# Patient Record
Sex: FEMALE | Race: BLACK OR AFRICAN AMERICAN | Employment: OTHER | URBAN - METROPOLITAN AREA
[De-identification: names, ages, dates, MRNs, and addresses within clinical notes are randomized per-mention and may not be internally consistent; named-entity substitution may affect disease eponyms.]

---

## 2017-07-05 ENCOUNTER — CLINICAL SUPPORT (OUTPATIENT)
Dept: CARDIOLOGY CLINIC | Age: 62
End: 2017-07-05

## 2017-07-05 ENCOUNTER — OFFICE VISIT (OUTPATIENT)
Dept: CARDIOLOGY CLINIC | Age: 62
End: 2017-07-05

## 2017-07-05 VITALS
WEIGHT: 193.4 LBS | SYSTOLIC BLOOD PRESSURE: 132 MMHG | HEART RATE: 84 BPM | HEIGHT: 67 IN | RESPIRATION RATE: 16 BRPM | DIASTOLIC BLOOD PRESSURE: 90 MMHG | BODY MASS INDEX: 30.35 KG/M2 | OXYGEN SATURATION: 95 %

## 2017-07-05 DIAGNOSIS — I49.3 PVC (PREMATURE VENTRICULAR CONTRACTION): ICD-10-CM

## 2017-07-05 DIAGNOSIS — Z95.810 IMPLANTABLE CARDIOVERTER-DEFIBRILLATOR (ICD) IN SITU: ICD-10-CM

## 2017-07-05 DIAGNOSIS — Z95.810 CARDIAC DEFIBRILLATOR IN SITU: Primary | ICD-10-CM

## 2017-07-05 DIAGNOSIS — I42.9 CARDIOMYOPATHY, IDIOPATHIC (HCC): Primary | ICD-10-CM

## 2017-07-05 RX ORDER — SITAGLIPTIN 100 MG/1
TABLET, FILM COATED ORAL
Refills: 5 | COMMUNITY
Start: 2017-06-21 | End: 2019-01-21 | Stop reason: SDUPTHER

## 2017-07-05 RX ORDER — HYDROCHLOROTHIAZIDE 12.5 MG/1
CAPSULE ORAL
Refills: 2 | COMMUNITY
Start: 2017-05-15 | End: 2021-01-01

## 2017-07-05 RX ORDER — LOSARTAN POTASSIUM 100 MG/1
100 TABLET ORAL DAILY
Refills: 2 | COMMUNITY
Start: 2017-04-25 | End: 2021-01-01

## 2017-07-05 NOTE — PROGRESS NOTES
Visit Vitals    /90 (BP 1 Location: Left arm, BP Patient Position: Sitting)    Pulse 84    Resp 16    Ht 5' 7\" (1.702 m)    Wt 193 lb 6.4 oz (87.7 kg)    SpO2 95%    BMI 30.29 kg/m2

## 2017-07-05 NOTE — PROGRESS NOTES
HISTORY OF PRESENTING ILLNESS      Daysi Mobley is a 60 y.o. female with hypertension, cardiomyopathy, and dual chamber Medtronic ICD who presents for follow up of her ICD. She denies chest pain, SOB, edema, syncope or functional limitations. Her device interrogation reveals normal device functioning with > 7 years of battery life remaining. ACTIVE PROBLEM LIST     Patient Active Problem List    Diagnosis Date Noted    Implantable cardioverter-defibrillator (ICD) in situ 11/16/2010    Cardiomyopathy, idiopathic ([address] 10/28/2010    HTN (hypertension), benign 10/28/2010    PVC (premature ventricular contraction) 10/28/2010           PAST MEDICAL HISTORY     Past Medical History:   Diagnosis Date    Cardiomyopathy, idiopathic ([address] 10/28/2010    HTN (hypertension), benign 10/28/2010    PVC (premature ventricular contraction) 10/28/2010           PAST SURGICAL HISTORY     History reviewed. No pertinent surgical history. ALLERGIES     Allergies   Allergen Reactions    Iodine Anaphylaxis    Lisinopril Anaphylaxis          FAMILY HISTORY     History reviewed. No pertinent family history. negative for cardiac disease       SOCIAL HISTORY     Social History     Social History    Marital status: SINGLE     Spouse name: N/A    Number of children: N/A    Years of education: N/A     Social History Main Topics    Smoking status: Never Smoker    Smokeless tobacco: Never Used    Alcohol use No    Drug use: None    Sexual activity: Not Asked     Other Topics Concern    None     Social History Narrative         MEDICATIONS     Current Outpatient Prescriptions   Medication Sig    JANUVIA 100 mg tablet TAKE 1 TABLET BY MOUTH DAILY    losartan (COZAAR) 100 mg tablet TAKE 1 TABLET EVERY DAY    hydroCHLOROthiazide (MICROZIDE) 12.5 mg capsule TAKE ONE CAPSULE BY MOUTH ONCE A DAY    cloNIDine (CATAPRESS) 0.2 mg tablet Take  by mouth daily.     PARoxetine (PAXIL) 20 mg tablet Take  by mouth daily.    omeprazole (PRILOSEC) 20 mg capsule Take 20 mg by mouth daily. No current facility-administered medications for this visit. I have reviewed the nurses notes, vitals, problem list, allergy list, medical history, family, social history and medications. REVIEW OF SYMPTOMS      General: Pt denies excessive weight gain or loss. Pt is able to conduct ADL's  HEENT: Denies blurred vision, headaches, hearing loss, epistaxis and difficulty swallowing. Respiratory: Denies cough, congestion, shortness of breath, WEAVER, wheezing or stridor. Cardiovascular: Denies precordial pain, palpitations, edema or PND  Gastrointestinal: Denies poor appetite, indigestion, abdominal pain or blood in stool  Genitourinary: Denies hematuria, dysuria, increased urinary frequency  Musculoskeletal: Denies joint pain or swelling from muscles or joints  Neurologic: Denies tremor, paresthesias, headache, or sensory motor disturbance  Psychiatric: Denies confusion, insomnia, depression  Integumentray: Denies rash, itching or ulcers. Hematologic: Denies easy bruising, bleeding       PHYSICAL EXAMINATION      Vitals:    07/05/17 1433   BP: 132/90   Pulse: 84   Resp: 16   SpO2: 95%   Weight: 193 lb 6.4 oz (87.7 kg)   Height: 5' 7\" (1.702 m)     General: Well developed, in no acute distress. HEENT: No jaundice, oral mucosa moist, no oral ulcers  Neck: Supple, no stiffness, no lymphadenopathy, supple  Heart:  Normal S1/S2 negative S3 or S4. Regular, no murmur, gallop or rub, no jugular venous distention  Respiratory: Clear bilaterally x 4, no wheezing or rales  Abdomen:   Soft, non-tender, bowel sounds are active.   Extremities:  No edema, normal cap refill, no cyanosis. Musculoskeletal: No clubbing, no deformities  Neuro: A&Ox3, speech clear, gait stable, cooperative, no focal neurologic deficits  Skin: Skin color is normal. No rashes or lesions.  Non diaphoretic, moist.  Vascular: 2+ pulses symmetric in all extremities DIAGNOSTIC DATA      EKG:        LABORATORY DATA      Lab Results   Component Value Date/Time    WBC 5.6 02/14/2014 03:17 PM    HGB 12.8 02/14/2014 03:17 PM    HCT 38.0 02/14/2014 03:17 PM    PLATELET 221 76/69/6217 03:17 PM    MCV 90 02/14/2014 03:17 PM      Lab Results   Component Value Date/Time    Sodium 142 02/14/2014 03:17 PM    Potassium 4.0 02/14/2014 03:17 PM    Chloride 106 02/14/2014 03:17 PM    CO2 25 02/14/2014 03:17 PM    Glucose 85 02/14/2014 03:17 PM    BUN 16 02/14/2014 03:17 PM    Creatinine 0.91 02/14/2014 03:17 PM    BUN/Creatinine ratio 18 02/14/2014 03:17 PM    GFR est AA 79 02/14/2014 03:17 PM    GFR est non-AA 69 02/14/2014 03:17 PM    Calcium 9.2 02/14/2014 03:17 PM           ASSESSMENT      1. ICD   A. Medtronic   B. Dual  2. Cardiomyopathy  3. Hypertension       PLAN     Continue follow up in device clinic. Patient prefers to continue with in-office device checks rather than remote monitoring. FOLLOW-UP     1 year      Thank you, Blake Ordonez MD for allowing me to participate in the care of this extraordinarily pleasant female. Please do not hesitate to contact me for further questions/concerns.          Galindo Lyle MD  Cardiac Electrophysiology / Cardiology    [facility].  [address] [facility], [address], [address] [place] [address], [facility]  [phone] / [phone] Fax   [phone] / [phone] Fax

## 2017-12-18 ENCOUNTER — CLINICAL SUPPORT (OUTPATIENT)
Dept: CARDIOLOGY CLINIC | Age: 62
End: 2017-12-18

## 2017-12-18 DIAGNOSIS — Z95.810 CARDIAC DEFIBRILLATOR IN SITU: Primary | ICD-10-CM

## 2018-03-26 ENCOUNTER — CLINICAL SUPPORT (OUTPATIENT)
Dept: CARDIOLOGY CLINIC | Age: 63
End: 2018-03-26

## 2018-03-26 DIAGNOSIS — Z95.810 CARDIAC DEFIBRILLATOR IN SITU: Primary | ICD-10-CM

## 2018-08-08 ENCOUNTER — OFFICE VISIT (OUTPATIENT)
Dept: CARDIOLOGY CLINIC | Age: 63
End: 2018-08-08

## 2018-08-08 ENCOUNTER — CLINICAL SUPPORT (OUTPATIENT)
Dept: CARDIOLOGY CLINIC | Age: 63
End: 2018-08-08

## 2018-08-08 VITALS
SYSTOLIC BLOOD PRESSURE: 138 MMHG | RESPIRATION RATE: 16 BRPM | BODY MASS INDEX: 29.03 KG/M2 | WEIGHT: 185 LBS | HEIGHT: 67 IN | HEART RATE: 68 BPM | DIASTOLIC BLOOD PRESSURE: 88 MMHG | OXYGEN SATURATION: 98 %

## 2018-08-08 DIAGNOSIS — I49.3 PVC (PREMATURE VENTRICULAR CONTRACTION): ICD-10-CM

## 2018-08-08 DIAGNOSIS — I10 HTN (HYPERTENSION), BENIGN: ICD-10-CM

## 2018-08-08 DIAGNOSIS — Z95.810 IMPLANTABLE CARDIOVERTER-DEFIBRILLATOR (ICD) IN SITU: ICD-10-CM

## 2018-08-08 DIAGNOSIS — Z95.810 CARDIAC DEFIBRILLATOR IN SITU: Primary | ICD-10-CM

## 2018-08-08 DIAGNOSIS — I42.9 CARDIOMYOPATHY, IDIOPATHIC (HCC): Primary | ICD-10-CM

## 2018-08-08 DIAGNOSIS — I48.0 PAROXYSMAL ATRIAL FIBRILLATION (HCC): ICD-10-CM

## 2018-08-08 RX ORDER — NIFEDIPINE 30 MG/1
TABLET, FILM COATED, EXTENDED RELEASE ORAL
Refills: 2 | COMMUNITY
Start: 2018-05-20 | End: 2021-01-01

## 2018-08-08 RX ORDER — CLONIDINE HYDROCHLORIDE 0.2 MG/1
0.2 TABLET ORAL 2 TIMES DAILY
Refills: 3 | COMMUNITY
Start: 2018-05-21 | End: 2021-01-01

## 2018-08-08 RX ORDER — METOPROLOL TARTRATE 25 MG/1
25 TABLET, FILM COATED ORAL 2 TIMES DAILY
Qty: 60 TAB | Refills: 3 | Status: SHIPPED | OUTPATIENT
Start: 2018-08-08 | End: 2018-09-10 | Stop reason: ALTCHOICE

## 2018-08-08 NOTE — PATIENT INSTRUCTIONS
Treatment Plan:  · Follow up in 1 month. · Start metoprolol 25mg twice daily. · Start Eliquis 5mg twice daily    Apixaban (By mouth)   Apixaban (a-PIX-a-ban)  Treats and prevents blood clots. This medicine is a blood thinner. Brand Name(s): Eliquis   There may be other brand names for this medicine. When This Medicine Should Not Be Used: This medicine is not right for everyone. Do not use it if you had an allergic reaction to apixaban or you have active bleeding. How to Use This Medicine:   Tablet  · Your doctor will tell you how much medicine to use. Do not use more than directed. · If you are not able to swallow the tablets whole, they may be crushed and mixed in water, 5% dextrose in water (D5W), apple juice, or applesauce. The crushed tablets may be mixed with 60 mL of water or D5W dose and given through a nasogastric tube (NGT). · This medicine should come with a Medication Guide. Ask your pharmacist for a copy if you do not have one. · Missed dose: Take a dose as soon as you remember. If it is almost time for your next dose, wait until then and take a regular dose. Do not take extra medicine to make up for a missed dose. · Store the medicine in a closed container at room temperature, away from heat, moisture, and direct light. Drugs and Foods to Avoid:   Ask your doctor or pharmacist before using any other medicine, including over-the-counter medicines, vitamins, and herbal products. · Some medicines can affect how apixaban works.  Tell your doctor if you are using any of the following:   ¨ Carbamazepine, clarithromycin, itraconazole, ketoconazole, phenytoin, rifampin, ritonavir, Lauryn's wort  ¨ Blood thinner (including clopidogrel, heparin, prasugrel, warfarin)  ¨ Medicine to treat depression  ¨ NSAID pain or arthritis medicine (including aspirin, celecoxib, diclofenac, ibuprofen, naproxen)  Warnings While Using This Medicine:   · Tell your doctor if you are pregnant or breastfeeding, or if you have kidney disease, liver disease, bleeding problems, or an artificial heart valve. · Do not stop using this medicine suddenly without asking your doctor. You might have a higher risk of stroke for a short time after you stop using this medicine. · This medicine increases your risk for bleeding that can become serious if not controlled. You may also bruise easily, and it may take longer than usual for bleeding to stop. · This medicine may increase your risk for blood clots in your spine or back if you undergo an epidural or spinal puncture. This could lead to paralysis. Tell your doctor if you ever had spine problems or back surgery. · Tell any doctor or dentist who treats you that you are using this medicine. With your doctor's supervision, you may need to stop using this medicine several days before you have surgery or medical tests. · Your doctor will do lab tests at regular visits to check on the effects of this medicine. Keep all appointments. · Keep all medicine out of the reach of children. Never share your medicine with anyone. Possible Side Effects While Using This Medicine:   Call your doctor right away if you notice any of these side effects:  · Allergic reaction: Itching or hives, swelling in your face or hands, swelling or tingling in your mouth or throat, chest tightness, trouble breathing  · Change in how much or how often you urinate, red or pink urine  · Chest pain, trouble breathing  · Coughing up blood, vomiting blood or material that looks like coffee grounds  · Numbness, tingling, or muscle weakness in your legs or feet  · Red or black, tarry stools  · Unusual bleeding, bruising, or weakness  If you notice other side effects that you think are caused by this medicine, tell your doctor. Call your doctor for medical advice about side effects.  You may report side effects to FDA at 9-573-FDA-4284  © 2017 [facility] Information is for End User's use only and may not be sold, redistributed or otherwise used for commercial purposes. The above information is an  only. It is not intended as medical advice for individual conditions or treatments. Talk to your doctor, nurse or pharmacist before following any medical regimen to see if it is safe and effective for you.

## 2018-08-08 NOTE — MR AVS SNAPSHOT
[address]  [place] [address] [facility] 
[phone] Patient: Rocío Roman MRN: [id number] QIS:[id number]/[id number] Visit Information Date & Time Provider Department Dept. Phone Encounter #  
 8/8/2018  9:20 AM Damaso Encinas MD [facility] [place] Addison [phone] [id number] Your Appointments 9/10/2018  9:40 AM  
ESTABLISHED PATIENT with Damaso Encinas MD  
[facility] ([address]) Appt Note: 1 month follow up  
 [address] [place] [address] [place] [id number]  
[phone]  
  
   
 [address] [facility] [place] [address] [place] [address]  
  
    
 11/12/2018  8:45 AM  
PACEMAKER with PACEMAKER, FRANCES  
[facility] (CRISTY SCHEDULING) Appt Note: med icd/stf  
 [address] [facility] [place] [address] [facility]  
[address] [facility] [address] [address] Upcoming Health Maintenance Date Due Hepatitis C Screening 1955 Pneumococcal 19-64 Medium Risk (1 of 1 - PPSV23) 1/28/1973 DTaP/Tdap/Td series (1 - Tdap) 1/28/1975 PAP AKA CERVICAL CYTOLOGY 1/28/1975 BREAST CANCER SCRN MAMMOGRAM 1/28/2004 FOBT Q 1 YEAR AGE 50-75 1/28/2004 ZOSTER VACCINE AGE 60> 11/28/2013 MEDICARE YEARLY EXAM 3/14/2018 Influenza Age 5 to Adult 8/1/2018 Allergies as of 8/8/2018  Review Complete On: 8/8/2018 By: Mylene Balbuena NP Severity Noted Reaction Type Reactions Iodine High 10/28/2010    Anaphylaxis Lisinopril High 02/14/2014    Anaphylaxis Current Immunizations  Reviewed on 2/28/2014 No immunizations on file. Not reviewed this visit You Were Diagnosed With   
  
 Codes Comments Cardiomyopathy, idiopathic ([facility]    -  Primary ICD-10-CM: I42.8 ICD-9-CM: 425.4 PVC (premature ventricular contraction)     ICD-10-CM: I49.3 ICD-9-CM: 427.69   
 Implantable cardioverter-defibrillator (ICD) in situ     ICD-10-CM: Z95.810 ICD-9-CM: V45.02 Paroxysmal atrial fibrillation (HCC)     ICD-10-CM: I48.0 ICD-9-CM: 427.31   
 HTN (hypertension), benign     ICD-10-CM: I10 
ICD-9-CM: 401.1 Vitals BP Pulse Resp Height(growth percentile) Weight(growth percentile) SpO2  
 138/88 (BP 1 Location: Right arm, BP Patient Position: Sitting) 68 16 5' 7\" (1.702 m) 185 lb (83.9 kg) 98% BMI Smoking Status 28.98 kg/m2 Never Smoker Vitals History BMI and BSA Data Body Mass Index Body Surface Area  
 28.98 kg/m 2 1.99 m 2 Preferred Pharmacy Pharmacy Name Phone [id number] [place] [place] - [address] [place] [address] [phone] Your Updated Medication List  
  
   
This list is accurate as of 8/8/18  9:58 AM.  Always use your most recent med list.  
  
  
  
  
 apixaban 5 mg tablet Commonly known as:  Laurent Amna Take 1 Tab by mouth two (2) times a day. cloNIDine HCl 0.3 mg tablet Commonly known as:  CATAPRES  
TAKE 1 TABLET TWICE DAILY  
  
 hydroCHLOROthiazide 12.5 mg capsule Commonly known as:  Christo Hilario TAKE ONE CAPSULE BY MOUTH ONCE A DAY  
  
 JANUVIA 100 mg tablet Generic drug:  SITagliptin TAKE 1 TABLET BY MOUTH DAILY losartan 100 mg tablet Commonly known as:  COZAAR  
TAKE 1 TABLET EVERY DAY  
  
 metoprolol tartrate 25 mg tablet Commonly known as:  LOPRESSOR Take 1 Tab by mouth two (2) times a day. NIFEdipine ER 30 mg ER tablet Commonly known as:  ADALAT CC  
TAKE 1 TABLET BY MOUTH EVERY DAY FOR 90 DAYS  
  
 omeprazole 20 mg capsule Commonly known as:  PRILOSEC Take 20 mg by mouth daily. PARoxetine 20 mg tablet Commonly known as:  PAXIL Take  by mouth daily. Prescriptions Sent to Pharmacy Refills  
 apixaban (ELIQUIS) 5 mg tablet 3 Sig: Take 1 Tab by mouth two (2) times a day.   
 Class: Normal  
 Pharmacy: [facility]acy [address] Ph #: [phone] Route: Oral  
 metoprolol tartrate (LOPRESSOR) 25 mg tablet 3 Sig: Take 1 Tab by mouth two (2) times a day. Class: Normal  
 Pharmacy: [facility]/pharm[facility] #[id number] [address] Ph #: [phone] Route: Oral  
  
We Performed the Following AMB POC EKG ROUTINE W/ 12 LEADS, INTER & REP [23018 CPT(R)] CBC WITH AUTOMATED DIFF [53195 CPT(R)] METABOLIC PANEL, COMPREHENSIVE [70151 CPT(R)] THYROID PANEL W/TSH [16326 CPT(R)] Patient Instructions Treatment Plan: · Follow up in 1 month. · Start metoprolol 25mg twice daily. · Start Eliquis 5mg twice daily Apixaban (By mouth) Apixaban (a-PIX-a-ban) Treats and prevents blood clots. This medicine is a blood thinner. Brand Name(s): Eliquis There may be other brand names for this medicine. When This Medicine Should Not Be Used: This medicine is not right for everyone. Do not use it if you had an allergic reaction to apixaban or you have active bleeding. How to Use This Medicine:  
Tablet · Your doctor will tell you how much medicine to use. Do not use more than directed. · If you are not able to swallow the tablets whole, they may be crushed and mixed in water, 5% dextrose in water (D5W), apple juice, or applesauce. The crushed tablets may be mixed with 60 mL of water or D5W dose and given through a nasogastric tube (NGT). · This medicine should come with a Medication Guide. Ask your pharmacist for a copy if you do not have one. · Missed dose: Take a dose as soon as you remember. If it is almost time for your next dose, wait until then and take a regular dose. Do not take extra medicine to make up for a missed dose. · Store the medicine in a closed container at room temperature, away from heat, moisture, and direct light. Drugs and Foods to Avoid: Ask your doctor or pharmacist before using any other medicine, including over-the-counter medicines, vitamins, and herbal products. · Some medicines can affect how apixaban works. Tell your doctor if you are using any of the following: ¨ Carbamazepine, clarithromycin, itraconazole, ketoconazole, phenytoin, rifampin, ritonavir, Lauryn's wort ¨ Blood thinner (including clopidogrel, heparin, prasugrel, warfarin) ¨ Medicine to treat depression ¨ NSAID pain or arthritis medicine (including aspirin, celecoxib, diclofenac, ibuprofen, naproxen) Warnings While Using This Medicine: · Tell your doctor if you are pregnant or breastfeeding, or if you have kidney disease, liver disease, bleeding problems, or an artificial heart valve. · Do not stop using this medicine suddenly without asking your doctor. You might have a higher risk of stroke for a short time after you stop using this medicine. · This medicine increases your risk for bleeding that can become serious if not controlled. You may also bruise easily, and it may take longer than usual for bleeding to stop. · This medicine may increase your risk for blood clots in your spine or back if you undergo an epidural or spinal puncture. This could lead to paralysis. Tell your doctor if you ever had spine problems or back surgery. · Tell any doctor or dentist who treats you that you are using this medicine. With your doctor's supervision, you may need to stop using this medicine several days before you have surgery or medical tests. · Your doctor will do lab tests at regular visits to check on the effects of this medicine. Keep all appointments. · Keep all medicine out of the reach of children. Never share your medicine with anyone. Possible Side Effects While Using This Medicine:  
Call your doctor right away if you notice any of these side effects: · Allergic reaction: Itching or hives, swelling in your face or hands, swelling or tingling in your mouth or throat, chest tightness, trouble breathing · Change in how much or how often you urinate, red or pink urine · Chest pain, trouble breathing · Coughing up blood, vomiting blood or material that looks like coffee grounds · Numbness, tingling, or muscle weakness in your legs or feet · Red or black, tarry stools · Unusual bleeding, bruising, or weakness If you notice other side effects that you think are caused by this medicine, tell your doctor. Call your doctor for medical advice about side effects. You may report side effects to FDA at 9-755-GQE-3035 © 2017 [address] Information is for End User's use only and may not be sold, redistributed or otherwise used for commercial purposes. The above information is an  only. It is not intended as medical advice for individual conditions or treatments. Talk to your doctor, nurse or pharmacist before following any medical regimen to see if it is safe and effective for you. Introducing [facility]! [employer] introduces [employer] patient portal. Now you can access parts of your medical record, email your doctor's office, and request medication refills online. 1. In your internet browser, go to [email or web address] 2. Click on the First Time User? Click Here link in the Sign In box. You will see the New Member Sign Up page. 3. Enter your [employer] Access Code exactly as it appears below. You will not need to use this code after youve completed the sign-up process. If you do not sign up before the expiration date, you must request a new code. · [employer] Access Code: [email or web address] Expires: 11/6/2018  9:58 AM 
 
4. Enter the last four digits of your Social Security Number (xxxx) and Date of Birth (mm/dd/yyyy) as indicated and click Submit. You will be taken to the next sign-up page. 5. Create a [employer] ID.  This will be your [employer] login ID and cannot be changed, so think of one that is secure and easy to remember. 6. Create a [employer] password. You can change your password at any time. 7. Enter your Password Reset Question and Answer. This can be used at a later time if you forget your password. 8. Enter your e-mail address. You will receive e-mail notification when new information is available in [address] 9. Click Sign Up. You can now view and download portions of your medical record. 10. Click the Download Summary menu link to download a portable copy of your medical information. If you have questions, please visit the Frequently Asked Questions section of the [employer] website. Remember, [employer] is NOT to be used for urgent needs. For medical emergencies, dial 911. Now available from your iPhone and Android! Please provide this summary of care documentation to your next provider. Your primary care clinician is listed as Sherlean Gosselin. If you have any questions after today's visit, please call [phone].

## 2018-08-08 NOTE — PROGRESS NOTES
Visit Vitals    /88 (BP 1 Location: Right arm, BP Patient Position: Sitting)    Pulse 68    Resp 16    Ht 5' 7\" (1.702 m)    Wt 185 lb (83.9 kg)    SpO2 98%    BMI 28.98 kg/m2     Medication changes made  per verbal order of Dr. Jerry Banda

## 2018-08-08 NOTE — PROGRESS NOTES
HISTORY OF PRESENTING ILLNESS      Alessandra Payne is a 58 y.o. female hypertension, cardiomyopathy, and dual chamber Medtronic ICD who presents for follow up of her ICD. She denies chest pain, SOB, edema, syncope or functional limitations. Her device interrogation reveals normal device functioning. She has new onset atrial fibrillation occurring on device check, longest episode was 40 minutes. She denies cardiac complaints. ACTIVE PROBLEM LIST     Patient Active Problem List    Diagnosis Date Noted    Implantable cardioverter-defibrillator (ICD) in situ 11/16/2010    Cardiomyopathy, idiopathic ([address] 10/28/2010    HTN (hypertension), benign 10/28/2010    PVC (premature ventricular contraction) 10/28/2010           PAST MEDICAL HISTORY     Past Medical History:   Diagnosis Date    Cardiomyopathy, idiopathic ([address] 10/28/2010    HTN (hypertension), benign 10/28/2010    PVC (premature ventricular contraction) 10/28/2010           PAST SURGICAL HISTORY     History reviewed. No pertinent surgical history. ALLERGIES     Allergies   Allergen Reactions    Iodine Anaphylaxis    Lisinopril Anaphylaxis          FAMILY HISTORY     History reviewed. No pertinent family history. negative for cardiac disease       SOCIAL HISTORY     Social History     Social History    Marital status: SINGLE     Spouse name: N/A    Number of children: N/A    Years of education: N/A     Social History Main Topics    Smoking status: Never Smoker    Smokeless tobacco: Never Used    Alcohol use No    Drug use: None    Sexual activity: Not Asked     Other Topics Concern    None     Social History Narrative         MEDICATIONS     Current Outpatient Prescriptions   Medication Sig    cloNIDine HCl (CATAPRES) 0.3 mg tablet TAKE 1 TABLET TWICE DAILY    NIFEdipine ER (ADALAT CC) 30 mg ER tablet TAKE 1 TABLET BY MOUTH EVERY DAY FOR 90 DAYS    apixaban (ELIQUIS) 5 mg tablet Take 1 Tab by mouth two (2) times a day.  metoprolol tartrate (LOPRESSOR) 25 mg tablet Take 1 Tab by mouth two (2) times a day.  JANUVIA 100 mg tablet TAKE 1 TABLET BY MOUTH DAILY    losartan (COZAAR) 100 mg tablet TAKE 1 TABLET EVERY DAY    hydroCHLOROthiazide (MICROZIDE) 12.5 mg capsule TAKE ONE CAPSULE BY MOUTH ONCE A DAY    PARoxetine (PAXIL) 20 mg tablet Take  by mouth daily.  omeprazole (PRILOSEC) 20 mg capsule Take 20 mg by mouth daily. No current facility-administered medications for this visit. I have reviewed the nurses notes, vitals, problem list, allergy list, medical history, family, social history and medications. REVIEW OF SYMPTOMS      General: Pt denies excessive weight gain or loss. Pt is able to conduct ADL's  HEENT: Denies blurred vision, headaches, hearing loss, epistaxis and difficulty swallowing. Respiratory: Denies cough, congestion, shortness of breath, WEAVER, wheezing or stridor. Cardiovascular: Denies precordial pain, palpitations, edema or PND  Gastrointestinal: Denies poor appetite, indigestion, abdominal pain or blood in stool  Genitourinary: Denies hematuria, dysuria, increased urinary frequency  Musculoskeletal: Denies joint pain or swelling from muscles or joints  Neurologic: Denies tremor, paresthesias, headache, or sensory motor disturbance  Psychiatric: Denies confusion, insomnia, depression  Integumentray: Denies rash, itching or ulcers. Hematologic: Denies easy bruising, bleeding       PHYSICAL EXAMINATION      Vitals:    08/08/18 0830   BP: 138/88   Pulse: 68   Resp: 16   SpO2: 98%   Weight: 185 lb (83.9 kg)   Height: 5' 7\" (1.702 m)     General: Well developed, in no acute distress. HEENT: No jaundice, oral mucosa moist, no oral ulcers  Neck: Supple, no stiffness, no lymphadenopathy, supple  Heart:  Normal S1/S2 negative S3 or S4.  Regular, no murmur, gallop or rub, no jugular venous distention  Respiratory: Clear bilaterally x 4, no wheezing or rales  Abdomen:   Soft, non-tender, bowel sounds are active.   Extremities:  No edema, normal cap refill, no cyanosis. Musculoskeletal: No clubbing, no deformities  Neuro: A&Ox3, speech clear, gait stable, cooperative, no focal neurologic deficits  Skin: Skin color is normal. No rashes or lesions. Non diaphoretic, moist.  Vascular: 2+ pulses symmetric in all extremities       DIAGNOSTIC DATA      EKG: sinus rhythm        LABORATORY DATA      Lab Results   Component Value Date/Time    WBC 5.6 02/14/2014 03:17 PM    HGB 12.8 02/14/2014 03:17 PM    HCT 38.0 02/14/2014 03:17 PM    PLATELET 620 62/58/5349 03:17 PM    MCV 90 02/14/2014 03:17 PM      Lab Results   Component Value Date/Time    Sodium 142 02/14/2014 03:17 PM    Potassium 4.0 02/14/2014 03:17 PM    Chloride 106 02/14/2014 03:17 PM    CO2 25 02/14/2014 03:17 PM    Glucose 85 02/14/2014 03:17 PM    BUN 16 02/14/2014 03:17 PM    Creatinine 0.91 02/14/2014 03:17 PM    BUN/Creatinine ratio 18 02/14/2014 03:17 PM    GFR est AA 79 02/14/2014 03:17 PM    GFR est non-AA 69 02/14/2014 03:17 PM    Calcium 9.2 02/14/2014 03:17 PM           ASSESSMENT      1. ICD                        A. Medtronic                        B. Dual  2. Cardiomyopathy  3. Hypertension  4. Paroxysmal [facility]   CHADS VASC 3              Asymptomatic       PLAN     Will start Eliquis 5mg BID for CVA risk reduction (CHADS VASC 3) as well as Lopressor 25mg BID. Obtain labwork and follow up with device interrogation in one month. FOLLOW-UP   1 month    Thank you, Gladys Christian MD for allowing me to participate in the care of this extraordinarily pleasant female. Please do not hesitate to contact me for further questions/concerns.      WILLIE Dey MD  Cardiac Electrophysiology / Cardiology    [address]  [address] [facility], [address] [place] [address], [address] [place] [address], [address] [place] [address] [id number]  [phone] / [phone] Fax   [phone] / [phone] Fax

## 2018-08-13 ENCOUNTER — HOSPITAL ENCOUNTER (OUTPATIENT)
Dept: LAB | Age: 63
Discharge: HOME | End: 2018-08-13
Payer: MEDICARE

## 2018-08-13 PROCEDURE — 36415 COLL VENOUS BLD VENIPUNCTURE: CPT

## 2018-08-13 PROCEDURE — 84443 ASSAY THYROID STIM HORMONE: CPT

## 2018-08-13 PROCEDURE — 80053 COMPREHEN METABOLIC PANEL: CPT

## 2018-08-13 PROCEDURE — 85025 COMPLETE CBC W/AUTO DIFF WBC: CPT

## 2018-08-14 LAB
ALBUMIN SERPL-MCNC: 4 G/DL (ref 3.6–4.8)
ALBUMIN/GLOB SERPL: 1.2 {RATIO} (ref 1.2–2.2)
ALP SERPL-CCNC: 117 IU/L (ref 39–117)
ALT SERPL-CCNC: 16 IU/L (ref 0–32)
AST SERPL-CCNC: 14 IU/L (ref 0–40)
BASOPHILS # BLD AUTO: 0 X10E3/UL (ref 0–0.2)
BASOPHILS NFR BLD AUTO: 0 %
BILIRUB SERPL-MCNC: 0.4 MG/DL (ref 0–1.2)
BUN SERPL-MCNC: 21 MG/DL (ref 8–27)
BUN/CREAT SERPL: 18 (ref 12–28)
CALCIUM SERPL-MCNC: 9.5 MG/DL (ref 8.7–10.3)
CHLORIDE SERPL-SCNC: 93 MMOL/L (ref 96–106)
CO2 SERPL-SCNC: 26 MMOL/L (ref 20–29)
CREAT SERPL-MCNC: 1.19 MG/DL (ref 0.57–1)
EOSINOPHIL # BLD AUTO: 0.1 X10E3/UL (ref 0–0.4)
EOSINOPHIL NFR BLD AUTO: 1 %
ERYTHROCYTE [DISTWIDTH] IN BLOOD BY AUTOMATED COUNT: 13.5 % (ref 12.3–15.4)
FT4I SERPL CALC-MCNC: 1.9 (ref 1.2–4.9)
GLOBULIN SER CALC-MCNC: 3.4 G/DL (ref 1.5–4.5)
GLUCOSE SERPL-MCNC: 417 MG/DL (ref 65–99)
HCT VFR BLD AUTO: 40.5 % (ref 34–46.6)
HGB BLD-MCNC: 13.7 G/DL (ref 11.1–15.9)
IMM GRANULOCYTES # BLD: 0 X10E3/UL (ref 0–0.1)
IMM GRANULOCYTES NFR BLD: 0 %
INTERPRETATION: NORMAL
LYMPHOCYTES # BLD AUTO: 1.8 X10E3/UL (ref 0.7–3.1)
LYMPHOCYTES NFR BLD AUTO: 29 %
MCH RBC QN AUTO: 30 PG (ref 26.6–33)
MCHC RBC AUTO-ENTMCNC: 33.8 G/DL (ref 31.5–35.7)
MCV RBC AUTO: 89 FL (ref 79–97)
MONOCYTES # BLD AUTO: 0.5 X10E3/UL (ref 0.1–0.9)
MONOCYTES NFR BLD AUTO: 8 %
NEUTROPHILS # BLD AUTO: 3.7 X10E3/UL (ref 1.4–7)
NEUTROPHILS NFR BLD AUTO: 62 %
PLATELET # BLD AUTO: 173 X10E3/UL (ref 150–379)
POTASSIUM SERPL-SCNC: 4.6 MMOL/L (ref 3.5–5.2)
PROT SERPL-MCNC: 7.4 G/DL (ref 6–8.5)
RBC # BLD AUTO: 4.57 X10E6/UL (ref 3.77–5.28)
SODIUM SERPL-SCNC: 134 MMOL/L (ref 134–144)
T3RU NFR SERPL: 23 % (ref 24–39)
T4 SERPL-MCNC: 8.1 UG/DL (ref 4.5–12)
TSH SERPL DL<=0.005 MIU/L-ACNC: 0.34 UIU/ML (ref 0.45–4.5)
WBC # BLD AUTO: 6 X10E3/UL (ref 3.4–10.8)

## 2018-08-30 ENCOUNTER — HOSPITAL ENCOUNTER (OUTPATIENT)
Dept: LAB | Age: 63
Discharge: HOME | End: 2018-08-30
Payer: MEDICARE

## 2018-08-30 ENCOUNTER — OFFICE VISIT (OUTPATIENT)
Dept: ENDOCRINOLOGY | Age: 63
End: 2018-08-30

## 2018-08-30 VITALS
RESPIRATION RATE: 18 BRPM | WEIGHT: 178.3 LBS | HEIGHT: 67 IN | OXYGEN SATURATION: 99 % | HEART RATE: 55 BPM | SYSTOLIC BLOOD PRESSURE: 99 MMHG | DIASTOLIC BLOOD PRESSURE: 72 MMHG | BODY MASS INDEX: 27.99 KG/M2 | TEMPERATURE: 98 F

## 2018-08-30 DIAGNOSIS — I10 ESSENTIAL HYPERTENSION: ICD-10-CM

## 2018-08-30 DIAGNOSIS — E11.65 TYPE 2 DIABETES MELLITUS WITH HYPERGLYCEMIA, UNSPECIFIED WHETHER LONG TERM INSULIN USE (HCC): Primary | ICD-10-CM

## 2018-08-30 LAB
GLUCOSE POC: 428 MG/DL
HBA1C MFR BLD HPLC: 13.4 %

## 2018-08-30 PROCEDURE — 84681 ASSAY OF C-PEPTIDE: CPT

## 2018-08-30 PROCEDURE — 83516 IMMUNOASSAY NONANTIBODY: CPT

## 2018-08-30 PROCEDURE — 80061 LIPID PANEL: CPT

## 2018-08-30 PROCEDURE — 82043 UR ALBUMIN QUANTITATIVE: CPT

## 2018-08-30 PROCEDURE — 36415 COLL VENOUS BLD VENIPUNCTURE: CPT

## 2018-08-30 PROCEDURE — 80053 COMPREHEN METABOLIC PANEL: CPT

## 2018-08-30 RX ORDER — NITROGLYCERIN 0.4 MG/1
0.4 TABLET SUBLINGUAL
COMMUNITY
End: 2021-01-01

## 2018-08-30 RX ORDER — SYRINGE AND NEEDLE,INSULIN,1ML 31GX15/64"
SYRINGE, EMPTY DISPOSABLE MISCELLANEOUS
Qty: 200 PEN NEEDLE | Refills: 6 | Status: SHIPPED | OUTPATIENT
Start: 2018-08-30 | End: 2021-01-01

## 2018-08-30 NOTE — PROGRESS NOTES
1. Have you been to the ER, urgent care clinic since your last visit? No  Hospitalized since your last visit? No 
 
2. Have you seen or consulted any other health care providers outside of the [address] [place] [address] since your last visit? Yes Wt Readings from Last 3 Encounters:  
08/30/18 178 lb 4.8 oz (80.9 kg) 08/08/18 185 lb (83.9 kg) 07/05/17 193 lb 6.4 oz (87.7 kg) Temp Readings from Last 3 Encounters:  
08/30/18 98 °F (36.7 °C) (Oral) 02/28/14 98.1 °F (36.7 °C) BP Readings from Last 3 Encounters:  
08/30/18 99/72  
08/08/18 138/88  
07/05/17 132/90 Pulse Readings from Last 3 Encounters:  
08/30/18 (!) 55  
08/08/18 68  
07/05/17 84 Patient has two meters today.

## 2018-08-30 NOTE — PROGRESS NOTES
HISTORY OF PRESENT ILLNESS Ruthie Bennett is a 58 y.o. female. HPI Patient here for initial visit of Type 2 diabetes mellitus . Referred : by pcp H/o diabetes for 5 years Current A1C is over 13 %   and symptoms/problems include polyuria, polydipsia and visual disturbances Current diabetic medications include metformin . Current monitoring regimen: home blood tests - rarely Home blood sugar records: trend: fluctuating a lot Any episodes of hypoglycemia? no 
 
Weight trend: fluctuating a lot Prior visit with dietician: no 
Current diet: \"unhealthy\" diet in general 
Current exercise: no regular exercise Known diabetic complications: cardiac and renal  
Cardiovascular risk factors: diabetes mellitus, obesity, hypertension, stress, post-menopausal 
 
Eye exam current (within one year): yes KAYE: no  
 
Past Medical History:  
Diagnosis Date  Cardiomyopathy, idiopathic ([facility] 10/28/2010  
 HTN (hypertension), benign 10/28/2010  PVC (premature ventricular contraction) 10/28/2010 History reviewed. No pertinent surgical history. Current Outpatient Prescriptions Medication Sig  
 nitroglycerin (NITROSTAT) 0.4 mg SL tablet 0.4 mg by SubLINGual route every five (5) minutes as needed for Chest Pain. Up to 3 doses.  cloNIDine HCl (CATAPRES) 0.3 mg tablet TAKE 1 TABLET TWICE DAILY  NIFEdipine ER (ADALAT CC) 30 mg ER tablet TAKE 1 TABLET BY MOUTH EVERY DAY FOR 90 DAYS  apixaban (ELIQUIS) 5 mg tablet Take 1 Tab by mouth two (2) times a day.  metoprolol tartrate (LOPRESSOR) 25 mg tablet Take 1 Tab by mouth two (2) times a day.  JANUVIA 100 mg tablet TAKE 1 TABLET BY MOUTH DAILY  losartan (COZAAR) 100 mg tablet TAKE 1 TABLET EVERY DAY  hydroCHLOROthiazide (MICROZIDE) 12.5 mg capsule TAKE ONE CAPSULE BY MOUTH ONCE A DAY  PARoxetine (PAXIL) 20 mg tablet Take  by mouth daily.  omeprazole (PRILOSEC) 20 mg capsule Take 20 mg by mouth daily. No current facility-administered medications for this visit. Review of Systems Constitutional: Positive for malaise/fatigue and weight loss. Eyes: Positive for blurred vision. Cardiovascular: Positive for palpitations. Physical Exam  
Constitutional: She is oriented to person, place, and time. She appears well-developed and well-nourished. HENT:  
Head: Normocephalic. Eyes: Conjunctivae and EOM are normal. Pupils are equal, round, and reactive to light. Neck: Normal range of motion. Neck supple. No JVD present. No tracheal deviation present. No thyromegaly present. Cardiovascular: Normal rate, regular rhythm and normal heart sounds. No murmur heard. Pulmonary/Chest: Breath sounds normal.  
Abdominal: Soft. Bowel sounds are normal.  
Musculoskeletal: Normal range of motion. Lymphadenopathy:  
  She has no cervical adenopathy. Neurological: She is alert and oriented to person, place, and time. She has normal reflexes. Skin: Skin is warm. Psychiatric: She has a normal mood and affect. Diabetic foot exam:  
 
Left Foot: 
 Visual Exam: normal  
 Pulse DP: 2+ (normal) Filament test: normal sensation Vibratory sensation: normal 
   
Right Foot: 
 Visual Exam: normal  
 Pulse DP: 2+ (normal) Filament test: normal sensation Vibratory sensation: normal 
 
 
ASSESSMENT and PLAN 1. Type 2 DM, poorly controlled : a1c is over 14 % Discussed DM 2 patho-physiology extensively  
 
could not review the glucose log 
 
starting on BASAL BOLUS REGIMEN  
EXPLAINED THE IMPORTANCE OF CHECKS AND ALSO INSULIN INTAKE  
 
TRAINED ON USING THE VIALS Patient is advised about checking blood sugars 4 times a day and maintaining log book. The danger of having low blood sugars has been explained with inappropriate use of insulin  Patient voiced understanding and using the printed instructions at home. Hypoglycemia management has been explained to the patient. Carbohydrate counting discussed with the patient  
lab results and schedule of future lab studies reviewed with patient 
specific diabetic recommendations: diabetic diet discussed in detail, written exchange diet given, home glucose monitoring emphasized, home glucose monitoring demonstrated and taught, all medications, side effects and compliance discussed carefully, use and side effects of insulin is taught, foot care discussed and Podiatry visits discussed, annual eye examinations at Ophthalmology discussed, glycohemoglobin and other lab monitoring discussed, long term diabetic complications discussed and labs immediately prior to next visit 2. Hypoglycemia :  Educated on treating the hypoglycemia. Discussed INSULIN use and prescribed 3. HTN : continue current care. Patient is educated about importance of compliance with anti-hypertensives especially ARB/ACEI 4. Dyslipidemia : continue current meds. Patient is educated about benefits and adverse effects of statins and explained how benefits outweigh risk. 5. use of aspirin to prevent MI and TIA's discussed 6. Cad : S/p IACD - ON ELIQUIS 7. ckd ; CREAT WAS1.25 WITH Egfr OF 53, STAGE 2 CKD LIKELY PRE RENAL FROM HYPERGLYCEMIAS  
 
> 50 % of time is spent on counseling Patient voiced understanding her plan of care

## 2018-08-30 NOTE — PATIENT INSTRUCTIONS
-------------------------------------------------------------------------------------------- Refills    -    please call your pharmacy and have them send us a refill request 
 
Results  -  allow up to a week for lab results to be processed and reviewed. Phone calls  -  Allow upto 24 hrs. for non-urgent calls to be retained Prior authorization - It may take up to 2 weeks to process, depending on your insurance Forms  -  FMLA, DMV, patient assistance, etc. will take up to 2 weeks to process Cancellations - please notify the office in advance if you cannot keep your appointment Samples  - will only be dispensed at visits as supply is limited If you are having a medical emergency call 911 
 
-------------------------------------------------------------------------------------------- Do not skip meals Do not eat in between meals Reduce carbs- pasta, rice, potatoes, bread Do not drink juices or sodas Do not eat peanut butter Do not eat sugar free cookies and cakes Do not eat peaches, grapes,  Oranges, pineapples, canteloupe and raisins Check blood sugars immediately before each meal and at bedtime COLD PACK Take  NPH   novolin   insulin  24  units  at bed time Take  novolin  Regular  Insulin   6   units before breakfast, 6 units before lunch and 6 units before dinner. Also, add additional  Regular insulin  as follows with meals  If blood sugars are[de-identified] 
 
150-200 mg 0 units 201-250 mg 2 units 251-300 mg 4 units 301-350 mg 6 units 351-400 mg 8 units 401-450 mg 10 units 451-500 mg 12 units Less than 70 mg NO INSULIN

## 2018-08-30 NOTE — MR AVS SNAPSHOT
[address] [facility] [address] 
[phone] Patient: Kathryn Johnson MRN: [id number] [facility]:[birth year] Visit Information Date & Time Provider Department Dept. Phone Encounter #  
 8/30/2018 10:00 AM Yuri Sanchez MD [facility] [phone] [id number] Follow-up Instructions Return in about 1 month (around 9/30/2018). Your Appointments 9/10/2018  9:40 AM  
ESTABLISHED PATIENT with Scot Walden MD  
[facility] ([address]) Appt Note: 1 month follow up  
 [address] [facility] [address] [facility] [address]  
[phone]  
  
   
 [address] [address]  
  
    
 9/10/2018 10:00 AM  
PACEMAKER with PACEMAKER, STFRANCES  
[facility] ([facility] SCHEDULING) Appt Note: Medtronic ICD interrogation and seeing Dr. Nadia Hall [address] [facility] [address] [facility] [address]  
[phone]  
  
   
 [address] [address]  
  
    
 11/12/2018  8:45 AM  
PACEMAKER with PACEMAKER, STFRANCES  
[facility] ([facility] SCHEDULING) Appt Note: med icd/stf  
 [address] [facility] [address] [facility]  
[phone] Upcoming Health Maintenance Date Due Hepatitis C Screening 1955 Pneumococcal 19-64 Medium Risk (1 of 1 - PPSV23) 1/28/1973 DTaP/Tdap/Td series (1 - Tdap) 1/28/1975 PAP AKA CERVICAL CYTOLOGY 1/28/1975 BREAST CANCER SCRN MAMMOGRAM 1/28/2004 FOBT Q 1 YEAR AGE 50-75 1/28/2004 ZOSTER VACCINE AGE 60> 11/28/2013 MEDICARE YEARLY EXAM 3/14/2018 Influenza Age 5 to Adult 8/1/2018 Allergies as of 8/30/2018  Review Complete On: 8/30/2018 By: Yuri Sanchez MD  
  
 Severity Noted Reaction Type Reactions Iodine High 10/28/2010    Anaphylaxis Lisinopril High 02/14/2014    Anaphylaxis Current Immunizations  Reviewed on 2/28/2014 No immunizations on file. Not reviewed this visit You Were Diagnosed With   
  
 Codes Comments Type 2 diabetes mellitus with hyperglycemia, unspecified whether long term insulin use (HCC)    -  Primary ICD-10-CM: E11.65 ICD-9-CM: 250.00 Vitals BP Pulse Temp Resp Height(growth percentile) Weight(growth percentile) 99/72 (BP 1 Location: Left arm, BP Patient Position: Sitting) (!) 55 98 °F (36.7 °C) (Oral) 18 5' 7\" (1.702 m) 178 lb 4.8 oz (80.9 kg) SpO2 BMI OB Status Smoking Status 99% 27.93 kg/m2 Postmenopausal Never Smoker BMI and BSA Data Body Mass Index Body Surface Area  
 27.93 kg/m 2 1.96 m 2 Preferred Pharmacy Pharmacy Name Phone [id number] [place], [place] - [address] [place] [phone] Your Updated Medication List  
  
   
This list is accurate as of 8/30/18 10:06 AM.  Always use your most recent med list.  
  
  
  
  
 apixaban 5 mg tablet Commonly known as:  Cliffton Rubinstein Take 1 Tab by mouth two (2) times a day. cloNIDine HCl 0.3 mg tablet Commonly known as:  CATAPRES  
TAKE 1 TABLET TWICE DAILY  
  
 hydroCHLOROthiazide 12.5 mg capsule Commonly known as:  Leita Grade TAKE ONE CAPSULE BY MOUTH ONCE A DAY  
  
 JANUVIA 100 mg tablet Generic drug:  SITagliptin TAKE 1 TABLET BY MOUTH DAILY losartan 100 mg tablet Commonly known as:  COZAAR  
TAKE 1 TABLET EVERY DAY  
  
 metoprolol tartrate 25 mg tablet Commonly known as:  LOPRESSOR Take 1 Tab by mouth two (2) times a day. NIFEdipine ER 30 mg ER tablet Commonly known as:  ADALAT CC  
TAKE 1 TABLET BY MOUTH EVERY DAY FOR 90 DAYS  
  
 nitroglycerin 0.4 mg SL tablet Commonly known as:  NITROSTAT  
0.4 mg by SubLINGual route every five (5) minutes as needed for Chest Pain. Up to 3 doses. omeprazole 20 mg capsule Commonly known as:  PRILOSEC Take 20 mg by mouth daily. PARoxetine 20 mg tablet Commonly known as:  PAXIL Take  by mouth daily. We Performed the Following AMB POC GLUCOSE BLOOD, BY GLUCOSE MONITORING DEVICE [96611 CPT(R)] AMB POC HEMOGLOBIN A1C [75919 CPT(R)] Follow-up Instructions Return in about 1 month (around 9/30/2018). Patient Instructions   
-------------------------------------------------------------------------------------------- Refills    -    please call your pharmacy and have them send us a refill request 
 
Results  -  allow up to a week for lab results to be processed and reviewed. Phone calls  -  Allow upto 24 hrs. for non-urgent calls to be retained Prior authorization - It may take up to 2 weeks to process, depending on your insurance Forms  -  FMLA, DMV, patient assistance, etc. will take up to 2 weeks to process Cancellations - please notify the office in advance if you cannot keep your appointment Samples  - will only be dispensed at visits as supply is limited If you are having a medical emergency call 911 
 
-------------------------------------------------------------------------------------------- Do not skip meals Do not eat in between meals Reduce carbs- pasta, rice, potatoes, bread Do not drink juices or sodas Do not eat peanut butter Do not eat sugar free cookies and cakes Do not eat peaches, grapes,  Oranges, pineapples, canteloupe and raisins Check blood sugars immediately before each meal and at bedtime COLD PACK Take  NPH   novolin   insulin  24  units  at bed time Take  novolin  Regular  Insulin   6   units before breakfast, 6 units before lunch and 6 units before dinner. Also, add additional  Regular insulin  as follows with meals  If blood sugars are[de-identified] 
 
150-200 mg 0 units 201-250 mg 2 units 251-300 mg 4 units 301-350 mg 6 units 351-400 mg 8 units 401-450 mg 10 units 451-500 mg 12 units Less than 70 mg NO INSULIN Introducing [facility]! Concepcion Norton introduces [employer] patient portal. Now you can access parts of your medical record, email your doctor's office, and request medication refills online. 1. In your internet browser, go to [email or web address] 2. Click on the First Time User? Click Here link in the Sign In box. You will see the New Member Sign Up page. 3. Enter your [employer] Access Code exactly as it appears below. You will not need to use this code after youve completed the sign-up process. If you do not sign up before the expiration date, you must request a new code. · [employer] Access Code: [email or web address] Expires: 11/6/2018  9:58 AM 
 
4. Enter the last four digits of your Social Security Number (xxxx) and Date of Birth (mm/dd/yyyy) as indicated and click Submit. You will be taken to the next sign-up page. 5. Create a [employer] ID. This will be your [employer] login ID and cannot be changed, so think of one that is secure and easy to remember. 6. Create a [employer] password. You can change your password at any time. 7. Enter your Password Reset Question and Answer. This can be used at a later time if you forget your password. 8. Enter your e-mail address. You will receive e-mail notification when new information is available in [address] 9. Click Sign Up. You can now view and download portions of your medical record. 10. Click the Download Summary menu link to download a portable copy of your medical information. If you have questions, please visit the Frequently Asked Questions section of the [employer] website. Remember, [employer] is NOT to be used for urgent needs. For medical emergencies, dial 911. Now available from your iPhone and Android! Please provide this summary of care documentation to your next provider. Your primary care clinician is listed as Merwin Fleischer.  If you have any questions after today's visit, please call [phone].

## 2018-09-04 LAB
ALBUMIN SERPL-MCNC: 3.7 G/DL (ref 3.6–4.8)
ALBUMIN/CREAT UR: 308.8 MG/G CREAT (ref 0–30)
ALBUMIN/GLOB SERPL: 1 {RATIO} (ref 1.2–2.2)
ALP SERPL-CCNC: 103 IU/L (ref 39–117)
ALT SERPL-CCNC: 14 IU/L (ref 0–32)
AST SERPL-CCNC: 13 IU/L (ref 0–40)
BILIRUB SERPL-MCNC: 0.3 MG/DL (ref 0–1.2)
BUN SERPL-MCNC: 25 MG/DL (ref 8–27)
BUN/CREAT SERPL: 19 (ref 12–28)
C PEPTIDE SERPL-MCNC: 5 NG/ML (ref 1.1–4.4)
CALCIUM SERPL-MCNC: 9.3 MG/DL (ref 8.7–10.3)
CHLORIDE SERPL-SCNC: 94 MMOL/L (ref 96–106)
CHOLEST SERPL-MCNC: 127 MG/DL (ref 100–199)
CO2 SERPL-SCNC: 26 MMOL/L (ref 20–29)
CREAT SERPL-MCNC: 1.33 MG/DL (ref 0.57–1)
CREAT UR-MCNC: 178.2 MG/DL
GAD65 AB SER IA-ACNC: <5 U/ML (ref 0–5)
GLOBULIN SER CALC-MCNC: 3.6 G/DL (ref 1.5–4.5)
GLUCOSE SERPL-MCNC: 343 MG/DL (ref 65–99)
HDLC SERPL-MCNC: 34 MG/DL
INTERPRETATION, 910389: NORMAL
INTERPRETATION: NORMAL
LDLC SERPL CALC-MCNC: 37 MG/DL (ref 0–99)
Lab: NORMAL
MICROALBUMIN UR-MCNC: 550.3 UG/ML
PDF IMAGE, 910387: NORMAL
POTASSIUM SERPL-SCNC: 3.7 MMOL/L (ref 3.5–5.2)
PROT SERPL-MCNC: 7.3 G/DL (ref 6–8.5)
SODIUM SERPL-SCNC: 137 MMOL/L (ref 134–144)
TRIGL SERPL-MCNC: 278 MG/DL (ref 0–149)
VLDLC SERPL CALC-MCNC: 56 MG/DL (ref 5–40)

## 2018-09-10 ENCOUNTER — OFFICE VISIT (OUTPATIENT)
Dept: CARDIOLOGY CLINIC | Age: 63
End: 2018-09-10

## 2018-09-10 ENCOUNTER — CLINICAL SUPPORT (OUTPATIENT)
Dept: CARDIOLOGY CLINIC | Age: 63
End: 2018-09-10

## 2018-09-10 VITALS
BODY MASS INDEX: 27.78 KG/M2 | WEIGHT: 177 LBS | HEIGHT: 67 IN | SYSTOLIC BLOOD PRESSURE: 102 MMHG | RESPIRATION RATE: 16 BRPM | DIASTOLIC BLOOD PRESSURE: 60 MMHG | OXYGEN SATURATION: 96 % | HEART RATE: 64 BPM

## 2018-09-10 DIAGNOSIS — Z95.810 CARDIAC DEFIBRILLATOR IN SITU: Primary | ICD-10-CM

## 2018-09-10 DIAGNOSIS — I48.0 PAROXYSMAL ATRIAL FIBRILLATION (HCC): Primary | ICD-10-CM

## 2018-09-10 RX ORDER — METOPROLOL SUCCINATE 25 MG/1
12.5 TABLET, EXTENDED RELEASE ORAL DAILY
Qty: 30 TAB | Refills: 3 | Status: SHIPPED | OUTPATIENT
Start: 2018-09-10 | End: 2018-11-05 | Stop reason: SDUPTHER

## 2018-09-10 NOTE — PROGRESS NOTES
HISTORY OF PRESENTING ILLNESS      Tripp Villatoro is a 58 y.o. female hypertension, cardiomyopathy, and dual chamber Medtronic ICD who presents for follow up of her ICD. She denies chest pain, SOB, edema, syncope or functional limitations. Her device interrogation reveals normal device functioning. She has new onset atrial fibrillation occurring on device check, longest episode was 40 minutes. She denies cardiac complaints. Last visit, Eliquis 5mg BID for CVA risk reduction (CHADS VASC 3) as well as Lopressor 25mg BID. She is tolerating her anticoagulation thus far however has experienced fatigue since starting the Lopressor. Device interrogation today reveals normal device function without recurrent episodes of atrial fibrillation. ACTIVE PROBLEM LIST     Patient Active Problem List    Diagnosis Date Noted    Implantable cardioverter-defibrillator (ICD) in situ 11/16/2010    Cardiomyopathy, idiopathic ([address] 10/28/2010    HTN (hypertension), benign 10/28/2010    PVC (premature ventricular contraction) 10/28/2010           PAST MEDICAL HISTORY     Past Medical History:   Diagnosis Date    Cardiomyopathy, idiopathic ([address] 10/28/2010    HTN (hypertension), benign 10/28/2010    PVC (premature ventricular contraction) 10/28/2010           PAST SURGICAL HISTORY     No past surgical history on file. ALLERGIES     Allergies   Allergen Reactions    Iodine Anaphylaxis    Lisinopril Anaphylaxis          FAMILY HISTORY     No family history on file.  negative for cardiac disease       SOCIAL HISTORY     Social History     Social History    Marital status: SINGLE     Spouse name: N/A    Number of children: N/A    Years of education: N/A     Social History Main Topics    Smoking status: Never Smoker    Smokeless tobacco: Never Used    Alcohol use No    Drug use: Not on file    Sexual activity: Not on file     Other Topics Concern    Not on file     Social History Narrative MEDICATIONS     Current Outpatient Prescriptions   Medication Sig    nitroglycerin (NITROSTAT) 0.4 mg SL tablet 0.4 mg by SubLINGual route every five (5) minutes as needed for Chest Pain. Up to 3 doses.  insulin NPH (NOVOLIN N, HUMULIN N) 100 unit/mL injection Inject 24 units at bedtime    insulin regular (NOVOLIN R, HUMULIN R) 100 unit/mL injection Inject 6 units before breakfast, lunch, and dinner. Plus sliding scale to max 54 units daily    insulin syringe-needle U-100 (BD INSULIN SYRINGE ULTRA-FINE) 1/2 mL 31 gauge x 15/64\" syrg Use 4 times daily. Dx code E11.65    flash glucose scanning reader (FREESTYLE BELKIS READER) misc Test 4 times daily. Dx code E11.65. To use as needed    flash glucose sensor (FREESTYLE BELKIS SENSOR) kit Test 4 times daily. Dx code E11.65. Change every 10 days    cloNIDine HCl (CATAPRES) 0.3 mg tablet TAKE 1 TABLET TWICE DAILY    NIFEdipine ER (ADALAT CC) 30 mg ER tablet TAKE 1 TABLET BY MOUTH EVERY DAY FOR 90 DAYS    apixaban (ELIQUIS) 5 mg tablet Take 1 Tab by mouth two (2) times a day.  metoprolol tartrate (LOPRESSOR) 25 mg tablet Take 1 Tab by mouth two (2) times a day.  JANUVIA 100 mg tablet TAKE 1 TABLET BY MOUTH DAILY    losartan (COZAAR) 100 mg tablet TAKE 1 TABLET EVERY DAY    hydroCHLOROthiazide (MICROZIDE) 12.5 mg capsule TAKE ONE CAPSULE BY MOUTH ONCE A DAY    PARoxetine (PAXIL) 20 mg tablet Take  by mouth daily.  omeprazole (PRILOSEC) 20 mg capsule Take 20 mg by mouth daily. No current facility-administered medications for this visit. I have reviewed the nurses notes, vitals, problem list, allergy list, medical history, family, social history and medications. REVIEW OF SYMPTOMS      General: Pt denies excessive weight gain or loss. Pt is able to conduct ADL's  HEENT: Denies blurred vision, headaches, hearing loss, epistaxis and difficulty swallowing.   Respiratory: Denies cough, congestion, shortness of breath, WEAVER, wheezing or stridor. Cardiovascular: Denies precordial pain, palpitations, edema or PND  Gastrointestinal: Denies poor appetite, indigestion, abdominal pain or blood in stool  Genitourinary: Denies hematuria, dysuria, increased urinary frequency  Musculoskeletal: Denies joint pain or swelling from muscles or joints  Neurologic: Denies tremor, paresthesias, headache, or sensory motor disturbance  Psychiatric: Denies confusion, insomnia, depression  Integumentray: Denies rash, itching or ulcers. Hematologic: Denies easy bruising, bleeding       PHYSICAL EXAMINATION      There were no vitals filed for this visit. General: Well developed, in no acute distress. HEENT: No jaundice, oral mucosa moist, no oral ulcers  Neck: Supple, no stiffness, no lymphadenopathy, supple  Heart:  Normal S1/S2 negative S3 or S4. Regular, no murmur, gallop or rub, no jugular venous distention  Respiratory: Clear bilaterally x 4, no wheezing or rales  Abdomen:   Soft, non-tender, bowel sounds are active.   Extremities:  No edema, normal cap refill, no cyanosis. Musculoskeletal: No clubbing, no deformities  Neuro: A&Ox3, speech clear, gait stable, cooperative, no focal neurologic deficits  Skin: Skin color is normal. No rashes or lesions.  Non diaphoretic, moist.  Vascular: 2+ pulses symmetric in all extremities       DIAGNOSTIC DATA      EKG:        LABORATORY DATA      Lab Results   Component Value Date/Time    WBC 6.0 08/13/2018 10:48 AM    HGB 13.7 08/13/2018 10:48 AM    HCT 40.5 08/13/2018 10:48 AM    PLATELET 347 55/37/4228 10:48 AM    MCV 89 08/13/2018 10:48 AM      Lab Results   Component Value Date/Time    Sodium 137 08/30/2018 10:33 AM    Potassium 3.7 08/30/2018 10:33 AM    Chloride 94 (L) 08/30/2018 10:33 AM    CO2 26 08/30/2018 10:33 AM    Glucose 343 (H) 08/30/2018 10:33 AM    BUN 25 08/30/2018 10:33 AM    Creatinine 1.33 (H) 08/30/2018 10:33 AM    BUN/Creatinine ratio 19 08/30/2018 10:33 AM    GFR est AA 49 (L) 08/30/2018 10:33 AM GFR est non-AA 42 (L) 08/30/2018 10:33 AM    Calcium 9.3 08/30/2018 10:33 AM    Bilirubin, total 0.3 08/30/2018 10:33 AM    AST (SGOT) 13 08/30/2018 10:33 AM    Alk. phosphatase 103 08/30/2018 10:33 AM    Protein, total 7.3 08/30/2018 10:33 AM    Albumin 3.7 08/30/2018 10:33 AM    A-G Ratio 1.0 (L) 08/30/2018 10:33 AM    ALT (SGPT) 14 08/30/2018 10:33 AM           ASSESSMENT      1. ICD                        N. Medtronic                        G. Dual  2. Cardiomyopathy  3. Hypertension  4. Paroxysmal [facility]                        CHADS VASC 3              Asymptomatic     PLAN     Continue anticoagulation. Continue follow-up in device clinic. Will change Lopressor to Toprol 12.5 mg daily due to patient's reported fatigue. FOLLOW-UP     1 year        Thank you, Toney Cárdenas MD for allowing me to participate in the care of this extraordinarily pleasant female. Please do not hesitate to contact me for further questions/concerns.          Dwain Reed MD  Cardiac Electrophysiology / Cardiology    [facility] [address] [facility], [facility], [address] [place] [address]  [phone] / [phone] Fax   [phone] / [phone] Fax

## 2018-09-10 NOTE — PROGRESS NOTES
Visit Vitals    /60 (BP 1 Location: Left arm, BP Patient Position: Sitting)    Pulse 64    Resp 16    Ht 5' 7\" (1.702 m)    Wt 177 lb (80.3 kg)    SpO2 96%    BMI 27.72 kg/m2

## 2018-09-10 NOTE — PATIENT INSTRUCTIONS
Treatment Plan:  · Discontinue metoprolol (lopressor). · Start metoprolol xl (Toprol) 12.5mg daily. · Follow up in 1 year. · Contact our office with any concerns. Metoprolol (Lopressor, Toprol XL) - (By mouth)   Why this medicine is used:   Treats high blood pressure, chest pain, and heart failure. Contact a nurse or doctor right away if you have:  · Lightheadedness, dizziness, fainting  · Rapid weight gain; swelling in your hands, ankles, or feet     Common side effects:  · Mild dizziness  · Tiredness  © 2017 [address] [employer] Information is for End User's use only and may not be sold, redistributed or otherwise used for commercial purposes.

## 2018-09-10 NOTE — MR AVS SNAPSHOT
[address] [place] [address] [facility] 
[phone] Patient: Ab Yadav MRN: [id number] Visit Information Date & Time Provider Department Dept. Phone Encounter #  
 9/10/2018  9:40 AM Verónica Mckinney MD [facility] [phone] [id number] Your Appointments 9/10/2018  9:40 AM  
ESTABLISHED PATIENT with Verónica Mckinney MD  
[facility] Appt Note: 1 month follow up  
 [address] [facility] [place] [address] [place] [address]  
[phone]  
  
   
 [address] [facility] [place] [address] [address]  
  
    
 9/10/2018 10:00 AM  
PACEMAKER with PACEMAKER, [facility]  
[facility] ([facility] SCHEDULING) Appt Note: Medtronic ICD interrogation and seeing Dr. Terri Hermosillo [address] [facility] [place] [address] [place] [facility] [address]  
[phone]  
  
   
 [address] [facility] [place] [address] [place] [id number]  
  
    
 10/1/2018  9:30 AM  
LAB with CDE NURSE [facility]) Appt Note: labs [address] [place] [address] [address]  
[phone]  
  
   
 [address] [address]  
  
    
 10/8/2018  9:45 AM  
ROUTINE CARE with Karen Cabrera MD  
[facility]) Appt Note: 1mo fu dm  
 [address] [address]  
[address] [address]  
  
    
 11/12/2018  8:45 AM  
PACEMAKER with PACEMAKER, [facility]  
[facility] ([facility]) Appt Note: med icd/stf  
 [address] [address] [place] [address] [facility]  
[phone]  
  
    
 9/16/2019  9:20 AM  
ESTABLISHED PATIENT with Verónica Mckinney MD  
[facility] Appt Note: annual  
 [address] [facility] [address] [facility]  
[phone] Upcoming Health Maintenance Date Due Hepatitis C Screening 1955 Pneumococcal 19-64 Medium Risk (1 of 1 - PPSV23) 1/28/1973 DTaP/Tdap/Td series (1 - Tdap) 1/28/1975 PAP AKA CERVICAL CYTOLOGY 1/28/1975 BREAST CANCER SCRN MAMMOGRAM 1/28/2004 FOBT Q 1 YEAR AGE 50-75 1/28/2004 ZOSTER VACCINE AGE 60> 11/28/2013 MEDICARE YEARLY EXAM 3/14/2018 Influenza Age 5 to Adult 8/1/2018 Allergies as of 9/10/2018  Review Complete On: 9/10/2018 By: Nati Leon LPN Severity Noted Reaction Type Reactions Iodine High 10/28/2010    Anaphylaxis Lisinopril High 02/14/2014    Anaphylaxis Current Immunizations  Reviewed on 2/28/2014 No immunizations on file. Not reviewed this visit Vitals BP Pulse Resp Height(growth percentile) Weight(growth percentile) SpO2  
 102/60 (BP 1 Location: Left arm, BP Patient Position: Sitting) 64 16 5' 7\" (1.702 m) 177 lb (80.3 kg) 96% BMI OB Status Smoking Status 27.72 kg/m2 Postmenopausal Never Smoker Vitals History BMI and BSA Data Body Mass Index Body Surface Area  
 27.72 kg/m 2 1.95 m 2 Preferred Pharmacy Pharmacy Name Phone [id number] [phone] [phone] Your Updated Medication List  
  
   
This list is accurate as of 9/10/18  9:35 AM.  Always use your most recent med list.  
  
  
  
  
 apixaban 5 mg tablet Commonly known as:  Virgel Michelle Take 1 Tab by mouth two (2) times a day. cloNIDine HCl 0.3 mg tablet Commonly known as:  CATAPRES  
TAKE 1 TABLET TWICE DAILY  
  
 flash glucose scanning reader Misc Commonly known as:  FREESTYLE BELKIS READER Test 4 times daily. Dx code E11.65. To use as needed  
  
 flash glucose sensor Kit Commonly known as:  [address] Test 4 times daily. Dx code E11.65. Change every 10 days hydroCHLOROthiazide 12.5 mg capsule Commonly known as:  Mallory Jung TAKE ONE CAPSULE BY MOUTH ONCE A DAY  
  
 insulin  unit/mL injection Commonly known as:  Leila Half Inject 24 units at bedtime  
  
 insulin regular 100 unit/mL injection Commonly known as:  Hipolito Adam, HUMULIN R Inject 6 units before breakfast, lunch, and dinner. Plus sliding scale to max 54 units daily  
  
 insulin syringe-needle U-100 1/2 mL 31 gauge x 15/64\" Syrg Commonly known as:  BD INSULIN SYRINGE ULTRA-FINE Use 4 times daily. Dx code E11.65 JANUVIA 100 mg tablet Generic drug:  SITagliptin TAKE 1 TABLET BY MOUTH DAILY losartan 100 mg tablet Commonly known as:  COZAAR  
TAKE 1 TABLET EVERY DAY  
  
 metoprolol tartrate 25 mg tablet Commonly known as:  LOPRESSOR Take 1 Tab by mouth two (2) times a day. NIFEdipine ER 30 mg ER tablet Commonly known as:  ADALAT CC  
TAKE 1 TABLET BY MOUTH EVERY DAY FOR 90 DAYS  
  
 nitroglycerin 0.4 mg SL tablet Commonly known as:  NITROSTAT  
0.4 mg by SubLINGual route every five (5) minutes as needed for Chest Pain. Up to 3 doses. omeprazole 20 mg capsule Commonly known as:  PRILOSEC Take 20 mg by mouth daily. PARoxetine 20 mg tablet Commonly known as:  PAXIL Take  by mouth daily. Introducing [facility]! Ban Alcaraz introduces [employer] patient portal. Now you can access parts of your medical record, email your doctor's office, and request medication refills online. 1. In your internet browser, go to [email or web address] 2. Click on the First Time User? Click Here link in the Sign In box. You will see the New Member Sign Up page. 3. Enter your [employer] Access Code exactly as it appears below. You will not need to use this code after youve completed the sign-up process. If you do not sign up before the expiration date, you must request a new code. · [employer] Access Code: [email or web address] Expires: 11/6/2018  9:58 AM 
 
4. Enter the last four digits of your Social Security Number (xxxx) and Date of Birth (mm/dd/yyyy) as indicated and click Submit. You will be taken to the next sign-up page. 5. Create a [employer] ID. This will be your [employer] login ID and cannot be changed, so think of one that is secure and easy to remember. 6. Create a [employer] password. You can change your password at any time. 7. Enter your Password Reset Question and Answer. This can be used at a later time if you forget your password. 8. Enter your e-mail address. You will receive e-mail notification when new information is available in [address] 9. Click Sign Up. You can now view and download portions of your medical record. 10. Click the Download Summary menu link to download a portable copy of your medical information. If you have questions, please visit the Frequently Asked Questions section of the [employer] website. Remember, [employer] is NOT to be used for urgent needs. For medical emergencies, dial 911. Now available from your iPhone and Android! Please provide this summary of care documentation to your next provider. Your primary care clinician is listed as Juvencio Barnard. If you have any questions after today's visit, please call [phone].

## 2018-09-27 ENCOUNTER — TELEPHONE (OUTPATIENT)
Dept: ENDOCRINOLOGY | Age: 63
End: 2018-09-27

## 2018-09-27 DIAGNOSIS — Z79.4 TYPE 2 DIABETES MELLITUS WITH HYPERGLYCEMIA, WITH LONG-TERM CURRENT USE OF INSULIN (HCC): Primary | ICD-10-CM

## 2018-09-27 DIAGNOSIS — E11.65 TYPE 2 DIABETES MELLITUS WITH HYPERGLYCEMIA, WITH LONG-TERM CURRENT USE OF INSULIN (HCC): Primary | ICD-10-CM

## 2018-10-01 ENCOUNTER — HOSPITAL ENCOUNTER (OUTPATIENT)
Dept: LAB | Age: 63
Discharge: HOME | End: 2018-10-01
Payer: MEDICARE

## 2018-10-01 PROCEDURE — 36415 COLL VENOUS BLD VENIPUNCTURE: CPT

## 2018-10-01 PROCEDURE — 80053 COMPREHEN METABOLIC PANEL: CPT

## 2018-10-01 PROCEDURE — 83036 HEMOGLOBIN GLYCOSYLATED A1C: CPT

## 2018-10-01 PROCEDURE — 80061 LIPID PANEL: CPT

## 2018-10-01 PROCEDURE — 82043 UR ALBUMIN QUANTITATIVE: CPT

## 2018-10-08 ENCOUNTER — OFFICE VISIT (OUTPATIENT)
Dept: ENDOCRINOLOGY | Age: 63
End: 2018-10-08

## 2018-10-08 VITALS
HEIGHT: 67 IN | WEIGHT: 183 LBS | OXYGEN SATURATION: 100 % | SYSTOLIC BLOOD PRESSURE: 120 MMHG | TEMPERATURE: 96.1 F | RESPIRATION RATE: 18 BRPM | DIASTOLIC BLOOD PRESSURE: 74 MMHG | BODY MASS INDEX: 28.72 KG/M2

## 2018-10-08 DIAGNOSIS — E78.2 MIXED HYPERLIPIDEMIA: ICD-10-CM

## 2018-10-08 DIAGNOSIS — I10 ESSENTIAL HYPERTENSION: ICD-10-CM

## 2018-10-08 DIAGNOSIS — E11.65 UNCONTROLLED TYPE 2 DIABETES MELLITUS WITH HYPERGLYCEMIA (HCC): Primary | ICD-10-CM

## 2018-10-08 NOTE — PROGRESS NOTES
Mynor Borrero is a 58 y.o. female Chief Complaint Patient presents with  Diabetes 1 month follow up 1. Have you been to the ER, urgent care clinic since your last visit? Hospitalized since your last visit? No 
M 
2. Have you seen or consulted any other health care providers outside of the [facility] since your last visit? Include any pap smears or colon screening. MCV for right eye surgery Visit Vitals  /74 (BP 1 Location: Left arm, BP Patient Position: Sitting)  Temp 96.1 °F (35.6 °C) (Oral)  Resp 18  Ht 5' 7\" (1.702 m)  Wt 183 lb (83 kg)  SpO2 100%  BMI 28.66 kg/m2 Health Maintenance Due Topic Date Due  
 Hepatitis C Screening  1955  
 EYE EXAM RETINAL OR DILATED Q1  01/28/1964  Pneumococcal 19-64 Medium Risk (1 of 1 - PPSV23) 01/28/1973  
 DTaP/Tdap/Td series (1 - Tdap) 01/28/1975  PAP AKA CERVICAL CYTOLOGY  01/28/1975  Shingrix Vaccine Age 50> (1 of 2) 01/28/2004  BREAST CANCER SCRN MAMMOGRAM  01/28/2004  FOBT Q 1 YEAR AGE 50-75  01/28/2004  MEDICARE YEARLY EXAM  03/14/2018  Influenza Age 5 to Adult  08/01/2018 Wt Readings from Last 3 Encounters:  
10/08/18 183 lb (83 kg) 09/10/18 177 lb (80.3 kg) 08/30/18 178 lb 4.8 oz (80.9 kg) Temp Readings from Last 3 Encounters:  
10/08/18 96.1 °F (35.6 °C) (Oral) 08/30/18 98 °F (36.7 °C) (Oral) 02/28/14 98.1 °F (36.7 °C) BP Readings from Last 3 Encounters:  
10/08/18 120/74  
09/10/18 102/60  
08/30/18 99/72 Pulse Readings from Last 3 Encounters:  
09/10/18 64  
08/30/18 (!) 55  
08/08/18 68

## 2018-10-08 NOTE — MR AVS SNAPSHOT
[address] [address] [address] [facility] [address] 
[phone] Patient: Genaro Lucia MRN: [id number] LHP:[id number] Visit Information Date & Time Provider Department Dept. Phone Encounter #  
 10/8/2018  9:45 AM Deneen Caraballo MD [facility] [phone] [id number] Follow-up Instructions Return in about 3 months (around 1/8/2019). Your Appointments 11/12/2018  8:45 AM  
PACEMAKER with PACEMAKER, STFRANCES  
[facility] ([facility] SCHEDULING) Appt Note: med icd/stf  
 [address] [place] [address] [facility]  
[phone]  
  
   
 [address] [place] [address] [place] [address]  
  
    
 9/16/2019  9:20 AM  
ESTABLISHED PATIENT with Tyrel Pruitt MD  
[facility] ([facility]) Appt Note: annual  
 [address] [place] [address] [address] [facility]  
[address] [place] [address] [address] Upcoming Health Maintenance Date Due Hepatitis C Screening 1955 EYE EXAM RETINAL OR DILATED Q1 1/28/1964 Pneumococcal 19-64 Medium Risk (1 of 1 - PPSV23) 1/28/1973 DTaP/Tdap/Td series (1 - Tdap) 1/28/1975 PAP AKA CERVICAL CYTOLOGY 1/28/1975 Shingrix Vaccine Age 50> (1 of 2) 1/28/2004 BREAST CANCER SCRN MAMMOGRAM 1/28/2004 FOBT Q 1 YEAR AGE 50-75 1/28/2004 MEDICARE YEARLY EXAM 3/14/2018 Influenza Age 5 to Adult 8/1/2018 HEMOGLOBIN A1C Q6M 4/1/2019 FOOT EXAM Q1 8/30/2019 MICROALBUMIN Q1 10/1/2019 LIPID PANEL Q1 10/1/2019 Allergies as of 10/8/2018  Review Complete On: 10/8/2018 By: Deneen Caraballo MD  
  
 Severity Noted Reaction Type Reactions Iodine High 10/28/2010    Anaphylaxis Lisinopril High 02/14/2014    Anaphylaxis Current Immunizations  Reviewed on 2/28/2014 No immunizations on file. Not reviewed this visit Vitals BP Temp Resp Height(growth percentile) Weight(growth percentile) SpO2  
 120/74 (BP 1 Location: Left arm, BP Patient Position: Sitting) 96.1 °F (35.6 °C) (Oral) 18 5' 7\" (1.702 m) 183 lb (83 kg) 100% BMI OB Status Smoking Status 28.66 kg/m2 Postmenopausal Never Smoker Vitals History BMI and BSA Data Body Mass Index Body Surface Area  
 28.66 kg/m 2 1.98 m 2 Preferred Pharmacy Pharmacy Name Phone [id number] [place], [place] - [address] [place] [address] [phone] Your Updated Medication List  
  
   
This list is accurate as of 10/8/18 10:14 AM.  Always use your most recent med list.  
  
  
  
  
 apixaban 5 mg tablet Commonly known as:  [place] Take 1 Tab by mouth two (2) times a day. cloNIDine HCl 0.3 mg tablet Commonly known as:  CATAPRES  
TAKE 1 TABLET TWICE DAILY  
  
 flash glucose scanning reader Misc Commonly known as:  FREESTYLE BELKIS 10 DAY READER Test 4 times daily. Dx code E11.65. To use as needed  
  
 flash glucose sensor Kit Commonly known as:  [address] Test 4 times daily. Dx code E11.65. Change every 10 days  
  
 hydroCHLOROthiazide 12.5 mg capsule Commonly known as:  P[place] TAKE ONE CAPSULE BY MOUTH ONCE A DAY  
  
 insulin syringe-needle U-100 1/2 mL 31 gauge x 15/64\" Syrg Commonly known as:  BD INSULIN SYRINGE ULTRA-FINE Use 4 times daily. Dx code E11.65 JANUVIA 100 mg tablet Generic drug:  SITagliptin TAKE 1 TABLET BY MOUTH DAILY losartan 100 mg tablet Commonly known as:  COZAAR  
TAKE 1 TABLET EVERY DAY  
  
 metoprolol succinate 25 mg XL tablet Commonly known as:  TOPROL-XL Take 0.5 Tabs by mouth daily. NIFEdipine ER 30 mg ER tablet Commonly known as:  ADALAT CC  
TAKE 1 TABLET BY MOUTH EVERY DAY FOR 90 DAYS  
  
 nitroglycerin 0.4 mg SL tablet Commonly known as:  NITROSTAT 0.4 mg by SubLINGual route every five (5) minutes as needed for Chest Pain. Up to 3 doses. omeprazole 20 mg capsule Commonly known as:  PRILOSEC Take 20 mg by mouth daily. PARoxetine 20 mg tablet Commonly known as:  PAXIL Take  by mouth daily. Follow-up Instructions Return in about 3 months (around 1/8/2019). Patient Instructions   
-------------------------------------------------------------------------------------------- Refills    -    please call your pharmacy and have them send us a refill request 
 
Results  -  allow up to a week for lab results to be processed and reviewed. Phone calls  -  Allow upto 24 hrs. for non-urgent calls to be retained Prior authorization - It may take up to 2 weeks to process, depending on your insurance Forms  -  FMLA, DMV, patient assistance, etc. will take up to 2 weeks to process Cancellations - please notify the office in advance if you cannot keep your appointment Samples  - will only be dispensed at visits as supply is limited If you are having a medical emergency call 911 
 
-------------------------------------------------------------------------------------------- Do not skip meals Do not eat in between meals Reduce carbs- pasta, rice, potatoes, bread Do not drink juices or sodas Do not eat peanut butter Do not eat sugar free cookies and cakes Do not eat peaches, grapes,  Oranges, pineapples, canteloupe and raisins SHE HAS NOT STARTED ANY INSULINS AT ALL Check blood sugars immediately before each meal and at bedtime STOP   NPH   novolin   insulin  AND   novolin  Regular  Insulin Introducing [facility]! Naomi Smith introduces MyChart patient portal. Now you can access parts of your medical record, email your doctor's office, and request medication refills online. 1. In your internet browser, go to [email or web address] 2. Click on the First Time User? Click Here link in the Sign In box. You will see the New Member Sign Up page. 3. Enter your [employer] Access Code exactly as it appears below. You will not need to use this code after youve completed the sign-up process. If you do not sign up before the expiration date, you must request a new code. · [employer] Access Code: [email or web address] Expires: 11/6/2018  9:58 AM 
 
4. Enter the last four digits of your Social Security Number (xxxx) and Date of Birth (mm/dd/yyyy) as indicated and click Submit. You will be taken to the next sign-up page. 5. Create a [employer] ID. This will be your [employer] login ID and cannot be changed, so think of one that is secure and easy to remember. 6. Create a [employer] password. You can change your password at any time. 7. Enter your Password Reset Question and Answer. This can be used at a later time if you forget your password. 8. Enter your e-mail address. You will receive e-mail notification when new information is available in [address] 9. Click Sign Up. You can now view and download portions of your medical record. 10. Click the Download Summary menu link to download a portable copy of your medical information. If you have questions, please visit the Frequently Asked Questions section of the [employer] website. Remember, [employer] is NOT to be used for urgent needs. For medical emergencies, dial 911. Now available from your iPhone and Android! Please provide this summary of care documentation to your next provider. Your primary care clinician is listed as Nasim Oakley. If you have any questions after today's visit, please call [phone].

## 2018-10-08 NOTE — PATIENT INSTRUCTIONS
-------------------------------------------------------------------------------------------- Refills    -    please call your pharmacy and have them send us a refill request 
 
Results  -  allow up to a week for lab results to be processed and reviewed. Phone calls  -  Allow upto 24 hrs. for non-urgent calls to be retained Prior authorization - It may take up to 2 weeks to process, depending on your insurance Forms  -  FMLA, DMV, patient assistance, etc. will take up to 2 weeks to process Cancellations - please notify the office in advance if you cannot keep your appointment Samples  - will only be dispensed at visits as supply is limited If you are having a medical emergency call 911 
 
-------------------------------------------------------------------------------------------- Do not skip meals Do not eat in between meals Reduce carbs- pasta, rice, potatoes, bread Do not drink juices or sodas Do not eat peanut butter Do not eat sugar free cookies and cakes Do not eat peaches, grapes,  Oranges, pineapples, canteloupe and raisins SHE HAS NOT STARTED ANY INSULINS AT ALL Check blood sugars immediately before each meal and at bedtime STOP   NPH   novolin   insulin  AND   novolin  Regular  Insulin

## 2018-10-08 NOTE — PROGRESS NOTES
HISTORY OF PRESENT ILLNESS Diana Hammond is a 58 y.o. female. HPI Patient here for  FIRST FOLLOW UP  initial visit of Type 2 diabetes mellitus   From August 2018 SHE HAD SURGERY ON  EYES AT [facility] [place] LAST Tuesday SHE DID WELL  
 
 
SHE GOT THE LOG , SUGARS ARE BETTER JUST WITH DIET  
BUT, SHE SAYS SHE HAS NOT STARTED ON INSULINS AT ALL Old  history H/o diabetes for 5 years Current A1C is over 13 %   and symptoms/problems include polyuria, polydipsia and visual disturbances Current diabetic medications include metformin . Current monitoring regimen: home blood tests - rarely Home blood sugar records: trend: fluctuating a lot Any episodes of hypoglycemia? no 
 
Weight trend: fluctuating a lot Prior visit with dietician: no 
Current diet: \"unhealthy\" diet in general 
Current exercise: no regular exercise Known diabetic complications: cardiac and renal  
Cardiovascular risk factors: diabetes mellitus, obesity, hypertension, stress, post-menopausal 
 
Eye exam current (within one year): yes KAYE: no  
 
Past Medical History:  
Diagnosis Date  Cardiomyopathy, idiopathic ([address] 10/28/2010  
 HTN (hypertension), benign 10/28/2010  PVC (premature ventricular contraction) 10/28/2010 Past Surgical History:  
Procedure Laterality Date  HX OTHER SURGICAL  10/02/2018 Right eye surgery Current Outpatient Prescriptions Medication Sig  
 metoprolol succinate (TOPROL-XL) 25 mg XL tablet Take 0.5 Tabs by mouth daily.  nitroglycerin (NITROSTAT) 0.4 mg SL tablet 0.4 mg by SubLINGual route every five (5) minutes as needed for Chest Pain. Up to 3 doses.  insulin syringe-needle U-100 (BD INSULIN SYRINGE ULTRA-FINE) 1/2 mL 31 gauge x 15/64\" syrg Use 4 times daily. Dx code E11.65  
 flash glucose scanning reader (FREESTYLE BELKIS READER) misc Test 4 times daily. Dx code E11.65. To use as needed  flash glucose sensor (FREESTYLE BELKIS SENSOR) kit Test 4 times daily.  Dx code E11.65. Change every 10 days  cloNIDine HCl (CATAPRES) 0.3 mg tablet TAKE 1 TABLET TWICE DAILY  NIFEdipine ER (ADALAT CC) 30 mg ER tablet TAKE 1 TABLET BY MOUTH EVERY DAY FOR 90 DAYS  apixaban (ELIQUIS) 5 mg tablet Take 1 Tab by mouth two (2) times a day.  JANUVIA 100 mg tablet TAKE 1 TABLET BY MOUTH DAILY  losartan (COZAAR) 100 mg tablet TAKE 1 TABLET EVERY DAY  hydroCHLOROthiazide (MICROZIDE) 12.5 mg capsule TAKE ONE CAPSULE BY MOUTH ONCE A DAY  PARoxetine (PAXIL) 20 mg tablet Take  by mouth daily.  omeprazole (PRILOSEC) 20 mg capsule Take 20 mg by mouth daily.  insulin regular (NOVOLIN R, HUMULIN R) 100 unit/mL injection Inject 6 units before breakfast, lunch, and dinner. Plus sliding scale to max 54 units daily (Patient not taking: Reported on 10/8/2018)  insulin NPH (NOVOLIN N, HUMULIN N) 100 unit/mL injection Inject 24 units at bedtime (Patient not taking: Reported on 10/8/2018) No current facility-administered medications for this visit. Review of Systems Constitutional: Positive for malaise/fatigue and weight loss. Eyes: Positive for blurred vision. Cardiovascular: Positive for palpitations. Physical Exam  
Constitutional: She is oriented to person, place, and time. She appears well-developed and well-nourished. HENT:  
Head: Normocephalic. Eyes: Conjunctivae and EOM are normal. Pupils are equal, round, and reactive to light. Neck: Normal range of motion. Neck supple. No JVD present. No tracheal deviation present. No thyromegaly present. Cardiovascular: Normal rate, regular rhythm and normal heart sounds. No murmur heard. Pulmonary/Chest: Breath sounds normal.  
Abdominal: Soft. Bowel sounds are normal.  
Musculoskeletal: Normal range of motion. Lymphadenopathy:  
  She has no cervical adenopathy. Neurological: She is alert and oriented to person, place, and time. She has normal reflexes. Skin: Skin is warm. Psychiatric: She has a normal mood and affect. Diabetic foot exam:  AUGUST 2018 Left Foot: 
 Visual Exam: normal  
 Pulse DP: 2+ (normal) Filament test: normal sensation Vibratory sensation: normal 
   
Right Foot: 
 Visual Exam: normal  
 Pulse DP: 2+ (normal) Filament test: normal sensation Vibratory sensation: normal 
 
 
ASSESSMENT and PLAN 1. Type 2 DM, poorly controlled : a1c is   11.3 %    FROM   SEPT 2018    COMPARED TO    over 14 %  
 
 reviewED  the glucose log, has  Few sugars over 200 mg Some improvement is noticeable as she has followed DIET STRICTLY  
startED  on BASAL BOLUS REGIMEN , BUT SHE HAS NOT STARTED ON INSULINS  
SHE GOT AFRAID OF TAKING INSULIN SHOTS ( SHE SAYS HAD MULTIPLE ALLERGIES IN THE PAST ) SHE DOES NOT HAVE A LIST , BUT SHE HAS NO CURRENT  LIST 
SHE IS TAKING JANUVIA   
 
 
SHE HAS FUNCTIONAL PANCREAS  
 
 
EXPLAINED THE IMPORTANCE OF CHECKS SHE IS NOT READY  TO TAKE ORAL MEDS EITHER WHEN I OFFERED IT TO HER INSTEAD OF INSULINS 
AS SHE SAYS SHE IS ALLERGIC TO MEDS    
 
SHE HAS LOW DIABETIC HEALTH LITERACY Patient is advised about checking blood sugars 4 times a day and maintaining log book. The danger of having low blood sugars has been explained with inappropriate use of insulin  Patient voiced understanding and using the printed instructions at home. Hypoglycemia management has been explained to the patient. Carbohydrate counting discussed with the patient  
lab results and schedule of future lab studies reviewed with patient 2. Hypoglycemia :  Educated on treating the hypoglycemia. 3. HTN : continue current care. Patient is educated about importance of compliance with anti-hypertensives especially ARB/ACEI 4. Dyslipidemia : continue current meds. Patient is educated about benefits and adverse effects of statins and explained how benefits outweigh risk. 5. use of aspirin to prevent MI and TIA's discussed 6. Cad : S/p IACD - ON WALI Leigh- REVIEWED HE INFO 7. ckd ; RESOLVED 8. PROTEINURIA : NEED GLYCEMIC CONTROL  
 
> 50 % of time is spent on counseling Patient voiced understanding her plan of care

## 2018-10-15 ENCOUNTER — DOCUMENTATION ONLY (OUTPATIENT)
Dept: ENDOCRINOLOGY | Age: 63
End: 2018-10-15

## 2018-10-15 NOTE — PROGRESS NOTES
Allergies obtained from PCP office via fax and updated in chart. Call placed to [facility] pharmacy per MD Blunt request. Pharmacist  states that patient picked up 5 vials of Novolin R on September 7, 2018. Also Januvia was picked up on July 27, 2018 (90 day supply).

## 2018-11-05 RX ORDER — METOPROLOL SUCCINATE 25 MG/1
12.5 TABLET, EXTENDED RELEASE ORAL DAILY
Qty: 45 TAB | Refills: 3 | Status: SHIPPED | OUTPATIENT
Start: 2018-11-05 | End: 2018-12-12 | Stop reason: SDUPTHER

## 2018-11-05 NOTE — TELEPHONE ENCOUNTER
Requested Prescriptions     Signed Prescriptions Disp Refills    metoprolol succinate (TOPROL-XL) 25 mg XL tablet 45 Tab 3     Sig: Take 0.5 Tabs by mouth daily. Authorizing Provider: Denise Arechiga     Ordering User: Callie Gonzalez     Refill per verbal order Dr. Rowan Ulloa.

## 2018-11-08 ENCOUNTER — TELEPHONE (OUTPATIENT)
Dept: ENDOCRINOLOGY | Age: 63
End: 2018-11-08

## 2018-11-08 NOTE — TELEPHONE ENCOUNTER
[facility] [address] faxed over a request for a new lancing device. Need to know for which type of meter.  Also she may have to end up getting a whole new meter kit, in order to get the lancing device if it is not ordererable individually

## 2018-11-26 ENCOUNTER — CLINICAL SUPPORT (OUTPATIENT)
Dept: CARDIOLOGY CLINIC | Age: 63
End: 2018-11-26

## 2018-11-26 DIAGNOSIS — Z95.810 CARDIAC DEFIBRILLATOR IN SITU: Primary | ICD-10-CM

## 2019-01-14 ENCOUNTER — HOSPITAL ENCOUNTER (OUTPATIENT)
Dept: LAB | Age: 64
Discharge: HOME | End: 2019-01-14
Payer: MEDICARE

## 2019-01-14 PROCEDURE — 36415 COLL VENOUS BLD VENIPUNCTURE: CPT

## 2019-01-14 PROCEDURE — 80053 COMPREHEN METABOLIC PANEL: CPT

## 2019-01-14 PROCEDURE — 83036 HEMOGLOBIN GLYCOSYLATED A1C: CPT

## 2019-01-14 PROCEDURE — 82043 UR ALBUMIN QUANTITATIVE: CPT

## 2019-01-14 PROCEDURE — 80061 LIPID PANEL: CPT

## 2019-01-21 ENCOUNTER — OFFICE VISIT (OUTPATIENT)
Dept: ENDOCRINOLOGY | Age: 64
End: 2019-01-21

## 2019-01-21 VITALS
HEIGHT: 67 IN | WEIGHT: 184.5 LBS | BODY MASS INDEX: 28.96 KG/M2 | RESPIRATION RATE: 18 BRPM | TEMPERATURE: 97 F | DIASTOLIC BLOOD PRESSURE: 82 MMHG | HEART RATE: 87 BPM | SYSTOLIC BLOOD PRESSURE: 118 MMHG | OXYGEN SATURATION: 98 %

## 2019-01-21 DIAGNOSIS — E11.65 UNCONTROLLED TYPE 2 DIABETES MELLITUS WITH HYPERGLYCEMIA (HCC): Primary | ICD-10-CM

## 2019-01-21 DIAGNOSIS — E78.2 MIXED HYPERLIPIDEMIA: ICD-10-CM

## 2019-01-21 DIAGNOSIS — I10 ESSENTIAL HYPERTENSION: ICD-10-CM

## 2019-01-21 RX ORDER — SITAGLIPTIN 100 MG/1
TABLET, FILM COATED ORAL
Qty: 30 TAB | Refills: 6 | Status: SHIPPED | OUTPATIENT
Start: 2019-01-21 | End: 2019-10-18 | Stop reason: SDUPTHER

## 2019-01-21 NOTE — PATIENT INSTRUCTIONS
-------------------------------------------------------------------------------------------- Refills    -    please call your pharmacy and have them send us a refill request 
 
Results  -  allow up to a week for lab results to be processed and reviewed. Phone calls  -  Allow upto 24 hrs. for non-urgent calls to be retained Prior authorization - It may take up to 2 weeks to process, depending on your insurance Forms  -  FMLA, DMV, patient assistance, etc. will take up to 2 weeks to process Cancellations - please notify the office in advance if you cannot keep your appointment Samples  - will only be dispensed at visits as supply is limited If you are having a medical emergency call 911 
 
-------------------------------------------------------------------------------------------- Do not skip meals Do not eat in between meals Reduce carbs- pasta, rice, potatoes, bread Do not drink juices or sodas Do not eat peanut butter Do not eat sugar free cookies and cakes Do not eat peaches, grapes,  Oranges, pineapples, canteloupe and raisins Check blood sugars  2 times a day STAY ON JANUVIA 100 MG A DAY

## 2019-01-21 NOTE — PROGRESS NOTES
1. Have you been to the ER, urgent care clinic since your last visit? Hospitalized since your last visit? No 
 
2. Have you seen or consulted any other health care providers outside of the [address] [place] [address] since your last visit? Include any pap smears or colon screening. No 
 
Chief Complaint Patient presents with  Diabetes  
  follow up Lab Results Component Value Date/Time Hemoglobin A1c 8.3 (H) 01/14/2019 10:09 AM  
 Hemoglobin A1c (POC) 13.4 08/30/2018 09:37 AM  
 
Wt Readings from Last 3 Encounters:  
01/21/19 184 lb 8 oz (83.7 kg) 10/08/18 183 lb (83 kg) 09/10/18 177 lb (80.3 kg) Temp Readings from Last 3 Encounters:  
01/21/19 97 °F (36.1 °C) (Oral) 10/08/18 96.1 °F (35.6 °C) (Oral) 08/30/18 98 °F (36.7 °C) (Oral) BP Readings from Last 3 Encounters:  
01/21/19 118/82  
10/08/18 120/74  
09/10/18 102/60 Pulse Readings from Last 3 Encounters:  
01/21/19 87  
09/10/18 64  
08/30/18 (!) 55

## 2019-01-21 NOTE — PROGRESS NOTES
HISTORY OF PRESENT ILLNESS Jose G Stover is a 58 y.o. female. HPI Patient here for   FOLLOW UP  After last  visit of Type 2 diabetes mellitus   From October 2018 Her vision Is good since cataract surgery She feels fine SHE REPORTS SEVERAL ALLERGIES AND SHE FEARS TO TAKE ANY MEDS  
SHE MANAGES  HER DIABETES BY EATING RIGHT Old history SHE HAD SURGERY ON  EYES AT [facility], LAST Tuesday SHE DID WELL  
 
 
SHE GOT THE LOG , SUGARS ARE BETTER JUST WITH DIET  
BUT, SHE SAYS SHE HAS NOT STARTED ON INSULINS AT ALL Old  history H/o diabetes for 5 years Current A1C is over 13 %   and symptoms/problems include polyuria, polydipsia and visual disturbances Current diabetic medications include metformin . Current monitoring regimen: home blood tests - rarely Home blood sugar records: trend: fluctuating a lot Any episodes of hypoglycemia? no 
 
Weight trend: fluctuating a lot Prior visit with dietician: no 
Current diet: \"unhealthy\" diet in general 
Current exercise: no regular exercise Known diabetic complications: cardiac and renal  
Cardiovascular risk factors: diabetes mellitus, obesity, hypertension, stress, post-menopausal 
 
Eye exam current (within one year): yes KAYE: no  
 
Past Medical History:  
Diagnosis Date  Cardiomyopathy, idiopathic ([address] 10/28/2010  
 HTN (hypertension), benign 10/28/2010  PVC (premature ventricular contraction) 10/28/2010 Past Surgical History:  
Procedure Laterality Date  HX OTHER SURGICAL  10/02/2018 Right eye surgery Current Outpatient Medications Medication Sig  
 apixaban (ELIQUIS) 5 mg tablet Take 1 Tab by mouth two (2) times a day.  metoprolol succinate (TOPROL-XL) 25 mg XL tablet Take 0.5 Tabs by mouth daily.  nitroglycerin (NITROSTAT) 0.4 mg SL tablet 0.4 mg by SubLINGual route every five (5) minutes as needed for Chest Pain. Up to 3 doses.  insulin syringe-needle U-100 (BD INSULIN SYRINGE ULTRA-FINE) 1/2 mL 31 gauge x 15/64\" syrg Use 4 times daily. Dx code E11.65  
 flash glucose scanning reader (FREESTYLE BELKIS READER) misc Test 4 times daily. Dx code E11.65. To use as needed  flash glucose sensor (FREESTYLE BELKIS SENSOR) kit Test 4 times daily. Dx code E11.65. Change every 10 days  cloNIDine HCl (CATAPRES) 0.3 mg tablet TAKE 1 TABLET TWICE DAILY  NIFEdipine ER (ADALAT CC) 30 mg ER tablet TAKE 1 TABLET BY MOUTH EVERY DAY FOR 90 DAYS  JANUVIA 100 mg tablet TAKE 1 TABLET BY MOUTH DAILY  losartan (COZAAR) 100 mg tablet TAKE 1 TABLET EVERY DAY  hydroCHLOROthiazide (MICROZIDE) 12.5 mg capsule TAKE ONE CAPSULE BY MOUTH ONCE A DAY  PARoxetine (PAXIL) 20 mg tablet Take  by mouth daily.  omeprazole (PRILOSEC) 20 mg capsule Take 20 mg by mouth daily. No current facility-administered medications for this visit. Review of Systems Constitutional: Positive for malaise/fatigue and weight loss. Eyes: Positive for blurred vision. Cardiovascular: Positive for palpitations. Physical Exam  
Constitutional: She is oriented to person, place, and time. She appears well-developed and well-nourished. HENT:  
Head: Normocephalic. Eyes: Conjunctivae and EOM are normal. Pupils are equal, round, and reactive to light. Neck: Normal range of motion. Neck supple. No JVD present. No tracheal deviation present. No thyromegaly present. Cardiovascular: Normal rate, regular rhythm and normal heart sounds. No murmur heard. Pulmonary/Chest: Breath sounds normal.  
Abdominal: Soft. Bowel sounds are normal.  
Musculoskeletal: Normal range of motion. Lymphadenopathy:  
  She has no cervical adenopathy. Neurological: She is alert and oriented to person, place, and time. She has normal reflexes. Skin: Skin is warm. Psychiatric: She has a normal mood and affect. Diabetic foot exam:  AUGUST 2018 Left Foot: Visual Exam: normal  
 Pulse DP: 2+ (normal) Filament test: normal sensation Vibratory sensation: normal 
   
Right Foot: 
 Visual Exam: normal  
 Pulse DP: 2+ (normal) Filament test: normal sensation Vibratory sensation: normal 
 
 
 
Lab Results Component Value Date/Time Hemoglobin A1c 8.3 (H) 01/14/2019 10:09 AM  
 Hemoglobin A1c 11.3 (H) 10/01/2018 08:57 AM  
 Glucose 207 (H) 01/14/2019 10:09 AM  
 Glucose  08/30/2018 09:37 AM  
 Microalb/Creat ratio (ug/mg creat.) 345.7 (H) 01/14/2019 10:09 AM  
 LDL, calculated 63 01/14/2019 10:09 AM  
 Creatinine 1.28 (H) 01/14/2019 10:09 AM  
  
Lab Results Component Value Date/Time Cholesterol, total 134 01/14/2019 10:09 AM  
 HDL Cholesterol 37 (L) 01/14/2019 10:09 AM  
 LDL, calculated 63 01/14/2019 10:09 AM  
 Triglyceride 172 (H) 01/14/2019 10:09 AM  
 
Lab Results Component Value Date/Time ALT (SGPT) 11 01/14/2019 10:09 AM  
 AST (SGOT) 10 01/14/2019 10:09 AM  
 Alk. phosphatase 98 01/14/2019 10:09 AM  
 Bilirubin, total 0.4 01/14/2019 10:09 AM  
 Albumin 4.0 01/14/2019 10:09 AM  
 Protein, total 7.6 01/14/2019 10:09 AM  
 INR 1.0 02/14/2014 03:17 PM  
 Prothrombin time 11.0 02/14/2014 03:17 PM  
 PLATELET 175 52/05/0818 10:48 AM  
 
Lab Results Component Value Date/Time GFR est non-AA 44 (L) 01/14/2019 10:09 AM  
 GFR est AA 51 (L) 01/14/2019 10:09 AM  
 Creatinine 1.28 (H) 01/14/2019 10:09 AM  
 BUN 19 01/14/2019 10:09 AM  
 Sodium 140 01/14/2019 10:09 AM  
 Potassium 3.7 01/14/2019 10:09 AM  
 Chloride 100 01/14/2019 10:09 AM  
 CO2 23 01/14/2019 10:09 AM  
 Magnesium 2.0 02/14/2014 03:17 PM  
 
 
 
 
 
ASSESSMENT and PLAN 1. Type 2 DM, uncontrolled : a1c is   8.3 %      From      Jan 2019     Compared to    11.3 %    FROM   SEPT 2018    COMPARED TO    over 14 % Reviewed the log Some improvement is noticeable as she has followed DIET STRICTLY  
startED  on BASAL BOLUS REGIMEN , BUT SHE HAS NOT STARTED ON INSULINS SHE GOT AFRAID OF TAKING INSULIN SHOTS ( SHE SAYS HAD MULTIPLE ALLERGIES IN THE PAST ) SHE IS TAKING JANUVIA   
 
 
SHE HAS FUNCTIONAL PANCREAS  
 
EXPLAINED THE IMPORTANCE OF CHECKS SHE IS NOT READY  TO TAKE ORAL MEDS EITHER WHEN I OFFERED IT TO HER INSTEAD OF INSULINS 
AS SHE SAYS SHE IS ALLERGIC TO MEDS    
 
SHE HAS LOW DIABETIC HEALTH LITERACY  
 
SHE IS EDUCATED ABOUT   INSULINS BEING MUCH SAFER THAN ORAL MEDS Patient is advised about checking blood sugars 4 times a day and maintaining log book. The danger of having low blood sugars has been explained with inappropriate use of insulin  Patient voiced understanding and using the printed instructions at home. Hypoglycemia management has been explained to the patient. Carbohydrate counting discussed with the patient  
lab results and schedule of future lab studies reviewed with patient 2. Hypoglycemia :  Educated on treating the hypoglycemia. 3. HTN : continue current care. Patient is educated about importance of compliance with anti-hypertensives especially ARB/ACEI 4. Dyslipidemia : continue current meds. Patient is educated about benefits and adverse effects of statins and explained how benefits outweigh risk. 5. use of aspirin to prevent MI and TIA's discussed 6. Cad : S/p IACD - ON WALI oMntoya- REVIEWED HE INFO 7. ckd ; RESOLVED 8. PROTEINURIA : NEED GLYCEMIC CONTROL  
 
> 50 % of time is spent on counseling Patient voiced understanding her plan of care

## 2019-03-04 ENCOUNTER — CLINICAL SUPPORT (OUTPATIENT)
Dept: CARDIOLOGY CLINIC | Age: 64
End: 2019-03-04

## 2019-03-04 DIAGNOSIS — Z95.810 CARDIAC DEFIBRILLATOR IN SITU: Primary | ICD-10-CM

## 2019-04-10 LAB
CREATININE, EXTERNAL: 1.18
LDL-C, EXTERNAL: 65

## 2019-07-15 ENCOUNTER — CLINICAL SUPPORT (OUTPATIENT)
Dept: CARDIOLOGY CLINIC | Age: 64
End: 2019-07-15

## 2019-07-15 DIAGNOSIS — Z95.810 CARDIAC DEFIBRILLATOR IN SITU: Primary | ICD-10-CM

## 2019-10-28 ENCOUNTER — OFFICE VISIT (OUTPATIENT)
Dept: CARDIOLOGY CLINIC | Age: 64
End: 2019-10-28

## 2019-10-28 ENCOUNTER — CLINICAL SUPPORT (OUTPATIENT)
Dept: CARDIOLOGY CLINIC | Age: 64
End: 2019-10-28

## 2019-10-28 VITALS
HEIGHT: 67 IN | SYSTOLIC BLOOD PRESSURE: 130 MMHG | OXYGEN SATURATION: 96 % | HEART RATE: 68 BPM | RESPIRATION RATE: 18 BRPM | WEIGHT: 187 LBS | BODY MASS INDEX: 29.35 KG/M2 | DIASTOLIC BLOOD PRESSURE: 88 MMHG

## 2019-10-28 DIAGNOSIS — I49.3 PVC (PREMATURE VENTRICULAR CONTRACTION): Primary | ICD-10-CM

## 2019-10-28 DIAGNOSIS — I10 HTN (HYPERTENSION), BENIGN: ICD-10-CM

## 2019-10-28 DIAGNOSIS — Z95.810 CARDIAC DEFIBRILLATOR IN SITU: ICD-10-CM

## 2019-10-28 DIAGNOSIS — Z95.810 CARDIAC DEFIBRILLATOR IN SITU: Primary | ICD-10-CM

## 2019-10-28 DIAGNOSIS — I42.9 CARDIOMYOPATHY, IDIOPATHIC (HCC): ICD-10-CM

## 2019-10-28 DIAGNOSIS — I48.0 PAROXYSMAL ATRIAL FIBRILLATION (HCC): ICD-10-CM

## 2019-10-28 NOTE — PROGRESS NOTES
Room # 4  Visit Vitals  /90 (BP 1 Location: Left arm, BP Patient Position: Sitting)   Pulse 68   Resp 18   Ht 5' 7\" (1.702 m)   Wt 187 lb (84.8 kg)   SpO2 96%   BMI 29.29 kg/m²     No Cardiac Complaints

## 2019-10-28 NOTE — PROGRESS NOTES
HISTORY OF PRESENTING ILLNESS      Sofy Elaine is a 71 y.o. female with hypertension, cardiomyopathy, and dual chamber Medtronic ICD who presents for follow up of her ICD. She denies chest pain, SOB, edema, syncope or functional limitations. Her device interrogation reveals normal device functioning. She has new onset atrial fibrillation occurring on device check, longest episode was 40 minutes. She denies cardiac complaints. Eliquis 5mg BID for CVA risk reduction (CHADS VASC 3) as well as Lopressor 25mg BID. She is tolerating her anticoagulation thus far however has experienced fatigue since starting the Lopressor. Metoprolol was lowered to 12.5mg daily d/t fatigue. She is feeling well and denies cardiac complications. EKG shows sinus rhythm and her device interrogation failed to reveal recurrence of arrhythmia.         ACTIVE PROBLEM LIST     Patient Active Problem List    Diagnosis Date Noted    Uncontrolled type 2 diabetes mellitus with hyperglycemia ([address] 10/08/2018    Mixed hyperlipidemia 10/08/2018    Implantable cardioverter-defibrillator (ICD) in situ 11/16/2010    Cardiomyopathy, idiopathic ([address] 10/28/2010    HTN (hypertension), benign 10/28/2010    PVC (premature ventricular contraction) 10/28/2010           PAST MEDICAL HISTORY     Past Medical History:   Diagnosis Date    Cardiomyopathy, idiopathic ([address] 10/28/2010    HTN (hypertension), benign 10/28/2010    PVC (premature ventricular contraction) 10/28/2010           PAST SURGICAL HISTORY     Past Surgical History:   Procedure Laterality Date    HX OTHER SURGICAL  10/02/2018    Right eye surgery          ALLERGIES     Allergies   Allergen Reactions    Chocolate [Cocoa] Anaphylaxis and Swelling    Iodine Anaphylaxis    Lisinopril Anaphylaxis    Peanut Anaphylaxis and Swelling    Glimepiride Swelling    Glipizide Other (comments)     Pruritis    Metformin Diarrhea    Norvasc [Amlodipine] Other (comments)     Light Headed    Pneumovax 23 [Pneumococcal 23-Devi Ps Vaccine] Hives    Toprol Xl [Metoprolol Succinate] Other (comments)     Light headed    Vitamin D [Cholecalciferol (Vitamin D3)] Other (comments)     Dizziness/headache          FAMILY HISTORY     No family history on file. negative for cardiac disease       SOCIAL HISTORY     Social History     Socioeconomic History    Marital status: SINGLE     Spouse name: Not on file    Number of children: Not on file    Years of education: Not on file    Highest education level: Not on file   Tobacco Use    Smoking status: Never Smoker    Smokeless tobacco: Never Used   Substance and Sexual Activity    Alcohol use: No         MEDICATIONS     Current Outpatient Medications   Medication Sig    SITagliptin (JANUVIA) 100 mg tablet TAKE 1 TABLET BY MOUTH EVERY DAY    apixaban (ELIQUIS) 5 mg tablet Take 1 Tab by mouth two (2) times a day.  metoprolol succinate (TOPROL-XL) 25 mg XL tablet Take 0.5 Tabs by mouth daily.  nitroglycerin (NITROSTAT) 0.4 mg SL tablet 0.4 mg by SubLINGual route every five (5) minutes as needed for Chest Pain. Up to 3 doses.  insulin syringe-needle U-100 (BD INSULIN SYRINGE ULTRA-FINE) 1/2 mL 31 gauge x 15/64\" syrg Use 4 times daily. Dx code E11.65    flash glucose scanning reader (FREESTYLE BELKIS READER) misc Test 4 times daily. Dx code E11.65. To use as needed    flash glucose sensor (FREESTYLE BELKIS SENSOR) kit Test 4 times daily. Dx code E11.65. Change every 10 days    cloNIDine HCl (CATAPRES) 0.3 mg tablet TAKE 1 TABLET TWICE DAILY    NIFEdipine ER (ADALAT CC) 30 mg ER tablet TAKE 1 TABLET BY MOUTH EVERY DAY FOR 90 DAYS    losartan (COZAAR) 100 mg tablet TAKE 1 TABLET EVERY DAY    hydroCHLOROthiazide (MICROZIDE) 12.5 mg capsule TAKE ONE CAPSULE BY MOUTH ONCE A DAY    PARoxetine (PAXIL) 20 mg tablet Take  by mouth daily.  omeprazole (PRILOSEC) 20 mg capsule Take 20 mg by mouth daily.      No current facility-administered medications for this visit. I have reviewed the nurses notes, vitals, problem list, allergy list, medical history, family, social history and medications. REVIEW OF SYMPTOMS      General: Pt denies excessive weight gain or loss. Pt is able to conduct ADL's  HEENT: Denies blurred vision, headaches, hearing loss, epistaxis and difficulty swallowing. Respiratory: Denies cough, congestion, shortness of breath, WEAVER, wheezing or stridor. Cardiovascular: Denies precordial pain, palpitations, edema or PND  Gastrointestinal: Denies poor appetite, indigestion, abdominal pain or blood in stool  Genitourinary: Denies hematuria, dysuria, increased urinary frequency  Musculoskeletal: Denies joint pain or swelling from muscles or joints  Neurologic: Denies tremor, paresthesias, headache, or sensory motor disturbance  Psychiatric: Denies confusion, insomnia, depression  Integumentray: Denies rash, itching or ulcers. Hematologic: Denies easy bruising, bleeding       PHYSICAL EXAMINATION      There were no vitals filed for this visit. General: Well developed, in no acute distress. HEENT: No jaundice, oral mucosa moist, no oral ulcers  Neck: Supple, no stiffness, no lymphadenopathy, supple  Heart:  Normal S1/S2 negative S3 or S4. Regular, no murmur, gallop or rub, no jugular venous distention  Respiratory: Clear bilaterally x 4, no wheezing or rales  Abdomen:   Soft, non-tender, bowel sounds are active.   Extremities:  No edema, normal cap refill, no cyanosis. Musculoskeletal: No clubbing, no deformities  Neuro: A&Ox3, speech clear, gait stable, cooperative, no focal neurologic deficits  Skin: Skin color is normal. No rashes or lesions.  Non diaphoretic, moist.  Vascular: 2+ pulses symmetric in all extremities       DIAGNOSTIC DATA      EKG:        LABORATORY DATA      Lab Results   Component Value Date/Time    WBC 6.0 08/13/2018 10:48 AM    HGB 13.7 08/13/2018 10:48 AM    HCT 40.5 08/13/2018 10:48 AM    PLATELET 746 08/13/2018 10:48 AM    MCV 89 08/13/2018 10:48 AM      Lab Results   Component Value Date/Time    Sodium 140 01/14/2019 10:09 AM    Potassium 3.7 01/14/2019 10:09 AM    Chloride 100 01/14/2019 10:09 AM    CO2 23 01/14/2019 10:09 AM    Glucose 207 (H) 01/14/2019 10:09 AM    BUN 19 01/14/2019 10:09 AM    Creatinine 1.28 (H) 01/14/2019 10:09 AM    BUN/Creatinine ratio 15 01/14/2019 10:09 AM    GFR est AA 51 (L) 01/14/2019 10:09 AM    GFR est non-AA 44 (L) 01/14/2019 10:09 AM    Calcium 9.7 01/14/2019 10:09 AM    Bilirubin, total 0.4 01/14/2019 10:09 AM    AST (SGOT) 10 01/14/2019 10:09 AM    Alk. phosphatase 98 01/14/2019 10:09 AM    Protein, total 7.6 01/14/2019 10:09 AM    Albumin 4.0 01/14/2019 10:09 AM    A-G Ratio 1.1 (L) 01/14/2019 10:09 AM    ALT (SGPT) 11 01/14/2019 10:09 AM           ASSESSMENT      1. ICD                        B. Medtronic                        V. Dual  2. Cardiomyopathy  3. Hypertension  4. Paroxysmal [facility]                        CHADS VASC 3              Asymptomatic        PLAN     Continue to follow per device clinic. FOLLOW-UP     1 year    Thank you, Emily Wolff MD for allowing me to participate in the care of this extraordinarily pleasant female. Please do not hesitate to contact me for further questions/concerns. Victor M Cagle NP    Patient seen and examined by me with nurse practitioner. I personally performed all components of the history, physical, and medical decision making and agree with the assessment and plan with minor modifications as noted.        Gabo Hernandez MD  Cardiac Electrophysiology / Cardiology    [address] [place] [address], [facility], [address]  [phone] / [phone] Fax   [phone] / [phone] Fax

## 2019-11-28 ENCOUNTER — ED HISTORICAL/CONVERTED ENCOUNTER (OUTPATIENT)
Dept: OTHER | Age: 64
End: 2019-11-28

## 2019-12-05 RX ORDER — APIXABAN 5 MG/1
TABLET, FILM COATED ORAL
Qty: 180 TAB | Refills: 3 | Status: SHIPPED | OUTPATIENT
Start: 2019-12-05 | End: 2021-01-01

## 2019-12-16 DIAGNOSIS — I49.3 PVC (PREMATURE VENTRICULAR CONTRACTION): ICD-10-CM

## 2019-12-16 DIAGNOSIS — I10 HTN (HYPERTENSION), BENIGN: ICD-10-CM

## 2019-12-16 RX ORDER — METOPROLOL SUCCINATE 25 MG/1
12.5 TABLET, EXTENDED RELEASE ORAL DAILY
Qty: 45 TAB | Refills: 11 | Status: SHIPPED | OUTPATIENT
Start: 2019-12-16 | End: 2021-01-01

## 2020-01-01 ENCOUNTER — OFFICE VISIT (OUTPATIENT)
Dept: CARDIOLOGY CLINIC | Age: 65
End: 2020-01-01
Payer: MEDICARE

## 2020-01-01 ENCOUNTER — CLINICAL SUPPORT (OUTPATIENT)
Dept: CARDIOLOGY CLINIC | Age: 65
End: 2020-01-01
Payer: MEDICARE

## 2020-01-01 VITALS
BODY MASS INDEX: 29.66 KG/M2 | OXYGEN SATURATION: 100 % | HEIGHT: 67 IN | WEIGHT: 189 LBS | SYSTOLIC BLOOD PRESSURE: 128 MMHG | HEART RATE: 72 BPM | DIASTOLIC BLOOD PRESSURE: 88 MMHG

## 2020-01-01 DIAGNOSIS — I48.0 PAROXYSMAL ATRIAL FIBRILLATION (HCC): ICD-10-CM

## 2020-01-01 DIAGNOSIS — I10 HTN (HYPERTENSION), BENIGN: ICD-10-CM

## 2020-01-01 DIAGNOSIS — Z95.0 CARDIAC PACEMAKER IN SITU: Primary | ICD-10-CM

## 2020-01-01 DIAGNOSIS — I49.3 PVC (PREMATURE VENTRICULAR CONTRACTION): ICD-10-CM

## 2020-01-01 PROCEDURE — G8752 SYS BP LESS 140: HCPCS | Performed by: INTERNAL MEDICINE

## 2020-01-01 PROCEDURE — 3017F COLORECTAL CA SCREEN DOC REV: CPT | Performed by: INTERNAL MEDICINE

## 2020-01-01 PROCEDURE — 99215 OFFICE O/P EST HI 40 MIN: CPT | Performed by: INTERNAL MEDICINE

## 2020-01-01 PROCEDURE — 93288 INTERROG EVL PM/LDLS PM IP: CPT

## 2020-01-01 PROCEDURE — 1101F PT FALLS ASSESS-DOCD LE1/YR: CPT | Performed by: INTERNAL MEDICINE

## 2020-01-01 PROCEDURE — 1090F PRES/ABSN URINE INCON ASSESS: CPT | Performed by: INTERNAL MEDICINE

## 2020-01-01 PROCEDURE — G8754 DIAS BP LESS 90: HCPCS | Performed by: INTERNAL MEDICINE

## 2020-01-01 PROCEDURE — G8536 NO DOC ELDER MAL SCRN: HCPCS | Performed by: INTERNAL MEDICINE

## 2020-01-01 PROCEDURE — G8419 CALC BMI OUT NRM PARAM NOF/U: HCPCS | Performed by: INTERNAL MEDICINE

## 2020-01-01 PROCEDURE — G8400 PT W/DXA NO RESULTS DOC: HCPCS | Performed by: INTERNAL MEDICINE

## 2020-01-01 PROCEDURE — 93280 PM DEVICE PROGR EVAL DUAL: CPT

## 2020-01-01 PROCEDURE — G0463 HOSPITAL OUTPT CLINIC VISIT: HCPCS | Performed by: INTERNAL MEDICINE

## 2020-01-01 PROCEDURE — G8510 SCR DEP NEG, NO PLAN REQD: HCPCS | Performed by: INTERNAL MEDICINE

## 2020-01-01 PROCEDURE — G8427 DOCREV CUR MEDS BY ELIG CLIN: HCPCS | Performed by: INTERNAL MEDICINE

## 2020-01-01 PROCEDURE — 93282 PRGRMG EVAL IMPLANTABLE DFB: CPT

## 2020-01-01 RX ORDER — GABAPENTIN 100 MG/1
CAPSULE ORAL
COMMUNITY
Start: 2020-01-01 | End: 2021-01-01

## 2020-01-01 RX ORDER — METFORMIN HYDROCHLORIDE 1000 MG/1
TABLET ORAL 2 TIMES DAILY WITH MEALS
COMMUNITY
Start: 2020-10-12 | End: 2021-01-01

## 2020-02-24 ENCOUNTER — CLINICAL SUPPORT (OUTPATIENT)
Dept: CARDIOLOGY CLINIC | Age: 65
End: 2020-02-24

## 2020-02-24 DIAGNOSIS — Z95.810 CARDIAC DEFIBRILLATOR IN SITU: Primary | ICD-10-CM

## 2020-06-01 ENCOUNTER — CLINICAL SUPPORT (OUTPATIENT)
Dept: CARDIOLOGY CLINIC | Age: 65
End: 2020-06-01

## 2020-06-01 DIAGNOSIS — Z95.810 CARDIAC DEFIBRILLATOR IN SITU: Primary | ICD-10-CM

## 2020-11-16 NOTE — PROGRESS NOTES
Room 4    Visit Vitals  /88 (BP 1 Location: Left arm, BP Patient Position: Sitting)   Pulse 72   Ht 5' 7\" (1.702 m)   Wt 189 lb (85.7 kg)   SpO2 100%   BMI 29.60 kg/m²         Chest pain: no  Shortness of breath: no  Edema: no  Palpitations, Skipped beats, Rapid heartbeat: no  Dizziness: no    New diagnosis/Surgeries: no    ER/Hospitalizations: no    Refills: no

## 2021-01-01 ENCOUNTER — TELEPHONE (OUTPATIENT)
Dept: CARDIOLOGY CLINIC | Age: 66
End: 2021-01-01

## 2021-01-01 ENCOUNTER — HOSPITAL ENCOUNTER (INPATIENT)
Age: 66
LOS: 4 days | Discharge: NURSING, REHAB OR LONG-TERM CARE | DRG: 291 | End: 2021-10-06
Attending: EMERGENCY MEDICINE | Admitting: INTERNAL MEDICINE
Payer: MEDICARE

## 2021-01-01 ENCOUNTER — APPOINTMENT (OUTPATIENT)
Dept: ENDOSCOPY | Age: 66
DRG: 871 | End: 2021-01-01
Attending: INTERNAL MEDICINE
Payer: MEDICARE

## 2021-01-01 ENCOUNTER — HOSPITAL ENCOUNTER (OUTPATIENT)
Dept: LAB | Age: 66
Discharge: HOME | End: 2021-08-09

## 2021-01-01 ENCOUNTER — APPOINTMENT (OUTPATIENT)
Dept: CT IMAGING | Age: 66
DRG: 948 | End: 2021-01-01
Attending: EMERGENCY MEDICINE
Payer: MEDICARE

## 2021-01-01 ENCOUNTER — APPOINTMENT (OUTPATIENT)
Dept: CT IMAGING | Age: 66
DRG: 948 | End: 2021-01-01
Attending: INTERNAL MEDICINE
Payer: MEDICARE

## 2021-01-01 ENCOUNTER — HOSPITAL ENCOUNTER (INPATIENT)
Age: 66
LOS: 6 days | Discharge: HOME | DRG: 177 | End: 2021-02-19
Attending: EMERGENCY MEDICINE | Admitting: FAMILY MEDICINE
Payer: MEDICARE

## 2021-01-01 ENCOUNTER — TRANSCRIBE ORDER (OUTPATIENT)
Dept: SCHEDULING | Age: 66
End: 2021-01-01

## 2021-01-01 ENCOUNTER — APPOINTMENT (OUTPATIENT)
Dept: GENERAL RADIOLOGY | Age: 66
DRG: 177 | End: 2021-01-01
Attending: EMERGENCY MEDICINE
Payer: MEDICARE

## 2021-01-01 ENCOUNTER — APPOINTMENT (OUTPATIENT)
Dept: GENERAL RADIOLOGY | Age: 66
DRG: 871 | End: 2021-01-01
Attending: FAMILY MEDICINE
Payer: MEDICARE

## 2021-01-01 ENCOUNTER — APPOINTMENT (OUTPATIENT)
Dept: CT IMAGING | Age: 66
DRG: 948 | End: 2021-01-01
Attending: STUDENT IN AN ORGANIZED HEALTH CARE EDUCATION/TRAINING PROGRAM
Payer: MEDICARE

## 2021-01-01 ENCOUNTER — APPOINTMENT (OUTPATIENT)
Dept: GENERAL RADIOLOGY | Age: 66
DRG: 177 | End: 2021-01-01
Attending: INTERNAL MEDICINE
Payer: MEDICARE

## 2021-01-01 ENCOUNTER — APPOINTMENT (OUTPATIENT)
Dept: NON INVASIVE DIAGNOSTICS | Age: 66
DRG: 177 | End: 2021-01-01
Attending: FAMILY MEDICINE
Payer: MEDICARE

## 2021-01-01 ENCOUNTER — APPOINTMENT (OUTPATIENT)
Dept: GENERAL RADIOLOGY | Age: 66
DRG: 291 | End: 2021-01-01
Attending: EMERGENCY MEDICINE
Payer: MEDICARE

## 2021-01-01 ENCOUNTER — ANESTHESIA (OUTPATIENT)
Dept: ENDOSCOPY | Age: 66
DRG: 871 | End: 2021-01-01
Payer: MEDICARE

## 2021-01-01 ENCOUNTER — HOSPITAL ENCOUNTER (OUTPATIENT)
Dept: LAB | Age: 66
Discharge: HOME | End: 2021-08-08

## 2021-01-01 ENCOUNTER — ANESTHESIA EVENT (OUTPATIENT)
Dept: ENDOSCOPY | Age: 66
DRG: 871 | End: 2021-01-01
Payer: MEDICARE

## 2021-01-01 ENCOUNTER — HOSPITAL ENCOUNTER (OUTPATIENT)
Dept: GENERAL RADIOLOGY | Age: 66
Discharge: HOME | End: 2021-01-14
Payer: MEDICARE

## 2021-01-01 ENCOUNTER — APPOINTMENT (OUTPATIENT)
Dept: CT IMAGING | Age: 66
DRG: 291 | End: 2021-01-01
Attending: INTERNAL MEDICINE
Payer: MEDICARE

## 2021-01-01 ENCOUNTER — APPOINTMENT (OUTPATIENT)
Dept: NON INVASIVE DIAGNOSTICS | Age: 66
DRG: 291 | End: 2021-01-01
Attending: INTERNAL MEDICINE
Payer: MEDICARE

## 2021-01-01 ENCOUNTER — OFFICE VISIT (OUTPATIENT)
Dept: CARDIOLOGY CLINIC | Age: 66
End: 2021-01-01

## 2021-01-01 ENCOUNTER — DOCUMENTATION ONLY (OUTPATIENT)
Dept: CARDIOLOGY CLINIC | Age: 66
End: 2021-01-01

## 2021-01-01 ENCOUNTER — HOSPITAL ENCOUNTER (INPATIENT)
Age: 66
LOS: 5 days | Discharge: HOME HEALTH | DRG: 948 | End: 2021-08-07
Attending: STUDENT IN AN ORGANIZED HEALTH CARE EDUCATION/TRAINING PROGRAM | Admitting: HOSPITALIST
Payer: MEDICARE

## 2021-01-01 ENCOUNTER — APPOINTMENT (OUTPATIENT)
Dept: GENERAL RADIOLOGY | Age: 66
DRG: 871 | End: 2021-01-01
Attending: STUDENT IN AN ORGANIZED HEALTH CARE EDUCATION/TRAINING PROGRAM
Payer: MEDICARE

## 2021-01-01 ENCOUNTER — APPOINTMENT (OUTPATIENT)
Dept: CT IMAGING | Age: 66
DRG: 871 | End: 2021-01-01
Attending: OTOLARYNGOLOGY
Payer: MEDICARE

## 2021-01-01 ENCOUNTER — APPOINTMENT (OUTPATIENT)
Dept: CT IMAGING | Age: 66
DRG: 871 | End: 2021-01-01
Attending: FAMILY MEDICINE
Payer: MEDICARE

## 2021-01-01 ENCOUNTER — APPOINTMENT (OUTPATIENT)
Dept: CT IMAGING | Age: 66
DRG: 871 | End: 2021-01-01
Attending: STUDENT IN AN ORGANIZED HEALTH CARE EDUCATION/TRAINING PROGRAM
Payer: MEDICARE

## 2021-01-01 ENCOUNTER — OFFICE VISIT (OUTPATIENT)
Dept: CARDIOLOGY CLINIC | Age: 66
End: 2021-01-01
Payer: MEDICARE

## 2021-01-01 ENCOUNTER — CLINICAL SUPPORT (OUTPATIENT)
Dept: CARDIOLOGY CLINIC | Age: 66
End: 2021-01-01

## 2021-01-01 ENCOUNTER — APPOINTMENT (OUTPATIENT)
Dept: GENERAL RADIOLOGY | Age: 66
DRG: 871 | End: 2021-01-01
Attending: INTERNAL MEDICINE
Payer: MEDICARE

## 2021-01-01 ENCOUNTER — CLINICAL SUPPORT (OUTPATIENT)
Dept: CARDIOLOGY CLINIC | Age: 66
End: 2021-01-01
Payer: MEDICARE

## 2021-01-01 ENCOUNTER — APPOINTMENT (OUTPATIENT)
Dept: GENERAL RADIOLOGY | Age: 66
DRG: 291 | End: 2021-01-01
Attending: INTERNAL MEDICINE
Payer: MEDICARE

## 2021-01-01 ENCOUNTER — APPOINTMENT (OUTPATIENT)
Dept: GENERAL RADIOLOGY | Age: 66
DRG: 948 | End: 2021-01-01
Attending: EMERGENCY MEDICINE
Payer: MEDICARE

## 2021-01-01 ENCOUNTER — HOSPITAL ENCOUNTER (OUTPATIENT)
Dept: MAMMOGRAPHY | Age: 66
Discharge: HOME | End: 2021-06-25
Payer: MEDICARE

## 2021-01-01 ENCOUNTER — HOSPITAL ENCOUNTER (INPATIENT)
Age: 66
LOS: 13 days | Discharge: NURSING, REHAB OR LONG-TERM CARE | DRG: 871 | End: 2021-10-24
Attending: STUDENT IN AN ORGANIZED HEALTH CARE EDUCATION/TRAINING PROGRAM | Admitting: FAMILY MEDICINE
Payer: MEDICARE

## 2021-01-01 ENCOUNTER — APPOINTMENT (OUTPATIENT)
Dept: NON INVASIVE DIAGNOSTICS | Age: 66
DRG: 871 | End: 2021-01-01
Attending: FAMILY MEDICINE
Payer: MEDICARE

## 2021-01-01 VITALS
OXYGEN SATURATION: 94 % | TEMPERATURE: 97.8 F | SYSTOLIC BLOOD PRESSURE: 108 MMHG | DIASTOLIC BLOOD PRESSURE: 81 MMHG | HEIGHT: 66 IN | BODY MASS INDEX: 26.89 KG/M2 | HEART RATE: 68 BPM | RESPIRATION RATE: 18 BRPM | WEIGHT: 167.33 LBS

## 2021-01-01 VITALS
OXYGEN SATURATION: 99 % | BODY MASS INDEX: 30.48 KG/M2 | HEART RATE: 80 BPM | SYSTOLIC BLOOD PRESSURE: 166 MMHG | HEIGHT: 67 IN | DIASTOLIC BLOOD PRESSURE: 98 MMHG | RESPIRATION RATE: 16 BRPM | WEIGHT: 194.2 LBS

## 2021-01-01 VITALS
RESPIRATION RATE: 18 BRPM | HEART RATE: 63 BPM | BODY MASS INDEX: 30.51 KG/M2 | SYSTOLIC BLOOD PRESSURE: 158 MMHG | HEIGHT: 66 IN | OXYGEN SATURATION: 92 % | WEIGHT: 189.82 LBS | TEMPERATURE: 98.2 F | DIASTOLIC BLOOD PRESSURE: 94 MMHG

## 2021-01-01 VITALS
DIASTOLIC BLOOD PRESSURE: 90 MMHG | OXYGEN SATURATION: 100 % | TEMPERATURE: 97.5 F | SYSTOLIC BLOOD PRESSURE: 148 MMHG | RESPIRATION RATE: 18 BRPM | BODY MASS INDEX: 29.83 KG/M2 | HEIGHT: 67 IN | WEIGHT: 190.04 LBS | HEART RATE: 66 BPM

## 2021-01-01 VITALS
OXYGEN SATURATION: 99 % | HEART RATE: 74 BPM | HEIGHT: 67 IN | RESPIRATION RATE: 16 BRPM | WEIGHT: 187.8 LBS | BODY MASS INDEX: 29.47 KG/M2 | SYSTOLIC BLOOD PRESSURE: 130 MMHG | DIASTOLIC BLOOD PRESSURE: 70 MMHG

## 2021-01-01 VITALS
DIASTOLIC BLOOD PRESSURE: 42 MMHG | TEMPERATURE: 97.7 F | WEIGHT: 168.65 LBS | OXYGEN SATURATION: 100 % | BODY MASS INDEX: 27.1 KG/M2 | SYSTOLIC BLOOD PRESSURE: 53 MMHG | HEIGHT: 66 IN

## 2021-01-01 DIAGNOSIS — Z95.810 AUTOMATIC IMPLANTABLE CARDIAC DEFIBRILLATOR IN SITU: Primary | ICD-10-CM

## 2021-01-01 DIAGNOSIS — I50.23 ACUTE ON CHRONIC SYSTOLIC CHF (CONGESTIVE HEART FAILURE) (HCC): ICD-10-CM

## 2021-01-01 DIAGNOSIS — N28.9 ACUTE RENAL INSUFFICIENCY: ICD-10-CM

## 2021-01-01 DIAGNOSIS — I48.0 PAROXYSMAL ATRIAL FIBRILLATION (HCC): ICD-10-CM

## 2021-01-01 DIAGNOSIS — I42.9 CARDIOMYOPATHY, IDIOPATHIC (HCC): ICD-10-CM

## 2021-01-01 DIAGNOSIS — Z95.810 ICD (IMPLANTABLE CARDIOVERTER-DEFIBRILLATOR) IN PLACE: ICD-10-CM

## 2021-01-01 DIAGNOSIS — E87.6 HYPOKALEMIA: ICD-10-CM

## 2021-01-01 DIAGNOSIS — Z12.31 VISIT FOR SCREENING MAMMOGRAM: Primary | ICD-10-CM

## 2021-01-01 DIAGNOSIS — Z22.322 MRSA COLONIZATION: ICD-10-CM

## 2021-01-01 DIAGNOSIS — U07.1 COVID-19 VIRUS INFECTION: ICD-10-CM

## 2021-01-01 DIAGNOSIS — I10 HTN (HYPERTENSION), BENIGN: ICD-10-CM

## 2021-01-01 DIAGNOSIS — I50.9 ACUTE CONGESTIVE HEART FAILURE, UNSPECIFIED HEART FAILURE TYPE (HCC): ICD-10-CM

## 2021-01-01 DIAGNOSIS — Z12.31 VISIT FOR SCREENING MAMMOGRAM: ICD-10-CM

## 2021-01-01 DIAGNOSIS — R78.81 MRSA BACTEREMIA: ICD-10-CM

## 2021-01-01 DIAGNOSIS — Z95.810 ICD (IMPLANTABLE CARDIOVERTER-DEFIBRILLATOR) IN PLACE: Primary | ICD-10-CM

## 2021-01-01 DIAGNOSIS — R50.9 FEVER, UNSPECIFIED FEVER CAUSE: ICD-10-CM

## 2021-01-01 DIAGNOSIS — R77.8 ELEVATED TROPONIN: ICD-10-CM

## 2021-01-01 DIAGNOSIS — R53.1 WEAKNESS: ICD-10-CM

## 2021-01-01 DIAGNOSIS — R41.82 ALTERED MENTAL STATUS, UNSPECIFIED ALTERED MENTAL STATUS TYPE: ICD-10-CM

## 2021-01-01 DIAGNOSIS — R06.02 SOB (SHORTNESS OF BREATH): Primary | ICD-10-CM

## 2021-01-01 DIAGNOSIS — I50.9 CONGESTIVE HEART FAILURE, UNSPECIFIED HF CHRONICITY, UNSPECIFIED HEART FAILURE TYPE (HCC): Primary | ICD-10-CM

## 2021-01-01 DIAGNOSIS — A41.9 SEPSIS, DUE TO UNSPECIFIED ORGANISM, UNSPECIFIED WHETHER ACUTE ORGAN DYSFUNCTION PRESENT (HCC): Primary | ICD-10-CM

## 2021-01-01 DIAGNOSIS — K11.21 ACUTE PAROTITIS: ICD-10-CM

## 2021-01-01 DIAGNOSIS — R78.81 GRAM-POSITIVE COCCI BACTEREMIA: ICD-10-CM

## 2021-01-01 DIAGNOSIS — N17.9 AKI (ACUTE KIDNEY INJURY) (HCC): ICD-10-CM

## 2021-01-01 DIAGNOSIS — I49.3 PVC (PREMATURE VENTRICULAR CONTRACTION): ICD-10-CM

## 2021-01-01 DIAGNOSIS — B95.62 MRSA BACTEREMIA: ICD-10-CM

## 2021-01-01 DIAGNOSIS — Z95.810 AUTOMATIC IMPLANTABLE CARDIAC DEFIBRILLATOR IN SITU: ICD-10-CM

## 2021-01-01 DIAGNOSIS — I21.4 NSTEMI (NON-ST ELEVATED MYOCARDIAL INFARCTION) (HCC): Primary | ICD-10-CM

## 2021-01-01 DIAGNOSIS — I48.20 CHRONIC ATRIAL FIBRILLATION (HCC): ICD-10-CM

## 2021-01-01 LAB
25(OH)D3 SERPL-MCNC: 12.4 NG/ML (ref 30–100)
ALBUMIN SERPL-MCNC: 1.7 G/DL (ref 3.5–5)
ALBUMIN SERPL-MCNC: 1.8 G/DL (ref 3.5–5)
ALBUMIN SERPL-MCNC: 1.9 G/DL (ref 3.5–5)
ALBUMIN SERPL-MCNC: 2 G/DL (ref 3.5–5)
ALBUMIN SERPL-MCNC: 2.2 G/DL (ref 3.5–5)
ALBUMIN SERPL-MCNC: 2.2 G/DL (ref 3.5–5)
ALBUMIN SERPL-MCNC: 2.4 G/DL (ref 3.5–5)
ALBUMIN SERPL-MCNC: 2.5 G/DL (ref 3.5–5)
ALBUMIN SERPL-MCNC: 2.5 G/DL (ref 3.5–5)
ALBUMIN SERPL-MCNC: 2.7 G/DL (ref 3.5–5)
ALBUMIN SERPL-MCNC: 2.7 G/DL (ref 3.5–5)
ALBUMIN SERPL-MCNC: 2.9 G/DL (ref 3.5–5)
ALBUMIN SERPL-MCNC: 2.9 G/DL (ref 3.5–5)
ALBUMIN SERPL-MCNC: 3.1 G/DL (ref 3.5–5)
ALBUMIN SERPL-MCNC: 3.1 G/DL (ref 3.5–5)
ALBUMIN/GLOB SERPL: 0.4 {RATIO} (ref 1.1–2.2)
ALBUMIN/GLOB SERPL: 0.5 {RATIO} (ref 1.1–2.2)
ALBUMIN/GLOB SERPL: 0.6 {RATIO} (ref 1.1–2.2)
ALBUMIN/GLOB SERPL: 0.7 {RATIO} (ref 1.1–2.2)
ALBUMIN/GLOB SERPL: 0.7 {RATIO} (ref 1.1–2.2)
ALP SERPL-CCNC: 101 U/L (ref 45–117)
ALP SERPL-CCNC: 112 U/L (ref 45–117)
ALP SERPL-CCNC: 130 U/L (ref 45–117)
ALP SERPL-CCNC: 139 U/L (ref 45–117)
ALP SERPL-CCNC: 208 U/L (ref 45–117)
ALP SERPL-CCNC: 232 U/L (ref 45–117)
ALP SERPL-CCNC: 61 U/L (ref 45–117)
ALP SERPL-CCNC: 67 U/L (ref 45–117)
ALP SERPL-CCNC: 67 U/L (ref 45–117)
ALP SERPL-CCNC: 72 U/L (ref 45–117)
ALP SERPL-CCNC: 72 U/L (ref 45–117)
ALP SERPL-CCNC: 78 U/L (ref 45–117)
ALP SERPL-CCNC: 82 U/L (ref 45–117)
ALP SERPL-CCNC: 85 U/L (ref 45–117)
ALP SERPL-CCNC: 88 U/L (ref 45–117)
ALP SERPL-CCNC: 91 U/L (ref 45–117)
ALP SERPL-CCNC: 92 U/L (ref 45–117)
ALP SERPL-CCNC: 93 U/L (ref 45–117)
ALP SERPL-CCNC: 95 U/L (ref 45–117)
ALT SERPL-CCNC: 17 U/L (ref 12–78)
ALT SERPL-CCNC: 18 U/L (ref 12–78)
ALT SERPL-CCNC: 18 U/L (ref 12–78)
ALT SERPL-CCNC: 20 U/L (ref 12–78)
ALT SERPL-CCNC: 29 U/L (ref 12–78)
ALT SERPL-CCNC: 34 U/L (ref 12–78)
ALT SERPL-CCNC: 34 U/L (ref 12–78)
ALT SERPL-CCNC: 42 U/L (ref 12–78)
ALT SERPL-CCNC: 43 U/L (ref 12–78)
ALT SERPL-CCNC: 47 U/L (ref 12–78)
ALT SERPL-CCNC: 53 U/L (ref 12–78)
ALT SERPL-CCNC: 61 U/L (ref 12–78)
ALT SERPL-CCNC: 64 U/L (ref 12–78)
ALT SERPL-CCNC: 68 U/L (ref 12–78)
ALT SERPL-CCNC: 74 U/L (ref 12–78)
ALT SERPL-CCNC: 74 U/L (ref 12–78)
AMPHET UR QL SCN: NEGATIVE
ANION GAP SERPL CALC-SCNC: 10 MMOL/L (ref 5–15)
ANION GAP SERPL CALC-SCNC: 11 MMOL/L (ref 5–15)
ANION GAP SERPL CALC-SCNC: 12 MMOL/L (ref 5–15)
ANION GAP SERPL CALC-SCNC: 13 MMOL/L (ref 5–15)
ANION GAP SERPL CALC-SCNC: 6 MMOL/L (ref 5–15)
ANION GAP SERPL CALC-SCNC: 6 MMOL/L (ref 5–15)
ANION GAP SERPL CALC-SCNC: 7 MMOL/L (ref 5–15)
ANION GAP SERPL CALC-SCNC: 8 MMOL/L (ref 5–15)
ANION GAP SERPL CALC-SCNC: 9 MMOL/L (ref 5–15)
APPEARANCE CSF: CLEAR
APPEARANCE UR: CLEAR
ARTERIAL PATENCY WRIST A: ABNORMAL
ARTERIAL PATENCY WRIST A: ABNORMAL
ARTERIAL PATENCY WRIST A: NORMAL
AST SERPL W P-5'-P-CCNC: 14 U/L (ref 15–37)
AST SERPL W P-5'-P-CCNC: 14 U/L (ref 15–37)
AST SERPL W P-5'-P-CCNC: 16 U/L (ref 15–37)
AST SERPL W P-5'-P-CCNC: 16 U/L (ref 15–37)
AST SERPL W P-5'-P-CCNC: 17 U/L (ref 15–37)
AST SERPL W P-5'-P-CCNC: 18 U/L (ref 15–37)
AST SERPL W P-5'-P-CCNC: 18 U/L (ref 15–37)
AST SERPL W P-5'-P-CCNC: 22 U/L (ref 15–37)
AST SERPL W P-5'-P-CCNC: 25 U/L (ref 15–37)
AST SERPL W P-5'-P-CCNC: 25 U/L (ref 15–37)
AST SERPL W P-5'-P-CCNC: 45 U/L (ref 15–37)
AST SERPL W P-5'-P-CCNC: 48 U/L (ref 15–37)
AST SERPL W P-5'-P-CCNC: 50 U/L (ref 15–37)
AST SERPL W P-5'-P-CCNC: 52 U/L (ref 15–37)
AST SERPL W P-5'-P-CCNC: 55 U/L (ref 15–37)
AST SERPL W P-5'-P-CCNC: 62 U/L (ref 15–37)
AST SERPL W P-5'-P-CCNC: 71 U/L (ref 15–37)
AST SERPL W P-5'-P-CCNC: 71 U/L (ref 15–37)
AST SERPL W P-5'-P-CCNC: ABNORMAL U/L (ref 15–37)
ATRIAL RATE: 83 BPM
ATRIAL RATE: 90 BPM
ATRIAL RATE: 90 BPM
ATRIAL RATE: 92 BPM
ATRIAL RATE: 92 BPM
ATRIAL RATE: 93 BPM
BACTERIA SPEC CULT: ABNORMAL
BACTERIA SPEC CULT: NORMAL
BACTERIA URNS QL MICRO: NEGATIVE /HPF
BARBITURATES UR QL SCN: NEGATIVE
BASE DEFICIT BLDA-SCNC: 0.7 MMOL/L (ref 0–2)
BASE DEFICIT BLDA-SCNC: 3 MMOL/L (ref 0–2)
BASE DEFICIT BLDA-SCNC: 4.7 MMOL/L (ref 0–2)
BASE DEFICIT BLDA-SCNC: 5.6 MMOL/L (ref 0–2)
BASOPHILS # BLD: 0 K/UL (ref 0–0.1)
BASOPHILS # BLD: 0.1 K/UL (ref 0–0.1)
BASOPHILS NFR BLD: 0 % (ref 0–1)
BDY SITE: ABNORMAL
BDY SITE: NORMAL
BENZODIAZ UR QL: NEGATIVE
BILIRUB SERPL-MCNC: 0.3 MG/DL (ref 0.2–1)
BILIRUB SERPL-MCNC: 0.3 MG/DL (ref 0.2–1)
BILIRUB SERPL-MCNC: 0.4 MG/DL (ref 0.2–1)
BILIRUB SERPL-MCNC: 0.7 MG/DL (ref 0.2–1)
BILIRUB SERPL-MCNC: 0.7 MG/DL (ref 0.2–1)
BILIRUB SERPL-MCNC: 0.8 MG/DL (ref 0.2–1)
BILIRUB SERPL-MCNC: 1 MG/DL (ref 0.2–1)
BILIRUB SERPL-MCNC: 1.2 MG/DL (ref 0.2–1)
BILIRUB SERPL-MCNC: 1.3 MG/DL (ref 0.2–1)
BILIRUB SERPL-MCNC: 1.6 MG/DL (ref 0.2–1)
BILIRUB UR QL: NEGATIVE
BNP SERPL-MCNC: 1637 PG/ML
BNP SERPL-MCNC: 4277 PG/ML
BNP SERPL-MCNC: 8969 PG/ML
BNP SERPL-MCNC: ABNORMAL PG/ML
BUN SERPL-MCNC: 15 MG/DL (ref 6–20)
BUN SERPL-MCNC: 17 MG/DL (ref 6–20)
BUN SERPL-MCNC: 25 MG/DL (ref 6–20)
BUN SERPL-MCNC: 27 MG/DL (ref 6–20)
BUN SERPL-MCNC: 31 MG/DL (ref 6–20)
BUN SERPL-MCNC: 43 MG/DL (ref 6–20)
BUN SERPL-MCNC: 44 MG/DL (ref 6–20)
BUN SERPL-MCNC: 45 MG/DL (ref 6–20)
BUN SERPL-MCNC: 47 MG/DL (ref 6–20)
BUN SERPL-MCNC: 48 MG/DL (ref 6–20)
BUN SERPL-MCNC: 48 MG/DL (ref 6–20)
BUN SERPL-MCNC: 58 MG/DL (ref 6–20)
BUN SERPL-MCNC: 61 MG/DL (ref 6–20)
BUN SERPL-MCNC: 62 MG/DL (ref 6–20)
BUN SERPL-MCNC: 63 MG/DL (ref 6–20)
BUN SERPL-MCNC: 64 MG/DL (ref 6–20)
BUN SERPL-MCNC: 65 MG/DL (ref 6–20)
BUN SERPL-MCNC: 65 MG/DL (ref 6–20)
BUN SERPL-MCNC: 70 MG/DL (ref 6–20)
BUN SERPL-MCNC: 70 MG/DL (ref 6–20)
BUN SERPL-MCNC: 71 MG/DL (ref 6–20)
BUN SERPL-MCNC: 72 MG/DL (ref 6–20)
BUN SERPL-MCNC: 73 MG/DL (ref 6–20)
BUN SERPL-MCNC: 74 MG/DL (ref 6–20)
BUN SERPL-MCNC: 75 MG/DL (ref 6–20)
BUN SERPL-MCNC: 76 MG/DL (ref 6–20)
BUN SERPL-MCNC: 77 MG/DL (ref 6–20)
BUN/CREAT SERPL: 17 (ref 12–20)
BUN/CREAT SERPL: 19 (ref 12–20)
BUN/CREAT SERPL: 20 (ref 12–20)
BUN/CREAT SERPL: 20 (ref 12–20)
BUN/CREAT SERPL: 21 (ref 12–20)
BUN/CREAT SERPL: 22 (ref 12–20)
BUN/CREAT SERPL: 22 (ref 12–20)
BUN/CREAT SERPL: 23 (ref 12–20)
BUN/CREAT SERPL: 24 (ref 12–20)
BUN/CREAT SERPL: 25 (ref 12–20)
BUN/CREAT SERPL: 26 (ref 12–20)
BUN/CREAT SERPL: 26 (ref 12–20)
BUN/CREAT SERPL: 27 (ref 12–20)
BUN/CREAT SERPL: 28 (ref 12–20)
BUN/CREAT SERPL: 29 (ref 12–20)
BUN/CREAT SERPL: 32 (ref 12–20)
BUN/CREAT SERPL: 32 (ref 12–20)
BUN/CREAT SERPL: 33 (ref 12–20)
BUN/CREAT SERPL: 36 (ref 12–20)
BUN/CREAT SERPL: 37 (ref 12–20)
BUN/CREAT SERPL: 38 (ref 12–20)
BUN/CREAT SERPL: 39 (ref 12–20)
BUN/CREAT SERPL: 40 (ref 12–20)
BUN/CREAT SERPL: 40 (ref 12–20)
BUN/CREAT SERPL: 41 (ref 12–20)
CA-I BLD-MCNC: 8.1 MG/DL (ref 8.5–10.1)
CA-I BLD-MCNC: 8.3 MG/DL (ref 8.5–10.1)
CA-I BLD-MCNC: 8.4 MG/DL (ref 8.5–10.1)
CA-I BLD-MCNC: 8.5 MG/DL (ref 8.5–10.1)
CA-I BLD-MCNC: 8.6 MG/DL (ref 8.5–10.1)
CA-I BLD-MCNC: 8.7 MG/DL (ref 8.5–10.1)
CA-I BLD-MCNC: 8.7 MG/DL (ref 8.5–10.1)
CA-I BLD-MCNC: 8.8 MG/DL (ref 8.5–10.1)
CA-I BLD-MCNC: 8.9 MG/DL (ref 8.5–10.1)
CA-I BLD-MCNC: 8.9 MG/DL (ref 8.5–10.1)
CA-I BLD-MCNC: 9 MG/DL (ref 8.5–10.1)
CA-I BLD-MCNC: 9.1 MG/DL (ref 8.5–10.1)
CA-I BLD-MCNC: 9.1 MG/DL (ref 8.5–10.1)
CA-I BLD-MCNC: 9.2 MG/DL (ref 8.5–10.1)
CA-I BLD-MCNC: 9.3 MG/DL (ref 8.5–10.1)
CA-I BLD-MCNC: 9.5 MG/DL (ref 8.5–10.1)
CA-I BLD-MCNC: 9.5 MG/DL (ref 8.5–10.1)
CALCULATED P AXIS, ECG09: 56 DEGREES
CALCULATED P AXIS, ECG09: 69 DEGREES
CALCULATED P AXIS, ECG09: 71 DEGREES
CALCULATED R AXIS, ECG10: -21 DEGREES
CALCULATED R AXIS, ECG10: -21 DEGREES
CALCULATED R AXIS, ECG10: -26 DEGREES
CALCULATED R AXIS, ECG10: -34 DEGREES
CALCULATED R AXIS, ECG10: -35 DEGREES
CALCULATED R AXIS, ECG10: -39 DEGREES
CALCULATED T AXIS, ECG11: 63 DEGREES
CALCULATED T AXIS, ECG11: 66 DEGREES
CALCULATED T AXIS, ECG11: 73 DEGREES
CALCULATED T AXIS, ECG11: 80 DEGREES
CALCULATED T AXIS, ECG11: 88 DEGREES
CALCULATED T AXIS, ECG11: 94 DEGREES
CANNABINOIDS UR QL SCN: NEGATIVE
CHLORIDE SERPL-SCNC: 100 MMOL/L (ref 97–108)
CHLORIDE SERPL-SCNC: 102 MMOL/L (ref 97–108)
CHLORIDE SERPL-SCNC: 103 MMOL/L (ref 97–108)
CHLORIDE SERPL-SCNC: 104 MMOL/L (ref 97–108)
CHLORIDE SERPL-SCNC: 105 MMOL/L (ref 97–108)
CHLORIDE SERPL-SCNC: 105 MMOL/L (ref 97–108)
CHLORIDE SERPL-SCNC: 106 MMOL/L (ref 97–108)
CHLORIDE SERPL-SCNC: 107 MMOL/L (ref 97–108)
CHLORIDE SERPL-SCNC: 107 MMOL/L (ref 97–108)
CHLORIDE SERPL-SCNC: 108 MMOL/L (ref 97–108)
CHLORIDE SERPL-SCNC: 109 MMOL/L (ref 97–108)
CHLORIDE SERPL-SCNC: 111 MMOL/L (ref 97–108)
CHLORIDE SERPL-SCNC: 112 MMOL/L (ref 97–108)
CHLORIDE SERPL-SCNC: 113 MMOL/L (ref 97–108)
CHLORIDE SERPL-SCNC: 114 MMOL/L (ref 97–108)
CHLORIDE SERPL-SCNC: 115 MMOL/L (ref 97–108)
CHLORIDE SERPL-SCNC: 115 MMOL/L (ref 97–108)
CHLORIDE SERPL-SCNC: 116 MMOL/L (ref 97–108)
CHLORIDE SERPL-SCNC: 119 MMOL/L (ref 97–108)
CHOLEST SERPL-MCNC: 81 MG/DL
CK MB CFR SERPL CALC: 1.3 %
CK MB SERPL-MCNC: 9.6 NG/ML (ref 5–25)
CK SERPL-CCNC: 174 U/L (ref 26–192)
CK SERPL-CCNC: 766 NG/ML (ref 26–192)
CK SERPL-CCNC: 95 U/L (ref 26–192)
CO2 SERPL-SCNC: 21 MMOL/L (ref 21–32)
CO2 SERPL-SCNC: 22 MMOL/L (ref 21–32)
CO2 SERPL-SCNC: 23 MMOL/L (ref 21–32)
CO2 SERPL-SCNC: 24 MMOL/L (ref 21–32)
CO2 SERPL-SCNC: 25 MMOL/L (ref 21–32)
CO2 SERPL-SCNC: 25 MMOL/L (ref 21–32)
CO2 SERPL-SCNC: 26 MMOL/L (ref 21–32)
CO2 SERPL-SCNC: 27 MMOL/L (ref 21–32)
COCAINE UR QL SCN: NEGATIVE
COLOR CSF: COLORLESS
COLOR SPUN CSF: COLORLESS
COLOR UR: ABNORMAL
COVID-19 RAPID TEST, COVR: DETECTED
COVID-19 RAPID TEST, COVR: NOT DETECTED
COVID-19 RAPID TEST, COVR: NOT DETECTED
CREAT SERPL-MCNC: 0.76 MG/DL (ref 0.55–1.02)
CREAT SERPL-MCNC: 0.98 MG/DL (ref 0.55–1.02)
CREAT SERPL-MCNC: 1.02 MG/DL (ref 0.55–1.02)
CREAT SERPL-MCNC: 1.09 MG/DL (ref 0.55–1.02)
CREAT SERPL-MCNC: 1.24 MG/DL (ref 0.55–1.02)
CREAT SERPL-MCNC: 1.44 MG/DL (ref 0.55–1.02)
CREAT SERPL-MCNC: 1.45 MG/DL (ref 0.55–1.02)
CREAT SERPL-MCNC: 1.54 MG/DL (ref 0.55–1.02)
CREAT SERPL-MCNC: 1.67 MG/DL (ref 0.55–1.02)
CREAT SERPL-MCNC: 1.7 MG/DL (ref 0.55–1.02)
CREAT SERPL-MCNC: 1.71 MG/DL (ref 0.55–1.02)
CREAT SERPL-MCNC: 1.75 MG/DL (ref 0.55–1.02)
CREAT SERPL-MCNC: 1.76 MG/DL (ref 0.55–1.02)
CREAT SERPL-MCNC: 1.85 MG/DL (ref 0.55–1.02)
CREAT SERPL-MCNC: 1.86 MG/DL (ref 0.55–1.02)
CREAT SERPL-MCNC: 1.94 MG/DL (ref 0.55–1.02)
CREAT SERPL-MCNC: 1.96 MG/DL (ref 0.55–1.02)
CREAT SERPL-MCNC: 2.03 MG/DL (ref 0.55–1.02)
CREAT SERPL-MCNC: 2.03 MG/DL (ref 0.55–1.02)
CREAT SERPL-MCNC: 2.05 MG/DL (ref 0.55–1.02)
CREAT SERPL-MCNC: 2.21 MG/DL (ref 0.55–1.02)
CREAT SERPL-MCNC: 2.22 MG/DL (ref 0.55–1.02)
CREAT SERPL-MCNC: 2.35 MG/DL (ref 0.55–1.02)
CREAT SERPL-MCNC: 2.56 MG/DL (ref 0.55–1.02)
CREAT SERPL-MCNC: 2.61 MG/DL (ref 0.55–1.02)
CREAT SERPL-MCNC: 2.63 MG/DL (ref 0.55–1.02)
CREAT SERPL-MCNC: 2.71 MG/DL (ref 0.55–1.02)
CREAT SERPL-MCNC: 2.72 MG/DL (ref 0.55–1.02)
CREAT SERPL-MCNC: 2.79 MG/DL (ref 0.55–1.02)
D DIMER PPP FEU-MCNC: 0.88 UG/ML(FEU)
DATE LAST DOSE: NORMAL
DIAGNOSIS, 93000: NORMAL
DIFFERENTIAL METHOD BLD: ABNORMAL
DIFFERENTIAL METHOD BLD: NORMAL
DRUG SCRN COMMENT,DRGCM: NORMAL
ECHO AO ROOT DIAM: 2.5 CM
ECHO AO ROOT DIAM: 2.9 CM
ECHO AV MEAN GRADIENT: 2 MMHG
ECHO AV MEAN VELOCITY: 69.3 CM/S
ECHO AV PEAK GRADIENT: 10 MMHG
ECHO AV PEAK GRADIENT: 3 MMHG
ECHO AV PEAK GRADIENT: 4 MMHG
ECHO AV PEAK VELOCITY: 93.1 CM/S
ECHO AV PEAK VELOCITY: 99.2 CM/S
ECHO AV VTI: 15.2 CM
ECHO EST RA PRESSURE: 15 MMHG
ECHO EST RA PRESSURE: 3 MMHG
ECHO LA AREA 2C: 20.3 CM2
ECHO LA AREA 4C: 26 CM2
ECHO LA AREA 4C: 26.45 CM2
ECHO LA MAJOR AXIS: 3.6 CM
ECHO LA MAJOR AXIS: 4 CM
ECHO LA MAJOR AXIS: 4.9 CM
ECHO LA MAJOR AXIS: 4.9 CM
ECHO LA MAJOR AXIS: 6.54 CM
ECHO LA MINOR AXIS: 2.03 CM
ECHO LA MINOR AXIS: 2.07 CM
ECHO LV E' SEPTAL VELOCITY: 3.61 CM/S
ECHO LV E' SEPTAL VELOCITY: 4.29 CM/S
ECHO LV EDV A2C: 109 CM3
ECHO LV EDV A2C: 132 CM3
ECHO LV EDV A2C: 190 CM3
ECHO LV EDV A2C: 201 CM3
ECHO LV EDV A4C: 129 CM3
ECHO LV EJECTION FRACTION BIPLANE: 12.3 % (ref 55–100)
ECHO LV EJECTION FRACTION BIPLANE: 18.8 % (ref 55–100)
ECHO LV ESV A2C: 159 CM3
ECHO LV ESV A2C: 179 CM3
ECHO LV ESV A2C: 84 CM3
ECHO LV ESV A4C: 108 CM3
ECHO LV INTERNAL DIMENSION DIASTOLIC: 4.78 CM (ref 3.9–5.3)
ECHO LV INTERNAL DIMENSION DIASTOLIC: 5.75 CM (ref 3.9–5.3)
ECHO LV INTERNAL DIMENSION DIASTOLIC: 5.86 CM (ref 3.9–5.3)
ECHO LV INTERNAL DIMENSION SYSTOLIC: 4.38 CM
ECHO LV INTERNAL DIMENSION SYSTOLIC: 5.42 CM
ECHO LV INTERNAL DIMENSION SYSTOLIC: 5.64 CM
ECHO LV IVSD: 1 CM (ref 0.6–0.9)
ECHO LV IVSD: 1.09 CM (ref 0.6–0.9)
ECHO LV IVSD: 1.45 CM (ref 0.6–0.9)
ECHO LV MASS 2D: 257.3 G (ref 67–162)
ECHO LV MASS 2D: 273.5 G (ref 67–162)
ECHO LV MASS 2D: 281.9 G (ref 67–162)
ECHO LV MASS INDEX 2D: 138.5 G/M2 (ref 43–95)
ECHO LV MASS INDEX 2D: 147.9 G/M2 (ref 43–95)
ECHO LV MASS INDEX 2D: 150.8 G/M2 (ref 43–95)
ECHO LV POSTERIOR WALL DIASTOLIC: 1.09 CM (ref 0.6–0.9)
ECHO LV POSTERIOR WALL DIASTOLIC: 1.28 CM (ref 0.6–0.9)
ECHO LV POSTERIOR WALL DIASTOLIC: 1.36 CM (ref 0.6–0.9)
ECHO LVOT PEAK GRADIENT: 1 MMHG
ECHO LVOT PEAK GRADIENT: 1 MMHG
ECHO LVOT PEAK GRADIENT: 3 MMHG
ECHO LVOT PEAK VELOCITY: 51.8 CM/S
ECHO LVOT PEAK VELOCITY: 53 CM/S
ECHO LVOT VTI: 8.15 CM
ECHO LVOT VTI: 8.15 CM
ECHO MV A VELOCITY: 107 CM/S
ECHO MV A VELOCITY: 66.6 CM/S
ECHO MV A VELOCITY: 71.6 CM/S
ECHO MV AREA PHT: 2.93 CM2
ECHO MV AREA PHT: 5.79 CM2
ECHO MV E DECELERATION TIME (DT): 134 MS
ECHO MV E DECELERATION TIME (DT): 162 MS
ECHO MV E VELOCITY: 117 CM/S
ECHO MV E VELOCITY: 172 CM/S
ECHO MV E VELOCITY: 183 CM/S
ECHO MV E/A RATIO: 1.09
ECHO MV E/A RATIO: 2.56
ECHO MV E/A RATIO: 2.58
ECHO MV E/E' SEPTAL: 27.27
ECHO MV E/E' SEPTAL: 50.69
ECHO MV MAX VELOCITY: 183 CM/S
ECHO MV MAX VELOCITY: 201 CM/S
ECHO MV MEAN GRADIENT: 5 MMHG
ECHO MV MEAN GRADIENT: 5 MMHG
ECHO MV PEAK GRADIENT: 13 MMHG
ECHO MV PEAK GRADIENT: 16 MMHG
ECHO MV PRESSURE HALF TIME (PHT): 38 MS
ECHO MV PRESSURE HALF TIME (PHT): 75 MS
ECHO MV REGURGITANT VTIA: 163 CM
ECHO MV VTI: 30.2 CM
ECHO MV VTI: 33 CM
ECHO PV MAX VELOCITY: 45.2 CM/S
ECHO PV MEAN GRADIENT: 1 MMHG
ECHO PV PEAK INSTANTANEOUS GRADIENT SYSTOLIC: 1 MMHG
ECHO PV PEAK INSTANTANEOUS GRADIENT SYSTOLIC: 10 MMHG
ECHO PV PEAK INSTANTANEOUS GRADIENT SYSTOLIC: 3 MMHG
ECHO PV REGURGITANT MAX VELOCITY: 116 CM/S
ECHO PV REGURGITANT MAX VELOCITY: 155 CM/S
ECHO PV REGURGITANT MAX VELOCITY: 156 CM/S
ECHO PV REGURGITANT MAX VELOCITY: 436 CM/S
ECHO PV REGURGITANT MAX VELOCITY: 79.9 CM/S
ECHO PV REGURGITANT MAX VELOCITY: 80.5 CM/S
ECHO PV VTI: 9.77 CM
ECHO PVEIN A DURATION: 113 MS
ECHO PVEIN A VELOCITY: 26.8 CM/S
ECHO RA AREA 4C: 16.2 CM2
ECHO RA AREA 4C: 23.99 CM2
ECHO RIGHT VENTRICULAR SYSTOLIC PRESSURE (RVSP): 31 MMHG
ECHO RIGHT VENTRICULAR SYSTOLIC PRESSURE (RVSP): 67 MMHG
ECHO RV INTERNAL DIMENSION: 3.32 CM
ECHO RV INTERNAL DIMENSION: 3.78 CM
ECHO RV TAPSE: 1.5 CM (ref 1.5–2)
ECHO TV MAX VELOCITY: 264 CM/S
ECHO TV MAX VELOCITY: 287 CM/S
ECHO TV MAX VELOCITY: 360 CM/S
ECHO TV MEAN GRADIENT: 58 MMHG
ECHO TV REGURGITANT PEAK GRADIENT: 28 MMHG
ECHO TV REGURGITANT PEAK GRADIENT: 33 MMHG
ECHO TV REGURGITANT PEAK GRADIENT: 52 MMHG
EOSINOPHIL # BLD: 0 K/UL (ref 0–0.4)
EOSINOPHIL # BLD: 0.1 K/UL (ref 0–0.4)
EOSINOPHIL # BLD: 0.2 K/UL (ref 0–0.4)
EOSINOPHIL NFR BLD: 0 % (ref 0–7)
EOSINOPHIL NFR BLD: 1 % (ref 0–7)
EOSINOPHIL NFR BLD: 2 % (ref 0–7)
EOSINOPHIL NFR BLD: 2 % (ref 0–7)
EOSINOPHIL NFR BLD: 3 % (ref 0–7)
EOSINOPHIL NFR BLD: 3 % (ref 0–7)
EPAP/CPAP/PEEP, PAPEEP: 0
ERYTHROCYTE [DISTWIDTH] IN BLOOD BY AUTOMATED COUNT: 13.4 % (ref 11.5–14.5)
ERYTHROCYTE [DISTWIDTH] IN BLOOD BY AUTOMATED COUNT: 13.8 % (ref 11.5–14.5)
ERYTHROCYTE [DISTWIDTH] IN BLOOD BY AUTOMATED COUNT: 13.9 % (ref 11.5–14.5)
ERYTHROCYTE [DISTWIDTH] IN BLOOD BY AUTOMATED COUNT: 14 % (ref 11.5–14.5)
ERYTHROCYTE [DISTWIDTH] IN BLOOD BY AUTOMATED COUNT: 14 % (ref 11.5–14.5)
ERYTHROCYTE [DISTWIDTH] IN BLOOD BY AUTOMATED COUNT: 15.9 % (ref 11.5–14.5)
ERYTHROCYTE [DISTWIDTH] IN BLOOD BY AUTOMATED COUNT: 16 % (ref 11.5–14.5)
ERYTHROCYTE [DISTWIDTH] IN BLOOD BY AUTOMATED COUNT: 18.5 % (ref 11.5–14.5)
ERYTHROCYTE [DISTWIDTH] IN BLOOD BY AUTOMATED COUNT: 18.6 % (ref 11.5–14.5)
ERYTHROCYTE [DISTWIDTH] IN BLOOD BY AUTOMATED COUNT: 18.8 % (ref 11.5–14.5)
ERYTHROCYTE [DISTWIDTH] IN BLOOD BY AUTOMATED COUNT: 18.9 % (ref 11.5–14.5)
ERYTHROCYTE [DISTWIDTH] IN BLOOD BY AUTOMATED COUNT: 19.1 % (ref 11.5–14.5)
ERYTHROCYTE [DISTWIDTH] IN BLOOD BY AUTOMATED COUNT: 19.6 % (ref 11.5–14.5)
ERYTHROCYTE [DISTWIDTH] IN BLOOD BY AUTOMATED COUNT: 19.7 % (ref 11.5–14.5)
ERYTHROCYTE [DISTWIDTH] IN BLOOD BY AUTOMATED COUNT: 19.8 % (ref 11.5–14.5)
ERYTHROCYTE [DISTWIDTH] IN BLOOD BY AUTOMATED COUNT: 19.9 % (ref 11.5–14.5)
ERYTHROCYTE [DISTWIDTH] IN BLOOD BY AUTOMATED COUNT: 20 % (ref 11.5–14.5)
ERYTHROCYTE [DISTWIDTH] IN BLOOD BY AUTOMATED COUNT: 20.4 % (ref 11.5–14.5)
ERYTHROCYTE [DISTWIDTH] IN BLOOD BY AUTOMATED COUNT: 20.4 % (ref 11.5–14.5)
ERYTHROCYTE [DISTWIDTH] IN BLOOD BY AUTOMATED COUNT: 21.4 % (ref 11.5–14.5)
ERYTHROCYTE [DISTWIDTH] IN BLOOD BY AUTOMATED COUNT: 22.2 % (ref 11.5–14.5)
EST. AVERAGE GLUCOSE BLD GHB EST-MCNC: 154 MG/DL
EST. AVERAGE GLUCOSE BLD GHB EST-MCNC: 163 MG/DL
EST. AVERAGE GLUCOSE BLD GHB EST-MCNC: 174 MG/DL
FIO2 ON VENT: 100 %
FIO2 ON VENT: 21 %
FIO2 ON VENT: 21 %
FIO2 ON VENT: 36 %
FLUAV AG NPH QL IA: NEGATIVE
FLUBV AG NOSE QL IA: NEGATIVE
FOLATE SERPL-MCNC: 12.5 NG/ML (ref 5–21)
GAS FLOW.O2 O2 DELIVERY SYS: 15 L/MIN
GAS FLOW.O2 O2 DELIVERY SYS: 4 L/MIN
GLOBULIN SER CALC-MCNC: 4 G/DL (ref 2–4)
GLOBULIN SER CALC-MCNC: 4.1 G/DL (ref 2–4)
GLOBULIN SER CALC-MCNC: 4.2 G/DL (ref 2–4)
GLOBULIN SER CALC-MCNC: 4.3 G/DL (ref 2–4)
GLOBULIN SER CALC-MCNC: 4.5 G/DL (ref 2–4)
GLOBULIN SER CALC-MCNC: 4.5 G/DL (ref 2–4)
GLOBULIN SER CALC-MCNC: 4.7 G/DL (ref 2–4)
GLOBULIN SER CALC-MCNC: 4.8 G/DL (ref 2–4)
GLOBULIN SER CALC-MCNC: 4.9 G/DL (ref 2–4)
GLOBULIN SER CALC-MCNC: 5 G/DL (ref 2–4)
GLOBULIN SER CALC-MCNC: 5.2 G/DL (ref 2–4)
GLUCOSE BLD STRIP.AUTO-MCNC: 100 MG/DL (ref 65–117)
GLUCOSE BLD STRIP.AUTO-MCNC: 100 MG/DL (ref 65–117)
GLUCOSE BLD STRIP.AUTO-MCNC: 101 MG/DL (ref 65–117)
GLUCOSE BLD STRIP.AUTO-MCNC: 101 MG/DL (ref 65–117)
GLUCOSE BLD STRIP.AUTO-MCNC: 109 MG/DL (ref 65–117)
GLUCOSE BLD STRIP.AUTO-MCNC: 112 MG/DL (ref 65–117)
GLUCOSE BLD STRIP.AUTO-MCNC: 113 MG/DL (ref 65–117)
GLUCOSE BLD STRIP.AUTO-MCNC: 113 MG/DL (ref 65–117)
GLUCOSE BLD STRIP.AUTO-MCNC: 114 MG/DL (ref 65–117)
GLUCOSE BLD STRIP.AUTO-MCNC: 116 MG/DL (ref 65–100)
GLUCOSE BLD STRIP.AUTO-MCNC: 116 MG/DL (ref 65–117)
GLUCOSE BLD STRIP.AUTO-MCNC: 116 MG/DL (ref 65–117)
GLUCOSE BLD STRIP.AUTO-MCNC: 119 MG/DL (ref 65–100)
GLUCOSE BLD STRIP.AUTO-MCNC: 120 MG/DL (ref 65–117)
GLUCOSE BLD STRIP.AUTO-MCNC: 121 MG/DL (ref 65–117)
GLUCOSE BLD STRIP.AUTO-MCNC: 121 MG/DL (ref 65–117)
GLUCOSE BLD STRIP.AUTO-MCNC: 130 MG/DL (ref 65–117)
GLUCOSE BLD STRIP.AUTO-MCNC: 130 MG/DL (ref 65–117)
GLUCOSE BLD STRIP.AUTO-MCNC: 131 MG/DL (ref 65–117)
GLUCOSE BLD STRIP.AUTO-MCNC: 133 MG/DL (ref 65–117)
GLUCOSE BLD STRIP.AUTO-MCNC: 137 MG/DL (ref 65–117)
GLUCOSE BLD STRIP.AUTO-MCNC: 138 MG/DL (ref 65–117)
GLUCOSE BLD STRIP.AUTO-MCNC: 139 MG/DL (ref 65–117)
GLUCOSE BLD STRIP.AUTO-MCNC: 140 MG/DL (ref 65–117)
GLUCOSE BLD STRIP.AUTO-MCNC: 141 MG/DL (ref 65–117)
GLUCOSE BLD STRIP.AUTO-MCNC: 141 MG/DL (ref 65–117)
GLUCOSE BLD STRIP.AUTO-MCNC: 142 MG/DL (ref 65–117)
GLUCOSE BLD STRIP.AUTO-MCNC: 144 MG/DL (ref 65–117)
GLUCOSE BLD STRIP.AUTO-MCNC: 145 MG/DL (ref 65–100)
GLUCOSE BLD STRIP.AUTO-MCNC: 147 MG/DL (ref 65–117)
GLUCOSE BLD STRIP.AUTO-MCNC: 149 MG/DL (ref 65–117)
GLUCOSE BLD STRIP.AUTO-MCNC: 153 MG/DL (ref 65–117)
GLUCOSE BLD STRIP.AUTO-MCNC: 157 MG/DL (ref 65–117)
GLUCOSE BLD STRIP.AUTO-MCNC: 158 MG/DL (ref 65–117)
GLUCOSE BLD STRIP.AUTO-MCNC: 160 MG/DL (ref 65–100)
GLUCOSE BLD STRIP.AUTO-MCNC: 163 MG/DL (ref 65–117)
GLUCOSE BLD STRIP.AUTO-MCNC: 163 MG/DL (ref 65–117)
GLUCOSE BLD STRIP.AUTO-MCNC: 167 MG/DL (ref 65–117)
GLUCOSE BLD STRIP.AUTO-MCNC: 169 MG/DL (ref 65–100)
GLUCOSE BLD STRIP.AUTO-MCNC: 170 MG/DL (ref 65–117)
GLUCOSE BLD STRIP.AUTO-MCNC: 175 MG/DL (ref 65–100)
GLUCOSE BLD STRIP.AUTO-MCNC: 175 MG/DL (ref 65–117)
GLUCOSE BLD STRIP.AUTO-MCNC: 176 MG/DL (ref 65–117)
GLUCOSE BLD STRIP.AUTO-MCNC: 177 MG/DL (ref 65–100)
GLUCOSE BLD STRIP.AUTO-MCNC: 177 MG/DL (ref 65–117)
GLUCOSE BLD STRIP.AUTO-MCNC: 178 MG/DL (ref 65–117)
GLUCOSE BLD STRIP.AUTO-MCNC: 179 MG/DL (ref 65–117)
GLUCOSE BLD STRIP.AUTO-MCNC: 179 MG/DL (ref 65–117)
GLUCOSE BLD STRIP.AUTO-MCNC: 184 MG/DL (ref 65–100)
GLUCOSE BLD STRIP.AUTO-MCNC: 184 MG/DL (ref 65–100)
GLUCOSE BLD STRIP.AUTO-MCNC: 184 MG/DL (ref 65–117)
GLUCOSE BLD STRIP.AUTO-MCNC: 190 MG/DL (ref 65–117)
GLUCOSE BLD STRIP.AUTO-MCNC: 191 MG/DL (ref 65–117)
GLUCOSE BLD STRIP.AUTO-MCNC: 191 MG/DL (ref 65–117)
GLUCOSE BLD STRIP.AUTO-MCNC: 198 MG/DL (ref 65–117)
GLUCOSE BLD STRIP.AUTO-MCNC: 199 MG/DL (ref 65–100)
GLUCOSE BLD STRIP.AUTO-MCNC: 199 MG/DL (ref 65–100)
GLUCOSE BLD STRIP.AUTO-MCNC: 200 MG/DL (ref 65–100)
GLUCOSE BLD STRIP.AUTO-MCNC: 202 MG/DL (ref 65–100)
GLUCOSE BLD STRIP.AUTO-MCNC: 205 MG/DL (ref 65–117)
GLUCOSE BLD STRIP.AUTO-MCNC: 206 MG/DL (ref 65–117)
GLUCOSE BLD STRIP.AUTO-MCNC: 206 MG/DL (ref 65–117)
GLUCOSE BLD STRIP.AUTO-MCNC: 207 MG/DL (ref 65–117)
GLUCOSE BLD STRIP.AUTO-MCNC: 209 MG/DL (ref 65–100)
GLUCOSE BLD STRIP.AUTO-MCNC: 209 MG/DL (ref 65–117)
GLUCOSE BLD STRIP.AUTO-MCNC: 209 MG/DL (ref 65–117)
GLUCOSE BLD STRIP.AUTO-MCNC: 210 MG/DL (ref 65–100)
GLUCOSE BLD STRIP.AUTO-MCNC: 211 MG/DL (ref 65–117)
GLUCOSE BLD STRIP.AUTO-MCNC: 214 MG/DL (ref 65–100)
GLUCOSE BLD STRIP.AUTO-MCNC: 216 MG/DL (ref 65–100)
GLUCOSE BLD STRIP.AUTO-MCNC: 218 MG/DL (ref 65–117)
GLUCOSE BLD STRIP.AUTO-MCNC: 220 MG/DL (ref 65–117)
GLUCOSE BLD STRIP.AUTO-MCNC: 222 MG/DL (ref 65–117)
GLUCOSE BLD STRIP.AUTO-MCNC: 222 MG/DL (ref 65–117)
GLUCOSE BLD STRIP.AUTO-MCNC: 224 MG/DL (ref 65–117)
GLUCOSE BLD STRIP.AUTO-MCNC: 224 MG/DL (ref 65–117)
GLUCOSE BLD STRIP.AUTO-MCNC: 239 MG/DL (ref 65–117)
GLUCOSE BLD STRIP.AUTO-MCNC: 240 MG/DL (ref 65–100)
GLUCOSE BLD STRIP.AUTO-MCNC: 247 MG/DL (ref 65–100)
GLUCOSE BLD STRIP.AUTO-MCNC: 248 MG/DL (ref 65–117)
GLUCOSE BLD STRIP.AUTO-MCNC: 248 MG/DL (ref 65–117)
GLUCOSE BLD STRIP.AUTO-MCNC: 249 MG/DL (ref 65–117)
GLUCOSE BLD STRIP.AUTO-MCNC: 264 MG/DL (ref 65–117)
GLUCOSE BLD STRIP.AUTO-MCNC: 271 MG/DL (ref 65–117)
GLUCOSE BLD STRIP.AUTO-MCNC: 275 MG/DL (ref 65–117)
GLUCOSE BLD STRIP.AUTO-MCNC: 282 MG/DL (ref 65–117)
GLUCOSE BLD STRIP.AUTO-MCNC: 30 MG/DL (ref 65–117)
GLUCOSE BLD STRIP.AUTO-MCNC: 305 MG/DL (ref 65–117)
GLUCOSE BLD STRIP.AUTO-MCNC: 308 MG/DL (ref 65–117)
GLUCOSE BLD STRIP.AUTO-MCNC: 309 MG/DL (ref 65–117)
GLUCOSE BLD STRIP.AUTO-MCNC: 315 MG/DL (ref 65–100)
GLUCOSE BLD STRIP.AUTO-MCNC: 32 MG/DL (ref 65–117)
GLUCOSE BLD STRIP.AUTO-MCNC: 326 MG/DL (ref 65–117)
GLUCOSE BLD STRIP.AUTO-MCNC: 328 MG/DL (ref 65–117)
GLUCOSE BLD STRIP.AUTO-MCNC: 336 MG/DL (ref 65–117)
GLUCOSE BLD STRIP.AUTO-MCNC: 342 MG/DL (ref 65–117)
GLUCOSE BLD STRIP.AUTO-MCNC: 363 MG/DL (ref 65–117)
GLUCOSE BLD STRIP.AUTO-MCNC: 373 MG/DL (ref 65–100)
GLUCOSE BLD STRIP.AUTO-MCNC: 383 MG/DL (ref 65–117)
GLUCOSE BLD STRIP.AUTO-MCNC: 387 MG/DL (ref 65–117)
GLUCOSE BLD STRIP.AUTO-MCNC: 418 MG/DL (ref 65–100)
GLUCOSE BLD STRIP.AUTO-MCNC: 422 MG/DL (ref 65–117)
GLUCOSE BLD STRIP.AUTO-MCNC: 461 MG/DL (ref 65–100)
GLUCOSE BLD STRIP.AUTO-MCNC: 48 MG/DL (ref 65–100)
GLUCOSE BLD STRIP.AUTO-MCNC: 55 MG/DL (ref 65–117)
GLUCOSE BLD STRIP.AUTO-MCNC: 65 MG/DL (ref 65–117)
GLUCOSE BLD STRIP.AUTO-MCNC: 67 MG/DL (ref 65–117)
GLUCOSE BLD STRIP.AUTO-MCNC: 70 MG/DL (ref 65–117)
GLUCOSE BLD STRIP.AUTO-MCNC: 72 MG/DL (ref 65–117)
GLUCOSE BLD STRIP.AUTO-MCNC: 79 MG/DL (ref 65–100)
GLUCOSE BLD STRIP.AUTO-MCNC: 79 MG/DL (ref 65–117)
GLUCOSE BLD STRIP.AUTO-MCNC: 81 MG/DL (ref 65–100)
GLUCOSE BLD STRIP.AUTO-MCNC: 85 MG/DL (ref 65–117)
GLUCOSE BLD STRIP.AUTO-MCNC: 86 MG/DL (ref 65–100)
GLUCOSE BLD STRIP.AUTO-MCNC: 86 MG/DL (ref 65–117)
GLUCOSE BLD STRIP.AUTO-MCNC: 86 MG/DL (ref 65–117)
GLUCOSE BLD STRIP.AUTO-MCNC: 87 MG/DL (ref 65–117)
GLUCOSE BLD STRIP.AUTO-MCNC: 94 MG/DL (ref 65–117)
GLUCOSE BLD STRIP.AUTO-MCNC: 98 MG/DL (ref 65–117)
GLUCOSE CSF-MCNC: 150 MG/DL (ref 40–70)
GLUCOSE SERPL-MCNC: 107 MG/DL (ref 65–100)
GLUCOSE SERPL-MCNC: 109 MG/DL (ref 65–100)
GLUCOSE SERPL-MCNC: 115 MG/DL (ref 65–100)
GLUCOSE SERPL-MCNC: 115 MG/DL (ref 65–100)
GLUCOSE SERPL-MCNC: 125 MG/DL (ref 65–100)
GLUCOSE SERPL-MCNC: 129 MG/DL (ref 65–100)
GLUCOSE SERPL-MCNC: 130 MG/DL (ref 65–100)
GLUCOSE SERPL-MCNC: 151 MG/DL (ref 65–100)
GLUCOSE SERPL-MCNC: 160 MG/DL (ref 65–100)
GLUCOSE SERPL-MCNC: 178 MG/DL (ref 65–100)
GLUCOSE SERPL-MCNC: 180 MG/DL (ref 65–100)
GLUCOSE SERPL-MCNC: 208 MG/DL (ref 65–100)
GLUCOSE SERPL-MCNC: 227 MG/DL (ref 65–100)
GLUCOSE SERPL-MCNC: 237 MG/DL (ref 65–100)
GLUCOSE SERPL-MCNC: 238 MG/DL (ref 65–100)
GLUCOSE SERPL-MCNC: 247 MG/DL (ref 65–100)
GLUCOSE SERPL-MCNC: 258 MG/DL (ref 65–100)
GLUCOSE SERPL-MCNC: 276 MG/DL (ref 65–100)
GLUCOSE SERPL-MCNC: 279 MG/DL (ref 65–100)
GLUCOSE SERPL-MCNC: 297 MG/DL (ref 65–100)
GLUCOSE SERPL-MCNC: 317 MG/DL (ref 65–100)
GLUCOSE SERPL-MCNC: 320 MG/DL (ref 65–100)
GLUCOSE SERPL-MCNC: 342 MG/DL (ref 65–100)
GLUCOSE SERPL-MCNC: 44 MG/DL (ref 65–100)
GLUCOSE SERPL-MCNC: 49 MG/DL (ref 65–100)
GLUCOSE SERPL-MCNC: 52 MG/DL (ref 65–100)
GLUCOSE SERPL-MCNC: 79 MG/DL (ref 65–100)
GLUCOSE SERPL-MCNC: 84 MG/DL (ref 65–100)
GLUCOSE SERPL-MCNC: 93 MG/DL (ref 65–100)
GLUCOSE SERPL-MCNC: 95 MG/DL (ref 65–100)
GLUCOSE UR STRIP.AUTO-MCNC: NEGATIVE MG/DL
GRAM STN SPEC: NORMAL
GRAM STN SPEC: NORMAL
HBA1C MFR BLD: 7 % (ref 4–5.6)
HBA1C MFR BLD: 7.3 % (ref 4–5.6)
HBA1C MFR BLD: 7.7 % (ref 4–5.6)
HCO3 BLDA-SCNC: 20 MMOL/L (ref 22–26)
HCO3 BLDA-SCNC: 20 MMOL/L (ref 22–26)
HCO3 BLDA-SCNC: 21 MMOL/L (ref 22–26)
HCO3 BLDA-SCNC: 24 MMOL/L (ref 22–26)
HCT VFR BLD AUTO: 36.6 % (ref 35–47)
HCT VFR BLD AUTO: 37.4 % (ref 35–47)
HCT VFR BLD AUTO: 37.4 % (ref 35–47)
HCT VFR BLD AUTO: 37.9 % (ref 35–47)
HCT VFR BLD AUTO: 37.9 % (ref 35–47)
HCT VFR BLD AUTO: 39.7 % (ref 35–47)
HCT VFR BLD AUTO: 39.8 % (ref 35–47)
HCT VFR BLD AUTO: 40 % (ref 35–47)
HCT VFR BLD AUTO: 40.5 % (ref 35–47)
HCT VFR BLD AUTO: 41 % (ref 35–47)
HCT VFR BLD AUTO: 41.3 % (ref 35–47)
HCT VFR BLD AUTO: 41.5 % (ref 35–47)
HCT VFR BLD AUTO: 41.5 % (ref 35–47)
HCT VFR BLD AUTO: 41.6 % (ref 35–47)
HCT VFR BLD AUTO: 42.4 % (ref 35–47)
HCT VFR BLD AUTO: 42.5 % (ref 35–47)
HCT VFR BLD AUTO: 43.2 % (ref 35–47)
HCT VFR BLD AUTO: 43.9 % (ref 35–47)
HCT VFR BLD AUTO: 43.9 % (ref 35–47)
HCT VFR BLD AUTO: 44.8 % (ref 35–47)
HCT VFR BLD AUTO: 45.4 % (ref 35–47)
HCT VFR BLD AUTO: 45.7 % (ref 35–47)
HCT VFR BLD AUTO: 49.3 % (ref 35–47)
HDLC SERPL-MCNC: 25 MG/DL
HDLC SERPL: 3.2 {RATIO} (ref 0–5)
HGB BLD-MCNC: 11.9 G/DL (ref 11.5–16)
HGB BLD-MCNC: 12.2 G/DL (ref 11.5–16)
HGB BLD-MCNC: 12.2 G/DL (ref 11.5–16)
HGB BLD-MCNC: 12.5 G/DL (ref 11.5–16)
HGB BLD-MCNC: 12.6 G/DL (ref 11.5–16)
HGB BLD-MCNC: 12.7 G/DL (ref 11.5–16)
HGB BLD-MCNC: 12.7 G/DL (ref 11.5–16)
HGB BLD-MCNC: 12.8 G/DL (ref 11.5–16)
HGB BLD-MCNC: 12.9 G/DL (ref 11.5–16)
HGB BLD-MCNC: 13 G/DL (ref 11.5–16)
HGB BLD-MCNC: 13 G/DL (ref 11.5–16)
HGB BLD-MCNC: 13.1 G/DL (ref 11.5–16)
HGB BLD-MCNC: 13.2 G/DL (ref 11.5–16)
HGB BLD-MCNC: 13.3 G/DL (ref 11.5–16)
HGB BLD-MCNC: 13.4 G/DL (ref 11.5–16)
HGB BLD-MCNC: 13.7 G/DL (ref 11.5–16)
HGB BLD-MCNC: 13.8 G/DL (ref 11.5–16)
HGB BLD-MCNC: 14.1 G/DL (ref 11.5–16)
HGB BLD-MCNC: 14.5 G/DL (ref 11.5–16)
HGB BLD-MCNC: 14.8 G/DL (ref 11.5–16)
HGB BLD-MCNC: 15.1 G/DL (ref 11.5–16)
HGB UR QL STRIP: NEGATIVE
HYALINE CASTS URNS QL MICRO: >20 /LPF (ref 0–5)
HYALINE CASTS URNS QL MICRO: ABNORMAL /LPF (ref 0–5)
IMM GRANULOCYTES # BLD AUTO: 0 K/UL
IMM GRANULOCYTES # BLD AUTO: 0 K/UL (ref 0–0.04)
IMM GRANULOCYTES # BLD AUTO: 0.1 K/UL (ref 0–0.04)
IMM GRANULOCYTES # BLD AUTO: 0.1 K/UL (ref 0–0.04)
IMM GRANULOCYTES # BLD AUTO: 0.2 K/UL (ref 0–0.04)
IMM GRANULOCYTES # BLD AUTO: 0.3 K/UL (ref 0–0.04)
IMM GRANULOCYTES # BLD AUTO: 0.3 K/UL (ref 0–0.04)
IMM GRANULOCYTES NFR BLD AUTO: 0 %
IMM GRANULOCYTES NFR BLD AUTO: 0 % (ref 0–0.5)
IMM GRANULOCYTES NFR BLD AUTO: 1 % (ref 0–0.5)
KETONES UR QL STRIP.AUTO: NEGATIVE MG/DL
LA VOL DISK BP: 83 CM3 (ref 22–52)
LACTATE SERPL-SCNC: 2.4 MMOL/L (ref 0.4–2)
LACTATE SERPL-SCNC: 3.8 MMOL/L (ref 0.4–2)
LACTATE SERPL-SCNC: 4.4 MMOL/L (ref 0.4–2)
LACTATE SERPL-SCNC: 7.7 MMOL/L (ref 0.4–2)
LDLC SERPL CALC-MCNC: 37.6 MG/DL (ref 0–100)
LEUKOCYTE ESTERASE UR QL STRIP.AUTO: NEGATIVE
LIPID PROFILE,FLP: NORMAL
LVOT MG: 1 MMHG
LYMPHOCYTES # BLD: 0.2 K/UL (ref 0.8–3.5)
LYMPHOCYTES # BLD: 0.6 K/UL (ref 0.8–3.5)
LYMPHOCYTES # BLD: 0.6 K/UL (ref 0.8–3.5)
LYMPHOCYTES # BLD: 0.9 K/UL (ref 0.8–3.5)
LYMPHOCYTES # BLD: 1 K/UL (ref 0.8–3.5)
LYMPHOCYTES # BLD: 1 K/UL (ref 0.8–3.5)
LYMPHOCYTES # BLD: 1.1 K/UL (ref 0.8–3.5)
LYMPHOCYTES # BLD: 1.2 K/UL (ref 0.8–3.5)
LYMPHOCYTES # BLD: 1.2 K/UL (ref 0.8–3.5)
LYMPHOCYTES # BLD: 1.5 K/UL (ref 0.8–3.5)
LYMPHOCYTES # BLD: 1.6 K/UL (ref 0.8–3.5)
LYMPHOCYTES # BLD: 1.6 K/UL (ref 0.8–3.5)
LYMPHOCYTES # BLD: 1.7 K/UL (ref 0.8–3.5)
LYMPHOCYTES # BLD: 2.1 K/UL (ref 0.8–3.5)
LYMPHOCYTES # BLD: 2.1 K/UL (ref 0.8–3.5)
LYMPHOCYTES # BLD: 2.2 K/UL (ref 0.8–3.5)
LYMPHOCYTES # BLD: 2.2 K/UL (ref 0.8–3.5)
LYMPHOCYTES # BLD: 2.3 K/UL (ref 0.8–3.5)
LYMPHOCYTES # BLD: 2.6 K/UL (ref 0.8–3.5)
LYMPHOCYTES NFR BLD: 10 % (ref 12–49)
LYMPHOCYTES NFR BLD: 10 % (ref 12–49)
LYMPHOCYTES NFR BLD: 13 % (ref 12–49)
LYMPHOCYTES NFR BLD: 14 % (ref 12–49)
LYMPHOCYTES NFR BLD: 15 % (ref 12–49)
LYMPHOCYTES NFR BLD: 23 % (ref 12–49)
LYMPHOCYTES NFR BLD: 24 % (ref 12–49)
LYMPHOCYTES NFR BLD: 29 % (ref 12–49)
LYMPHOCYTES NFR BLD: 29 % (ref 12–49)
LYMPHOCYTES NFR BLD: 3 % (ref 12–49)
LYMPHOCYTES NFR BLD: 31 % (ref 12–49)
LYMPHOCYTES NFR BLD: 4 % (ref 12–49)
LYMPHOCYTES NFR BLD: 4 % (ref 12–49)
LYMPHOCYTES NFR BLD: 6 % (ref 12–49)
LYMPHOCYTES NFR BLD: 7 % (ref 12–49)
LYMPHOCYTES NFR BLD: 9 % (ref 12–49)
MAGNESIUM SERPL-MCNC: 2 MG/DL (ref 1.6–2.4)
MAGNESIUM SERPL-MCNC: 2.2 MG/DL (ref 1.6–2.4)
MAGNESIUM SERPL-MCNC: 2.2 MG/DL (ref 1.6–2.4)
MAGNESIUM SERPL-MCNC: 2.3 MG/DL (ref 1.6–2.4)
MCH RBC QN AUTO: 27 PG (ref 26–34)
MCH RBC QN AUTO: 27.2 PG (ref 26–34)
MCH RBC QN AUTO: 27.3 PG (ref 26–34)
MCH RBC QN AUTO: 27.4 PG (ref 26–34)
MCH RBC QN AUTO: 27.5 PG (ref 26–34)
MCH RBC QN AUTO: 27.5 PG (ref 26–34)
MCH RBC QN AUTO: 27.6 PG (ref 26–34)
MCH RBC QN AUTO: 27.6 PG (ref 26–34)
MCH RBC QN AUTO: 27.8 PG (ref 26–34)
MCH RBC QN AUTO: 27.8 PG (ref 26–34)
MCH RBC QN AUTO: 27.9 PG (ref 26–34)
MCH RBC QN AUTO: 27.9 PG (ref 26–34)
MCH RBC QN AUTO: 28 PG (ref 26–34)
MCH RBC QN AUTO: 28.1 PG (ref 26–34)
MCH RBC QN AUTO: 28.2 PG (ref 26–34)
MCH RBC QN AUTO: 28.6 PG (ref 26–34)
MCH RBC QN AUTO: 29.5 PG (ref 26–34)
MCH RBC QN AUTO: 29.6 PG (ref 26–34)
MCH RBC QN AUTO: 29.8 PG (ref 26–34)
MCH RBC QN AUTO: 30 PG (ref 26–34)
MCH RBC QN AUTO: 30.1 PG (ref 26–34)
MCHC RBC AUTO-ENTMCNC: 30.5 G/DL (ref 30–36.5)
MCHC RBC AUTO-ENTMCNC: 30.6 G/DL (ref 30–36.5)
MCHC RBC AUTO-ENTMCNC: 31.1 G/DL (ref 30–36.5)
MCHC RBC AUTO-ENTMCNC: 31.2 G/DL (ref 30–36.5)
MCHC RBC AUTO-ENTMCNC: 31.3 G/DL (ref 30–36.5)
MCHC RBC AUTO-ENTMCNC: 31.4 G/DL (ref 30–36.5)
MCHC RBC AUTO-ENTMCNC: 31.5 G/DL (ref 30–36.5)
MCHC RBC AUTO-ENTMCNC: 31.5 G/DL (ref 30–36.5)
MCHC RBC AUTO-ENTMCNC: 31.6 G/DL (ref 30–36.5)
MCHC RBC AUTO-ENTMCNC: 31.7 G/DL (ref 30–36.5)
MCHC RBC AUTO-ENTMCNC: 31.8 G/DL (ref 30–36.5)
MCHC RBC AUTO-ENTMCNC: 31.9 G/DL (ref 30–36.5)
MCHC RBC AUTO-ENTMCNC: 32 G/DL (ref 30–36.5)
MCHC RBC AUTO-ENTMCNC: 32.2 G/DL (ref 30–36.5)
MCHC RBC AUTO-ENTMCNC: 32.4 G/DL (ref 30–36.5)
MCHC RBC AUTO-ENTMCNC: 32.5 G/DL (ref 30–36.5)
MCHC RBC AUTO-ENTMCNC: 32.5 G/DL (ref 30–36.5)
MCHC RBC AUTO-ENTMCNC: 32.6 G/DL (ref 30–36.5)
MCHC RBC AUTO-ENTMCNC: 32.6 G/DL (ref 30–36.5)
MCHC RBC AUTO-ENTMCNC: 33 G/DL (ref 30–36.5)
MCHC RBC AUTO-ENTMCNC: 33.2 G/DL (ref 30–36.5)
MCV RBC AUTO: 85.3 FL (ref 80–99)
MCV RBC AUTO: 86.4 FL (ref 80–99)
MCV RBC AUTO: 86.7 FL (ref 80–99)
MCV RBC AUTO: 87 FL (ref 80–99)
MCV RBC AUTO: 87 FL (ref 80–99)
MCV RBC AUTO: 87.2 FL (ref 80–99)
MCV RBC AUTO: 87.3 FL (ref 80–99)
MCV RBC AUTO: 87.8 FL (ref 80–99)
MCV RBC AUTO: 88.1 FL (ref 80–99)
MCV RBC AUTO: 88.3 FL (ref 80–99)
MCV RBC AUTO: 88.4 FL (ref 80–99)
MCV RBC AUTO: 88.5 FL (ref 80–99)
MCV RBC AUTO: 90 FL (ref 80–99)
MCV RBC AUTO: 90 FL (ref 80–99)
MCV RBC AUTO: 90.5 FL (ref 80–99)
MCV RBC AUTO: 90.6 FL (ref 80–99)
MCV RBC AUTO: 90.8 FL (ref 80–99)
MCV RBC AUTO: 90.8 FL (ref 80–99)
MCV RBC AUTO: 90.9 FL (ref 80–99)
MCV RBC AUTO: 91.2 FL (ref 80–99)
METHADONE UR QL: NEGATIVE
MONOCYTES # BLD: 0.3 K/UL (ref 0–1)
MONOCYTES # BLD: 0.4 K/UL (ref 0–1)
MONOCYTES # BLD: 0.4 K/UL (ref 0–1)
MONOCYTES # BLD: 0.5 K/UL (ref 0–1)
MONOCYTES # BLD: 0.6 K/UL (ref 0–1)
MONOCYTES # BLD: 0.7 K/UL (ref 0–1)
MONOCYTES # BLD: 0.8 K/UL (ref 0–1)
MONOCYTES # BLD: 0.8 K/UL (ref 0–1)
MONOCYTES # BLD: 0.9 K/UL (ref 0–1)
MONOCYTES # BLD: 1.1 K/UL (ref 0–1)
MONOCYTES # BLD: 1.2 K/UL (ref 0–1)
MONOCYTES # BLD: 1.2 K/UL (ref 0–1)
MONOCYTES # BLD: 1.3 K/UL (ref 0–1)
MONOCYTES # BLD: 1.3 K/UL (ref 0–1)
MONOCYTES # BLD: 1.4 K/UL (ref 0–1)
MONOCYTES # BLD: 1.5 K/UL (ref 0–1)
MONOCYTES # BLD: 1.7 K/UL (ref 0–1)
MONOCYTES # BLD: 1.7 K/UL (ref 0–1)
MONOCYTES # BLD: 1.8 K/UL (ref 0–1)
MONOCYTES NFR BLD: 10 % (ref 5–13)
MONOCYTES NFR BLD: 10 % (ref 5–13)
MONOCYTES NFR BLD: 11 % (ref 5–13)
MONOCYTES NFR BLD: 12 % (ref 5–13)
MONOCYTES NFR BLD: 15 % (ref 5–13)
MONOCYTES NFR BLD: 5 % (ref 5–13)
MONOCYTES NFR BLD: 6 % (ref 5–13)
MONOCYTES NFR BLD: 7 % (ref 5–13)
MONOCYTES NFR BLD: 9 % (ref 5–13)
MRSA DNA SPEC QL NAA+PROBE: DETECTED
MUCOUS THREADS URNS QL MICRO: ABNORMAL /LPF
MV DEC SLOPE: 5520 MM/S2
MV DEC SLOPE: 5520 MM/S2
MV DEC SLOPE: ABNORMAL MM/S2
MV DEC SLOPE: ABNORMAL MM/S2
NEUTS BAND NFR BLD MANUAL: 17 % (ref 0–6)
NEUTS SEG # BLD: 11.7 K/UL (ref 1.8–8)
NEUTS SEG # BLD: 12.6 K/UL (ref 1.8–8)
NEUTS SEG # BLD: 13.4 K/UL (ref 1.8–8)
NEUTS SEG # BLD: 13.5 K/UL (ref 1.8–8)
NEUTS SEG # BLD: 13.6 K/UL (ref 1.8–8)
NEUTS SEG # BLD: 13.8 K/UL (ref 1.8–8)
NEUTS SEG # BLD: 17.1 K/UL (ref 1.8–8)
NEUTS SEG # BLD: 21.1 K/UL (ref 1.8–8)
NEUTS SEG # BLD: 22.2 K/UL (ref 1.8–8)
NEUTS SEG # BLD: 23.7 K/UL (ref 1.8–8)
NEUTS SEG # BLD: 24.6 K/UL (ref 1.8–8)
NEUTS SEG # BLD: 24.7 K/UL (ref 1.8–8)
NEUTS SEG # BLD: 3.3 K/UL (ref 1.8–8)
NEUTS SEG # BLD: 3.7 K/UL (ref 1.8–8)
NEUTS SEG # BLD: 3.9 K/UL (ref 1.8–8)
NEUTS SEG # BLD: 4 K/UL (ref 1.8–8)
NEUTS SEG # BLD: 4.1 K/UL (ref 1.8–8)
NEUTS SEG # BLD: 5 K/UL (ref 1.8–8)
NEUTS SEG # BLD: 5.2 K/UL (ref 1.8–8)
NEUTS SEG # BLD: 5.3 K/UL (ref 1.8–8)
NEUTS SEG # BLD: 6.5 K/UL (ref 1.8–8)
NEUTS SEG NFR BLD: 51 % (ref 32–75)
NEUTS SEG NFR BLD: 56 % (ref 32–75)
NEUTS SEG NFR BLD: 60 % (ref 32–75)
NEUTS SEG NFR BLD: 62 % (ref 32–75)
NEUTS SEG NFR BLD: 68 % (ref 32–75)
NEUTS SEG NFR BLD: 75 % (ref 32–75)
NEUTS SEG NFR BLD: 77 % (ref 32–75)
NEUTS SEG NFR BLD: 78 % (ref 32–75)
NEUTS SEG NFR BLD: 78 % (ref 32–75)
NEUTS SEG NFR BLD: 79 % (ref 32–75)
NEUTS SEG NFR BLD: 80 % (ref 32–75)
NEUTS SEG NFR BLD: 81 % (ref 32–75)
NEUTS SEG NFR BLD: 81 % (ref 32–75)
NEUTS SEG NFR BLD: 82 % (ref 32–75)
NEUTS SEG NFR BLD: 83 % (ref 32–75)
NEUTS SEG NFR BLD: 83 % (ref 32–75)
NEUTS SEG NFR BLD: 86 % (ref 32–75)
NEUTS SEG NFR BLD: 87 % (ref 32–75)
NEUTS SEG NFR BLD: 88 % (ref 32–75)
NEUTS SEG NFR BLD: 89 % (ref 32–75)
NEUTS SEG NFR BLD: 90 % (ref 32–75)
NITRITE UR QL STRIP.AUTO: NEGATIVE
NRBC # BLD: 0 K/UL (ref 0–0.01)
NRBC # BLD: 0.02 K/UL (ref 0–0.01)
NRBC # BLD: 0.03 K/UL (ref 0–0.01)
NRBC # BLD: 0.05 K/UL (ref 0–0.01)
NRBC # BLD: 0.09 K/UL (ref 0–0.01)
NRBC # BLD: 0.12 K/UL (ref 0–0.01)
NRBC # BLD: 0.14 K/UL
NRBC # BLD: 0.16 K/UL (ref 0–0.01)
NRBC # BLD: 0.21 K/UL (ref 0–0.01)
NRBC # BLD: 0.25 K/UL (ref 0–0.01)
NRBC BLD MANUAL-RTO: 2 PER 100 WBC
NRBC BLD-RTO: 0 PER 100 WBC
NRBC BLD-RTO: 0.1 PER 100 WBC
NRBC BLD-RTO: 0.3 PER 100 WBC
NRBC BLD-RTO: 0.5 PER 100 WBC
NRBC BLD-RTO: 0.7 PER 100 WBC
NRBC BLD-RTO: 1.1 PER 100 WBC
NRBC BLD-RTO: 1.3 PER 100 WBC
NRBC BLD-RTO: 3.5 PER 100 WBC
OPIATES UR QL: NEGATIVE
P-R INTERVAL, ECG05: 158 MS
P-R INTERVAL, ECG05: 166 MS
P-R INTERVAL, ECG05: 184 MS
P-R INTERVAL, ECG05: 188 MS
P-R INTERVAL, ECG05: 194 MS
PCO2 BLDA: 24 MMHG (ref 35–45)
PCO2 BLDA: 28 MMHG (ref 35–45)
PCO2 BLDA: 29 MMHG (ref 35–45)
PCO2 BLDA: 36 MMHG (ref 35–45)
PCP UR QL: NEGATIVE
PERFORMED BY, TECHID: ABNORMAL
PERFORMED BY, TECHID: NORMAL
PH BLDA: 7.42 [PH] (ref 7.35–7.45)
PH BLDA: 7.42 [PH] (ref 7.35–7.45)
PH BLDA: 7.44 [PH] (ref 7.35–7.45)
PH BLDA: 7.44 [PH] (ref 7.35–7.45)
PH UR STRIP: 5 [PH] (ref 5–8)
PH UR STRIP: 5 [PH] (ref 5–8)
PH UR STRIP: 6 [PH] (ref 5–8)
PHOSPHATE SERPL-MCNC: 2.2 MG/DL (ref 2.6–4.7)
PHOSPHATE SERPL-MCNC: 2.9 MG/DL (ref 2.6–4.7)
PHOSPHATE SERPL-MCNC: 3 MG/DL (ref 2.6–4.7)
PHOSPHATE SERPL-MCNC: 3.2 MG/DL (ref 2.6–4.7)
PHOSPHATE SERPL-MCNC: 4.2 MG/DL (ref 2.6–4.7)
PHOSPHATE SERPL-MCNC: 4.4 MG/DL (ref 2.6–4.7)
PHOSPHATE SERPL-MCNC: 4.5 MG/DL (ref 2.6–4.7)
PHOSPHATE SERPL-MCNC: 5.1 MG/DL (ref 2.6–4.7)
PLATELET # BLD AUTO: 100 K/UL (ref 150–400)
PLATELET # BLD AUTO: 102 K/UL (ref 150–400)
PLATELET # BLD AUTO: 120 K/UL (ref 150–400)
PLATELET # BLD AUTO: 128 K/UL (ref 150–400)
PLATELET # BLD AUTO: 131 K/UL (ref 150–400)
PLATELET # BLD AUTO: 146 K/UL (ref 150–400)
PLATELET # BLD AUTO: 164 K/UL (ref 150–400)
PLATELET # BLD AUTO: 166 K/UL (ref 150–400)
PLATELET # BLD AUTO: 166 K/UL (ref 150–400)
PLATELET # BLD AUTO: 178 K/UL (ref 150–400)
PLATELET # BLD AUTO: 58 K/UL (ref 150–400)
PLATELET # BLD AUTO: 66 K/UL (ref 150–400)
PLATELET # BLD AUTO: 67 K/UL (ref 150–400)
PLATELET # BLD AUTO: 68 K/UL (ref 150–400)
PLATELET # BLD AUTO: 69 K/UL (ref 150–400)
PLATELET # BLD AUTO: 71 K/UL (ref 150–400)
PLATELET # BLD AUTO: 73 K/UL (ref 150–400)
PLATELET # BLD AUTO: 75 K/UL (ref 150–400)
PLATELET # BLD AUTO: 78 K/UL (ref 150–400)
PLATELET # BLD AUTO: 80 K/UL (ref 150–400)
PLATELET # BLD AUTO: 84 K/UL (ref 150–400)
PLATELET # BLD AUTO: 89 K/UL (ref 150–400)
PLATELET # BLD AUTO: 93 K/UL (ref 150–400)
PMV BLD AUTO: 11.7 FL (ref 8.9–12.9)
PMV BLD AUTO: 12 FL (ref 8.9–12.9)
PMV BLD AUTO: 12.8 FL (ref 8.9–12.9)
PMV BLD AUTO: 13 FL (ref 8.9–12.9)
PMV BLD AUTO: 13.1 FL (ref 8.9–12.9)
PMV BLD AUTO: 13.5 FL (ref 8.9–12.9)
PO2 BLDA: 180 MMHG (ref 75–100)
PO2 BLDA: 182 MMHG (ref 75–100)
PO2 BLDA: 185 MMHG (ref 75–100)
PO2 BLDA: 86 MMHG (ref 75–100)
POTASSIUM SERPL-SCNC: 3.2 MMOL/L (ref 3.5–5.1)
POTASSIUM SERPL-SCNC: 3.3 MMOL/L (ref 3.5–5.1)
POTASSIUM SERPL-SCNC: 3.4 MMOL/L (ref 3.5–5.1)
POTASSIUM SERPL-SCNC: 3.5 MMOL/L (ref 3.5–5.1)
POTASSIUM SERPL-SCNC: 3.5 MMOL/L (ref 3.5–5.1)
POTASSIUM SERPL-SCNC: 3.7 MMOL/L (ref 3.5–5.1)
POTASSIUM SERPL-SCNC: 3.7 MMOL/L (ref 3.5–5.1)
POTASSIUM SERPL-SCNC: 3.8 MMOL/L (ref 3.5–5.1)
POTASSIUM SERPL-SCNC: 3.8 MMOL/L (ref 3.5–5.1)
POTASSIUM SERPL-SCNC: 3.9 MMOL/L (ref 3.5–5.1)
POTASSIUM SERPL-SCNC: 4 MMOL/L (ref 3.5–5.1)
POTASSIUM SERPL-SCNC: 4.1 MMOL/L (ref 3.5–5.1)
POTASSIUM SERPL-SCNC: 4.2 MMOL/L (ref 3.5–5.1)
POTASSIUM SERPL-SCNC: 4.3 MMOL/L (ref 3.5–5.1)
POTASSIUM SERPL-SCNC: 4.4 MMOL/L (ref 3.5–5.1)
POTASSIUM SERPL-SCNC: 4.8 MMOL/L (ref 3.5–5.1)
POTASSIUM SERPL-SCNC: ABNORMAL MMOL/L (ref 3.5–5.1)
PROT CSF-MCNC: 41 MG/DL (ref 15–45)
PROT SERPL-MCNC: 5.7 G/DL (ref 6.4–8.2)
PROT SERPL-MCNC: 5.7 G/DL (ref 6.4–8.2)
PROT SERPL-MCNC: 5.8 G/DL (ref 6.4–8.2)
PROT SERPL-MCNC: 5.9 G/DL (ref 6.4–8.2)
PROT SERPL-MCNC: 5.9 G/DL (ref 6.4–8.2)
PROT SERPL-MCNC: 6.1 G/DL (ref 6.4–8.2)
PROT SERPL-MCNC: 6.3 G/DL (ref 6.4–8.2)
PROT SERPL-MCNC: 6.5 G/DL (ref 6.4–8.2)
PROT SERPL-MCNC: 6.7 G/DL (ref 6.4–8.2)
PROT SERPL-MCNC: 6.8 G/DL (ref 6.4–8.2)
PROT SERPL-MCNC: 7.1 G/DL (ref 6.4–8.2)
PROT SERPL-MCNC: 7.2 G/DL (ref 6.4–8.2)
PROT SERPL-MCNC: 7.4 G/DL (ref 6.4–8.2)
PROT SERPL-MCNC: 7.4 G/DL (ref 6.4–8.2)
PROT SERPL-MCNC: 7.5 G/DL (ref 6.4–8.2)
PROT SERPL-MCNC: 8 G/DL (ref 6.4–8.2)
PROT SERPL-MCNC: 8.3 G/DL (ref 6.4–8.2)
PROT UR STRIP-MCNC: 100 MG/DL
PROT UR STRIP-MCNC: 30 MG/DL
PROT UR STRIP-MCNC: >300 MG/DL
PTH-INTACT SERPL-MCNC: 51.3 PG/ML (ref 18.4–88)
PTH-INTACT SERPL-MCNC: 89 PG/ML (ref 18.4–88)
Q-T INTERVAL, ECG07: 392 MS
Q-T INTERVAL, ECG07: 402 MS
Q-T INTERVAL, ECG07: 440 MS
Q-T INTERVAL, ECG07: 448 MS
Q-T INTERVAL, ECG07: 450 MS
Q-T INTERVAL, ECG07: 456 MS
QRS DURATION, ECG06: 102 MS
QRS DURATION, ECG06: 114 MS
QRS DURATION, ECG06: 116 MS
QRS DURATION, ECG06: 118 MS
QRS DURATION, ECG06: 122 MS
QRS DURATION, ECG06: 98 MS
QTC CALCULATION (BEZET), ECG08: 484 MS
QTC CALCULATION (BEZET), ECG08: 497 MS
QTC CALCULATION (BEZET), ECG08: 528 MS
QTC CALCULATION (BEZET), ECG08: 538 MS
QTC CALCULATION (BEZET), ECG08: 547 MS
QTC CALCULATION (BEZET), ECG08: 560 MS
RBC # BLD AUTO: 4.03 M/UL (ref 3.8–5.2)
RBC # BLD AUTO: 4.1 M/UL (ref 3.8–5.2)
RBC # BLD AUTO: 4.12 M/UL (ref 3.8–5.2)
RBC # BLD AUTO: 4.17 M/UL (ref 3.8–5.2)
RBC # BLD AUTO: 4.19 M/UL (ref 3.8–5.2)
RBC # BLD AUTO: 4.47 M/UL (ref 3.8–5.2)
RBC # BLD AUTO: 4.55 M/UL (ref 3.8–5.2)
RBC # BLD AUTO: 4.56 M/UL (ref 3.8–5.2)
RBC # BLD AUTO: 4.63 M/UL (ref 3.8–5.2)
RBC # BLD AUTO: 4.69 M/UL (ref 3.8–5.2)
RBC # BLD AUTO: 4.71 M/UL (ref 3.8–5.2)
RBC # BLD AUTO: 4.77 M/UL (ref 3.8–5.2)
RBC # BLD AUTO: 4.84 M/UL (ref 3.8–5.2)
RBC # BLD AUTO: 4.87 M/UL (ref 3.8–5.2)
RBC # BLD AUTO: 4.92 M/UL (ref 3.8–5.2)
RBC # BLD AUTO: 4.97 M/UL (ref 3.8–5.2)
RBC # BLD AUTO: 5.03 M/UL (ref 3.8–5.2)
RBC # BLD AUTO: 5.07 M/UL (ref 3.8–5.2)
RBC # BLD AUTO: 5.22 M/UL (ref 3.8–5.2)
RBC # BLD AUTO: 5.27 M/UL (ref 3.8–5.2)
RBC # BLD AUTO: 5.48 M/UL (ref 3.8–5.2)
RBC # CSF: 0 /CU MM
RBC #/AREA URNS HPF: ABNORMAL /HPF (ref 0–5)
RBC MORPH BLD: ABNORMAL
REPORTED DOSE,DOSE: NORMAL UNITS
SAO2 % BLD: 100 %
SAO2 % BLD: 100 %
SAO2 % BLD: 101 %
SAO2 % BLD: 96 %
SAO2% DEVICE SAO2% SENSOR NAME: ABNORMAL
SAO2% DEVICE SAO2% SENSOR NAME: NORMAL
SARS-COV-2, COV2: NORMAL
SARS-COV-2, COV2: NORMAL
SODIUM SERPL-SCNC: 135 MMOL/L (ref 136–145)
SODIUM SERPL-SCNC: 138 MMOL/L (ref 136–145)
SODIUM SERPL-SCNC: 139 MMOL/L (ref 136–145)
SODIUM SERPL-SCNC: 140 MMOL/L (ref 136–145)
SODIUM SERPL-SCNC: 141 MMOL/L (ref 136–145)
SODIUM SERPL-SCNC: 142 MMOL/L (ref 136–145)
SODIUM SERPL-SCNC: 142 MMOL/L (ref 136–145)
SODIUM SERPL-SCNC: 144 MMOL/L (ref 136–145)
SODIUM SERPL-SCNC: 145 MMOL/L (ref 136–145)
SODIUM SERPL-SCNC: 146 MMOL/L (ref 136–145)
SODIUM SERPL-SCNC: 147 MMOL/L (ref 136–145)
SODIUM SERPL-SCNC: 148 MMOL/L (ref 136–145)
SODIUM SERPL-SCNC: 149 MMOL/L (ref 136–145)
SODIUM SERPL-SCNC: 150 MMOL/L (ref 136–145)
SODIUM SERPL-SCNC: 150 MMOL/L (ref 136–145)
SP GR UR REFRACTOMETRY: 1.02 (ref 1–1.03)
SPECIAL REQUESTS,SREQ: ABNORMAL
SPECIAL REQUESTS,SREQ: NORMAL
SPECIMEN SOURCE: ABNORMAL
SPECIMEN SOURCE: NORMAL
SPECIMEN SOURCE: NORMAL
TRIGL SERPL-MCNC: 92 MG/DL (ref ?–150)
TROPONIN I SERPL-MCNC: 0.17 NG/ML
TROPONIN I SERPL-MCNC: 0.2 NG/ML
TROPONIN I SERPL-MCNC: 0.2 NG/ML
TROPONIN I SERPL-MCNC: 0.3 NG/ML
TROPONIN I SERPL-MCNC: 0.32 NG/ML
TROPONIN I SERPL-MCNC: 0.34 NG/ML
TROPONIN I SERPL-MCNC: 0.35 NG/ML
TROPONIN I SERPL-MCNC: 0.42 NG/ML
TROPONIN I SERPL-MCNC: 0.45 NG/ML
TSH SERPL DL<=0.05 MIU/L-ACNC: 0.09 UIU/ML (ref 0.36–3.74)
TSH SERPL DL<=0.05 MIU/L-ACNC: 2.13 UIU/ML (ref 0.36–3.74)
TUBE # CSF: 1
TUBE # CSF: 1
TUBE # CSF: 3
UA: UC IF INDICATED,UAUC: ABNORMAL
UA: UC IF INDICATED,UAUC: ABNORMAL
UROBILINOGEN UR QL STRIP.AUTO: 0.1 EU/DL (ref 0.1–1)
UROBILINOGEN UR QL STRIP.AUTO: 2 EU/DL (ref 0.1–1)
UROBILINOGEN UR QL STRIP.AUTO: 4 EU/DL (ref 0.1–1)
VANCOMYCIN SERPL-MCNC: 15.2 UG/ML
VANCOMYCIN SERPL-MCNC: 16.8 UG/ML
VANCOMYCIN SERPL-MCNC: 18.2 UG/ML
VANCOMYCIN SERPL-MCNC: 19.4 UG/ML
VANCOMYCIN SERPL-MCNC: 22.8 UG/ML
VANCOMYCIN SERPL-MCNC: 25.1 UG/ML
VENTRICULAR RATE, ECG03: 83 BPM
VENTRICULAR RATE, ECG03: 84 BPM
VENTRICULAR RATE, ECG03: 92 BPM
VENTRICULAR RATE, ECG03: 92 BPM
VENTRICULAR RATE, ECG03: 93 BPM
VENTRICULAR RATE, ECG03: 94 BPM
VIT B12 SERPL-MCNC: >2000 PG/ML (ref 193–986)
VLDLC SERPL CALC-MCNC: 18.4 MG/DL
WBC # BLD AUTO: 15 K/UL (ref 3.6–11)
WBC # BLD AUTO: 16.3 K/UL (ref 3.6–11)
WBC # BLD AUTO: 16.4 K/UL (ref 3.6–11)
WBC # BLD AUTO: 16.6 K/UL (ref 3.6–11)
WBC # BLD AUTO: 16.8 K/UL (ref 3.6–11)
WBC # BLD AUTO: 17.7 K/UL (ref 3.6–11)
WBC # BLD AUTO: 20.4 K/UL (ref 3.6–11)
WBC # BLD AUTO: 24.4 K/UL (ref 3.6–11)
WBC # BLD AUTO: 25.6 K/UL (ref 3.6–11)
WBC # BLD AUTO: 26.3 K/UL (ref 3.6–11)
WBC # BLD AUTO: 27.7 K/UL (ref 3.6–11)
WBC # BLD AUTO: 28 K/UL (ref 3.6–11)
WBC # BLD AUTO: 4.8 K/UL (ref 3.6–11)
WBC # BLD AUTO: 4.9 K/UL (ref 3.6–11)
WBC # BLD AUTO: 5.2 K/UL (ref 3.6–11)
WBC # BLD AUTO: 6.2 K/UL (ref 3.6–11)
WBC # BLD AUTO: 6.4 K/UL (ref 3.6–11)
WBC # BLD AUTO: 6.5 K/UL (ref 3.6–11)
WBC # BLD AUTO: 6.6 K/UL (ref 3.6–11)
WBC # BLD AUTO: 7.1 K/UL (ref 3.6–11)
WBC # BLD AUTO: 7.2 K/UL (ref 3.6–11)
WBC # BLD AUTO: 7.5 K/UL (ref 3.6–11)
WBC # BLD AUTO: 8.8 K/UL (ref 3.6–11)
WBC # CSF: 2 /CU MM (ref 0–5)
WBC URNS QL MICRO: ABNORMAL /HPF (ref 0–4)

## 2021-01-01 PROCEDURE — G8753 SYS BP > OR = 140: HCPCS | Performed by: INTERNAL MEDICINE

## 2021-01-01 PROCEDURE — 77010033678 HC OXYGEN DAILY

## 2021-01-01 PROCEDURE — 74011636637 HC RX REV CODE- 636/637: Performed by: INTERNAL MEDICINE

## 2021-01-01 PROCEDURE — G8536 NO DOC ELDER MAL SCRN: HCPCS | Performed by: INTERNAL MEDICINE

## 2021-01-01 PROCEDURE — 85027 COMPLETE CBC AUTOMATED: CPT

## 2021-01-01 PROCEDURE — 99285 EMERGENCY DEPT VISIT HI MDM: CPT

## 2021-01-01 PROCEDURE — 36415 COLL VENOUS BLD VENIPUNCTURE: CPT

## 2021-01-01 PROCEDURE — 97530 THERAPEUTIC ACTIVITIES: CPT

## 2021-01-01 PROCEDURE — 96374 THER/PROPH/DIAG INJ IV PUSH: CPT

## 2021-01-01 PROCEDURE — 80053 COMPREHEN METABOLIC PANEL: CPT

## 2021-01-01 PROCEDURE — 74011636637 HC RX REV CODE- 636/637: Performed by: FAMILY MEDICINE

## 2021-01-01 PROCEDURE — 99232 SBSQ HOSP IP/OBS MODERATE 35: CPT | Performed by: INTERNAL MEDICINE

## 2021-01-01 PROCEDURE — 83605 ASSAY OF LACTIC ACID: CPT

## 2021-01-01 PROCEDURE — 82962 GLUCOSE BLOOD TEST: CPT

## 2021-01-01 PROCEDURE — 65270000029 HC RM PRIVATE

## 2021-01-01 PROCEDURE — G8427 DOCREV CUR MEDS BY ELIG CLIN: HCPCS | Performed by: INTERNAL MEDICINE

## 2021-01-01 PROCEDURE — 70450 CT HEAD/BRAIN W/O DYE: CPT

## 2021-01-01 PROCEDURE — 99215 OFFICE O/P EST HI 40 MIN: CPT | Performed by: INTERNAL MEDICINE

## 2021-01-01 PROCEDURE — 74011250636 HC RX REV CODE- 250/636: Performed by: INTERNAL MEDICINE

## 2021-01-01 PROCEDURE — 74011250636 HC RX REV CODE- 250/636: Performed by: FAMILY MEDICINE

## 2021-01-01 PROCEDURE — 74011250637 HC RX REV CODE- 250/637: Performed by: FAMILY MEDICINE

## 2021-01-01 PROCEDURE — 80048 BASIC METABOLIC PNL TOTAL CA: CPT

## 2021-01-01 PROCEDURE — 74011250637 HC RX REV CODE- 250/637: Performed by: INTERNAL MEDICINE

## 2021-01-01 PROCEDURE — 97110 THERAPEUTIC EXERCISES: CPT

## 2021-01-01 PROCEDURE — 71045 X-RAY EXAM CHEST 1 VIEW: CPT

## 2021-01-01 PROCEDURE — 97162 PT EVAL MOD COMPLEX 30 MIN: CPT

## 2021-01-01 PROCEDURE — 74011250636 HC RX REV CODE- 250/636: Performed by: EMERGENCY MEDICINE

## 2021-01-01 PROCEDURE — 85025 COMPLETE CBC W/AUTO DIFF WBC: CPT

## 2021-01-01 PROCEDURE — 76060000032 HC ANESTHESIA 0.5 TO 1 HR: Performed by: INTERNAL MEDICINE

## 2021-01-01 PROCEDURE — 65610000006 HC RM INTENSIVE CARE

## 2021-01-01 PROCEDURE — 74011250637 HC RX REV CODE- 250/637: Performed by: HOSPITALIST

## 2021-01-01 PROCEDURE — 36600 WITHDRAWAL OF ARTERIAL BLOOD: CPT

## 2021-01-01 PROCEDURE — 74011000250 HC RX REV CODE- 250: Performed by: INTERNAL MEDICINE

## 2021-01-01 PROCEDURE — 74011000250 HC RX REV CODE- 250

## 2021-01-01 PROCEDURE — 87186 SC STD MICRODIL/AGAR DIL: CPT

## 2021-01-01 PROCEDURE — 74011000258 HC RX REV CODE- 258: Performed by: FAMILY MEDICINE

## 2021-01-01 PROCEDURE — G0463 HOSPITAL OUTPT CLINIC VISIT: HCPCS | Performed by: INTERNAL MEDICINE

## 2021-01-01 PROCEDURE — 94760 N-INVAS EAR/PLS OXIMETRY 1: CPT

## 2021-01-01 PROCEDURE — 74011250636 HC RX REV CODE- 250/636: Performed by: STUDENT IN AN ORGANIZED HEALTH CARE EDUCATION/TRAINING PROGRAM

## 2021-01-01 PROCEDURE — 81001 URINALYSIS AUTO W/SCOPE: CPT

## 2021-01-01 PROCEDURE — 74011000258 HC RX REV CODE- 258: Performed by: INTERNAL MEDICINE

## 2021-01-01 PROCEDURE — 80202 ASSAY OF VANCOMYCIN: CPT

## 2021-01-01 PROCEDURE — 84443 ASSAY THYROID STIM HORMONE: CPT

## 2021-01-01 PROCEDURE — 97165 OT EVAL LOW COMPLEX 30 MIN: CPT

## 2021-01-01 PROCEDURE — 65270000032 HC RM SEMIPRIVATE

## 2021-01-01 PROCEDURE — 93283 PRGRMG EVAL IMPLANTABLE DFB: CPT | Performed by: INTERNAL MEDICINE

## 2021-01-01 PROCEDURE — 84100 ASSAY OF PHOSPHORUS: CPT

## 2021-01-01 PROCEDURE — 99231 SBSQ HOSP IP/OBS SF/LOW 25: CPT | Performed by: NURSE PRACTITIONER

## 2021-01-01 PROCEDURE — 99284 EMERGENCY DEPT VISIT MOD MDM: CPT

## 2021-01-01 PROCEDURE — 93283 PRGRMG EVAL IMPLANTABLE DFB: CPT

## 2021-01-01 PROCEDURE — 77067 SCR MAMMO BI INCL CAD: CPT

## 2021-01-01 PROCEDURE — 97161 PT EVAL LOW COMPLEX 20 MIN: CPT

## 2021-01-01 PROCEDURE — 74011250636 HC RX REV CODE- 250/636: Performed by: NURSE ANESTHETIST, CERTIFIED REGISTERED

## 2021-01-01 PROCEDURE — 83735 ASSAY OF MAGNESIUM: CPT

## 2021-01-01 PROCEDURE — 87086 URINE CULTURE/COLONY COUNT: CPT

## 2021-01-01 PROCEDURE — G8400 PT W/DXA NO RESULTS DOC: HCPCS | Performed by: INTERNAL MEDICINE

## 2021-01-01 PROCEDURE — 83880 ASSAY OF NATRIURETIC PEPTIDE: CPT

## 2021-01-01 PROCEDURE — G8417 CALC BMI ABV UP PARAM F/U: HCPCS | Performed by: INTERNAL MEDICINE

## 2021-01-01 PROCEDURE — 74011000250 HC RX REV CODE- 250: Performed by: FAMILY MEDICINE

## 2021-01-01 PROCEDURE — 87635 SARS-COV-2 COVID-19 AMP PRB: CPT

## 2021-01-01 PROCEDURE — 74018 RADEX ABDOMEN 1 VIEW: CPT

## 2021-01-01 PROCEDURE — 82306 VITAMIN D 25 HYDROXY: CPT

## 2021-01-01 PROCEDURE — 82550 ASSAY OF CK (CPK): CPT

## 2021-01-01 PROCEDURE — 73630 X-RAY EXAM OF FOOT: CPT

## 2021-01-01 PROCEDURE — 009U3ZX DRAINAGE OF SPINAL CANAL, PERCUTANEOUS APPROACH, DIAGNOSTIC: ICD-10-PCS | Performed by: INTERNAL MEDICINE

## 2021-01-01 PROCEDURE — 74176 CT ABD & PELVIS W/O CONTRAST: CPT

## 2021-01-01 PROCEDURE — 3E0333Z INTRODUCTION OF ANTI-INFLAMMATORY INTO PERIPHERAL VEIN, PERCUTANEOUS APPROACH: ICD-10-PCS | Performed by: FAMILY MEDICINE

## 2021-01-01 PROCEDURE — 99233 SBSQ HOSP IP/OBS HIGH 50: CPT | Performed by: INTERNAL MEDICINE

## 2021-01-01 PROCEDURE — 93321 DOPPLER ECHO F-UP/LMTD STD: CPT

## 2021-01-01 PROCEDURE — 94640 AIRWAY INHALATION TREATMENT: CPT

## 2021-01-01 PROCEDURE — 93280 PM DEVICE PROGR EVAL DUAL: CPT

## 2021-01-01 PROCEDURE — 72128 CT CHEST SPINE W/O DYE: CPT

## 2021-01-01 PROCEDURE — 93005 ELECTROCARDIOGRAM TRACING: CPT

## 2021-01-01 PROCEDURE — 99232 SBSQ HOSP IP/OBS MODERATE 35: CPT | Performed by: OTOLARYNGOLOGY

## 2021-01-01 PROCEDURE — 82553 CREATINE MB FRACTION: CPT

## 2021-01-01 PROCEDURE — C8929 TTE W OR WO FOL WCON,DOPPLER: HCPCS

## 2021-01-01 PROCEDURE — 83970 ASSAY OF PARATHORMONE: CPT

## 2021-01-01 PROCEDURE — 82947 ASSAY GLUCOSE BLOOD QUANT: CPT

## 2021-01-01 PROCEDURE — 74011636637 HC RX REV CODE- 636/637: Performed by: HOSPITALIST

## 2021-01-01 PROCEDURE — 87147 CULTURE TYPE IMMUNOLOGIC: CPT

## 2021-01-01 PROCEDURE — 87040 BLOOD CULTURE FOR BACTERIA: CPT

## 2021-01-01 PROCEDURE — 80069 RENAL FUNCTION PANEL: CPT

## 2021-01-01 PROCEDURE — 0DH63UZ INSERTION OF FEEDING DEVICE INTO STOMACH, PERCUTANEOUS APPROACH: ICD-10-PCS | Performed by: INTERNAL MEDICINE

## 2021-01-01 PROCEDURE — 87641 MR-STAPH DNA AMP PROBE: CPT

## 2021-01-01 PROCEDURE — 83036 HEMOGLOBIN GLYCOSYLATED A1C: CPT

## 2021-01-01 PROCEDURE — 84484 ASSAY OF TROPONIN QUANT: CPT

## 2021-01-01 PROCEDURE — 3017F COLORECTAL CA SCREEN DOC REV: CPT | Performed by: INTERNAL MEDICINE

## 2021-01-01 PROCEDURE — G8432 DEP SCR NOT DOC, RNG: HCPCS | Performed by: INTERNAL MEDICINE

## 2021-01-01 PROCEDURE — 82803 BLOOD GASES ANY COMBINATION: CPT

## 2021-01-01 PROCEDURE — 77030005123 HC CATH GASTMY PEG BSC -C: Performed by: INTERNAL MEDICINE

## 2021-01-01 PROCEDURE — 80307 DRUG TEST PRSMV CHEM ANLYZR: CPT

## 2021-01-01 PROCEDURE — 70490 CT SOFT TISSUE NECK W/O DYE: CPT

## 2021-01-01 PROCEDURE — 80061 LIPID PANEL: CPT

## 2021-01-01 PROCEDURE — 74011000250 HC RX REV CODE- 250: Performed by: NURSE ANESTHETIST, CERTIFIED REGISTERED

## 2021-01-01 PROCEDURE — 1101F PT FALLS ASSESS-DOCD LE1/YR: CPT | Performed by: INTERNAL MEDICINE

## 2021-01-01 PROCEDURE — 74011000250 HC RX REV CODE- 250: Performed by: STUDENT IN AN ORGANIZED HEALTH CARE EDUCATION/TRAINING PROGRAM

## 2021-01-01 PROCEDURE — 82945 GLUCOSE OTHER FLUID: CPT

## 2021-01-01 PROCEDURE — 87077 CULTURE AEROBIC IDENTIFY: CPT

## 2021-01-01 PROCEDURE — 85379 FIBRIN DEGRADATION QUANT: CPT

## 2021-01-01 PROCEDURE — 71046 X-RAY EXAM CHEST 2 VIEWS: CPT

## 2021-01-01 PROCEDURE — 75810000133 HC LUMBAR PUNCTURE

## 2021-01-01 PROCEDURE — 76040000007: Performed by: INTERNAL MEDICINE

## 2021-01-01 PROCEDURE — 99222 1ST HOSP IP/OBS MODERATE 55: CPT | Performed by: INTERNAL MEDICINE

## 2021-01-01 PROCEDURE — 84132 ASSAY OF SERUM POTASSIUM: CPT

## 2021-01-01 PROCEDURE — 74011000250 HC RX REV CODE- 250: Performed by: HOSPITALIST

## 2021-01-01 PROCEDURE — 87205 SMEAR GRAM STAIN: CPT

## 2021-01-01 PROCEDURE — 74011250637 HC RX REV CODE- 250/637: Performed by: EMERGENCY MEDICINE

## 2021-01-01 PROCEDURE — 1090F PRES/ABSN URINE INCON ASSESS: CPT | Performed by: INTERNAL MEDICINE

## 2021-01-01 PROCEDURE — 70486 CT MAXILLOFACIAL W/O DYE: CPT

## 2021-01-01 PROCEDURE — 82607 VITAMIN B-12: CPT

## 2021-01-01 PROCEDURE — 84157 ASSAY OF PROTEIN OTHER: CPT

## 2021-01-01 PROCEDURE — 87804 INFLUENZA ASSAY W/OPTIC: CPT

## 2021-01-01 PROCEDURE — 74011250637 HC RX REV CODE- 250/637: Performed by: STUDENT IN AN ORGANIZED HEALTH CARE EDUCATION/TRAINING PROGRAM

## 2021-01-01 PROCEDURE — 89050 BODY FLUID CELL COUNT: CPT

## 2021-01-01 PROCEDURE — 72131 CT LUMBAR SPINE W/O DYE: CPT

## 2021-01-01 PROCEDURE — 93280 PM DEVICE PROGR EVAL DUAL: CPT | Performed by: INTERNAL MEDICINE

## 2021-01-01 PROCEDURE — 87015 SPECIMEN INFECT AGNT CONCNTJ: CPT

## 2021-01-01 PROCEDURE — 93306 TTE W/DOPPLER COMPLETE: CPT

## 2021-01-01 PROCEDURE — 99221 1ST HOSP IP/OBS SF/LOW 40: CPT | Performed by: NURSE PRACTITIONER

## 2021-01-01 PROCEDURE — G8755 DIAS BP > OR = 90: HCPCS | Performed by: INTERNAL MEDICINE

## 2021-01-01 RX ORDER — ACETAMINOPHEN 650 MG/1
650 SUPPOSITORY RECTAL
Status: DISCONTINUED | OUTPATIENT
Start: 2021-01-01 | End: 2021-01-01 | Stop reason: HOSPADM

## 2021-01-01 RX ORDER — LOSARTAN POTASSIUM 50 MG/1
100 TABLET ORAL DAILY
Status: DISCONTINUED | OUTPATIENT
Start: 2021-01-01 | End: 2021-01-01 | Stop reason: HOSPADM

## 2021-01-01 RX ORDER — CINACALCET 30 MG/1
30 TABLET, FILM COATED ORAL
Status: DISCONTINUED | OUTPATIENT
Start: 2021-01-01 | End: 2021-01-01

## 2021-01-01 RX ORDER — MAGNESIUM SULFATE 100 %
4 CRYSTALS MISCELLANEOUS AS NEEDED
Status: DISCONTINUED | OUTPATIENT
Start: 2021-01-01 | End: 2021-01-01 | Stop reason: HOSPADM

## 2021-01-01 RX ORDER — NITROGLYCERIN 0.4 MG/1
0.4 TABLET SUBLINGUAL
Status: DISCONTINUED | OUTPATIENT
Start: 2021-01-01 | End: 2021-01-01 | Stop reason: HOSPADM

## 2021-01-01 RX ORDER — ACETAMINOPHEN 325 MG/1
650 TABLET ORAL
Status: DISCONTINUED | OUTPATIENT
Start: 2021-01-01 | End: 2021-01-01 | Stop reason: HOSPADM

## 2021-01-01 RX ORDER — PANTOPRAZOLE SODIUM 40 MG/1
40 TABLET, DELAYED RELEASE ORAL
Status: DISCONTINUED | OUTPATIENT
Start: 2021-01-01 | End: 2021-01-01 | Stop reason: HOSPADM

## 2021-01-01 RX ORDER — ASPIRIN 325 MG
325 TABLET ORAL
Status: COMPLETED | OUTPATIENT
Start: 2021-01-01 | End: 2021-01-01

## 2021-01-01 RX ORDER — SODIUM CHLORIDE 9 MG/ML
50 INJECTION, SOLUTION INTRAVENOUS CONTINUOUS
Status: DISCONTINUED | OUTPATIENT
Start: 2021-01-01 | End: 2021-01-01

## 2021-01-01 RX ORDER — ONDANSETRON 2 MG/ML
4 INJECTION INTRAMUSCULAR; INTRAVENOUS
Status: DISCONTINUED | OUTPATIENT
Start: 2021-01-01 | End: 2021-10-25 | Stop reason: HOSPADM

## 2021-01-01 RX ORDER — PAROXETINE HYDROCHLORIDE 20 MG/1
20 TABLET, FILM COATED ORAL DAILY
Status: DISCONTINUED | OUTPATIENT
Start: 2021-01-01 | End: 2021-01-01 | Stop reason: HOSPADM

## 2021-01-01 RX ORDER — ACETAMINOPHEN 500 MG
1000 TABLET ORAL
Status: COMPLETED | OUTPATIENT
Start: 2021-01-01 | End: 2021-01-01

## 2021-01-01 RX ORDER — SODIUM CHLORIDE 0.9 % (FLUSH) 0.9 %
5-40 SYRINGE (ML) INJECTION AS NEEDED
Status: CANCELLED | OUTPATIENT
Start: 2021-01-01

## 2021-01-01 RX ORDER — ALOGLIPTIN 12.5 MG/1
12.5 TABLET, FILM COATED ORAL DAILY
Status: DISCONTINUED | OUTPATIENT
Start: 2021-01-01 | End: 2021-01-01 | Stop reason: HOSPADM

## 2021-01-01 RX ORDER — INSULIN GLARGINE 100 [IU]/ML
INJECTION, SOLUTION SUBCUTANEOUS
Qty: 1 ML | Refills: 1 | Status: SHIPPED | OUTPATIENT
Start: 2021-01-01

## 2021-01-01 RX ORDER — ALOGLIPTIN 25 MG/1
25 TABLET, FILM COATED ORAL DAILY
Status: DISCONTINUED | OUTPATIENT
Start: 2021-01-01 | End: 2021-01-01 | Stop reason: HOSPADM

## 2021-01-01 RX ORDER — RIFAMPIN 300 MG/1
300 CAPSULE ORAL DAILY
Qty: 7 CAPSULE | Refills: 0 | Status: SHIPPED | OUTPATIENT
Start: 2021-01-01 | End: 2021-10-29

## 2021-01-01 RX ORDER — AMITRIPTYLINE HYDROCHLORIDE 50 MG/1
75 TABLET, FILM COATED ORAL DAILY
Status: DISCONTINUED | OUTPATIENT
Start: 2021-01-01 | End: 2021-01-01 | Stop reason: HOSPADM

## 2021-01-01 RX ORDER — MORPHINE SULFATE 2 MG/ML
0.5 INJECTION, SOLUTION INTRAMUSCULAR; INTRAVENOUS ONCE
Status: COMPLETED | OUTPATIENT
Start: 2021-01-01 | End: 2021-01-01

## 2021-01-01 RX ORDER — DEXTROSE MONOHYDRATE AND SODIUM CHLORIDE 5; .45 G/100ML; G/100ML
75 INJECTION, SOLUTION INTRAVENOUS CONTINUOUS
Status: DISCONTINUED | OUTPATIENT
Start: 2021-01-01 | End: 2021-01-01 | Stop reason: HOSPADM

## 2021-01-01 RX ORDER — DEXTROSE 50 % IN WATER (D50W) INTRAVENOUS SYRINGE
25 ONCE
Status: COMPLETED | OUTPATIENT
Start: 2021-01-01 | End: 2021-01-01

## 2021-01-01 RX ORDER — LOPERAMIDE HYDROCHLORIDE 2 MG/1
2 CAPSULE ORAL
Status: DISCONTINUED | OUTPATIENT
Start: 2021-01-01 | End: 2021-01-01 | Stop reason: HOSPADM

## 2021-01-01 RX ORDER — FUROSEMIDE 10 MG/ML
40 INJECTION INTRAMUSCULAR; INTRAVENOUS DAILY
Status: DISCONTINUED | OUTPATIENT
Start: 2021-01-01 | End: 2021-01-01

## 2021-01-01 RX ORDER — ONDANSETRON 2 MG/ML
4 INJECTION INTRAMUSCULAR; INTRAVENOUS
Status: DISCONTINUED | OUTPATIENT
Start: 2021-01-01 | End: 2021-01-01 | Stop reason: HOSPADM

## 2021-01-01 RX ORDER — FUROSEMIDE 10 MG/ML
20 INJECTION INTRAMUSCULAR; INTRAVENOUS
Status: COMPLETED | OUTPATIENT
Start: 2021-01-01 | End: 2021-01-01

## 2021-01-01 RX ORDER — ONDANSETRON 4 MG/1
4 TABLET, ORALLY DISINTEGRATING ORAL
Status: DISCONTINUED | OUTPATIENT
Start: 2021-01-01 | End: 2021-01-01 | Stop reason: HOSPADM

## 2021-01-01 RX ORDER — HYDROCHLOROTHIAZIDE 12.5 MG/1
12.5 TABLET ORAL DAILY
COMMUNITY
Start: 2021-01-01 | End: 2021-01-01

## 2021-01-01 RX ORDER — CLONIDINE HYDROCHLORIDE 0.1 MG/1
0.1 TABLET ORAL
Status: COMPLETED | OUTPATIENT
Start: 2021-01-01 | End: 2021-01-01

## 2021-01-01 RX ORDER — POLYETHYLENE GLYCOL 3350 17 G/17G
17 POWDER, FOR SOLUTION ORAL DAILY PRN
Status: DISCONTINUED | OUTPATIENT
Start: 2021-01-01 | End: 2021-01-01 | Stop reason: HOSPADM

## 2021-01-01 RX ORDER — LIDOCAINE HYDROCHLORIDE 10 MG/ML
0.1 INJECTION, SOLUTION EPIDURAL; INFILTRATION; INTRACAUDAL; PERINEURAL AS NEEDED
Status: CANCELLED | OUTPATIENT
Start: 2021-01-01

## 2021-01-01 RX ORDER — FENTANYL CITRATE 50 UG/ML
50 INJECTION, SOLUTION INTRAMUSCULAR; INTRAVENOUS
Status: CANCELLED | OUTPATIENT
Start: 2021-01-01

## 2021-01-01 RX ORDER — TRAMADOL HYDROCHLORIDE 50 MG/1
50 TABLET ORAL
Status: DISCONTINUED | OUTPATIENT
Start: 2021-01-01 | End: 2021-01-01

## 2021-01-01 RX ORDER — DIPHENHYDRAMINE HYDROCHLORIDE 50 MG/ML
12.5 INJECTION, SOLUTION INTRAMUSCULAR; INTRAVENOUS AS NEEDED
Status: CANCELLED | OUTPATIENT
Start: 2021-01-01 | End: 2021-01-01

## 2021-01-01 RX ORDER — DEXTROSE 50 % IN WATER (D50W) INTRAVENOUS SYRINGE
25-50 AS NEEDED
Status: DISCONTINUED | OUTPATIENT
Start: 2021-01-01 | End: 2021-01-01

## 2021-01-01 RX ORDER — HYDROMORPHONE HYDROCHLORIDE 1 MG/ML
0.5 INJECTION, SOLUTION INTRAMUSCULAR; INTRAVENOUS; SUBCUTANEOUS
Status: ACTIVE | OUTPATIENT
Start: 2021-01-01 | End: 2021-01-01

## 2021-01-01 RX ORDER — MUPIROCIN 20 MG/G
OINTMENT TOPICAL 2 TIMES DAILY
Status: COMPLETED | OUTPATIENT
Start: 2021-01-01 | End: 2021-01-01

## 2021-01-01 RX ORDER — SODIUM CHLORIDE 0.9 % (FLUSH) 0.9 %
5-40 SYRINGE (ML) INJECTION EVERY 8 HOURS
Status: DISCONTINUED | OUTPATIENT
Start: 2021-01-01 | End: 2021-01-01 | Stop reason: HOSPADM

## 2021-01-01 RX ORDER — GABAPENTIN 100 MG/1
100 CAPSULE ORAL 2 TIMES DAILY
Status: DISCONTINUED | OUTPATIENT
Start: 2021-01-01 | End: 2021-01-01

## 2021-01-01 RX ORDER — GUAIFENESIN 600 MG/1
600 TABLET, EXTENDED RELEASE ORAL EVERY 12 HOURS
Status: DISCONTINUED | OUTPATIENT
Start: 2021-01-01 | End: 2021-01-01 | Stop reason: HOSPADM

## 2021-01-01 RX ORDER — HYDRALAZINE HYDROCHLORIDE 20 MG/ML
10 INJECTION INTRAMUSCULAR; INTRAVENOUS
Status: DISCONTINUED | OUTPATIENT
Start: 2021-01-01 | End: 2021-01-01 | Stop reason: HOSPADM

## 2021-01-01 RX ORDER — OXYCODONE HYDROCHLORIDE 5 MG/1
5 TABLET ORAL
Status: DISCONTINUED | OUTPATIENT
Start: 2021-01-01 | End: 2021-01-01 | Stop reason: HOSPADM

## 2021-01-01 RX ORDER — AZITHROMYCIN 250 MG/1
TABLET, FILM COATED ORAL
Qty: 6 TAB | Refills: 0 | Status: SHIPPED | OUTPATIENT
Start: 2021-01-01 | End: 2021-01-01

## 2021-01-01 RX ORDER — LORAZEPAM 2 MG/ML
1 INJECTION INTRAMUSCULAR
Status: DISCONTINUED | OUTPATIENT
Start: 2021-01-01 | End: 2021-10-25 | Stop reason: HOSPADM

## 2021-01-01 RX ORDER — AMITRIPTYLINE HYDROCHLORIDE 50 MG/1
75 TABLET, FILM COATED ORAL
Status: DISCONTINUED | OUTPATIENT
Start: 2021-01-01 | End: 2021-01-01 | Stop reason: HOSPADM

## 2021-01-01 RX ORDER — POTASSIUM CHLORIDE 1.5 G/1.77G
40 POWDER, FOR SOLUTION ORAL
Status: COMPLETED | OUTPATIENT
Start: 2021-01-01 | End: 2021-01-01

## 2021-01-01 RX ORDER — INSULIN LISPRO 100 [IU]/ML
INJECTION, SOLUTION INTRAVENOUS; SUBCUTANEOUS
Status: DISCONTINUED | OUTPATIENT
Start: 2021-01-01 | End: 2021-01-01 | Stop reason: HOSPADM

## 2021-01-01 RX ORDER — CYCLOBENZAPRINE HCL 10 MG
5 TABLET ORAL
Status: DISCONTINUED | OUTPATIENT
Start: 2021-01-01 | End: 2021-01-01 | Stop reason: HOSPADM

## 2021-01-01 RX ORDER — FUROSEMIDE 10 MG/ML
40 INJECTION INTRAMUSCULAR; INTRAVENOUS
Status: COMPLETED | OUTPATIENT
Start: 2021-01-01 | End: 2021-01-01

## 2021-01-01 RX ORDER — POLYETHYLENE GLYCOL 3350 17 G/17G
17 POWDER, FOR SOLUTION ORAL DAILY PRN
Status: DISCONTINUED | OUTPATIENT
Start: 2021-01-01 | End: 2021-01-01

## 2021-01-01 RX ORDER — ALOGLIPTIN 6.25 MG/1
6.25 TABLET, FILM COATED ORAL DAILY
Status: DISCONTINUED | OUTPATIENT
Start: 2021-01-01 | End: 2021-01-01 | Stop reason: HOSPADM

## 2021-01-01 RX ORDER — CYCLOBENZAPRINE HCL 5 MG
5 TABLET ORAL
COMMUNITY
Start: 2021-01-01 | End: 2021-01-01

## 2021-01-01 RX ORDER — AMMONIA 15 % (W/V)
1 AMPUL (EA) INHALATION
Status: COMPLETED | OUTPATIENT
Start: 2021-01-01 | End: 2021-01-01

## 2021-01-01 RX ORDER — MIDAZOLAM HYDROCHLORIDE 1 MG/ML
0.5 INJECTION, SOLUTION INTRAMUSCULAR; INTRAVENOUS
Status: CANCELLED | OUTPATIENT
Start: 2021-01-01

## 2021-01-01 RX ORDER — FUROSEMIDE 40 MG/1
TABLET ORAL
Qty: 60 TAB | Refills: 0 | Status: SHIPPED | OUTPATIENT
Start: 2021-01-01 | End: 2021-01-01

## 2021-01-01 RX ORDER — MORPHINE SULFATE 2 MG/ML
1 INJECTION, SOLUTION INTRAMUSCULAR; INTRAVENOUS
Status: DISCONTINUED | OUTPATIENT
Start: 2021-01-01 | End: 2021-10-25 | Stop reason: HOSPADM

## 2021-01-01 RX ORDER — GLIPIZIDE 5 MG/1
5 TABLET ORAL DAILY
Status: DISCONTINUED | OUTPATIENT
Start: 2021-01-01 | End: 2021-01-01

## 2021-01-01 RX ORDER — HYDROXYZINE 50 MG/ML
25 INJECTION, SOLUTION INTRAMUSCULAR
Status: DISCONTINUED | OUTPATIENT
Start: 2021-01-01 | End: 2021-01-01

## 2021-01-01 RX ORDER — ACETAMINOPHEN 325 MG/1
650 TABLET ORAL
Status: DISCONTINUED | OUTPATIENT
Start: 2021-01-01 | End: 2021-10-25 | Stop reason: HOSPADM

## 2021-01-01 RX ORDER — MAG HYDROX/ALUMINUM HYD/SIMETH 200-200-20
30 SUSPENSION, ORAL (FINAL DOSE FORM) ORAL
Status: DISCONTINUED | OUTPATIENT
Start: 2021-01-01 | End: 2021-01-01 | Stop reason: HOSPADM

## 2021-01-01 RX ORDER — ATORVASTATIN CALCIUM 40 MG/1
40 TABLET, FILM COATED ORAL
Status: DISCONTINUED | OUTPATIENT
Start: 2021-01-01 | End: 2021-01-01

## 2021-01-01 RX ORDER — FENTANYL CITRATE 50 UG/ML
50 INJECTION, SOLUTION INTRAMUSCULAR; INTRAVENOUS AS NEEDED
Status: CANCELLED | OUTPATIENT
Start: 2021-01-01

## 2021-01-01 RX ORDER — PROPOFOL 10 MG/ML
INJECTION, EMULSION INTRAVENOUS AS NEEDED
Status: DISCONTINUED | OUTPATIENT
Start: 2021-01-01 | End: 2021-01-01 | Stop reason: HOSPADM

## 2021-01-01 RX ORDER — PREDNISOLONE ACETATE 10 MG/ML
SUSPENSION/ DROPS OPHTHALMIC
COMMUNITY
Start: 2021-01-01

## 2021-01-01 RX ORDER — LABETALOL HCL 20 MG/4 ML
20 SYRINGE (ML) INTRAVENOUS
Status: DISCONTINUED | OUTPATIENT
Start: 2021-01-01 | End: 2021-01-01 | Stop reason: HOSPADM

## 2021-01-01 RX ORDER — DEXAMETHASONE SODIUM PHOSPHATE 4 MG/ML
4 INJECTION, SOLUTION INTRA-ARTICULAR; INTRALESIONAL; INTRAMUSCULAR; INTRAVENOUS; SOFT TISSUE EVERY 6 HOURS
Status: DISCONTINUED | OUTPATIENT
Start: 2021-01-01 | End: 2021-01-01 | Stop reason: HOSPADM

## 2021-01-01 RX ORDER — MIDAZOLAM HYDROCHLORIDE 1 MG/ML
1 INJECTION, SOLUTION INTRAMUSCULAR; INTRAVENOUS AS NEEDED
Status: CANCELLED | OUTPATIENT
Start: 2021-01-01

## 2021-01-01 RX ORDER — OXYCODONE HYDROCHLORIDE 5 MG/1
5 TABLET ORAL
Status: DISCONTINUED | OUTPATIENT
Start: 2021-01-01 | End: 2021-01-01

## 2021-01-01 RX ORDER — CARVEDILOL 3.12 MG/1
6.25 TABLET ORAL 2 TIMES DAILY WITH MEALS
Status: DISCONTINUED | OUTPATIENT
Start: 2021-01-01 | End: 2021-01-01 | Stop reason: HOSPADM

## 2021-01-01 RX ORDER — ATORVASTATIN CALCIUM 40 MG/1
40 TABLET, FILM COATED ORAL
Status: DISCONTINUED | OUTPATIENT
Start: 2021-01-01 | End: 2021-01-01 | Stop reason: HOSPADM

## 2021-01-01 RX ORDER — GABAPENTIN 100 MG/1
100 CAPSULE ORAL 2 TIMES DAILY
Status: DISCONTINUED | OUTPATIENT
Start: 2021-01-01 | End: 2021-01-01 | Stop reason: HOSPADM

## 2021-01-01 RX ORDER — FUROSEMIDE 10 MG/ML
40 INJECTION INTRAMUSCULAR; INTRAVENOUS DAILY
Status: DISCONTINUED | OUTPATIENT
Start: 2021-01-01 | End: 2021-01-01 | Stop reason: HOSPADM

## 2021-01-01 RX ORDER — LIDOCAINE HYDROCHLORIDE 20 MG/ML
INJECTION, SOLUTION EPIDURAL; INFILTRATION; INTRACAUDAL; PERINEURAL AS NEEDED
Status: DISCONTINUED | OUTPATIENT
Start: 2021-01-01 | End: 2021-01-01 | Stop reason: HOSPADM

## 2021-01-01 RX ORDER — INSULIN LISPRO 100 [IU]/ML
18 INJECTION, SOLUTION INTRAVENOUS; SUBCUTANEOUS ONCE
Status: COMPLETED | OUTPATIENT
Start: 2021-01-01 | End: 2021-01-01

## 2021-01-01 RX ORDER — GUAIFENESIN 100 MG/5ML
81 LIQUID (ML) ORAL DAILY
Status: DISCONTINUED | OUTPATIENT
Start: 2021-01-01 | End: 2021-01-01

## 2021-01-01 RX ORDER — ASPIRIN 81 MG/1
81 TABLET ORAL DAILY
Status: DISCONTINUED | OUTPATIENT
Start: 2021-01-01 | End: 2021-01-01

## 2021-01-01 RX ORDER — IPRATROPIUM BROMIDE AND ALBUTEROL SULFATE 2.5; .5 MG/3ML; MG/3ML
3 SOLUTION RESPIRATORY (INHALATION)
Status: DISCONTINUED | OUTPATIENT
Start: 2021-01-01 | End: 2021-01-01

## 2021-01-01 RX ORDER — PREDNISOLONE ACETATE 10 MG/ML
1 SUSPENSION/ DROPS OPHTHALMIC 4 TIMES DAILY
Status: DISCONTINUED | OUTPATIENT
Start: 2021-01-01 | End: 2021-01-01 | Stop reason: HOSPADM

## 2021-01-01 RX ORDER — DEXTROSE 50 % IN WATER (D50W) INTRAVENOUS SYRINGE
Status: COMPLETED
Start: 2021-01-01 | End: 2021-01-01

## 2021-01-01 RX ORDER — METFORMIN HYDROCHLORIDE 500 MG/1
1000 TABLET ORAL 2 TIMES DAILY WITH MEALS
Status: DISCONTINUED | OUTPATIENT
Start: 2021-01-01 | End: 2021-01-01 | Stop reason: HOSPADM

## 2021-01-01 RX ORDER — POTASSIUM CHLORIDE 750 MG/1
40 TABLET, FILM COATED, EXTENDED RELEASE ORAL
Status: COMPLETED | OUTPATIENT
Start: 2021-01-01 | End: 2021-01-01

## 2021-01-01 RX ORDER — SODIUM CHLORIDE 0.9 % (FLUSH) 0.9 %
5-40 SYRINGE (ML) INJECTION AS NEEDED
Status: DISCONTINUED | OUTPATIENT
Start: 2021-01-01 | End: 2021-01-01 | Stop reason: HOSPADM

## 2021-01-01 RX ORDER — RIFAMPIN 300 MG/1
300 CAPSULE ORAL DAILY
Status: DISCONTINUED | OUTPATIENT
Start: 2021-01-01 | End: 2021-01-01

## 2021-01-01 RX ORDER — LABETALOL HCL 20 MG/4 ML
5 SYRINGE (ML) INTRAVENOUS
Status: CANCELLED | OUTPATIENT
Start: 2021-01-01

## 2021-01-01 RX ORDER — GLIPIZIDE 5 MG/1
5 TABLET ORAL DAILY
COMMUNITY
Start: 2021-01-01 | End: 2021-01-01

## 2021-01-01 RX ORDER — FUROSEMIDE 40 MG/1
40 TABLET ORAL DAILY
Status: DISCONTINUED | OUTPATIENT
Start: 2021-01-01 | End: 2021-01-01 | Stop reason: HOSPADM

## 2021-01-01 RX ORDER — SODIUM CHLORIDE 0.9 % (FLUSH) 0.9 %
5-40 SYRINGE (ML) INJECTION EVERY 8 HOURS
Status: CANCELLED | OUTPATIENT
Start: 2021-01-01

## 2021-01-01 RX ORDER — ASPIRIN 81 MG/1
81 TABLET ORAL DAILY
Qty: 30 TABLET | Refills: 0 | Status: SHIPPED | OUTPATIENT
Start: 2021-01-01

## 2021-01-01 RX ORDER — GABAPENTIN 100 MG/1
100 CAPSULE ORAL 3 TIMES DAILY
Status: DISCONTINUED | OUTPATIENT
Start: 2021-01-01 | End: 2021-01-01 | Stop reason: HOSPADM

## 2021-01-01 RX ORDER — POTASSIUM CHLORIDE 750 MG/1
40 TABLET, FILM COATED, EXTENDED RELEASE ORAL
Status: DISCONTINUED | OUTPATIENT
Start: 2021-01-01 | End: 2021-01-01 | Stop reason: CLARIF

## 2021-01-01 RX ORDER — METOPROLOL SUCCINATE 25 MG/1
25 TABLET, EXTENDED RELEASE ORAL DAILY
Status: DISCONTINUED | OUTPATIENT
Start: 2021-01-01 | End: 2021-01-01 | Stop reason: HOSPADM

## 2021-01-01 RX ORDER — INSULIN GLARGINE 100 [IU]/ML
10 INJECTION, SOLUTION SUBCUTANEOUS DAILY
Status: DISCONTINUED | OUTPATIENT
Start: 2021-01-01 | End: 2021-01-01

## 2021-01-01 RX ORDER — METOPROLOL TARTRATE 5 MG/5ML
2.5 INJECTION INTRAVENOUS
Status: CANCELLED | OUTPATIENT
Start: 2021-01-01

## 2021-01-01 RX ORDER — AMITRIPTYLINE HYDROCHLORIDE 75 MG/1
75 TABLET, FILM COATED ORAL DAILY
COMMUNITY
Start: 2021-01-01 | End: 2021-01-01

## 2021-01-01 RX ORDER — HYDROCHLOROTHIAZIDE 25 MG/1
12.5 TABLET ORAL DAILY
Status: DISCONTINUED | OUTPATIENT
Start: 2021-01-01 | End: 2021-01-01 | Stop reason: HOSPADM

## 2021-01-01 RX ORDER — CARVEDILOL 3.12 MG/1
6.25 TABLET ORAL 2 TIMES DAILY WITH MEALS
Status: DISCONTINUED | OUTPATIENT
Start: 2021-01-01 | End: 2021-01-01

## 2021-01-01 RX ORDER — CLONIDINE HYDROCHLORIDE 0.1 MG/1
0.1 TABLET ORAL 2 TIMES DAILY
Status: DISCONTINUED | OUTPATIENT
Start: 2021-01-01 | End: 2021-01-01

## 2021-01-01 RX ORDER — DEXTROSE 50 % IN WATER (D50W) INTRAVENOUS SYRINGE
25-50 AS NEEDED
Status: DISCONTINUED | OUTPATIENT
Start: 2021-01-01 | End: 2021-01-01 | Stop reason: HOSPADM

## 2021-01-01 RX ORDER — FUROSEMIDE 40 MG/1
40 TABLET ORAL DAILY
Status: DISCONTINUED | OUTPATIENT
Start: 2021-01-01 | End: 2021-01-01

## 2021-01-01 RX ORDER — AMITRIPTYLINE HYDROCHLORIDE 50 MG/1
75 TABLET, FILM COATED ORAL DAILY
Status: DISCONTINUED | OUTPATIENT
Start: 2021-01-01 | End: 2021-01-01

## 2021-01-01 RX ORDER — HYDRALAZINE HYDROCHLORIDE 20 MG/ML
10 INJECTION INTRAMUSCULAR; INTRAVENOUS
Status: CANCELLED | OUTPATIENT
Start: 2021-01-01

## 2021-01-01 RX ORDER — SODIUM CHLORIDE, SODIUM LACTATE, POTASSIUM CHLORIDE, CALCIUM CHLORIDE 600; 310; 30; 20 MG/100ML; MG/100ML; MG/100ML; MG/100ML
INJECTION, SOLUTION INTRAVENOUS
Status: DISCONTINUED | OUTPATIENT
Start: 2021-01-01 | End: 2021-01-01 | Stop reason: HOSPADM

## 2021-01-01 RX ORDER — METOPROLOL TARTRATE 25 MG/1
25 TABLET, FILM COATED ORAL ONCE
Status: COMPLETED | OUTPATIENT
Start: 2021-01-01 | End: 2021-01-01

## 2021-01-01 RX ORDER — METFORMIN HYDROCHLORIDE 500 MG/1
500 TABLET ORAL 2 TIMES DAILY WITH MEALS
Status: DISCONTINUED | OUTPATIENT
Start: 2021-01-01 | End: 2021-01-01 | Stop reason: HOSPADM

## 2021-01-01 RX ORDER — HYDROMORPHONE HYDROCHLORIDE 1 MG/ML
0.4 INJECTION, SOLUTION INTRAMUSCULAR; INTRAVENOUS; SUBCUTANEOUS
Status: CANCELLED | OUTPATIENT
Start: 2021-01-01

## 2021-01-01 RX ORDER — ATORVASTATIN CALCIUM 40 MG/1
40 TABLET, FILM COATED ORAL
Qty: 60 TAB | Refills: 1 | Status: SHIPPED | OUTPATIENT
Start: 2021-01-01

## 2021-01-01 RX ORDER — ONDANSETRON 4 MG/1
4 TABLET, ORALLY DISINTEGRATING ORAL
Status: DISCONTINUED | OUTPATIENT
Start: 2021-01-01 | End: 2021-10-25 | Stop reason: HOSPADM

## 2021-01-01 RX ORDER — PREDNISOLONE ACETATE 10 MG/ML
1 SUSPENSION/ DROPS OPHTHALMIC 3 TIMES DAILY
Status: DISCONTINUED | OUTPATIENT
Start: 2021-01-01 | End: 2021-01-01

## 2021-01-01 RX ORDER — ONDANSETRON 2 MG/ML
4 INJECTION INTRAMUSCULAR; INTRAVENOUS AS NEEDED
Status: CANCELLED | OUTPATIENT
Start: 2021-01-01

## 2021-01-01 RX ORDER — PREDNISONE 10 MG/1
TABLET ORAL
Qty: 10 TAB | Refills: 0 | Status: SHIPPED | OUTPATIENT
Start: 2021-01-01 | End: 2021-01-01

## 2021-01-01 RX ORDER — FUROSEMIDE 40 MG/1
40 TABLET ORAL
Status: DISCONTINUED | OUTPATIENT
Start: 2021-01-01 | End: 2021-01-01

## 2021-01-01 RX ORDER — EPHEDRINE SULFATE/0.9% NACL/PF 50 MG/5 ML
5 SYRINGE (ML) INTRAVENOUS AS NEEDED
Status: CANCELLED | OUTPATIENT
Start: 2021-01-01

## 2021-01-01 RX ORDER — METFORMIN HYDROCHLORIDE 500 MG/1
500 TABLET ORAL 2 TIMES DAILY WITH MEALS
Status: DISCONTINUED | OUTPATIENT
Start: 2021-01-01 | End: 2021-01-01

## 2021-01-01 RX ORDER — MAGNESIUM SULFATE 100 %
4 CRYSTALS MISCELLANEOUS AS NEEDED
Status: DISCONTINUED | OUTPATIENT
Start: 2021-01-01 | End: 2021-01-01

## 2021-01-01 RX ORDER — INSULIN LISPRO 100 [IU]/ML
INJECTION, SOLUTION INTRAVENOUS; SUBCUTANEOUS
Status: DISCONTINUED | OUTPATIENT
Start: 2021-01-01 | End: 2021-01-01

## 2021-01-01 RX ORDER — MORPHINE SULFATE 2 MG/ML
1 INJECTION, SOLUTION INTRAMUSCULAR; INTRAVENOUS
Status: DISCONTINUED | OUTPATIENT
Start: 2021-01-01 | End: 2021-01-01

## 2021-01-01 RX ORDER — CARVEDILOL 6.25 MG/1
6.25 TABLET ORAL 2 TIMES DAILY WITH MEALS
Qty: 60 TABLET | Refills: 0 | Status: SHIPPED | OUTPATIENT
Start: 2021-01-01

## 2021-01-01 RX ORDER — PAROXETINE 10 MG/1
20 TABLET, FILM COATED ORAL DAILY
Status: DISCONTINUED | OUTPATIENT
Start: 2021-01-01 | End: 2021-01-01 | Stop reason: HOSPADM

## 2021-01-01 RX ORDER — DEXTROSE MONOHYDRATE 50 MG/ML
75 INJECTION, SOLUTION INTRAVENOUS CONTINUOUS
Status: DISCONTINUED | OUTPATIENT
Start: 2021-01-01 | End: 2021-01-01

## 2021-01-01 RX ORDER — VANCOMYCIN HYDROCHLORIDE 10 G/100ML
1250 INJECTION, POWDER, LYOPHILIZED, FOR SOLUTION INTRAVENOUS DAILY
Qty: 38 EACH | Refills: 0 | Status: SHIPPED | OUTPATIENT
Start: 2021-01-01 | End: 2021-11-28

## 2021-01-01 RX ORDER — FUROSEMIDE 20 MG/1
TABLET ORAL
Qty: 10 TABLET | Refills: 0 | Status: SHIPPED | OUTPATIENT
Start: 2021-01-01

## 2021-01-01 RX ORDER — CLONIDINE HYDROCHLORIDE 0.1 MG/1
0.2 TABLET ORAL 2 TIMES DAILY
Status: DISCONTINUED | OUTPATIENT
Start: 2021-01-01 | End: 2021-01-01 | Stop reason: HOSPADM

## 2021-01-01 RX ORDER — ASPIRIN 81 MG/1
81 TABLET ORAL DAILY
Status: DISCONTINUED | OUTPATIENT
Start: 2021-01-01 | End: 2021-01-01 | Stop reason: HOSPADM

## 2021-01-01 RX ORDER — HEPARIN SODIUM 5000 [USP'U]/ML
5000 INJECTION, SOLUTION INTRAVENOUS; SUBCUTANEOUS EVERY 8 HOURS
Status: DISCONTINUED | OUTPATIENT
Start: 2021-01-01 | End: 2021-01-01

## 2021-01-01 RX ORDER — OXYCODONE HYDROCHLORIDE 5 MG/1
5 TABLET ORAL
COMMUNITY
Start: 2021-01-01 | End: 2021-01-01

## 2021-01-01 RX ORDER — HYDROCODONE BITARTRATE AND ACETAMINOPHEN 5; 325 MG/1; MG/1
1 TABLET ORAL AS NEEDED
Status: CANCELLED | OUTPATIENT
Start: 2021-01-01

## 2021-01-01 RX ORDER — TRAMADOL HYDROCHLORIDE 50 MG/1
50 TABLET ORAL
Status: DISCONTINUED | OUTPATIENT
Start: 2021-01-01 | End: 2021-01-01 | Stop reason: HOSPADM

## 2021-01-01 RX ORDER — ACETAMINOPHEN 650 MG/1
650 SUPPOSITORY RECTAL
Status: DISCONTINUED | OUTPATIENT
Start: 2021-01-01 | End: 2021-10-25 | Stop reason: HOSPADM

## 2021-01-01 RX ORDER — PANTOPRAZOLE SODIUM 20 MG/1
20 TABLET, DELAYED RELEASE ORAL DAILY
Status: DISCONTINUED | OUTPATIENT
Start: 2021-01-01 | End: 2021-01-01 | Stop reason: HOSPADM

## 2021-01-01 RX ADMIN — METFORMIN HYDROCHLORIDE 500 MG: 500 TABLET ORAL at 17:30

## 2021-01-01 RX ADMIN — PAROXETINE HYDROCHLORIDE 20 MG: 20 TABLET, FILM COATED ORAL at 09:29

## 2021-01-01 RX ADMIN — DAPTOMYCIN 450 MG: 500 INJECTION, POWDER, LYOPHILIZED, FOR SOLUTION INTRAVENOUS at 13:14

## 2021-01-01 RX ADMIN — CEFTAROLINE FOSAMIL 300 MG: 600 POWDER, FOR SOLUTION INTRAVENOUS at 17:25

## 2021-01-01 RX ADMIN — DEXMEDETOMIDINE HYDROCHLORIDE 0.2 MCG/KG/HR: 100 INJECTION, SOLUTION, CONCENTRATE INTRAVENOUS at 17:09

## 2021-01-01 RX ADMIN — VANCOMYCIN HYDROCHLORIDE 500 MG: 500 INJECTION, POWDER, LYOPHILIZED, FOR SOLUTION INTRAVENOUS at 14:24

## 2021-01-01 RX ADMIN — WHITE PETROLATUM,ZINC OXIDE: 57; 17 PASTE TOPICAL at 17:44

## 2021-01-01 RX ADMIN — AZITHROMYCIN DIHYDRATE 500 MG: 500 INJECTION, POWDER, LYOPHILIZED, FOR SOLUTION INTRAVENOUS at 00:34

## 2021-01-01 RX ADMIN — TRAMADOL HYDROCHLORIDE 50 MG: 50 TABLET, COATED ORAL at 05:53

## 2021-01-01 RX ADMIN — FUROSEMIDE 40 MG: 40 TABLET ORAL at 09:13

## 2021-01-01 RX ADMIN — APIXABAN 5 MG: 5 TABLET, FILM COATED ORAL at 21:39

## 2021-01-01 RX ADMIN — PIPERACILLIN AND TAZOBACTAM 3.38 G: 3; .375 INJECTION, POWDER, LYOPHILIZED, FOR SOLUTION INTRAVENOUS at 18:19

## 2021-01-01 RX ADMIN — WHITE PETROLATUM,ZINC OXIDE: 57; 17 PASTE TOPICAL at 16:40

## 2021-01-01 RX ADMIN — APIXABAN 5 MG: 5 TABLET, FILM COATED ORAL at 21:50

## 2021-01-01 RX ADMIN — INSULIN LISPRO 3 UNITS: 100 INJECTION, SOLUTION INTRAVENOUS; SUBCUTANEOUS at 21:49

## 2021-01-01 RX ADMIN — HYDROCHLOROTHIAZIDE 12.5 MG: 25 TABLET ORAL at 08:37

## 2021-01-01 RX ADMIN — APIXABAN 5 MG: 5 TABLET, FILM COATED ORAL at 09:50

## 2021-01-01 RX ADMIN — ASPIRIN 81 MG CHEWABLE TABLET 81 MG: 81 TABLET CHEWABLE at 09:48

## 2021-01-01 RX ADMIN — Medication 10 ML: at 16:08

## 2021-01-01 RX ADMIN — PIPERACILLIN AND TAZOBACTAM 3.38 G: 3; .375 INJECTION, POWDER, LYOPHILIZED, FOR SOLUTION INTRAVENOUS at 00:40

## 2021-01-01 RX ADMIN — GUAIFENESIN 600 MG: 600 TABLET, EXTENDED RELEASE ORAL at 09:38

## 2021-01-01 RX ADMIN — INSULIN LISPRO 2 UNITS: 100 INJECTION, SOLUTION INTRAVENOUS; SUBCUTANEOUS at 21:50

## 2021-01-01 RX ADMIN — GLIPIZIDE 5 MG: 5 TABLET ORAL at 08:37

## 2021-01-01 RX ADMIN — INSULIN LISPRO 2 UNITS: 100 INJECTION, SOLUTION INTRAVENOUS; SUBCUTANEOUS at 17:09

## 2021-01-01 RX ADMIN — ASPIRIN 81 MG: 81 TABLET, COATED ORAL at 09:27

## 2021-01-01 RX ADMIN — AZITHROMYCIN DIHYDRATE 500 MG: 500 INJECTION, POWDER, LYOPHILIZED, FOR SOLUTION INTRAVENOUS at 23:10

## 2021-01-01 RX ADMIN — HYDROXYZINE HYDROCHLORIDE 25 MG: 50 INJECTION, SOLUTION INTRAMUSCULAR at 08:14

## 2021-01-01 RX ADMIN — PAROXETINE HYDROCHLORIDE 20 MG: 20 TABLET, FILM COATED ORAL at 12:03

## 2021-01-01 RX ADMIN — ALOGLIPTIN 25 MG: 25 TABLET, FILM COATED ORAL at 08:36

## 2021-01-01 RX ADMIN — CLONIDINE HYDROCHLORIDE 0.1 MG: 0.1 TABLET ORAL at 21:19

## 2021-01-01 RX ADMIN — INSULIN LISPRO 4 UNITS: 100 INJECTION, SOLUTION INTRAVENOUS; SUBCUTANEOUS at 17:53

## 2021-01-01 RX ADMIN — DEXAMETHASONE SODIUM PHOSPHATE 4 MG: 4 INJECTION, SOLUTION INTRA-ARTICULAR; INTRALESIONAL; INTRAMUSCULAR; INTRAVENOUS; SOFT TISSUE at 21:35

## 2021-01-01 RX ADMIN — PANTOPRAZOLE SODIUM 40 MG: 40 TABLET, DELAYED RELEASE ORAL at 08:35

## 2021-01-01 RX ADMIN — INSULIN LISPRO 2 UNITS: 100 INJECTION, SOLUTION INTRAVENOUS; SUBCUTANEOUS at 18:23

## 2021-01-01 RX ADMIN — INSULIN LISPRO 3 UNITS: 100 INJECTION, SOLUTION INTRAVENOUS; SUBCUTANEOUS at 11:52

## 2021-01-01 RX ADMIN — PREDNISOLONE ACETATE 1 DROP: 10 SUSPENSION/ DROPS OPHTHALMIC at 16:47

## 2021-01-01 RX ADMIN — AZITHROMYCIN DIHYDRATE 500 MG: 500 INJECTION, POWDER, LYOPHILIZED, FOR SOLUTION INTRAVENOUS at 00:19

## 2021-01-01 RX ADMIN — LOSARTAN POTASSIUM 100 MG: 50 TABLET, FILM COATED ORAL at 09:29

## 2021-01-01 RX ADMIN — WHITE PETROLATUM,ZINC OXIDE: 57; 17 PASTE TOPICAL at 23:34

## 2021-01-01 RX ADMIN — VANCOMYCIN HYDROCHLORIDE 500 MG: 500 INJECTION, POWDER, LYOPHILIZED, FOR SOLUTION INTRAVENOUS at 10:35

## 2021-01-01 RX ADMIN — ATORVASTATIN CALCIUM 40 MG: 40 TABLET, FILM COATED ORAL at 21:50

## 2021-01-01 RX ADMIN — PIPERACILLIN AND TAZOBACTAM 3.38 G: 3; .375 INJECTION, POWDER, LYOPHILIZED, FOR SOLUTION INTRAVENOUS at 11:21

## 2021-01-01 RX ADMIN — CLONIDINE HYDROCHLORIDE 0.2 MG: 0.1 TABLET ORAL at 09:22

## 2021-01-01 RX ADMIN — ASPIRIN 81 MG CHEWABLE TABLET 81 MG: 81 TABLET CHEWABLE at 08:47

## 2021-01-01 RX ADMIN — APIXABAN 5 MG: 5 TABLET, FILM COATED ORAL at 23:05

## 2021-01-01 RX ADMIN — PANTOPRAZOLE SODIUM 40 MG: 40 TABLET, DELAYED RELEASE ORAL at 08:44

## 2021-01-01 RX ADMIN — PANTOPRAZOLE SODIUM 20 MG: 20 TABLET, DELAYED RELEASE ORAL at 13:27

## 2021-01-01 RX ADMIN — CLONIDINE HYDROCHLORIDE 0.3 MG: 0.1 TABLET ORAL at 20:59

## 2021-01-01 RX ADMIN — DEXAMETHASONE SODIUM PHOSPHATE 4 MG: 4 INJECTION, SOLUTION INTRA-ARTICULAR; INTRALESIONAL; INTRAMUSCULAR; INTRAVENOUS; SOFT TISSUE at 21:19

## 2021-01-01 RX ADMIN — PREDNISOLONE ACETATE 1 DROP: 10 SUSPENSION/ DROPS OPHTHALMIC at 15:41

## 2021-01-01 RX ADMIN — AZITHROMYCIN DIHYDRATE 500 MG: 500 INJECTION, POWDER, LYOPHILIZED, FOR SOLUTION INTRAVENOUS at 23:57

## 2021-01-01 RX ADMIN — ATORVASTATIN CALCIUM 40 MG: 40 TABLET, FILM COATED ORAL at 23:05

## 2021-01-01 RX ADMIN — PREDNISOLONE ACETATE 1 DROP: 10 SUSPENSION/ DROPS OPHTHALMIC at 21:29

## 2021-01-01 RX ADMIN — CARVEDILOL 6.25 MG: 3.12 TABLET, FILM COATED ORAL at 10:05

## 2021-01-01 RX ADMIN — Medication 10 ML: at 21:43

## 2021-01-01 RX ADMIN — PANTOPRAZOLE SODIUM 40 MG: 40 TABLET, DELAYED RELEASE ORAL at 09:29

## 2021-01-01 RX ADMIN — AMMONIA INHALANTS 1 AMPULE: 0.04 INHALANT RESPIRATORY (INHALATION) at 15:00

## 2021-01-01 RX ADMIN — INSULIN LISPRO 2 UNITS: 100 INJECTION, SOLUTION INTRAVENOUS; SUBCUTANEOUS at 13:47

## 2021-01-01 RX ADMIN — MORPHINE SULFATE 1 MG: 2 INJECTION, SOLUTION INTRAMUSCULAR; INTRAVENOUS at 15:58

## 2021-01-01 RX ADMIN — PAROXETINE HYDROCHLORIDE 20 MG: 20 TABLET, FILM COATED ORAL at 09:38

## 2021-01-01 RX ADMIN — PAROXETINE HYDROCHLORIDE 20 MG: 20 TABLET, FILM COATED ORAL at 09:13

## 2021-01-01 RX ADMIN — ASPIRIN 81 MG CHEWABLE TABLET 81 MG: 81 TABLET CHEWABLE at 09:26

## 2021-01-01 RX ADMIN — INSULIN LISPRO 1 UNITS: 100 INJECTION, SOLUTION INTRAVENOUS; SUBCUTANEOUS at 07:49

## 2021-01-01 RX ADMIN — VANCOMYCIN HYDROCHLORIDE 1000 MG: 1 INJECTION, POWDER, LYOPHILIZED, FOR SOLUTION INTRAVENOUS at 12:24

## 2021-01-01 RX ADMIN — DEXAMETHASONE SODIUM PHOSPHATE 4 MG: 4 INJECTION, SOLUTION INTRA-ARTICULAR; INTRALESIONAL; INTRAMUSCULAR; INTRAVENOUS; SOFT TISSUE at 04:50

## 2021-01-01 RX ADMIN — IPRATROPIUM BROMIDE AND ALBUTEROL SULFATE 3 ML: .5; 2.5 SOLUTION RESPIRATORY (INHALATION) at 13:20

## 2021-01-01 RX ADMIN — APIXABAN 5 MG: 5 TABLET, FILM COATED ORAL at 08:35

## 2021-01-01 RX ADMIN — GABAPENTIN 100 MG: 100 CAPSULE ORAL at 00:15

## 2021-01-01 RX ADMIN — Medication 5 ML: at 06:08

## 2021-01-01 RX ADMIN — HYDROXYZINE HYDROCHLORIDE 25 MG: 50 INJECTION, SOLUTION INTRAMUSCULAR at 21:36

## 2021-01-01 RX ADMIN — METFORMIN HYDROCHLORIDE 500 MG: 500 TABLET ORAL at 08:35

## 2021-01-01 RX ADMIN — FUROSEMIDE 40 MG: 10 INJECTION, SOLUTION INTRAMUSCULAR; INTRAVENOUS at 17:25

## 2021-01-01 RX ADMIN — INSULIN LISPRO 10 UNITS: 100 INJECTION, SOLUTION INTRAVENOUS; SUBCUTANEOUS at 12:33

## 2021-01-01 RX ADMIN — HYDROCHLOROTHIAZIDE 12.5 MG: 25 TABLET ORAL at 11:04

## 2021-01-01 RX ADMIN — DAPTOMYCIN 450 MG: 500 INJECTION, POWDER, LYOPHILIZED, FOR SOLUTION INTRAVENOUS at 14:23

## 2021-01-01 RX ADMIN — TRAMADOL HYDROCHLORIDE 50 MG: 50 TABLET, FILM COATED ORAL at 23:02

## 2021-01-01 RX ADMIN — PREDNISOLONE ACETATE 1 DROP: 10 SUSPENSION/ DROPS OPHTHALMIC at 08:50

## 2021-01-01 RX ADMIN — LABETALOL HYDROCHLORIDE 20 MG: 5 INJECTION, SOLUTION INTRAVENOUS at 15:18

## 2021-01-01 RX ADMIN — LOSARTAN POTASSIUM 100 MG: 50 TABLET, FILM COATED ORAL at 09:00

## 2021-01-01 RX ADMIN — CLONIDINE HYDROCHLORIDE 0.1 MG: 0.1 TABLET ORAL at 18:13

## 2021-01-01 RX ADMIN — PREDNISOLONE ACETATE 1 DROP: 10 SUSPENSION/ DROPS OPHTHALMIC at 09:47

## 2021-01-01 RX ADMIN — PREDNISOLONE ACETATE 1 DROP: 10 SUSPENSION/ DROPS OPHTHALMIC at 00:44

## 2021-01-01 RX ADMIN — ATORVASTATIN CALCIUM 40 MG: 40 TABLET, FILM COATED ORAL at 22:00

## 2021-01-01 RX ADMIN — APIXABAN 5 MG: 5 TABLET, FILM COATED ORAL at 09:22

## 2021-01-01 RX ADMIN — PIPERACILLIN AND TAZOBACTAM 3.38 G: 3; .375 INJECTION, POWDER, LYOPHILIZED, FOR SOLUTION INTRAVENOUS at 03:44

## 2021-01-01 RX ADMIN — LOPERAMIDE HYDROCHLORIDE 2 MG: 2 CAPSULE ORAL at 17:43

## 2021-01-01 RX ADMIN — PIPERACILLIN AND TAZOBACTAM 3.38 G: 3; .375 INJECTION, POWDER, LYOPHILIZED, FOR SOLUTION INTRAVENOUS at 01:42

## 2021-01-01 RX ADMIN — DEXAMETHASONE SODIUM PHOSPHATE 4 MG: 4 INJECTION, SOLUTION INTRA-ARTICULAR; INTRALESIONAL; INTRAMUSCULAR; INTRAVENOUS; SOFT TISSUE at 05:51

## 2021-01-01 RX ADMIN — INSULIN GLARGINE 10 UNITS: 100 INJECTION, SOLUTION SUBCUTANEOUS at 09:00

## 2021-01-01 RX ADMIN — ACETAMINOPHEN 650 MG: 325 TABLET ORAL at 17:35

## 2021-01-01 RX ADMIN — RIFAMPIN 300 MG: 300 CAPSULE ORAL at 09:26

## 2021-01-01 RX ADMIN — PAROXETINE HYDROCHLORIDE 20 MG: 20 TABLET, FILM COATED ORAL at 09:33

## 2021-01-01 RX ADMIN — WHITE PETROLATUM,ZINC OXIDE: 57; 17 PASTE TOPICAL at 09:48

## 2021-01-01 RX ADMIN — METOPROLOL SUCCINATE 25 MG: 25 TABLET, EXTENDED RELEASE ORAL at 08:34

## 2021-01-01 RX ADMIN — METFORMIN HYDROCHLORIDE 500 MG: 500 TABLET ORAL at 08:38

## 2021-01-01 RX ADMIN — ATORVASTATIN CALCIUM 40 MG: 40 TABLET, FILM COATED ORAL at 00:23

## 2021-01-01 RX ADMIN — APIXABAN 5 MG: 5 TABLET, FILM COATED ORAL at 23:29

## 2021-01-01 RX ADMIN — INSULIN GLARGINE 10 UNITS: 100 INJECTION, SOLUTION SUBCUTANEOUS at 10:13

## 2021-01-01 RX ADMIN — ATORVASTATIN CALCIUM 40 MG: 40 TABLET, FILM COATED ORAL at 22:52

## 2021-01-01 RX ADMIN — PREDNISOLONE ACETATE 1 DROP: 10 SUSPENSION/ DROPS OPHTHALMIC at 18:28

## 2021-01-01 RX ADMIN — METFORMIN HYDROCHLORIDE 500 MG: 500 TABLET ORAL at 08:32

## 2021-01-01 RX ADMIN — PIPERACILLIN AND TAZOBACTAM 3.38 G: 3; .375 INJECTION, POWDER, LYOPHILIZED, FOR SOLUTION INTRAVENOUS at 14:15

## 2021-01-01 RX ADMIN — APIXABAN 5 MG: 5 TABLET, FILM COATED ORAL at 08:31

## 2021-01-01 RX ADMIN — CARVEDILOL 6.25 MG: 3.12 TABLET, FILM COATED ORAL at 16:04

## 2021-01-01 RX ADMIN — PANTOPRAZOLE SODIUM 40 MG: 40 TABLET, DELAYED RELEASE ORAL at 11:04

## 2021-01-01 RX ADMIN — GABAPENTIN 100 MG: 100 CAPSULE ORAL at 21:12

## 2021-01-01 RX ADMIN — AZITHROMYCIN DIHYDRATE 500 MG: 500 INJECTION, POWDER, LYOPHILIZED, FOR SOLUTION INTRAVENOUS at 02:47

## 2021-01-01 RX ADMIN — GABAPENTIN 100 MG: 100 CAPSULE ORAL at 08:34

## 2021-01-01 RX ADMIN — ASPIRIN 325 MG ORAL TABLET 325 MG: 325 PILL ORAL at 19:25

## 2021-01-01 RX ADMIN — PIPERACILLIN AND TAZOBACTAM 3.38 G: 3; .375 INJECTION, POWDER, LYOPHILIZED, FOR SOLUTION INTRAVENOUS at 04:27

## 2021-01-01 RX ADMIN — CARVEDILOL 6.25 MG: 3.12 TABLET, FILM COATED ORAL at 10:12

## 2021-01-01 RX ADMIN — METOPROLOL SUCCINATE 25 MG: 25 TABLET, EXTENDED RELEASE ORAL at 09:38

## 2021-01-01 RX ADMIN — LOPERAMIDE HYDROCHLORIDE 2 MG: 2 CAPSULE ORAL at 11:25

## 2021-01-01 RX ADMIN — CLONIDINE HYDROCHLORIDE 0.2 MG: 0.1 TABLET ORAL at 10:06

## 2021-01-01 RX ADMIN — GABAPENTIN 100 MG: 100 CAPSULE ORAL at 08:32

## 2021-01-01 RX ADMIN — POTASSIUM CHLORIDE 40 MEQ: 1.5 FOR SOLUTION ORAL at 10:44

## 2021-01-01 RX ADMIN — PROPOFOL 50 MG: 10 INJECTION, EMULSION INTRAVENOUS at 14:34

## 2021-01-01 RX ADMIN — WHITE PETROLATUM,ZINC OXIDE: 57; 17 PASTE TOPICAL at 23:30

## 2021-01-01 RX ADMIN — ATORVASTATIN CALCIUM 40 MG: 40 TABLET, FILM COATED ORAL at 21:19

## 2021-01-01 RX ADMIN — CLONIDINE HYDROCHLORIDE 0.3 MG: 0.1 TABLET ORAL at 08:35

## 2021-01-01 RX ADMIN — FUROSEMIDE 40 MG: 10 INJECTION, SOLUTION INTRAMUSCULAR; INTRAVENOUS at 08:35

## 2021-01-01 RX ADMIN — INSULIN LISPRO 2 UNITS: 100 INJECTION, SOLUTION INTRAVENOUS; SUBCUTANEOUS at 23:05

## 2021-01-01 RX ADMIN — INSULIN LISPRO 15 UNITS: 100 INJECTION, SOLUTION INTRAVENOUS; SUBCUTANEOUS at 18:12

## 2021-01-01 RX ADMIN — WHITE PETROLATUM,ZINC OXIDE: 57; 17 PASTE TOPICAL at 08:38

## 2021-01-01 RX ADMIN — PREDNISOLONE ACETATE 1 DROP: 10 SUSPENSION/ DROPS OPHTHALMIC at 17:10

## 2021-01-01 RX ADMIN — HYDROXYZINE HYDROCHLORIDE 25 MG: 50 INJECTION, SOLUTION INTRAMUSCULAR at 10:13

## 2021-01-01 RX ADMIN — INSULIN GLARGINE 10 UNITS: 100 INJECTION, SOLUTION SUBCUTANEOUS at 08:37

## 2021-01-01 RX ADMIN — MORPHINE SULFATE 1 MG: 2 INJECTION, SOLUTION INTRAMUSCULAR; INTRAVENOUS at 18:40

## 2021-01-01 RX ADMIN — INSULIN GLARGINE 10 UNITS: 100 INJECTION, SOLUTION SUBCUTANEOUS at 09:48

## 2021-01-01 RX ADMIN — Medication 10 ML: at 01:02

## 2021-01-01 RX ADMIN — WHITE PETROLATUM,ZINC OXIDE: 57; 17 PASTE TOPICAL at 09:27

## 2021-01-01 RX ADMIN — CEFTAROLINE FOSAMIL 300 MG: 600 POWDER, FOR SOLUTION INTRAVENOUS at 19:13

## 2021-01-01 RX ADMIN — ASPIRIN 81 MG: 81 TABLET, COATED ORAL at 09:50

## 2021-01-01 RX ADMIN — FUROSEMIDE 40 MG: 10 INJECTION, SOLUTION INTRAMUSCULAR; INTRAVENOUS at 08:34

## 2021-01-01 RX ADMIN — APIXABAN 5 MG: 5 TABLET, FILM COATED ORAL at 08:37

## 2021-01-01 RX ADMIN — LOSARTAN POTASSIUM 100 MG: 50 TABLET, FILM COATED ORAL at 09:22

## 2021-01-01 RX ADMIN — LOSARTAN POTASSIUM 100 MG: 50 TABLET, FILM COATED ORAL at 09:33

## 2021-01-01 RX ADMIN — INSULIN LISPRO 6 UNITS: 100 INJECTION, SOLUTION INTRAVENOUS; SUBCUTANEOUS at 11:30

## 2021-01-01 RX ADMIN — LABETALOL HYDROCHLORIDE 20 MG: 5 INJECTION, SOLUTION INTRAVENOUS at 10:15

## 2021-01-01 RX ADMIN — HYDROXYZINE HYDROCHLORIDE 25 MG: 50 INJECTION, SOLUTION INTRAMUSCULAR at 10:16

## 2021-01-01 RX ADMIN — CARVEDILOL 6.25 MG: 3.12 TABLET, FILM COATED ORAL at 08:47

## 2021-01-01 RX ADMIN — CEFTAROLINE FOSAMIL 300 MG: 600 POWDER, FOR SOLUTION INTRAVENOUS at 06:28

## 2021-01-01 RX ADMIN — APIXABAN 5 MG: 5 TABLET, FILM COATED ORAL at 21:35

## 2021-01-01 RX ADMIN — MUPIROCIN: 20 OINTMENT TOPICAL at 08:45

## 2021-01-01 RX ADMIN — PANTOPRAZOLE SODIUM 20 MG: 20 TABLET, DELAYED RELEASE ORAL at 09:25

## 2021-01-01 RX ADMIN — MORPHINE SULFATE 0.5 MG: 2 INJECTION, SOLUTION INTRAMUSCULAR; INTRAVENOUS at 17:09

## 2021-01-01 RX ADMIN — PIPERACILLIN AND TAZOBACTAM 3.38 G: 3; .375 INJECTION, POWDER, LYOPHILIZED, FOR SOLUTION INTRAVENOUS at 01:02

## 2021-01-01 RX ADMIN — LOSARTAN POTASSIUM 100 MG: 50 TABLET, FILM COATED ORAL at 08:37

## 2021-01-01 RX ADMIN — DEXAMETHASONE SODIUM PHOSPHATE 4 MG: 4 INJECTION, SOLUTION INTRA-ARTICULAR; INTRALESIONAL; INTRAMUSCULAR; INTRAVENOUS; SOFT TISSUE at 04:10

## 2021-01-01 RX ADMIN — HYDROXYZINE HYDROCHLORIDE 25 MG: 50 INJECTION, SOLUTION INTRAMUSCULAR at 20:11

## 2021-01-01 RX ADMIN — HYDROCHLOROTHIAZIDE 12.5 MG: 25 TABLET ORAL at 10:05

## 2021-01-01 RX ADMIN — INSULIN LISPRO 2 UNITS: 100 INJECTION, SOLUTION INTRAVENOUS; SUBCUTANEOUS at 09:33

## 2021-01-01 RX ADMIN — CARVEDILOL 6.25 MG: 3.12 TABLET, FILM COATED ORAL at 07:51

## 2021-01-01 RX ADMIN — GABAPENTIN 100 MG: 100 CAPSULE ORAL at 23:29

## 2021-01-01 RX ADMIN — METOPROLOL SUCCINATE 25 MG: 25 TABLET, EXTENDED RELEASE ORAL at 08:37

## 2021-01-01 RX ADMIN — PREDNISOLONE ACETATE 1 DROP: 10 SUSPENSION/ DROPS OPHTHALMIC at 18:19

## 2021-01-01 RX ADMIN — PIPERACILLIN AND TAZOBACTAM 3.38 G: 3; .375 INJECTION, POWDER, LYOPHILIZED, FOR SOLUTION INTRAVENOUS at 05:27

## 2021-01-01 RX ADMIN — DEXTROSE 50 % IN WATER (D50W) INTRAVENOUS SYRINGE 25 G: at 18:50

## 2021-01-01 RX ADMIN — ALOGLIPTIN 12.5 MG: 12.5 TABLET, FILM COATED ORAL at 09:33

## 2021-01-01 RX ADMIN — AMITRIPTYLINE HYDROCHLORIDE 75 MG: 50 TABLET, FILM COATED ORAL at 09:31

## 2021-01-01 RX ADMIN — ATORVASTATIN CALCIUM 40 MG: 40 TABLET, FILM COATED ORAL at 21:16

## 2021-01-01 RX ADMIN — PREDNISOLONE ACETATE 1 DROP: 10 SUSPENSION/ DROPS OPHTHALMIC at 10:37

## 2021-01-01 RX ADMIN — PIPERACILLIN AND TAZOBACTAM 3.38 G: 3; .375 INJECTION, POWDER, LYOPHILIZED, FOR SOLUTION INTRAVENOUS at 08:44

## 2021-01-01 RX ADMIN — CARVEDILOL 6.25 MG: 3.12 TABLET, FILM COATED ORAL at 00:15

## 2021-01-01 RX ADMIN — CARVEDILOL 6.25 MG: 3.12 TABLET, FILM COATED ORAL at 16:02

## 2021-01-01 RX ADMIN — HYDROXYZINE HYDROCHLORIDE 25 MG: 50 INJECTION, SOLUTION INTRAMUSCULAR at 21:42

## 2021-01-01 RX ADMIN — FUROSEMIDE 40 MG: 10 INJECTION, SOLUTION INTRAMUSCULAR; INTRAVENOUS at 08:31

## 2021-01-01 RX ADMIN — PIPERACILLIN AND TAZOBACTAM 3.38 G: 3; .375 INJECTION, POWDER, LYOPHILIZED, FOR SOLUTION INTRAVENOUS at 17:49

## 2021-01-01 RX ADMIN — SODIUM CHLORIDE, POTASSIUM CHLORIDE, SODIUM LACTATE AND CALCIUM CHLORIDE: 600; 310; 30; 20 INJECTION, SOLUTION INTRAVENOUS at 14:15

## 2021-01-01 RX ADMIN — PREDNISOLONE ACETATE 1 DROP: 10 SUSPENSION/ DROPS OPHTHALMIC at 22:00

## 2021-01-01 RX ADMIN — ASPIRIN 325 MG ORAL TABLET 325 MG: 325 PILL ORAL at 17:25

## 2021-01-01 RX ADMIN — ALOGLIPTIN 12.5 MG: 12.5 TABLET, FILM COATED ORAL at 09:28

## 2021-01-01 RX ADMIN — Medication 10 ML: at 06:01

## 2021-01-01 RX ADMIN — GABAPENTIN 100 MG: 100 CAPSULE ORAL at 09:33

## 2021-01-01 RX ADMIN — APIXABAN 5 MG: 5 TABLET, FILM COATED ORAL at 08:32

## 2021-01-01 RX ADMIN — PREDNISOLONE ACETATE 1 DROP: 10 SUSPENSION/ DROPS OPHTHALMIC at 08:44

## 2021-01-01 RX ADMIN — APIXABAN 5 MG: 5 TABLET, FILM COATED ORAL at 09:29

## 2021-01-01 RX ADMIN — METFORMIN HYDROCHLORIDE 500 MG: 500 TABLET ORAL at 16:44

## 2021-01-01 RX ADMIN — TRAMADOL HYDROCHLORIDE 50 MG: 50 TABLET, COATED ORAL at 00:09

## 2021-01-01 RX ADMIN — DEXAMETHASONE SODIUM PHOSPHATE 4 MG: 4 INJECTION, SOLUTION INTRA-ARTICULAR; INTRALESIONAL; INTRAMUSCULAR; INTRAVENOUS; SOFT TISSUE at 21:50

## 2021-01-01 RX ADMIN — PREDNISOLONE ACETATE 1 DROP: 10 SUSPENSION/ DROPS OPHTHALMIC at 21:43

## 2021-01-01 RX ADMIN — PREDNISOLONE ACETATE 1 DROP: 10 SUSPENSION/ DROPS OPHTHALMIC at 13:40

## 2021-01-01 RX ADMIN — DEXAMETHASONE SODIUM PHOSPHATE 4 MG: 4 INJECTION, SOLUTION INTRA-ARTICULAR; INTRALESIONAL; INTRAMUSCULAR; INTRAVENOUS; SOFT TISSUE at 10:34

## 2021-01-01 RX ADMIN — TRAMADOL HYDROCHLORIDE 50 MG: 50 TABLET, COATED ORAL at 14:29

## 2021-01-01 RX ADMIN — PREDNISOLONE ACETATE 1 DROP: 10 SUSPENSION/ DROPS OPHTHALMIC at 16:03

## 2021-01-01 RX ADMIN — PREDNISOLONE ACETATE 1 DROP: 10 SUSPENSION/ DROPS OPHTHALMIC at 21:25

## 2021-01-01 RX ADMIN — PREDNISOLONE ACETATE 1 DROP: 10 SUSPENSION/ DROPS OPHTHALMIC at 22:50

## 2021-01-01 RX ADMIN — CEFTAROLINE FOSAMIL 300 MG: 600 POWDER, FOR SOLUTION INTRAVENOUS at 18:12

## 2021-01-01 RX ADMIN — CARVEDILOL 6.25 MG: 3.12 TABLET, FILM COATED ORAL at 07:49

## 2021-01-01 RX ADMIN — ATORVASTATIN CALCIUM 40 MG: 40 TABLET, FILM COATED ORAL at 21:38

## 2021-01-01 RX ADMIN — DAPTOMYCIN 450 MG: 500 INJECTION, POWDER, LYOPHILIZED, FOR SOLUTION INTRAVENOUS at 15:29

## 2021-01-01 RX ADMIN — PREDNISOLONE ACETATE 1 DROP: 10 SUSPENSION/ DROPS OPHTHALMIC at 09:29

## 2021-01-01 RX ADMIN — HYDROCHLOROTHIAZIDE 12.5 MG: 25 TABLET ORAL at 09:28

## 2021-01-01 RX ADMIN — CARVEDILOL 6.25 MG: 3.12 TABLET, FILM COATED ORAL at 16:03

## 2021-01-01 RX ADMIN — SODIUM CHLORIDE 50 ML/HR: 9 INJECTION, SOLUTION INTRAVENOUS at 21:42

## 2021-01-01 RX ADMIN — PREDNISOLONE ACETATE 1 DROP: 10 SUSPENSION/ DROPS OPHTHALMIC at 22:22

## 2021-01-01 RX ADMIN — APIXABAN 5 MG: 5 TABLET, FILM COATED ORAL at 09:13

## 2021-01-01 RX ADMIN — WHITE PETROLATUM,ZINC OXIDE: 57; 17 PASTE TOPICAL at 00:04

## 2021-01-01 RX ADMIN — Medication 10 ML: at 09:40

## 2021-01-01 RX ADMIN — CARVEDILOL 6.25 MG: 3.12 TABLET, FILM COATED ORAL at 09:26

## 2021-01-01 RX ADMIN — ASPIRIN 81 MG: 81 TABLET, COATED ORAL at 08:15

## 2021-01-01 RX ADMIN — APIXABAN 5 MG: 5 TABLET, FILM COATED ORAL at 21:19

## 2021-01-01 RX ADMIN — ATORVASTATIN CALCIUM 40 MG: 40 TABLET, FILM COATED ORAL at 20:59

## 2021-01-01 RX ADMIN — GABAPENTIN 100 MG: 100 CAPSULE ORAL at 22:00

## 2021-01-01 RX ADMIN — INSULIN LISPRO 3 UNITS: 100 INJECTION, SOLUTION INTRAVENOUS; SUBCUTANEOUS at 21:18

## 2021-01-01 RX ADMIN — WHITE PETROLATUM,ZINC OXIDE: 57; 17 PASTE TOPICAL at 10:18

## 2021-01-01 RX ADMIN — ACETAMINOPHEN 1000 MG: 500 TABLET, FILM COATED ORAL at 01:02

## 2021-01-01 RX ADMIN — METFORMIN HYDROCHLORIDE 500 MG: 500 TABLET ORAL at 09:39

## 2021-01-01 RX ADMIN — INSULIN LISPRO 4 UNITS: 100 INJECTION, SOLUTION INTRAVENOUS; SUBCUTANEOUS at 15:36

## 2021-01-01 RX ADMIN — WHITE PETROLATUM,ZINC OXIDE: 57; 17 PASTE TOPICAL at 17:56

## 2021-01-01 RX ADMIN — CLONIDINE HYDROCHLORIDE 0.1 MG: 0.1 TABLET ORAL at 08:32

## 2021-01-01 RX ADMIN — Medication 10 ML: at 23:30

## 2021-01-01 RX ADMIN — MUPIROCIN: 20 OINTMENT TOPICAL at 21:35

## 2021-01-01 RX ADMIN — DEXTROSE MONOHYDRATE 1000 ML: 100 INJECTION, SOLUTION INTRAVENOUS at 08:35

## 2021-01-01 RX ADMIN — FUROSEMIDE 40 MG: 10 INJECTION, SOLUTION INTRAMUSCULAR; INTRAVENOUS at 09:50

## 2021-01-01 RX ADMIN — PREDNISOLONE ACETATE 1 DROP: 10 SUSPENSION/ DROPS OPHTHALMIC at 17:26

## 2021-01-01 RX ADMIN — PREDNISOLONE ACETATE 1 DROP: 10 SUSPENSION/ DROPS OPHTHALMIC at 08:48

## 2021-01-01 RX ADMIN — PANTOPRAZOLE SODIUM 40 MG: 40 TABLET, DELAYED RELEASE ORAL at 09:37

## 2021-01-01 RX ADMIN — PIPERACILLIN AND TAZOBACTAM 3.38 G: 3; .375 INJECTION, POWDER, LYOPHILIZED, FOR SOLUTION INTRAVENOUS at 07:26

## 2021-01-01 RX ADMIN — PANTOPRAZOLE SODIUM 40 MG: 40 TABLET, DELAYED RELEASE ORAL at 08:34

## 2021-01-01 RX ADMIN — RIFAMPIN 300 MG: 300 CAPSULE ORAL at 09:27

## 2021-01-01 RX ADMIN — LOSARTAN POTASSIUM 100 MG: 50 TABLET, FILM COATED ORAL at 09:37

## 2021-01-01 RX ADMIN — ACETAMINOPHEN 650 MG: 325 TABLET ORAL at 23:29

## 2021-01-01 RX ADMIN — DEXMEDETOMIDINE HYDROCHLORIDE 1 MCG/KG/HR: 100 INJECTION, SOLUTION, CONCENTRATE INTRAVENOUS at 10:35

## 2021-01-01 RX ADMIN — ATORVASTATIN CALCIUM 40 MG: 40 TABLET, FILM COATED ORAL at 01:01

## 2021-01-01 RX ADMIN — INSULIN LISPRO 7 UNITS: 100 INJECTION, SOLUTION INTRAVENOUS; SUBCUTANEOUS at 10:05

## 2021-01-01 RX ADMIN — RIFAMPIN 300 MG: 300 CAPSULE ORAL at 09:48

## 2021-01-01 RX ADMIN — GABAPENTIN 100 MG: 100 CAPSULE ORAL at 09:50

## 2021-01-01 RX ADMIN — PANTOPRAZOLE SODIUM 40 MG: 40 TABLET, DELAYED RELEASE ORAL at 08:36

## 2021-01-01 RX ADMIN — Medication 10 ML: at 22:00

## 2021-01-01 RX ADMIN — HYDROXYZINE HYDROCHLORIDE 25 MG: 50 INJECTION, SOLUTION INTRAMUSCULAR at 23:49

## 2021-01-01 RX ADMIN — WHITE PETROLATUM,ZINC OXIDE: 57; 17 PASTE TOPICAL at 16:03

## 2021-01-01 RX ADMIN — AZITHROMYCIN DIHYDRATE 500 MG: 500 INJECTION, POWDER, LYOPHILIZED, FOR SOLUTION INTRAVENOUS at 01:02

## 2021-01-01 RX ADMIN — ATORVASTATIN CALCIUM 40 MG: 40 TABLET, FILM COATED ORAL at 23:21

## 2021-01-01 RX ADMIN — INSULIN LISPRO 1 UNITS: 100 INJECTION, SOLUTION INTRAVENOUS; SUBCUTANEOUS at 12:07

## 2021-01-01 RX ADMIN — METFORMIN HYDROCHLORIDE 500 MG: 500 TABLET ORAL at 17:15

## 2021-01-01 RX ADMIN — WHITE PETROLATUM,ZINC OXIDE: 57; 17 PASTE TOPICAL at 18:24

## 2021-01-01 RX ADMIN — LORAZEPAM 1 MG: 2 INJECTION INTRAMUSCULAR; INTRAVENOUS at 18:40

## 2021-01-01 RX ADMIN — APIXABAN 5 MG: 5 TABLET, FILM COATED ORAL at 11:04

## 2021-01-01 RX ADMIN — CEFTAROLINE FOSAMIL 300 MG: 600 POWDER, FOR SOLUTION INTRAVENOUS at 17:22

## 2021-01-01 RX ADMIN — ALOGLIPTIN 12.5 MG: 12.5 TABLET, FILM COATED ORAL at 08:37

## 2021-01-01 RX ADMIN — LOSARTAN POTASSIUM 100 MG: 50 TABLET, FILM COATED ORAL at 08:32

## 2021-01-01 RX ADMIN — CLONIDINE HYDROCHLORIDE 0.3 MG: 0.1 TABLET ORAL at 08:31

## 2021-01-01 RX ADMIN — CEFTRIAXONE SODIUM 2 G: 2 INJECTION, POWDER, FOR SOLUTION INTRAMUSCULAR; INTRAVENOUS at 03:47

## 2021-01-01 RX ADMIN — WHITE PETROLATUM,ZINC OXIDE: 57; 17 PASTE TOPICAL at 21:43

## 2021-01-01 RX ADMIN — ASPIRIN 81 MG CHEWABLE TABLET 81 MG: 81 TABLET CHEWABLE at 10:08

## 2021-01-01 RX ADMIN — METFORMIN HYDROCHLORIDE 500 MG: 500 TABLET ORAL at 08:34

## 2021-01-01 RX ADMIN — RIFAMPIN 300 MG: 300 CAPSULE ORAL at 10:05

## 2021-01-01 RX ADMIN — Medication 10 ML: at 21:38

## 2021-01-01 RX ADMIN — LABETALOL HYDROCHLORIDE 20 MG: 5 INJECTION, SOLUTION INTRAVENOUS at 09:34

## 2021-01-01 RX ADMIN — ALOGLIPTIN 25 MG: 25 TABLET, FILM COATED ORAL at 09:38

## 2021-01-01 RX ADMIN — INSULIN LISPRO 15 UNITS: 100 INJECTION, SOLUTION INTRAVENOUS; SUBCUTANEOUS at 12:39

## 2021-01-01 RX ADMIN — Medication 10 ML: at 21:14

## 2021-01-01 RX ADMIN — DEXAMETHASONE SODIUM PHOSPHATE 4 MG: 4 INJECTION, SOLUTION INTRA-ARTICULAR; INTRALESIONAL; INTRAMUSCULAR; INTRAVENOUS; SOFT TISSUE at 15:52

## 2021-01-01 RX ADMIN — CARVEDILOL 6.25 MG: 3.12 TABLET, FILM COATED ORAL at 11:03

## 2021-01-01 RX ADMIN — INSULIN LISPRO 3 UNITS: 100 INJECTION, SOLUTION INTRAVENOUS; SUBCUTANEOUS at 11:53

## 2021-01-01 RX ADMIN — LABETALOL HYDROCHLORIDE 20 MG: 5 INJECTION, SOLUTION INTRAVENOUS at 00:05

## 2021-01-01 RX ADMIN — CEFTAROLINE FOSAMIL 300 MG: 600 POWDER, FOR SOLUTION INTRAVENOUS at 06:08

## 2021-01-01 RX ADMIN — ATORVASTATIN CALCIUM 40 MG: 40 TABLET, FILM COATED ORAL at 00:15

## 2021-01-01 RX ADMIN — INSULIN LISPRO 7 UNITS: 100 INJECTION, SOLUTION INTRAVENOUS; SUBCUTANEOUS at 12:34

## 2021-01-01 RX ADMIN — GABAPENTIN 100 MG: 100 CAPSULE ORAL at 20:59

## 2021-01-01 RX ADMIN — CEFTAROLINE FOSAMIL 300 MG: 600 POWDER, FOR SOLUTION INTRAVENOUS at 18:44

## 2021-01-01 RX ADMIN — TRAMADOL HYDROCHLORIDE 50 MG: 50 TABLET, COATED ORAL at 01:34

## 2021-01-01 RX ADMIN — INSULIN LISPRO 4 UNITS: 100 INJECTION, SOLUTION INTRAVENOUS; SUBCUTANEOUS at 21:37

## 2021-01-01 RX ADMIN — CEFTAROLINE FOSAMIL 300 MG: 600 POWDER, FOR SOLUTION INTRAVENOUS at 06:00

## 2021-01-01 RX ADMIN — GABAPENTIN 100 MG: 100 CAPSULE ORAL at 21:19

## 2021-01-01 RX ADMIN — PREDNISOLONE ACETATE 1 DROP: 10 SUSPENSION/ DROPS OPHTHALMIC at 09:28

## 2021-01-01 RX ADMIN — GUAIFENESIN 600 MG: 600 TABLET, EXTENDED RELEASE ORAL at 23:10

## 2021-01-01 RX ADMIN — ATORVASTATIN CALCIUM 40 MG: 40 TABLET, FILM COATED ORAL at 22:14

## 2021-01-01 RX ADMIN — DEXAMETHASONE SODIUM PHOSPHATE 4 MG: 4 INJECTION, SOLUTION INTRA-ARTICULAR; INTRALESIONAL; INTRAMUSCULAR; INTRAVENOUS; SOFT TISSUE at 16:43

## 2021-01-01 RX ADMIN — DEXAMETHASONE SODIUM PHOSPHATE 4 MG: 4 INJECTION, SOLUTION INTRA-ARTICULAR; INTRALESIONAL; INTRAMUSCULAR; INTRAVENOUS; SOFT TISSUE at 01:02

## 2021-01-01 RX ADMIN — INSULIN LISPRO 6 UNITS: 100 INJECTION, SOLUTION INTRAVENOUS; SUBCUTANEOUS at 07:30

## 2021-01-01 RX ADMIN — GUAIFENESIN 600 MG: 600 TABLET, EXTENDED RELEASE ORAL at 21:35

## 2021-01-01 RX ADMIN — MUPIROCIN: 20 OINTMENT TOPICAL at 09:09

## 2021-01-01 RX ADMIN — PREDNISOLONE ACETATE 1 DROP: 10 SUSPENSION/ DROPS OPHTHALMIC at 23:09

## 2021-01-01 RX ADMIN — PREDNISOLONE ACETATE 1 DROP: 10 SUSPENSION/ DROPS OPHTHALMIC at 14:34

## 2021-01-01 RX ADMIN — TRAMADOL HYDROCHLORIDE 50 MG: 50 TABLET, FILM COATED ORAL at 20:19

## 2021-01-01 RX ADMIN — FUROSEMIDE 20 MG: 10 INJECTION, SOLUTION INTRAMUSCULAR; INTRAVENOUS at 20:41

## 2021-01-01 RX ADMIN — ATORVASTATIN CALCIUM 40 MG: 40 TABLET, FILM COATED ORAL at 23:54

## 2021-01-01 RX ADMIN — PIPERACILLIN AND TAZOBACTAM 3.38 G: 3; .375 INJECTION, POWDER, LYOPHILIZED, FOR SOLUTION INTRAVENOUS at 11:48

## 2021-01-01 RX ADMIN — INSULIN LISPRO 6 UNITS: 100 INJECTION, SOLUTION INTRAVENOUS; SUBCUTANEOUS at 11:21

## 2021-01-01 RX ADMIN — INSULIN LISPRO 2 UNITS: 100 INJECTION, SOLUTION INTRAVENOUS; SUBCUTANEOUS at 12:31

## 2021-01-01 RX ADMIN — NITROGLYCERIN 0.5 INCH: 20 OINTMENT TOPICAL at 20:37

## 2021-01-01 RX ADMIN — CEFTAROLINE FOSAMIL 300 MG: 600 POWDER, FOR SOLUTION INTRAVENOUS at 05:55

## 2021-01-01 RX ADMIN — SODIUM CHLORIDE 1000 ML: 9 INJECTION, SOLUTION INTRAVENOUS at 03:48

## 2021-01-01 RX ADMIN — DEXAMETHASONE SODIUM PHOSPHATE 4 MG: 4 INJECTION, SOLUTION INTRA-ARTICULAR; INTRALESIONAL; INTRAMUSCULAR; INTRAVENOUS; SOFT TISSUE at 05:19

## 2021-01-01 RX ADMIN — INSULIN GLARGINE 10 UNITS: 100 INJECTION, SOLUTION SUBCUTANEOUS at 09:47

## 2021-01-01 RX ADMIN — METFORMIN HYDROCHLORIDE 500 MG: 500 TABLET ORAL at 08:37

## 2021-01-01 RX ADMIN — INSULIN LISPRO 10 UNITS: 100 INJECTION, SOLUTION INTRAVENOUS; SUBCUTANEOUS at 16:46

## 2021-01-01 RX ADMIN — PREDNISOLONE ACETATE 1 DROP: 10 SUSPENSION/ DROPS OPHTHALMIC at 21:36

## 2021-01-01 RX ADMIN — POTASSIUM CHLORIDE 40 MEQ: 750 TABLET, FILM COATED, EXTENDED RELEASE ORAL at 20:28

## 2021-01-01 RX ADMIN — PIPERACILLIN AND TAZOBACTAM 3.38 G: 3; .375 INJECTION, POWDER, LYOPHILIZED, FOR SOLUTION INTRAVENOUS at 03:32

## 2021-01-01 RX ADMIN — DAPTOMYCIN 450 MG: 500 INJECTION, POWDER, LYOPHILIZED, FOR SOLUTION INTRAVENOUS at 13:46

## 2021-01-01 RX ADMIN — ASPIRIN 325 MG ORAL TABLET 325 MG: 325 PILL ORAL at 20:28

## 2021-01-01 RX ADMIN — GLIPIZIDE 5 MG: 5 TABLET ORAL at 09:28

## 2021-01-01 RX ADMIN — AMITRIPTYLINE HYDROCHLORIDE 75 MG: 50 TABLET, FILM COATED ORAL at 10:04

## 2021-01-01 RX ADMIN — WHITE PETROLATUM,ZINC OXIDE: 57; 17 PASTE TOPICAL at 09:28

## 2021-01-01 RX ADMIN — WHITE PETROLATUM,ZINC OXIDE: 57; 17 PASTE TOPICAL at 10:05

## 2021-01-01 RX ADMIN — INSULIN LISPRO 3 UNITS: 100 INJECTION, SOLUTION INTRAVENOUS; SUBCUTANEOUS at 08:36

## 2021-01-01 RX ADMIN — APIXABAN 5 MG: 5 TABLET, FILM COATED ORAL at 21:38

## 2021-01-01 RX ADMIN — AMITRIPTYLINE HYDROCHLORIDE 75 MG: 50 TABLET, FILM COATED ORAL at 08:37

## 2021-01-01 RX ADMIN — ATORVASTATIN CALCIUM 40 MG: 40 TABLET, FILM COATED ORAL at 23:29

## 2021-01-01 RX ADMIN — CEFTAROLINE FOSAMIL 300 MG: 600 POWDER, FOR SOLUTION INTRAVENOUS at 18:54

## 2021-01-01 RX ADMIN — CARVEDILOL 6.25 MG: 3.12 TABLET, FILM COATED ORAL at 09:29

## 2021-01-01 RX ADMIN — HYDROCHLOROTHIAZIDE 12.5 MG: 25 TABLET ORAL at 09:32

## 2021-01-01 RX ADMIN — PREDNISOLONE ACETATE 1 DROP: 10 SUSPENSION/ DROPS OPHTHALMIC at 08:16

## 2021-01-01 RX ADMIN — WHITE PETROLATUM,ZINC OXIDE: 57; 17 PASTE TOPICAL at 22:49

## 2021-01-01 RX ADMIN — INSULIN LISPRO 7 UNITS: 100 INJECTION, SOLUTION INTRAVENOUS; SUBCUTANEOUS at 16:56

## 2021-01-01 RX ADMIN — FUROSEMIDE 40 MG: 40 TABLET ORAL at 09:22

## 2021-01-01 RX ADMIN — Medication 10 ML: at 15:55

## 2021-01-01 RX ADMIN — PREDNISOLONE ACETATE 1 DROP: 10 SUSPENSION/ DROPS OPHTHALMIC at 22:14

## 2021-01-01 RX ADMIN — DEXAMETHASONE SODIUM PHOSPHATE 4 MG: 4 INJECTION, SOLUTION INTRA-ARTICULAR; INTRALESIONAL; INTRAMUSCULAR; INTRAVENOUS; SOFT TISSUE at 09:38

## 2021-01-01 RX ADMIN — METOPROLOL TARTRATE 25 MG: 25 TABLET, FILM COATED ORAL at 23:02

## 2021-01-01 RX ADMIN — TRAMADOL HYDROCHLORIDE 50 MG: 50 TABLET, FILM COATED ORAL at 21:50

## 2021-01-01 RX ADMIN — PREDNISOLONE ACETATE 1 DROP: 10 SUSPENSION/ DROPS OPHTHALMIC at 11:15

## 2021-01-01 RX ADMIN — PAROXETINE HYDROCHLORIDE 20 MG: 20 TABLET, FILM COATED ORAL at 09:39

## 2021-01-01 RX ADMIN — CEFTAROLINE FOSAMIL 300 MG: 600 POWDER, FOR SOLUTION INTRAVENOUS at 05:39

## 2021-01-01 RX ADMIN — GABAPENTIN 100 MG: 100 CAPSULE ORAL at 21:50

## 2021-01-01 RX ADMIN — PREDNISOLONE ACETATE 1 DROP: 10 SUSPENSION/ DROPS OPHTHALMIC at 10:14

## 2021-01-01 RX ADMIN — PIPERACILLIN AND TAZOBACTAM 3.38 G: 3; .375 INJECTION, POWDER, LYOPHILIZED, FOR SOLUTION INTRAVENOUS at 09:09

## 2021-01-01 RX ADMIN — CARVEDILOL 6.25 MG: 3.12 TABLET, FILM COATED ORAL at 16:07

## 2021-01-01 RX ADMIN — PIPERACILLIN AND TAZOBACTAM 3.38 G: 3; .375 INJECTION, POWDER, LYOPHILIZED, FOR SOLUTION INTRAVENOUS at 22:03

## 2021-01-01 RX ADMIN — PANTOPRAZOLE SODIUM 40 MG: 40 TABLET, DELAYED RELEASE ORAL at 08:37

## 2021-01-01 RX ADMIN — APIXABAN 5 MG: 5 TABLET, FILM COATED ORAL at 20:59

## 2021-01-01 RX ADMIN — ASPIRIN 81 MG: 81 TABLET, COATED ORAL at 10:04

## 2021-01-01 RX ADMIN — APIXABAN 5 MG: 5 TABLET, FILM COATED ORAL at 21:13

## 2021-01-01 RX ADMIN — HYDROXYZINE HYDROCHLORIDE 25 MG: 50 INJECTION, SOLUTION INTRAMUSCULAR at 17:41

## 2021-01-01 RX ADMIN — GABAPENTIN 100 MG: 100 CAPSULE ORAL at 08:36

## 2021-01-01 RX ADMIN — INSULIN LISPRO 10 UNITS: 100 INJECTION, SOLUTION INTRAVENOUS; SUBCUTANEOUS at 12:06

## 2021-01-01 RX ADMIN — INSULIN LISPRO 4 UNITS: 100 INJECTION, SOLUTION INTRAVENOUS; SUBCUTANEOUS at 16:27

## 2021-01-01 RX ADMIN — PAROXETINE HYDROCHLORIDE 20 MG: 20 TABLET, FILM COATED ORAL at 08:34

## 2021-01-01 RX ADMIN — Medication 10 ML: at 05:59

## 2021-01-01 RX ADMIN — CARVEDILOL 6.25 MG: 3.12 TABLET, FILM COATED ORAL at 09:48

## 2021-01-01 RX ADMIN — Medication 10 ML: at 14:00

## 2021-01-01 RX ADMIN — VANCOMYCIN HYDROCHLORIDE 500 MG: 500 INJECTION, POWDER, LYOPHILIZED, FOR SOLUTION INTRAVENOUS at 12:03

## 2021-01-01 RX ADMIN — PREDNISOLONE ACETATE 1 DROP: 10 SUSPENSION/ DROPS OPHTHALMIC at 23:30

## 2021-01-01 RX ADMIN — INSULIN LISPRO 2 UNITS: 100 INJECTION, SOLUTION INTRAVENOUS; SUBCUTANEOUS at 07:30

## 2021-01-01 RX ADMIN — LABETALOL HYDROCHLORIDE 20 MG: 5 INJECTION, SOLUTION INTRAVENOUS at 05:32

## 2021-01-01 RX ADMIN — GABAPENTIN 100 MG: 100 CAPSULE ORAL at 22:15

## 2021-01-01 RX ADMIN — INSULIN LISPRO 4 UNITS: 100 INJECTION, SOLUTION INTRAVENOUS; SUBCUTANEOUS at 07:30

## 2021-01-01 RX ADMIN — CARVEDILOL 6.25 MG: 3.12 TABLET, FILM COATED ORAL at 17:20

## 2021-01-01 RX ADMIN — DEXAMETHASONE SODIUM PHOSPHATE 4 MG: 4 INJECTION, SOLUTION INTRA-ARTICULAR; INTRALESIONAL; INTRAMUSCULAR; INTRAVENOUS; SOFT TISSUE at 16:53

## 2021-01-01 RX ADMIN — CARVEDILOL 6.25 MG: 3.12 TABLET, FILM COATED ORAL at 16:56

## 2021-01-01 RX ADMIN — AMITRIPTYLINE HYDROCHLORIDE 75 MG: 50 TABLET, FILM COATED ORAL at 11:03

## 2021-01-01 RX ADMIN — PIPERACILLIN AND TAZOBACTAM 3.38 G: 3; .375 INJECTION, POWDER, LYOPHILIZED, FOR SOLUTION INTRAVENOUS at 21:20

## 2021-01-01 RX ADMIN — DEXAMETHASONE SODIUM PHOSPHATE 4 MG: 4 INJECTION, SOLUTION INTRA-ARTICULAR; INTRALESIONAL; INTRAMUSCULAR; INTRAVENOUS; SOFT TISSUE at 16:44

## 2021-01-01 RX ADMIN — ASPIRIN 81 MG: 81 TABLET, COATED ORAL at 09:22

## 2021-01-01 RX ADMIN — DEXTROSE MONOHYDRATE 25 G: 25 INJECTION, SOLUTION INTRAVENOUS at 03:50

## 2021-01-01 RX ADMIN — HYDROCHLOROTHIAZIDE 12.5 MG: 25 TABLET ORAL at 09:15

## 2021-01-01 RX ADMIN — INSULIN LISPRO 6 UNITS: 100 INJECTION, SOLUTION INTRAVENOUS; SUBCUTANEOUS at 12:38

## 2021-01-01 RX ADMIN — PREDNISOLONE ACETATE 1 DROP: 10 SUSPENSION/ DROPS OPHTHALMIC at 10:05

## 2021-01-01 RX ADMIN — APIXABAN 5 MG: 5 TABLET, FILM COATED ORAL at 09:33

## 2021-01-01 RX ADMIN — PANTOPRAZOLE SODIUM 20 MG: 20 TABLET, DELAYED RELEASE ORAL at 12:09

## 2021-01-01 RX ADMIN — INSULIN LISPRO 10 UNITS: 100 INJECTION, SOLUTION INTRAVENOUS; SUBCUTANEOUS at 21:56

## 2021-01-01 RX ADMIN — WHITE PETROLATUM,ZINC OXIDE: 57; 17 PASTE TOPICAL at 22:53

## 2021-01-01 RX ADMIN — GABAPENTIN 100 MG: 100 CAPSULE ORAL at 21:49

## 2021-01-01 RX ADMIN — Medication 10 ML: at 17:55

## 2021-01-01 RX ADMIN — PIPERACILLIN AND TAZOBACTAM 3.38 G: 3; .375 INJECTION, POWDER, LYOPHILIZED, FOR SOLUTION INTRAVENOUS at 10:36

## 2021-01-01 RX ADMIN — CARVEDILOL 6.25 MG: 3.12 TABLET, FILM COATED ORAL at 08:36

## 2021-01-01 RX ADMIN — DEXTROSE MONOHYDRATE 25 G: 25 INJECTION, SOLUTION INTRAVENOUS at 14:10

## 2021-01-01 RX ADMIN — INSULIN LISPRO 4 UNITS: 100 INJECTION, SOLUTION INTRAVENOUS; SUBCUTANEOUS at 10:44

## 2021-01-01 RX ADMIN — INSULIN LISPRO 10 UNITS: 100 INJECTION, SOLUTION INTRAVENOUS; SUBCUTANEOUS at 12:57

## 2021-01-01 RX ADMIN — WHITE PETROLATUM,ZINC OXIDE: 57; 17 PASTE TOPICAL at 15:37

## 2021-01-01 RX ADMIN — RIFAMPIN 300 MG: 300 CAPSULE ORAL at 09:00

## 2021-01-01 RX ADMIN — AMITRIPTYLINE HYDROCHLORIDE 75 MG: 50 TABLET, FILM COATED ORAL at 09:16

## 2021-01-01 RX ADMIN — AMITRIPTYLINE HYDROCHLORIDE 75 MG: 50 TABLET, FILM COATED ORAL at 09:29

## 2021-01-01 RX ADMIN — Medication 10 ML: at 07:06

## 2021-01-01 RX ADMIN — IPRATROPIUM BROMIDE AND ALBUTEROL SULFATE 3 ML: .5; 2.5 SOLUTION RESPIRATORY (INHALATION) at 07:53

## 2021-01-01 RX ADMIN — PIPERACILLIN AND TAZOBACTAM 3.38 G: 3; .375 INJECTION, POWDER, LYOPHILIZED, FOR SOLUTION INTRAVENOUS at 09:47

## 2021-01-01 RX ADMIN — ATORVASTATIN CALCIUM 40 MG: 40 TABLET, FILM COATED ORAL at 21:39

## 2021-01-01 RX ADMIN — VANCOMYCIN HYDROCHLORIDE 1250 MG: 1.25 INJECTION, POWDER, LYOPHILIZED, FOR SOLUTION INTRAVENOUS at 14:48

## 2021-01-01 RX ADMIN — PREDNISOLONE ACETATE 1 DROP: 10 SUSPENSION/ DROPS OPHTHALMIC at 08:38

## 2021-01-01 RX ADMIN — RIFAMPIN 300 MG: 300 CAPSULE ORAL at 08:15

## 2021-01-01 RX ADMIN — WHITE PETROLATUM,ZINC OXIDE: 57; 17 PASTE TOPICAL at 17:26

## 2021-01-01 RX ADMIN — PREDNISOLONE ACETATE 1 DROP: 10 SUSPENSION/ DROPS OPHTHALMIC at 00:03

## 2021-01-01 RX ADMIN — INSULIN LISPRO 10 UNITS: 100 INJECTION, SOLUTION INTRAVENOUS; SUBCUTANEOUS at 15:48

## 2021-01-01 RX ADMIN — PREDNISOLONE ACETATE 1 DROP: 10 SUSPENSION/ DROPS OPHTHALMIC at 17:21

## 2021-01-01 RX ADMIN — GABAPENTIN 100 MG: 100 CAPSULE ORAL at 21:39

## 2021-01-01 RX ADMIN — INSULIN LISPRO 15 UNITS: 100 INJECTION, SOLUTION INTRAVENOUS; SUBCUTANEOUS at 21:19

## 2021-01-01 RX ADMIN — CINACALCET 30 MG: 30 TABLET, FILM COATED ORAL at 08:15

## 2021-01-01 RX ADMIN — CARVEDILOL 6.25 MG: 3.12 TABLET, FILM COATED ORAL at 17:26

## 2021-01-01 RX ADMIN — GABAPENTIN 100 MG: 100 CAPSULE ORAL at 17:41

## 2021-01-01 RX ADMIN — PERFLUTREN 2 ML: 6.52 INJECTION, SUSPENSION INTRAVENOUS at 13:00

## 2021-01-01 RX ADMIN — PREDNISOLONE ACETATE 1 DROP: 10 SUSPENSION/ DROPS OPHTHALMIC at 09:09

## 2021-01-01 RX ADMIN — CEFTAROLINE FOSAMIL 300 MG: 600 POWDER, FOR SOLUTION INTRAVENOUS at 08:47

## 2021-01-01 RX ADMIN — GUAIFENESIN 600 MG: 600 TABLET, EXTENDED RELEASE ORAL at 08:37

## 2021-01-01 RX ADMIN — TRAMADOL HYDROCHLORIDE 50 MG: 50 TABLET, COATED ORAL at 22:49

## 2021-01-01 RX ADMIN — PREDNISOLONE ACETATE 1 DROP: 10 SUSPENSION/ DROPS OPHTHALMIC at 23:06

## 2021-01-01 RX ADMIN — APIXABAN 5 MG: 5 TABLET, FILM COATED ORAL at 01:01

## 2021-01-01 RX ADMIN — TRAMADOL HYDROCHLORIDE 50 MG: 50 TABLET, COATED ORAL at 12:25

## 2021-01-01 RX ADMIN — GABAPENTIN 100 MG: 100 CAPSULE ORAL at 10:05

## 2021-01-01 RX ADMIN — CLONIDINE HYDROCHLORIDE 0.2 MG: 0.1 TABLET ORAL at 23:29

## 2021-01-01 RX ADMIN — VANCOMYCIN HYDROCHLORIDE 500 MG: 500 INJECTION, POWDER, LYOPHILIZED, FOR SOLUTION INTRAVENOUS at 13:50

## 2021-01-01 RX ADMIN — DEXTROSE AND SODIUM CHLORIDE 75 ML/HR: 5; 450 INJECTION, SOLUTION INTRAVENOUS at 14:31

## 2021-01-01 RX ADMIN — GLIPIZIDE 5 MG: 5 TABLET ORAL at 09:33

## 2021-01-01 RX ADMIN — ATORVASTATIN CALCIUM 40 MG: 40 TABLET, FILM COATED ORAL at 21:35

## 2021-01-01 RX ADMIN — DEXAMETHASONE SODIUM PHOSPHATE 4 MG: 4 INJECTION, SOLUTION INTRA-ARTICULAR; INTRALESIONAL; INTRAMUSCULAR; INTRAVENOUS; SOFT TISSUE at 23:10

## 2021-01-01 RX ADMIN — DEXTROSE MONOHYDRATE 25 G: 25 INJECTION, SOLUTION INTRAVENOUS at 07:24

## 2021-01-01 RX ADMIN — CLONIDINE HYDROCHLORIDE 0.2 MG: 0.1 TABLET ORAL at 09:13

## 2021-01-01 RX ADMIN — PREDNISOLONE ACETATE 1 DROP: 10 SUSPENSION/ DROPS OPHTHALMIC at 09:49

## 2021-01-01 RX ADMIN — TRAMADOL HYDROCHLORIDE 50 MG: 50 TABLET, COATED ORAL at 21:36

## 2021-01-01 RX ADMIN — ALOGLIPTIN 12.5 MG: 12.5 TABLET, FILM COATED ORAL at 09:20

## 2021-01-01 RX ADMIN — INSULIN LISPRO 6 UNITS: 100 INJECTION, SOLUTION INTRAVENOUS; SUBCUTANEOUS at 08:30

## 2021-01-01 RX ADMIN — APIXABAN 5 MG: 5 TABLET, FILM COATED ORAL at 10:07

## 2021-01-01 RX ADMIN — CARVEDILOL 6.25 MG: 3.12 TABLET, FILM COATED ORAL at 09:39

## 2021-01-01 RX ADMIN — INSULIN LISPRO 3 UNITS: 100 INJECTION, SOLUTION INTRAVENOUS; SUBCUTANEOUS at 08:44

## 2021-01-01 RX ADMIN — CARVEDILOL 6.25 MG: 3.12 TABLET, FILM COATED ORAL at 09:25

## 2021-01-01 RX ADMIN — CINACALCET 30 MG: 30 TABLET, FILM COATED ORAL at 07:51

## 2021-01-01 RX ADMIN — Medication 10 ML: at 05:08

## 2021-01-01 RX ADMIN — INSULIN LISPRO 7 UNITS: 100 INJECTION, SOLUTION INTRAVENOUS; SUBCUTANEOUS at 12:51

## 2021-01-01 RX ADMIN — INSULIN LISPRO 4 UNITS: 100 INJECTION, SOLUTION INTRAVENOUS; SUBCUTANEOUS at 17:33

## 2021-01-01 RX ADMIN — WHITE PETROLATUM,ZINC OXIDE: 57; 17 PASTE TOPICAL at 15:42

## 2021-01-01 RX ADMIN — PREDNISOLONE ACETATE 1 DROP: 10 SUSPENSION/ DROPS OPHTHALMIC at 16:57

## 2021-01-01 RX ADMIN — APIXABAN 5 MG: 5 TABLET, FILM COATED ORAL at 01:02

## 2021-01-01 RX ADMIN — CEFTAROLINE FOSAMIL 300 MG: 400 POWDER, FOR SOLUTION INTRAVENOUS at 18:27

## 2021-01-01 RX ADMIN — APIXABAN 5 MG: 5 TABLET, FILM COATED ORAL at 09:38

## 2021-01-01 RX ADMIN — DEXAMETHASONE SODIUM PHOSPHATE 4 MG: 4 INJECTION, SOLUTION INTRA-ARTICULAR; INTRALESIONAL; INTRAMUSCULAR; INTRAVENOUS; SOFT TISSUE at 17:30

## 2021-01-01 RX ADMIN — INSULIN LISPRO 4 UNITS: 100 INJECTION, SOLUTION INTRAVENOUS; SUBCUTANEOUS at 07:54

## 2021-01-01 RX ADMIN — CEFTAROLINE FOSAMIL 300 MG: 600 POWDER, FOR SOLUTION INTRAVENOUS at 17:27

## 2021-01-01 RX ADMIN — PREDNISOLONE ACETATE 1 DROP: 10 SUSPENSION/ DROPS OPHTHALMIC at 15:37

## 2021-01-01 RX ADMIN — INSULIN LISPRO 4 UNITS: 100 INJECTION, SOLUTION INTRAVENOUS; SUBCUTANEOUS at 17:25

## 2021-01-01 RX ADMIN — WHITE PETROLATUM,ZINC OXIDE: 57; 17 PASTE TOPICAL at 23:09

## 2021-01-01 RX ADMIN — WHITE PETROLATUM,ZINC OXIDE: 57; 17 PASTE TOPICAL at 16:47

## 2021-01-01 RX ADMIN — CLONIDINE HYDROCHLORIDE 0.2 MG: 0.1 TABLET ORAL at 23:05

## 2021-01-01 RX ADMIN — DEXMEDETOMIDINE HYDROCHLORIDE 0.1 MCG/KG/HR: 100 INJECTION, SOLUTION, CONCENTRATE INTRAVENOUS at 18:05

## 2021-01-01 RX ADMIN — PAROXETINE HYDROCHLORIDE 20 MG: 20 TABLET, FILM COATED ORAL at 09:21

## 2021-01-01 RX ADMIN — GABAPENTIN 100 MG: 100 CAPSULE ORAL at 09:29

## 2021-01-01 RX ADMIN — GABAPENTIN 100 MG: 100 CAPSULE ORAL at 08:35

## 2021-01-01 RX ADMIN — PREDNISOLONE ACETATE 1 DROP: 10 SUSPENSION/ DROPS OPHTHALMIC at 21:45

## 2021-01-01 RX ADMIN — APIXABAN 5 MG: 5 TABLET, FILM COATED ORAL at 08:34

## 2021-01-01 RX ADMIN — CARVEDILOL 6.25 MG: 3.12 TABLET, FILM COATED ORAL at 08:45

## 2021-01-01 RX ADMIN — GABAPENTIN 100 MG: 100 CAPSULE ORAL at 09:12

## 2021-01-01 RX ADMIN — GABAPENTIN 100 MG: 100 CAPSULE ORAL at 09:22

## 2021-01-01 RX ADMIN — GABAPENTIN 100 MG: 100 CAPSULE ORAL at 17:24

## 2021-01-01 RX ADMIN — CARVEDILOL 6.25 MG: 3.12 TABLET, FILM COATED ORAL at 08:15

## 2021-01-01 RX ADMIN — CEFTAROLINE FOSAMIL 300 MG: 600 POWDER, FOR SOLUTION INTRAVENOUS at 07:43

## 2021-01-01 RX ADMIN — ASPIRIN 81 MG: 81 TABLET, COATED ORAL at 09:12

## 2021-01-01 RX ADMIN — METFORMIN HYDROCHLORIDE 500 MG: 500 TABLET ORAL at 16:53

## 2021-01-01 RX ADMIN — INSULIN LISPRO 3 UNITS: 100 INJECTION, SOLUTION INTRAVENOUS; SUBCUTANEOUS at 16:47

## 2021-01-01 RX ADMIN — ATORVASTATIN CALCIUM 40 MG: 40 TABLET, FILM COATED ORAL at 23:33

## 2021-01-01 RX ADMIN — RIFAMPIN 300 MG: 300 CAPSULE ORAL at 08:47

## 2021-01-01 RX ADMIN — Medication 10 ML: at 00:18

## 2021-01-01 RX ADMIN — MUPIROCIN: 20 OINTMENT TOPICAL at 22:22

## 2021-01-01 RX ADMIN — PREDNISOLONE ACETATE 1 DROP: 10 SUSPENSION/ DROPS OPHTHALMIC at 17:41

## 2021-01-01 RX ADMIN — INSULIN LISPRO 10 UNITS: 100 INJECTION, SOLUTION INTRAVENOUS; SUBCUTANEOUS at 12:21

## 2021-01-01 RX ADMIN — LORAZEPAM 1 MG: 2 INJECTION INTRAMUSCULAR; INTRAVENOUS at 15:58

## 2021-01-01 RX ADMIN — PIPERACILLIN AND TAZOBACTAM 3.38 G: 3; .375 INJECTION, POWDER, LYOPHILIZED, FOR SOLUTION INTRAVENOUS at 17:09

## 2021-01-01 RX ADMIN — DEXTROSE MONOHYDRATE 75 ML/HR: 50 INJECTION, SOLUTION INTRAVENOUS at 12:22

## 2021-01-01 RX ADMIN — FUROSEMIDE 40 MG: 10 INJECTION, SOLUTION INTRAMUSCULAR; INTRAVENOUS at 10:07

## 2021-01-01 RX ADMIN — LIDOCAINE HYDROCHLORIDE 100 MG: 20 INJECTION, SOLUTION EPIDURAL; INFILTRATION; INTRACAUDAL; PERINEURAL at 14:29

## 2021-01-01 RX ADMIN — INSULIN LISPRO 3 UNITS: 100 INJECTION, SOLUTION INTRAVENOUS; SUBCUTANEOUS at 09:38

## 2021-01-01 RX ADMIN — SODIUM CHLORIDE 1500 MG: 9 INJECTION, SOLUTION INTRAVENOUS at 11:30

## 2021-01-01 RX ADMIN — PAROXETINE HYDROCHLORIDE 20 MG: 20 TABLET, FILM COATED ORAL at 10:17

## 2021-01-01 RX ADMIN — LOSARTAN POTASSIUM 100 MG: 50 TABLET, FILM COATED ORAL at 08:34

## 2021-01-01 RX ADMIN — Medication 10 ML: at 13:40

## 2021-01-01 RX ADMIN — HYDROXYZINE HYDROCHLORIDE 25 MG: 50 INJECTION, SOLUTION INTRAMUSCULAR at 06:19

## 2021-01-01 RX ADMIN — CLONIDINE HYDROCHLORIDE 0.3 MG: 0.1 TABLET ORAL at 08:34

## 2021-01-01 RX ADMIN — CEFTAROLINE FOSAMIL 300 MG: 600 POWDER, FOR SOLUTION INTRAVENOUS at 06:07

## 2021-01-01 RX ADMIN — INSULIN LISPRO 4 UNITS: 100 INJECTION, SOLUTION INTRAVENOUS; SUBCUTANEOUS at 13:14

## 2021-01-01 RX ADMIN — INSULIN LISPRO 18 UNITS: 100 INJECTION, SOLUTION INTRAVENOUS; SUBCUTANEOUS at 09:48

## 2021-01-01 RX ADMIN — METFORMIN HYDROCHLORIDE 500 MG: 500 TABLET ORAL at 09:37

## 2021-01-01 RX ADMIN — DEXAMETHASONE SODIUM PHOSPHATE 4 MG: 4 INJECTION, SOLUTION INTRA-ARTICULAR; INTRALESIONAL; INTRAMUSCULAR; INTRAVENOUS; SOFT TISSUE at 10:00

## 2021-01-01 RX ADMIN — APIXABAN 5 MG: 5 TABLET, FILM COATED ORAL at 22:16

## 2021-01-01 RX ADMIN — CARVEDILOL 6.25 MG: 3.12 TABLET, FILM COATED ORAL at 09:55

## 2021-01-01 RX ADMIN — PREDNISOLONE ACETATE 1 DROP: 10 SUSPENSION/ DROPS OPHTHALMIC at 09:25

## 2021-01-01 RX ADMIN — DAPTOMYCIN 450 MG: 500 INJECTION, POWDER, LYOPHILIZED, FOR SOLUTION INTRAVENOUS at 13:37

## 2021-01-01 RX ADMIN — PAROXETINE HYDROCHLORIDE 20 MG: 20 TABLET, FILM COATED ORAL at 08:32

## 2021-01-01 RX ADMIN — DEXAMETHASONE SODIUM PHOSPHATE 4 MG: 4 INJECTION, SOLUTION INTRA-ARTICULAR; INTRALESIONAL; INTRAMUSCULAR; INTRAVENOUS; SOFT TISSUE at 04:27

## 2021-01-01 RX ADMIN — CARVEDILOL 6.25 MG: 3.12 TABLET, FILM COATED ORAL at 17:41

## 2021-01-01 RX ADMIN — CLONIDINE HYDROCHLORIDE 0.2 MG: 0.1 TABLET ORAL at 22:00

## 2021-01-01 RX ADMIN — PIPERACILLIN AND TAZOBACTAM 3.38 G: 3; .375 INJECTION, POWDER, LYOPHILIZED, FOR SOLUTION INTRAVENOUS at 15:52

## 2021-01-01 RX ADMIN — DEXTROSE MONOHYDRATE 12.5 G: 25 INJECTION, SOLUTION INTRAVENOUS at 08:40

## 2021-01-01 RX ADMIN — PAROXETINE HYDROCHLORIDE 20 MG: 20 TABLET, FILM COATED ORAL at 08:37

## 2021-01-01 RX ADMIN — ATORVASTATIN CALCIUM 40 MG: 40 TABLET, FILM COATED ORAL at 21:49

## 2021-01-01 RX ADMIN — GABAPENTIN 100 MG: 100 CAPSULE ORAL at 17:20

## 2021-01-01 RX ADMIN — CARVEDILOL 6.25 MG: 3.12 TABLET, FILM COATED ORAL at 10:06

## 2021-01-01 RX ADMIN — DEXMEDETOMIDINE HYDROCHLORIDE 0.8 MCG/KG/HR: 100 INJECTION, SOLUTION, CONCENTRATE INTRAVENOUS at 12:51

## 2021-01-01 RX ADMIN — MUPIROCIN: 20 OINTMENT TOPICAL at 21:16

## 2021-01-01 RX ADMIN — Medication 10 ML: at 17:42

## 2021-01-01 RX ADMIN — CLONIDINE HYDROCHLORIDE 0.3 MG: 0.1 TABLET ORAL at 09:37

## 2021-01-01 RX ADMIN — DEXMEDETOMIDINE HYDROCHLORIDE 0.2 MCG/KG/HR: 100 INJECTION, SOLUTION, CONCENTRATE INTRAVENOUS at 07:17

## 2021-01-01 RX ADMIN — WHITE PETROLATUM,ZINC OXIDE: 57; 17 PASTE TOPICAL at 08:47

## 2021-01-01 RX ADMIN — METFORMIN HYDROCHLORIDE 500 MG: 500 TABLET ORAL at 09:28

## 2021-01-01 RX ADMIN — DEXAMETHASONE SODIUM PHOSPHATE 4 MG: 4 INJECTION, SOLUTION INTRA-ARTICULAR; INTRALESIONAL; INTRAMUSCULAR; INTRAVENOUS; SOFT TISSUE at 11:25

## 2021-01-01 RX ADMIN — HYDROXYZINE HYDROCHLORIDE 25 MG: 50 INJECTION, SOLUTION INTRAMUSCULAR at 02:11

## 2021-01-01 RX ADMIN — ATORVASTATIN CALCIUM 40 MG: 40 TABLET, FILM COATED ORAL at 22:49

## 2021-01-01 RX ADMIN — PIPERACILLIN AND TAZOBACTAM 3.38 G: 3; .375 INJECTION, POWDER, LYOPHILIZED, FOR SOLUTION INTRAVENOUS at 11:25

## 2021-01-01 RX ADMIN — PREDNISOLONE ACETATE 1 DROP: 10 SUSPENSION/ DROPS OPHTHALMIC at 09:00

## 2021-01-01 RX ADMIN — APIXABAN 5 MG: 5 TABLET, FILM COATED ORAL at 00:15

## 2021-01-01 RX ADMIN — GUAIFENESIN 600 MG: 600 TABLET, EXTENDED RELEASE ORAL at 08:34

## 2021-01-01 RX ADMIN — AMITRIPTYLINE HYDROCHLORIDE 75 MG: 50 TABLET, FILM COATED ORAL at 23:29

## 2021-01-01 RX ADMIN — ATORVASTATIN CALCIUM 40 MG: 40 TABLET, FILM COATED ORAL at 21:57

## 2021-01-01 RX ADMIN — CARVEDILOL 6.25 MG: 3.12 TABLET, FILM COATED ORAL at 17:15

## 2021-01-01 RX ADMIN — GUAIFENESIN 600 MG: 600 TABLET, EXTENDED RELEASE ORAL at 21:50

## 2021-01-01 RX ADMIN — GABAPENTIN 100 MG: 100 CAPSULE ORAL at 21:35

## 2021-01-01 RX ADMIN — CARVEDILOL 6.25 MG: 3.12 TABLET, FILM COATED ORAL at 17:23

## 2021-01-01 RX ADMIN — CARVEDILOL 6.25 MG: 3.12 TABLET, FILM COATED ORAL at 18:21

## 2021-01-01 RX ADMIN — GUAIFENESIN 600 MG: 600 TABLET, EXTENDED RELEASE ORAL at 12:39

## 2021-01-01 RX ADMIN — PREDNISOLONE ACETATE 1 DROP: 10 SUSPENSION/ DROPS OPHTHALMIC at 13:33

## 2021-01-01 RX ADMIN — ASPIRIN 81 MG CHEWABLE TABLET 81 MG: 81 TABLET CHEWABLE at 10:12

## 2021-01-01 RX ADMIN — WHITE PETROLATUM,ZINC OXIDE: 57; 17 PASTE TOPICAL at 08:52

## 2021-01-01 RX ADMIN — PREDNISOLONE ACETATE 1 DROP: 10 SUSPENSION/ DROPS OPHTHALMIC at 17:56

## 2021-01-01 RX ADMIN — INSULIN GLARGINE 10 UNITS: 100 INJECTION, SOLUTION SUBCUTANEOUS at 10:05

## 2021-01-01 RX ADMIN — HYDROXYZINE HYDROCHLORIDE 25 MG: 50 INJECTION, SOLUTION INTRAMUSCULAR at 20:04

## 2021-01-01 RX ADMIN — PAROXETINE HYDROCHLORIDE 20 MG: 20 TABLET, FILM COATED ORAL at 09:22

## 2021-01-01 RX ADMIN — ALOGLIPTIN 25 MG: 25 TABLET, FILM COATED ORAL at 09:00

## 2021-01-01 RX ADMIN — PIPERACILLIN AND TAZOBACTAM 3.38 G: 3; .375 INJECTION, POWDER, LYOPHILIZED, FOR SOLUTION INTRAVENOUS at 05:20

## 2021-01-01 RX ADMIN — METFORMIN HYDROCHLORIDE 500 MG: 500 TABLET ORAL at 16:43

## 2021-01-01 RX ADMIN — PANTOPRAZOLE SODIUM 40 MG: 40 TABLET, DELAYED RELEASE ORAL at 09:22

## 2021-01-01 RX ADMIN — ATORVASTATIN CALCIUM 40 MG: 40 TABLET, FILM COATED ORAL at 21:37

## 2021-01-01 RX ADMIN — INSULIN LISPRO 15 UNITS: 100 INJECTION, SOLUTION INTRAVENOUS; SUBCUTANEOUS at 09:24

## 2021-01-01 RX ADMIN — INSULIN LISPRO 3 UNITS: 100 INJECTION, SOLUTION INTRAVENOUS; SUBCUTANEOUS at 10:13

## 2021-01-01 RX ADMIN — PANTOPRAZOLE SODIUM 20 MG: 20 TABLET, DELAYED RELEASE ORAL at 10:10

## 2021-01-01 RX ADMIN — CLONIDINE HYDROCHLORIDE 0.3 MG: 0.1 TABLET ORAL at 21:50

## 2021-01-01 RX ADMIN — DEXAMETHASONE SODIUM PHOSPHATE 4 MG: 4 INJECTION, SOLUTION INTRA-ARTICULAR; INTRALESIONAL; INTRAMUSCULAR; INTRAVENOUS; SOFT TISSUE at 21:49

## 2021-01-01 RX ADMIN — DEXMEDETOMIDINE HYDROCHLORIDE 0.4 MCG/KG/HR: 100 INJECTION, SOLUTION, CONCENTRATE INTRAVENOUS at 05:29

## 2021-01-01 RX ADMIN — PREDNISOLONE ACETATE 1 DROP: 10 SUSPENSION/ DROPS OPHTHALMIC at 15:24

## 2021-01-01 RX ADMIN — PIPERACILLIN AND TAZOBACTAM 3.38 G: 3; .375 INJECTION, POWDER, LYOPHILIZED, FOR SOLUTION INTRAVENOUS at 00:52

## 2021-01-01 RX ADMIN — IPRATROPIUM BROMIDE AND ALBUTEROL SULFATE 3 ML: .5; 2.5 SOLUTION RESPIRATORY (INHALATION) at 02:44

## 2021-01-01 RX ADMIN — ALOGLIPTIN 25 MG: 25 TABLET, FILM COATED ORAL at 12:03

## 2021-01-01 RX ADMIN — CARVEDILOL 6.25 MG: 3.12 TABLET, FILM COATED ORAL at 18:08

## 2021-01-01 RX ADMIN — FUROSEMIDE 40 MG: 10 INJECTION, SOLUTION INTRAMUSCULAR; INTRAVENOUS at 09:34

## 2021-01-01 RX ADMIN — PAROXETINE HYDROCHLORIDE 20 MG: 20 TABLET, FILM COATED ORAL at 08:35

## 2021-01-01 RX ADMIN — ALOGLIPTIN 25 MG: 25 TABLET, FILM COATED ORAL at 18:13

## 2021-01-01 RX ADMIN — PIPERACILLIN AND TAZOBACTAM 3.38 G: 3; .375 INJECTION, POWDER, LYOPHILIZED, FOR SOLUTION INTRAVENOUS at 16:44

## 2021-01-01 RX ADMIN — AMITRIPTYLINE HYDROCHLORIDE 75 MG: 50 TABLET, FILM COATED ORAL at 09:50

## 2021-01-01 RX ADMIN — METFORMIN HYDROCHLORIDE 500 MG: 500 TABLET ORAL at 18:13

## 2021-01-01 RX ADMIN — Medication 5 ML: at 23:06

## 2021-01-01 RX ADMIN — ACETAMINOPHEN 650 MG: 325 TABLET ORAL at 00:02

## 2021-01-01 RX ADMIN — PREDNISOLONE ACETATE 1 DROP: 10 SUSPENSION/ DROPS OPHTHALMIC at 13:24

## 2021-01-01 RX ADMIN — ATORVASTATIN CALCIUM 40 MG: 40 TABLET, FILM COATED ORAL at 22:16

## 2021-01-01 RX ADMIN — ALOGLIPTIN 25 MG: 25 TABLET, FILM COATED ORAL at 08:31

## 2021-01-01 RX ADMIN — PREDNISOLONE ACETATE 1 DROP: 10 SUSPENSION/ DROPS OPHTHALMIC at 23:34

## 2021-01-01 RX ADMIN — GABAPENTIN 100 MG: 100 CAPSULE ORAL at 09:38

## 2021-01-01 RX ADMIN — PAROXETINE HYDROCHLORIDE 20 MG: 20 TABLET, FILM COATED ORAL at 09:50

## 2021-01-01 RX ADMIN — VANCOMYCIN HYDROCHLORIDE 500 MG: 500 INJECTION, POWDER, LYOPHILIZED, FOR SOLUTION INTRAVENOUS at 05:06

## 2021-01-01 RX ADMIN — FUROSEMIDE 40 MG: 40 TABLET ORAL at 09:27

## 2021-01-01 RX ADMIN — CARVEDILOL 6.25 MG: 3.12 TABLET, FILM COATED ORAL at 18:26

## 2021-01-01 RX ADMIN — INSULIN LISPRO 4 UNITS: 100 INJECTION, SOLUTION INTRAVENOUS; SUBCUTANEOUS at 23:26

## 2021-01-01 RX ADMIN — Medication 10 ML: at 22:16

## 2021-01-01 RX ADMIN — CLONIDINE HYDROCHLORIDE 0.3 MG: 0.1 TABLET ORAL at 21:35

## 2021-01-01 RX ADMIN — CEFTAROLINE FOSAMIL 300 MG: 600 POWDER, FOR SOLUTION INTRAVENOUS at 17:55

## 2021-01-01 RX ADMIN — CARVEDILOL 6.25 MG: 3.12 TABLET, FILM COATED ORAL at 16:46

## 2021-01-01 RX ADMIN — PIPERACILLIN AND TAZOBACTAM 3.38 G: 3; .375 INJECTION, POWDER, LYOPHILIZED, FOR SOLUTION INTRAVENOUS at 17:10

## 2021-01-01 RX ADMIN — LOSARTAN POTASSIUM 100 MG: 50 TABLET, FILM COATED ORAL at 08:35

## 2021-01-01 RX ADMIN — ATORVASTATIN CALCIUM 40 MG: 40 TABLET, FILM COATED ORAL at 21:12

## 2021-01-01 RX ADMIN — PANTOPRAZOLE SODIUM 40 MG: 40 TABLET, DELAYED RELEASE ORAL at 09:39

## 2021-01-01 RX ADMIN — Medication 10 ML: at 16:07

## 2021-01-01 RX ADMIN — APIXABAN 5 MG: 5 TABLET, FILM COATED ORAL at 21:49

## 2021-01-01 RX ADMIN — PREDNISOLONE ACETATE 1 DROP: 10 SUSPENSION/ DROPS OPHTHALMIC at 22:54

## 2021-01-01 RX ADMIN — INSULIN LISPRO 2 UNITS: 100 INJECTION, SOLUTION INTRAVENOUS; SUBCUTANEOUS at 23:53

## 2021-01-01 RX ADMIN — INSULIN LISPRO 3 UNITS: 100 INJECTION, SOLUTION INTRAVENOUS; SUBCUTANEOUS at 17:45

## 2021-01-01 RX ADMIN — APIXABAN 5 MG: 5 TABLET, FILM COATED ORAL at 22:00

## 2021-01-01 RX ADMIN — WHITE PETROLATUM,ZINC OXIDE: 57; 17 PASTE TOPICAL at 21:45

## 2021-01-01 RX ADMIN — PROPOFOL 20 MG: 10 INJECTION, EMULSION INTRAVENOUS at 14:39

## 2021-01-01 RX ADMIN — WHITE PETROLATUM,ZINC OXIDE: 57; 17 PASTE TOPICAL at 08:48

## 2021-01-01 RX ADMIN — PREDNISOLONE ACETATE 1 DROP: 10 SUSPENSION/ DROPS OPHTHALMIC at 17:44

## 2021-01-01 RX ADMIN — FUROSEMIDE 40 MG: 10 INJECTION, SOLUTION INTRAMUSCULAR; INTRAVENOUS at 09:38

## 2021-01-01 RX ADMIN — POTASSIUM CHLORIDE 40 MEQ: 1.5 POWDER, FOR SOLUTION ORAL at 20:34

## 2021-01-01 RX ADMIN — Medication 10 ML: at 21:39

## 2021-01-01 RX ADMIN — Medication 10 ML: at 06:05

## 2021-01-01 RX ADMIN — INSULIN LISPRO 6 UNITS: 100 INJECTION, SOLUTION INTRAVENOUS; SUBCUTANEOUS at 16:53

## 2021-01-01 RX ADMIN — WHITE PETROLATUM,ZINC OXIDE: 57; 17 PASTE TOPICAL at 22:00

## 2021-01-01 RX ADMIN — Medication 10 ML: at 13:32

## 2021-01-01 RX ADMIN — ATORVASTATIN CALCIUM 40 MG: 40 TABLET, FILM COATED ORAL at 01:02

## 2021-01-01 RX ADMIN — CLONIDINE HYDROCHLORIDE 0.2 MG: 0.1 TABLET ORAL at 21:12

## 2021-01-01 RX ADMIN — PIPERACILLIN AND TAZOBACTAM 3.38 G: 3; .375 INJECTION, POWDER, LYOPHILIZED, FOR SOLUTION INTRAVENOUS at 17:34

## 2021-01-01 RX ADMIN — CARVEDILOL 6.25 MG: 3.12 TABLET, FILM COATED ORAL at 09:22

## 2021-01-01 RX ADMIN — WHITE PETROLATUM,ZINC OXIDE: 57; 17 PASTE TOPICAL at 18:20

## 2021-01-01 RX ADMIN — PAROXETINE HYDROCHLORIDE 20 MG: 20 TABLET, FILM COATED ORAL at 11:04

## 2021-01-01 RX ADMIN — INSULIN LISPRO 10 UNITS: 100 INJECTION, SOLUTION INTRAVENOUS; SUBCUTANEOUS at 08:14

## 2021-01-01 RX ADMIN — WHITE PETROLATUM,ZINC OXIDE: 57; 17 PASTE TOPICAL at 21:42

## 2021-01-01 RX ADMIN — DEXTROSE MONOHYDRATE 25 G: 25 INJECTION, SOLUTION INTRAVENOUS at 18:50

## 2021-01-01 RX ADMIN — ALOGLIPTIN 12.5 MG: 12.5 TABLET, FILM COATED ORAL at 11:03

## 2021-01-01 RX ADMIN — APIXABAN 5 MG: 5 TABLET, FILM COATED ORAL at 21:57

## 2021-01-01 RX ADMIN — CLONIDINE HYDROCHLORIDE 0.2 MG: 0.1 TABLET ORAL at 09:50

## 2021-01-01 RX ADMIN — CLONIDINE HYDROCHLORIDE 0.3 MG: 0.1 TABLET ORAL at 21:49

## 2021-01-01 RX ADMIN — CARVEDILOL 6.25 MG: 3.12 TABLET, FILM COATED ORAL at 17:22

## 2021-01-01 RX ADMIN — DEXAMETHASONE SODIUM PHOSPHATE 4 MG: 4 INJECTION, SOLUTION INTRA-ARTICULAR; INTRALESIONAL; INTRAMUSCULAR; INTRAVENOUS; SOFT TISSUE at 03:32

## 2021-01-01 RX ADMIN — GLIPIZIDE 5 MG: 5 TABLET ORAL at 09:21

## 2021-01-01 RX ADMIN — GABAPENTIN 100 MG: 100 CAPSULE ORAL at 01:02

## 2021-01-01 RX ADMIN — PROPOFOL 50 MG: 10 INJECTION, EMULSION INTRAVENOUS at 14:30

## 2021-01-01 RX ADMIN — WHITE PETROLATUM,ZINC OXIDE: 57; 17 PASTE TOPICAL at 08:02

## 2021-01-13 NOTE — TELEPHONE ENCOUNTER
Pt called back stating her ICD has been beeping every 4 hrs & started this morning at 9:00. She stated that she has been feeling fine. Pt denies shocks, dizziness, syncope, chest pain, SOB, irreg beats. Pt doesn't have a remote. [facility] for tomorrow am at 9:00 & informed her she will cont to beep until she comes in for check. Pt stated she understands.

## 2021-01-13 NOTE — TELEPHONE ENCOUNTER
Patient states that her \"box has been going off a couple of times\". Please advise.      Phone: [phone]

## 2021-01-14 NOTE — PROGRESS NOTES
no charge/MDT ICD/pacer ck for high urgency tones    Probable shocking lead fracture. NP appraised - CXR today & f/u with Gale Ugalde Next Wednesday afternoon. See scanned document for details.

## 2021-01-19 NOTE — PROGRESS NOTES
HISTORY OF PRESENTING ILLNESS Herman Robledo is a 76 y.o. female with hypertension, cardiomyopathy, and dual chamber Medtronic ICD who presents for follow up of her ICD. She denies chest pain, SOB, edema, syncope or functional limitations. Her device interrogation reveals normal device functioning. She has new onset atrial fibrillation occurring on device check, longest episode was 40 minutes. She denies cardiac complaints. Eliquis 5mg BID for CVA risk reduction (CHADS VASC 3) as well as Lopressor 25 mg BID. She is tolerating her anticoagulation thus far however had fatigue since starting the Lopressor. Metoprolol was lowered to 12.5mg daily as a result. She was last seen in 11/2020; however recent device check showed probable coil fracture. She had chest xray and now presents for follow up. She has a Mc lead implanted in 2006 with sensing 5076 RV lead (2014) as well as an atrial lead. PAST MEDICAL HISTORY Past Medical History:  
Diagnosis Date  [facility] ([address]  Cardiomyopathy, idiopathic ([address] 10/28/2010  
 HTN (hypertension), benign 10/28/2010  Pacemaker ICD  PVC (premature ventricular contraction) 10/28/2010 PAST SURGICAL HISTORY Past Surgical History:  
Procedure Laterality Date  HX OTHER SURGICAL  10/02/2018 Right eye surgery  HX PACEMAKER ALLERGIES Allergies Allergen Reactions  Chocolate [Cocoa] Anaphylaxis and Swelling  Iodine Anaphylaxis  Lisinopril Anaphylaxis  Peanut Anaphylaxis and Swelling  Glimepiride Swelling  Glipizide Other (comments) Pruritis  Metformin Diarrhea  Norvasc [Amlodipine] Other (comments) Miachel Sabins  Pneumovax 23 [Pneumococcal 23-Devi Ps Vaccine] Hives  Toprol Xl [Metoprolol Succinate] Other (comments) Light headed  Vitamin D [Cholecalciferol (Vitamin D3)] Other (comments) Dizziness/headache FAMILY HISTORY No family history on file. negative for cardiac disease SOCIAL HISTORY Social History Socioeconomic History  Marital status: SINGLE Spouse name: Not on file  Number of children: Not on file  Years of education: Not on file  Highest education level: Not on file Tobacco Use  Smoking status: Never Smoker  Smokeless tobacco: Never Used Substance and Sexual Activity  Alcohol use: No  
 Drug use: Never MEDICATIONS Current Outpatient Medications Medication Sig  
 metFORMIN (GLUCOPHAGE) 500 mg tablet TAKE 1 TABLET BY MOUTH TWICE A DAY  gabapentin (NEURONTIN) 100 mg capsule TAKE 1 CAPSULE BY MOUTH THREE TIMES A DAY  SITagliptin (Januvia) 100 mg tablet TAKE 1 TABLET BY MOUTH EVERY DAY  metoprolol succinate (TOPROL-XL) 25 mg XL tablet TAKE 0.5 TABS BY MOUTH DAILY.  ELIQUIS 5 mg tablet TAKE 1 TABLET BY MOUTH TWICE A DAY  nitroglycerin (NITROSTAT) 0.4 mg SL tablet 0.4 mg by SubLINGual route every five (5) minutes as needed for Chest Pain. Up to 3 doses.  insulin syringe-needle U-100 (BD INSULIN SYRINGE ULTRA-FINE) 1/2 mL 31 gauge x 15/64\" syrg Use 4 times daily. Dx code E11.65  
 flash glucose scanning reader (FREESTYLE BELKIS READER) misc Test 4 times daily. Dx code E11.65. To use as needed  flash glucose sensor (FREESTYLE BELKIS SENSOR) kit Test 4 times daily. Dx code E11.65. Change every 10 days  cloNIDine HCl (CATAPRES) 0.3 mg tablet TAKE 1 TABLET TWICE DAILY  NIFEdipine ER (ADALAT CC) 30 mg ER tablet TAKE 1 TABLET BY MOUTH EVERY DAY FOR 90 DAYS  losartan (COZAAR) 100 mg tablet TAKE 1 TABLET EVERY DAY  hydroCHLOROthiazide (MICROZIDE) 12.5 mg capsule TAKE ONE CAPSULE BY MOUTH ONCE A DAY  PARoxetine (PAXIL) 20 mg tablet Take  by mouth daily.  omeprazole (PRILOSEC) 20 mg capsule Take 20 mg by mouth daily. No current facility-administered medications for this visit. I have reviewed the nurses notes, vitals, problem list, allergy list, medical history, family, social history and medications. REVIEW OF SYMPTOMS General: Pt denies excessive weight gain or loss. Pt is able to conduct ADL's HEENT: Denies blurred vision, headaches, hearing loss, epistaxis and difficulty swallowing. Respiratory: Denies cough, congestion, shortness of breath, WEAVER, wheezing or stridor. Cardiovascular: Denies precordial pain, palpitations, edema or PND Gastrointestinal: Denies poor appetite, indigestion, abdominal pain or blood in stool Genitourinary: Denies hematuria, dysuria, increased urinary frequency Musculoskeletal: Denies joint pain or swelling from muscles or joints Neurologic: Denies tremor, paresthesias, headache, or sensory motor disturbance Psychiatric: Denies confusion, insomnia, depression Integumentray: Denies rash, itching or ulcers. Hematologic: Denies easy bruising, bleeding PHYSICAL EXAMINATION Vitals: see vitals section General: Well developed, in no acute distress. HEENT: No jaundice, oral mucosa moist, no oral ulcers Neck: Supple, no stiffness, no lymphadenopathy, supple Heart:  Normal S1/S2 negative S3 or S4. Regular, no murmur, gallop or rub, no jugular venous distention Respiratory: Clear bilaterally x 4, no wheezing or rales Abdomen:   Soft, non-tender, bowel sounds are active. Extremities:  No edema, normal cap refill, no cyanosis. Musculoskeletal: No clubbing, no deformities Neuro: A&Ox3, speech clear, gait stable, cooperative, no focal neurologic deficits Skin: Skin color is normal. No rashes or lesions. Non diaphoretic, moist. 
Vascular: 2+ pulses symmetric in all extremities DIAGNOSTIC DATA LABORATORY DATA Lab Results Component Value Date/Time  WBC 6.0 08/13/2018 10:48 AM  
 HGB 13.7 08/13/2018 10:48 AM  
 HCT 40.5 08/13/2018 10:48 AM  
 PLATELET 905 85/29/9000 10:48 AM  
 MCV 89 08/13/2018 10:48 AM  
  
 Lab Results Component Value Date/Time Sodium 140 01/14/2019 10:09 AM  
 Potassium 3.7 01/14/2019 10:09 AM  
 Chloride 100 01/14/2019 10:09 AM  
 CO2 23 01/14/2019 10:09 AM  
 Glucose 207 (H) 01/14/2019 10:09 AM  
 BUN 19 01/14/2019 10:09 AM  
 Creatinine 1.28 (H) 01/14/2019 10:09 AM  
 BUN/Creatinine ratio 15 01/14/2019 10:09 AM  
 GFR est AA 51 (L) 01/14/2019 10:09 AM  
 GFR est non-AA 44 (L) 01/14/2019 10:09 AM  
 Calcium 9.7 01/14/2019 10:09 AM  
 Bilirubin, total 0.4 01/14/2019 10:09 AM  
 Alk. phosphatase 98 01/14/2019 10:09 AM  
 Protein, total 7.6 01/14/2019 10:09 AM  
 Albumin 4.0 01/14/2019 10:09 AM  
 A-G Ratio 1.1 (L) 01/14/2019 10:09 AM  
 ALT (SGPT) 11 01/14/2019 10:09 AM  
  
 
 
 ASSESSMENT 1. ICD A. Medtronic B. Dual 
2. Cardiomyopathy 3. Hypertension 4. Paroxysmal [facility] CHADS VASC 3 Asymptomatic 5. ICD lead fracture PLAN  
 
 
 
  ICD-10-CM ICD-9-CM 1. ICD (implantable cardioverter-defibrillator) in place  Z95.810 V45.02   
2. Paroxysmal atrial fibrillation (HCC)  I48.0 427.31   
3. HTN (hypertension), benign  I10 401.1 4. PVC (premature ventricular contraction)  I49.3 427.69   
5. Cardiomyopathy, idiopathic (HCC)  I42.8 425.4 No orders of the defined types were placed in this encounter. FOLLOW-UP Thank you, Chichi Jain MD for allowing me to participate in the care of this extraordinarily pleasant female. Please do not hesitate to contact me for further questions/concerns. Dwayne Jones MD 
Cardiac Electrophysiology / Cardiology [address] [place] [address] 
[facility], [address] [place] [address], [address] 
[phone] / [phone] Fax   [phone] / [phone] Fax

## 2021-01-20 NOTE — PROGRESS NOTES
Room # 3 Chest pain: no Shortness of breath: no 
Edema: no 
Palpitations, Skipped beats, Rapid heartbeat: no 
Dizziness: no Fatigue: yes New diagnosis/Surgeries: corneal implant 12/2/20 & 1/6/21 ER/Hospitalizations: no 
 
Refills:no Visit Vitals /70 (BP 1 Location: Left arm, BP Patient Position: Sitting) Pulse 74 Resp 16 Ht 5' 7\" (1.702 m) Wt 187 lb 12.8 oz (85.2 kg) SpO2 99% BMI 29.41 kg/m²

## 2021-02-08 NOTE — TELEPHONE ENCOUNTER
Patient is following up on scheduling a \"defibrillator or something\". Please advise.        Phone: [phone]

## 2021-02-11 NOTE — TELEPHONE ENCOUNTER
Billie Bean, NP  You Yesterday (12:55 PM)     Planning for extraction; Dr. Damaris Lopez is to talk to Dr. Darren Ring about scheduling procedure. AFter they discuss, his office will contact her to arrange. Returned patient call, ID verified using two patient identifiers. Patient calling to ask when her procedure is going to be scheduled. Relayed above information. Patient verbalized understanding and will call with any other questions.

## 2021-02-13 PROBLEM — R50.9 FEVER: Status: ACTIVE | Noted: 2021-01-01

## 2021-02-13 PROBLEM — R06.02 SHORTNESS OF BREATH: Status: ACTIVE | Noted: 2021-01-01

## 2021-02-13 PROBLEM — U07.1 COVID-19 VIRUS INFECTION: Status: ACTIVE | Noted: 2021-01-01

## 2021-02-13 PROBLEM — I50.9 CHF (CONGESTIVE HEART FAILURE) (HCC): Status: ACTIVE | Noted: 2021-01-01

## 2021-02-13 PROBLEM — I50.9 ACUTE CHF (CONGESTIVE HEART FAILURE) (HCC): Status: ACTIVE | Noted: 2021-01-01

## 2021-02-13 NOTE — ED TRIAGE NOTES
Pt started new BP medication on tuesday, shortly after became lightheaded, and fell, unable to get up.  Pt states \"her bones hurt\", gcs 15

## 2021-02-13 NOTE — Clinical Note
Status[de-identified] INPATIENT [101]  Type of Bed: Medical [8]  Inpatient Hospitalization Certified Necessary for the Following Reasons: 3. Patient receiving treatment that can only be provided in an inpatient setting (further clarification in H&P documentation)   Admitting Diagnosis: Shortness of breath [786.05. ICD-9-CM]  Admitting Diagnosis: Acute CHF (congestive heart failure) ([facility] [5174255]  Admitting Diagnosis: COVID-19 virus infection [3398855734]  Admitting Diagnosis: Fever [6463459]  Admitting Physi phani: Linda Lazaro [[id number]]  Attending Physician: Linda Lazaro [[id number]]  Estimated Length of Stay: 3-4 Midnights  Discharge Plan[de-identified] Home with Office Follow-up

## 2021-02-14 NOTE — CONSULTS
Cardiology Consult    NAME: Doretha Gandhi   :  1955   MRN:  [id number]     Date/Time:  2021 2:49 PM    Patient PCP: Sam Quintana MD  ________________________________________________________________________     Assessment:   Acute COVID-19 infection  Dilated cardiomyopathy/CHF  AICD dual-chamber  [facility]  Type 2 diabetes      Plan:   Treatment of underlying Covid infection as per pulmonary  I do not favor invasive cardiac evaluation  Serial cardiac enzymes/EKG      []        High complexity decision making was performed        Subjective:   CHIEF COMPLAINT: SOB      REASON FOR CONSULT: CHF      HISTORY OF PRESENT ILLNESS:     Doretha Gandhi is a 78 y.o. BLACK [place]  female who has hypertension, DM, cardiomyopathy, and dual chamber Medtronic ICD. She has atrial fibrillation on Eliquis and came to the hospital for complaints of shortness of breath over several days duration. She tested positive for COVID-19. Cardiology consultation is requested because of her history of heart failure. Reports generalized weakness, dizziness and shortness of breath. She reports compliance with her prescribed medications. He denies any AICD discharge, falls, or swelling.         Past Medical History:   Diagnosis Date    [facility] ([address]     Cardiomyopathy, idiopathic ([facility] 10/28/2010    HTN (hypertension), benign 10/28/2010    Pacemaker     ICD    PVC (premature ventricular contraction) 10/28/2010      Past Surgical History:   Procedure Laterality Date    HX OTHER SURGICAL  10/02/2018    Right eye surgery    HX PACEMAKER       Allergies   Allergen Reactions    Chocolate [Cocoa] Anaphylaxis and Swelling    Iodine Anaphylaxis    Lisinopril Anaphylaxis    Peanut Anaphylaxis and Swelling    Glimepiride Swelling    Glipizide Other (comments)     Pruritis    Metformin Diarrhea    Norvasc [Amlodipine] Other (comments)     Light Headed    Pneumovax 23 [Pneumococcal 23-Devi Ps Vaccine] Hives    Toprol Xl [Metoprolol Succinate] Other (comments)     Light headed    Vitamin D [Cholecalciferol (Vitamin D3)] Other (comments)     Dizziness/headache      Meds:  See below  Social History     Tobacco Use    Smoking status: Never Smoker    Smokeless tobacco: Never Used   Substance Use Topics    Alcohol use: No      No family history on file. REVIEW OF SYSTEMS:     [x]         Unable to obtain  ROS due to Covid infection   []         Total of 12 systems reviewed as follows:        Objective:      Physical Exam:    Last 24hrs VS reviewed since prior progress note. Most recent are:    Visit Vitals  BP (!) 157/105   Pulse (!) 110   Temp 98.2 °F (36.8 °C)   Resp 26   Ht 5' 7\" (1.702 m)   Wt 84.8 kg (187 lb)   SpO2 97%   BMI 29.29 kg/m²       Intake/Output Summary (Last 24 hours) at 2/14/2021 1449  Last data filed at 2/14/2021 0507  Gross per 24 hour   Intake    Output 700 ml   Net -700 ml      Due to active Covid infection, a noncontact physical examination was performed      General Appearance: Well developed, o acute distress. l. Neck: Supple. JVP within normal limits. Chest: Unlabored respiration  Cardiovascular: No evidence of respiratory distress; edema seen  Abdomen: mild distention noted  Extremities: No edema bilaterally  Skin: No diaphoresis  Neuro: Alert and oriented x3, normal speech  Psychiatric: Cooperative    []         Post-cath site without hematoma, bruit, tenderness, or thrill. Distal pulses intact. Data:      Telemetry: Sinus Rhythm    EKG: Sinus rhythm with PVCs and LVH  []  No new EKG for review. Prior to Admission medications    Medication Sig Start Date End Date Taking? Authorizing Provider   metFORMIN (GLUCOPHAGE) 1,000 mg tablet two (2) times daily (with meals).  10/12/20   Provider, Historical   gabapentin (NEURONTIN) 100 mg capsule TAKE 1 CAPSULE BY MOUTH THREE TIMES A DAY 10/27/20   Provider, Historical   SITagliptin (Januvia) 100 mg tablet TAKE 1 TABLET BY MOUTH EVERY DAY 8/2/20   Letty Paris MD   metoprolol succinate (TOPROL-XL) 25 mg XL tablet TAKE 0.5 TABS BY MOUTH DAILY. 12/16/19   Vika Card NP   ELIQUIS 5 mg tablet TAKE 1 TABLET BY MOUTH TWICE A DAY 12/5/19   Vika Card NP   nitroglycerin (NITROSTAT) 0.4 mg SL tablet 0.4 mg by SubLINGual route every five (5) minutes as needed for Chest Pain. Up to 3 doses. Provider, Historical   insulin syringe-needle U-100 (BD INSULIN SYRINGE ULTRA-FINE) 1/2 mL 31 gauge x 15/64\" syrg Use 4 times daily. Dx code E11.65 8/30/18   Letty Paris MD   flash glucose scanning reader (FREESTYLE BELKIS READER) misc Test 4 times daily. Dx code E11.65. To use as needed 8/30/18   Letty Paris MD   flash glucose sensor (FREESTYLE BELKIS SENSOR) kit Test 4 times daily. Dx code E11.65. Change every 10 days 8/30/18   Letty Paris MD   cloNIDine HCl (CATAPRES) 0.3 mg tablet TAKE 1 TABLET TWICE DAILY 5/21/18   Provider, Historical   NIFEdipine ER (ADALAT CC) 30 mg ER tablet TAKE 1 TABLET BY MOUTH EVERY DAY FOR 90 DAYS 5/20/18   Provider, Historical   losartan (COZAAR) 100 mg tablet TAKE 1 TABLET EVERY DAY 4/25/17   Provider, Historical   hydroCHLOROthiazide (MICROZIDE) 12.5 mg capsule TAKE ONE CAPSULE BY MOUTH ONCE A DAY 5/15/17   Provider, Historical   PARoxetine (PAXIL) 20 mg tablet Take  by mouth daily. Provider, Historical   omeprazole (PRILOSEC) 20 mg capsule Take 20 mg by mouth daily.  10/28/10   Provider, Historical       Recent Results (from the past 24 hour(s))   CBC WITH AUTOMATED DIFF    Collection Time: 02/13/21  5:45 PM   Result Value Ref Range    WBC 6.2 3.6 - 11.0 K/uL    RBC 4.12 3.80 - 5.20 M/uL    HGB 12.2 11.5 - 16.0 g/dL    HCT 37.4 35.0 - 47.0 %    MCV 90.8 80.0 - 99.0 FL    MCH 29.6 26.0 - 34.0 PG    MCHC 32.6 30.0 - 36.5 g/dL    RDW 14.0 11.5 - 14.5 %    PLATELET 883 336 - 627 K/uL    MPV 12.0 8.9 - 12.9 FL    NEUTROPHILS 80 (H) 32 - 75 %    LYMPHOCYTES 10 (L) 12 - 49 % MONOCYTES 10 5 - 13 %    EOSINOPHILS 0 0 - 7 %    BASOPHILS 0 0 - 1 %    IMMATURE GRANULOCYTES 0 0.0 - 0.5 %    ABS. NEUTROPHILS 5.0 1.8 - 8.0 K/UL    ABS. LYMPHOCYTES 0.6 (L) 0.8 - 3.5 K/UL    ABS. MONOCYTES 0.6 0.0 - 1.0 K/UL    ABS. EOSINOPHILS 0.0 0.0 - 0.4 K/UL    ABS. BASOPHILS 0.0 0.0 - 0.1 K/UL    ABS. IMM. GRANS. 0.0 0.00 - 0.04 K/UL    DF AUTOMATED     METABOLIC PANEL, COMPREHENSIVE    Collection Time: 02/13/21  5:45 PM   Result Value Ref Range    Sodium 142 136 - 145 mmol/L    Potassium 3.4 (L) 3.5 - 5.1 mmol/L    Chloride 112 (H) 97 - 108 mmol/L    CO2 21 21 - 32 mmol/L    Anion gap 9 5 - 15 mmol/L    Glucose 84 65 - 100 mg/dL    BUN 17 6 - 20 mg/dL    Creatinine 1.02 0.55 - 1.02 mg/dL    BUN/Creatinine ratio 17 12 - 20      GFR est AA >60 >60 ml/min/1.73m2    GFR est non-AA 54 (L) >60 ml/min/1.73m2    Calcium 8.6 8.5 - 10.1 mg/dL    Bilirubin, total 0.3 0.2 - 1.0 mg/dL    AST (SGOT) 18 15 - 37 U/L    ALT (SGPT) 17 12 - 78 U/L    Alk.  phosphatase 72 45 - 117 U/L    Protein, total 8.0 6.4 - 8.2 g/dL    Albumin 3.1 (L) 3.5 - 5.0 g/dL    Globulin 4.9 (H) 2.0 - 4.0 g/dL    A-G Ratio 0.6 (L) 1.1 - 2.2     TROPONIN I    Collection Time: 02/13/21  5:45 PM   Result Value Ref Range    Troponin-I, Qt. 0.30 (H) <0.05 ng/mL   BNP    Collection Time: 02/13/21  5:45 PM   Result Value Ref Range    NT pro-BNP 1,637 (H) <125 pg/mL   MAGNESIUM    Collection Time: 02/13/21  5:45 PM   Result Value Ref Range    Magnesium 2.0 1.6 - 2.4 mg/dL   TSH 3RD GENERATION    Collection Time: 02/13/21  5:45 PM   Result Value Ref Range    TSH 0.09 (L) 0.36 - 3.74 uIU/mL   PHOSPHORUS    Collection Time: 02/13/21  5:45 PM   Result Value Ref Range    Phosphorus 2.9 2.6 - 4.7 mg/dL   CK    Collection Time: 02/13/21  5:45 PM   Result Value Ref Range    CK 95 26 - 192 U/L   GLUCOSE, POC    Collection Time: 02/13/21  6:44 PM   Result Value Ref Range    Glucose (POC) 48 (LL) 65 - 100 mg/dL    Performed by Codie 1, POC Collection Time: 02/13/21  7:35 PM   Result Value Ref Range    Glucose (POC) 116 (H) 65 - 100 mg/dL    Performed by Paris Perdue    SARS-COV-2    Collection Time: 02/13/21  7:45 PM   Result Value Ref Range    SARS-CoV-2 Please find results under separate order     INFLUENZA A & B AG (RAPID TEST)    Collection Time: 02/13/21  7:45 PM   Result Value Ref Range    Influenza A Antigen Negative Negative      Influenza B Antigen Negative Negative     COVID-19 RAPID TEST    Collection Time: 02/13/21  7:45 PM   Result Value Ref Range    Specimen source Nasopharyngeal      COVID-19 rapid test DETECTED (A) Not Detected     EKG, 12 LEAD, INITIAL    Collection Time: 02/13/21  8:07 PM   Result Value Ref Range    Ventricular Rate 92 BPM    Atrial Rate 92 BPM    P-R Interval 166 ms    QRS Duration 98 ms    Q-T Interval 402 ms    QTC Calculation (Bezet) 497 ms    Calculated R Axis -21 degrees    Calculated T Axis 80 degrees    Diagnosis       Sinus rhythm with occasional Premature ventricular complexes  Moderate voltage criteria for LVH, may be normal variant  Prolonged QT  Abnormal ECG  When compared with ECG of 13-FEB-2021 18:05, (Unconfirmed)  Premature ventricular complexes are now Present  Confirmed by TANYA NORIEGA, Daniel Paredes ([id number]) on 2/14/2021 12:37:10 PM     HEMOGLOBIN A1C WITH EAG    Collection Time: 02/14/21  6:15 AM   Result Value Ref Range    Hemoglobin A1c 7.7 (H) 4.0 - 5.6 %    Est. average glucose 174 mg/dL   TROPONIN I    Collection Time: 02/14/21  6:15 AM   Result Value Ref Range    Troponin-I, Qt. 0.45 (H) <6.42 ng/mL   METABOLIC PANEL, COMPREHENSIVE    Collection Time: 02/14/21  6:15 AM   Result Value Ref Range    Sodium 141 136 - 145 mmol/L    Potassium 3.8 3.5 - 5.1 mmol/L    Chloride 113 (H) 97 - 108 mmol/L    CO2 22 21 - 32 mmol/L    Anion gap 6 5 - 15 mmol/L    Glucose 49 (LL) 65 - 100 mg/dL    BUN 15 6 - 20 mg/dL    Creatinine 0.76 0.55 - 1.02 mg/dL    BUN/Creatinine ratio 20 12 - 20      GFR est AA >60 >60 ml/min/1.73m2    GFR est non-AA >60 >60 ml/min/1.73m2    Calcium 9.1 8.5 - 10.1 mg/dL    Bilirubin, total 0.4 0.2 - 1.0 mg/dL    AST (SGOT) 25 15 - 37 U/L    ALT (SGPT) 18 12 - 78 U/L    Alk. phosphatase 72 45 - 117 U/L    Protein, total 8.3 (H) 6.4 - 8.2 g/dL    Albumin 3.1 (L) 3.5 - 5.0 g/dL    Globulin 5.2 (H) 2.0 - 4.0 g/dL    A-G Ratio 0.6 (L) 1.1 - 2.2     CBC WITH AUTOMATED DIFF    Collection Time: 02/14/21  6:15 AM   Result Value Ref Range    WBC 4.9 3.6 - 11.0 K/uL    RBC 4.17 3.80 - 5.20 M/uL    HGB 12.5 11.5 - 16.0 g/dL    HCT 37.9 35.0 - 47.0 %    MCV 90.9 80.0 - 99.0 FL    MCH 30.0 26.0 - 34.0 PG    MCHC 33.0 30.0 - 36.5 g/dL    RDW 14.0 11.5 - 14.5 %    PLATELET 307 519 - 049 K/uL    MPV 12.0 8.9 - 12.9 FL    NEUTROPHILS 81 (H) 32 - 75 %    LYMPHOCYTES 13 12 - 49 %    MONOCYTES 6 5 - 13 %    EOSINOPHILS 0 0 - 7 %    BASOPHILS 0 0 - 1 %    IMMATURE GRANULOCYTES 0 0.0 - 0.5 %    ABS. NEUTROPHILS 3.9 1.8 - 8.0 K/UL    ABS. LYMPHOCYTES 0.6 (L) 0.8 - 3.5 K/UL    ABS. MONOCYTES 0.3 0.0 - 1.0 K/UL    ABS. EOSINOPHILS 0.0 0.0 - 0.4 K/UL    ABS. BASOPHILS 0.0 0.0 - 0.1 K/UL    ABS. IMM.  GRANS. 0.0 0.00 - 0.04 K/UL    DF AUTOMATED     URINALYSIS W/ REFLEX CULTURE    Collection Time: 02/14/21  6:50 AM    Specimen: Urine   Result Value Ref Range    Color Yellow/Straw      Appearance Clear Clear      Specific gravity 1.020 1.003 - 1.030      pH (UA) 6.0 5.0 - 8.0      Protein >300 (A) Negative mg/dL    Glucose Negative Negative mg/dL    Ketone Negative Negative mg/dL    Bilirubin Negative Negative      Blood Negative Negative      Urobilinogen 0.1 0.1 - 1.0 EU/dL    Nitrites Negative Negative      Leukocyte Esterase Negative Negative      UA:UC IF INDICATED Culture not indicated by UA result Culture not indicated by UA result      WBC 0-4 0 - 4 /hpf    RBC 0-5 0 - 5 /hpf    Bacteria Negative Negative /hpf    Mucus Trace /lpf   DRUG SCREEN, URINE    Collection Time: 02/14/21  8:15 AM   Result Value Ref Range AMPHETAMINES Negative Negative      BARBITURATES Negative Negative      BENZODIAZEPINES Negative Negative      COCAINE Negative Negative      METHADONE Negative Negative      OPIATES Negative Negative      PCP(PHENCYCLIDINE) Negative Negative      THC (TH-CANNABINOL) Negative Negative      Drug screen comment        This test is a screen for drugs of abuse in a medical setting only (i.e., they are unconfirmed results and as such must not be used for non-medical purposes, e.g.,employment testing, legal testing). Due to its inherent nature, false positive (FP) and false negative (FN) results may be obtained. Therefore, if necessary for medical care, recommend confirmation of positive findings by GC/MS.    GLUCOSE, POC    Collection Time: 02/14/21  8:18 AM   Result Value Ref Range    Glucose (POC) 199 (H) 65 - 100 mg/dL    Performed by Miryam Nolasco MD

## 2021-02-14 NOTE — H&P
History and Physical    NAME: Di Trevizo   :  1955   MRN:  [id number]     Date/Time:  2021 2:07 PM    Patient PCP: Reddy Figueroa MD  ______________________________________________________________________             Subjective:     CHIEF COMPLAINT:   Dizziness      HISTORY OF PRESENT ILLNESS:       Patient is a 78y.o. year old female signal past medical history of diabetes hypertension history of AICD placement GERD came to the ER complaining of generalized dizziness weakness also history of severe cardiomyopathy chronic A. fib PVCs seen by the ER physician work-up done shows positive for Covid and congestive heart failure patient was admitted for further evaluation and treatment    Patient denies any fever chills cough and congestion complaining of generalized weakness difficulty in walking    Past Medical History:   Diagnosis Date    [facility] ([address]     Cardiomyopathy, idiopathic ([facility] [address] 10/28/2010    HTN (hypertension), benign 10/28/2010    Pacemaker     ICD    PVC (premature ventricular contraction) 10/28/2010        Past Surgical History:   Procedure Laterality Date    HX OTHER SURGICAL  10/02/2018    Right eye surgery    HX PACEMAKER         Social History     Tobacco Use    Smoking status: Never Smoker    Smokeless tobacco: Never Used   Substance Use Topics    Alcohol use: No        No family history on file.     Allergies   Allergen Reactions    Chocolate [Cocoa] Anaphylaxis and Swelling    Iodine Anaphylaxis    Lisinopril Anaphylaxis    Peanut Anaphylaxis and Swelling    Glimepiride Swelling    Glipizide Other (comments)     Pruritis    Metformin Diarrhea    Norvasc [Amlodipine] Other (comments)     Light Headed    Pneumovax 23 [Pneumococcal 23-Devi Ps Vaccine] Hives    Toprol Xl [Metoprolol Succinate] Other (comments)     Light headed    Vitamin D [Cholecalciferol (Vitamin D3)] Other (comments)     Dizziness/headache        Prior to Admission medications Medication Sig Start Date End Date Taking? Authorizing Provider   metFORMIN (GLUCOPHAGE) 1,000 mg tablet two (2) times daily (with meals). 10/12/20   Provider, Historical   gabapentin (NEURONTIN) 100 mg capsule TAKE 1 CAPSULE BY MOUTH THREE TIMES A DAY 10/27/20   Provider, Historical   SITagliptin (Januvia) 100 mg tablet TAKE 1 TABLET BY MOUTH EVERY DAY 8/2/20   Bennie Cornell MD   metoprolol succinate (TOPROL-XL) 25 mg XL tablet TAKE 0.5 TABS BY MOUTH DAILY. 12/16/19   Jocelyn Tafoya NP   ELIQUIS 5 mg tablet TAKE 1 TABLET BY MOUTH TWICE A DAY 12/5/19   Jocelyn Tafoya NP   nitroglycerin (NITROSTAT) 0.4 mg SL tablet 0.4 mg by SubLINGual route every five (5) minutes as needed for Chest Pain. Up to 3 doses. Provider, Historical   insulin syringe-needle U-100 (BD INSULIN SYRINGE ULTRA-FINE) 1/2 mL 31 gauge x 15/64\" syrg Use 4 times daily. Dx code E11.65 8/30/18   Bennie Cornell MD   flash glucose scanning reader (FREESTYLE BELKIS READER) misc Test 4 times daily. Dx code E11.65. To use as needed 8/30/18   Bennie Cornell MD   flash glucose sensor (FREESTYLE BELKIS SENSOR) kit Test 4 times daily. Dx code E11.65. Change every 10 days 8/30/18   Bennie Cornell MD   cloNIDine HCl (CATAPRES) 0.3 mg tablet TAKE 1 TABLET TWICE DAILY 5/21/18   Provider, Historical   NIFEdipine ER (ADALAT CC) 30 mg ER tablet TAKE 1 TABLET BY MOUTH EVERY DAY FOR 90 DAYS 5/20/18   Provider, Historical   losartan (COZAAR) 100 mg tablet TAKE 1 TABLET EVERY DAY 4/25/17   Provider, Historical   hydroCHLOROthiazide (MICROZIDE) 12.5 mg capsule TAKE ONE CAPSULE BY MOUTH ONCE A DAY 5/15/17   Provider, Historical   PARoxetine (PAXIL) 20 mg tablet Take  by mouth daily. Provider, Historical   omeprazole (PRILOSEC) 20 mg capsule Take 20 mg by mouth daily.  10/28/10   Provider, Historical         Current Facility-Administered Medications:     labetaloL (NORMODYNE;TRANDATE) injection 20 mg, 20 mg, IntraVENous, Q4H PRN, Ammy Dumont MD, 20 mg at 02/14/21 0934    cloNIDine HCL (CATAPRES) tablet 0.1 mg, 0.1 mg, Oral, BID, Prakash Dumont MD    gabapentin (NEURONTIN) capsule 100 mg, 100 mg, Oral, BID, Prakash Dumont MD    metFORMIN (GLUCOPHAGE) tablet 500 mg, 500 mg, Oral, BID WITH MEALS, Prakash Dumont MD    apixaban (ELIQUIS) tablet 5 mg, 5 mg, Oral, BID, Ammy Dumont MD, 5 mg at 02/14/21 9849    SITagliptin (JANUVIA) tablet 100 mg, 100 mg, Oral, DAILY, Prakash Dumont MD, Stopped at 02/14/21 0900    losartan (COZAAR) tablet 100 mg, 100 mg, Oral, DAILY, Ammy Dumont MD, 100 mg at 02/14/21 4560    nitroglycerin (NITROSTAT) tablet 0.4 mg, 0.4 mg, SubLINGual, Q5MIN PRN, Prakash Dumont MD    pantoprazole (PROTONIX) tablet 40 mg, 40 mg, Oral, ACB, Ammy Dumont MD, 40 mg at 02/14/21 1994    PARoxetine (PAXIL) tablet 20 mg, 20 mg, Oral, DAILY, Ammy Dumont MD, 20 mg at 02/14/21 3072    insulin lispro (HUMALOG) injection, , SubCUTAneous, AC&HS, Prakash Dumont MD, Stopped at 02/14/21 0730    glucose chewable tablet 16 g, 4 Tab, Oral, PRN, Prakash Dumont MD    dextrose (D50W) injection syrg 12.5-25 g, 25-50 mL, IntraVENous, PRN, Ammy Dumont MD, 25 g at 02/14/21 0724    glucagon (GLUCAGEN) injection 1 mg, 1 mg, IntraMUSCular, PRN, Prakash Dumont MD    acetaminophen (TYLENOL) tablet 650 mg, 650 mg, Oral, Q6H PRN **OR** acetaminophen (TYLENOL) suppository 650 mg, 650 mg, Rectal, Q6H PRN, Prakash Dumont MD    polyethylene glycol (MIRALAX) packet 17 g, 17 g, Oral, DAILY PRN, Prakash Dumont MD    ondansetron (ZOFRAN ODT) tablet 4 mg, 4 mg, Oral, Q8H PRN **OR** ondansetron (ZOFRAN) injection 4 mg, 4 mg, IntraVENous, Q6H PRN, Ammy Dumont MD    atorvastatin (LIPITOR) tablet 40 mg, 40 mg, Oral, QHS, Ammy Dumont MD, 40 mg at 02/14/21 0101    furosemide (LASIX) injection 40 mg, 40 mg, IntraVENous, DAILY, Ammy Dumont MD, 40 mg at 02/14/21 0934    dexamethasone (DECADRON) 4 mg/mL injection 4 mg, 4 mg, IntraVENous, Q6H, Ammy Dumont MD, 4 mg at 02/14/21 0551    piperacillin-tazobactam (ZOSYN) 3.375 g in 0.9% sodium chloride (MBP/ADV) 100 mL MBP, 3.375 g, IntraVENous, Q8H, Ammy Dumont MD, Last Rate: 25 mL/hr at 02/14/21 0726, 3.375 g at 02/14/21 0726    azithromycin (ZITHROMAX) 500 mg in 0.9% sodium chloride 250 mL (VIAL-MATE), 500 mg, IntraVENous, Q24H, Ammy Dumont MD, 500 mg at 02/14/21 0102    Current Outpatient Medications:     metFORMIN (GLUCOPHAGE) 1,000 mg tablet, two (2) times daily (with meals). , Disp: , Rfl:     gabapentin (NEURONTIN) 100 mg capsule, TAKE 1 CAPSULE BY MOUTH THREE TIMES A DAY, Disp: , Rfl:     SITagliptin (Januvia) 100 mg tablet, TAKE 1 TABLET BY MOUTH EVERY DAY, Disp: 90 Tab, Rfl: 2    metoprolol succinate (TOPROL-XL) 25 mg XL tablet, TAKE 0.5 TABS BY MOUTH DAILY. , Disp: 45 Tab, Rfl: 11    ELIQUIS 5 mg tablet, TAKE 1 TABLET BY MOUTH TWICE A DAY, Disp: 180 Tab, Rfl: 3    nitroglycerin (NITROSTAT) 0.4 mg SL tablet, 0.4 mg by SubLINGual route every five (5) minutes as needed for Chest Pain. Up to 3 doses. , Disp: , Rfl:     insulin syringe-needle U-100 (BD INSULIN SYRINGE ULTRA-FINE) 1/2 mL 31 gauge x 15/64\" syrg, Use 4 times daily. Dx code E11.65, Disp: 200 Pen Needle, Rfl: 6    flash glucose scanning reader (FREESTYLE BELKIS READER) misc, Test 4 times daily. Dx code E11.65. To use as needed, Disp: 1 Each, Rfl: 6    flash glucose sensor (FREESTYLE BELKIS SENSOR) kit, Test 4 times daily. Dx code E11.65.  Change every 10 days, Disp: 3 Each, Rfl: 6    cloNIDine HCl (CATAPRES) 0.3 mg tablet, TAKE 1 TABLET TWICE DAILY, Disp: , Rfl: 3    NIFEdipine ER (ADALAT CC) 30 mg ER tablet, TAKE 1 TABLET BY MOUTH EVERY DAY FOR 90 DAYS, Disp: , Rfl: 2    losartan (COZAAR) 100 mg tablet, TAKE 1 TABLET EVERY DAY, Disp: , Rfl: 2    hydroCHLOROthiazide (MICROZIDE) 12.5 mg capsule, TAKE ONE CAPSULE BY MOUTH ONCE A DAY, Disp: , Rfl: 2    PARoxetine (PAXIL) 20 mg tablet, Take by mouth daily. , Disp: , Rfl:     omeprazole (PRILOSEC) 20 mg capsule, Take 20 mg by mouth daily. , Disp: , Rfl:     LAB DATA REVIEWED:    Recent Results (from the past 24 hour(s))   CBC WITH AUTOMATED DIFF    Collection Time: 02/13/21  5:45 PM   Result Value Ref Range    WBC 6.2 3.6 - 11.0 K/uL    RBC 4.12 3.80 - 5.20 M/uL    HGB 12.2 11.5 - 16.0 g/dL    HCT 37.4 35.0 - 47.0 %    MCV 90.8 80.0 - 99.0 FL    MCH 29.6 26.0 - 34.0 PG    MCHC 32.6 30.0 - 36.5 g/dL    RDW 14.0 11.5 - 14.5 %    PLATELET 479 974 - 694 K/uL    MPV 12.0 8.9 - 12.9 FL    NEUTROPHILS 80 (H) 32 - 75 %    LYMPHOCYTES 10 (L) 12 - 49 %    MONOCYTES 10 5 - 13 %    EOSINOPHILS 0 0 - 7 %    BASOPHILS 0 0 - 1 %    IMMATURE GRANULOCYTES 0 0.0 - 0.5 %    ABS. NEUTROPHILS 5.0 1.8 - 8.0 K/UL    ABS. LYMPHOCYTES 0.6 (L) 0.8 - 3.5 K/UL    ABS. MONOCYTES 0.6 0.0 - 1.0 K/UL    ABS. EOSINOPHILS 0.0 0.0 - 0.4 K/UL    ABS. BASOPHILS 0.0 0.0 - 0.1 K/UL    ABS. IMM. GRANS. 0.0 0.00 - 0.04 K/UL    DF AUTOMATED     METABOLIC PANEL, COMPREHENSIVE    Collection Time: 02/13/21  5:45 PM   Result Value Ref Range    Sodium 142 136 - 145 mmol/L    Potassium 3.4 (L) 3.5 - 5.1 mmol/L    Chloride 112 (H) 97 - 108 mmol/L    CO2 21 21 - 32 mmol/L    Anion gap 9 5 - 15 mmol/L    Glucose 84 65 - 100 mg/dL    BUN 17 6 - 20 mg/dL    Creatinine 1.02 0.55 - 1.02 mg/dL    BUN/Creatinine ratio 17 12 - 20      GFR est AA >60 >60 ml/min/1.73m2    GFR est non-AA 54 (L) >60 ml/min/1.73m2    Calcium 8.6 8.5 - 10.1 mg/dL    Bilirubin, total 0.3 0.2 - 1.0 mg/dL    AST (SGOT) 18 15 - 37 U/L    ALT (SGPT) 17 12 - 78 U/L    Alk.  phosphatase 72 45 - 117 U/L    Protein, total 8.0 6.4 - 8.2 g/dL    Albumin 3.1 (L) 3.5 - 5.0 g/dL    Globulin 4.9 (H) 2.0 - 4.0 g/dL    A-G Ratio 0.6 (L) 1.1 - 2.2     TROPONIN I    Collection Time: 02/13/21  5:45 PM   Result Value Ref Range    Troponin-I, Qt. 0.30 (H) <0.05 ng/mL   BNP    Collection Time: 02/13/21  5:45 PM   Result Value Ref Range    NT pro-BNP 1,637 (H) <125 pg/mL   MAGNESIUM    Collection Time: 02/13/21  5:45 PM   Result Value Ref Range    Magnesium 2.0 1.6 - 2.4 mg/dL   TSH 3RD GENERATION    Collection Time: 02/13/21  5:45 PM   Result Value Ref Range    TSH 0.09 (L) 0.36 - 3.74 uIU/mL   PHOSPHORUS    Collection Time: 02/13/21  5:45 PM   Result Value Ref Range    Phosphorus 2.9 2.6 - 4.7 mg/dL   CK    Collection Time: 02/13/21  5:45 PM   Result Value Ref Range    CK 95 26 - 192 U/L   GLUCOSE, POC    Collection Time: 02/13/21  6:44 PM   Result Value Ref Range    Glucose (POC) 48 (LL) 65 - 100 mg/dL    Performed by Codie 1, POC    Collection Time: 02/13/21  7:35 PM   Result Value Ref Range    Glucose (POC) 116 (H) 65 - 100 mg/dL    Performed by Joann Santiago    SARS-COV-2    Collection Time: 02/13/21  7:45 PM   Result Value Ref Range    SARS-CoV-2 Please find results under separate order     INFLUENZA A & B AG (RAPID TEST)    Collection Time: 02/13/21  7:45 PM   Result Value Ref Range    Influenza A Antigen Negative Negative      Influenza B Antigen Negative Negative     COVID-19 RAPID TEST    Collection Time: 02/13/21  7:45 PM   Result Value Ref Range    Specimen source Nasopharyngeal      COVID-19 rapid test DETECTED (A) Not Detected     EKG, 12 LEAD, INITIAL    Collection Time: 02/13/21  8:07 PM   Result Value Ref Range    Ventricular Rate 92 BPM    Atrial Rate 92 BPM    P-R Interval 166 ms    QRS Duration 98 ms    Q-T Interval 402 ms    QTC Calculation (Bezet) 497 ms    Calculated R Axis -21 degrees    Calculated T Axis 80 degrees    Diagnosis       Sinus rhythm with occasional Premature ventricular complexes  Moderate voltage criteria for LVH, may be normal variant  Prolonged QT  Abnormal ECG  When compared with ECG of 13-FEB-2021 18:05, (Unconfirmed)  Premature ventricular complexes are now Present  Confirmed by TANYA NORIEGA, Joy Joiner ([id number]) on 2/14/2021 12:37:10 PM     HEMOGLOBIN A1C WITH EAG    Collection Time: 02/14/21  6:15 AM Result Value Ref Range    Hemoglobin A1c 7.7 (H) 4.0 - 5.6 %    Est. average glucose 174 mg/dL   TROPONIN I    Collection Time: 02/14/21  6:15 AM   Result Value Ref Range    Troponin-I, Qt. 0.45 (H) <2.59 ng/mL   METABOLIC PANEL, COMPREHENSIVE    Collection Time: 02/14/21  6:15 AM   Result Value Ref Range    Sodium 141 136 - 145 mmol/L    Potassium 3.8 3.5 - 5.1 mmol/L    Chloride 113 (H) 97 - 108 mmol/L    CO2 22 21 - 32 mmol/L    Anion gap 6 5 - 15 mmol/L    Glucose 49 (LL) 65 - 100 mg/dL    BUN 15 6 - 20 mg/dL    Creatinine 0.76 0.55 - 1.02 mg/dL    BUN/Creatinine ratio 20 12 - 20      GFR est AA >60 >60 ml/min/1.73m2    GFR est non-AA >60 >60 ml/min/1.73m2    Calcium 9.1 8.5 - 10.1 mg/dL    Bilirubin, total 0.4 0.2 - 1.0 mg/dL    AST (SGOT) 25 15 - 37 U/L    ALT (SGPT) 18 12 - 78 U/L    Alk. phosphatase 72 45 - 117 U/L    Protein, total 8.3 (H) 6.4 - 8.2 g/dL    Albumin 3.1 (L) 3.5 - 5.0 g/dL    Globulin 5.2 (H) 2.0 - 4.0 g/dL    A-G Ratio 0.6 (L) 1.1 - 2.2     CBC WITH AUTOMATED DIFF    Collection Time: 02/14/21  6:15 AM   Result Value Ref Range    WBC 4.9 3.6 - 11.0 K/uL    RBC 4.17 3.80 - 5.20 M/uL    HGB 12.5 11.5 - 16.0 g/dL    HCT 37.9 35.0 - 47.0 %    MCV 90.9 80.0 - 99.0 FL    MCH 30.0 26.0 - 34.0 PG    MCHC 33.0 30.0 - 36.5 g/dL    RDW 14.0 11.5 - 14.5 %    PLATELET 662 726 - 001 K/uL    MPV 12.0 8.9 - 12.9 FL    NEUTROPHILS 81 (H) 32 - 75 %    LYMPHOCYTES 13 12 - 49 %    MONOCYTES 6 5 - 13 %    EOSINOPHILS 0 0 - 7 %    BASOPHILS 0 0 - 1 %    IMMATURE GRANULOCYTES 0 0.0 - 0.5 %    ABS. NEUTROPHILS 3.9 1.8 - 8.0 K/UL    ABS. LYMPHOCYTES 0.6 (L) 0.8 - 3.5 K/UL    ABS. MONOCYTES 0.3 0.0 - 1.0 K/UL    ABS. EOSINOPHILS 0.0 0.0 - 0.4 K/UL    ABS. BASOPHILS 0.0 0.0 - 0.1 K/UL    ABS. IMM.  GRANS. 0.0 0.00 - 0.04 K/UL    DF AUTOMATED     URINALYSIS W/ REFLEX CULTURE    Collection Time: 02/14/21  6:50 AM    Specimen: Urine   Result Value Ref Range    Color Yellow/Straw      Appearance Clear Clear      Specific gravity 1.020 1.003 - 1.030      pH (UA) 6.0 5.0 - 8.0      Protein >300 (A) Negative mg/dL    Glucose Negative Negative mg/dL    Ketone Negative Negative mg/dL    Bilirubin Negative Negative      Blood Negative Negative      Urobilinogen 0.1 0.1 - 1.0 EU/dL    Nitrites Negative Negative      Leukocyte Esterase Negative Negative      UA:UC IF INDICATED Culture not indicated by UA result Culture not indicated by UA result      WBC 0-4 0 - 4 /hpf    RBC 0-5 0 - 5 /hpf    Bacteria Negative Negative /hpf    Mucus Trace /lpf   DRUG SCREEN, URINE    Collection Time: 02/14/21  8:15 AM   Result Value Ref Range    AMPHETAMINES Negative Negative      BARBITURATES Negative Negative      BENZODIAZEPINES Negative Negative      COCAINE Negative Negative      METHADONE Negative Negative      OPIATES Negative Negative      PCP(PHENCYCLIDINE) Negative Negative      THC (TH-CANNABINOL) Negative Negative      Drug screen comment        This test is a screen for drugs of abuse in a medical setting only (i.e., they are unconfirmed results and as such must not be used for non-medical purposes, e.g.,employment testing, legal testing). Due to its inherent nature, false positive (FP) and false negative (FN) results may be obtained. Therefore, if necessary for medical care, recommend confirmation of positive findings by GC/MS. GLUCOSE, POC    Collection Time: 02/14/21  8:18 AM   Result Value Ref Range    Glucose (POC) 199 (H) 65 - 100 mg/dL    Performed by Sylvia Beard        XR Results (most recent):  Results from Hospital Encounter encounter on 02/13/21   XR CHEST PORT    Narrative Examination: XR CHEST PORT     History: SOB    Comparison: Chest radiograph January 14, 2021    FINDINGS:    Single frontal portable view of the chest. [employer] AICD appearing unchanged  from prior. The generator pack overlies and obscures portions of the left lung. Symmetric lung volumes.  No focal airspace consolidation, pneumothorax, or  significant pleural effusion. Cardiac monitoring leads overlie the patient's  chest. The cardiac silhouette is enlarged. This appears unchanged. There is mild  atherosclerosis of the thoracic aorta. No findings of acute osseous abnormality. Impression 1. No findings of acute cardiopulmonary abnormality. 2. Similar enlarged cardiac silhouette. XR CHEST PORT   Final Result   1. No findings of acute cardiopulmonary abnormality. 2. Similar enlarged cardiac silhouette. Review of Systems:  Constitutional: Generalized weakness   HENT: Negative. Eyes: Negative. Respiratory: Negative. Cardiovascular: Negative. Gastrointestinal: Negative for abdominal pain and nausea. Skin: Negative. Neurological: Negative. Objective:   VITALS:    Visit Vitals  BP (!) 157/105   Pulse (!) 110   Temp 98.2 °F (36.8 °C)   Resp 26   Ht 5' 7\" (1.702 m)   Wt 84.8 kg (187 lb)   SpO2 97%   BMI 29.29 kg/m²       Physical Exam:   Constitutional: pt is oriented to person, place, and time. HENT:   Head: Normocephalic and atraumatic. Eyes: Pupils are equal, round, and reactive to light. EOM are normal.   Cardiovascular: Normal rate, regular rhythm and normal heart sounds. Pulmonary/Chest: Breath sounds normal. No wheezes. No rales. Exhibits no tenderness. Abdominal: Soft. Bowel sounds are normal. There is no abdominal tenderness. There is no rebound and no guarding. Musculoskeletal: Normal range of motion. Neurological: pt is alert and oriented to person, place, and time. Alert. Normal strength. No cranial nerve deficit or sensory deficit. Displays a negative Romberg sign. ASSESSMENT & PLAN:  Covid 19 pneumonitis  Generalized weakness  Recurrent falls  Type 2 diabetes  Chronic A.  Fib  Status post AICD  Hypertension  Neuropathy  GERD  Cardiomyopathy      Current Facility-Administered Medications:     labetaloL (NORMODYNE;TRANDATE) injection 20 mg, 20 mg, IntraVENous, Q4H PRN, Mohiuddin, Gretel Lundborg, MD, 20 mg at 02/14/21 0934    cloNIDine HCL (CATAPRES) tablet 0.1 mg, 0.1 mg, Oral, BID, Shruti Dumont MD    gabapentin (NEURONTIN) capsule 100 mg, 100 mg, Oral, BID, Shruti Dumont MD    metFORMIN (GLUCOPHAGE) tablet 500 mg, 500 mg, Oral, BID WITH MEALS, Shruti Dumont MD    apixaban (ELIQUIS) tablet 5 mg, 5 mg, Oral, BID, Ammy Dumont MD, 5 mg at 02/14/21 8936    SITagliptin (JANUVIA) tablet 100 mg, 100 mg, Oral, DAILY, Shruti Dumont MD, Stopped at 02/14/21 0900    losartan (COZAAR) tablet 100 mg, 100 mg, Oral, DAILY, Ammy Dumont MD, 100 mg at 02/14/21 0231    nitroglycerin (NITROSTAT) tablet 0.4 mg, 0.4 mg, SubLINGual, Q5MIN PRN, Shruti Dumont MD    pantoprazole (PROTONIX) tablet 40 mg, 40 mg, Oral, ACB, Ammy Dumont MD, 40 mg at 02/14/21 8410    PARoxetine (PAXIL) tablet 20 mg, 20 mg, Oral, DAILY, Ammy Dumont MD, 20 mg at 02/14/21 2324    insulin lispro (HUMALOG) injection, , SubCUTAneous, AC&HS, Shruti Dumont MD, Stopped at 02/14/21 0730    glucose chewable tablet 16 g, 4 Tab, Oral, PRN, Shruti Dumont MD    dextrose (D50W) injection syrg 12.5-25 g, 25-50 mL, IntraVENous, PRN, Ammy Dumont MD, 25 g at 02/14/21 0724    glucagon (GLUCAGEN) injection 1 mg, 1 mg, IntraMUSCular, PRN, Shruti Dumont MD    acetaminophen (TYLENOL) tablet 650 mg, 650 mg, Oral, Q6H PRN **OR** acetaminophen (TYLENOL) suppository 650 mg, 650 mg, Rectal, Q6H PRN, Shruti Dumont MD    polyethylene glycol (MIRALAX) packet 17 g, 17 g, Oral, DAILY PRN, Shruti Dumont MD    ondansetron (ZOFRAN ODT) tablet 4 mg, 4 mg, Oral, Q8H PRN **OR** ondansetron (ZOFRAN) injection 4 mg, 4 mg, IntraVENous, Q6H PRN, Ammy Dumont MD    atorvastatin (LIPITOR) tablet 40 mg, 40 mg, Oral, QHS, Ammy Dumont MD, 40 mg at 02/14/21 0101    furosemide (LASIX) injection 40 mg, 40 mg, IntraVENous, DAILY, Ammy Dumont MD, 40 mg at 02/14/21 0934    dexamethasone (DECADRON) 4 mg/mL injection 4 mg, 4 mg, IntraVENous, Q6H, Ammy Dumont MD, 4 mg at 02/14/21 0551    piperacillin-tazobactam (ZOSYN) 3.375 g in 0.9% sodium chloride (MBP/ADV) 100 mL MBP, 3.375 g, IntraVENous, Q8H, Ammy Dumont MD, Last Rate: 25 mL/hr at 02/14/21 0726, 3.375 g at 02/14/21 0726    azithromycin (ZITHROMAX) 500 mg in 0.9% sodium chloride 250 mL (VIAL-MATE), 500 mg, IntraVENous, Q24H, Ammy Dumont MD, 500 mg at 02/14/21 0102    Current Outpatient Medications:     metFORMIN (GLUCOPHAGE) 1,000 mg tablet, two (2) times daily (with meals). , Disp: , Rfl:     gabapentin (NEURONTIN) 100 mg capsule, TAKE 1 CAPSULE BY MOUTH THREE TIMES A DAY, Disp: , Rfl:     SITagliptin (Januvia) 100 mg tablet, TAKE 1 TABLET BY MOUTH EVERY DAY, Disp: 90 Tab, Rfl: 2    metoprolol succinate (TOPROL-XL) 25 mg XL tablet, TAKE 0.5 TABS BY MOUTH DAILY. , Disp: 45 Tab, Rfl: 11    ELIQUIS 5 mg tablet, TAKE 1 TABLET BY MOUTH TWICE A DAY, Disp: 180 Tab, Rfl: 3    nitroglycerin (NITROSTAT) 0.4 mg SL tablet, 0.4 mg by SubLINGual route every five (5) minutes as needed for Chest Pain. Up to 3 doses. , Disp: , Rfl:     insulin syringe-needle U-100 (BD INSULIN SYRINGE ULTRA-FINE) 1/2 mL 31 gauge x 15/64\" syrg, Use 4 times daily. Dx code E11.65, Disp: 200 Pen Needle, Rfl: 6    flash glucose scanning reader (FREESTYLE BELKIS READER) [facility], Test 4 times daily. Dx code E11.65. To use as needed, Disp: 1 Each, Rfl: 6    flash glucose sensor (FREESTYLE BELKIS SENSOR) kit, Test 4 times daily. Dx code E11.65.  Change every 10 days, Disp: 3 Each, Rfl: 6    cloNIDine HCl (CATAPRES) 0.3 mg tablet, TAKE 1 TABLET TWICE DAILY, Disp: , Rfl: 3    NIFEdipine ER (ADALAT CC) 30 mg ER tablet, TAKE 1 TABLET BY MOUTH EVERY DAY FOR 90 DAYS, Disp: , Rfl: 2    losartan (COZAAR) 100 mg tablet, TAKE 1 TABLET EVERY DAY, Disp: , Rfl: 2    hydroCHLOROthiazide (MICROZIDE) 12.5 mg capsule, TAKE ONE CAPSULE BY MOUTH ONCE A DAY, Disp: , Rfl: 2    PARoxetine (PAXIL) 20 mg tablet, Take  by mouth daily. , Disp: , Rfl:     omeprazole (PRILOSEC) 20 mg capsule, Take 20 mg by mouth daily. , Disp: , Rfl:     ___Patient started on Lipitor clonidine Lasix losartan for blood pressure  On Zithromax Decadron and Zosyn for pneumonia likely from Covid  Continue Metformin Januvia for diabetes and sliding scale     Cardiology consult   PT OT consult _____________________________________________________________________    Signed: [id number] Michele Jones MD

## 2021-02-14 NOTE — PROGRESS NOTES
Reason for Admission:   fall                  RUR Score:   14%               PCP: First and Last name:  Anthony Delgado MD   Name of Practice:    Are you a current patient: Yes/No: yes   Approximate date of last visit: 1/10   Can you participate in a virtual visit if needed:     Do you (patient/family) have any concerns for transition/discharge? no              Plan for utilizing home health:  no    Current Advanced Directive/Advance Care Plan:      Healthcare Decision Maker:   Click here to complete [place] including selection of the Healthcare Decision Maker Relationship (ie \"Primary\")            Transition of Care Plan:      Home    The patient lives with her boyfriend independently.

## 2021-02-14 NOTE — ED PROVIDER NOTES
EMERGENCY DEPARTMENT HISTORY AND PHYSICAL EXAM        Date: 2/13/2021  Patient Name: Preston Carrillo    History of Presenting Illness     Chief Complaint   Patient presents with    Fall    Dizziness       12:19 AM    History Provided By: Patient and Patient's Daughter    HPI: Preston Carrillo, 78 y.o. female with a history of increasing dyspnea on exertion over 10-day, that became acutely worse worse days 2 to 3 days ago. Patient's daughter states that they had noticed that more short of breath while walking around the house but that the labs day and a half has had rest more frequently when walking short distances. Patient denies any chest pain associated with her episodes of shortness of breath nor has she been experiencing any chest pain awakening her at night. Patient daughter provides a bit better history stating that her mother actually has a history of CHF,HTN,Afib and COPD. Reviewed patient's medications with her daughter demonstrated that the patient is prescribed hydrochlorothiazide which patient states she has not been taking consistently because it makes her go to the bathroom too frequently. Patient denies any increased salt intake, increase water intake, peripheral edema, syncope, near syncope fever, chills, nausea, vomiting,. Patient is very assistances positive for WEAVER, and orthopnea. Patient states she also intermittently wheezes. Patient states that she is unable to determine whether or not her symptoms are related to her CHF or her COPD or both. Patient denies any known Covid exposures or symptoms suggestive of COVID-19 infection.     PCP: Aric Epps MD    Current Facility-Administered Medications   Medication Dose Route Frequency Provider Last Rate Last Admin    loperamide (IMODIUM) capsule 2 mg  2 mg Oral Q4H PRN Ammy Dumont MD   2 mg at 02/17/21 1743    guaiFENesin ER (MUCINEX) tablet 600 mg  600 mg Oral Q12H Sierra Freitas MD   600 mg at 02/18/21 8449    metoprolol succinate (TOPROL-XL) XL tablet 25 mg  25 mg Oral DAILY Johnathon Cabrales MD   25 mg at 02/18/21 3145    cloNIDine HCL (CATAPRES) tablet 0.3 mg  0.3 mg Oral BID Ammy Dumont MD   0.3 mg at 02/18/21 0831    traMADoL (ULTRAM) tablet 50 mg  50 mg Oral Q6H PRN Ammy Dumont MD   50 mg at 02/17/21 2019    labetaloL (NORMODYNE;TRANDATE) 20 mg/4 mL (5 mg/mL) injection 20 mg  20 mg IntraVENous Q4H PRN Ammy Dumont MD   20 mg at 02/16/21 0005    gabapentin (NEURONTIN) capsule 100 mg  100 mg Oral BID Ammy Dumont MD   100 mg at 02/18/21 5063    metFORMIN (GLUCOPHAGE) tablet 500 mg  500 mg Oral BID WITH MEALS Ammy Dumont MD   500 mg at 02/18/21 5009    alogliptin (NESINA) tablet 25 mg  25 mg Oral DAILY Ammy Dumont MD   25 mg at 02/18/21 0831    apixaban (ELIQUIS) tablet 5 mg  5 mg Oral BID Ammy Dumont MD   5 mg at 02/18/21 0831    losartan (COZAAR) tablet 100 mg  100 mg Oral DAILY Ammy Dumont MD   100 mg at 02/18/21 7198    nitroglycerin (NITROSTAT) tablet 0.4 mg  0.4 mg SubLINGual Q5MIN PRN Laurie Dumont MD        pantoprazole (PROTONIX) tablet 40 mg  40 mg Oral ACB Ammy Dumont MD   40 mg at 02/18/21 8940    PARoxetine (PAXIL) tablet 20 mg  20 mg Oral DAILY Ammy Dumont MD   20 mg at 02/18/21 1761    insulin lispro (HUMALOG) injection   SubCUTAneous AC&HS Thai Ascencio MD   6 Units at 02/18/21 1121    glucose chewable tablet 16 g  4 Tab Oral PRN Laurie Dumont MD        dextrose (D50W) injection syrg 12.5-25 g  25-50 mL IntraVENous PRN Thai Ascencio MD   25 g at 02/14/21 0724    glucagon (GLUCAGEN) injection 1 mg  1 mg IntraMUSCular PRN Laurie Dumont MD        acetaminophen (TYLENOL) tablet 650 mg  650 mg Oral Q6H PRN Ammy Dumont MD   650 mg at 02/16/21 0002    Or    acetaminophen (TYLENOL) suppository 650 mg  650 mg Rectal Q6H PRN Laurie Dumont MD        polyethylene glycol (MIRALAX) packet 17 g  17 g Oral DAILY PRN Yousuf Dumont MD        ondansetron (ZOFRAN ODT) tablet 4 mg  4 mg Oral Q8H PRN Yousuf Dumont MD        Or    [facility] injection 4 mg  4 mg IntraVENous Q6H PRN Yousuf Dumont MD        atorvastatin (LIPITOR) tablet 40 mg  40 mg Oral QHS Ammy Dumont MD   40 mg at 02/17/21 2135    furosemide (LASIX) injection 40 mg  40 mg IntraVENous DAILY Ammy Dumont MD   40 mg at 02/18/21 0831    dexamethasone (DECADRON) 4 mg/mL injection 4 mg  4 mg IntraVENous Q6H Ammy Dumont MD   4 mg at 02/18/21 1034    piperacillin-tazobactam (ZOSYN) 3.375 g in 0.9% sodium chloride (MBP/ADV) 100 mL MBP  3.375 g IntraVENous Q8H Ammy Dumont MD 25 mL/hr at 02/18/21 1121 3.375 g at 02/18/21 1121    azithromycin (ZITHROMAX) 500 mg in 0.9% sodium chloride 250 mL (VIAL-MATE)  500 mg IntraVENous Q24H Ammy Dumont MD   500 mg at 02/18/21 0019       Past History     Past Medical History:  Past Medical History:   Diagnosis Date    [facility] ([facility] [place] [address]     Cardiomyopathy, idiopathic ([facility] [place] [address] 10/28/2010    HTN (hypertension), benign 10/28/2010    Pacemaker     ICD    PVC (premature ventricular contraction) 10/28/2010       Past Surgical History:  Past Surgical History:   Procedure Laterality Date    HX OTHER SURGICAL  10/02/2018    Right eye surgery    HX PACEMAKER         Family History:  No family history on file. Social History:  Social History     Tobacco Use    Smoking status: Never Smoker    Smokeless tobacco: Never Used   Substance Use Topics    Alcohol use: No    Drug use: Never       Allergies:   Allergies   Allergen Reactions    Chocolate [Cocoa] Anaphylaxis and Swelling    Iodine Anaphylaxis    Lisinopril Anaphylaxis    Peanut Anaphylaxis and Swelling    Glimepiride Swelling    Glipizide Other (comments)     Pruritis    Metformin Diarrhea    Norvasc [Amlodipine] Other (comments)     Light Headed    Pneumovax 23 [Pneumococcal 23-Devi Ps Vaccine] Hives    Toprol Xl [Metoprolol Succinate] Other (comments)     Light headed    Vitamin D [Cholecalciferol (Vitamin D3)] Other (comments)     Dizziness/headache       Review of Systems   Review of Systems   Constitutional: Negative for chills, diaphoresis and fever. HENT: Negative for congestion, sore throat and trouble swallowing. Eyes: Negative for visual disturbance. Respiratory: Positive for shortness of breath and wheezing. Negative for cough. Cardiovascular: Negative for chest pain and palpitations. Gastrointestinal: Negative for abdominal pain, diarrhea, nausea and vomiting. Endocrine: Negative for polydipsia, polyphagia and polyuria. Genitourinary: Negative for dysuria, frequency, hematuria and urgency. Musculoskeletal: Negative for gait problem and neck pain. Skin: Negative for rash. Neurological: Negative for dizziness, syncope and headaches. Physical Exam   Physical Exam  Vitals signs and nursing note reviewed. Constitutional:       General: She is not in acute distress. Appearance: Normal appearance. She is well-developed. She is not ill-appearing or toxic-appearing. Comments: Well-nourished -American female not ill-appearing but is ever so slightly dyspneic with speech. Patient's daughter sits at bedside   HENT:      Head: Normocephalic and atraumatic. Nose: Nose normal.      Mouth/Throat:      Mouth: Mucous membranes are moist.      Pharynx: No posterior oropharyngeal erythema. Eyes:      General: Vision grossly intact. Extraocular Movements: Extraocular movements intact. Conjunctiva/sclera: Conjunctivae normal.      Pupils: Pupils are equal, round, and reactive to light. Neck:      Musculoskeletal: Neck supple. Vascular: No JVD. Trachea: No tracheal deviation. Comments: Positive JVD approximately snf to the jawline  Cardiovascular:      Rate and Rhythm: Normal rate and regular rhythm. Pulses: Normal pulses. Carotid pulses are 2+ on the right side and 2+ on the left side. Radial pulses are 2+ on the right side and 2+ on the left side. Femoral pulses are 2+ on the right side and 2+ on the left side. Popliteal pulses are 2+ on the right side and 2+ on the left side. Dorsalis pedis pulses are 2+ on the right side and 2+ on the left side. Posterior tibial pulses are 2+ on the right side and 2+ on the left side. Heart sounds: Normal heart sounds. Comments: Quiet PMI deviated to the left no palpable heaves or thrills  Pulmonary:      Effort: Pulmonary effort is normal.      Breath sounds: Normal air entry. Wheezing and rales present. No rhonchi. Comments: Occasional faint expiratory wheeze in the upper lung fields  Abdominal:      General: Abdomen is protuberant. Bowel sounds are normal.      Palpations: Abdomen is soft. Tenderness: There is no abdominal tenderness. There is no guarding or rebound. Musculoskeletal:         General: No swelling or deformity. Right shoulder: She exhibits normal range of motion and no swelling. Right lower leg: Edema present. Left lower leg: Edema present. Comments: 1-2+ pretibial edema   Skin:     General: Skin is warm and dry. Capillary Refill: Capillary refill takes less than 2 seconds. Findings: No signs of injury or rash. Neurological:      General: No focal deficit present. Mental Status: She is alert and oriented to person, place, and time. Cranial Nerves: No cranial nerve deficit. Comments: Normal Speech   Psychiatric:         Attention and Perception: Attention normal.         Mood and Affect: Mood and affect normal.         Speech: Speech normal.         Behavior: Behavior is cooperative.          Diagnostic Study Results     Labs -     Recent Results (from the past 48 hour(s))   GLUCOSE, POC    Collection Time: 02/16/21  4:11 PM   Result Value Ref Range    Glucose (POC) 210 (H) 65 - 100 mg/dL    Performed by Ama Salazar    GLUCOSE, POC    Collection Time: 02/16/21  7:48 PM   Result Value Ref Range    Glucose (POC) 119 (H) 65 - 100 mg/dL    Performed by Saturnino Fernandez    GLUCOSE, POC    Collection Time: 02/17/21  7:43 AM   Result Value Ref Range    Glucose (POC) 216 (H) 65 - 100 mg/dL    Performed by Monse Ochoa, POC    Collection Time: 02/17/21 11:21 AM   Result Value Ref Range    Glucose (POC) 247 (H) 65 - 100 mg/dL    Performed by Josue Chance    ECHO ADULT COMPLETE    Collection Time: 02/17/21 12:46 PM   Result Value Ref Range    Pulmonic Regurgitant End Max Velocity 155.00 cm/s    AoV PG 10.00 mmHg    IVSd 1.45 (A) 0.6 - 0.9 cm    LVIDd 4.78 3.9 - 5.3 cm    LVIDs 4.38 cm    Pulmonic Regurgitant End Max Velocity 80.50 cm/s    LVOT Peak Gradient 3.00 mmHg    LVPWd 1.36 (A) 0.6 - 0.9 cm    LV E' Septal Velocity 4.29 cm/s    LV ED Vol A2C 109.00 cm3    BP EF 18.8 (A) 55 - 100 %    LV ES Vol A2C 84.00 cm3    E/E' septal 27.27     Pulmonic Regurgitant End Max Velocity 436.00 cm/s    Mitral Valve Deceleration [place] 5,520.00 mm/s2    Mitral Valve Deceleration [place] 5,520.00 mm/s2    Mitral Valve E Wave Deceleration Time 134.00 ms    Mitral Valve Pressure Half-time 75.00 ms    MV A Bubba 107.00 cm/s    MV E Bubba 117.00 cm/s    MVA (PHT) 2.93 cm2    MV E/A 1.09     Pulmonic Regurgitant End Max Velocity 79.90 cm/s    Pulmonic Valve Systolic Peak Instantaneous Gradient 3.00 mmHg    Pulmonic Regurgitant End Max Velocity 116.00 cm/s    P Vein A Dur 113.00 ms    Pulmonary Vein \"A\" Wave Velocity 26.80 cm/s    Est. RA Pressure 3.00 mmHg    RVIDd 3.32 cm    RVSP 31.00 mmHg    Tricuspid Valve Max Velocity 264.00 cm/s    Triscuspid Valve Regurgitation Peak Gradient 28.00 mmHg    LV Mass .5 67 - 162 g    LV Mass AL Index 138.5 43 - 95 g/m2    Left Atrium Minor Axis 2.03 cm    Left Atrium Major Axis 4.00 cm    Ao Root D 2.90 cm   GLUCOSE, POC    Collection Time: 02/17/21  5:37 PM Result Value Ref Range    Glucose (POC) 86 65 - 100 mg/dL    Performed by Baldev Holland    GLUCOSE, POC    Collection Time: 02/17/21  9:10 PM   Result Value Ref Range    Glucose (POC) 160 (H) 65 - 100 mg/dL    Performed by Baldev Holland    GLUCOSE, POC    Collection Time: 02/18/21  8:11 AM   Result Value Ref Range    Glucose (POC) 240 (H) 65 - 100 mg/dL    Performed by Baldev Bromojgan    GLUCOSE, POC    Collection Time: 02/18/21 11:07 AM   Result Value Ref Range    Glucose (POC) 214 (H) 65 - 100 mg/dL    Performed by CAN QUINTANA    CBC WITH AUTOMATED DIFF    Collection Time: 02/18/21 12:23 PM   Result Value Ref Range    WBC 6.5 3.6 - 11.0 K/uL    RBC 4.03 3.80 - 5.20 M/uL    HGB 11.9 11.5 - 16.0 g/dL    HCT 36.6 35.0 - 47.0 %    MCV 90.8 80.0 - 99.0 FL    MCH 29.5 26.0 - 34.0 PG    MCHC 32.5 30.0 - 36.5 g/dL    RDW 13.4 11.5 - 14.5 %    PLATELET 538 (L) 237 - 400 K/uL    MPV 12.8 8.9 - 12.9 FL    NEUTROPHILS 79 (H) 32 - 75 %    LYMPHOCYTES 14 12 - 49 %    MONOCYTES 7 5 - 13 %    EOSINOPHILS 0 0 - 7 %    BASOPHILS 0 0 - 1 %    IMMATURE GRANULOCYTES 0 0.0 - 0.5 %    ABS. NEUTROPHILS 5.2 1.8 - 8.0 K/UL    ABS. LYMPHOCYTES 0.9 0.8 - 3.5 K/UL    ABS. MONOCYTES 0.5 0.0 - 1.0 K/UL    ABS. EOSINOPHILS 0.0 0.0 - 0.4 K/UL    ABS. BASOPHILS 0.0 0.0 - 0.1 K/UL    ABS. IMM. GRANS. 0.0 0.00 - 0.04 K/UL    DF AUTOMATED     METABOLIC PANEL, COMPREHENSIVE    Collection Time: 02/18/21 12:23 PM   Result Value Ref Range    Sodium 139 136 - 145 mmol/L    Potassium 4.1 3.5 - 5.1 mmol/L    Chloride 105 97 - 108 mmol/L    CO2 26 21 - 32 mmol/L    Anion gap 8 5 - 15 mmol/L    Glucose 227 (H) 65 - 100 mg/dL    BUN 27 (H) 6 - 20 mg/dL    Creatinine 1.09 (H) 0.55 - 1.02 mg/dL    BUN/Creatinine ratio 25 (H) 12 - 20      GFR est AA >60 >60 ml/min/1.73m2    GFR est non-AA 50 (L) >60 ml/min/1.73m2    Calcium 8.4 (L) 8.5 - 10.1 mg/dL    Bilirubin, total 0.4 0.2 - 1.0 mg/dL    AST (SGOT) 22 15 - 37 U/L    ALT (SGPT) 17 12 - 78 U/L    Alk. phosphatase 67 45 - 117 U/L    Protein, total 6.7 6.4 - 8.2 g/dL    Albumin 2.2 (L) 3.5 - 5.0 g/dL    Globulin 4.5 (H) 2.0 - 4.0 g/dL    A-G Ratio 0.5 (L) 1.1 - 2.2         Radiologic Studies -   XR CHEST PORT   Final Result      XR CHEST PORT   Final Result   1. No findings of acute cardiopulmonary abnormality. 2. Similar enlarged cardiac silhouette. CT Results  (Last 48 hours)    None        CXR Results  (Last 48 hours)    None          Medical Decision Making and ED Course     I have also reviewed the vital signs, available nursing notes, past medical history, past surgical history, family history and social history. Vital Signs - Reviewed the patient's vital signs. Patient Vitals for the past 12 hrs:   Temp Pulse Resp BP SpO2   02/18/21 1412 97.8 °F (36.6 °C) 72  (!) 131/96 97 %   02/18/21 0840    (!) 146/88    02/18/21 0811 98.2 °F (36.8 °C) 73 20 (!) 163/106        EKG interpretation: (Preliminary): Performed at 1000    Rhythm: normal sinus rhythm; and regular . Rate (approx.): 92; Axis: left axis deviation;  LVH    Records Reviewed: Nursing Notes and Old medical records as available. Medical Decision Making/Diff Dx:  Differential diagnosis: CHF, COPD exacerbation, fluid overload, acute renal failure, UTI, med noncompliance, electrolyte abnormalities, UTI,    62-year-old active male with past medical significant for cardiomyopathy with CHF and COPD plus hypertension and defibrillator here now with history and physical strongly suggestive of CHF rather than COPD exacerbation. The patient noncompliance with taking her hydrochlorothiazide may be contributing to this today. We will do screening labs chest x-ray supplemental ox and treat patient symptomatically with nitroglycerin paste chest inches and IV Lasix. Plan admission.         ED course/Re-evaluation/Consultations/Other     ED Course as of Feb 18 1501   Sun Feb 14, 2021   0017 Patient with surprising finding that she was COVID-19 positive on screening testing for admission to the facility. Patient and daughter who is at bedside states that they are unclear as to  where she could have been exposed. Also denies any fever chills change in sense of taste and smell or Covid associated symptoms.    [SP]      ED Course User Index  [SP] Grupo Werner MD     Patient's blood pressure still a bit elevated but she does feel as if her shortness of breath has   markedly improved beginning treatment in the p  Procedures     N/A    Disposition     Admitted          Diagnosis     Clinical impression:   1. SOB (shortness of breath)    2. Acute on chronic systolic CHF (congestive heart failure) ([facility] [address]    3. Fever, unspecified fever cause    4. COVID-19 virus infection    5. Hypokalemia    6.  Elevated troponin

## 2021-02-14 NOTE — CONSULTS
PULMONARY CONSULT  [facility]    Name: Kailyn Aguilar MRN: [id number]   : 1955 Hospital: [address] [place] [address]   Date: 2021  Admission date: 2021 Hospital Day: 2       HPI:     Hospital Problems  Date Reviewed: 2021          Codes Class Noted POA    Shortness of breath ICD-10-CM: R06.02  ICD-9-CM: 786.05  2021 Unknown        Fever ICD-10-CM: R50.9  ICD-9-CM: 780.60  2021 Unknown        Acute CHF (congestive heart failure) ([facility] ICD-10-CM: I50.9  ICD-9-CM: 428.0  2021 Unknown        COVID-19 virus infection ICD-10-CM: U07.1  ICD-9-CM: 079.89  2021 Unknown        CHF (congestive heart failure) ([facility] ICD-10-CM: I50.9  ICD-9-CM: 428.0  2021 Unknown                   [x] High complexity decision making was performed  [x] See my orders for details      Subjective/Initial History:     I was asked by Nano Oneil MD to see Kailyn Aguilar  a 78 y.o.  female in consultation     Excerpts from admission 2021 or consult notes as follows:   70-year-old lady came in because of generalized weakness and dizziness past medical history of cardiomyopathy chronic A. fib hypertension diabetes history of AICD placement and gastroesophageal flux disease. Her COVID-19 came back positive now she is in the emergency room short of breath dyspneic and also having generalized weakness so pulmonary consult was called for further evaluation.       Allergies   Allergen Reactions    Chocolate [Cocoa] Anaphylaxis and Swelling    Iodine Anaphylaxis    Lisinopril Anaphylaxis    Peanut Anaphylaxis and Swelling    Glimepiride Swelling    Glipizide Other (comments)     Pruritis    Metformin Diarrhea    Norvasc [Amlodipine] Other (comments)     Light Headed    Pneumovax 23 [Pneumococcal 23-Devi Ps Vaccine] Hives    Toprol Xl [Metoprolol Succinate] Other (comments)     Light headed    Vitamin D [Cholecalciferol (Vitamin D3)] Other (comments) Dizziness/headache        MAR reviewed and pertinent medications noted or modified as needed     Current Facility-Administered Medications   Medication    labetaloL (NORMODYNE;TRANDATE) injection 20 mg    cloNIDine HCL (CATAPRES) tablet 0.1 mg    gabapentin (NEURONTIN) capsule 100 mg    metFORMIN (GLUCOPHAGE) tablet 500 mg    apixaban (ELIQUIS) tablet 5 mg    SITagliptin (JANUVIA) tablet 100 mg    losartan (COZAAR) tablet 100 mg    nitroglycerin (NITROSTAT) tablet 0.4 mg    pantoprazole (PROTONIX) tablet 40 mg    PARoxetine (PAXIL) tablet 20 mg    insulin lispro (HUMALOG) injection    glucose chewable tablet 16 g    dextrose (D50W) injection syrg 12.5-25 g    glucagon (GLUCAGEN) injection 1 mg    acetaminophen (TYLENOL) tablet 650 mg    Or    acetaminophen (TYLENOL) suppository 650 mg    polyethylene glycol (MIRALAX) packet 17 g    ondansetron (ZOFRAN ODT) tablet 4 mg    Or    ondansetron (ZOFRAN) injection 4 mg    atorvastatin (LIPITOR) tablet 40 mg    furosemide (LASIX) injection 40 mg    dexamethasone (DECADRON) 4 mg/mL injection 4 mg    piperacillin-tazobactam (ZOSYN) 3.375 g in 0.9% sodium chloride (MBP/ADV) 100 mL MBP    azithromycin (ZITHROMAX) 500 mg in 0.9% sodium chloride 250 mL (VIAL-MATE)     Current Outpatient Medications   Medication Sig    metFORMIN (GLUCOPHAGE) 1,000 mg tablet two (2) times daily (with meals).  gabapentin (NEURONTIN) 100 mg capsule TAKE 1 CAPSULE BY MOUTH THREE TIMES A DAY    SITagliptin (Januvia) 100 mg tablet TAKE 1 TABLET BY MOUTH EVERY DAY    metoprolol succinate (TOPROL-XL) 25 mg XL tablet TAKE 0.5 TABS BY MOUTH DAILY.  ELIQUIS 5 mg tablet TAKE 1 TABLET BY MOUTH TWICE A DAY    nitroglycerin (NITROSTAT) 0.4 mg SL tablet 0.4 mg by SubLINGual route every five (5) minutes as needed for Chest Pain. Up to 3 doses.  insulin syringe-needle U-100 (BD INSULIN SYRINGE ULTRA-FINE) 1/2 mL 31 gauge x 15/64\" syrg Use 4 times daily.  Dx code E11.65    flash glucose scanning reader (FREESTYLE BELKIS READER) misc Test 4 times daily. Dx code E11.65. To use as needed    flash glucose sensor (FREESTYLE BELKIS SENSOR) kit Test 4 times daily. Dx code E11.65. Change every 10 days    cloNIDine HCl (CATAPRES) 0.3 mg tablet TAKE 1 TABLET TWICE DAILY    NIFEdipine ER (ADALAT CC) 30 mg ER tablet TAKE 1 TABLET BY MOUTH EVERY DAY FOR 90 DAYS    losartan (COZAAR) 100 mg tablet TAKE 1 TABLET EVERY DAY    hydroCHLOROthiazide (MICROZIDE) 12.5 mg capsule TAKE ONE CAPSULE BY MOUTH ONCE A DAY    PARoxetine (PAXIL) 20 mg tablet Take  by mouth daily.  omeprazole (PRILOSEC) 20 mg capsule Take 20 mg by mouth daily. Patient PCP: Sp Rothman MD  PMH:  has a past medical history of [facility] ([facility] Cardiomyopathy, idiopathic ([facility] (10/28/2010), HTN (hypertension), benign (10/28/2010), Pacemaker, and PVC (premature ventricular contraction) (10/28/2010). PSH:   has a past surgical history that includes hx other surgical (10/02/2018) and hx pacemaker. FHX: family history is not on file. SHX:  reports that she has never smoked. She has never used smokeless tobacco. She reports that she does not drink alcohol or use drugs. ROS:    Review of Systems   Constitutional:        Generalized weakness   HENT: Negative. Eyes: Negative. Respiratory: Negative. Cardiovascular: Negative. Gastrointestinal: Negative. Genitourinary: Negative. Musculoskeletal: Negative. Skin: Negative. Neurological: Negative. Endo/Heme/Allergies: Negative. Psychiatric/Behavioral: Negative.          Objective:     Vital Signs: Telemetry:    normal sinus rhythm Intake/Output:   Visit Vitals  BP (!) 157/105   Pulse (!) 110   Temp 98.2 °F (36.8 °C)   Resp 26   Ht 5' 7\" (1.702 m)   Wt 84.8 kg (187 lb)   SpO2 97%   BMI 29.29 kg/m²       Temp (24hrs), Av.3 °F (37.4 °C), Min:98.2 °F (36.8 °C), Max:100.5 °F (38.1 °C)        O2 Device: Room air         Wt Readings from Last 4 Encounters:   02/13/21 84.8 kg (187 lb)   01/20/21 85.2 kg (187 lb 12.8 oz)   11/16/20 85.7 kg (189 lb)   10/28/19 84.8 kg (187 lb)          Intake/Output Summary (Last 24 hours) at 2/14/2021 1432  Last data filed at 2/14/2021 0507  Gross per 24 hour   Intake    Output 700 ml   Net -700 ml       Last shift:      No intake/output data recorded. Last 3 shifts: 02/12 1901 - 02/14 0700  In: -   Out: 700 [Urine:700]       Physical Exam:     Physical Exam   Constitutional: She appears distressed. HENT:   Head: Normocephalic and atraumatic. Eyes: Pupils are equal, round, and reactive to light. Conjunctivae are normal.   Neck: Normal range of motion. Neck supple. Cardiovascular: Normal rate and regular rhythm. Pulmonary/Chest: Effort normal and breath sounds normal.   Abdominal: Soft. Bowel sounds are normal.   Musculoskeletal: Normal range of motion. Neurological: She is alert. Skin: Skin is warm. Labs:    Recent Labs     02/14/21  0615 02/13/21  1745   WBC 4.9 6.2   HGB 12.5 12.2    178     Recent Labs     02/14/21  0615 02/13/21  1745    142   K 3.8 3.4*   * 112*   CO2 22 21   GLU 49* 84   BUN 15 17   CREA 0.76 1.02   CA 9.1 8.6   MG  --  2.0   PHOS  --  2.9   ALB 3.1* 3.1*   ALT 18 17     No results for input(s): PH, PCO2, PO2, HCO3, FIO2 in the last 72 hours. Recent Labs     02/14/21  0615 02/13/21  1745   CPK  --  95   TROIQ 0.45* 0.30*     No results found for: BNPP, BNP   No results found for: CULT  Lab Results   Component Value Date/Time    TSH 0.09 (L) 02/13/2021 05:45 PM       Imaging:    CXR Results  (Last 48 hours)               02/13/21 1915  XR CHEST PORT Final result    Impression:  1. No findings of acute cardiopulmonary abnormality. 2. Similar enlarged cardiac silhouette.        Narrative:  Examination: XR CHEST PORT        History: SOB       Comparison: Chest radiograph January 14, 2021       FINDINGS:       Single frontal portable view of the chest. Multilead AICD appearing unchanged   from prior. The generator pack overlies and obscures portions of the left lung. Symmetric lung volumes. No focal airspace consolidation, pneumothorax, or   significant pleural effusion. Cardiac monitoring leads overlie the patient's   chest. The cardiac silhouette is enlarged. This appears unchanged. There is mild   atherosclerosis of the thoracic aorta. No findings of acute osseous abnormality. Results from Hospital Encounter encounter on 02/13/21   XR CHEST PORT    Narrative Examination: XR CHEST PORT     History: SOB    Comparison: Chest radiograph January 14, 2021    FINDINGS:    Single frontal portable view of the chest. Multilead AICD appearing unchanged  from prior. The generator pack overlies and obscures portions of the left lung. Symmetric lung volumes. No focal airspace consolidation, pneumothorax, or  significant pleural effusion. Cardiac monitoring leads overlie the patient's  chest. The cardiac silhouette is enlarged. This appears unchanged. There is mild  atherosclerosis of the thoracic aorta. No findings of acute osseous abnormality. Impression 1. No findings of acute cardiopulmonary abnormality. 2. Similar enlarged cardiac silhouette. Results from Hospital Encounter encounter on 01/14/21   XR CHEST PA LAT    Narrative Exam:  2 view chest    Indication: Cardiac defibrillator, possible coil fracture. COMPARISON: February 14, 2014 and February 27, 2014    PA and lateral views demonstrate normal heart size. There is no acute process in  the lung fields. The osseous mild cardiomegaly. Left subclavian ICD is in place. One lead projects over the right atrial  appendage. 2 leads project over the right ventricle. The position of the leads  is unchanged. There appears to be a small area of discontinuity of the ICD wire at the  proximal junction of the wire with the coil that is within the left  brachycephalic vein.  Projecting at this proximal junction there appears to be 1  mm area of discontinuity and some elevation of the wire in relation to the coil. This most likely represents a fracture of the wire at the junction with the coil  in the left brachycephalic vein. The lungs are clear. No pulmonary edema. Osseous structures are unremarkable. Impression IMPRESSION:  1. Probable small area of discontinuity, fracture of the wire at the proximal  junction of the wire and the coil that is in the left brachycephalic vein. Results from Hospital Encounter encounter on 02/27/14   XR CHEST PORT    Narrative **Final Report**       ICD Codes / Adm. Diagnosis: 425.4  401.1 / ICD GEN CHANGE  ICD GEN CHANGE  Examination:  CR CHEST PORT  - [id number] - Feb 27 2014  2:58PM  Accession No:  [id number]  Reason:  check lead position of newly placed ICD/r/o pneumothorax      REPORT:  A portable radiograph of the chest demonstrates left subclavian leads in   place. A pneumothorax is not seen. The lung volumes are low. There is mild   subsegmental atelectasis in the right upper lobe. IMPRESSION: Subsegmental right upper lobe atelectasis. No evidence of   pneumothorax. Signing/Reading Doctor: Jessica Murphy ([id number])    Hilary Zuniga ([id number])  Feb 27 2014  3:10PM                                 No results found for this or any previous visit. IMPRESSION:   1. COVID-19 pneumonia  2. Right upper lobe atelectasis  3. History of CHF cardiomyopathy chronic A. fib status post AICD  4. Gastroesophageal reflux disease  5. Peripheral neuropathy  6. Pt is requiring Drug therapy requiring intensive monitoring for toxicity  7. Pt is unstable, unpredictable needing inpatient monitoring; is acutely ill and at high risk of sudden decline and decompensation with severe consequenses and continued end organ dysfunction and failure  8. Prognosis guarded       RECOMMENDATIONS/PLAN:     1.  We will start patient on Decadron no need for remdesivir add Zithromax and she is already on Eliquis  2. Agree with Empiric IV antibiotics pending culture results on Zosyn  3. Follow culture results  4. Supplemental O2 to keep sats > 93%  5. Aspiration precautions  6. Labs to follow electrolytes, renal function and and blood counts  7. Glucose monitoring and SSI  8. Bronchial hygiene with respiratory therapy techniques, bronchodilators  9.  DVT, SUP prophylaxis       This care involved high complexity medical decision making: I personally:  · Reviewed the flowsheet and previous days notes  · Reviewed and summarized records or history from previous days note or discussions with staff, family  · High Risk Drug therapy requiring intensive monitoring for toxicity: eg steroids, pressors, antibiotics  · Reviewed and/or ordered Clinical lab tests  · Reviewed images and/or ordered Radiology tests  · Reviewed the patients ECG / Alan Soliman MD

## 2021-02-15 NOTE — INTERDISCIPLINARY ROUNDS
Primary Nurse Pina Martinez RN and Anuj Hinton RN, performed a dual skin assessment on this patient No impairment noted, Skin is clean and dry

## 2021-02-15 NOTE — ROUTINE PROCESS
TRANSFER - OUT REPORT: 
 
Verbal report given to Chacho Mcadams on [facility]  being transferred to [facility] for routine progression of care Report consisted of patients Situation, Background, Assessment and  
Recommendations(SBAR). Information from the following report(s) SBAR and ED Summary was reviewed with the receiving nurse. Opportunity for questions and clarification was provided. Patient transported with: 
 Monitor Registered Nurse

## 2021-02-15 NOTE — ED NOTES
Pt up to chair at bedside for breakfast which was obtained for cafeteria early due to blood glucose. . Wash cloth given for face and hands. Pt complained of how long she had been here and nothing being done to her room nor bathing. Reminded pt that on yesterday that her room had been wiped down twice (once for general cleaning, the second due to her accident with stool). Pt states it wasn't done last pm. Asked pt was there a spefic something that she needs at this moment. 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 Linen removed from the bed, entire room to include half of floor wiped c disinfectant wipes. Complete linen change done. Pt given a bag c toothbrush, toothpaste, liquid bath soap and other toiletires. Instructed pt that once breakfast is complete we will make sure that's taken care of.

## 2021-02-15 NOTE — ED NOTES
Pt at sink washing up with toiletries given her. Denies needing any help. Clean gown given.  Instructed pt to let us know when she is finish so monitoring can be resumed

## 2021-02-15 NOTE — CONSULTS
PULMONARY NOTE  [facility]    Name: Felicitas Escobar MRN: [id number]   : 1955 Hospital: [facility]   Date: 2/15/2021  Admission date: 2021 Hospital Day: 3       HPI:     Hospital Problems  Date Reviewed: 2021          Codes Class Noted POA    Shortness of breath ICD-10-CM: R06.02  ICD-9-CM: 786.05  2021 Unknown        Fever ICD-10-CM: R50.9  ICD-9-CM: 780.60  2021 Unknown        Acute CHF (congestive heart failure) ([facility] ICD-10-CM: I50.9  ICD-9-CM: 428.0  2021 Unknown        COVID-19 virus infection ICD-10-CM: U07.1  ICD-9-CM: 079.89  2021 Unknown        CHF (congestive heart failure) ([facility] ICD-10-CM: I50.9  ICD-9-CM: 428.0  2021 Unknown                   [x] High complexity decision making was performed  [x] See my orders for details      Subjective/Initial History:     I was asked by Martha Maki MD to see Felicitas Escobar  a 78 y.o. African American female in consultation     Excerpts from admission 2021 or consult notes as follows:   69-year-old lady came in because of generalized weakness and dizziness past medical history of cardiomyopathy chronic A. fib hypertension diabetes history of AICD placement and gastroesophageal flux disease. Her COVID-19 came back positive now she is in the emergency room short of breath dyspneic and also having generalized weakness so pulmonary consult was called for further evaluation.       Allergies   Allergen Reactions    Chocolate [Cocoa] Anaphylaxis and Swelling    Iodine Anaphylaxis    Lisinopril Anaphylaxis    Peanut Anaphylaxis and Swelling    Glimepiride Swelling    Glipizide Other (comments)     Pruritis    Metformin Diarrhea    Norvasc [Amlodipine] Other (comments)     Light Headed    Pneumovax 23 [Pneumococcal 23-Devi Ps Vaccine] Hives    Toprol Xl [Metoprolol Succinate] Other (comments)     Light headed    Vitamin D [Cholecalciferol (Vitamin D3)] Other (comments) Dizziness/headache        MAR reviewed and pertinent medications noted or modified as needed     Current Facility-Administered Medications   Medication    labetaloL (NORMODYNE;TRANDATE) injection 20 mg    cloNIDine HCL (CATAPRES) tablet 0.1 mg    gabapentin (NEURONTIN) capsule 100 mg    metFORMIN (GLUCOPHAGE) tablet 500 mg    alogliptin (NESINA) tablet 25 mg    apixaban (ELIQUIS) tablet 5 mg    losartan (COZAAR) tablet 100 mg    nitroglycerin (NITROSTAT) tablet 0.4 mg    pantoprazole (PROTONIX) tablet 40 mg    PARoxetine (PAXIL) tablet 20 mg    insulin lispro (HUMALOG) injection    glucose chewable tablet 16 g    dextrose (D50W) injection syrg 12.5-25 g    glucagon (GLUCAGEN) injection 1 mg    acetaminophen (TYLENOL) tablet 650 mg    Or    acetaminophen (TYLENOL) suppository 650 mg    polyethylene glycol (MIRALAX) packet 17 g    ondansetron (ZOFRAN ODT) tablet 4 mg    Or    ondansetron (ZOFRAN) injection 4 mg    atorvastatin (LIPITOR) tablet 40 mg    furosemide (LASIX) injection 40 mg    dexamethasone (DECADRON) 4 mg/mL injection 4 mg    piperacillin-tazobactam (ZOSYN) 3.375 g in 0.9% sodium chloride (MBP/ADV) 100 mL MBP    azithromycin (ZITHROMAX) 500 mg in 0.9% sodium chloride 250 mL (VIAL-MATE)     Current Outpatient Medications   Medication Sig    metFORMIN (GLUCOPHAGE) 1,000 mg tablet two (2) times daily (with meals).  gabapentin (NEURONTIN) 100 mg capsule TAKE 1 CAPSULE BY MOUTH THREE TIMES A DAY    SITagliptin (Januvia) 100 mg tablet TAKE 1 TABLET BY MOUTH EVERY DAY    metoprolol succinate (TOPROL-XL) 25 mg XL tablet TAKE 0.5 TABS BY MOUTH DAILY.  ELIQUIS 5 mg tablet TAKE 1 TABLET BY MOUTH TWICE A DAY    nitroglycerin (NITROSTAT) 0.4 mg SL tablet 0.4 mg by SubLINGual route every five (5) minutes as needed for Chest Pain. Up to 3 doses.  insulin syringe-needle U-100 (BD INSULIN SYRINGE ULTRA-FINE) 1/2 mL 31 gauge x 15/64\" syrg Use 4 times daily.  Dx code E11.65    flash glucose scanning reader (FREESTYLE BELKIS READER) misc Test 4 times daily. Dx code E11.65. To use as needed    flash glucose sensor (FREESTYLE BELKIS SENSOR) kit Test 4 times daily. Dx code E11.65. Change every 10 days    cloNIDine HCl (CATAPRES) 0.3 mg tablet TAKE 1 TABLET TWICE DAILY    NIFEdipine ER (ADALAT CC) 30 mg ER tablet TAKE 1 TABLET BY MOUTH EVERY DAY FOR 90 DAYS    losartan (COZAAR) 100 mg tablet TAKE 1 TABLET EVERY DAY    hydroCHLOROthiazide (MICROZIDE) 12.5 mg capsule TAKE ONE CAPSULE BY MOUTH ONCE A DAY    PARoxetine (PAXIL) 20 mg tablet Take  by mouth daily.  omeprazole (PRILOSEC) 20 mg capsule Take 20 mg by mouth daily. Patient PCP: Reddy Figueroa MD  PMH:  has a past medical history of [facility] ([facility] Cardiomyopathy, idiopathic ([facility] (10/28/2010), HTN (hypertension), benign (10/28/2010), Pacemaker, and PVC (premature ventricular contraction) (10/28/2010). PSH:   has a past surgical history that includes hx other surgical (10/02/2018) and hx pacemaker. FHX: family history is not on file. SHX:  reports that she has never smoked. She has never used smokeless tobacco. She reports that she does not drink alcohol or use drugs. ROS:    Review of Systems   Constitutional:        Generalized weakness   HENT: Negative. Eyes: Negative. Respiratory: Negative. Cardiovascular: Negative. Gastrointestinal: Negative. Genitourinary: Negative. Musculoskeletal: Negative. Skin: Negative. Neurological: Negative. Endo/Heme/Allergies: Negative. Psychiatric/Behavioral: Negative.          Objective:     Vital Signs: Telemetry:    normal sinus rhythm Intake/Output:   Visit Vitals  BP (!) 162/125   Pulse 94   Temp 98.2 °F (36.8 °C)   Resp 18   Ht 5' 7\" (1.702 m)   Wt 84.8 kg (187 lb)   SpO2 100%   BMI 29.29 kg/m²       Temp (24hrs), Av.4 °F (36.9 °C), Min:98.2 °F (36.8 °C), Max:98.5 °F (36.9 °C)        O2 Device: Room air         Wt Readings from Last 4 Encounters:   02/13/21 84.8 kg (187 lb)   01/20/21 85.2 kg (187 lb 12.8 oz)   11/16/20 85.7 kg (189 lb)   10/28/19 84.8 kg (187 lb)        No intake or output data in the 24 hours ending 02/15/21 1106    Last shift:      No intake/output data recorded. Last 3 shifts: 02/13 1901 - 02/15 0700  In: -   Out: 700 [Urine:700]       Physical Exam:     Physical Exam   Constitutional: She appears distressed. HENT:   Head: Normocephalic and atraumatic. Eyes: Pupils are equal, round, and reactive to light. Conjunctivae are normal.   Neck: Normal range of motion. Neck supple. Cardiovascular: Normal rate and regular rhythm. Pulmonary/Chest: Effort normal and breath sounds normal.   Abdominal: Soft. Bowel sounds are normal.   Musculoskeletal: Normal range of motion. Neurological: She is alert. Skin: Skin is warm. Labs:    Recent Labs     02/15/21  0445 02/14/21  0615 02/13/21  1745   WBC 5.2 4.9 6.2   HGB 12.6 12.5 12.2    166 178     Recent Labs     02/15/21  0445 02/14/21  0615 02/13/21  1745    141 142   K 3.5 3.8 3.4*   * 113* 112*   CO2 24 22 21   GLU 52* 49* 84   BUN 25* 15 17   CREA 0.98 0.76 1.02   CA 8.7 9.1 8.6   MG  --   --  2.0   PHOS  --   --  2.9   ALB 2.7* 3.1* 3.1*   ALT 17 18 17     No results for input(s): PH, PCO2, PO2, HCO3, FIO2 in the last 72 hours. Recent Labs     02/14/21  1550 02/14/21  0615 02/13/21  1745   CPK  --   --  95   TROIQ 0.42* 0.45* 0.30*     No results found for: BNPP, BNP   No results found for: CULT  Lab Results   Component Value Date/Time    TSH 0.09 (L) 02/13/2021 05:45 PM       Imaging:    CXR Results  (Last 48 hours)               02/13/21 1915  XR CHEST PORT Final result    Impression:  1. No findings of acute cardiopulmonary abnormality. 2. Similar enlarged cardiac silhouette.        Narrative:  Examination: XR CHEST PORT        History: SOB       Comparison: Chest radiograph January 14, 2021       FINDINGS:       Single frontal portable view of the chest. Multilead AICD appearing unchanged   from prior. The generator pack overlies and obscures portions of the left lung. Symmetric lung volumes. No focal airspace consolidation, pneumothorax, or   significant pleural effusion. Cardiac monitoring leads overlie the patient's   chest. The cardiac silhouette is enlarged. This appears unchanged. There is mild   atherosclerosis of the thoracic aorta. No findings of acute osseous abnormality. Results from Hospital Encounter encounter on 02/13/21   XR CHEST PORT    Narrative Examination: XR CHEST PORT     History: SOB    Comparison: Chest radiograph January 14, 2021    FINDINGS:    Single frontal portable view of the chest. Multilead AICD appearing unchanged  from prior. The generator pack overlies and obscures portions of the left lung. Symmetric lung volumes. No focal airspace consolidation, pneumothorax, or  significant pleural effusion. Cardiac monitoring leads overlie the patient's  chest. The cardiac silhouette is enlarged. This appears unchanged. There is mild  atherosclerosis of the thoracic aorta. No findings of acute osseous abnormality. Impression 1. No findings of acute cardiopulmonary abnormality. 2. Similar enlarged cardiac silhouette. Results from Hospital Encounter encounter on 01/14/21   XR CHEST PA LAT    Narrative Exam:  2 view chest    Indication: Cardiac defibrillator, possible coil fracture. COMPARISON: February 14, 2014 and February 27, 2014    PA and lateral views demonstrate normal heart size. There is no acute process in  the lung fields. The osseous mild cardiomegaly. Left subclavian ICD is in place. One lead projects over the right atrial  appendage. 2 leads project over the right ventricle. The position of the leads  is unchanged. There appears to be a small area of discontinuity of the ICD wire at the  proximal junction of the wire with the coil that is within the left  brachycephalic vein. Projecting at this proximal junction there appears to be 1  mm area of discontinuity and some elevation of the wire in relation to the coil. This most likely represents a fracture of the wire at the junction with the coil  in the left brachycephalic vein. The lungs are clear. No pulmonary edema. Osseous structures are unremarkable. Impression IMPRESSION:  1. Probable small area of discontinuity, fracture of the wire at the proximal  junction of the wire and the coil that is in the left brachycephalic vein. Results from Hospital Encounter encounter on 02/27/14   XR CHEST PORT    Narrative **Final Report**       ICD Codes / Adm. Diagnosis: 425.4  401.1 / ICD GEN CHANGE  ICD GEN CHANGE  Examination:  CR CHEST PORT  - [id number] - Feb 27 2014  2:58PM  Accession No:  [id number]  Reason:  check lead position of newly placed ICD/r/o pneumothorax      REPORT:  A portable radiograph of the chest demonstrates left subclavian leads in   place. A pneumothorax is not seen. The lung volumes are low. There is mild   subsegmental atelectasis in the right upper lobe. IMPRESSION: Subsegmental right upper lobe atelectasis. No evidence of   pneumothorax. Signing/Reading Doctor: Jayme Muhammad ([id number])    Codie Mcdermott ([id number])  Feb 27 2014  3:10PM                                 No results found for this or any previous visit. IMPRESSION:   1. COVID-19 pneumonia  2. Right upper lobe atelectasis  3. History of CHF cardiomyopathy chronic A. fib status post AICD  4. Gastroesophageal reflux disease  5. Peripheral neuropathy  6. Pt is requiring Drug therapy requiring intensive monitoring for toxicity  7. Pt is unstable, unpredictable needing inpatient monitoring; is acutely ill and at high risk of sudden decline and decompensation with severe consequenses and continued end organ dysfunction and failure  8. Prognosis guarded       RECOMMENDATIONS/PLAN:     1.  Patient on Decadron no need for remdesivir add Zithromax and she is already on Eliquis  2. Agree with Empiric IV antibiotics pending culture results on Zosyn  3. Follow culture results  4. Incentive spirometry  5. Supplemental O2 to keep sats > 93%  6. Aspiration precautions  7. Labs to follow electrolytes, renal function and and blood counts  8. Glucose monitoring and SSI  9. Bronchial hygiene with respiratory therapy techniques, bronchodilators  10.  DVT, SUP prophylaxis     ·       Sabine Godfrey MD

## 2021-02-15 NOTE — PROGRESS NOTES
PHYSICAL THERAPY EVALUATION  Patient: Jarrod Ayers (31 y.o. female)  Date: 2/15/2021  Primary Diagnosis: Shortness of breath [R06.02]  Acute CHF (congestive heart failure) ([facility]) [I50.9]  COVID-19 virus infection [U07.1]  Fever [R50.9]  CHF (congestive heart failure) ([address] [I50.9]        Precautions: COVID-19 +ve    ASSESSMENT  This is a 79 y/o female with h/o sever cardiomyopathy , AICD placement , chronic A- fib [facility]  with c/o generalized dizziness , admitted  on 2/13/21 for  SOB , acute CHF ,   fever and COVID-19 infection . Pt. Received in semi-supine , agreeable for PT/OT eval. Pt is A& O x 4 , as per pt. Report , son lives with her in a mobile home with 3 CAROL and antonino handrails , IND with ADL's and IND for functional transfers/mobility without AD prior to admission . Patient reports no DME at home . Based on the objective data described below, the patient is mod I with bed mobility , intact sitting balance , IND with all functional transfers ,intact standing balance without support ,  is able to ambulate -76' in the room with no AD independently and no instances of LOB . Pt is at her functional baseline with no acute PT needs at this time . Pt instructed and reviewed with HEP for b/l LE to build up endurance , pt verbalizes understanding . Patient educated on pacing activities upon discharge if COVID symptoms worsen . Recommend d/c to home with family care  when medically appropriate . Current Level of Function Impacting Discharge (mobility/balance): Pt is  IND with transfers and ambulation . Functional Outcome Measure: The patient scored 24 on the AM Pac basic mobility  outcome measure which is indicative of low complexity . Other factors to consider for discharge: time since onset , COVID - 19 infection      Patient will benefit from skilled therapy intervention to address the above noted impairments.        PLAN :  Recommendation for discharge: (in order for the patient to meet his/her long term goals)  Home independently     This discharge recommendation:  Has been made in collaboration with the attending provider and/or case management    IF patient discharges home will need the following DME: none         SUBJECTIVE:   Patient stated I feel alright .     OBJECTIVE DATA SUMMARY:   HISTORY:    Past Medical History:   Diagnosis Date    [facility] ([facility]     Cardiomyopathy, idiopathic ([facility] 10/28/2010    HTN (hypertension), benign 10/28/2010    Pacemaker     ICD    PVC (premature ventricular contraction) 10/28/2010     Past Surgical History:   Procedure Laterality Date    HX OTHER SURGICAL  10/02/2018    Right eye surgery    HX PACEMAKER         Personal factors and/or comorbidities impacting plan of care:     Home Situation  Home Environment: Trailer/mobile home  # Steps to Enter: 3  Rails to Enter: Yes  Hand Rails : Bilateral  Wheelchair Ramp: No  One/Two Story Residence: One story  Living Alone: No  Support Systems: Child(elvis)(son lives with her)  Patient Expects to be Discharged to[de-identified] Trailer/mobile home  Current DME Used/Available at Home: None    PLOF: Pt IND for ADLS/IADLS, IND with mobility prior to admission. EXAMINATION/PRESENTATION/DECISION MAKING:   Critical Behavior:  Neurologic State: Alert  Orientation Level: Oriented X4        Hearing: Auditory  Auditory Impairment: None  Skin:  intact where exposed     Range Of Motion:  AROM: Within functional limits    PROM: Within functional limits    Strength:    Strength:  Within functional limits    Tone & Sensation:   Tone: Normal    Sensation: Intact      Vision:   Corrective Lenses: Glasses  Functional Mobility:  Bed Mobility:  Rolling: Modified independent  Supine to Sit: Modified independent  Sit to Supine: Modified independent  Scooting: Modified independent  Transfers:  Sit to Stand: Independent  Stand to Sit: Independent  Stand Pivot Transfers: Independent     Bed to Chair: Independent    Balance:   Sitting: Intact; With support  Standing: Intact; Without support  Ambulation/Gait Training:  Distance (ft): 75 Feet (ft)  Assistive Device: Gait belt  Ambulation - Level of Assistance: Independent       Functional Measure:    [employer] AM-PAC 6 Clicks         Basic Mobility Inpatient Short Form  How much difficulty does the patient currently have. .. Unable A Lot A Little None   1. Turning over in bed (including adjusting bedclothes, sheets and blankets)? [] 1   [] 2   [] 3   [x] 4   2. Sitting down on and standing up from a chair with arms ( e.g., wheelchair, bedside commode, etc.)   [] 1   [] 2   [] 3   [x] 4   3. Moving from lying on back to sitting on the side of the bed? [] 1   [] 2   [] 3   [x] 4          How much help from another person does the patient currently need. .. Total A Lot A Little None   4. Moving to and from a bed to a chair (including a wheelchair)? [] 1   [] 2   [] 3   [x] 4   5. Need to walk in hospital room? [] 1   [] 2   [] 3   [x] 4   6. Climbing 3-5 steps with a railing? [] 1   [] 2   [] 3   [x] 4   © 2007, Trustees of [facility] [employer], under license to [employer]. All rights reserved     Score:  Initial: 24 Most Recent: X (Date: 2/15/21-- )   Interpretation of Tool:  Represents activities that are increasingly more difficult (i.e. Bed mobility, Transfers, Gait).   Score 24 23 22-20 19-15 14-10 9-7 6   Modifier CH CI CJ CK CL CM CN          Physical Therapy Evaluation Charge Determination   History Examination Presentation Decision-Making   MEDIUM  Complexity : 1-2 comorbidities / personal factors will impact the outcome/ POC  LOW Complexity : 1-2 Standardized tests and measures addressing body structure, function, activity limitation and / or participation in recreation  LOW Complexity : Stable, uncomplicated  Other Functional Measure [facility] 6 low complexity       Based on the above components, the patient evaluation is determined to be of the following complexity level: LOW     Pain Ratin/10    Activity Tolerance: WNL  Please refer to the flowsheet for vital signs taken during this treatment. After treatment patient left in no apparent distress:   Supine in bed and Call bell within reach    COMMUNICATION/EDUCATION:   The patients plan of care was discussed with: Occupational therapist and Registered nurse. Fall prevention education was provided and the patient/caregiver indicated understanding. and Patient/family agree to work toward stated goals and plan of care. Seen with OT for increased pt's safety and maximum functional outcome .        Thank you for this referral.  Ronda Kolb   Time Calculation: 23 mins

## 2021-02-15 NOTE — PROGRESS NOTES
OCCUPATIONAL THERAPY EVALUATION/DISCHARGE  Patient: Florencio Rodriguez (10 y.o. female)  Date: 2/15/2021  Primary Diagnosis: Shortness of breath [R06.02]  Acute CHF (congestive heart failure) ([facility]) [I50.9]  COVID-19 virus infection [U07.1]  Fever [R50.9]  CHF (congestive heart failure) ([facility] [I50.9]       Precautions: COVID +     ASSESSMENT  Based on the objective data described below, the patient presents at baseline independence for self care and functional mobility/transfers. Patient semi supine on stretcher upon OT/PT entry and agreeable to participate with therapy. Patient A&O x4 and reports son lives with her in a mobile home with 3 CAROL and antonino handrails. Patient reports no DME at home, ambulating without AD. Patient independent for all ADLs and IADLs PTA. Patient not currently on O2 and no c/o SOB with mobility. Patient had 1 fall immediately PTA with no other reported falls at home. Patient mod I bed mobility on stretcher, sup <> sit and scooting, I sit <> stand and ambulating in room with gait belt and no AD. Patient declined grooming needs at this time stating recently got cleaned up at sink independently, independent for simulated grooming, independent feeding, independent LE dressing to don socks EOB. Patient educated on pacing activities upon discharge if COVID symptoms worsen. At this time, patient is at functional baseline independence and will be discharged from OT services following eval as patient has no acute OT needs. If functional status changes, will be happy to reassess.      Current Level of Function (ADLs/self-care): at baseline for self care tasks    Other factors to consider for discharge: time since onset, COVID +     PLAN :  Recommend with staff: up to chair for meals, up to bathroom for toileting    Recommendation for discharge: (in order for the patient to meet his/her long term goals)  Home independently    This discharge recommendation:  Has been made in collaboration with the attending provider and/or case management    IF patient discharges home will need the following DME: none       SUBJECTIVE:   Patient stated I feel so much better, I was allergic to that medicine.     OBJECTIVE DATA SUMMARY:   HISTORY:   Past Medical History:   Diagnosis Date    [facility] ([facility] [address]     Cardiomyopathy, idiopathic ([facility] [address] 10/28/2010    HTN (hypertension), benign 10/28/2010    Pacemaker     ICD    PVC (premature ventricular contraction) 10/28/2010     Past Surgical History:   Procedure Laterality Date    HX OTHER SURGICAL  10/02/2018    Right eye surgery    HX PACEMAKER         Prior Level of Function/Environment/Context: independent for self care and mobility  Expanded or extensive additional review of patient history:   Home Situation  Home Environment: Trailer/mobile home  # Steps to Enter: 3  Rails to Enter: Yes  Hand Rails : Bilateral  Wheelchair Ramp: No  One/Two Story Residence: One story  Living Alone: No  Support Systems: Child(elvis)(son lives with her)  Patient Expects to be Discharged to[de-identified] Trailer/mobile home  Current DME Used/Available at Home: None    EXAMINATION OF PERFORMANCE DEFICITS:  Cognitive/Behavioral Status:  Neurologic State: Alert  Orientation Level: Oriented X4    Skin: intact where visible    Edema: none noted    Hearing: Auditory  Auditory Impairment: None    Vision/Perceptual:    Corrective Lenses: Glasses    Range of Motion:  AROM: Within functional limits  PROM: Within functional limits    Strength:  Strength: Within functional limits     RUE Strength  Observation: grossly observed to be 5/5     LUE Strength  Observation: grossly observed to be 5/5    Coordination:  Generally intact    Tone & Sensation:  Tone: Normal  Sensation: Intact    Balance:  Sitting: Intact; Without support  Standing: Intact; Without support    Functional Mobility and Transfers for ADLs:  Bed Mobility:  Rolling: Modified independent  Supine to Sit: Modified independent  Sit to Supine: Modified independent  Scooting: Modified independent    Transfers:  Sit to Stand: Independent  Stand to Sit: Independent  Bed to Chair: Independent  Assistive Device : Gait Belt    ADL Assessment:  Feeding: Independent    Oral Facial Hygiene/Grooming: Independent(simulated)    Lower Body Dressing: Independent(EOB)    ADL Intervention and task modifications:  Feeding  Feeding Assistance: Independent  Food to Mouth: Independent  Drink to Mouth: Independent    Grooming  Grooming Assistance: Independent(simulated)  Position Performed: Standing    Lower Body Dressing Assistance  Dressing Assistance: Independent  Socks: Independent  Leg Crossed Method Used: Yes  Position Performed: Seated edge of bed    Therapeutic Exercise:  Patient with no UE HEP needs at this time, instructed to mobilize throughout day to maintain strength     Functional Measure:    [address] [place] [address] AM-PACTM \"6 Clicks\"                                                       Daily Activity Inpatient Short Form  How much help from another person does the patient currently need. .. Total; A Lot A Little None   1. Putting on and taking off regular lower body clothing? []  1 []  2 []  3 [x]  4   2. Bathing (including washing, rinsing, drying)? []  1 []  2 []  3 [x]  4   3. Toileting, which includes using toilet, bedpan or urinal? [] 1 []  2 []  3 [x]  4   4. Putting on and taking off regular upper body clothing? []  1 []  2 []  3 [x]  4   5. Taking care of personal grooming such as brushing teeth? []  1 []  2 []  3 [x]  4   6. Eating meals? []  1 []  2 []  3 [x]  4   © 2007, Trustees of [address] [place] [address], under license to [employer]. All rights reserved     Score: 24/24     Interpretation of Tool:  Represents clinically-significant functional categories (i.e. Activities of daily living).   Percentage of Impairment CH    0%   CI    1-19% CJ    20-39% CK    40-59% CL    60-79% CM    80-99% CN     100%   AMPA  Score 6-24 24 23 20-22 15-19 10-14 7-9 6 Occupational Therapy Evaluation Charge Determination   History Examination Decision-Making   LOW Complexity : Brief history review  LOW Complexity : 1-3 performance deficits relating to physical, cognitive , or psychosocial skils that result in activity limitations and / or participation restrictions  LOW Complexity : No comorbidities that affect functional and no verbal or physical assistance needed to complete eval tasks       Based on the above components, the patient evaluation is determined to be of the following complexity level: LOW   Pain Ratin/10    Activity Tolerance:   WNL and SpO2 stable on RA    After treatment patient left in no apparent distress:    Supine in bed and Call bell within reach    COMMUNICATION/EDUCATION:   The patients plan of care was discussed with: Physical therapist and Registered nurse. OT/PT sessions occurred together for increased patient and clinician safety.     Thank you for this referral.  Jung Welch, OTR/L  Time Calculation: 23 mins

## 2021-02-15 NOTE — PROGRESS NOTES
General Daily Progress Note          Patient Name:   Bree Morales       YOB: 1955       Age:  78 y.o.       Admit Date: 2/13/2021      Subjective:     Patient resting the bed alert awake feeling much better blood pressure much improved             Objective:     Visit Vitals  BP (!) 162/125   Pulse 94   Temp 98.2 °F (36.8 °C)   Resp 18   Ht 5' 7\" (1.702 m)   Wt 84.8 kg (187 lb)   SpO2 100%   BMI 29.29 kg/m²        Recent Results (from the past 24 hour(s))   GLUCOSE, POC    Collection Time: 02/14/21  3:48 PM   Result Value Ref Range    Glucose (POC) 461 (H) 65 - 100 mg/dL    Performed by Zaid Crandall    BNP    Collection Time: 02/14/21  3:50 PM   Result Value Ref Range    NT pro-BNP 4,277 (H) <125 pg/mL   TROPONIN I    Collection Time: 02/14/21  3:50 PM   Result Value Ref Range    Troponin-I, Qt. 0.42 (H) <0.05 ng/mL   GLUCOSE, POC    Collection Time: 02/14/21  6:36 PM   Result Value Ref Range    Glucose (POC) 418 (H) 65 - 100 mg/dL    Performed by [address], POC    Collection Time: 02/14/21  8:22 PM   Result Value Ref Range    Glucose (POC) 373 (H) 65 - 100 mg/dL    Performed by [address] [place] [address], POC    Collection Time: 02/15/21 12:31 AM   Result Value Ref Range    Glucose (POC) 177 (H) 65 - 100 mg/dL    Performed by Liddie Record    CBC W/O DIFF    Collection Time: 02/15/21  4:45 AM   Result Value Ref Range    WBC 5.2 3.6 - 11.0 K/uL    RBC 4.19 3.80 - 5.20 M/uL    HGB 12.6 11.5 - 16.0 g/dL    HCT 37.9 35.0 - 47.0 %    MCV 90.5 80.0 - 99.0 FL    MCH 30.1 26.0 - 34.0 PG    MCHC 33.2 30.0 - 36.5 g/dL    RDW 13.8 11.5 - 14.5 %    PLATELET 160 764 - 760 K/uL    MPV 11.7 8.9 - 73.7 FL   METABOLIC PANEL, COMPREHENSIVE    Collection Time: 02/15/21  4:45 AM   Result Value Ref Range    Sodium 144 136 - 145 mmol/L    Potassium 3.5 3.5 - 5.1 mmol/L    Chloride 114 (H) 97 - 108 mmol/L    CO2 24 21 - 32 mmol/L    Anion gap 6 5 - 15 mmol/L    Glucose 52 (L) 65 - 100 mg/dL    BUN 25 (H) 6 - 20 mg/dL    Creatinine 0.98 0.55 - 1.02 mg/dL    BUN/Creatinine ratio 26 (H) 12 - 20      GFR est AA >60 >60 ml/min/1.73m2    GFR est non-AA 57 (L) >60 ml/min/1.73m2    Calcium 8.7 8.5 - 10.1 mg/dL    Bilirubin, total 0.3 0.2 - 1.0 mg/dL    AST (SGOT) 14 (L) 15 - 37 U/L    ALT (SGPT) 17 12 - 78 U/L    Alk. phosphatase 67 45 - 117 U/L    Protein, total 7.4 6.4 - 8.2 g/dL    Albumin 2.7 (L) 3.5 - 5.0 g/dL    Globulin 4.7 (H) 2.0 - 4.0 g/dL    A-G Ratio 0.6 (L) 1.1 - 2.2     GLUCOSE, POC    Collection Time: 02/15/21  6:44 AM   Result Value Ref Range    Glucose (POC) 81 65 - 100 mg/dL    Performed by [address] [place] [address], POC    Collection Time: 02/15/21  8:06 AM   Result Value Ref Range    Glucose (POC) 79 65 - 100 mg/dL    Performed by [email or web address] Bis    GLUCOSE, POC    Collection Time: 02/15/21 11:35 AM   Result Value Ref Range    Glucose (POC) 175 (H) 65 - 100 mg/dL    Performed by Lois Fried      [unfilled]      Review of Systems    Constitutional: Negative for chills and fever. HENT: Negative. Eyes: Negative. Respiratory: Negative. Cardiovascular: Negative. Gastrointestinal: Negative for abdominal pain and nausea. Skin: Negative. Neurological: Negative. Physical Exam:      Constitutional: pt is oriented to person, place, and time. HENT:   Head: Normocephalic and atraumatic. Eyes: Pupils are equal, round, and reactive to light. EOM are normal.   Cardiovascular: Normal rate, regular rhythm and normal heart sounds. Pulmonary/Chest: Breath sounds normal. No wheezes. No rales. Exhibits no tenderness. Abdominal: Soft. Bowel sounds are normal. There is no abdominal tenderness. There is no rebound and no guarding. Musculoskeletal: Normal range of motion. Neurological: pt is alert and oriented to person, place, and time. XR CHEST PORT   Final Result   1. No findings of acute cardiopulmonary abnormality. 2. Similar enlarged cardiac silhouette.            Recent Results (from the past 24 hour(s))   GLUCOSE, POC    Collection Time: 02/14/21  3:48 PM   Result Value Ref Range    Glucose (POC) 461 (H) 65 - 100 mg/dL    Performed by Valerie Julio    BNP    Collection Time: 02/14/21  3:50 PM   Result Value Ref Range    NT pro-BNP 4,277 (H) <125 pg/mL   TROPONIN I    Collection Time: 02/14/21  3:50 PM   Result Value Ref Range    Troponin-I, Qt. 0.42 (H) <0.05 ng/mL   GLUCOSE, POC    Collection Time: 02/14/21  6:36 PM   Result Value Ref Range    Glucose (POC) 418 (H) 65 - 100 mg/dL    Performed by [address], POC    Collection Time: 02/14/21  8:22 PM   Result Value Ref Range    Glucose (POC) 373 (H) 65 - 100 mg/dL    Performed by [address] [place] [address], POC    Collection Time: 02/15/21 12:31 AM   Result Value Ref Range    Glucose (POC) 177 (H) 65 - 100 mg/dL    Performed by Marnie Pacheco    CBC W/O DIFF    Collection Time: 02/15/21  4:45 AM   Result Value Ref Range    WBC 5.2 3.6 - 11.0 K/uL    RBC 4.19 3.80 - 5.20 M/uL    HGB 12.6 11.5 - 16.0 g/dL    HCT 37.9 35.0 - 47.0 %    MCV 90.5 80.0 - 99.0 FL    MCH 30.1 26.0 - 34.0 PG    MCHC 33.2 30.0 - 36.5 g/dL    RDW 13.8 11.5 - 14.5 %    PLATELET 172 617 - 101 K/uL    MPV 11.7 8.9 - 97.0 FL   METABOLIC PANEL, COMPREHENSIVE    Collection Time: 02/15/21  4:45 AM   Result Value Ref Range    Sodium 144 136 - 145 mmol/L    Potassium 3.5 3.5 - 5.1 mmol/L    Chloride 114 (H) 97 - 108 mmol/L    CO2 24 21 - 32 mmol/L    Anion gap 6 5 - 15 mmol/L    Glucose 52 (L) 65 - 100 mg/dL    BUN 25 (H) 6 - 20 mg/dL    Creatinine 0.98 0.55 - 1.02 mg/dL    BUN/Creatinine ratio 26 (H) 12 - 20      GFR est AA >60 >60 ml/min/1.73m2    GFR est non-AA 57 (L) >60 ml/min/1.73m2    Calcium 8.7 8.5 - 10.1 mg/dL    Bilirubin, total 0.3 0.2 - 1.0 mg/dL    AST (SGOT) 14 (L) 15 - 37 U/L    ALT (SGPT) 17 12 - 78 U/L    Alk.  phosphatase 67 45 - 117 U/L    Protein, total 7.4 6.4 - 8.2 g/dL    Albumin 2.7 (L) 3.5 - 5.0 g/dL    Globulin 4.7 (H) 2.0 - 4.0 g/dL    A-G Ratio 0.6 (L) 1.1 - 2.2     GLUCOSE, POC    Collection Time: 02/15/21  6:44 AM   Result Value Ref Range    Glucose (POC) 81 65 - 100 mg/dL    Performed by [address] [place] [address], POC    Collection Time: 02/15/21  8:06 AM   Result Value Ref Range    Glucose (POC) 79 65 - 100 mg/dL    Performed by Candace Moran    GLUCOSE, POC    Collection Time: 02/15/21 11:35 AM   Result Value Ref Range    Glucose (POC) 175 (H) 65 - 100 mg/dL    Performed by John Beltran        Results     Procedure Component Value Units Date/Time    INFLUENZA A & B AG (RAPID TEST) [[id number]] Collected: 02/13/21 1945    Order Status: Completed Specimen: Nasopharyngeal from Nasal washing Updated: 02/13/21 2130     Influenza A Antigen Negative        Influenza B Antigen Negative       COVID-19 RAPID TEST [[id number]]  (Abnormal) Collected: 02/13/21 1945    Order Status: Completed Specimen: Nasopharyngeal Updated: 02/13/21 2135     Specimen source Nasopharyngeal        COVID-19 rapid test DETECTED        Comment: Rapid Abbott ID Now   The specimen is POSITIVE for SARS-CoV-2, the novel coronavirus associated with COVID-19. This test has been authorized by the FDA under an Emergency Use Authorization (EUA) for use by authorized laboratories.    Fact sheet for Healthcare Providers: ConventionUpdate.co.nz Fact sheet for Patients: ConventionUpdate.co.nz   Methodology: Isothermal Nucleic Acid Amplification Results verified, [email or web address] by Raiza :     Recent Labs     02/15/21  0445 02/14/21  0615   WBC 5.2 4.9   HGB 12.6 12.5   HCT 37.9 37.9    166     Recent Labs     02/15/21  0445 02/14/21  0615 02/13/21  1745    141 142   K 3.5 3.8 3.4*   * 113* 112*   CO2 24 22 21   BUN 25* 15 17   CREA 0.98 0.76 1.02   GLU 52* 49* 84   CA 8.7 9.1 8.6   MG  --   --  2.0   PHOS  --   --  2.9     Recent Labs     02/15/21  0445 02/14/21  0615 02/13/21  1745 ALT 17 18 17   AP 67 72 72   TBILI 0.3 0.4 0.3   TP 7.4 8.3* 8.0   ALB 2.7* 3.1* 3.1*   GLOB 4.7* 5.2* 4.9*     No results for input(s): INR, PTP, APTT, INREXT in the last 72 hours. No results for input(s): FE, TIBC, PSAT, FERR in the last 72 hours. No results found for: FOL, RBCF   No results for input(s): PH, PCO2, PO2 in the last 72 hours. Recent Labs     02/14/21  1550 02/14/21  0615 02/13/21  1745   CPK  --   --  95   TROIQ 0.42* 0.45* 0.30*     Lab Results   Component Value Date/Time    Cholesterol, total 134 01/14/2019 10:09 AM    HDL Cholesterol 37 (L) 01/14/2019 10:09 AM    LDL, calculated 63 01/14/2019 10:09 AM    Triglyceride 172 (H) 01/14/2019 10:09 AM     Lab Results   Component Value Date/Time    Glucose (POC) 175 (H) 02/15/2021 11:35 AM    Glucose (POC) 79 02/15/2021 08:06 AM    Glucose (POC) 81 02/15/2021 06:44 AM    Glucose (POC) 177 (H) 02/15/2021 12:31 AM    Glucose (POC) 373 (H) 02/14/2021 08:22 PM     Lab Results   Component Value Date/Time    Color Yellow/Straw 02/14/2021 06:50 AM    Appearance Clear 02/14/2021 06:50 AM    Specific gravity 1.020 02/14/2021 06:50 AM    pH (UA) 6.0 02/14/2021 06:50 AM    Protein >300 (A) 02/14/2021 06:50 AM    Glucose Negative 02/14/2021 06:50 AM    Ketone Negative 02/14/2021 06:50 AM    Bilirubin Negative 02/14/2021 06:50 AM    Urobilinogen 0.1 02/14/2021 06:50 AM    Nitrites Negative 02/14/2021 06:50 AM    Leukocyte Esterase Negative 02/14/2021 06:50 AM    Bacteria Negative 02/14/2021 06:50 AM    WBC 0-4 02/14/2021 06:50 AM    RBC 0-5 02/14/2021 06:50 AM         Assessment:   Covid 19 pneumonitis  Generalized weakness  Recurrent falls  Type 2 diabetes  Chronic A. Fib  Status post AICD  Hypertension  Neuropathy  GERD  Cardiomyopathy  Elevated troponin        Plan:     Order D-dimers  Patient on Eliquis 5 mg twice a day for chronic A.  Fib  On Zithromax Zosyn and Decadron for Covid  Patient on clonidine and Lasix losartan for high blood pressure    Continue current medication        Current Facility-Administered Medications:     cloNIDine HCL (CATAPRES) tablet 0.3 mg, 0.3 mg, Oral, BID, Naomi Dumont MD    labetaloL (NORMODYNE;TRANDATE) injection 20 mg, 20 mg, IntraVENous, Q4H PRN, Ammy Dumont MD, 20 mg at 02/15/21 1015    gabapentin (NEURONTIN) capsule 100 mg, 100 mg, Oral, BID, Naomi Dumont MD, 100 mg at 02/15/21 3566    metFORMIN (GLUCOPHAGE) tablet 500 mg, 500 mg, Oral, BID WITH MEALS, Naomi Dumont MD, 500 mg at 02/15/21 6592    alogliptin (NESINA) tablet 25 mg, 25 mg, Oral, DAILY, Kerry Quinonez MD, 25 mg at 02/14/21 1813    apixaban (ELIQUIS) tablet 5 mg, 5 mg, Oral, BID, Naomi Dumont MD, 5 mg at 02/15/21 3437    losartan (COZAAR) tablet 100 mg, 100 mg, Oral, DAILY, Ammy Dumont MD, 100 mg at 02/15/21 5484    nitroglycerin (NITROSTAT) tablet 0.4 mg, 0.4 mg, SubLINGual, Q5MIN PRN, Naomi Dumont MD    pantoprazole (PROTONIX) tablet 40 mg, 40 mg, Oral, ACB, Ammy Dumont MD, 40 mg at 02/15/21 0836    PARoxetine (PAXIL) tablet 20 mg, 20 mg, Oral, DAILY, Ammy Dumont MD, 20 mg at 02/14/21 4315    insulin lispro (HUMALOG) injection, , SubCUTAneous, AC&HS, Naomi Dumont MD, Stopped at 02/15/21 0730    glucose chewable tablet 16 g, 4 Tab, Oral, PRN, Naomi Dumont MD    dextrose (D50W) injection syrg 12.5-25 g, 25-50 mL, IntraVENous, PRN, Ammy Dumont MD, 25 g at 02/14/21 0724    glucagon (GLUCAGEN) injection 1 mg, 1 mg, IntraMUSCular, PRN, Naomi Dumont MD    acetaminophen (TYLENOL) tablet 650 mg, 650 mg, Oral, Q6H PRN **OR** acetaminophen (TYLENOL) suppository 650 mg, 650 mg, Rectal, Q6H PRN, Naomi Dumont MD    polyethylene glycol (MIRALAX) packet 17 g, 17 g, Oral, DAILY PRN, Naomi Dumont MD    ondansetron (ZOFRAN ODT) tablet 4 mg, 4 mg, Oral, Q8H PRN **OR** ondansetron (ZOFRAN) injection 4 mg, 4 mg, IntraVENous, Q6H PRN, Ammy Dumont MD    atorvastatin (LIPITOR) tablet 40 mg, 40 mg, Oral, QHS, Ammy Dumont MD, 40 mg at 02/14/21 2119    furosemide (LASIX) injection 40 mg, 40 mg, IntraVENous, DAILY, Ammy Dumont MD, 40 mg at 02/15/21 0831    dexamethasone (DECADRON) 4 mg/mL injection 4 mg, 4 mg, IntraVENous, Q6H, Ammy Dumont MD, 4 mg at 02/15/21 0410    piperacillin-tazobactam (ZOSYN) 3.375 g in 0.9% sodium chloride (MBP/ADV) 100 mL MBP, 3.375 g, IntraVENous, Q8H, Ammy Dumont MD, Last Rate: 25 mL/hr at 02/15/21 0527, 3.375 g at 02/15/21 0527    azithromycin (ZITHROMAX) 500 mg in 0.9% sodium chloride 250 mL (VIAL-MATE), 500 mg, IntraVENous, Q24H, Ammy Dumont MD, 500 mg at 02/14/21 4817    Current Outpatient Medications:     metFORMIN (GLUCOPHAGE) 1,000 mg tablet, two (2) times daily (with meals). , Disp: , Rfl:     gabapentin (NEURONTIN) 100 mg capsule, TAKE 1 CAPSULE BY MOUTH THREE TIMES A DAY, Disp: , Rfl:     SITagliptin (Januvia) 100 mg tablet, TAKE 1 TABLET BY MOUTH EVERY DAY, Disp: 90 Tab, Rfl: 2    metoprolol succinate (TOPROL-XL) 25 mg XL tablet, TAKE 0.5 TABS BY MOUTH DAILY. , Disp: 45 Tab, Rfl: 11    ELIQUIS 5 mg tablet, TAKE 1 TABLET BY MOUTH TWICE A DAY, Disp: 180 Tab, Rfl: 3    nitroglycerin (NITROSTAT) 0.4 mg SL tablet, 0.4 mg by SubLINGual route every five (5) minutes as needed for Chest Pain. Up to 3 doses. , Disp: , Rfl:     insulin syringe-needle U-100 (BD INSULIN SYRINGE ULTRA-FINE) 1/2 mL 31 gauge x 15/64\" syrg, Use 4 times daily. Dx code E11.65, Disp: 200 Pen Needle, Rfl: 6    flash glucose scanning reader ([employer] Link[employer]YLE BELKIS READER) [facility] 4 times daily. Dx code E11.65. To use as needed, Disp: 1 Each, Rfl: 6    flash glucose sensor (FREESTYLE BELKIS SENSOR) kit, Test 4 times daily. Dx code E11.65.  Change every 10 days, Disp: 3 Each, Rfl: 6    cloNIDine HCl (CATAPRES) 0.3 mg tablet, TAKE 1 TABLET TWICE DAILY, Disp: , Rfl: 3    NIFEdipine ER (ADALAT CC) 30 mg ER tablet, TAKE 1 TABLET BY MOUTH EVERY DAY FOR 90 DAYS, Disp: , Rfl: 2    losartan (COZAAR) 100 mg tablet, TAKE 1 TABLET EVERY DAY, Disp: , Rfl: 2    hydroCHLOROthiazide (MICROZIDE) 12.5 mg capsule, TAKE ONE CAPSULE BY MOUTH ONCE A DAY, Disp: , Rfl: 2    PARoxetine (PAXIL) 20 mg tablet, Take  by mouth daily. , Disp: , Rfl:     omeprazole (PRILOSEC) 20 mg capsule, Take 20 mg by mouth daily. , Disp: , Rfl:

## 2021-02-16 NOTE — PROGRESS NOTES
General Daily Progress Note          Patient Name:   Porfirio Luevano       YOB: 1955       Age:  78 y.o. Admit Date: 2/13/2021      Subjective:     Patient resting the bed alert awake feeling much better blood pressure much improved             Objective:     Visit Vitals  /83 (BP 1 Location: Left upper arm, BP Patient Position: At rest)   Pulse 77   Temp 98.1 °F (36.7 °C)   Resp 18   Ht 5' 7\" (1.702 m)   Wt 84.8 kg (187 lb)   SpO2 97%   BMI 29.29 kg/m²        Recent Results (from the past 24 hour(s))   GLUCOSE, POC    Collection Time: 02/16/21  3:55 AM   Result Value Ref Range    Glucose (POC) 202 (H) 65 - 100 mg/dL    Performed by [email or web address] Nicholas Ochoa Se, POC    Collection Time: 02/16/21  7:31 AM   Result Value Ref Range    Glucose (POC) 199 (H) 65 - 100 mg/dL    Performed by Sarah, POC    Collection Time: 02/16/21 11:12 AM   Result Value Ref Range    Glucose (POC) 315 (H) 65 - 100 mg/dL    Performed by Daniela Middleton    CBC WITH AUTOMATED DIFF    Collection Time: 02/16/21  1:18 PM   Result Value Ref Range    WBC 4.8 3.6 - 11.0 K/uL    RBC 4.10 3.80 - 5.20 M/uL    HGB 12.2 11.5 - 16.0 g/dL    HCT 37.4 35.0 - 47.0 %    MCV 91.2 80.0 - 99.0 FL    MCH 29.8 26.0 - 34.0 PG    MCHC 32.6 30.0 - 36.5 g/dL    RDW 13.9 11.5 - 14.5 %    PLATELET 315 494 - 585 K/uL    MPV 12.0 8.9 - 12.9 FL    NEUTROPHILS 68 32 - 75 %    LYMPHOCYTES 23 12 - 49 %    MONOCYTES 9 5 - 13 %    EOSINOPHILS 0 0 - 7 %    BASOPHILS 0 0 - 1 %    IMMATURE GRANULOCYTES 0 0.0 - 0.5 %    ABS. NEUTROPHILS 3.3 1.8 - 8.0 K/UL    ABS. LYMPHOCYTES 1.1 0.8 - 3.5 K/UL    ABS. MONOCYTES 0.4 0.0 - 1.0 K/UL    ABS. EOSINOPHILS 0.0 0.0 - 0.4 K/UL    ABS. BASOPHILS 0.0 0.0 - 0.1 K/UL    ABS. IMM.  GRANS. 0.0 0.00 - 0.04 K/UL    DF AUTOMATED     METABOLIC PANEL, COMPREHENSIVE    Collection Time: 02/16/21  1:18 PM   Result Value Ref Range    Sodium 140 136 - 145 mmol/L    Potassium 4.1 3.5 - 5.1 mmol/L Chloride 107 97 - 108 mmol/L    CO2 26 21 - 32 mmol/L    Anion gap 7 5 - 15 mmol/L    Glucose 237 (H) 65 - 100 mg/dL    BUN 27 (H) 6 - 20 mg/dL    Creatinine 1.24 (H) 0.55 - 1.02 mg/dL    BUN/Creatinine ratio 22 (H) 12 - 20      GFR est AA 52 (L) >60 ml/min/1.73m2    GFR est non-AA 43 (L) >60 ml/min/1.73m2    Calcium 8.8 8.5 - 10.1 mg/dL    Bilirubin, total 0.4 0.2 - 1.0 mg/dL    AST (SGOT) 17 15 - 37 U/L    ALT (SGPT) 17 12 - 78 U/L    Alk. phosphatase 61 45 - 117 U/L    Protein, total 7.1 6.4 - 8.2 g/dL    Albumin 2.4 (L) 3.5 - 5.0 g/dL    Globulin 4.7 (H) 2.0 - 4.0 g/dL    A-G Ratio 0.5 (L) 1.1 - 2.2     GLUCOSE, POC    Collection Time: 02/16/21  4:11 PM   Result Value Ref Range    Glucose (POC) 210 (H) 65 - 100 mg/dL    Performed by Gianna Vargas      [unfilled]      Review of Systems    Constitutional: Negative for chills and fever. HENT: Negative. Eyes: Negative. Respiratory: Negative. Cardiovascular: Negative. Gastrointestinal: Negative for abdominal pain and nausea. Skin: Negative. Neurological: Negative. Physical Exam:      Constitutional: pt is oriented to person, place, and time. HENT:   Head: Normocephalic and atraumatic. Eyes: Pupils are equal, round, and reactive to light. EOM are normal.   Cardiovascular: Normal rate, regular rhythm and normal heart sounds. Pulmonary/Chest: Breath sounds normal. No wheezes. No rales. Exhibits no tenderness. Abdominal: Soft. Bowel sounds are normal. There is no abdominal tenderness. There is no rebound and no guarding. Musculoskeletal: Normal range of motion. Neurological: pt is alert and oriented to person, place, and time. XR CHEST PORT   Final Result      XR CHEST PORT   Final Result   1. No findings of acute cardiopulmonary abnormality. 2. Similar enlarged cardiac silhouette.            Recent Results (from the past 24 hour(s))   GLUCOSE, POC    Collection Time: 02/16/21  3:55 AM   Result Value Ref Range    Glucose (POC) 202 (H) 65 - 100 mg/dL    Performed by [email or web address] Nicholas Ochoa Se, POC    Collection Time: 02/16/21  7:31 AM   Result Value Ref Range    Glucose (POC) 199 (H) 65 - 100 mg/dL    Performed by Sarah, POC    Collection Time: 02/16/21 11:12 AM   Result Value Ref Range    Glucose (POC) 315 (H) 65 - 100 mg/dL    Performed by Chester Farias    CBC WITH AUTOMATED DIFF    Collection Time: 02/16/21  1:18 PM   Result Value Ref Range    WBC 4.8 3.6 - 11.0 K/uL    RBC 4.10 3.80 - 5.20 M/uL    HGB 12.2 11.5 - 16.0 g/dL    HCT 37.4 35.0 - 47.0 %    MCV 91.2 80.0 - 99.0 FL    MCH 29.8 26.0 - 34.0 PG    MCHC 32.6 30.0 - 36.5 g/dL    RDW 13.9 11.5 - 14.5 %    PLATELET 696 048 - 452 K/uL    MPV 12.0 8.9 - 12.9 FL    NEUTROPHILS 68 32 - 75 %    LYMPHOCYTES 23 12 - 49 %    MONOCYTES 9 5 - 13 %    EOSINOPHILS 0 0 - 7 %    BASOPHILS 0 0 - 1 %    IMMATURE GRANULOCYTES 0 0.0 - 0.5 %    ABS. NEUTROPHILS 3.3 1.8 - 8.0 K/UL    ABS. LYMPHOCYTES 1.1 0.8 - 3.5 K/UL    ABS. MONOCYTES 0.4 0.0 - 1.0 K/UL    ABS. EOSINOPHILS 0.0 0.0 - 0.4 K/UL    ABS. BASOPHILS 0.0 0.0 - 0.1 K/UL    ABS. IMM. GRANS. 0.0 0.00 - 0.04 K/UL    DF AUTOMATED     METABOLIC PANEL, COMPREHENSIVE    Collection Time: 02/16/21  1:18 PM   Result Value Ref Range    Sodium 140 136 - 145 mmol/L    Potassium 4.1 3.5 - 5.1 mmol/L    Chloride 107 97 - 108 mmol/L    CO2 26 21 - 32 mmol/L    Anion gap 7 5 - 15 mmol/L    Glucose 237 (H) 65 - 100 mg/dL    BUN 27 (H) 6 - 20 mg/dL    Creatinine 1.24 (H) 0.55 - 1.02 mg/dL    BUN/Creatinine ratio 22 (H) 12 - 20      GFR est AA 52 (L) >60 ml/min/1.73m2    GFR est non-AA 43 (L) >60 ml/min/1.73m2    Calcium 8.8 8.5 - 10.1 mg/dL    Bilirubin, total 0.4 0.2 - 1.0 mg/dL    AST (SGOT) 17 15 - 37 U/L    ALT (SGPT) 17 12 - 78 U/L    Alk.  phosphatase 61 45 - 117 U/L    Protein, total 7.1 6.4 - 8.2 g/dL    Albumin 2.4 (L) 3.5 - 5.0 g/dL    Globulin 4.7 (H) 2.0 - 4.0 g/dL    A-G Ratio 0.5 (L) 1.1 - 2.2     GLUCOSE, POC Collection Time: 02/16/21  4:11 PM   Result Value Ref Range    Glucose (POC) 210 (H) 65 - 100 mg/dL    Performed by Lynette Baron        Results     Procedure Component Value Units Date/Time    INFLUENZA A & B AG (RAPID TEST) [[id number]] Collected: 02/13/21 1945    Order Status: Completed Specimen: Nasopharyngeal from Nasal washing Updated: 02/13/21 2130     Influenza A Antigen Negative        Influenza B Antigen Negative       COVID-19 RAPID TEST [[id number]]  (Abnormal) Collected: 02/13/21 1945    Order Status: Completed Specimen: Nasopharyngeal Updated: 02/13/21 2135     Specimen source Nasopharyngeal        COVID-19 rapid test DETECTED        Comment: Rapid Abbott ID Now   The specimen is POSITIVE for SARS-CoV-2, the novel coronavirus associated with COVID-19. This test has been authorized by the FDA under an Emergency Use Authorization (EUA) for use by authorized laboratories. Fact sheet for Healthcare Providers: kstattoo.com Fact sheet for Patients: kstattoo.com   Methodology: Isothermal Nucleic Acid Amplification Results verified, phoned to and read back by [email or web address]              Labs:     Recent Labs     02/16/21  1318 02/15/21  0445   WBC 4.8 5.2   HGB 12.2 12.6   HCT 37.4 37.9    166     Recent Labs     02/16/21  1318 02/15/21  0445 02/14/21  0615 02/13/21  1745    144 141 142   K 4.1 3.5 3.8 3.4*    114* 113* 112*   CO2 26 24 22 21   BUN 27* 25* 15 17   CREA 1.24* 0.98 0.76 1.02   * 52* 49* 84   CA 8.8 8.7 9.1 8.6   MG  --   --   --  2.0   PHOS  --   --   --  2.9     Recent Labs     02/16/21  1318 02/15/21  0445 02/14/21  0615   ALT 17 17 18   AP 61 67 72   TBILI 0.4 0.3 0.4   TP 7.1 7.4 8.3*   ALB 2.4* 2.7* 3.1*   GLOB 4.7* 4.7* 5.2*     No results for input(s): INR, PTP, APTT, INREXT, INREXT in the last 72 hours. No results for input(s): FE, TIBC, PSAT, FERR in the last 72 hours.    No results found for: FOL, RBCF   No results for input(s): PH, PCO2, PO2 in the last 72 hours. Recent Labs     02/14/21  1550 02/14/21  0615 02/13/21  1745   CPK  --   --  95   TROIQ 0.42* 0.45* 0.30*     Lab Results   Component Value Date/Time    Cholesterol, total 134 01/14/2019 10:09 AM    HDL Cholesterol 37 (L) 01/14/2019 10:09 AM    LDL, calculated 63 01/14/2019 10:09 AM    Triglyceride 172 (H) 01/14/2019 10:09 AM     Lab Results   Component Value Date/Time    Glucose (POC) 210 (H) 02/16/2021 04:11 PM    Glucose (POC) 315 (H) 02/16/2021 11:12 AM    Glucose (POC) 199 (H) 02/16/2021 07:31 AM    Glucose (POC) 202 (H) 02/16/2021 03:55 AM    Glucose (POC) 145 (H) 02/15/2021 04:26 PM     Lab Results   Component Value Date/Time    Color Yellow/Straw 02/14/2021 06:50 AM    Appearance Clear 02/14/2021 06:50 AM    Specific gravity 1.020 02/14/2021 06:50 AM    pH (UA) 6.0 02/14/2021 06:50 AM    Protein >300 (A) 02/14/2021 06:50 AM    Glucose Negative 02/14/2021 06:50 AM    Ketone Negative 02/14/2021 06:50 AM    Bilirubin Negative 02/14/2021 06:50 AM    Urobilinogen 0.1 02/14/2021 06:50 AM    Nitrites Negative 02/14/2021 06:50 AM    Leukocyte Esterase Negative 02/14/2021 06:50 AM    Bacteria Negative 02/14/2021 06:50 AM    WBC 0-4 02/14/2021 06:50 AM    RBC 0-5 02/14/2021 06:50 AM         Assessment:   Covid 19 pneumonitis  Dilated cardiomyopathy status post AICD  Generalized weakness  Recurrent falls  Type 2 diabetes  Chronic A. Fib  Hypertension  Neuropathy  GERD  Elevated troponin        Plan:     Patient on Eliquis 5 mg twice a day for chronic A.  Fib  On Zithromax Zosyn and Decadron for Covid  Patient on clonidine and Lasix losartan for high blood pressure    Continue current medication  PT OT consult      Current Facility-Administered Medications:     guaiFENesin ER (MUCINEX) tablet 600 mg, 600 mg, Oral, Q12H, Yung Freitas MD, 600 mg at 02/16/21 1239    [START ON 2/17/2021] metoprolol succinate (TOPROL-XL) XL tablet 25 mg, 25 mg, Oral, DAILY, Viviana Nergon MD    cloNIDine HCL (CATAPRES) tablet 0.3 mg, 0.3 mg, Oral, BID, Ammy Dumont MD, 0.3 mg at 02/16/21 0835    traMADoL (ULTRAM) tablet 50 mg, 50 mg, Oral, Q6H PRN, Ammy Dumont MD, 50 mg at 02/15/21 2302    labetaloL (NORMODYNE;TRANDATE) 20 mg/4 mL (5 mg/mL) injection 20 mg, 20 mg, IntraVENous, Q4H PRN, Lavonne Frankel, MD, 20 mg at 02/16/21 0005    gabapentin (NEURONTIN) capsule 100 mg, 100 mg, Oral, BID, Ammy Dumont MD, 100 mg at 02/16/21 2647    metFORMIN (GLUCOPHAGE) tablet 500 mg, 500 mg, Oral, BID WITH MEALS, Lavonne Frankel, MD, 500 mg at 02/16/21 1653    alogliptin (NESINA) tablet 25 mg, 25 mg, Oral, DAILY, Lavonne Frankel, MD, 25 mg at 02/16/21 6682    apixaban (ELIQUIS) tablet 5 mg, 5 mg, Oral, BID, Ammy Dumont MD, 5 mg at 02/16/21 0835    losartan (COZAAR) tablet 100 mg, 100 mg, Oral, DAILY, Lavonne Frankel, MD, 100 mg at 02/16/21 0835    nitroglycerin (NITROSTAT) tablet 0.4 mg, 0.4 mg, SubLINGual, Q5MIN PRN, Ammy Dumont MD    pantoprazole (PROTONIX) tablet 40 mg, 40 mg, Oral, ACB, Ammy Dumont MD, 40 mg at 02/16/21 0835    PARoxetine (PAXIL) tablet 20 mg, 20 mg, Oral, DAILY, Lavonne Frankel, MD, 20 mg at 02/16/21 0835    insulin lispro (HUMALOG) injection, , SubCUTAneous, AC&HS, Lavonne Frankel, MD, 6 Units at 02/16/21 1653    glucose chewable tablet 16 g, 4 Tab, Oral, PRN, Ammy Dumont MD    dextrose (D50W) injection syrg 12.5-25 g, 25-50 mL, IntraVENous, PRN, Ammy Dumont MD, 25 g at 02/14/21 0724    glucagon (GLUCAGEN) injection 1 mg, 1 mg, IntraMUSCular, PRN, Sheila Dumont MD    acetaminophen (TYLENOL) tablet 650 mg, 650 mg, Oral, Q6H PRN, 650 mg at 02/16/21 0002 **OR** acetaminophen (TYLENOL) suppository 650 mg, 650 mg, Rectal, Q6H PRN, Ammy Dumont MD    polyethylene glycol (MIRALAX) packet 17 g, 17 g, Oral, DAILY PRN, Ammy Dumont MD    ondansetron (ZOFRAN ODT) tablet 4 mg, 4 mg, Oral, Q8H PRN **OR** ondansetron (ZOFRAN) injection 4 mg, 4 mg, IntraVENous, Q6H PRN, Adam Dumont MD    atorvastatin (LIPITOR) tablet 40 mg, 40 mg, Oral, QHS, Ammy Dumont MD, 40 mg at 02/15/21 2149    furosemide (LASIX) injection 40 mg, 40 mg, IntraVENous, DAILY, Ammy Dumont MD, 40 mg at 02/16/21 0834    dexamethasone (DECADRON) 4 mg/mL injection 4 mg, 4 mg, IntraVENous, Q6H, Ammy Dumont MD, 4 mg at 02/16/21 1653    piperacillin-tazobactam (ZOSYN) 3.375 g in 0.9% sodium chloride (MBP/ADV) 100 mL MBP, 3.375 g, IntraVENous, Q8H, Ammy Dumont MD, Last Rate: 25 mL/hr at 02/16/21 1415, 3.375 g at 02/16/21 1415    azithromycin (ZITHROMAX) 500 mg in 0.9% sodium chloride 250 mL (VIAL-MATE), 500 mg, IntraVENous, Q24H, Ammy Dumont MD, 500 mg at 02/16/21 0247

## 2021-02-16 NOTE — CONSULTS
PULMONARY NOTE  [facility]    Name: Preston Carrillo MRN: [id number]   : 1955 Hospital: [facility]   Date: 2021  Admission date: 2021 Hospital Day: 4       HPI:     Hospital Problems  Date Reviewed: 2021          Codes Class Noted POA    Shortness of breath ICD-10-CM: R06.02  ICD-9-CM: 786.05  2021 Unknown        Fever ICD-10-CM: R50.9  ICD-9-CM: 780.60  2021 Unknown        Acute CHF (congestive heart failure) ([facility] ICD-10-CM: I50.9  ICD-9-CM: 428.0  2021 Unknown        COVID-19 virus infection ICD-10-CM: U07.1  ICD-9-CM: 079.89  2021 Unknown        CHF (congestive heart failure) ([facility] ICD-10-CM: I50.9  ICD-9-CM: 428.0  2021 Unknown                   [x] High complexity decision making was performed  [x] See my orders for details      Subjective/Initial History:     I was asked by Elmer Herman MD to see Preston Carrillo  a 78 y.o.  female in consultation     Excerpts from admission 2021 or consult notes as follows:   50-year-old lady came in because of generalized weakness and dizziness past medical history of cardiomyopathy chronic A. fib hypertension diabetes history of AICD placement and gastroesophageal flux disease. Her COVID-19 came back positive now she is in the emergency room short of breath dyspneic and also having generalized weakness so pulmonary consult was called for further evaluation.       Allergies   Allergen Reactions    Chocolate [Cocoa] Anaphylaxis and Swelling    Iodine Anaphylaxis    Lisinopril Anaphylaxis    Peanut Anaphylaxis and Swelling    Glimepiride Swelling    Glipizide Other (comments)     Pruritis    Metformin Diarrhea    Norvasc [Amlodipine] Other (comments)     Light Headed    Pneumovax 23 [Pneumococcal 23-Devi Ps Vaccine] Hives    Toprol Xl [Metoprolol Succinate] Other (comments)     Light headed    Vitamin D [Cholecalciferol (Vitamin D3)] Other (comments) Dizziness/headache        MAR reviewed and pertinent medications noted or modified as needed     Current Facility-Administered Medications   Medication    cloNIDine HCL (CATAPRES) tablet 0.3 mg    traMADoL (ULTRAM) tablet 50 mg    labetaloL (NORMODYNE;TRANDATE) 20 mg/4 mL (5 mg/mL) injection 20 mg    gabapentin (NEURONTIN) capsule 100 mg    metFORMIN (GLUCOPHAGE) tablet 500 mg    alogliptin (NESINA) tablet 25 mg    apixaban (ELIQUIS) tablet 5 mg    losartan (COZAAR) tablet 100 mg    nitroglycerin (NITROSTAT) tablet 0.4 mg    pantoprazole (PROTONIX) tablet 40 mg    PARoxetine (PAXIL) tablet 20 mg    insulin lispro (HUMALOG) injection    glucose chewable tablet 16 g    dextrose (D50W) injection syrg 12.5-25 g    glucagon (GLUCAGEN) injection 1 mg    acetaminophen (TYLENOL) tablet 650 mg    Or    acetaminophen (TYLENOL) suppository 650 mg    polyethylene glycol (MIRALAX) packet 17 g    ondansetron (ZOFRAN ODT) tablet 4 mg    Or    ondansetron (ZOFRAN) injection 4 mg    atorvastatin (LIPITOR) tablet 40 mg    furosemide (LASIX) injection 40 mg    dexamethasone (DECADRON) 4 mg/mL injection 4 mg    piperacillin-tazobactam (ZOSYN) 3.375 g in 0.9% sodium chloride (MBP/ADV) 100 mL MBP    azithromycin (ZITHROMAX) 500 mg in 0.9% sodium chloride 250 mL (VIAL-MATE)      Patient PCP: Susana Antonio MD  PMH:  has a past medical history of [facility] ([facility] Cardiomyopathy, idiopathic ([facility] (10/28/2010), HTN (hypertension), benign (10/28/2010), Pacemaker, and PVC (premature ventricular contraction) (10/28/2010). PSH:   has a past surgical history that includes hx other surgical (10/02/2018) and hx pacemaker. FHX: family history is not on file. SHX:  reports that she has never smoked. She has never used smokeless tobacco. She reports that she does not drink alcohol or use drugs. ROS:    Review of Systems   Constitutional:        Generalized weakness   HENT: Negative. Eyes: Negative. Respiratory: Negative. Cardiovascular: Negative. Gastrointestinal: Negative. Genitourinary: Negative. Musculoskeletal: Negative. Skin: Negative. Neurological: Negative. Endo/Heme/Allergies: Negative. Psychiatric/Behavioral: Negative. Objective:     Vital Signs: Telemetry:    normal sinus rhythm Intake/Output:   Visit Vitals  BP (!) 156/100 (BP 1 Location: Left upper arm, BP Patient Position: At rest)   Pulse 79   Temp 97.7 °F (36.5 °C)   Resp 19   Ht 5' 7\" (1.702 m)   Wt 84.8 kg (187 lb)   SpO2 97%   BMI 29.29 kg/m²       Temp (24hrs), Av.6 °F (38.1 °C), Min:97.7 °F (36.5 °C), Max:103.1 °F (39.5 °C)        O2 Device: Room air         Wt Readings from Last 4 Encounters:   21 84.8 kg (187 lb)   21 85.2 kg (187 lb 12.8 oz)   20 85.7 kg (189 lb)   10/28/19 84.8 kg (187 lb)        No intake or output data in the 24 hours ending 21 1036    Last shift:      No intake/output data recorded. Last 3 shifts: No intake/output data recorded. Physical Exam:     Physical Exam   Constitutional: She appears distressed. HENT:   Head: Normocephalic and atraumatic. Eyes: Pupils are equal, round, and reactive to light. Conjunctivae are normal.   Neck: Normal range of motion. Neck supple. Cardiovascular: Normal rate and regular rhythm. Pulmonary/Chest: Effort normal and breath sounds normal.   Abdominal: Soft. Bowel sounds are normal.   Musculoskeletal: Normal range of motion. Neurological: She is alert. Skin: Skin is warm.         Labs:    Recent Labs     02/15/21  0445 21  0615 21  1745   WBC 5.2 4.9 6.2   HGB 12.6 12.5 12.2    166 178     Recent Labs     02/15/21  0445 21  0615 21  1745    141 142   K 3.5 3.8 3.4*   * 113* 112*   CO2 24 22 21   GLU 52* 49* 84   BUN 25* 15 17   CREA 0.98 0.76 1.02   CA 8.7 9.1 8.6   MG  --   --  2.0   PHOS  --   --  2.9   ALB 2.7* 3.1* 3.1*   ALT 17 18 17     No results for input(s): PH, PCO2, PO2, HCO3, FIO2 in the last 72 hours. Recent Labs     02/14/21  1550 02/14/21  0615 02/13/21  1745   CPK  --   --  95   TROIQ 0.42* 0.45* 0.30*     No results found for: BNPP, BNP   No results found for: CULT  Lab Results   Component Value Date/Time    TSH 0.09 (L) 02/13/2021 05:45 PM       Imaging:    CXR Results  (Last 48 hours)    None        Results from Hospital Encounter encounter on 02/13/21   XR CHEST PORT    Narrative Examination: XR CHEST PORT     History: SOB    Comparison: Chest radiograph January 14, 2021    FINDINGS:    Single frontal portable view of the chest. Multilead AICD appearing unchanged  from prior. The generator pack overlies and obscures portions of the left lung. Symmetric lung volumes. No focal airspace consolidation, pneumothorax, or  significant pleural effusion. Cardiac monitoring leads overlie the patient's  chest. The cardiac silhouette is enlarged. This appears unchanged. There is mild  atherosclerosis of the thoracic aorta. No findings of acute osseous abnormality. Impression 1. No findings of acute cardiopulmonary abnormality. 2. Similar enlarged cardiac silhouette. Results from Hospital Encounter encounter on 01/14/21   XR CHEST PA LAT    Narrative Exam:  2 view chest    Indication: Cardiac defibrillator, possible coil fracture. COMPARISON: February 14, 2014 and February 27, 2014    PA and lateral views demonstrate normal heart size. There is no acute process in  the lung fields. The osseous mild cardiomegaly. Left subclavian ICD is in place. One lead projects over the right atrial  appendage. 2 leads project over the right ventricle. The position of the leads  is unchanged. There appears to be a small area of discontinuity of the ICD wire at the  proximal junction of the wire with the coil that is within the left  brachycephalic vein.  Projecting at this proximal junction there appears to be 1  mm area of discontinuity and some elevation of the wire in relation to the coil. This most likely represents a fracture of the wire at the junction with the coil  in the left brachycephalic vein. The lungs are clear. No pulmonary edema. Osseous structures are unremarkable. Impression IMPRESSION:  1. Probable small area of discontinuity, fracture of the wire at the proximal  junction of the wire and the coil that is in the left brachycephalic vein. Results from Hospital Encounter encounter on 02/27/14   XR CHEST PORT    Narrative **Final Report**       ICD Codes / Adm. Diagnosis: 425.4  401.1 / ICD GEN CHANGE  ICD GEN CHANGE  Examination:  CR CHEST PORT  - [id number] - Feb 27 2014  2:58PM  Accession No:  [id number]  Reason:  check lead position of newly placed ICD/r/o pneumothorax      REPORT:  A portable radiograph of the chest demonstrates left subclavian leads in   place. A pneumothorax is not seen. The lung volumes are low. There is mild   subsegmental atelectasis in the right upper lobe. IMPRESSION: Subsegmental right upper lobe atelectasis. No evidence of   pneumothorax. Signing/Reading Doctor: Sheila Bland ([id number])    Chana Gomez ([id number])  Feb 27 2014  3:10PM                                 No results found for this or any previous visit. IMPRESSION:   1. COVID-19 pneumonia  2. Right upper lobe atelectasis  3. History of CHF cardiomyopathy chronic A. fib status post AICD  4. Gastroesophageal reflux disease  5. Peripheral neuropathy  6. Pt is requiring Drug therapy requiring intensive monitoring for toxicity  7. Prognosis guarded       RECOMMENDATIONS/PLAN:     1. Patient on Decadron no need for remdesivir add Zithromax and she is already on Eliquis  2. Agree with Empiric IV antibiotics pending culture results on Zosyn  3. We will repeat chest x-ray today start patient on Mucinex  4. Incentive spirometry  5. Supplemental O2 to keep sats > 93%  6. Aspiration precautions  7.  Bronchial hygiene with respiratory therapy techniques, bronchodilators  8.  DVT, SUP prophylaxis     ·       Jose Ruano MD

## 2021-02-16 NOTE — PROGRESS NOTES
Patient evaluated by PT/OT and discharge recommendation is home independently. Patient will require Medicare 2nd IM letter prior to discharge.        YADIRA Phan

## 2021-02-16 NOTE — PROGRESS NOTES
Telemetry called [id number]. Patient presented with 2 bursts os SVT. Notified Dr. Shashank Mai. One time dose of 25mg PO metoprolol administered.

## 2021-02-16 NOTE — PROGRESS NOTES
Progress Note      2/16/2021 3:31 PM  NAME: Merlin Sol   MRN:  [id number]   Admit Diagnosis: Shortness of breath [R06.02]  Acute CHF (congestive heart failure) ([facility]) [I50.9]  COVID-19 virus infection [U07.1]  Fever [R50.9]  CHF (congestive heart failure) ([address] [I50.9]          Assessment/Plan:   COVID-19 infection, feeling better    Dilated cardiomyopathy, status post ICD, recently left ventricular function has been better, continues ACE inhibitor with losartan, beta-blockade with metoprolol succinate, will restart    Hypertension, had been difficult to control, improved, continue current medications    [facility] noted on ICD, has been on Eliquis    Hypercholesterolemia, on atorvastatin         []       High complexity decision making was performed in this patient at high risk for decompensation with multiple organ involvement. Subjective:     Merlin Sol denies chest pain, dyspnea. Discussed with RN events overnight. Review of Systems:    Symptom Y/N Comments  Symptom Y/N Comments   Fever/Chills N   Chest Pain N    Poor Appetite N   Edema N    Cough N   Abdominal Pain N    Sputum N   Joint Pain N    SOB/WEAVER N   Pruritis/Rash N    Nausea/vomit N   Tolerating PT/OT Y    Diarrhea N   Tolerating Diet Y    Constipation N   Other       Could NOT obtain due to:      Objective:      Physical Exam:    Last 24hrs VS reviewed since prior progress note. Most recent are:    Visit Vitals  /83 (BP 1 Location: Left upper arm, BP Patient Position: At rest)   Pulse 77   Temp 98.1 °F (36.7 °C)   Resp 18   Ht 5' 7\" (1.702 m)   Wt 84.8 kg (187 lb)   SpO2 97%   BMI 29.29 kg/m²     No intake or output data in the 24 hours ending 02/16/21 1531     General Appearance: Well developed, well nourished, alert; no acute distress. Ears/Nose/Mouth/Throat: Hearing grossly normal; moist mucous membranes  Neck: Supple. Chest: Lungs clear to auscultation bilaterally.   Cardiovascular: Regular rate and rhythm, S1S2 normal, no murmur. Abdomen: Soft, non-tender, bowel sounds are active. Extremities: No edema bilaterally. Skin: Warm and dry. []         Post-cath site without hematoma, bruit, tenderness, or thrill. Distal pulses intact. PMH/SH reviewed - no change compared to H&P    Data Review    Telemetry:     EKG:   []  No new EKG for review    Lab Data Personally Reviewed:    Recent Labs     02/16/21  1318 02/15/21  0445   WBC 4.8 5.2   HGB 12.2 12.6   HCT 37.4 37.9    166     No results for input(s): INR, PTP, APTT, INREXT in the last 72 hours. Recent Labs     02/16/21  1318 02/15/21  0445 02/14/21  0615 02/13/21  1745    144 141 142   K 4.1 3.5 3.8 3.4*    114* 113* 112*   CO2 26 24 22 21   BUN 27* 25* 15 17   CREA 1.24* 0.98 0.76 1.02   * 52* 49* 84   CA 8.8 8.7 9.1 8.6   MG  --   --   --  2.0     Recent Labs     02/14/21  1550 02/14/21  0615 02/13/21  1745   CPK  --   --  95   TROIQ 0.42* 0.45* 0.30*     Lab Results   Component Value Date/Time    Cholesterol, total 134 01/14/2019 10:09 AM    HDL Cholesterol 37 (L) 01/14/2019 10:09 AM    LDL, calculated 63 01/14/2019 10:09 AM    Triglyceride 172 (H) 01/14/2019 10:09 AM       Recent Labs     02/16/21  1318 02/15/21  0445 02/14/21  0615   AP 61 67 72   TP 7.1 7.4 8.3*   ALB 2.4* 2.7* 3.1*   GLOB 4.7* 4.7* 5.2*     No results for input(s): PH, PCO2, PO2 in the last 72 hours.     Medications Personally Reviewed:    Current Facility-Administered Medications   Medication Dose Route Frequency    guaiFENesin ER (MUCINEX) tablet 600 mg  600 mg Oral Q12H    cloNIDine HCL (CATAPRES) tablet 0.3 mg  0.3 mg Oral BID    traMADoL (ULTRAM) tablet 50 mg  50 mg Oral Q6H PRN    labetaloL (NORMODYNE;TRANDATE) 20 mg/4 mL (5 mg/mL) injection 20 mg  20 mg IntraVENous Q4H PRN    gabapentin (NEURONTIN) capsule 100 mg  100 mg Oral BID    metFORMIN (GLUCOPHAGE) tablet 500 mg  500 mg Oral BID WITH MEALS    alogliptin (NESINA) tablet 25 mg  25 mg Oral DAILY    apixaban (ELIQUIS) tablet 5 mg  5 mg Oral BID    losartan (COZAAR) tablet 100 mg  100 mg Oral DAILY    nitroglycerin (NITROSTAT) tablet 0.4 mg  0.4 mg SubLINGual Q5MIN PRN    pantoprazole (PROTONIX) tablet 40 mg  40 mg Oral ACB    PARoxetine (PAXIL) tablet 20 mg  20 mg Oral DAILY    insulin lispro (HUMALOG) injection   SubCUTAneous AC&HS    glucose chewable tablet 16 g  4 Tab Oral PRN    dextrose (D50W) injection syrg 12.5-25 g  25-50 mL IntraVENous PRN    glucagon (GLUCAGEN) injection 1 mg  1 mg IntraMUSCular PRN    acetaminophen (TYLENOL) tablet 650 mg  650 mg Oral Q6H PRN    Or    acetaminophen (TYLENOL) suppository 650 mg  650 mg Rectal Q6H PRN    polyethylene glycol (MIRALAX) packet 17 g  17 g Oral DAILY PRN    ondansetron (ZOFRAN ODT) tablet 4 mg  4 mg Oral Q8H PRN    Or    ondansetron (ZOFRAN) injection 4 mg  4 mg IntraVENous Q6H PRN    atorvastatin (LIPITOR) tablet 40 mg  40 mg Oral QHS    furosemide (LASIX) injection 40 mg  40 mg IntraVENous DAILY    dexamethasone (DECADRON) 4 mg/mL injection 4 mg  4 mg IntraVENous Q6H    piperacillin-tazobactam (ZOSYN) 3.375 g in 0.9% sodium chloride (MBP/ADV) 100 mL MBP  3.375 g IntraVENous Q8H    azithromycin (ZITHROMAX) 500 mg in 0.9% sodium chloride 250 mL (VIAL-MATE)  500 mg IntraVENous Q24H         Beatriz Saba MD

## 2021-02-17 NOTE — INTERDISCIPLINARY ROUNDS
Primary Nurse Letty Miranda RN and Jose G Borrero RN performed a dual skin assessment on this patient No impairment noted

## 2021-02-17 NOTE — PROGRESS NOTES
General Daily Progress Note          Patient Name:   Gris Bolaños       YOB: 1955       Age:  78 y.o. Admit Date: 2/13/2021      Subjective:       Patient feeling much better today blood pressures still high             Objective:     Visit Vitals  BP (!) 176/106   Pulse 91   Temp 98.6 °F (37 °C)   Resp 18   Ht 5' 7\" (1.702 m)   Wt 85.9 kg (189 lb 6 oz)   SpO2 95%   BMI 29.66 kg/m²        Recent Results (from the past 24 hour(s))   GLUCOSE, POC    Collection Time: 02/16/21 11:12 AM   Result Value Ref Range    Glucose (POC) 315 (H) 65 - 100 mg/dL    Performed by Lauryn Avalos    CBC WITH AUTOMATED DIFF    Collection Time: 02/16/21  1:18 PM   Result Value Ref Range    WBC 4.8 3.6 - 11.0 K/uL    RBC 4.10 3.80 - 5.20 M/uL    HGB 12.2 11.5 - 16.0 g/dL    HCT 37.4 35.0 - 47.0 %    MCV 91.2 80.0 - 99.0 FL    MCH 29.8 26.0 - 34.0 PG    MCHC 32.6 30.0 - 36.5 g/dL    RDW 13.9 11.5 - 14.5 %    PLATELET 005 376 - 903 K/uL    MPV 12.0 8.9 - 12.9 FL    NEUTROPHILS 68 32 - 75 %    LYMPHOCYTES 23 12 - 49 %    MONOCYTES 9 5 - 13 %    EOSINOPHILS 0 0 - 7 %    BASOPHILS 0 0 - 1 %    IMMATURE GRANULOCYTES 0 0.0 - 0.5 %    ABS. NEUTROPHILS 3.3 1.8 - 8.0 K/UL    ABS. LYMPHOCYTES 1.1 0.8 - 3.5 K/UL    ABS. MONOCYTES 0.4 0.0 - 1.0 K/UL    ABS. EOSINOPHILS 0.0 0.0 - 0.4 K/UL    ABS. BASOPHILS 0.0 0.0 - 0.1 K/UL    ABS. IMM.  GRANS. 0.0 0.00 - 0.04 K/UL    DF AUTOMATED     METABOLIC PANEL, COMPREHENSIVE    Collection Time: 02/16/21  1:18 PM   Result Value Ref Range    Sodium 140 136 - 145 mmol/L    Potassium 4.1 3.5 - 5.1 mmol/L    Chloride 107 97 - 108 mmol/L    CO2 26 21 - 32 mmol/L    Anion gap 7 5 - 15 mmol/L    Glucose 237 (H) 65 - 100 mg/dL    BUN 27 (H) 6 - 20 mg/dL    Creatinine 1.24 (H) 0.55 - 1.02 mg/dL    BUN/Creatinine ratio 22 (H) 12 - 20      GFR est AA 52 (L) >60 ml/min/1.73m2    GFR est non-AA 43 (L) >60 ml/min/1.73m2    Calcium 8.8 8.5 - 10.1 mg/dL    Bilirubin, total 0.4 0.2 - 1.0 mg/dL AST (SGOT) 17 15 - 37 U/L    ALT (SGPT) 17 12 - 78 U/L    Alk. phosphatase 61 45 - 117 U/L    Protein, total 7.1 6.4 - 8.2 g/dL    Albumin 2.4 (L) 3.5 - 5.0 g/dL    Globulin 4.7 (H) 2.0 - 4.0 g/dL    A-G Ratio 0.5 (L) 1.1 - 2.2     GLUCOSE, POC    Collection Time: 02/16/21  4:11 PM   Result Value Ref Range    Glucose (POC) 210 (H) 65 - 100 mg/dL    Performed by Aki Head    GLUCOSE, POC    Collection Time: 02/16/21  7:48 PM   Result Value Ref Range    Glucose (POC) 119 (H) 65 - 100 mg/dL    Performed by Thierno Chang    GLUCOSE, POC    Collection Time: 02/17/21  7:43 AM   Result Value Ref Range    Glucose (POC) 216 (H) 65 - 100 mg/dL    Performed by Amadeo Dorado      [unfilled]      Review of Systems    Constitutional: Negative for chills and fever. HENT: Negative. Eyes: Negative. Respiratory: Negative. Cardiovascular: Negative. Gastrointestinal: Negative for abdominal pain and nausea. Skin: Negative. Neurological: Negative. Physical Exam:      Constitutional: pt is oriented to person, place, and time. HENT:   Head: Normocephalic and atraumatic. Eyes: Pupils are equal, round, and reactive to light. EOM are normal.   Cardiovascular: Normal rate, regular rhythm and normal heart sounds. Pulmonary/Chest: Breath sounds normal. No wheezes. No rales. Exhibits no tenderness. Abdominal: Soft. Bowel sounds are normal. There is no abdominal tenderness. There is no rebound and no guarding. Musculoskeletal: Normal range of motion. Neurological: pt is alert and oriented to person, place, and time. XR CHEST PORT   Final Result      XR CHEST PORT   Final Result   1. No findings of acute cardiopulmonary abnormality. 2. Similar enlarged cardiac silhouette.            Recent Results (from the past 24 hour(s))   GLUCOSE, POC    Collection Time: 02/16/21 11:12 AM   Result Value Ref Range    Glucose (POC) 315 (H) 65 - 100 mg/dL    Performed by Gilbert Rudd    CBC WITH AUTOMATED DIFF    Collection Time: 02/16/21  1:18 PM   Result Value Ref Range    WBC 4.8 3.6 - 11.0 K/uL    RBC 4.10 3.80 - 5.20 M/uL    HGB 12.2 11.5 - 16.0 g/dL    HCT 37.4 35.0 - 47.0 %    MCV 91.2 80.0 - 99.0 FL    MCH 29.8 26.0 - 34.0 PG    MCHC 32.6 30.0 - 36.5 g/dL    RDW 13.9 11.5 - 14.5 %    PLATELET 469 476 - 067 K/uL    MPV 12.0 8.9 - 12.9 FL    NEUTROPHILS 68 32 - 75 %    LYMPHOCYTES 23 12 - 49 %    MONOCYTES 9 5 - 13 %    EOSINOPHILS 0 0 - 7 %    BASOPHILS 0 0 - 1 %    IMMATURE GRANULOCYTES 0 0.0 - 0.5 %    ABS. NEUTROPHILS 3.3 1.8 - 8.0 K/UL    ABS. LYMPHOCYTES 1.1 0.8 - 3.5 K/UL    ABS. MONOCYTES 0.4 0.0 - 1.0 K/UL    ABS. EOSINOPHILS 0.0 0.0 - 0.4 K/UL    ABS. BASOPHILS 0.0 0.0 - 0.1 K/UL    ABS. IMM. GRANS. 0.0 0.00 - 0.04 K/UL    DF AUTOMATED     METABOLIC PANEL, COMPREHENSIVE    Collection Time: 02/16/21  1:18 PM   Result Value Ref Range    Sodium 140 136 - 145 mmol/L    Potassium 4.1 3.5 - 5.1 mmol/L    Chloride 107 97 - 108 mmol/L    CO2 26 21 - 32 mmol/L    Anion gap 7 5 - 15 mmol/L    Glucose 237 (H) 65 - 100 mg/dL    BUN 27 (H) 6 - 20 mg/dL    Creatinine 1.24 (H) 0.55 - 1.02 mg/dL    BUN/Creatinine ratio 22 (H) 12 - 20      GFR est AA 52 (L) >60 ml/min/1.73m2    GFR est non-AA 43 (L) >60 ml/min/1.73m2    Calcium 8.8 8.5 - 10.1 mg/dL    Bilirubin, total 0.4 0.2 - 1.0 mg/dL    AST (SGOT) 17 15 - 37 U/L    ALT (SGPT) 17 12 - 78 U/L    Alk.  phosphatase 61 45 - 117 U/L    Protein, total 7.1 6.4 - 8.2 g/dL    Albumin 2.4 (L) 3.5 - 5.0 g/dL    Globulin 4.7 (H) 2.0 - 4.0 g/dL    A-G Ratio 0.5 (L) 1.1 - 2.2     GLUCOSE, POC    Collection Time: 02/16/21  4:11 PM   Result Value Ref Range    Glucose (POC) 210 (H) 65 - 100 mg/dL    Performed by Nicol Pinto    GLUCOSE, POC    Collection Time: 02/16/21  7:48 PM   Result Value Ref Range    Glucose (POC) 119 (H) 65 - 100 mg/dL    Performed by Arturo Deleon    GLUCOSE, POC    Collection Time: 02/17/21  7:43 AM   Result Value Ref Range    Glucose (POC) 216 (H) 65 - 100 mg/dL    Performed by [place]ham Range        Results     Procedure Component Value Units Date/Time    INFLUENZA A & B AG (RAPID TEST) [[id number]] Collected: 02/13/21 1945    Order Status: Completed Specimen: Nasopharyngeal from Nasal washing Updated: 02/13/21 2130     Influenza A Antigen Negative        Influenza B Antigen Negative       COVID-19 RAPID TEST [[id number]]  (Abnormal) Collected: 02/13/21 1945    Order Status: Completed Specimen: Nasopharyngeal Updated: 02/13/21 2135     Specimen source Nasopharyngeal        COVID-19 rapid test DETECTED        Comment: Rapid [employer] ID Now   The specimen is POSITIVE for SARS-CoV-2, the novel coronavirus associated with COVID-19. This test has been authorized by the FDA under an Emergency Use Authorization (EUA) for use by authorized laboratories. Fact sheet for Healthcare Providers: ConventionUpdate.co.nz Fact sheet for Patients: ConventionUpdate.co.nz   Methodology: Isothermal Nucleic Acid Amplification Results verified, [email or web address] by Jonathan Wang:     Recent Labs     02/16/21  1318 02/15/21  0445   WBC 4.8 5.2   HGB 12.2 12.6   HCT 37.4 37.9    166     Recent Labs     02/16/21  1318 02/15/21  0445    144   K 4.1 3.5    114*   CO2 26 24   BUN 27* 25*   CREA 1.24* 0.98   * 52*   CA 8.8 8.7     Recent Labs     02/16/21  1318 02/15/21  0445   ALT 17 17   AP 61 67   TBILI 0.4 0.3   TP 7.1 7.4   ALB 2.4* 2.7*   GLOB 4.7* 4.7*     No results for input(s): INR, PTP, APTT, INREXT, INREXT in the last 72 hours. No results for input(s): FE, TIBC, PSAT, FERR in the last 72 hours. No results found for: FOL, RBCF   No results for input(s): PH, PCO2, PO2 in the last 72 hours.   Recent Labs     02/14/21  1550   TROIQ 0.42*     Lab Results   Component Value Date/Time    Cholesterol, total 134 01/14/2019 10:09 AM    HDL Cholesterol 37 (L) 01/14/2019 10:09 AM LDL, calculated 63 01/14/2019 10:09 AM    Triglyceride 172 (H) 01/14/2019 10:09 AM     Lab Results   Component Value Date/Time    Glucose (POC) 216 (H) 02/17/2021 07:43 AM    Glucose (POC) 119 (H) 02/16/2021 07:48 PM    Glucose (POC) 210 (H) 02/16/2021 04:11 PM    Glucose (POC) 315 (H) 02/16/2021 11:12 AM    Glucose (POC) 199 (H) 02/16/2021 07:31 AM     Lab Results   Component Value Date/Time    Color Yellow/Straw 02/14/2021 06:50 AM    Appearance Clear 02/14/2021 06:50 AM    Specific gravity 1.020 02/14/2021 06:50 AM    pH (UA) 6.0 02/14/2021 06:50 AM    Protein >300 (A) 02/14/2021 06:50 AM    Glucose Negative 02/14/2021 06:50 AM    Ketone Negative 02/14/2021 06:50 AM    Bilirubin Negative 02/14/2021 06:50 AM    Urobilinogen 0.1 02/14/2021 06:50 AM    Nitrites Negative 02/14/2021 06:50 AM    Leukocyte Esterase Negative 02/14/2021 06:50 AM    Bacteria Negative 02/14/2021 06:50 AM    WBC 0-4 02/14/2021 06:50 AM    RBC 0-5 02/14/2021 06:50 AM         Assessment:   Covid 19 pneumonitis  Dilated cardiomyopathy status post AICD  Generalized weakness  Recurrent falls  Type 2 diabetes  Chronic A. Fib  Hypertension  Neuropathy  GERD  Elevated troponin  Diarrhea        Plan:     Patient on Eliquis 5 mg twice a day for chronic A.  Fib  On Zithromax Zosyn and Decadron for Covid  Patient on clonidine and Lasix losartan for high blood pressure  Lipitor 40 mg daily  Continue current medication  PT OT consult  Today's labs are pending  Monitor renal function  Start on Imodium  Ambulatory pulse ox      Current Facility-Administered Medications:     guaiFENesin ER (MUCINEX) tablet 600 mg, 600 mg, Oral, Q12H, Yung Freitas MD, 600 mg at 02/17/21 0834    metoprolol succinate (TOPROL-XL) XL tablet 25 mg, 25 mg, Oral, DAILY, Therese Bull MD, 25 mg at 02/17/21 0834    cloNIDine HCL (CATAPRES) tablet 0.3 mg, 0.3 mg, Oral, BID, Ammy Dumont MD, 0.3 mg at 02/17/21 0834    traMADoL (ULTRAM) tablet 50 mg, 50 mg, Oral, Q6H PRN, Ammy Dumont MD, 50 mg at 02/15/21 2302  •  labetaloL (NORMODYNE;TRANDATE) 20 mg/4 mL (5 mg/mL) injection 20 mg, 20 mg, IntraVENous, Q4H PRN, Ammy Dumont MD, 20 mg at 02/16/21 0005  •  gabapentin (NEURONTIN) capsule 100 mg, 100 mg, Oral, BID, Ammy Dumont MD, 100 mg at 02/17/21 0834  •  metFORMIN (GLUCOPHAGE) tablet 500 mg, 500 mg, Oral, BID WITH MEALS, Ammy Dumont MD, 500 mg at 02/17/21 0834  •  alogliptin (NESINA) tablet 25 mg, 25 mg, Oral, DAILY, Ammy Dumont MD, 25 mg at 02/17/21 0900  •  apixaban (ELIQUIS) tablet 5 mg, 5 mg, Oral, BID, Ammy Dumont MD, 5 mg at 02/17/21 0834  •  losartan (COZAAR) tablet 100 mg, 100 mg, Oral, DAILY, Ammy Dumont MD, 100 mg at 02/17/21 0834  •  nitroglycerin (NITROSTAT) tablet 0.4 mg, 0.4 mg, SubLINGual, Q5MIN PRN, Ammy Dumont MD  •  pantoprazole (PROTONIX) tablet 40 mg, 40 mg, Oral, ACB, Ammy Dumont MD, 40 mg at 02/17/21 0834  •  PARoxetine (PAXIL) tablet 20 mg, 20 mg, Oral, DAILY, Ammy Dumont MD, 20 mg at 02/17/21 0834  •  insulin lispro (HUMALOG) injection, , SubCUTAneous, AC&HS, Ammy Dumont MD, 6 Units at 02/17/21 0730  •  glucose chewable tablet 16 g, 4 Tab, Oral, PRN, Ammy Dumont MD  •  dextrose (D50W) injection syrg 12.5-25 g, 25-50 mL, IntraVENous, PRN, Ammy Dmuont MD, 25 g at 02/14/21 0724  •  glucagon (GLUCAGEN) injection 1 mg, 1 mg, IntraMUSCular, PRN, Ammy Dumont MD  •  acetaminophen (TYLENOL) tablet 650 mg, 650 mg, Oral, Q6H PRN, 650 mg at 02/16/21 0002 **OR** acetaminophen (TYLENOL) suppository 650 mg, 650 mg, Rectal, Q6H PRN, Ammy Dumont MD  •  polyethylene glycol (MIRALAX) packet 17 g, 17 g, Oral, DAILY PRN, Ammy Dumont MD  •  ondansetron (ZOFRAN ODT) tablet 4 mg, 4 mg, Oral, Q8H PRN **OR** ondansetron (ZOFRAN) injection 4 mg, 4 mg, IntraVENous, Q6H PRN, Ammy Dumont MD  •  atorvastatin (LIPITOR) tablet 40 mg, 40 mg, Oral, QHS, Ammy Dumont MD, 40 mg at 02/16/21  2059    furosemide (LASIX) injection 40 mg, 40 mg, IntraVENous, DAILY, Ammy Dumont MD, 40 mg at 02/17/21 0835    dexamethasone (DECADRON) 4 mg/mL injection 4 mg, 4 mg, IntraVENous, Q6H, Ammy Dumont MD, 4 mg at 02/17/21 0450    piperacillin-tazobactam (ZOSYN) 3.375 g in 0.9% sodium chloride (MBP/ADV) 100 mL MBP, 3.375 g, IntraVENous, Q8H, Ammy Dumont MD, Last Rate: 25 mL/hr at 02/17/21 0142, 3.375 g at 02/17/21 0142    azithromycin (ZITHROMAX) 500 mg in 0.9% sodium chloride 250 mL (VIAL-MATE), 500 mg, IntraVENous, Q24H, Ammy Dumont MD, 500 mg at 02/16/21 2313

## 2021-02-17 NOTE — CONSULTS
PULMONARY NOTE  [facility]    Name: Jarrod Ayers MRN: [id number]   : 1955 Hospital: [facility]   Date: 2021  Admission date: 2021 Hospital Day: 5       HPI:     Hospital Problems  Date Reviewed: 2021          Codes Class Noted POA    Shortness of breath ICD-10-CM: R06.02  ICD-9-CM: 786.05  2021 Unknown        Fever ICD-10-CM: R50.9  ICD-9-CM: 780.60  2021 Unknown        Acute CHF (congestive heart failure) ([facility] ICD-10-CM: I50.9  ICD-9-CM: 428.0  2021 Unknown        COVID-19 virus infection ICD-10-CM: U07.1  ICD-9-CM: 079.89  2021 Unknown        CHF (congestive heart failure) ([facility] ICD-10-CM: I50.9  ICD-9-CM: 428.0  2021 Unknown                   [x] High complexity decision making was performed  [x] See my orders for details      Subjective/Initial History:     I was asked by Anaid Hernandez MD to see Jarrod Ayers  a 78 y.o.  female in consultation     Excerpts from admission 2021 or consult notes as follows:   77-year-old lady came in because of generalized weakness and dizziness past medical history of cardiomyopathy chronic A. fib hypertension diabetes history of AICD placement and gastroesophageal flux disease. Her COVID-19 came back positive now she is in the emergency room short of breath dyspneic and also having generalized weakness so pulmonary consult was called for further evaluation.       Allergies   Allergen Reactions    Chocolate [Cocoa] Anaphylaxis and Swelling    Iodine Anaphylaxis    Lisinopril Anaphylaxis    Peanut Anaphylaxis and Swelling    Glimepiride Swelling    Glipizide Other (comments)     Pruritis    Metformin Diarrhea    Norvasc [Amlodipine] Other (comments)     Light Headed    Pneumovax 23 [Pneumococcal 23-Devi Ps Vaccine] Hives    Toprol Xl [Metoprolol Succinate] Other (comments)     Light headed    Vitamin D [Cholecalciferol (Vitamin D3)] Other (comments) Dizziness/headache        MAR reviewed and pertinent medications noted or modified as needed     Current Facility-Administered Medications   Medication    guaiFENesin ER (MUCINEX) tablet 600 mg    metoprolol succinate (TOPROL-XL) XL tablet 25 mg    cloNIDine HCL (CATAPRES) tablet 0.3 mg    traMADoL (ULTRAM) tablet 50 mg    labetaloL (NORMODYNE;TRANDATE) 20 mg/4 mL (5 mg/mL) injection 20 mg    gabapentin (NEURONTIN) capsule 100 mg    metFORMIN (GLUCOPHAGE) tablet 500 mg    alogliptin (NESINA) tablet 25 mg    apixaban (ELIQUIS) tablet 5 mg    losartan (COZAAR) tablet 100 mg    nitroglycerin (NITROSTAT) tablet 0.4 mg    pantoprazole (PROTONIX) tablet 40 mg    PARoxetine (PAXIL) tablet 20 mg    insulin lispro (HUMALOG) injection    glucose chewable tablet 16 g    dextrose (D50W) injection syrg 12.5-25 g    glucagon (GLUCAGEN) injection 1 mg    acetaminophen (TYLENOL) tablet 650 mg    Or    acetaminophen (TYLENOL) suppository 650 mg    polyethylene glycol (MIRALAX) packet 17 g    ondansetron (ZOFRAN ODT) tablet 4 mg    Or    ondansetron (ZOFRAN) injection 4 mg    atorvastatin (LIPITOR) tablet 40 mg    furosemide (LASIX) injection 40 mg    dexamethasone (DECADRON) 4 mg/mL injection 4 mg    piperacillin-tazobactam (ZOSYN) 3.375 g in 0.9% sodium chloride (MBP/ADV) 100 mL MBP    azithromycin (ZITHROMAX) 500 mg in 0.9% sodium chloride 250 mL (VIAL-MATE)      Patient PCP: Sam Angel MD  PMH:  has a past medical history of [facility] ([facility] Cardiomyopathy, idiopathic ([address] (10/28/2010), HTN (hypertension), benign (10/28/2010), Pacemaker, and PVC (premature ventricular contraction) (10/28/2010). PSH:   has a past surgical history that includes hx other surgical (10/02/2018) and hx pacemaker. FHX: family history is not on file. SHX:  reports that she has never smoked. She has never used smokeless tobacco. She reports that she does not drink alcohol or use drugs.      ROS:    Review of Systems   Constitutional:        Generalized weakness   HENT: Negative. Eyes: Negative. Respiratory: Negative. Cardiovascular: Negative. Gastrointestinal: Negative. Genitourinary: Negative. Musculoskeletal: Negative. Skin: Negative. Neurological: Positive for speech change. Endo/Heme/Allergies: Negative. Psychiatric/Behavioral: Negative. Objective:     Vital Signs: Telemetry:    normal sinus rhythm Intake/Output:   Visit Vitals  BP (!) 176/106   Pulse 91   Temp 98.6 °F (37 °C)   Resp 18   Ht 5' 7\" (1.702 m)   Wt 85.9 kg (189 lb 6 oz)   SpO2 95%   BMI 29.66 kg/m²       Temp (24hrs), Av.3 °F (36.8 °C), Min:97.7 °F (36.5 °C), Max:98.9 °F (37.2 °C)        O2 Device: Room air         Wt Readings from Last 4 Encounters:   21 85.9 kg (189 lb 6 oz)   21 85.2 kg (187 lb 12.8 oz)   20 85.7 kg (189 lb)   10/28/19 84.8 kg (187 lb)          Intake/Output Summary (Last 24 hours) at 2021 1023  Last data filed at 2021 1838  Gross per 24 hour   Intake    Output 1 ml   Net -1 ml       Last shift:      No intake/output data recorded. Last 3 shifts: 02/15 1901 -  0700  In: -   Out: 1        Physical Exam:     Physical Exam   Constitutional: She appears distressed. HENT:   Head: Normocephalic and atraumatic. Eyes: Pupils are equal, round, and reactive to light. Conjunctivae are normal.   Neck: Normal range of motion. Neck supple. Cardiovascular: Normal rate and regular rhythm. Pulmonary/Chest: Effort normal and breath sounds normal.   Abdominal: Soft. Bowel sounds are normal.   Musculoskeletal: Normal range of motion. Neurological: She is alert. Skin: Skin is warm.         Labs:    Recent Labs     21  1318 02/15/21  0445   WBC 4.8 5.2   HGB 12.2 12.6    166     Recent Labs     21  1318 02/15/21  0445    144   K 4.1 3.5    114*   CO2 26 24   * 52*   BUN 27* 25*   CREA 1.24* 0.98   CA 8.8 8.7   ALB 2.4* 2.7*   ALT 17 17     No results for input(s): PH, PCO2, PO2, HCO3, FIO2 in the last 72 hours. Recent Labs     02/14/21  1550   TROIQ 0.42*     No results found for: BNPP, BNP   No results found for: CULT  Lab Results   Component Value Date/Time    TSH 0.09 (L) 02/13/2021 05:45 PM       Imaging:    CXR Results  (Last 48 hours)               02/16/21 1156  XR CHEST PORT Final result    Narrative:  1 view comparison the 13th       Borderline cardiomegaly without congestion. Essentially clear lungs. No effusion   or pneumothorax           Results from Hospital Encounter encounter on 02/13/21   XR CHEST PORT    Narrative 1 view comparison the 13th    Borderline cardiomegaly without congestion. Essentially clear lungs. No effusion  or pneumothorax   XR CHEST PORT    Narrative Examination: XR CHEST PORT     History: SOB    Comparison: Chest radiograph January 14, 2021    FINDINGS:    Single frontal portable view of the chest. Multilead AICD appearing unchanged  from prior. The generator pack overlies and obscures portions of the left lung. Symmetric lung volumes. No focal airspace consolidation, pneumothorax, or  significant pleural effusion. Cardiac monitoring leads overlie the patient's  chest. The cardiac silhouette is enlarged. This appears unchanged. There is mild  atherosclerosis of the thoracic aorta. No findings of acute osseous abnormality. Impression 1. No findings of acute cardiopulmonary abnormality. 2. Similar enlarged cardiac silhouette. Results from Hospital Encounter encounter on 01/14/21   XR CHEST PA LAT    Narrative Exam:  2 view chest    Indication: Cardiac defibrillator, possible coil fracture. COMPARISON: February 14, 2014 and February 27, 2014    PA and lateral views demonstrate normal heart size. There is no acute process in  the lung fields. The osseous mild cardiomegaly. Left subclavian ICD is in place. One lead projects over the right atrial  appendage.  2 leads project over the right ventricle. The position of the leads  is unchanged. There appears to be a small area of discontinuity of the ICD wire at the  proximal junction of the wire with the coil that is within the left  brachycephalic vein. Projecting at this proximal junction there appears to be 1  mm area of discontinuity and some elevation of the wire in relation to the coil. This most likely represents a fracture of the wire at the junction with the coil  in the left brachycephalic vein. The lungs are clear. No pulmonary edema. Osseous structures are unremarkable. Impression IMPRESSION:  1. Probable small area of discontinuity, fracture of the wire at the proximal  junction of the wire and the coil that is in the left brachycephalic vein. No results found for this or any previous visit. IMPRESSION:   1. COVID-19 pneumonia  2. Right upper lobe atelectasis much improved on recent chest x-ray  3. History of CHF cardiomyopathy chronic A. fib status post AICD  4. Gastroesophageal reflux disease  5. Peripheral neuropathy  6. Pt is requiring Drug therapy requiring intensive monitoring for toxicity  7. Prognosis guarded       RECOMMENDATIONS/PLAN:     1. Patient on Decadron no need for remdesivir add Zithromax and she is already on Eliquis  2. Agree with Empiric IV antibiotics pending culture results on Zosyn  3. Chest x-ray showed resolution of atelectasis  4. Incentive spirometry  5. Supplemental O2 to keep sats > 93%  6. Aspiration precautions  7. Bronchial hygiene with respiratory therapy techniques, bronchodilators  8.  DVT, SUP prophylaxis     ·       Connie Lund MD

## 2021-02-18 NOTE — PROGRESS NOTES
Patient is sitting upright in bed on phone at this time. Patient has dinner tray at her side and is in no distress nor has any complaints at this time. Very pleasant and cooperative.

## 2021-02-18 NOTE — PROGRESS NOTES
Problem: Diabetes Self-Management  Goal: *Disease process and treatment process  Description: Define diabetes and identify own type of diabetes; list 3 options for treating diabetes. Outcome: Progressing Towards Goal  Goal: *Incorporating nutritional management into lifestyle  Description: Describe effect of type, amount and timing of food on blood glucose; list 3 methods for planning meals. Outcome: Progressing Towards Goal  Goal: *Incorporating physical activity into lifestyle  Description: State effect of exercise on blood glucose levels. Outcome: Progressing Towards Goal  Goal: *Developing strategies to promote health/change behavior  Description: Define the ABC's of diabetes; identify appropriate screenings, schedule and personal plan for screenings. Outcome: Progressing Towards Goal  Goal: *Using medications safely  Description: State effect of diabetes medications on diabetes; name diabetes medication taking, action and side effects. Outcome: Progressing Towards Goal  Goal: *Monitoring blood glucose, interpreting and using results  Description: Identify recommended blood glucose targets  and personal targets. Outcome: Progressing Towards Goal  Goal: *Prevention, detection, treatment of acute complications  Description: List symptoms of hyper- and hypoglycemia; describe how to treat low blood sugar and actions for lowering  high blood glucose level. Outcome: Progressing Towards Goal  Goal: *Prevention, detection and treatment of chronic complications  Description: Define the natural course of diabetes and describe the relationship of blood glucose levels to long term complications of diabetes.   Outcome: Progressing Towards Goal  Goal: *Developing strategies to address psychosocial issues  Description: Describe feelings about living with diabetes; identify support needed and support network  Outcome: Progressing Towards Goal  Goal: *Insulin pump training  Outcome: Progressing Towards Goal  Goal: *Sick day guidelines  Outcome: Progressing Towards Goal  Goal: *Patient Specific Goal (EDIT GOAL, INSERT TEXT)  Outcome: Progressing Towards Goal     Problem: Patient Education: Go to Patient Education Activity  Goal: Patient/Family Education  Outcome: Progressing Towards Goal     Problem: Falls - Risk of  Goal: *Absence of Falls  Description: Document Danney Mess Fall Risk and appropriate interventions in the flowsheet. Outcome: Progressing Towards Goal  Note: Fall Risk Interventions:  Mobility Interventions: Patient to call before getting OOB         Medication Interventions: Patient to call before getting OOB    Elimination Interventions: Call light in reach              Problem: Patient Education: Go to Patient Education Activity  Goal: Patient/Family Education  Outcome: Progressing Towards Goal     Problem: Pressure Injury - Risk of  Goal: *Prevention of pressure injury  Description: Document Marshall Scale and appropriate interventions in the flowsheet.   Outcome: Progressing Towards Goal  Note: Pressure Injury Interventions:  Sensory Interventions: Minimize linen layers    Moisture Interventions: Absorbent underpads, Minimize layers    Activity Interventions: Increase time out of bed    Mobility Interventions: HOB 30 degrees or less    Nutrition Interventions: Document food/fluid/supplement intake    Friction and Shear Interventions: Minimize layers                Problem: Patient Education: Go to Patient Education Activity  Goal: Patient/Family Education  Outcome: Progressing Towards Goal

## 2021-02-18 NOTE — PROGRESS NOTES
ELAYNE Plan:    -d/c home when medically stable  -PCP follow up      HH wasn't recommended by PT/OT due to the patient being able to function independently w/o any assistance.  Medicare 2nd IM letter prior to discharge is required.        Amairani Siddiqi, MSW  [phone]

## 2021-02-18 NOTE — PROGRESS NOTES
General Daily Progress Note          Patient Name:   Doretha Gandhi       YOB: 1955       Age:  78 y.o. Admit Date: 2/13/2021      Subjective:       Patient feeling much better today blood pressures still high             Objective:     Visit Vitals  BP (!) 131/96 (BP 1 Location: Left upper arm)   Pulse 72   Temp 97.8 °F (36.6 °C)   Resp 18   Ht 5' 6.93\" (1.7 m)   Wt 85.9 kg (189 lb 6 oz)   SpO2 97%   BMI 29.72 kg/m²        Recent Results (from the past 24 hour(s))   GLUCOSE, POC    Collection Time: 02/17/21  5:37 PM   Result Value Ref Range    Glucose (POC) 86 65 - 100 mg/dL    Performed by Rica Olsen    GLUCOSE, POC    Collection Time: 02/17/21  9:10 PM   Result Value Ref Range    Glucose (POC) 160 (H) 65 - 100 mg/dL    Performed by Rica Olsen    GLUCOSE, POC    Collection Time: 02/18/21  8:11 AM   Result Value Ref Range    Glucose (POC) 240 (H) 65 - 100 mg/dL    Performed by Rica Olsen    GLUCOSE, POC    Collection Time: 02/18/21 11:07 AM   Result Value Ref Range    Glucose (POC) 214 (H) 65 - 100 mg/dL    Performed by CAN QUINTANA    CBC WITH AUTOMATED DIFF    Collection Time: 02/18/21 12:23 PM   Result Value Ref Range    WBC 6.5 3.6 - 11.0 K/uL    RBC 4.03 3.80 - 5.20 M/uL    HGB 11.9 11.5 - 16.0 g/dL    HCT 36.6 35.0 - 47.0 %    MCV 90.8 80.0 - 99.0 FL    MCH 29.5 26.0 - 34.0 PG    MCHC 32.5 30.0 - 36.5 g/dL    RDW 13.4 11.5 - 14.5 %    PLATELET 499 (L) 122 - 400 K/uL    MPV 12.8 8.9 - 12.9 FL    NEUTROPHILS 79 (H) 32 - 75 %    LYMPHOCYTES 14 12 - 49 %    MONOCYTES 7 5 - 13 %    EOSINOPHILS 0 0 - 7 %    BASOPHILS 0 0 - 1 %    IMMATURE GRANULOCYTES 0 0.0 - 0.5 %    ABS. NEUTROPHILS 5.2 1.8 - 8.0 K/UL    ABS. LYMPHOCYTES 0.9 0.8 - 3.5 K/UL    ABS. MONOCYTES 0.5 0.0 - 1.0 K/UL    ABS. EOSINOPHILS 0.0 0.0 - 0.4 K/UL    ABS. BASOPHILS 0.0 0.0 - 0.1 K/UL    ABS. IMM.  GRANS. 0.0 0.00 - 0.04 K/UL    DF AUTOMATED     METABOLIC PANEL, COMPREHENSIVE    Collection Time: 02/18/21 12:23 PM Result Value Ref Range    Sodium 139 136 - 145 mmol/L    Potassium 4.1 3.5 - 5.1 mmol/L    Chloride 105 97 - 108 mmol/L    CO2 26 21 - 32 mmol/L    Anion gap 8 5 - 15 mmol/L    Glucose 227 (H) 65 - 100 mg/dL    BUN 27 (H) 6 - 20 mg/dL    Creatinine 1.09 (H) 0.55 - 1.02 mg/dL    BUN/Creatinine ratio 25 (H) 12 - 20      GFR est AA >60 >60 ml/min/1.73m2    GFR est non-AA 50 (L) >60 ml/min/1.73m2    Calcium 8.4 (L) 8.5 - 10.1 mg/dL    Bilirubin, total 0.4 0.2 - 1.0 mg/dL    AST (SGOT) 22 15 - 37 U/L    ALT (SGPT) 17 12 - 78 U/L    Alk. phosphatase 67 45 - 117 U/L    Protein, total 6.7 6.4 - 8.2 g/dL    Albumin 2.2 (L) 3.5 - 5.0 g/dL    Globulin 4.5 (H) 2.0 - 4.0 g/dL    A-G Ratio 0.5 (L) 1.1 - 2.2     GLUCOSE, POC    Collection Time: 02/18/21  4:09 PM   Result Value Ref Range    Glucose (POC) 169 (H) 65 - 100 mg/dL    Performed by Vladimir Turner      [unfilled]      Review of Systems    Constitutional: Negative for chills and fever. HENT: Negative. Eyes: Negative. Respiratory: Negative. Cardiovascular: Negative. Gastrointestinal: Negative for abdominal pain and nausea. Skin: Negative. Neurological: Negative. Physical Exam:      Constitutional: pt is oriented to person, place, and time. HENT:   Head: Normocephalic and atraumatic. Eyes: Pupils are equal, round, and reactive to light. EOM are normal.   Cardiovascular: Normal rate, regular rhythm and normal heart sounds. Pulmonary/Chest: Breath sounds normal. No wheezes. No rales. Exhibits no tenderness. Abdominal: Soft. Bowel sounds are normal. There is no abdominal tenderness. There is no rebound and no guarding. Musculoskeletal: Normal range of motion. Neurological: pt is alert and oriented to person, place, and time. XR CHEST PORT   Final Result      XR CHEST PORT   Final Result   1. No findings of acute cardiopulmonary abnormality. 2. Similar enlarged cardiac silhouette.            Recent Results (from the past 24 hour(s))   GLUCOSE, POC    Collection Time: 02/17/21  5:37 PM   Result Value Ref Range    Glucose (POC) 86 65 - 100 mg/dL    Performed by Benja Templeton    GLUCOSE, POC    Collection Time: 02/17/21  9:10 PM   Result Value Ref Range    Glucose (POC) 160 (H) 65 - 100 mg/dL    Performed by Benja Templeton    GLUCOSE, POC    Collection Time: 02/18/21  8:11 AM   Result Value Ref Range    Glucose (POC) 240 (H) 65 - 100 mg/dL    Performed by Benja Templeton    GLUCOSE, POC    Collection Time: 02/18/21 11:07 AM   Result Value Ref Range    Glucose (POC) 214 (H) 65 - 100 mg/dL    Performed by CAN QUINTANA    CBC WITH AUTOMATED DIFF    Collection Time: 02/18/21 12:23 PM   Result Value Ref Range    WBC 6.5 3.6 - 11.0 K/uL    RBC 4.03 3.80 - 5.20 M/uL    HGB 11.9 11.5 - 16.0 g/dL    HCT 36.6 35.0 - 47.0 %    MCV 90.8 80.0 - 99.0 FL    MCH 29.5 26.0 - 34.0 PG    MCHC 32.5 30.0 - 36.5 g/dL    RDW 13.4 11.5 - 14.5 %    PLATELET 236 (L) 131 - 400 K/uL    MPV 12.8 8.9 - 12.9 FL    NEUTROPHILS 79 (H) 32 - 75 %    LYMPHOCYTES 14 12 - 49 %    MONOCYTES 7 5 - 13 %    EOSINOPHILS 0 0 - 7 %    BASOPHILS 0 0 - 1 %    IMMATURE GRANULOCYTES 0 0.0 - 0.5 %    ABS. NEUTROPHILS 5.2 1.8 - 8.0 K/UL    ABS. LYMPHOCYTES 0.9 0.8 - 3.5 K/UL    ABS. MONOCYTES 0.5 0.0 - 1.0 K/UL    ABS. EOSINOPHILS 0.0 0.0 - 0.4 K/UL    ABS. BASOPHILS 0.0 0.0 - 0.1 K/UL    ABS. IMM.  GRANS. 0.0 0.00 - 0.04 K/UL    DF AUTOMATED     METABOLIC PANEL, COMPREHENSIVE    Collection Time: 02/18/21 12:23 PM   Result Value Ref Range    Sodium 139 136 - 145 mmol/L    Potassium 4.1 3.5 - 5.1 mmol/L    Chloride 105 97 - 108 mmol/L    CO2 26 21 - 32 mmol/L    Anion gap 8 5 - 15 mmol/L    Glucose 227 (H) 65 - 100 mg/dL    BUN 27 (H) 6 - 20 mg/dL    Creatinine 1.09 (H) 0.55 - 1.02 mg/dL    BUN/Creatinine ratio 25 (H) 12 - 20      GFR est AA >60 >60 ml/min/1.73m2    GFR est non-AA 50 (L) >60 ml/min/1.73m2    Calcium 8.4 (L) 8.5 - 10.1 mg/dL    Bilirubin, total 0.4 0.2 - 1.0 mg/dL    AST (SGOT) 22 15 - 37 U/L    ALT (SGPT) 17 12 - 78 U/L    Alk. phosphatase 67 45 - 117 U/L    Protein, total 6.7 6.4 - 8.2 g/dL    Albumin 2.2 (L) 3.5 - 5.0 g/dL    Globulin 4.5 (H) 2.0 - 4.0 g/dL    A-G Ratio 0.5 (L) 1.1 - 2.2     GLUCOSE, POC    Collection Time: 02/18/21  4:09 PM   Result Value Ref Range    Glucose (POC) 169 (H) 65 - 100 mg/dL    Performed by Vladimir Turner        Results     Procedure Component Value Units Date/Time    INFLUENZA A & B AG (RAPID TEST) [[id number]] Collected: 02/13/21 1945    Order Status: Completed Specimen: Nasopharyngeal from Nasal washing Updated: 02/13/21 2130     Influenza A Antigen Negative        Influenza B Antigen Negative       COVID-19 RAPID TEST [[id number]]  (Abnormal) Collected: 02/13/21 1945    Order Status: Completed Specimen: Nasopharyngeal Updated: 02/13/21 2135     Specimen source Nasopharyngeal        COVID-19 rapid test DETECTED        Comment: Rapid Abbott ID Now   The specimen is POSITIVE for SARS-CoV-2, the novel coronavirus associated with COVID-19. This test has been authorized by the FDA under an Emergency Use Authorization (EUA) for use by authorized laboratories. Fact sheet for Healthcare Providers: ConventionUpdate.co.nz Fact sheet for Patients: ConventionUpdate.co.nz   Methodology: Isothermal Nucleic Acid Amplification Results verified, [email or web address] by Corby Amador:     Recent Labs     02/18/21  1223 02/16/21  1318   WBC 6.5 4.8   HGB 11.9 12.2   HCT 36.6 37.4   * 164     Recent Labs     02/18/21  1223 02/16/21  1318    140   K 4.1 4.1    107   CO2 26 26   BUN 27* 27*   CREA 1.09* 1.24*   * 237*   CA 8.4* 8.8     Recent Labs     02/18/21  1223 02/16/21  1318   ALT 17 17   AP 67 61   TBILI 0.4 0.4   TP 6.7 7.1   ALB 2.2* 2.4*   GLOB 4.5* 4.7*     No results for input(s): INR, PTP, APTT, INREXT, INREXT in the last 72 hours.    No results for input(s): FE, TIBC, PSAT, FERR in the last 72 hours. No results found for: FOL, RBCF   No results for input(s): PH, PCO2, PO2 in the last 72 hours. No results for input(s): CPK, CKNDX, TROIQ in the last 72 hours. No lab exists for component: CPKMB  Lab Results   Component Value Date/Time    Cholesterol, total 134 01/14/2019 10:09 AM    HDL Cholesterol 37 (L) 01/14/2019 10:09 AM    LDL, calculated 63 01/14/2019 10:09 AM    Triglyceride 172 (H) 01/14/2019 10:09 AM     Lab Results   Component Value Date/Time    Glucose (POC) 169 (H) 02/18/2021 04:09 PM    Glucose (POC) 214 (H) 02/18/2021 11:07 AM    Glucose (POC) 240 (H) 02/18/2021 08:11 AM    Glucose (POC) 160 (H) 02/17/2021 09:10 PM    Glucose (POC) 86 02/17/2021 05:37 PM     Lab Results   Component Value Date/Time    Color Yellow/Straw 02/14/2021 06:50 AM    Appearance Clear 02/14/2021 06:50 AM    Specific gravity 1.020 02/14/2021 06:50 AM    pH (UA) 6.0 02/14/2021 06:50 AM    Protein >300 (A) 02/14/2021 06:50 AM    Glucose Negative 02/14/2021 06:50 AM    Ketone Negative 02/14/2021 06:50 AM    Bilirubin Negative 02/14/2021 06:50 AM    Urobilinogen 0.1 02/14/2021 06:50 AM    Nitrites Negative 02/14/2021 06:50 AM    Leukocyte Esterase Negative 02/14/2021 06:50 AM    Bacteria Negative 02/14/2021 06:50 AM    WBC 0-4 02/14/2021 06:50 AM    RBC 0-5 02/14/2021 06:50 AM         Assessment:   Covid 19 pneumonitis  Dilated cardiomyopathy status post AICD  Generalized weakness  Recurrent falls  Type 2 diabetes  Chronic A. Fib  Hypertension  Neuropathy  GERD  Elevated troponin  Diarrhea        Plan:     Patient on Eliquis 5 mg twice a day for chronic A.  Fib  On Zithromax Zosyn and Decadron for Covid  Patient on clonidine and Lasix losartan for high blood pressure  Lipitor 40 mg daily  Continue current medication  PT OT consult  Today's labs are pending  Monitor renal function  Start on Imodium  Ambulatory pulse ox      Current Facility-Administered Medications:     loperamide (IMODIUM) capsule 2 mg, 2 mg, Oral, Q4H PRN, Ammy Dumont MD, 2 mg at 02/17/21 1743    guaiFENesin ER (MUCINEX) tablet 600 mg, 600 mg, Oral, Q12H, Yung Freitas MD, 600 mg at 02/18/21 0837    metoprolol succinate (TOPROL-XL) XL tablet 25 mg, 25 mg, Oral, DAILY, Sarai Farmer MD, 25 mg at 02/18/21 5577    cloNIDine HCL (CATAPRES) tablet 0.3 mg, 0.3 mg, Oral, BID, Ammy Dumont MD, 0.3 mg at 02/18/21 0831    traMADoL (ULTRAM) tablet 50 mg, 50 mg, Oral, Q6H PRN, Ammy Dumont MD, 50 mg at 02/17/21 2019    labetaloL (NORMODYNE;TRANDATE) 20 mg/4 mL (5 mg/mL) injection 20 mg, 20 mg, IntraVENous, Q4H PRN, Ammy Dumont MD, 20 mg at 02/16/21 0005    gabapentin (NEURONTIN) capsule 100 mg, 100 mg, Oral, BID, Ammy Dumont MD, 100 mg at 02/18/21 7580    metFORMIN (GLUCOPHAGE) tablet 500 mg, 500 mg, Oral, BID WITH MEALS, Denisa Lowery MD, 500 mg at 02/18/21 1643    alogliptin (NESINA) tablet 25 mg, 25 mg, Oral, DAILY, Denisa Lowery MD, 25 mg at 02/18/21 0831    apixaban (ELIQUIS) tablet 5 mg, 5 mg, Oral, BID, Ammy Dumont MD, 5 mg at 02/18/21 0831    losartan (COZAAR) tablet 100 mg, 100 mg, Oral, DAILY, Denisa Lowery MD, 100 mg at 02/18/21 0837    nitroglycerin (NITROSTAT) tablet 0.4 mg, 0.4 mg, SubLINGual, Q5MIN PRN, mAmy Dumont MD    pantoprazole (PROTONIX) tablet 40 mg, 40 mg, Oral, ACB, Ammy Dumont MD, 40 mg at 02/18/21 4227    PARoxetine (PAXIL) tablet 20 mg, 20 mg, Oral, DAILY, Denisa Lowery MD, 20 mg at 02/18/21 3111    insulin lispro (HUMALOG) injection, , SubCUTAneous, AC&HS, Ammy Dumont MD, 3 Units at 02/18/21 1647    glucose chewable tablet 16 g, 4 Tab, Oral, PRN, Ammy Dumont MD    dextrose (D50W) injection syrg 12.5-25 g, 25-50 mL, IntraVENous, PRN, Ammy Dumont MD, 25 g at 02/14/21 0724    glucagon (GLUCAGEN) injection 1 mg, 1 mg, IntraMUSCular, PRN, Ammy Dumont MD    acetaminophen (TYLENOL) tablet 650 mg, 650 mg, Oral, Q6H PRN, 650 mg at 02/16/21 0002 **OR** acetaminophen (TYLENOL) suppository 650 mg, 650 mg, Rectal, Q6H PRN, Jamel Dumont MD    polyethylene glycol (MIRALAX) packet 17 g, 17 g, Oral, DAILY PRN, Jamel Dumont MD    ondansetron (ZOFRAN ODT) tablet 4 mg, 4 mg, Oral, Q8H PRN **OR** ondansetron (ZOFRAN) injection 4 mg, 4 mg, IntraVENous, Q6H PRN, Ammy Dumont MD    atorvastatin (LIPITOR) tablet 40 mg, 40 mg, Oral, QHS, Ammy Dumont MD, 40 mg at 02/17/21 2135    furosemide (LASIX) injection 40 mg, 40 mg, IntraVENous, DAILY, Ammy Dumont MD, 40 mg at 02/18/21 0831    dexamethasone (DECADRON) 4 mg/mL injection 4 mg, 4 mg, IntraVENous, Q6H, Ammy Dumont MD, 4 mg at 02/18/21 1643    piperacillin-tazobactam (ZOSYN) 3.375 g in 0.9% sodium chloride (MBP/ADV) 100 mL MBP, 3.375 g, IntraVENous, Q8H, Ammy Dumont MD, Last Rate: 25 mL/hr at 02/18/21 1121, 3.375 g at 02/18/21 1121    azithromycin (ZITHROMAX) 500 mg in 0.9% sodium chloride 250 mL (VIAL-MATE), 500 mg, IntraVENous, Q24H, Ammy Dumont MD, 500 mg at 02/18/21 0019

## 2021-02-18 NOTE — CONSULTS
PULMONARY NOTE  [facility]    Name: Abelardo Fields MRN: [id number]   : 1955 Hospital: [address] [place] [address]   Date: 2021  Admission date: 2021 Hospital Day: 6       HPI:     Hospital Problems  Date Reviewed: 2021          Codes Class Noted POA    Shortness of breath ICD-10-CM: R06.02  ICD-9-CM: 786.05  2021 Unknown        Fever ICD-10-CM: R50.9  ICD-9-CM: 780.60  2021 Unknown        Acute CHF (congestive heart failure) ([facility] ICD-10-CM: I50.9  ICD-9-CM: 428.0  2021 Unknown        COVID-19 virus infection ICD-10-CM: U07.1  ICD-9-CM: 079.89  2021 Unknown        CHF (congestive heart failure) ([facility] ICD-10-CM: I50.9  ICD-9-CM: 428.0  2021 Unknown                   [x] High complexity decision making was performed  [x] See my orders for details      Subjective/Initial History:     I was asked by Marcellus Heath MD to see Abelardo Fields  a 78 y.o.  female in consultation     Excerpts from admission 2021 or consult notes as follows:   19-year-old lady came in because of generalized weakness and dizziness past medical history of cardiomyopathy chronic A. fib hypertension diabetes history of AICD placement and gastroesophageal flux disease. Her COVID-19 came back positive now she is in the emergency room short of breath dyspneic and also having generalized weakness so pulmonary consult was called for further evaluation.       Allergies   Allergen Reactions    Chocolate [Cocoa] Anaphylaxis and Swelling    Iodine Anaphylaxis    Lisinopril Anaphylaxis    Peanut Anaphylaxis and Swelling    Glimepiride Swelling    Glipizide Other (comments)     Pruritis    Metformin Diarrhea    Norvasc [Amlodipine] Other (comments)     Light Headed    Pneumovax 23 [Pneumococcal 23-Devi Ps Vaccine] Hives    Toprol Xl [Metoprolol Succinate] Other (comments)     Light headed    Vitamin D [Cholecalciferol (Vitamin D3)] Other (comments) Dizziness/headache        MAR reviewed and pertinent medications noted or modified as needed     Current Facility-Administered Medications   Medication    loperamide (IMODIUM) capsule 2 mg    guaiFENesin ER (MUCINEX) tablet 600 mg    metoprolol succinate (TOPROL-XL) XL tablet 25 mg    cloNIDine HCL (CATAPRES) tablet 0.3 mg    traMADoL (ULTRAM) tablet 50 mg    labetaloL (NORMODYNE;TRANDATE) 20 mg/4 mL (5 mg/mL) injection 20 mg    gabapentin (NEURONTIN) capsule 100 mg    metFORMIN (GLUCOPHAGE) tablet 500 mg    alogliptin (NESINA) tablet 25 mg    apixaban (ELIQUIS) tablet 5 mg    losartan (COZAAR) tablet 100 mg    nitroglycerin (NITROSTAT) tablet 0.4 mg    pantoprazole (PROTONIX) tablet 40 mg    PARoxetine (PAXIL) tablet 20 mg    insulin lispro (HUMALOG) injection    glucose chewable tablet 16 g    dextrose (D50W) injection syrg 12.5-25 g    glucagon (GLUCAGEN) injection 1 mg    acetaminophen (TYLENOL) tablet 650 mg    Or    acetaminophen (TYLENOL) suppository 650 mg    polyethylene glycol (MIRALAX) packet 17 g    ondansetron (ZOFRAN ODT) tablet 4 mg    Or    ondansetron (ZOFRAN) injection 4 mg    atorvastatin (LIPITOR) tablet 40 mg    furosemide (LASIX) injection 40 mg    dexamethasone (DECADRON) 4 mg/mL injection 4 mg    piperacillin-tazobactam (ZOSYN) 3.375 g in 0.9% sodium chloride (MBP/ADV) 100 mL MBP    azithromycin (ZITHROMAX) 500 mg in 0.9% sodium chloride 250 mL (VIAL-MATE)      Patient PCP: Elizabeth Duong MD  PMH:  has a past medical history of [facility] ([facility] Cardiomyopathy, idiopathic ([facility] (10/28/2010), HTN (hypertension), benign (10/28/2010), Pacemaker, and PVC (premature ventricular contraction) (10/28/2010). PSH:   has a past surgical history that includes hx other surgical (10/02/2018) and hx pacemaker. FHX: family history is not on file. SHX:  reports that she has never smoked.  She has never used smokeless tobacco. She reports that she does not drink alcohol or use drugs. ROS:    Review of Systems   Constitutional:        Generalized weakness   HENT: Negative. Eyes: Negative. Respiratory: Negative. Cardiovascular: Negative. Gastrointestinal: Negative. Genitourinary: Negative. Musculoskeletal: Negative. Skin: Negative. Neurological: Positive for speech change. Endo/Heme/Allergies: Negative. Psychiatric/Behavioral: Negative. Objective:     Vital Signs: Telemetry:    normal sinus rhythm Intake/Output:   Visit Vitals  BP (!) 163/106 (BP 1 Location: Left upper arm, BP Patient Position: At rest)   Pulse 73   Temp 98.2 °F (36.8 °C)   Resp 20   Ht 5' 6.93\" (1.7 m)   Wt 85.9 kg (189 lb 6 oz)   SpO2 98%   BMI 29.72 kg/m²       Temp (24hrs), Av.2 °F (37.3 °C), Min:98.2 °F (36.8 °C), Max:100.1 °F (37.8 °C)        O2 Device: Room air         Wt Readings from Last 4 Encounters:   21 85.9 kg (189 lb 6 oz)   21 85.2 kg (187 lb 12.8 oz)   20 85.7 kg (189 lb)   10/28/19 84.8 kg (187 lb)        No intake or output data in the 24 hours ending 21 1003    Last shift:      No intake/output data recorded. Last 3 shifts: No intake/output data recorded. Physical Exam:     Physical Exam   Constitutional: She appears distressed. HENT:   Head: Normocephalic and atraumatic. Eyes: Pupils are equal, round, and reactive to light. Conjunctivae are normal.   Neck: Normal range of motion. Neck supple. Cardiovascular: Normal rate and regular rhythm. Pulmonary/Chest: Effort normal and breath sounds normal.   Abdominal: Soft. Bowel sounds are normal.   Musculoskeletal: Normal range of motion. Neurological: She is alert. Skin: Skin is warm.         Labs:    Recent Labs     21  1318   WBC 4.8   HGB 12.2        Recent Labs     21  1318      K 4.1      CO2 26   *   BUN 27*   CREA 1.24*   CA 8.8   ALB 2.4*   ALT 17     No results for input(s): PH, PCO2, PO2, HCO3, FIO2 in the last 72 hours. No results for input(s): CPK, CKNDX, TROIQ in the last 72 hours. No lab exists for component: CPKMB  No results found for: BNPP, BNP   No results found for: CULT  Lab Results   Component Value Date/Time    TSH 0.09 (L) 02/13/2021 05:45 PM       Imaging:    CXR Results  (Last 48 hours)               02/16/21 1156  XR CHEST PORT Final result    Narrative:  1 view comparison the 13th       Borderline cardiomegaly without congestion. Essentially clear lungs. No effusion   or pneumothorax           Results from Hospital Encounter encounter on 02/13/21   XR CHEST PORT    Narrative 1 view comparison the 13th    Borderline cardiomegaly without congestion. Essentially clear lungs. No effusion  or pneumothorax   XR CHEST PORT    Narrative Examination: XR CHEST PORT     History: SOB    Comparison: Chest radiograph January 14, 2021    FINDINGS:    Single frontal portable view of the chest. Mult[employer] AICD appearing unchanged  from prior. The generator pack overlies and obscures portions of the left lung. Symmetric lung volumes. No focal airspace consolidation, pneumothorax, or  significant pleural effusion. Cardiac monitoring leads overlie the patient's  chest. The cardiac silhouette is enlarged. This appears unchanged. There is mild  atherosclerosis of the thoracic aorta. No findings of acute osseous abnormality. Impression 1. No findings of acute cardiopulmonary abnormality. 2. Similar enlarged cardiac silhouette. Results from Hospital Encounter encounter on 01/14/21   XR CHEST PA LAT    Narrative Exam:  2 view chest    Indication: Cardiac defibrillator, possible coil fracture. COMPARISON: February 14, 2014 and February 27, 2014    PA and lateral views demonstrate normal heart size. There is no acute process in  the lung fields. The osseous mild cardiomegaly. Left subclavian ICD is in place. One lead projects over the right atrial  appendage. 2 leads project over the right ventricle.  The position of the leads  is unchanged. There appears to be a small area of discontinuity of the ICD wire at the  proximal junction of the wire with the coil that is within the left  brachycephalic vein. Projecting at this proximal junction there appears to be 1  mm area of discontinuity and some elevation of the wire in relation to the coil. This most likely represents a fracture of the wire at the junction with the coil  in the left brachycephalic vein. The lungs are clear. No pulmonary edema. Osseous structures are unremarkable. Impression IMPRESSION:  1. Probable small area of discontinuity, fracture of the wire at the proximal  junction of the wire and the coil that is in the left brachycephalic vein. No results found for this or any previous visit. IMPRESSION:   1. COVID-19 pneumonia  2. Right upper lobe atelectasis much improved on recent chest x-ray  3. History of CHF cardiomyopathy chronic A. fib status post AICD  4. Gastroesophageal reflux disease  5. Peripheral neuropathy  6. Prognosis guarded       RECOMMENDATIONS/PLAN:     1. Patient on Decadron no need for remdesivir add Zithromax and she is already on Eliquis  2. Agree with [facility]ir[facility] IV antibiotics on Zosyn  3. Chest x-ray showed resolution of atelectasis  4. Incentive spirometry  5. Supplemental O2 to keep sats > 93%  6.  DVT, SUP prophylaxis     ·       Connie Lund MD

## 2021-02-19 NOTE — CONSULTS
PULMONARY NOTE  [facility]    Name: Tamra Rehman MRN: [id number]   : 1955 Hospital: [address] [place] [address]   Date: 2021  Admission date: 2021 Hospital Day: 7       HPI:     Hospital Problems  Date Reviewed: 2021          Codes Class Noted POA    Shortness of breath ICD-10-CM: R06.02  ICD-9-CM: 786.05  2021 Unknown        Fever ICD-10-CM: R50.9  ICD-9-CM: 780.60  2021 Unknown        Acute CHF (congestive heart failure) ([facility] ICD-10-CM: I50.9  ICD-9-CM: 428.0  2021 Unknown        COVID-19 virus infection ICD-10-CM: U07.1  ICD-9-CM: 079.89  2021 Unknown        CHF (congestive heart failure) ([facility] ICD-10-CM: I50.9  ICD-9-CM: 428.0  2021 Unknown                   [x] High complexity decision making was performed  [x] See my orders for details      Subjective/Initial History:     I was asked by Padmini Banegas MD to see Tamra Rehman  a 78 y.o.  female in consultation     Excerpts from admission 2021 or consult notes as follows:   58-year-old lady came in because of generalized weakness and dizziness past medical history of cardiomyopathy chronic A. fib hypertension diabetes history of AICD placement and gastroesophageal flux disease. Her COVID-19 came back positive now she is in the emergency room short of breath dyspneic and also having generalized weakness so pulmonary consult was called for further evaluation.       Allergies   Allergen Reactions    Chocolate [Cocoa] Anaphylaxis and Swelling    Iodine Anaphylaxis    Lisinopril Anaphylaxis    Peanut Anaphylaxis and Swelling    Glimepiride Swelling    Glipizide Other (comments)     Pruritis    Metformin Diarrhea    Norvasc [Amlodipine] Other (comments)     Light Headed    Pneumovax 23 [Pneumococcal 23-Devi Ps Vaccine] Hives    Toprol Xl [Metoprolol Succinate] Other (comments)     Light headed    Vitamin D [Cholecalciferol (Vitamin D3)] Other (comments) Dizziness/headache        MAR reviewed and pertinent medications noted or modified as needed     Current Facility-Administered Medications   Medication    loperamide (IMODIUM) capsule 2 mg    guaiFENesin ER (MUCINEX) tablet 600 mg    metoprolol succinate (TOPROL-XL) XL tablet 25 mg    cloNIDine HCL (CATAPRES) tablet 0.3 mg    traMADoL (ULTRAM) tablet 50 mg    labetaloL (NORMODYNE;TRANDATE) 20 mg/4 mL (5 mg/mL) injection 20 mg    gabapentin (NEURONTIN) capsule 100 mg    metFORMIN (GLUCOPHAGE) tablet 500 mg    alogliptin (NESINA) tablet 25 mg    apixaban (ELIQUIS) tablet 5 mg    losartan (COZAAR) tablet 100 mg    nitroglycerin (NITROSTAT) tablet 0.4 mg    pantoprazole (PROTONIX) tablet 40 mg    PARoxetine (PAXIL) tablet 20 mg    insulin lispro (HUMALOG) injection    glucose chewable tablet 16 g    dextrose (D50W) injection syrg 12.5-25 g    glucagon (GLUCAGEN) injection 1 mg    acetaminophen (TYLENOL) tablet 650 mg    Or    acetaminophen (TYLENOL) suppository 650 mg    polyethylene glycol (MIRALAX) packet 17 g    ondansetron (ZOFRAN ODT) tablet 4 mg    Or    ondansetron (ZOFRAN) injection 4 mg    atorvastatin (LIPITOR) tablet 40 mg    furosemide (LASIX) injection 40 mg    dexamethasone (DECADRON) 4 mg/mL injection 4 mg    piperacillin-tazobactam (ZOSYN) 3.375 g in 0.9% sodium chloride (MBP/ADV) 100 mL MBP    azithromycin (ZITHROMAX) 500 mg in 0.9% sodium chloride 250 mL (VIAL-MATE)      Patient PCP: Zohreh Nguyen MD  PMH:  has a past medical history of [facility] ([facility] Cardiomyopathy, idiopathic ([facility] (10/28/2010), HTN (hypertension), benign (10/28/2010), Pacemaker, and PVC (premature ventricular contraction) (10/28/2010). PSH:   has a past surgical history that includes hx other surgical (10/02/2018) and hx pacemaker. FHX: family history is not on file. SHX:  reports that she has never smoked.  She has never used smokeless tobacco. She reports that she does not drink alcohol or use drugs. ROS:    Review of Systems   Constitutional:        Generalized weakness   HENT: Negative. Eyes: Negative. Respiratory: Negative. Cardiovascular: Negative. Gastrointestinal: Negative. Genitourinary: Negative. Musculoskeletal: Negative. Skin: Negative. Neurological: Positive for speech change. Endo/Heme/Allergies: Negative. Psychiatric/Behavioral: Negative. Objective:     Vital Signs: Telemetry:    normal sinus rhythm Intake/Output:   Visit Vitals  BP (!) 167/103 (BP 1 Location: Left upper arm, BP Patient Position: At rest)   Pulse 79   Temp 97.6 °F (36.4 °C)   Resp 19   Ht 5' 6.93\" (1.7 m)   Wt 86.2 kg (190 lb 0.6 oz)   SpO2 93%   BMI 29.83 kg/m²       Temp (24hrs), Av.8 °F (36.6 °C), Min:97.6 °F (36.4 °C), Max:98.1 °F (36.7 °C)        O2 Device: Room air         Wt Readings from Last 4 Encounters:   21 86.2 kg (190 lb 0.6 oz)   21 85.2 kg (187 lb 12.8 oz)   20 85.7 kg (189 lb)   10/28/19 84.8 kg (187 lb)        No intake or output data in the 24 hours ending 21 1036    Last shift:      No intake/output data recorded. Last 3 shifts: No intake/output data recorded. Physical Exam:     Physical Exam   Constitutional: She appears distressed. HENT:   Head: Normocephalic and atraumatic. Eyes: Pupils are equal, round, and reactive to light. Conjunctivae are normal.   Neck: Normal range of motion. Neck supple. Cardiovascular: Normal rate and regular rhythm. Pulmonary/Chest: Effort normal and breath sounds normal.   Abdominal: Soft. Bowel sounds are normal.   Musculoskeletal: Normal range of motion. Neurological: She is alert. Skin: Skin is warm.         Labs:    Recent Labs     21  1223 21  1318   WBC 6.5 4.8   HGB 11.9 12.2   * 164     Recent Labs     21  1223 21  1318    140   K 4.1 4.1    107   CO2 26 26   * 237*   BUN 27* 27*   CREA 1.09* 1.24*   CA 8.4* 8.8   ALB 2.2* 2.4*   ALT 17 17     No results for input(s): PH, PCO2, PO2, HCO3, FIO2 in the last 72 hours. No results for input(s): CPK, CKNDX, TROIQ in the last 72 hours. No lab exists for component: CPKMB  No results found for: BNPP, BNP   No results found for: CULT  Lab Results   Component Value Date/Time    TSH 0.09 (L) 02/13/2021 05:45 PM       Imaging:    CXR Results  (Last 48 hours)    None        Results from Hospital Encounter encounter on 02/13/21   XR CHEST PORT    Narrative 1 view comparison the 13th    Borderline cardiomegaly without congestion. Essentially clear lungs. No effusion  or pneumothorax   XR CHEST PORT    Narrative Examination: XR CHEST PORT     History: SOB    Comparison: Chest radiograph January 14, 2021    FINDINGS:    Single frontal portable view of the chest. Multilead AICD appearing unchanged  from prior. The generator pack overlies and obscures portions of the left lung. Symmetric lung volumes. No focal airspace consolidation, pneumothorax, or  significant pleural effusion. Cardiac monitoring leads overlie the patient's  chest. The cardiac silhouette is enlarged. This appears unchanged. There is mild  atherosclerosis of the thoracic aorta. No findings of acute osseous abnormality. Impression 1. No findings of acute cardiopulmonary abnormality. 2. Similar enlarged cardiac silhouette. Results from Hospital Encounter encounter on 01/14/21   XR CHEST PA LAT    Narrative Exam:  2 view chest    Indication: Cardiac defibrillator, possible coil fracture. COMPARISON: February 14, 2014 and February 27, 2014    PA and lateral views demonstrate normal heart size. There is no acute process in  the lung fields. The osseous mild cardiomegaly. Left subclavian ICD is in place. One lead projects over the right atrial  appendage. 2 leads project over the right ventricle. The position of the leads  is unchanged.     There appears to be a small area of discontinuity of the ICD wire at the  proximal junction of the wire with the coil that is within the left  brachycephalic vein. Projecting at this proximal junction there appears to be 1  mm area of discontinuity and some elevation of the wire in relation to the coil. This most likely represents a fracture of the wire at the junction with the coil  in the left brachycephalic vein. The lungs are clear. No pulmonary edema. Osseous structures are unremarkable. Impression IMPRESSION:  1. Probable small area of discontinuity, fracture of the wire at the proximal  junction of the wire and the coil that is in the left brachycephalic vein. No results found for this or any previous visit. IMPRESSION:   1. COVID-19 pneumonia  2. Right upper lobe atelectasis much improved on recent chest x-ray  3. History of CHF cardiomyopathy chronic A. fib status post AICD  4. Gastroesophageal reflux disease  5. Peripheral neuropathy  6. Prognosis guarded       RECOMMENDATIONS/PLAN:     1. Patient on Decadron no need for remdesivir add Zithromax and she is already on Eliquis  2. Agree with Empiric IV antibiotics on Zosyn  3. Chest x-ray showed resolution of atelectasis  4. Incentive spirometry  5. Supplemental O2 to keep sats > 93%  6.  DVT, SUP prophylaxis     ·       Freya Bay MD

## 2021-02-19 NOTE — PROGRESS NOTES
Comprehensive Nutrition Assessment    Type and Reason for Visit: RD nutrition re-screen/LOS    Nutrition Recommendations/Plan:   Continue current Consistent Carbohydrate (Diabetic) diet  Allow snacks as desired    Adjust insulin to promote euglycemia  Please document % of all meal/snack consumed, BMs, wts    Nutrition Assessment:  Admitted for generalized weakness. +COVID-19. CXR improved since admit. No appetite and 1 intakes of 75% documented in EMR. Interviewed pt over phone, endorsed good appetite and intakes >/=75% of meals. Stated she is trying to not to eat 100% to prevent unwanted wt gain. Reported no other nutrition concerns at this time. Labs and meds reviewed. Malnutrition Assessment:  Malnutrition Status:  No malnutrition      Nutrition Related Findings:  Unable to complete NFPE d/t COVID-19 precautions. Denies issues with c/s. Denies N/V, previously wit diarrhea however resolving s/p imodium. Last BM 2/18, loose. No edema. Wounds:    None       Current Nutrition Therapies:  DIET DIABETIC CONSISTENT CARB Regular    Anthropometric Measures:  · Height:  5' 6.93\" (170 cm)  · Current Body Wt:  86.2 kg (190 lb 0.6 oz)(2/18)   · Admission Body Wt:  189 lb 6 oz(2/16)    · Usual Body Wt:  71.2 kg (156 lb 15.8 oz)(per pt)   · Ideal Body Wt:  135 lbs:  140.8 %   · BMI Category: Overweight (BMI 25.0-29. 9)     Pt stated wt stable, same per EMR  Wt Readings from Last 6 Encounters:   02/18/21 86.2 kg (190 lb 0.6 oz)   01/20/21 85.2 kg (187 lb 12.8 oz)   11/16/20 85.7 kg (189 lb)   10/28/19 84.8 kg (187 lb)   01/21/19 83.7 kg (184 lb 8 oz)   10/08/18 83 kg (183 lb)       Nutrition Diagnosis:   No nutrition diagnosis at this time     Nutrition Interventions:   Food and/or Nutrient Delivery: Continue current diet  Nutrition Education and Counseling: No recommendations at this time  Coordination of Nutrition Care: Continue to monitor while inpatient    Nutrition Monitoring and Evaluation: Behavioral-Environmental Outcomes: None identified  Food/Nutrient Intake Outcomes: Food and nutrient intake  Physical Signs/Symptoms Outcomes: Weight, Diarrhea    Discharge Planning:    No discharge needs at this time     Electronically signed by Karol Mackay RD on 2/19/2021 at 9:50 AM    Contact: Ext [phone], or via [employer]

## 2021-02-19 NOTE — DISCHARGE SUMMARY
Discharge Summary       PATIENT ID: Landon Nolasco  MRN: [id number]   YOB: 1955    DATE OF ADMISSION: 2/13/2021  5:37 PM    DATE OF DISCHARGE:   PRIMARY CARE PROVIDER: Sharon Lisa MD     ATTENDING PHYSICIAN: Cecille Freeman  DISCHARGING PROVIDER: Omid Dumont      CONSULTATIONS: IP CONSULT TO HOSPITALIST  IP CONSULT TO CARDIOLOGY  IP CONSULT TO CARDIOLOGY  IP CONSULT TO CARDIOLOGY  IP CONSULT TO PULMONOLOGY    PROCEDURES/SURGERIES: * No surgery found *    ADMITTING DIAGNOSES:    Patient Active Problem List    Diagnosis Date Noted    Shortness of breath 02/13/2021    Fever 02/13/2021    Acute CHF (congestive heart failure) ([facility] 02/13/2021    COVID-19 virus infection 02/13/2021    CHF (congestive heart failure) ([facility] 02/13/2021    Uncontrolled type 2 diabetes mellitus with hyperglycemia ([facility] 10/08/2018    Mixed hyperlipidemia 10/08/2018    Implantable cardioverter-defibrillator (ICD) in situ 11/16/2010    Cardiomyopathy, idiopathic ([facility] 10/28/2010    HTN (hypertension), benign 10/28/2010    PVC (premature ventricular contraction) 10/28/2010       DISCHARGE DIAGNOSES / PLAN:    Covid 19 pneumonitis  Dilated cardiomyopathy status post AICD  Generalized weakness  Recurrent falls  Type 2 diabetes  Chronic A. Fib  Hypertension  Neuropathy  GERD  Elevated troponin  Diarrhea    Patient is a 78y.o. year old female signal past medical history of diabetes hypertension history of AICD placement GERD came to the ER complaining of generalized dizziness weakness also history of severe cardiomyopathy chronic A. fib PVCs seen by the ER physician work-up done shows positive for Covid and congestive heart failure patient was admitted for further evaluation and treatment     Patient denies any fever chills cough and congestion complaining of generalized weakness difficulty in walking  :      To present illness this rotation physical at the time of the admission as the patient was admitted of COVID-19 pneumonitis dilated cardiomyopathy history of AICD generalized weakness recurrent fall chronic A. fib hypertension neuropathy patient received Zithromax Zosyn Decadron for Covid blood pressure medication was adjusted patient seen by the pulmonologist patient was hypoxic initially now resolved patient is alert awake denies any chest pain or shortness of breath nausea vomiting diarrhea constipation patient discharged home on the following medication follow-up in 1 to 2 weeks  Medication reconciliation done time to discharge patient 35 minutes 50% time spent in counseling and coronation of care    Seen by the cardiology and pulmonologist during this admission    DISCHARGE MEDICATIONS:  Current Discharge Medication List      START taking these medications    Details   atorvastatin (LIPITOR) 40 mg tablet Take 1 Tab by mouth nightly. Qty: 60 Tab, Refills: 1      azithromycin (Zithromax) 250 mg tablet Daily  Qty: 6 Tab, Refills: 0      furosemide (Lasix) 40 mg tablet 1 tablet daily  Qty: 60 Tab, Refills: 0      predniSONE (DELTASONE) 10 mg tablet 1 tab po qd  Qty: 10 Tab, Refills: 0         CONTINUE these medications which have NOT CHANGED    Details   metFORMIN (GLUCOPHAGE) 1,000 mg tablet two (2) times daily (with meals). gabapentin (NEURONTIN) 100 mg capsule TAKE 1 CAPSULE BY MOUTH THREE TIMES A DAY      SITagliptin (Januvia) 100 mg tablet TAKE 1 TABLET BY MOUTH EVERY DAY  Qty: 90 Tab, Refills: 2    Comments: EMERGENCY HOLDOVER PROVIDED: [place]#[id number]. [facility] GAVE 3 JANUVIA 100 MG TABLET. MD CONTACTED FOR AUTHORIZATION ON 08/01/2020      metoprolol succinate (TOPROL-XL) 25 mg XL tablet TAKE 0.5 TABS BY MOUTH DAILY.   Qty: 45 Tab, Refills: 11    Associated Diagnoses: HTN (hypertension), benign; PVC (premature ventricular contraction)      ELIQUIS 5 mg tablet TAKE 1 TABLET BY MOUTH TWICE A DAY  Qty: 180 Tab, Refills: 3      cloNIDine HCl (CATAPRES) 0.3 mg tablet TAKE 1 TABLET TWICE DAILY  Refills: 3 NIFEdipine ER (ADALAT CC) 30 mg ER tablet TAKE 1 TABLET BY MOUTH EVERY DAY FOR 90 DAYS  Refills: 2      losartan (COZAAR) 100 mg tablet TAKE 1 TABLET EVERY DAY  Refills: 2      PARoxetine (PAXIL) 20 mg tablet Take  by mouth daily. omeprazole (PRILOSEC) 20 mg capsule Take 20 mg by mouth daily. nitroglycerin (NITROSTAT) 0.4 mg SL tablet 0.4 mg by SubLINGual route every five (5) minutes as needed for Chest Pain. Up to 3 doses. Associated Diagnoses: Type 2 diabetes mellitus with hyperglycemia, unspecified whether long term insulin use (HCC)      hydroCHLOROthiazide (MICROZIDE) 12.5 mg capsule TAKE ONE CAPSULE BY MOUTH ONCE A DAY  Refills: 2         STOP taking these medications       insulin syringe-needle U-100 (BD INSULIN SYRINGE ULTRA-FINE) 1/2 mL 31 gauge x 15/64\" syrg Comments:   Reason for Stopping:         flash glucose scanning reader (FREESTYLE BELKIS READER) misc Comments:   Reason for Stopping:         flash glucose sensor (FREESTYLE BELKIS SENSOR) kit Comments:   Reason for Stopping:                 NOTIFY YOUR PHYSICIAN FOR ANY OF THE FOLLOWING:   Fever over 101 degrees for 24 hours. Chest pain, shortness of breath, fever, chills, nausea, vomiting, diarrhea, change in mentation, falling, weakness, bleeding. Severe pain or pain not relieved by medications. Or, any other signs or symptoms that you may have questions about. DISPOSITION:  x  Home With:   OT  PT  HH  RN       Long term SNF/Inpatient Rehab    Independent/assisted living    Hospice    Other:       PATIENT CONDITION AT DISCHARGE: Stable      PHYSICAL EXAMINATION AT DISCHARGE:  General:          Alert, cooperative, no distress, appears stated age. HEENT:           Atraumatic, anicteric sclerae, pink conjunctivae                          No oral ulcers, mucosa moist, throat clear, dentition fair  Neck:               Supple, symmetrical  Lungs:             Clear to auscultation bilaterally. No Wheezing or Rhonchi.  No rales.  Chest wall:      No tenderness  No Accessory muscle use. Heart:              Regular  rhythm,  No  murmur   No edema  Abdomen:        Soft, non-tender. Not distended. Bowel sounds normal  Extremities:     No cyanosis. No clubbing,                            Skin turgor normal, Capillary refill normal  Skin:                Not pale. Not Jaundiced  No rashes   Psych:             Not anxious or agitated. Neurologic:      Alert, moves all extremities, answers questions appropriately and responds to commands     XR CHEST PORT   Final Result      XR CHEST PORT   Final Result   1. No findings of acute cardiopulmonary abnormality. 2. Similar enlarged cardiac silhouette. Recent Results (from the past 24 hour(s))   CBC WITH AUTOMATED DIFF    Collection Time: 02/18/21 12:23 PM   Result Value Ref Range    WBC 6.5 3.6 - 11.0 K/uL    RBC 4.03 3.80 - 5.20 M/uL    HGB 11.9 11.5 - 16.0 g/dL    HCT 36.6 35.0 - 47.0 %    MCV 90.8 80.0 - 99.0 FL    MCH 29.5 26.0 - 34.0 PG    MCHC 32.5 30.0 - 36.5 g/dL    RDW 13.4 11.5 - 14.5 %    PLATELET 812 (L) 006 - 400 K/uL    MPV 12.8 8.9 - 12.9 FL    NEUTROPHILS 79 (H) 32 - 75 %    LYMPHOCYTES 14 12 - 49 %    MONOCYTES 7 5 - 13 %    EOSINOPHILS 0 0 - 7 %    BASOPHILS 0 0 - 1 %    IMMATURE GRANULOCYTES 0 0.0 - 0.5 %    ABS. NEUTROPHILS 5.2 1.8 - 8.0 K/UL    ABS. LYMPHOCYTES 0.9 0.8 - 3.5 K/UL    ABS. MONOCYTES 0.5 0.0 - 1.0 K/UL    ABS. EOSINOPHILS 0.0 0.0 - 0.4 K/UL    ABS. BASOPHILS 0.0 0.0 - 0.1 K/UL    ABS. IMM.  GRANS. 0.0 0.00 - 0.04 K/UL    DF AUTOMATED     METABOLIC PANEL, COMPREHENSIVE    Collection Time: 02/18/21 12:23 PM   Result Value Ref Range    Sodium 139 136 - 145 mmol/L    Potassium 4.1 3.5 - 5.1 mmol/L    Chloride 105 97 - 108 mmol/L    CO2 26 21 - 32 mmol/L    Anion gap 8 5 - 15 mmol/L    Glucose 227 (H) 65 - 100 mg/dL    BUN 27 (H) 6 - 20 mg/dL    Creatinine 1.09 (H) 0.55 - 1.02 mg/dL    BUN/Creatinine ratio 25 (H) 12 - 20      GFR est AA >60 >60 ml/min/1.73m2    GFR est non-AA 50 (L) >60 ml/min/1.73m2    Calcium 8.4 (L) 8.5 - 10.1 mg/dL    Bilirubin, total 0.4 0.2 - 1.0 mg/dL    AST (SGOT) 22 15 - 37 U/L    ALT (SGPT) 17 12 - 78 U/L    Alk.  phosphatase 67 45 - 117 U/L    Protein, total 6.7 6.4 - 8.2 g/dL    Albumin 2.2 (L) 3.5 - 5.0 g/dL    Globulin 4.5 (H) 2.0 - 4.0 g/dL    A-G Ratio 0.5 (L) 1.1 - 2.2     GLUCOSE, POC    Collection Time: 02/18/21  4:09 PM   Result Value Ref Range    Glucose (POC) 169 (H) 65 - 100 mg/dL    Performed by Vladimir Turner    GLUCOSE, POC    Collection Time: 02/18/21  8:31 PM   Result Value Ref Range    Glucose (POC) 200 (H) 65 - 100 mg/dL    Performed by Abel Keen    GLUCOSE, POC    Collection Time: 02/19/21  8:12 AM   Result Value Ref Range    Glucose (POC) 184 (H) 65 - 100 mg/dL    Performed by Sarah, POC    Collection Time: 02/19/21 11:47 AM   Result Value Ref Range    Glucose (POC) 209 (H) 65 - 100 mg/dL    Performed by Odalys Norman:      Signed:   Timoteo Hernandez MD  2/19/2021  12:20 PM

## 2021-02-19 NOTE — PROGRESS NOTES
ELAYNE Plan:    -d/c home   -PCP follow up      Patient scheduled to discharge home today w/family. PCP follow up for this inpatient admission. Medicare pt has received, reviewed, and signed 2nd IM letter informing them of their right to appeal the discharge. Signed copy has been placed on pt bedside chart. Patient clear to discharge home w/no needs, and is cleared from SW standpoint.          Hector Cotter, YADIRA

## 2021-02-19 NOTE — PROGRESS NOTES
Physician Progress Note      PATIENT:               Sara Spencer  CSN #:                  [id number]  :                       1955  ADMIT DATE:       2021 5:37 PM  DISCH DATE:  RESPONDING  PROVIDER #:        Nic BURKS MD          QUERY TEXT:    Good Morning,  This patient admitted for COVID Pneumonia    The patient noted with systolic BP in the 150-398N---MMN diastolic BP  448 -592    If possible, please document in progress notes and discharge summary if you are evaluating and/or treating any of the following: The medical record reflects the following:  Risk Factors: Known Cardiomyopathy, HTN, AICD placement , now with COVID19 + Pneumonia  Clinical Indicators: TO ER with generalized dizziness, weakness, BP systolic [phone] and diastolic bp [phone]---  Treatment: Receiving IV labetalol, Telemetry, Receiving Lopressor, Clonidine tablet, IV Lasix,      Thank you,  [place]chan [place]den[place], [place]  [phone]  ________________________________________________________________    Hypertensive Crisis, unspecified: at least 2 consecutive readings of SBP > 180 mmHg or DBP > 110 mmHg  - Hypertensive Urgency: Hypertensive crisis w/o associated organ dysfunction. S/s may or may not be present, but can include severe headache, SOB, epistaxis, severe anxiety. Tx: adjustment of oral antihypertensives; IV meds not usually required. - Hypertensive Emergency: Hypertensive crisis w/ associated organ damage (stroke, encephalopathy, ADELINA, MI, angina, acute or decompensated CHF, acute pulmonary edema, HELLP, etc.). Requires immediate treatment (usually IV meds) & possible ICU admission. Associated organ dysfunction needs documented.   [email or web address]  Options provided:  -- Hypertensive Crisis  -- Hypertensive Emergency  -- Hypertensive Urgency  -- Other - I will add my own diagnosis  -- Disagree - Not applicable / Not valid  -- Disagree - Clinically unable to determine / Unknown  -- Refer to Clinical Documentation Reviewer    PROVIDER RESPONSE TEXT:    This patient is in hypertensive emergency.     Query created by: Irina Henriquez on 2/16/2021 10:34 AM      Electronically signed by:  Ольга Joshi MD 2/19/2021 9:13 AM

## 2021-04-09 NOTE — TELEPHONE ENCOUNTER
Pt. Identified using two identifiers. Called to follow-up on seeking treatment at [address] [place] [address] for fractured RV lead. States she's scheduled for replacement next Monday, 4/12/2021. She's not sure where she'll follow yet post change out. Appreciative of call.

## 2021-04-29 NOTE — PROGRESS NOTES
Faxed request to [facility] records requesting records from lead extraction with Dr. Rosarua Castro. Confirmation received.

## 2021-04-29 NOTE — TELEPHONE ENCOUNTER
Pt identified using two identifiers. Pt called to set-up f/u appt after lead extraction/replacement at [facility].  Appointment set.

## 2021-05-06 NOTE — PROGRESS NOTES
Received records from [facility] for recent lead extraction and generator change per Dr. Cherylene Frank. Patient has an appointment with Dr. Petty Schreiber on 5/7/21.

## 2021-05-12 NOTE — PROGRESS NOTES
Room EP 2  Visit Vitals  BP (!) 180/104 (BP 1 Location: Left upper arm, BP Patient Position: Sitting) Comment: took am meds   Pulse 80   Resp 16   Ht 5' 6.93\" (1.7 m)   Wt 194 lb 3.2 oz (88.1 kg)   SpO2 99%   BMI 30.48 kg/m²       Chest pain: no  Shortness of breath: yes with exertion  Edema: no  Palpitations, Skipped beats, Rapid heartbeat: no  Dizziness: no  Fatigue: no   Fell while in the hospital @ [facility]    New diagnosis/Surgeries: no    ER/Hospitalizations: [facility] 4/24/21 SOB and CHF, discharged from [facility] on 4/16/21    Refills:no

## 2021-05-12 NOTE — PROGRESS NOTES
HISTORY OF PRESENTING ILLNESS      Kings Epps is a 78 y.o. female with hypertension, cardiomyopathy, and dual chamber Medtronic ICD who presents for follow up of her ICD. She denies chest pain, SOB, edema, syncope or functional limitations. Her device interrogation reveals normal device functioning. She has new onset atrial fibrillation occurring on device check, longest episode was 40 minutes. She denies cardiac complaints. Eliquis 5mg BID for CVA risk reduction (CHADS VASC 3) as well as Lopressor 25 mg BID. She is tolerating her anticoagulation thus far however had fatigue since starting the Lopressor. Metoprolol was lowered to 12.5mg daily as a result. She was noted on recent device check to have probable coil fracture. She underwent lead extraction/revision and ICD generator change and now presents for follow-up. PAST MEDICAL HISTORY     Past Medical History:   Diagnosis Date    [facility] ([address]     Cardiomyopathy, idiopathic ([address] 10/28/2010    HTN (hypertension), benign 10/28/2010    Pacemaker     ICD    PVC (premature ventricular contraction) 10/28/2010           PAST SURGICAL HISTORY     Past Surgical History:   Procedure Laterality Date    HX OTHER SURGICAL  10/02/2018    Right eye surgery    HX PACEMAKER            ALLERGIES     Allergies   Allergen Reactions    Chocolate [Cocoa] Anaphylaxis and Swelling    Iodine Anaphylaxis    Lisinopril Anaphylaxis    Peanut Anaphylaxis and Swelling    Glimepiride Swelling    Glipizide Other (comments)     Pruritis    Metformin Diarrhea    Norvasc [Amlodipine] Other (comments)     Light Headed    Pneumovax 23 [Pneumococcal 23-Devi Ps Vaccine] Hives    Toprol Xl [Metoprolol Succinate] Other (comments)     Light headed    Vitamin D [Cholecalciferol (Vitamin D3)] Other (comments)     Dizziness/headache          FAMILY HISTORY     History reviewed. No pertinent family history.  negative for cardiac disease SOCIAL HISTORY     Social History     Socioeconomic History    Marital status: SINGLE     Spouse name: Not on file    Number of children: Not on file    Years of education: Not on file    Highest education level: Not on file   Tobacco Use    Smoking status: Never Smoker    Smokeless tobacco: Never Used   Substance and Sexual Activity    Alcohol use: No    Drug use: Never         MEDICATIONS     Current Outpatient Medications   Medication Sig    glipiZIDE (GLUCOTROL) 5 mg tablet Take 5 mg by mouth daily.  prednisoLONE acetate (PRED FORTE) 1 % ophthalmic suspension INSTILL 1 DROP INTO RIGHT EYE 4 TIMES A DAY    atorvastatin (LIPITOR) 40 mg tablet Take 1 Tab by mouth nightly.  furosemide (Lasix) 40 mg tablet 1 tablet daily    gabapentin (NEURONTIN) 100 mg capsule TAKE 1 CAPSULE BY MOUTH THREE TIMES A DAY    metoprolol succinate (TOPROL-XL) 25 mg XL tablet TAKE 0.5 TABS BY MOUTH DAILY.  ELIQUIS 5 mg tablet TAKE 1 TABLET BY MOUTH TWICE A DAY    nitroglycerin (NITROSTAT) 0.4 mg SL tablet 0.4 mg by SubLINGual route every five (5) minutes as needed for Chest Pain. Up to 3 doses.  cloNIDine HCL (CATAPRES) 0.2 mg tablet Take 0.2 mg by mouth two (2) times a day.  NIFEdipine ER (ADALAT CC) 30 mg ER tablet TAKE 1 TABLET BY MOUTH EVERY DAY FOR 90 DAYS    losartan (COZAAR) 100 mg tablet TAKE 1 TABLET EVERY DAY    hydroCHLOROthiazide (MICROZIDE) 12.5 mg capsule TAKE ONE CAPSULE BY MOUTH ONCE A DAY    PARoxetine (PAXIL) 20 mg tablet Take  by mouth daily.  omeprazole (PRILOSEC) 20 mg capsule Take 20 mg by mouth daily.  predniSONE (DELTASONE) 10 mg tablet 1 tab po qd    metFORMIN (GLUCOPHAGE) 1,000 mg tablet two (2) times daily (with meals).  SITagliptin (Januvia) 100 mg tablet TAKE 1 TABLET BY MOUTH EVERY DAY     No current facility-administered medications for this visit.         I have reviewed the nurses notes, vitals, problem list, allergy list, medical history, family, social history and medications. REVIEW OF SYMPTOMS      General: Pt denies excessive weight gain or loss. Pt is able to conduct ADL's  HEENT: Denies blurred vision, headaches, hearing loss, epistaxis and difficulty swallowing. Respiratory: Denies cough, congestion, shortness of breath, WEAVER, wheezing or stridor. Cardiovascular: Denies precordial pain, palpitations, edema or PND  Gastrointestinal: Denies poor appetite, indigestion, abdominal pain or blood in stool  Genitourinary: Denies hematuria, dysuria, increased urinary frequency  Musculoskeletal: Denies joint pain or swelling from muscles or joints  Neurologic: Denies tremor, paresthesias, headache, or sensory motor disturbance  Psychiatric: Denies confusion, insomnia, depression  Integumentray: Denies rash, itching or ulcers. Hematologic: Denies easy bruising, bleeding       PHYSICAL EXAMINATION      Vitals: see vitals section  General: Well developed, in no acute distress. HEENT: No jaundice, oral mucosa moist, no oral ulcers  Neck: Supple, no stiffness, no lymphadenopathy, supple  Heart:  Normal S1/S2 negative S3 or S4. Regular, no murmur, gallop or rub, no jugular venous distention  Respiratory: Clear bilaterally x 4, no wheezing or rales  Abdomen:   Soft, non-tender, bowel sounds are active. Extremities:  No edema, normal cap refill, no cyanosis. Musculoskeletal: No clubbing, no deformities  Neuro: A&Ox3, speech clear, gait stable, cooperative, no focal neurologic deficits  Skin: Skin color is normal. No rashes or lesions.  Non diaphoretic, moist.  Vascular: 2+ pulses symmetric in all extremities       DIAGNOSTIC DATA      EKG:        LABORATORY DATA      Lab Results   Component Value Date/Time    WBC 6.5 02/18/2021 12:23 PM    HGB 11.9 02/18/2021 12:23 PM    HCT 36.6 02/18/2021 12:23 PM    PLATELET 523 (L) 11/14/3940 12:23 PM    MCV 90.8 02/18/2021 12:23 PM      Lab Results   Component Value Date/Time    Sodium 139 02/18/2021 12:23 PM    Potassium 4.1 02/18/2021 12:23 PM    Chloride 105 02/18/2021 12:23 PM    CO2 26 02/18/2021 12:23 PM    Anion gap 8 02/18/2021 12:23 PM    Glucose 227 (H) 02/18/2021 12:23 PM    BUN 27 (H) 02/18/2021 12:23 PM    Creatinine 1.09 (H) 02/18/2021 12:23 PM    BUN/Creatinine ratio 25 (H) 02/18/2021 12:23 PM    GFR est AA >60 02/18/2021 12:23 PM    GFR est non-AA 50 (L) 02/18/2021 12:23 PM    Calcium 8.4 (L) 02/18/2021 12:23 PM    Bilirubin, total 0.4 02/18/2021 12:23 PM    Alk. phosphatase 67 02/18/2021 12:23 PM    Protein, total 6.7 02/18/2021 12:23 PM    Albumin 2.2 (L) 02/18/2021 12:23 PM    Globulin 4.5 (H) 02/18/2021 12:23 PM    A-G Ratio 0.5 (L) 02/18/2021 12:23 PM    ALT (SGPT) 17 02/18/2021 12:23 PM           ASSESSMENT      1. ICD                        A. Medtronic                        B. Dual  2. Cardiomyopathy  3. Hypertension  4. Paroxysmal [facility]                        CHADS VASC 3              Asymptomatic  5. ICD lead fracture         PLAN     Continue monitoring in device clinic. ICD-10-CM ICD-9-CM    1. Automatic implantable cardiac defibrillator in situ  Z95.810 V45.02    2. PVC (premature ventricular contraction)  I49.3 427.69    3. Cardiomyopathy, idiopathic (HCC)  I42.8 425.4    4. Paroxysmal atrial fibrillation (HCC)  I48.0 427.31      Orders Placed This Encounter    glipiZIDE (GLUCOTROL) 5 mg tablet     Sig: Take 5 mg by mouth daily.  prednisoLONE acetate (PRED FORTE) 1 % ophthalmic suspension     Sig: INSTILL 1 DROP INTO RIGHT EYE 4 TIMES A DAY          FOLLOW-UP     2 months with Zacarias Rao    Thank you, Camila Quesada MD for allowing me to participate in the care of this extraordinarily pleasant female. Please do not hesitate to contact me for further questions/concerns.          Chantelle Kwong MD  Cardiac Electrophysiology / Cardiology    [place] [address].  [address] [place] [address], [address] [place] [address] [address] [place] [address], [facility]. Kayleen Augustine.    Subhash Cerda  [phone] / [phone] Fax   [phone] / [phone] Fax

## 2021-05-12 NOTE — PROGRESS NOTES
Chargeable pacer remote transmission  See scanned document for details. Normal device function. 1 VT lasting 1 sec with partial EGMs available showing possible FFRW oversensing - adjustments made to cover. >9 years battery life remaining.

## 2021-08-02 PROBLEM — R26.9 GAIT DISTURBANCE: Status: ACTIVE | Noted: 2021-01-01

## 2021-08-02 PROBLEM — R77.8 ELEVATED TROPONIN I LEVEL: Status: ACTIVE | Noted: 2021-01-01

## 2021-08-02 NOTE — ED NOTES
Bedside shift change report given to Cassandra Christy  (oncoming nurse) by Mel Jewell RN  (offgoing nurse). Report included the following information SBAR and ED Summary.

## 2021-08-02 NOTE — ED TRIAGE NOTES
Pt signed up for hip pain and trouble walking. In triage pt is difficult to understand. Reports she is weak and having trouble walking reports dizziness. Denies pain. Reports symptoms started this am at unk time. Pt poor historian.

## 2021-08-03 PROBLEM — I50.9 CHF (CONGESTIVE HEART FAILURE) (HCC): Status: RESOLVED | Noted: 2021-01-01 | Resolved: 2021-01-01

## 2021-08-03 NOTE — PROGRESS NOTES
Problem: Mobility Impaired (Adult and Pediatric)  Goal: *Acute Goals and Plan of Care (Insert Text)  Description: Patient will move from supine to sit and sit to supine , scoot up and down, and roll side to side in bed with supervision/set-up within 7 day(s). Patient will transfer from bed to chair and chair to bed with minimal assistance/contact guard assist using the least restrictive device within 7 day(s). Patient will improve static standing balance to minimal assistance within 1 week(s). Patient will ambulate 50 feet with minimal assistance with least restrictive device within 1 weeks. Outcome: Progressing Towards Goal   PHYSICAL THERAPY EVALUATION  Patient: Laith Herzog (21 y.o. female)  Date: 8/3/2021  Primary Diagnosis: Gait disturbance [R26.9]  Elevated troponin I level [R77.8]        Precautions: fall  ASSESSMENT  65-year-old female with a history of hypertension, atrial fibrillation and hyperlipidemia presents to the emergency room complaining of severe lower extremity weakness. She is unable to walk complains of pain in her right hip and difficulty moving to good. According to the boyfriend at bedside she is completely active until May of this year. She has Covid infection in February and since then her activity is completely changed. Especially in May when she went for her AICD at [facility] to be corrected and had a fall after weight causing her more problems. Since then she is not able to walk, complains of pain had several evaluations done that was all normal according to the information. But she is complaining a lot of pain and inability to walk and also her condition seems to be deteriorating. Her speech is not very clear even though she is able to give the correct information her speech is not very clear. Patient found in the bed sleepying, arousable. patient required multiple vcs for keeping awake.  patient found some what confused to vcs and following the commands. patient able toperform bed mob an supine to sit with cg. sit to stand with min assist and took 4 side steps with constant vcs for following the instruction. unable to process well. Patient not safe to be in the chair so was assisted to get back in bed. [facility]lo ECG tech came to do her EKg so shortened the session. will need 2 person assist.    As per CM notes (Pt's dtr expressed several concerns regardaing pt's care in the home. CM provided dtr with information for [facility] ([facility]) and the [facility] [address] and Neglect Hotline to complete an anonymous report with her concerns. Dtr was thankful but states they know too many people and have too many friends/family in the area who would report back to her brother with the concerns stating it was her. CM informed dtr we could not make a report while pt is in the hospital but if pt refuses rehab/HH services then we could request a follow up. CM also advised if pt goes to SNF/IPR to see if the SW's/Case Workers there could assist.              Plan for utilizing home health: Per recommendation pending pt's progression during hospitalization stay. Dtr states pt [facility] but the brother stopped letting them in the house, does not allow any other family in the house (even though it is patients house). Other factors to consider for discharge: Patient will need snf vs HHPT and 24 hr sup at this point. If family insist for her to go home only   Patient will benefit from skilled therapy intervention to address the above noted impairments. PLAN :  Recommendations and Planned Interventions: bed mobility training, transfer training, gait training, therapeutic exercises, and patient and family training/education      Frequency/Duration: Patient will be followed by physical therapy:  5 times a week to address goals.     Recommendation for discharge: (in order for the patient to meet his/her long term goals)  To be determined: snf vs HH with 24 hr care    This discharge recommendation:  Has been made in collaboration with the attending provider and/or case management    IF patient discharges home will need the following DME: rolling walker         SUBJECTIVE:   Patient stated I am doing ok.     OBJECTIVE DATA SUMMARY:   HISTORY:    Past Medical History:   Diagnosis Date    [facility] ([facility]     Cardiomyopathy, idiopathic ([facility] [address] 10/28/2010    HTN (hypertension), benign 10/28/2010    Pacemaker     ICD    PVC (premature ventricular contraction) 10/28/2010     Past Surgical History:   Procedure Laterality Date    HX OTHER SURGICAL  10/02/2018    Right eye surgery    HX PACEMAKER         Personal factors and/or comorbidities impacting plan of care:   Home Situation  Home Environment: Private residence  # Steps to Enter: 5  One/Two Story Residence: One story  Living Alone: No (Patient live with her son.)  Support Systems: Child(elvis)  Patient Expects to be Discharged to[de-identified] Skilled nursing facility  Current DME Used/Available at Home: None    EXAMINATION/PRESENTATION/DECISION MAKING:   Critical Behavior:  Neurologic State: Alert  Orientation Level: Disoriented to place, Disoriented to time, Disoriented to situation  Cognition: Decreased attention/concentration, Decreased command following, Impaired decision making     Hearing: Auditory  Auditory Impairment: None  Skin: intact  Edema: intact  Range Of Motion:  AROM: Generally decreased, functional                       Strength:    Strength: Generally decreased, functional                    Tone & Sensation:   Tone: Normal                              Coordination:impaired     Vision: intact     Functional Mobility:  Bed Mobility:  Rolling: Stand-by assistance;Contact guard assistance  Supine to Sit: Stand-by assistance;Contact guard assistance  Sit to Supine: Stand-by assistance;Contact guard assistance  Scooting: Contact guard assistance  Transfers:  Sit to Stand: Minimum assistance; Moderate assistance  Stand to Sit: Minimum assistance; Moderate assistance                       Balance:   Sitting: Intact  Standing: Impaired;Pull to stand; With support  Standing - Static: Constant support  Standing - Dynamic : Constant support  Ambulation/Gait Training:  Distance (ft): 3 Feet (ft)  Assistive Device: Walker, rolling  Ambulation - Level of Assistance: Moderate assistance;Maximum assistance     Gait Description (WDL): Exceptions to [facility]           Base of Support: Widened     Speed/Jaja: Slow  Step Length: Left shortened;Right shortened                Functional Measure:  [facility] AM-PAC 6 Clicks         Basic Mobility Inpatient Short Form  How much difficulty does the patient currently have. .. Unable A Lot A Little None   1. Turning over in bed (including adjusting bedclothes, sheets and blankets)? [] 1   [] 2   [x] 3   [] 4   2. Sitting down on and standing up from a chair with arms ( e.g., wheelchair, bedside commode, etc.)   [] 1   [x] 2   [] 3   [] 4   3. Moving from lying on back to sitting on the side of the bed? [] 1   [x] 2   [] 3   [] 4          How much help from another person does the patient currently need. .. Total A Lot A Little None   4. Moving to and from a bed to a chair (including a wheelchair)? [] 1   [x] 2   [] 3   [] 4   5. Need to walk in hospital room? [] 1   [x] 2   [] 3   [] 4   6. Climbing 3-5 steps with a railing? [] 1   [x] 2   [] 3   [] 4   © 2007, Trustees of [facility], under license to [employer]. All rights reserved     Score:  Initial: 13/24 Most Recent: X (Date: 08/03/2021 )   Interpretation of Tool:  Represents activities that are increasingly more difficult (i.e. Bed mobility, Transfers, Gait).   Score 24 23 22-20 19-15 14-10 9-7 6   Modifier CH CI CJ CK CL CM CN           Physical Therapy Evaluation Charge Determination   History Examination Presentation Decision-Making   MEDIUM  Complexity : 1-2 comorbidities / personal factors will impact the outcome/ POC  MEDIUM Complexity : 3 Standardized tests and measures addressing body structure, function, activity limitation and / or participation in recreation  MEDIUM Complexity : Evolving with changing characteristics  MEDIUM Complexity : FOTO score of 26-74      Based on the above components, the patient evaluation is determined to be of the following complexity level: MEDIUM    Pain Ratin/10    Activity Tolerance:   Good and Fair  Please refer to the flowsheet for vital signs taken during this treatment. After treatment patient left in no apparent distress:   Supine in bed and Call bell within reach    COMMUNICATION/EDUCATION:   The patients plan of care was discussed with: Registered nurse. Fall prevention education was provided and the patient/caregiver indicated understanding. and Patient is unable to participate in goal setting and plan of care.     Thank you for this referral.  Caro Kelly SPT   Time Calculation: 35 mins

## 2021-08-03 NOTE — PROGRESS NOTES
Reason for Admission:  Trouble with ambulation                   RUR Score: 19% MODERATE                PCP: First and Last name:   Sheri Barajas MD   Name of Practice:    Are you a current patient: Yes/No: Yes    Approximate date of last visit: Unsure of exact date- states she is active with them. Can you participate in a virtual visit if needed: Yes     Do you (patient/family) have any concerns for transition/discharge? Pt's dtr expressed several concerns regardaing pt's care in the home. CM provided dtr with information for [facility]) and the [facility] [address] and Neglect Hotline to complete an anonymous report with her concerns. Dtr was thankful but states they know too many people and have too many friends/family in the area who would report back to her brother with the concerns stating it was her. CM informed dtr we could not make a report while pt is in the hospital but if pt refuses rehab/HH services then we could request a follow up. CM also advised if pt goes to SNF/IPR to see if the SW's/Case Workers there could assist.              Plan for utilizing home health: Per recommendation pending pt's progression during hospitalization stay. Dtr states [facility] but the brother stopped letting them in the house, does not allow any other family in the house (even though it is patients house). Current Advanced Directive/Advance Care Plan:  Full Code - no AMD on file. Pt's dtr requested CM address with pt, stating she has tried several times in the community. CM advised her at this time we were not able to due to pt's drowsiness but we would reach out to [facility] to assist with completion of AMD prior to d/c. Dtr requested we make her MPOA and pt's oldest granddaughter Kristin Sanchez as secondary- CM advised that we could not sway who they would wish for MPOA to be and would have to honor pt's wishes.      If there are no MPOA paperwork pt has 5 children- two of them are in senior living, one of them is active with substance abuse, dtr states he cannot make decisions- this would leave the dtr Sahil Nuñez and son Emma Hill who do not get along. Healthcare Decision Maker:  Updated to reflect information obtained. Transition of Care Plan:         Pt is a 78year old,  [place] female, admitted with trouble with ambulation. And lower extremity pain. Pt was very drowsy when CM met with her- pt agreeable for CM to reach out to her daughter to complete assessment. CM spoke with pt's dtr Sahil Nuñez- [phone]. At baseline pt is AOX4 and is not able to care for self. Pt's youngest son Emma Hill [facility]) lives with pt but per dtr he does not do anything to assist. Pt's long term boyfriend Janece Klaudia who moved out of the home because of son comes by and assists with tasks as he can. Pt has no DME, cannot drive and has had HH in the past (name unknown) and has not been to any SNF/IPR but dtr states pt needs to go. Pt's dtr confirmed insurance coverage of [employer]- states pt no problems affording or accessing medications, family picks up as needed. Pt will likely require [facility] transportation at time of discharge. Pt was not vaccinated against COVID-19 and did not wish to be vaccinated- per dtr pt and her had several arguments about this and pt is too stubborn to get vaccinated. Pt remains on 4 WEST- pending medical stability. Pt's dtr adamant that pt cannot return home with the youngest son and needs to go to rehab. Pt's dtr declined a Medicaid screening for LTC or secondary insurance, states they have enough resources and funds to care for pt. Floor CM updated and will continue to follow and assist as needed. Care Management Interventions  PCP Verified by CM:  Yes  Palliative Care Criteria Met (RRAT>21 & CHF Dx)?: No (Would benefit from a Pallaitive Consult for Goals of Care and AMD. )  Mode of Transport at Discharge: Self  Transition of Care Consult (CM Consult): Discharge Planning  MyChart Signup: No  Discharge Durable Medical Equipment: No  Health Maintenance Reviewed: Yes  Physical Therapy Consult: Yes  Occupational Therapy Consult: Yes  Speech Therapy Consult: No  Confirm Follow Up Transport: Family  The Patient and/or Patient Representative was Provided with a Choice of Provider and Agrees with the Discharge Plan?: Yes  Name of the Patient Representative Who was Provided with a Choice of Provider and Agrees with the Discharge Plan: Spoke with pt's dtr.   [place] of Choice List was Provided with Basic Dialogue that Supports the Patient's Individualized Plan of Care/Goals, Treatment Preferences and Shares the Quality Data Associated with the Providers?: Yes  Discharge Location  Discharge Placement: Skilled nursing facility     YADIRA Turcios, [address]

## 2021-08-03 NOTE — H&P
History and Physical              Subjective :   Chief Complaint : Trouble with ambulation with lower extremity pain    Source of information : Patient, her fiancé at bedside known for for 20 years    History of present illness:   76-year-old female with a history of hypertension, atrial fibrillation and hyperlipidemia presents to the emergency room complaining of severe lower extremity weakness. She is unable to walk complains of pain in her right hip and difficulty moving to good. According to the boyfriend at bedside she is completely active until May of this year. She has Covid infection in February and since then her activity is completely changed. Especially in May when she went for her AICD at [facility] to be corrected and had a fall after weight causing her more problems. Since then she is not able to walk, complains of pain had several evaluations done that was all normal according to the information. But she is complaining a lot of pain and inability to walk and also her condition seems to be deteriorating. Her speech is not very clear even though she is able to give the correct information her speech is not very clear. Past Medical History:   Diagnosis Date    [facility] ([address]     Cardiomyopathy, idiopathic ([address] 10/28/2010    HTN (hypertension), benign 10/28/2010    Pacemaker     ICD    PVC (premature ventricular contraction) 10/28/2010     Past Surgical History:   Procedure Laterality Date    HX OTHER SURGICAL  10/02/2018    Right eye surgery    HX PACEMAKER       History reviewed. No pertinent family history. Social History     Tobacco Use    Smoking status: Never Smoker    Smokeless tobacco: Never Used   Substance Use Topics    Alcohol use: No       Prior to Admission medications    Medication Sig Start Date End Date Taking? Authorizing Provider   oxyCODONE IR (ROXICODONE) 5 mg immediate release tablet Take 5 mg by mouth every four (4) hours as needed for Pain.  7/16/21 Provider, Historical   cyclobenzaprine (FLEXERIL) 5 mg tablet Take 5 mg by mouth two (2) times daily as needed for Muscle Spasm(s). 7/19/21   Provider, Historical   hydroCHLOROthiazide (HYDRODIURIL) 12.5 mg tablet Take 112.5 mg by mouth daily. 5/7/21   Provider, Historical   amitriptyline (ELAVIL) 75 mg tablet Take 75 mg by mouth daily. 5/3/21   Provider, Historical   glipiZIDE (GLUCOTROL) 5 mg tablet Take 5 mg by mouth daily. 4/29/21   Provider, Historical   prednisoLONE acetate (PRED FORTE) 1 % ophthalmic suspension INSTILL 1 DROP INTO RIGHT EYE 4 TIMES A DAY 3/13/21   Provider, Historical   atorvastatin (LIPITOR) 40 mg tablet Take 1 Tab by mouth nightly. 2/19/21   Kelsie Dumont MD   furosemide (Lasix) 40 mg tablet 1 tablet daily 2/19/21   Joanna Izaguirre MD   metFORMIN (GLUCOPHAGE) 1,000 mg tablet two (2) times daily (with meals). 10/12/20   Provider, Historical   gabapentin (NEURONTIN) 100 mg capsule TAKE 1 CAPSULE BY MOUTH THREE TIMES A DAY 10/27/20   Provider, Historical   SITagliptin (Januvia) 100 mg tablet TAKE 1 TABLET BY MOUTH EVERY DAY 8/2/20   Denisse Jensen MD   metoprolol succinate (TOPROL-XL) 25 mg XL tablet TAKE 0.5 TABS BY MOUTH DAILY. 12/16/19   Bertha Her NP   ELIQUIS 5 mg tablet TAKE 1 TABLET BY MOUTH TWICE A DAY 12/5/19   Bertha Her NP   nitroglycerin (NITROSTAT) 0.4 mg SL tablet 0.4 mg by SubLINGual route every five (5) minutes as needed for Chest Pain. Up to 3 doses. Provider, Historical   cloNIDine HCL (CATAPRES) 0.2 mg tablet Take 0.2 mg by mouth two (2) times a day. 5/21/18   Provider, Historical   NIFEdipine ER (ADALAT CC) 30 mg ER tablet TAKE 1 TABLET BY MOUTH EVERY DAY FOR 90 DAYS 5/20/18   Provider, Historical   losartan (COZAAR) 100 mg tablet TAKE 1 TABLET EVERY DAY 4/25/17   Provider, Historical   PARoxetine (PAXIL) 20 mg tablet Take  by mouth daily. Provider, Historical   omeprazole (PRILOSEC) 20 mg capsule Take 20 mg by mouth daily.  10/28/10   Provider, Historical     Allergies   Allergen Reactions    Chocolate [Cocoa] Anaphylaxis and Swelling    Iodine Anaphylaxis    Lisinopril Anaphylaxis    Peanut Anaphylaxis and Swelling    Glimepiride Swelling    Glipizide Other (comments)     Pruritis    Metformin Diarrhea    Norvasc [Amlodipine] Other (comments)     Light Headed    Pneumovax 23 [Pneumococcal 23-Devi Ps Vaccine] Hives    Toprol Xl [Metoprolol Succinate] Other (comments)     Light headed    Vitamin D [Cholecalciferol (Vitamin D3)] Other (comments)     Dizziness/headache             Review of Systems:  Constitutional: Appetite is good, denies weight loss, no fever, no chills, no night sweats. Eye: No recent visual disturbances, no discharge, no double vision. Ear/nose/mouth/throat : No hearing disturbance, no ear pain, no nasal congestion, no sore throat, no trouble swallowing. Respiratory : No trouble breathing, no cough, no shortness of breath, no hemoptysis, no wheezing. Cardiovascular : No chest pain, no palpitation,  no orthopnea, no peripheral edema. Gastrointestinal : No nausea, no vomiting, no incontinence of bowels. No constipation,  No abdominal pain. Genitourinary : No dysuria, no hematuria,  No incontinence. Lymphatics : No swollen glands -Neck, axillary, inguinal.  Endocrine : No excessive thirst, No polyuria. Immunologic : No hives, urticaria, No seasonal allergies. Musculoskeletal : As in history of present illness pain in the right hip and right leg. Also complains of pain in the right flank area. Integumentary : No rash, No pruritus, No ecchymosis. Hematology : No petechiae, No easy bruising,  No tendency to bleed easy. Neurology : Denies change in mental status, No abnormal balance, No headache, No confusion, No numbness or tingling. Psychiatric : No mood swings, No anxiety, No depression.     Vitals:     Patient Vitals for the past 12 hrs:   Temp Pulse Resp BP SpO2   08/02/21 1906  86 18 (!) 157/108 98 %   08/02/21 1503 98.8 °F (37.1 °C) 94 18 (!) 150/105 99 %       Physical Exam:   General : Looks comfortable, no respiratory distress noted. HEENT : PERRLA, normal oral mucosa, atraumatic normocephalic, Normal ear and nose. Neck : Supple, no JVD, no masses noted, no carotid bruit. Lungs : Breath sounds with good air entry bilaterally, no wheezes or rales, no accessory muscle use. CVS : Rhythm rate regular, S1+, S2+, no murmur or gallop. Abdomen : Soft, nontender, no organomegaly, bowel sounds active. Extremities : No edema noted,  pedal pulses palpable. Musculoskeletal : Fair range of motion, no joint swelling or effusion, muscle tone and power appears normal.   Skin : Moist, warm,  no pathological rash. Lymphatic : No cervical lymphadenopathy. Neurological : Awake, alert, oriented to time place person. Psychiatric : Mood and affect appears appropriate to the situation. Data Review:   Recent Results (from the past 24 hour(s))   CBC WITH AUTOMATED DIFF    Collection Time: 08/02/21  6:40 PM   Result Value Ref Range    WBC 8.8 3.6 - 11.0 K/uL    RBC 4.47 3.80 - 5.20 M/uL    HGB 12.8 11.5 - 16.0 g/dL    HCT 40.5 35.0 - 47.0 %    MCV 90.6 80.0 - 99.0 FL    MCH 28.6 26.0 - 34.0 PG    MCHC 31.6 30.0 - 36.5 g/dL    RDW 15.9 (H) 11.5 - 14.5 %    PLATELET 874 (L) 329 - 400 K/uL    MPV 13.1 (H) 8.9 - 12.9 FL    NRBC 0.0 0.0  WBC    ABSOLUTE NRBC 0.00 0.00 - 0.01 K/uL    NEUTROPHILS 60 32 - 75 %    LYMPHOCYTES 29 12 - 49 %    MONOCYTES 9 5 - 13 %    EOSINOPHILS 2 0 - 7 %    BASOPHILS 0 0 - 1 %    IMMATURE GRANULOCYTES 0 0 - 0.5 %    ABS. NEUTROPHILS 5.3 1.8 - 8.0 K/UL    ABS. LYMPHOCYTES 2.6 0.8 - 3.5 K/UL    ABS. MONOCYTES 0.8 0.0 - 1.0 K/UL    ABS. EOSINOPHILS 0.1 0.0 - 0.4 K/UL    ABS. BASOPHILS 0.0 0.0 - 0.1 K/UL    ABS. IMM.  GRANS. 0.0 0.00 - 0.04 K/UL    DF AUTOMATED     METABOLIC PANEL, COMPREHENSIVE    Collection Time: 08/02/21  6:40 PM   Result Value Ref Range    Sodium 140 136 - 145 mmol/L    Potassium 3.3 (L) 3.5 - 5.1 mmol/L    Chloride 107 97 - 108 mmol/L    CO2 25 21 - 32 mmol/L    Anion gap 8 5 - 15 mmol/L    Glucose 151 (H) 65 - 100 mg/dL    BUN 31 (H) 6 - 20 mg/dL    Creatinine 1.45 (H) 0.55 - 1.02 mg/dL    BUN/Creatinine ratio 21 (H) 12 - 20      GFR est AA 44 (L) >60 ml/min/1.73m2    GFR est non-AA 36 (L) >60 ml/min/1.73m2    Calcium 8.9 8.5 - 10.1 mg/dL    Bilirubin, total 0.7 0.2 - 1.0 mg/dL    AST (SGOT) 48 (H) 15 - 37 U/L    ALT (SGPT) 29 12 - 78 U/L    Alk. phosphatase 91 45 - 117 U/L    Protein, total 7.4 6.4 - 8.2 g/dL    Albumin 2.9 (L) 3.5 - 5.0 g/dL    Globulin 4.5 (H) 2.0 - 4.0 g/dL    A-G Ratio 0.6 (L) 1.1 - 2.2     CK W/ REFLX CKMB    Collection Time: 08/02/21  6:40 PM   Result Value Ref Range    .0 (H) 26 - 192 ng/mL   TROPONIN I    Collection Time: 08/02/21  6:40 PM   Result Value Ref Range    Troponin-I, Qt. 0.17 (H) <0.05 ng/mL       Radiologic Studies :   CT Results  (Last 48 hours)               08/02/21 1748  CT SPINE [facility] WO CONT Final result    Narrative:  CT dose reduction was achieved through use of a standardized protocol tailored   for this examination and automatic exposure control for dose modulation. Protocol study shows mild diffuse DDD. Greater DDD in the visualized cervical   spine. No fracture, subluxation or bone destruction       Disc spaces, spinal canal, facets and foramina are maintained. Adjacent soft   tissues are maintained. Note is made of cardiomegaly and moderate to large   pericardial effusion       08/02/21 1748  CT SPINE LUMB WO CONT Final result    Narrative:  CT dose reduction was achieved through use of a standardized protocol tailored   for this examination and automatic exposure control for dose modulation. Protocol study shows no fracture, subluxation bone destruction. The disc spaces   are maintained       There are mild diffuse disc bulges at L3-4 and L4-5, without lateralization.    They do extend into the foramina, symmetric and without stenosis       Advanced facet DJD, mainly on the left at L3-4 and on the right at L4-5. These   do not contribute to foraminal stenosis. There is no adjacent findings       08/02/21 1556  CT HEAD WO CONT Final result    Impression:  1. No acute hemorrhage, acute large vessel distribution infarct. 2. Left basal ganglia lacunar infarct of undetermined acuity. 3. Atrophy and small vessel ischemic disease. Bilateral small focal   encephalomalacia from remote ischemic processes. 4. Right maxillary sinus disease. Narrative: Altered mental status. No comparison. Technique: Axial images head without IV contrast, with multiplanar reformatting. Multiplanar reformatting. Dose reduction: All CT scans at this facility are performed using dose reduction   optimization techniques as appropriate to a performed exam including the   following: Automated exposure control, adjustments of the mA and/or kV according   to patient's size, or use of iterative reconstruction technique. Findings: Bilateral periventricular white matter hypodensity. Left frontal,   right parietal encephalomalacia from remote ischemia. Left basal ganglia lacunae   of indeterminate acuity. No mass effect, extra-axial fluid collection or   hemorrhage. Normal position craniocervical junction. Atrophy. Bubbly fluid right   maxillary sinus anteriorly. Mastoid air cells are unopacified. Calvarium intact. Bilateral native lenses are absent. CXR Results  (Last 48 hours)               08/02/21 1523  XR CHEST PORT Final result    Impression:  Enlarged cardiac silhouette, increased in size compared to prior study. This may   be due to patient rotation, but pericardial effusion is not excluded. Correlate   clinically. Narrative:  Study: XR CHEST PORT       Clinical indication: chest pain       Comparison: Chest x-ray 2/16/2021. Findings:       Patient slightly rotated.        Stable position of left chest wall ICD. Chronic silhouette is enlarged. No   pulmonary edema. No consolidative airspace disease, pleural effusion or pneumothorax. No acute osseous abnormality                       Assessment and Plan :     Gait disturbance due to right lower extremity pain and weakness, etiology unclear, she is complaining of pain. Will consult orthopedics and physical therapy for evaluation. Elevated troponin with a history of coronary artery disease. Will consult patient's primary cardiologist for further evaluation    Diabetes mellitus type 2 : Not at goal, on medications which we will continue, will keep her on Accu-Cheks with a sliding scale insulin    Benign essential hypertension: We will continue home medications. But on review found out that she has a carvedilol, metoprolol succinate and clonidine. I discontinued metoprolol succinate and clonidine, continued carvedilol. She is also on hydrochlorothiazide and Cozaar which we will continue    History of atrial fibrillation on anticoagulation with Eliquis at home which we will continue    Patient is admitted to medical floor, full CODE STATUS, home medications reviewed with the external Rx history and her boyfriend called from home and given the information of the names of medications. She has no advance medical directives. VTE prophylaxis is covered with Eliquis. CC : Chikis Romo MD  Signed By: Joy Daniels MD     August 2, 2021      This dictation was done by dragon, computer voice recognition software. Often unanticipated grammatical, syntax, [place] phones and other interpretive errors are inadvertently transcribed. Please excuse errors that have escaped final proofreading.

## 2021-08-03 NOTE — CONSULTS
Neurology Consult Note    HPI  Ms. Kenia Patricia is a 78 y.o.  female with a history significant for Afib (Apixaban), Cardiomyopathy s/p Pacemaker/AICD, HTN, HL who presented with BLE weakness. Per chart review, patient had COVID-19 in February and then went to [facility] in May to get her AICD after which patient has been having difficulty with strength in the lower extremities making it difficult for her to walk as well as patient suffering from pain in the lower extremities. Patient reportedly has been evaluated by other physicians without finding an etiology to the symptoms. Emergent CT head revealed left basal ganglier ischemia of indeterminate acuity. CT of the lumbar spine revealed some degenerative changes from L3-L5 but no significant central canal or neuroforaminal stenosis. Patient had elevated BUN of 31, elevated creatinine of 1.45 and slightly elevated troponin of 0.17 which was later a little increased to 0.20. Patient that she recently fell in the bathroom and hit the right side of her leg causing her to have pain and weakness in the right leg. She reports that she feels she has weakness in the left leg as well. She has no prior history of strokes that she is aware of. She says she usually ambulates with some assistance at home and has been diagnosed with diabetes in the past.  She does not know if she takes any medication for this at home however. Home Stroke Meds: Apixaban 5 BID, Atorvastatin 40       CTH WO  NAICA  Left putaminal hypoattenuation likely reflecting ischemia that is of indeterminate chronicity   Hypodensities in the left frontal and right inferior parietal region likely reflecting remote infarcts      IMPRESSION  Ms. Kenia Patricia is a 78 y.o.  female with the above history presented with BLE weakness and pain.   Per chart review, patient symptoms began in May when she got an AICD placed at [facility].  Patient reportedly has been having difficulty ambulating since then accumulating to her presentation to the ED. Patient reported that her symptoms worsen suddenly a day prior to presentation to the hospitalist team however. Emergent CT head revealed left basal ganglier ischemia of undetermined chronicity. Emergent CT of the lumbar spine revealed degenerative changes but no evidence of central canal stenosis or neuroforaminal stenosis to account for patient's symptoms. Patient has some elevated troponins and BUN/creatinine. We will repeat CT of the head to evaluate for any interval evolving ischemia in the left caudate. Reviewed CT head myself and agree that left caudate lesion is of indeterminate chronicity and could reflect a subacute region of ischemia. We will continue patient on anticoagulation with apixaban and continue on with statin therapy for neurovascular prophylaxis. If evidence of evolving ischemia on repeat CT head then patient will need rest of stroke work-up per below. In regards to patient's bilateral lower extremity weakness and pain, the etiology of this is difficult to ascertain. There is no spinal level on examination and upon review of patient's CT lumbar spine no evidence of spinal canal stenosis or neuroforaminal stenosis that would result in lumbar radiculopathy or spinal cord impingement. Will plan for outpatient NCS/EMG of the lower extremities to evaluate for evidence of peripheral neuropathy given lack of reflexes and length dependent decrease in sensation. Low suspicion for inflammatory polyradiculopathy given symptoms have been on going for quite a while.       RECOMMENDATIONS      Concern For Left Caudate Subacute Ischemia  Remote Right Frontal Left Inferior Parietal Ischemia  BLE Weakness & Pain  Associated: ADELINA, Elevated Troponins  -Q6Hr NeuroChecks, TELE  -Stroke Prophylaxis: Apixaban 5 BID, Atrovastatin 40   -SBP Goal < 160  -Glucose Goal < 180  -Head of Bed at 30 degrees  -PT/OT/ST as needed  -Management of metabolic/infectious derangements to referring teams  -Plan for outpatient NCS/EMG of the BLE to evaluate for evidence of neuropathy  -F/U CTH WO (24 hour repeat)      Review of Systems  A 14 point review was done and pertinent positives & negative findings are listed as part of the history of present illness, all other systems were reviewed and are negative. Physical/Neurological Exam  Cardiovascular: tachycardic at times  Pulmonary: no increased work of breathing  Gastrointestinal/Abdomen: soft w/hypoactive bowel sounds   Skin: warm and dry      Patient awake, alert; following central and peripheral commands   No expressive or receptive aphasia;  No dysarthria   Pupils react to light bilaterally; EOM Intact   No visual field deficits on gross exam   Intact to light touch on face bilaterally   No facial droop   No gross hearing loss   Tongue is midline   Motor: 4+/5 in the bilateral upper extremities, 4-5 in the bilateral lower extremities  No abnormal movements   Normal tone throughout   Decreased sensation to light touch in a length dependent manner in the lower extremities  No spinal level on examination  Reflexes: Absent in the bilateral ankles and bilateral knees, trace in bilateral brachioradialis      Past Medical History:   Diagnosis Date    [facility] ([address]     Cardiomyopathy, idiopathic ([address] 10/28/2010    HTN (hypertension), benign 10/28/2010    Pacemaker     ICD    PVC (premature ventricular contraction) 10/28/2010      Past Surgical History:   Procedure Laterality Date    HX OTHER SURGICAL  10/02/2018    Right eye surgery    HX PACEMAKER       Allergies   Allergen Reactions    Chocolate [Cocoa] Anaphylaxis and Swelling    Iodine Anaphylaxis    Lisinopril Anaphylaxis    Peanut Anaphylaxis and Swelling    Glimepiride Swelling    Glipizide Other (comments)     Pruritis    Metformin Diarrhea    Norvasc [Amlodipine] Other (comments)     Light Headed    Pneumovax 23 [Pneumococcal 23-Devi Ps Vaccine] Hives    Toprol Xl [Metoprolol Succinate] Other (comments)     Light headed    Vitamin D [Cholecalciferol (Vitamin D3)] Other (comments)     Dizziness/headache     History reviewed. No pertinent family history.      Social Connections:     Frequency of Communication with Friends and Family:     Frequency of Social Gatherings with Friends and Family:     Attends Christianity Services:     Active Member of Clubs or Organizations:     Attends Club or Organization Meetings:     Marital Status:          Medications  Current Outpatient Medications   Medication Instructions    amitriptyline (ELAVIL) 75 mg, Oral, DAILY    atorvastatin (LIPITOR) 40 mg, Oral, EVERY BEDTIME    cloNIDine HCL (CATAPRES) 0.2 mg, Oral, 2 TIMES DAILY    cyclobenzaprine (FLEXERIL) 5 mg, Oral, 2 TIMES DAILY AS NEEDED    ELIQUIS 5 mg tablet TAKE 1 TABLET BY MOUTH TWICE A DAY    furosemide (Lasix) 40 mg tablet 1 tablet daily    gabapentin (NEURONTIN) 100 mg capsule TAKE 1 CAPSULE BY MOUTH THREE TIMES A DAY    glipiZIDE (GLUCOTROL) 5 mg, Oral, DAILY    hydroCHLOROthiazide (HYDRODIURIL) 12.5 mg, Oral, DAILY    losartan (COZAAR) 100 mg, Oral, DAILY    metFORMIN (GLUCOPHAGE) 1,000 mg tablet 2 TIMES DAILY WITH MEALS    metoprolol succinate (TOPROL-XL) 12.5 mg, Oral, DAILY    nitroglycerin (NITROSTAT) 0.4 mg, EVERY 5 MIN AS NEEDED    omeprazole (PRILOSEC) 20 mg, DAILY    oxyCODONE IR (ROXICODONE) 5 mg, Oral, EVERY 4 HOURS AS NEEDED    PARoxetine (PAXIL) 20 mg, Oral, DAILY    prednisoLONE acetate (PRED FORTE) 1 % ophthalmic suspension INSTILL 1 DROP INTO RIGHT EYE 4 TIMES A DAY    SITagliptin (Januvia) 100 mg tablet TAKE 1 TABLET BY MOUTH EVERY DAY       Current Facility-Administered Medications   Medication Dose Route Frequency    sodium chloride (NS) flush 5-40 mL  5-40 mL IntraVENous Q8H    sodium chloride (NS) flush 5-40 mL  5-40 mL IntraVENous PRN    acetaminophen (TYLENOL) tablet 650 mg  650 mg Oral Q6H PRN Or    acetaminophen (TYLENOL) suppository 650 mg  650 mg Rectal Q6H PRN    ondansetron (ZOFRAN ODT) tablet 4 mg  4 mg Oral Q6H PRN    Or    ondansetron (ZOFRAN) injection 4 mg  4 mg IntraVENous Q6H PRN    pantoprazole (PROTONIX) tablet 40 mg  40 mg Oral ACB    PARoxetine (PAXIL) tablet 20 mg  20 mg Oral DAILY    losartan (COZAAR) tablet 100 mg  100 mg Oral DAILY    nitroglycerin (NITROSTAT) tablet 0.4 mg  0.4 mg SubLINGual Q5MIN PRN    apixaban (ELIQUIS) tablet 5 mg  5 mg Oral BID    alogliptin (NESINA) tablet 12.5 mg  12.5 mg Oral DAILY    metFORMIN (GLUCOPHAGE) tablet 500 mg  500 mg Oral BID WITH MEALS    gabapentin (NEURONTIN) capsule 100 mg  100 mg Oral BID    atorvastatin (LIPITOR) tablet 40 mg  40 mg Oral QHS    glipiZIDE (GLUCOTROL) tablet 5 mg  5 mg Oral DAILY    oxyCODONE IR (ROXICODONE) tablet 5 mg  5 mg Oral Q6H PRN    cyclobenzaprine (FLEXERIL) tablet 5 mg  5 mg Oral BID PRN    hydroCHLOROthiazide (HYDRODIURIL) tablet 12.5 mg  12.5 mg Oral DAILY    amitriptyline (ELAVIL) tablet 75 mg  75 mg Oral DAILY    carvediloL (COREG) tablet 6.25 mg  6.25 mg Oral BID WITH MEALS    insulin lispro (HUMALOG) injection   SubCUTAneous AC&HS    glucose chewable tablet 16 g  4 Tablet Oral PRN    dextrose (D50W) injection syrg 12.5-25 g  25-50 mL IntraVENous PRN    glucagon (GLUCAGEN) injection 1 mg  1 mg IntraMUSCular PRN        Objective  Temp:  [97.5 °F (36.4 °C)-98.8 °F (37.1 °C)]   Pulse (Heart Rate):  [86-97]   BP: (146-157)/()   Resp Rate:  [18-22]   O2 Sat (%):  [97 %-100 %]   Weight:  [88 kg (194 lb)]   No intake or output data in the 24 hours ending 08/03/21 1544  Wt Readings from Last 3 Encounters:   08/02/21 88 kg (194 lb)   05/12/21 88.1 kg (194 lb 3.2 oz)   02/18/21 86.2 kg (190 lb 0.6 oz)        Labs  Recent Results (from the past 24 hour(s))   CBC WITH AUTOMATED DIFF    Collection Time: 08/02/21  6:40 PM   Result Value Ref Range    WBC 8.8 3.6 - 11.0 K/uL    RBC 4.47 3.80 - 5.20 M/uL    HGB 12.8 11.5 - 16.0 g/dL    HCT 40.5 35.0 - 47.0 %    MCV 90.6 80.0 - 99.0 FL    MCH 28.6 26.0 - 34.0 PG    MCHC 31.6 30.0 - 36.5 g/dL    RDW 15.9 (H) 11.5 - 14.5 %    PLATELET 808 (L) 602 - 400 K/uL    MPV 13.1 (H) 8.9 - 12.9 FL    NRBC 0.0 0.0  WBC    ABSOLUTE NRBC 0.00 0.00 - 0.01 K/uL    NEUTROPHILS 60 32 - 75 %    LYMPHOCYTES 29 12 - 49 %    MONOCYTES 9 5 - 13 %    EOSINOPHILS 2 0 - 7 %    BASOPHILS 0 0 - 1 %    IMMATURE GRANULOCYTES 0 0 - 0.5 %    ABS. NEUTROPHILS 5.3 1.8 - 8.0 K/UL    ABS. LYMPHOCYTES 2.6 0.8 - 3.5 K/UL    ABS. MONOCYTES 0.8 0.0 - 1.0 K/UL    ABS. EOSINOPHILS 0.1 0.0 - 0.4 K/UL    ABS. BASOPHILS 0.0 0.0 - 0.1 K/UL    ABS. IMM. GRANS. 0.0 0.00 - 0.04 K/UL    DF AUTOMATED     METABOLIC PANEL, COMPREHENSIVE    Collection Time: 08/02/21  6:40 PM   Result Value Ref Range    Sodium 140 136 - 145 mmol/L    Potassium 3.3 (L) 3.5 - 5.1 mmol/L    Chloride 107 97 - 108 mmol/L    CO2 25 21 - 32 mmol/L    Anion gap 8 5 - 15 mmol/L    Glucose 151 (H) 65 - 100 mg/dL    BUN 31 (H) 6 - 20 mg/dL    Creatinine 1.45 (H) 0.55 - 1.02 mg/dL    BUN/Creatinine ratio 21 (H) 12 - 20      GFR est AA 44 (L) >60 ml/min/1.73m2    GFR est non-AA 36 (L) >60 ml/min/1.73m2    Calcium 8.9 8.5 - 10.1 mg/dL    Bilirubin, total 0.7 0.2 - 1.0 mg/dL    AST (SGOT) 48 (H) 15 - 37 U/L    ALT (SGPT) 29 12 - 78 U/L    Alk. phosphatase 91 45 - 117 U/L    Protein, total 7.4 6.4 - 8.2 g/dL    Albumin 2.9 (L) 3.5 - 5.0 g/dL    Globulin 4.5 (H) 2.0 - 4.0 g/dL    A-G Ratio 0.6 (L) 1.1 - 2.2     CK W/ REFLX CKMB    Collection Time: 08/02/21  6:40 PM   Result Value Ref Range    .0 (H) 26 - 192 ng/mL   TROPONIN I    Collection Time: 08/02/21  6:40 PM   Result Value Ref Range    Troponin-I, Qt. 0.17 (H) <0.05 ng/mL   CK-MB,QUANT.     Collection Time: 08/02/21  6:40 PM   Result Value Ref Range    CK - MB 9.6 (H) <3.6 ng/mL    CK-MB Index 1.3     EKG, 12 LEAD, INITIAL    Collection Time: 08/02/21  8:27 PM   Result Value Ref Range    Ventricular Rate 94 BPM    Atrial Rate 90 BPM    QRS Duration 114 ms    Q-T Interval 448 ms    QTC Calculation (Bezet) 560 ms    Calculated R Axis -35 degrees    Calculated T Axis 66 degrees    Diagnosis       [facility] with premature ventricular or aberrantly conducted   complexes  Left axis deviation  Minimal voltage criteria for LVH, may be normal variant  Prolonged QT  Abnormal ECG  When compared with ECG of 02-AUG-2021 20:26, (Unconfirmed)  No significant change was found  Confirmed by Bettye Ribeiro ([id number]) on 8/3/2021 3:06:01 PM     TROPONIN I    Collection Time: 08/02/21 10:02 PM   Result Value Ref Range    Troponin-I, Qt. 0.20 (H) <0.05 ng/mL   GLUCOSE, POC    Collection Time: 08/03/21  8:25 AM   Result Value Ref Range    Glucose (POC) 178 (H) 65 - 117 mg/dL    Performed by Rito Maya    GLUCOSE, POC    Collection Time: 08/03/21 11:07 AM   Result Value Ref Range    Glucose (POC) 191 (H) 65 - 117 mg/dL    Performed by Pj Perez    TROPONIN I    Collection Time: 08/03/21 11:59 AM   Result Value Ref Range    Troponin-I, Qt. 0.20 (H) <0.05 ng/mL   EKG, 12 LEAD, SUBSEQUENT    Collection Time: 08/03/21 12:18 PM   Result Value Ref Range    Ventricular Rate 83 BPM    Atrial Rate 83 BPM    P-R Interval 188 ms    QRS Duration 116 ms    Q-T Interval 450 ms    QTC Calculation (Bezet) 528 ms    Calculated P Axis 56 degrees    Calculated R Axis -26 degrees    Calculated T Axis 63 degrees    Diagnosis       Sinus rhythm with marked sinus arrhythmia  Left ventricular hypertrophy with QRS widening  Inferior infarct , age undetermined  Prolonged QT  Abnormal ECG  When compared with ECG of 02-AUG-2021 20:27, (Unconfirmed)  Sinus rhythm has replaced [facility]  Confirmed by aMyo Waldron ([id number]) on 8/3/2021 12:40:36 PM            Significant Diagnostic Studies  All images independently visualized    CT SPINE [facility] WO CONT   Final Result      CT SPINE LUMB WO CONT   Final Result      CT HEAD WO CONT   Final Result   1. No acute hemorrhage, acute large vessel distribution infarct. 2. Left basal ganglia lacunar infarct of undetermined acuity. 3. Atrophy and small vessel ischemic disease. Bilateral small focal   encephalomalacia from remote ischemic processes. 4. Right maxillary sinus disease. XR CHEST PORT   Final Result   Enlarged cardiac silhouette, increased in size compared to prior study. This may   be due to patient rotation, but pericardial effusion is not excluded. Correlate   clinically. This document has been prepared by the Dragon voice recognition system, typographical errors may have occurred.  Attempts have been made to correct errors, however inadvertent errors may persist.

## 2021-08-03 NOTE — PROGRESS NOTES
Hospitalist Progress Note               Daily Progress Note: 8/3/2021      Subjective: The patient is seen for follow up. Patient is a 58-year-old female with history of hypertension, atrial fibrillation and hyperlipidemia who presented to the ED last night with complaints of severe lower extremity weakness. Patient had COVID-19 in February and since then has been very weak. She apparently had a fall after an AICD was placed at [facility] in May and this is caused more problems with ambulation. Per ED physician report patient had acute worsening of bilateral leg weakness just 2 days ago. The admitting physician felt like she had some right leg pain and weakness as opposed to the left and has requested orthopedics evaluation. Cardiology consultation was also requested. CT of the brain obtained last night showed a left basal ganglia lacunar infarct of undetermined acuity although certainly could account for some right leg weakness. In addition there was a left frontal and right parietal encephalomalacia from remote ischemia. When I questioned the patient, she states she feels like her left leg is weaker than the right.   She told me her symptoms all started yesterday so there is clearly a lot of variation in her history depending on when she talks with    Patient does admit  to chronic back problems    CT of the lumbar spine showed advanced facet DJD but no foraminal stenosis, no spinal stenosis    PT recommending SNF versus home health with 24-hour care    Problem List:  Problem List as of 8/3/2021 Date Reviewed: 5/12/2021        Codes Class Noted - Resolved    Gait disturbance ICD-10-CM: R26.9  ICD-9-CM: 781.2  8/2/2021 - Present        Elevated troponin I level ICD-10-CM: R77.8  ICD-9-CM: 790.6  8/2/2021 - Present        Shortness of breath ICD-10-CM: R06.02  ICD-9-CM: 786.05  2/13/2021 - Present        Fever ICD-10-CM: R50.9  ICD-9-CM: 780.60  2/13/2021 - Present        Acute CHF (congestive heart failure) ([facility] [place] [facility].) ICD-10-CM: I50.9  ICD-9-CM: 428.0  2/13/2021 - Present        COVID-19 virus infection ICD-10-CM: U07.1  ICD-9-CM: 079.89  2/13/2021 - Present        CHF (congestive heart failure) (HCC) ICD-10-CM: I50.9  ICD-9-CM: 428.0  2/13/2021 - Present        Uncontrolled type 2 diabetes mellitus with hyperglycemia ([facility] [place] [facility] ICD-10-CM: E11.65  ICD-9-CM: 250.02  10/8/2018 - Present        Mixed hyperlipidemia ICD-10-CM: E78.2  ICD-9-CM: 272.2  10/8/2018 - Present        Implantable cardioverter-defibrillator (ICD) in situ ICD-10-CM: Z95.810  ICD-9-CM: V45.02  11/16/2010 - Present        Cardiomyopathy, idiopathic ([place] [id number].) ICD-10-CM: I42.8  ICD-9-CM: 425.4  10/28/2010 - Present        HTN (hypertension), benign ICD-10-CM: I10  ICD-9-CM: 401.1  10/28/2010 - Present        PVC (premature ventricular contraction) ICD-10-CM: I49.3  ICD-9-CM: 427.69  10/28/2010 - Present              Medications reviewed  Current Facility-Administered Medications   Medication Dose Route Frequency    sodium chloride (NS) flush 5-40 mL  5-40 mL IntraVENous Q8H    sodium chloride (NS) flush 5-40 mL  5-40 mL IntraVENous PRN    acetaminophen (TYLENOL) tablet 650 mg  650 mg Oral Q6H PRN    Or    acetaminophen (TYLENOL) suppository 650 mg  650 mg Rectal Q6H PRN    ondansetron (ZOFRAN ODT) tablet 4 mg  4 mg Oral Q6H PRN    Or    ondansetron (ZOFRAN) injection 4 mg  4 mg IntraVENous Q6H PRN    pantoprazole (PROTONIX) tablet 40 mg  40 mg Oral ACB    PARoxetine (PAXIL) tablet 20 mg  20 mg Oral DAILY    losartan (COZAAR) tablet 100 mg  100 mg Oral DAILY    nitroglycerin (NITROSTAT) tablet 0.4 mg  0.4 mg SubLINGual Q5MIN PRN    apixaban (ELIQUIS) tablet 5 mg  5 mg Oral BID    alogliptin (NESINA) tablet 12.5 mg  12.5 mg Oral DAILY    metFORMIN (GLUCOPHAGE) tablet 500 mg  500 mg Oral BID WITH MEALS    gabapentin (NEURONTIN) capsule 100 mg  100 mg Oral BID    atorvastatin (LIPITOR) tablet 40 mg  40 mg Oral QHS    glipiZIDE (GLUCOTROL) tablet 5 mg  5 mg Oral DAILY    oxyCODONE IR (ROXICODONE) tablet 5 mg  5 mg Oral Q6H PRN    cyclobenzaprine (FLEXERIL) tablet 5 mg  5 mg Oral BID PRN    hydroCHLOROthiazide (HYDRODIURIL) tablet 12.5 mg  12.5 mg Oral DAILY    amitriptyline (ELAVIL) tablet 75 mg  75 mg Oral DAILY    carvediloL (COREG) tablet 6.25 mg  6.25 mg Oral BID WITH MEALS    insulin lispro (HUMALOG) injection   SubCUTAneous AC&HS    glucose chewable tablet 16 g  4 Tablet Oral PRN    dextrose (D50W) injection syrg 12.5-25 g  25-50 mL IntraVENous PRN    glucagon (GLUCAGEN) injection 1 mg  1 mg IntraMUSCular PRN       Review of Systems:   A comprehensive review of systems was negative except for that written in the HPI. Objective:   Physical Exam:     Visit Vitals  BP (!) 146/103 (BP Patient Position: At rest;Lying)   Pulse 88   Temp 97.5 °F (36.4 °C)   Resp 20   Ht 5' 6\" (1.676 m)   Wt 88 kg (194 lb)   SpO2 100%   BMI 31.31 kg/m²      O2 Device: None (Room air)    Temp (24hrs), Av.1 °F (36.7 °C), Min:97.5 °F (36.4 °C), Max:98.8 °F (37.1 °C)    No intake/output data recorded. No intake/output data recorded. General:   Awake but fell asleep several times during my talk with her   Lungs:   Clear to auscultation bilaterally. Chest wall:  No tenderness or deformity. Heart:  Regular rate and rhythm, S1, S2 normal, no murmur, click, rub or gallop. Abdomen:   Soft, non-tender. Bowel sounds normal. No masses,  No organomegaly. Extremities: Extremities normal, atraumatic, no cyanosis or edema. Pulses: 2+ and symmetric all extremities. Skin: Skin color, texture, turgor normal. No rashes or lesions   Neurologic: CNII-XII intact. Left leg strength 4/5, right leg strength 4+/5.   Upper extremity strength is symmetric and intact         Data Review:       Recent Days:  Recent Labs     21  1840   WBC 8.8   HGB 12.8   HCT 40.5   *     Recent Labs     21  1840      K 3.3*      CO2 25   *   BUN 31* CREA 1.45*   CA 8.9   ALB 2.9*   TBILI 0.7   ALT 29     No results for input(s): PH, PCO2, PO2, HCO3, FIO2 in the last 72 hours. 24 Hour Results:  Recent Results (from the past 24 hour(s))   EKG, 12 LEAD, INITIAL    Collection Time: 08/02/21  3:08 PM   Result Value Ref Range    Ventricular Rate 84 BPM    Atrial Rate 90 BPM    P-R Interval 184 ms    QRS Duration 122 ms    Q-T Interval 456 ms    QTC Calculation (Bezet) 538 ms    Calculated P Axis 71 degrees    Calculated R Axis -39 degrees    Calculated T Axis 88 degrees    Diagnosis       Sinus rhythm with occasional Premature ventricular complexes and Premature   atrial complexes  Left axis deviation  Left ventricular hypertrophy with QRS widening  Possible Lateral infarct , age undetermined  Abnormal ECG  No previous ECGs available  Confirmed by Levon Licona ([id number]) on 8/3/2021 2:44:47 PM     CBC WITH AUTOMATED DIFF    Collection Time: 08/02/21  6:40 PM   Result Value Ref Range    WBC 8.8 3.6 - 11.0 K/uL    RBC 4.47 3.80 - 5.20 M/uL    HGB 12.8 11.5 - 16.0 g/dL    HCT 40.5 35.0 - 47.0 %    MCV 90.6 80.0 - 99.0 FL    MCH 28.6 26.0 - 34.0 PG    MCHC 31.6 30.0 - 36.5 g/dL    RDW 15.9 (H) 11.5 - 14.5 %    PLATELET 579 (L) 667 - 400 K/uL    MPV 13.1 (H) 8.9 - 12.9 FL    NRBC 0.0 0.0  WBC    ABSOLUTE NRBC 0.00 0.00 - 0.01 K/uL    NEUTROPHILS 60 32 - 75 %    LYMPHOCYTES 29 12 - 49 %    MONOCYTES 9 5 - 13 %    EOSINOPHILS 2 0 - 7 %    BASOPHILS 0 0 - 1 %    IMMATURE GRANULOCYTES 0 0 - 0.5 %    ABS. NEUTROPHILS 5.3 1.8 - 8.0 K/UL    ABS. LYMPHOCYTES 2.6 0.8 - 3.5 K/UL    ABS. MONOCYTES 0.8 0.0 - 1.0 K/UL    ABS. EOSINOPHILS 0.1 0.0 - 0.4 K/UL    ABS. BASOPHILS 0.0 0.0 - 0.1 K/UL    ABS. IMM.  GRANS. 0.0 0.00 - 0.04 K/UL    DF AUTOMATED     METABOLIC PANEL, COMPREHENSIVE    Collection Time: 08/02/21  6:40 PM   Result Value Ref Range    Sodium 140 136 - 145 mmol/L    Potassium 3.3 (L) 3.5 - 5.1 mmol/L    Chloride 107 97 - 108 mmol/L    CO2 25 21 - 32 mmol/L Anion gap 8 5 - 15 mmol/L    Glucose 151 (H) 65 - 100 mg/dL    BUN 31 (H) 6 - 20 mg/dL    Creatinine 1.45 (H) 0.55 - 1.02 mg/dL    BUN/Creatinine ratio 21 (H) 12 - 20      GFR est AA 44 (L) >60 ml/min/1.73m2    GFR est non-AA 36 (L) >60 ml/min/1.73m2    Calcium 8.9 8.5 - 10.1 mg/dL    Bilirubin, total 0.7 0.2 - 1.0 mg/dL    AST (SGOT) 48 (H) 15 - 37 U/L    ALT (SGPT) 29 12 - 78 U/L    Alk. phosphatase 91 45 - 117 U/L    Protein, total 7.4 6.4 - 8.2 g/dL    Albumin 2.9 (L) 3.5 - 5.0 g/dL    Globulin 4.5 (H) 2.0 - 4.0 g/dL    A-G Ratio 0.6 (L) 1.1 - 2.2     CK W/ REFLX CKMB    Collection Time: 08/02/21  6:40 PM   Result Value Ref Range    .0 (H) 26 - 192 ng/mL   TROPONIN I    Collection Time: 08/02/21  6:40 PM   Result Value Ref Range    Troponin-I, Qt. 0.17 (H) <0.05 ng/mL   CK-MB,QUANT.     Collection Time: 08/02/21  6:40 PM   Result Value Ref Range    CK - MB 9.6 (H) <3.6 ng/mL    CK-MB Index 1.3     TROPONIN I    Collection Time: 08/02/21 10:02 PM   Result Value Ref Range    Troponin-I, Qt. 0.20 (H) <0.05 ng/mL   GLUCOSE, POC    Collection Time: 08/03/21  8:25 AM   Result Value Ref Range    Glucose (POC) 178 (H) 65 - 117 mg/dL    Performed by Rito Maya    GLUCOSE, POC    Collection Time: 08/03/21 11:07 AM   Result Value Ref Range    Glucose (POC) 191 (H) 65 - 117 mg/dL    Performed by Gagan Menendez    TROPONIN I    Collection Time: 08/03/21 11:59 AM   Result Value Ref Range    Troponin-I, Qt. 0.20 (H) <0.05 ng/mL   EKG, 12 LEAD, SUBSEQUENT    Collection Time: 08/03/21 12:18 PM   Result Value Ref Range    Ventricular Rate 83 BPM    Atrial Rate 83 BPM    P-R Interval 188 ms    QRS Duration 116 ms    Q-T Interval 450 ms    QTC Calculation (Bezet) 528 ms    Calculated P Axis 56 degrees    Calculated R Axis -26 degrees    Calculated T Axis 63 degrees    Diagnosis       Sinus rhythm with marked sinus arrhythmia  Left ventricular hypertrophy with QRS widening  Inferior infarct , age undetermined  Prolonged QT  Abnormal ECG  When compared with ECG of 02-AUG-2021 20:27, (Unconfirmed)  Sinus rhythm has replaced [facility]  Confirmed by Misael Lopez ([id number]) on 8/3/2021 12:40:36 PM         CT SPINE THORAC WO CONT   Final Result      CT SPINE LUMB WO CONT   Final Result      CT HEAD WO CONT   Final Result   1. No acute hemorrhage, acute large vessel distribution infarct. 2. Left basal ganglia lacunar infarct of undetermined acuity. 3. Atrophy and small vessel ischemic disease. Bilateral small focal   encephalomalacia from remote ischemic processes. 4. Right maxillary sinus disease. XR CHEST PORT   Final Result   Enlarged cardiac silhouette, increased in size compared to prior study. This may   be due to patient rotation, but pericardial effusion is not excluded. Correlate   clinically. Assessment:  Bilateral leg weakness with ambulatory dysfunction, questionable more pronounced weakness left leg    Left basal ganglia lacunar infarct of indeterminate age. Would not explain left leg weakness    Chronic back pain    Acute kidney injury, creatinine 1.45 with baseline 1.09 in February    Indeterminate troponin elevation    Paroxysmal atrial fibrillation     Chronic systolic heart failure, EF 20% in February    AICD in situ    Plan: We will request neurology consultation  Continue Eliquis and other current medications  Ideally, would like to obtain MRI brain but precluded by her AICD    Will probably need SNF    Care Plan discussed with: Patient/Family    Disposition: To be determined    Total time spent with patient: 30 minutes.     Darcy Ibarra MD

## 2021-08-03 NOTE — ROUTINE PROCESS
TRANSFER - OUT REPORT:    Verbal report given to Becca Garcia on Joe Newton  being transferred to  for routine progression of care       Report consisted of patients Situation, Background, Assessment and   Recommendations(SBAR). Information from the following report(s) SBAR, ED Summary and Recent Results was reviewed with the receiving nurse. Lines:   Peripheral IV 08/02/21 Left Antecubital (Active)   Site Assessment Clean, dry, & intact 08/02/21 1833   Dressing Status Clean, dry, & intact 08/02/21 1833   Dressing Type Transparent 08/02/21 1833        Opportunity for questions and clarification was provided.       Patient transported with:   [employer]  EDT

## 2021-08-03 NOTE — ED PROVIDER NOTES
EMERGENCY DEPARTMENT HISTORY AND PHYSICAL EXAM      Date: 8/2/2021  Patient Name: Miah Magaña    History of Presenting Illness     Chief Complaint   Patient presents with    Lethargy       History Provided By: Patient    HPI: Miah Magaña, 78 y.o. female with a past medical history of hypertension, cardiomyopathy, A. fib, pacemaker, diabetes, and Covid positive in February on Eliquis, Lasix, metoprolol, prednisone, sitagliptin, clonidine, nifedipine presents to the ED for weakness. Patient reports that since yesterday she reports bilateral leg weakness. The weakness did not prevent her from ambulating as she uses a walker at home and reports that she is using it at baseline. She reports lower back pain more so on the right side but denies any trauma. Denies any urine incontinence or constipation. Denies any fevers or chills. Denies abdominal pain, nausea, or vomiting. No chest pain, shortness breath, lightheadedness, dizziness, syncope, orthopnea, PND, or lower extremity swelling. There are no other complaints, changes, or physical findings at this time.     PCP: Shaye Wilson MD    Current Facility-Administered Medications   Medication Dose Route Frequency Provider Last Rate Last Admin    sodium chloride (NS) flush 5-40 mL  5-40 mL IntraVENous Q8H Barb Macias MD   10 mL at 08/03/21 0018    sodium chloride (NS) flush 5-40 mL  5-40 mL IntraVENous PRN Barb Macias MD        acetaminophen (TYLENOL) tablet 650 mg  650 mg Oral Q6H PRN Barb Macias MD        Or   Lartrisha Eliezer acetaminophen (TYLENOL) suppository 650 mg  650 mg Rectal Q6H PRN Barb Macias MD        ondansetron (ZOFRAN ODT) tablet 4 mg  4 mg Oral Q6H PRN Barb Macias MD        Or    ondansetron (ZOFRAN) injection 4 mg  4 mg IntraVENous Q6H PRN Barb Macias MD        pantoprazole (PROTONIX) tablet 40 mg  40 mg Oral ACB Barb Macias MD        PARoxetine (PAXIL) tablet 20 mg  20 mg Oral DAILY Krishna Hill MD        losartan (COZAAR) tablet 100 mg  100 mg Oral DAILY Krishna Hill MD        nitroglycerin (NITROSTAT) tablet 0.4 mg  0.4 mg SubLINGual Q5MIN PRN Krishna Hill MD        apixaban (ELIQUIS) tablet 5 mg  5 mg Oral BID Krishna Hill MD   5 mg at 08/03/21 0015    alogliptin (NESINA) tablet 12.5 mg  12.5 mg Oral DAILY Krishna Hill MD        metFORMIN (GLUCOPHAGE) tablet 500 mg  500 mg Oral BID WITH MEALS Krishna Hill MD        gabapentin (NEURONTIN) capsule 100 mg  100 mg Oral BID Krishna Hill MD   100 mg at 08/03/21 0015    atorvastatin (LIPITOR) tablet 40 mg  40 mg Oral QHS Krishna Hill MD   40 mg at 08/03/21 0015    glipiZIDE (GLUCOTROL) tablet 5 mg  5 mg Oral DAILY Krishna Hill MD        oxyCODONE IR (ROXICODONE) tablet 5 mg  5 mg Oral Q6H PRN Krishna Hill MD        cyclobenzaprine (FLEXERIL) tablet 5 mg  5 mg Oral BID PRN Krishna Hill MD        hydroCHLOROthiazide (HYDRODIURIL) tablet 12.5 mg  12.5 mg Oral DAILY Krishna Hill MD        amitriptyline (ELAVIL) tablet 75 mg  75 mg Oral DAILY Krishna Hill MD        carvediloL (COREG) tablet 6.25 mg  6.25 mg Oral BID WITH MEALS Krishna Hill MD   6.25 mg at 08/03/21 0015    insulin lispro (HUMALOG) injection   SubCUTAneous AC&HS Krishna Hill MD        glucose chewable tablet 16 g  4 Tablet Oral PRN Krishna Hill MD        dextrose (D50W) injection syrg 12.5-25 g  25-50 mL IntraVENous PRN Krishna Hill MD        glucagon (GLUCAGEN) injection 1 mg  1 mg IntraMUSCular PRN Krishna Hill MD           Past History   I reviewed the past medical hx, surgical hx, family hx, social hx, and allergy information and are listed here:    Past Medical History:  Past Medical History:   Diagnosis Date    [facility] ([address]     Cardiomyopathy, idiopathic ([facility] [address] 10/28/2010    HTN (hypertension), benign 10/28/2010    Pacemaker     ICD    PVC (premature ventricular contraction) 10/28/2010       Past Surgical History:  Past Surgical History:   Procedure Laterality Date    HX OTHER SURGICAL  10/02/2018    Right eye surgery    HX PACEMAKER         Family History:  History reviewed. No pertinent family history. Social History:  Social History     Tobacco Use    Smoking status: Never Smoker    Smokeless tobacco: Never Used   Substance Use Topics    Alcohol use: No    Drug use: Never       Allergies: Allergies   Allergen Reactions    Chocolate [Cocoa] Anaphylaxis and Swelling    Iodine Anaphylaxis    Lisinopril Anaphylaxis    Peanut Anaphylaxis and Swelling    Glimepiride Swelling    Glipizide Other (comments)     Pruritis    Metformin Diarrhea    Norvasc [Amlodipine] Other (comments)     Light Headed    Pneumovax 23 [Pneumococcal 23-Devi Ps Vaccine] Hives    Toprol Xl [Metoprolol Succinate] Other (comments)     Light headed    Vitamin D [Cholecalciferol (Vitamin D3)] Other (comments)     Dizziness/headache       Review of Systems     Review of Systems   Constitutional: Negative for chills and fever. HENT: Negative for congestion, rhinorrhea and sore throat. Eyes: Negative. Respiratory: Negative for cough and shortness of breath. Cardiovascular: Negative for chest pain and leg swelling. Gastrointestinal: Negative for abdominal pain, nausea and vomiting. Endocrine: Negative. Genitourinary: Negative for dysuria, flank pain and hematuria. Musculoskeletal: Positive for back pain. Negative for myalgias. Skin: Negative for rash and wound. Allergic/Immunologic: Negative for immunocompromised state. Neurological: Positive for weakness. Negative for dizziness, light-headedness and numbness. Hematological: Negative. Psychiatric/Behavioral: Negative for agitation and confusion.        Physical Exam and Vital Signs   Vital Signs - Reviewed the patient's vital signs. Patient Vitals for the past 12 hrs:   Temp Pulse Resp BP SpO2   08/02/21 2342 98.4 °F (36.9 °C) 97 18 (!) 154/96 97 %   08/02/21 2124  89 18 (!) 154/117 98 %   08/02/21 1906  86 18 (!) 157/108 98 %   08/02/21 1503 98.8 °F (37.1 °C) 94 18 (!) 150/105 99 %       Physical Exam:    GENERAL: awake, alert, cooperative, not in distress  HEENT:  * Pupils equal, EOMI  * Head atraumatic  CV:  * regular rhythm  * +2 pulses in extremities bilaterally  PULMONARY: Good air movement, no wheezes or crackles  ABDOMEN: soft, not distended, not tender, no guarding  : No suprapubic tenderness  EXTREMITIES/BACK: warm and perfused, no tenderness, no edema, power 3/5 in the bilateral lower extremities, at baseline per patient. 2+ pulses bilaterally. No pulse deficit. No mid spine tenderness in the back however bilateral paravertebral tenderness present. SKIN: no rashes or signs of trauma  NEURO:  * Speech clear  * Moves U&LE to command      Medical Decision Making and ED Course   - I am the first and primary provider for this patient and am the primary provider of record. - I reviewed the vital signs, available nursing notes, past medical history, past surgical history, family history and social history. - Initial assessment performed. The patients presenting problems have been discussed, and the staff are in agreement with the care plan formulated and outlined with them. I have encouraged them to ask questions as they arise throughout their visit. - Records Reviewed: Nursing Notes and Old Medical Records      MDM:   Patient is a 78 y.o. female presenting for bilateral lower extremity weakness paravertebral back pain for the past 2 days. Vitals reveal no abnormalities and physical exam reveals no abnormalities other than 3/5 power in the bilateral lower extremities. EKG showed no acute ischemic changes.  Based on the history, physical exam, risk factors, and vitals signs, differential includes: ACS, CHF, lumbar fracture, sciatica, electrolyte disturbances, anemia. Results     Labs:     Recent Results (from the past 12 hour(s))   CBC WITH AUTOMATED DIFF    Collection Time: 08/02/21  6:40 PM   Result Value Ref Range    WBC 8.8 3.6 - 11.0 K/uL    RBC 4.47 3.80 - 5.20 M/uL    HGB 12.8 11.5 - 16.0 g/dL    HCT 40.5 35.0 - 47.0 %    MCV 90.6 80.0 - 99.0 FL    MCH 28.6 26.0 - 34.0 PG    MCHC 31.6 30.0 - 36.5 g/dL    RDW 15.9 (H) 11.5 - 14.5 %    PLATELET 537 (L) 309 - 400 K/uL    MPV 13.1 (H) 8.9 - 12.9 FL    NRBC 0.0 0.0  WBC    ABSOLUTE NRBC 0.00 0.00 - 0.01 K/uL    NEUTROPHILS 60 32 - 75 %    LYMPHOCYTES 29 12 - 49 %    MONOCYTES 9 5 - 13 %    EOSINOPHILS 2 0 - 7 %    BASOPHILS 0 0 - 1 %    IMMATURE GRANULOCYTES 0 0 - 0.5 %    ABS. NEUTROPHILS 5.3 1.8 - 8.0 K/UL    ABS. LYMPHOCYTES 2.6 0.8 - 3.5 K/UL    ABS. MONOCYTES 0.8 0.0 - 1.0 K/UL    ABS. EOSINOPHILS 0.1 0.0 - 0.4 K/UL    ABS. BASOPHILS 0.0 0.0 - 0.1 K/UL    ABS. IMM. GRANS. 0.0 0.00 - 0.04 K/UL    DF AUTOMATED     METABOLIC PANEL, COMPREHENSIVE    Collection Time: 08/02/21  6:40 PM   Result Value Ref Range    Sodium 140 136 - 145 mmol/L    Potassium 3.3 (L) 3.5 - 5.1 mmol/L    Chloride 107 97 - 108 mmol/L    CO2 25 21 - 32 mmol/L    Anion gap 8 5 - 15 mmol/L    Glucose 151 (H) 65 - 100 mg/dL    BUN 31 (H) 6 - 20 mg/dL    Creatinine 1.45 (H) 0.55 - 1.02 mg/dL    BUN/Creatinine ratio 21 (H) 12 - 20      GFR est AA 44 (L) >60 ml/min/1.73m2    GFR est non-AA 36 (L) >60 ml/min/1.73m2    Calcium 8.9 8.5 - 10.1 mg/dL    Bilirubin, total 0.7 0.2 - 1.0 mg/dL    AST (SGOT) 48 (H) 15 - 37 U/L    ALT (SGPT) 29 12 - 78 U/L    Alk.  phosphatase 91 45 - 117 U/L    Protein, total 7.4 6.4 - 8.2 g/dL    Albumin 2.9 (L) 3.5 - 5.0 g/dL    Globulin 4.5 (H) 2.0 - 4.0 g/dL    A-G Ratio 0.6 (L) 1.1 - 2.2     CK W/ REFLX CKMB    Collection Time: 08/02/21  6:40 PM   Result Value Ref Range    .0 (H) 26 - 192 ng/mL   TROPONIN I    Collection Time: 08/02/21  6:40 PM   Result Value Ref Range    Troponin-I, Qt. 0.17 (H) <0.05 ng/mL   CK-MB,QUANT. Collection Time: 08/02/21  6:40 PM   Result Value Ref Range    CK - MB 9.6 (H) <3.6 ng/mL    CK-MB Index 1.3     TROPONIN I    Collection Time: 08/02/21 10:02 PM   Result Value Ref Range    Troponin-I, Qt. 0.20 (H) <0.05 ng/mL       Radiologic Studies:  CT Results  (Last 48 hours)               08/02/21 1748  CT SPINE [facility] WO CONT Final result    Narrative:  CT dose reduction was achieved through use of a standardized protocol tailored   for this examination and automatic exposure control for dose modulation. Protocol study shows mild diffuse DDD. Greater DDD in the visualized cervical   spine. No fracture, subluxation or bone destruction       Disc spaces, spinal canal, facets and foramina are maintained. Adjacent soft   tissues are maintained. Note is made of cardiomegaly and moderate to large   pericardial effusion       08/02/21 1748  CT SPINE LUMB WO CONT Final result    Narrative:  CT dose reduction was achieved through use of a standardized protocol tailored   for this examination and automatic exposure control for dose modulation. Protocol study shows no fracture, subluxation bone destruction. The disc spaces   are maintained       There are mild diffuse disc bulges at L3-4 and L4-5, without lateralization. They do extend into the foramina, symmetric and without stenosis       Advanced facet DJD, mainly on the left at L3-4 and on the right at L4-5. These   do not contribute to foraminal stenosis. There is no adjacent findings       08/02/21 1556  CT HEAD WO CONT Final result    Impression:  1. No acute hemorrhage, acute large vessel distribution infarct. 2. Left basal ganglia lacunar infarct of undetermined acuity. 3. Atrophy and small vessel ischemic disease. Bilateral small focal   encephalomalacia from remote ischemic processes. 4. Right maxillary sinus disease.        Narrative: Altered mental status. No comparison. Technique: Axial images head without IV contrast, with multiplanar reformatting. Multiplanar reformatting. Dose reduction: All CT scans at this facility are performed using dose reduction   optimization techniques as appropriate to a performed exam including the   following: Automated exposure control, adjustments of the mA and/or kV according   to patient's size, or use of iterative reconstruction technique. Findings: Bilateral periventricular white matter hypodensity. Left frontal,   right parietal encephalomalacia from remote ischemia. Left basal ganglia lacunae   of indeterminate acuity. No mass effect, extra-axial fluid collection or   hemorrhage. Normal position craniocervical junction. Atrophy. Bubbly fluid right   maxillary sinus anteriorly. Mastoid air cells are unopacified. Calvarium intact. Bilateral native lenses are absent. CXR Results  (Last 48 hours)               08/02/21 1523  XR CHEST PORT Final result    Impression:  Enlarged cardiac silhouette, increased in size compared to prior study. This may   be due to patient rotation, but pericardial effusion is not excluded. Correlate   clinically. Narrative:  Study: XR CHEST PORT       Clinical indication: chest pain       Comparison: Chest x-ray 2/16/2021. Findings:       Patient slightly rotated. Stable position of left chest wall ICD. Chronic silhouette is enlarged. No   pulmonary edema. No consolidative airspace disease, pleural effusion or pneumothorax.        No acute osseous abnormality                     Medications ordered:  Medications   sodium chloride (NS) flush 5-40 mL (10 mL IntraVENous Given 8/3/21 0018)   sodium chloride (NS) flush 5-40 mL (has no administration in time range)   acetaminophen (TYLENOL) tablet 650 mg (has no administration in time range)     Or   acetaminophen (TYLENOL) suppository 650 mg (has no administration in time range) ondansetron (ZOFRAN ODT) tablet 4 mg (has no administration in time range)     Or   ondansetron (ZOFRAN) injection 4 mg (has no administration in time range)   pantoprazole (PROTONIX) tablet 40 mg (has no administration in time range)   PARoxetine (PAXIL) tablet 20 mg (has no administration in time range)   losartan (COZAAR) tablet 100 mg (has no administration in time range)   nitroglycerin (NITROSTAT) tablet 0.4 mg (has no administration in time range)   apixaban (ELIQUIS) tablet 5 mg (5 mg Oral Given 8/3/21 0015)   alogliptin (NESINA) tablet 12.5 mg (has no administration in time range)   metFORMIN (GLUCOPHAGE) tablet 500 mg (has no administration in time range)   gabapentin (NEURONTIN) capsule 100 mg (100 mg Oral Given 8/3/21 0015)   atorvastatin (LIPITOR) tablet 40 mg (40 mg Oral Given 8/3/21 0015)   glipiZIDE (GLUCOTROL) tablet 5 mg (has no administration in time range)   oxyCODONE IR (ROXICODONE) tablet 5 mg (has no administration in time range)   cyclobenzaprine (FLEXERIL) tablet 5 mg (has no administration in time range)   hydroCHLOROthiazide (HYDRODIURIL) tablet 12.5 mg (has no administration in time range)   amitriptyline (ELAVIL) tablet 75 mg (has no administration in time range)   carvediloL (COREG) tablet 6.25 mg (6.25 mg Oral Given 8/3/21 0015)   insulin lispro (HUMALOG) injection (has no administration in time range)   glucose chewable tablet 16 g (has no administration in time range)   dextrose (D50W) injection syrg 12.5-25 g (has no administration in time range)   glucagon (GLUCAGEN) injection 1 mg (has no administration in time range)   aspirin tablet 325 mg (325 mg Oral Given 8/2/21 2028)   potassium chloride SR (KLOR-CON 10) tablet 40 mEq (40 mEq Oral Given 8/2/21 2028)        ED Course     ED Course:          Reassessment / Disposition Discussion:    X-ray did not show any pneumothorax, hemothorax, pulmonary edema, or fractures. CT did not show any significant bleeds.   CTA did not show any acute large vessel occlusions but . Old infarcts. CT lumbar did not show any acute fractures. Work-up showed NSTEMI with troponin of 0.17. No STEMI on EKG. Repeat troponin 0.2. Hypokalemia was monitored and was replaced. Patient was given aspirin 325 but due to the fact that she is on Eliquis, will hold off heparin for now. Will admit for NSTEMI evaluation. Disposition     Disposition: Admitted to Floor Medical Floor the case was discussed with the admitting physician Dr. Peace Hernandez    Admitted    ADMISSION:  After completion of ED workup and discussion of results and diagnoses with the patient, patient was admitted to the hospital. All the patient's questions were answered. Case was discussed with the receiving team.      Consultations and Procedures:    Performed by: Tariq Polk MD  Procedures     EKG interpretation (Preliminary):  Performed at 20.26, and read at 20.30. Rhythm: atrial fib; and irregular. Rate (approx.): 86. Axis: left axis deviation;  CA interval: normal;  QRS interval: normal ;  ST/T wave: TW flattening in I,II  Other findings: abnormal ekg. Diagnosis     Clinical Impression:   1. NSTEMI (non-ST elevated myocardial infarction) ([address]    2. Chronic atrial fibrillation (HCC)    3. Hypokalemia    4. Weakness        Attestations:    Tariq Polk MD    Please note that this dictation was completed with [employer], the computer voice recognition software. Quite often unanticipated grammatical, syntax, homophones, and other interpretive errors are inadvertently transcribed by the computer software. Please disregard these errors. Please excuse any errors that have escaped final proofreading. Thank you.

## 2021-08-03 NOTE — CONSULTS
ORTHOPEDIC CONSULT    Patient: Dylan Cho MRN: [id number]  SSN: [id number]    YOB: 1955  Age: 78 y.o. Sex: female      Subjective:      Dylan Cho is a 78 y.o. female who is being seen in orthopedic consultation for low back pain and weakness of her lower extremities. The patient was seen with a family friend at bedside who provided the majority of the history. The patient is alert, oriented to person but very lethargic and with slurred speech. The patient states she has had chronic low back pain since 2016. She denies any direct injury to her back. Her friend states that since May of this year she has progressively gotten worse with her back pain and her inability to ambulate after her AICD was placed at [facility].  The friend also states the patient did have Covid in February of this year and he noticed a general decline after that as well. He states the patient had a fall after her AICD was placed in May hurting her low back and right side. That was the only known fall but again he states she has progressively gotten worse since then. She now states she does have pain of her lower back when she is ambulating. She denies any numbness or tingling of her lower extremities. She denies any bowel or bladder incontinence. CT scan of her brain was performed last night and she was found to have a basal ganglia lacunar infarct of undetermined acuity as well as left frontal and right parietal encephalomalacia from remote ischemia. She denies any headaches at this time. She denies any other musculoskeletal complaints at this time.     Past Medical History:   Diagnosis Date    [facility] ([address]     Cardiomyopathy, idiopathic ([address] 10/28/2010    HTN (hypertension), benign 10/28/2010    Pacemaker     ICD    PVC (premature ventricular contraction) 10/28/2010     Past Surgical History:   Procedure Laterality Date    HX OTHER SURGICAL  10/02/2018    Right eye surgery    HX PACEMAKER        History reviewed. No pertinent family history. Social History     Tobacco Use    Smoking status: Never Smoker    Smokeless tobacco: Never Used   Substance Use Topics    Alcohol use: No      Prior to Admission medications    Medication Sig Start Date End Date Taking? Authorizing Provider   oxyCODONE IR (ROXICODONE) 5 mg immediate release tablet Take 5 mg by mouth every four (4) hours as needed for Pain. 7/16/21  Yes Provider, Historical   cyclobenzaprine (FLEXERIL) 5 mg tablet Take 5 mg by mouth two (2) times daily as needed for Muscle Spasm(s). 7/19/21  Yes Provider, Historical   hydroCHLOROthiazide (HYDRODIURIL) 12.5 mg tablet Take 12.5 mg by mouth daily. 5/7/21  Yes Provider, Historical   amitriptyline (ELAVIL) 75 mg tablet Take 75 mg by mouth daily. 5/3/21  Yes Provider, Historical   glipiZIDE (GLUCOTROL) 5 mg tablet Take 5 mg by mouth daily. 4/29/21  Yes Provider, Historical   atorvastatin (LIPITOR) 40 mg tablet Take 1 Tab by mouth nightly. 2/19/21  Yes Mohiuddin, Philomena Paget, MD   furosemide (Lasix) 40 mg tablet 1 tablet daily  Patient taking differently: Take 40 mg by mouth daily. 1 tablet daily 2/19/21  Yes Carolyn Govea MD   metFORMIN (GLUCOPHAGE) 1,000 mg tablet two (2) times daily (with meals). 10/12/20  Yes Provider, Historical   gabapentin (NEURONTIN) 100 mg capsule TAKE 1 CAPSULE BY MOUTH THREE TIMES A DAY 10/27/20  Yes Provider, Historical   SITagliptin (Januvia) 100 mg tablet TAKE 1 TABLET BY MOUTH EVERY DAY  Patient taking differently: Take 100 mg by mouth daily. TAKE 1 TABLET BY MOUTH EVERY DAY 8/2/20  Yes Edwin Amaro MD   metoprolol succinate (TOPROL-XL) 25 mg XL tablet TAKE 0.5 TABS BY MOUTH DAILY. 12/16/19  Yes Alessandra Dale NP   ELIQUIS 5 mg tablet TAKE 1 TABLET BY MOUTH TWICE A DAY 12/5/19  Yes Alessandra Dale NP   cloNIDine HCL (CATAPRES) 0.2 mg tablet Take 0.2 mg by mouth two (2) times a day.  5/21/18  Yes Provider, Historical   losartan (COZAAR) 100 mg tablet Take 100 mg by mouth daily. 4/25/17  Yes Provider, Historical   PARoxetine (PAXIL) 20 mg tablet Take 20 mg by mouth daily. Yes Provider, Historical   prednisoLONE acetate (PRED FORTE) 1 % ophthalmic suspension INSTILL 1 DROP INTO RIGHT EYE 4 TIMES A DAY 3/13/21   Provider, Historical   nitroglycerin (NITROSTAT) 0.4 mg SL tablet 0.4 mg by SubLINGual route every five (5) minutes as needed for Chest Pain. Up to 3 doses. Patient not taking: Reported on 8/3/2021    Provider, Historical   omeprazole (PRILOSEC) 20 mg capsule Take 20 mg by mouth daily. 10/28/10   Provider, Historical       Allergies   Allergen Reactions    Chocolate [Cocoa] Anaphylaxis and Swelling    Iodine Anaphylaxis    Lisinopril Anaphylaxis    Peanut Anaphylaxis and Swelling    Glimepiride Swelling    Glipizide Other (comments)     Pruritis    Metformin Diarrhea    Norvasc [Amlodipine] Other (comments)     Light Headed    Pneumovax 23 [Pneumococcal 23-Devi Ps Vaccine] Hives    Toprol Xl [Metoprolol Succinate] Other (comments)     Light headed    Vitamin D [Cholecalciferol (Vitamin D3)] Other (comments)     Dizziness/headache       Review of Systems:  Review of Systems   Constitutional: Negative. HENT: Negative. Eyes: Negative. Respiratory: Negative. Cardiovascular: Negative. Gastrointestinal: Negative. Genitourinary: Negative. Musculoskeletal: Positive for back pain. Skin: Negative. Neurological: Positive for weakness. Bilateral lower extremities left being worse than right   Endo/Heme/Allergies: Negative. Psychiatric/Behavioral: Negative.           Objective:     Current Facility-Administered Medications   Medication Dose Route Frequency    sodium chloride (NS) flush 5-40 mL  5-40 mL IntraVENous Q8H    sodium chloride (NS) flush 5-40 mL  5-40 mL IntraVENous PRN    acetaminophen (TYLENOL) tablet 650 mg  650 mg Oral Q6H PRN    Or    acetaminophen (TYLENOL) suppository 650 mg  650 mg Rectal Q6H PRN    ondansetron (ZOFRAN ODT) tablet 4 mg  4 mg Oral Q6H PRN    Or    ondansetron (ZOFRAN) injection 4 mg  4 mg IntraVENous Q6H PRN    pantoprazole (PROTONIX) tablet 40 mg  40 mg Oral ACB    PARoxetine (PAXIL) tablet 20 mg  20 mg Oral DAILY    losartan (COZAAR) tablet 100 mg  100 mg Oral DAILY    nitroglycerin (NITROSTAT) tablet 0.4 mg  0.4 mg SubLINGual Q5MIN PRN    apixaban (ELIQUIS) tablet 5 mg  5 mg Oral BID    alogliptin (NESINA) tablet 12.5 mg  12.5 mg Oral DAILY    metFORMIN (GLUCOPHAGE) tablet 500 mg  500 mg Oral BID WITH MEALS    gabapentin (NEURONTIN) capsule 100 mg  100 mg Oral BID    atorvastatin (LIPITOR) tablet 40 mg  40 mg Oral QHS    glipiZIDE (GLUCOTROL) tablet 5 mg  5 mg Oral DAILY    oxyCODONE IR (ROXICODONE) tablet 5 mg  5 mg Oral Q6H PRN    cyclobenzaprine (FLEXERIL) tablet 5 mg  5 mg Oral BID PRN    hydroCHLOROthiazide (HYDRODIURIL) tablet 12.5 mg  12.5 mg Oral DAILY    amitriptyline (ELAVIL) tablet 75 mg  75 mg Oral DAILY    carvediloL (COREG) tablet 6.25 mg  6.25 mg Oral BID WITH MEALS    insulin lispro (HUMALOG) injection   SubCUTAneous AC&HS    glucose chewable tablet 16 g  4 Tablet Oral PRN    dextrose (D50W) injection syrg 12.5-25 g  25-50 mL IntraVENous PRN    glucagon (GLUCAGEN) injection 1 mg  1 mg IntraMUSCular PRN      Vitals:    08/03/21 0620 08/03/21 0748 08/03/21 1103 08/03/21 1555   BP:  (!) 148/110 (!) 146/103 (!) 151/85   Pulse:  88 88 96   Resp:  22 20 18   Temp:  97.7 °F (36.5 °C) 97.5 °F (36.4 °C) 98.3 °F (36.8 °C)   SpO2: 99% 100% 100% 98%   Weight:       Height:            Alert and oriented to person and place, No apparent distress    Physical Exam:  Back Limited exam: There is mild tenderness palpation of the paravertebral muscles between L2-L5. No tenderness palpation paravertebral muscles. Lower extremities: Leg lengths were equal.  No malrotation seen. Sensation to sharp dull touch was intact throughout bilateral lower extremities.   Strength of left lower extremity 2 out of 5. Right lower extremity 4 out of 5. DTRs were delayed throughout bilaterally. EHL/DF/PF right lower extremity 4 out of 5, left lower extremity 2 out of 5. No calf pain to palpation. Negative straight leg raise sign bilaterally. No tenderness to internal/external rotation of her lower extremities. No tenderness to passive flexion of her bilateral knees and hips. DP/PT pulses are palpable. Cap refill is 2 seconds. Bilateral lower extremities appear neurovascularly intact. Labs:  CBC:  Recent Labs     08/02/21  1840   WBC 8.8   RBC 4.47   HGB 12.8   HCT 40.5   MCV 90.6   RDW 15.9*   *     CHEMISTRIES:  Recent Labs     08/02/21  1840      K 3.3*      CO2 25   BUN 31*   CREA 1.45*   CA 8.9   PT/INR:No results for input(s): INR, INREXT in the last 72 hours. No lab exists for component: PROTIME  APTT:No results for input(s): APTT in the last 72 hours. LIVER PROFILE:  Recent Labs     08/02/21  1840   AST 48*   ALT 29       IMAGING:  CT scan taken of her lumbar spine show advanced degenerative disc disease throughout lumbar spine. There is mild disc bulges seen at L3-L5 without significant stenosis. No acute fracture seen. CT scan taken of her thoracic spine shows mild degenerative disc disease. No acute fractures or subluxation. Assessment/Plan:     Hospital Problems  Date Reviewed: 5/12/2021        Codes Class Noted POA    Gait disturbance ICD-10-CM: R26.9  ICD-9-CM: 781.2  8/2/2021 Unknown        Elevated troponin I level ICD-10-CM: R77.8  ICD-9-CM: 790.6  8/2/2021 Unknown            Mild degenerative disc disease thoracic and lumbar spine  Does not appear, based on the imaging studies and clinical exam, that this is her main issue with her left-sided weakness and gait disturbance. Would recommend neurology evaluation for further evaluation of her lethargy, weakness and gait disturbance.   Patient is unable to have an MRI secondary to AICD placement. She can ambulate weightbearing as tolerated with therapy. No acute orthopedic surgical intervention needed at this time. Orthopedics will follow as needed. This patient was examined direct consult with Dr. Ayah Prasad. Thank you for the courtesy of this consult.     Signed By: Benito Zambrano PA-C     August 3, 2021

## 2021-08-03 NOTE — CONSULTS
Cardiology Consult    NAME: Laith Herzog   :  1955   MRN:  [id number]     Date/Time:  8/3/2021 11:36 AM    Patient PCP: Ajay Lomax MD  ________________________________________________________________________     Assessment/Plan:   Troponin leak, no peaking. Without chest pain or shortness of breath. Will obtain EKG. Cardiomyopathy, known severe left ventricular systolic dysfunction, status post ICD.   echo with ejection fraction of 20%. Paroxysmal A. fib, anticoagulated with Eliquis    Dizziness, CT of the brain  with left basal ganglia infarct of unknown acuity, small vessel disease           []        High complexity decision making was performed        Subjective:   CHIEF COMPLAINT:     Dizziness  REASON FOR CONSULT:  Troponin leak    HISTORY OF PRESENT ILLNESS:     Laith Herzog is a 78 y.o. BLACK/ female who has a long history of hypertension, A. fib on Eliquis, cardiomyopathy with severe left ventricular systolic dysfunction status post AICD  and diabetes. Patient does use a walker at home. Does present with complaints of feeling dizzy. This did start more or less last week. Was getting progressively worse. With this she called rescue squad. Continues to feel dizzy when she tries to get out of bed. With troponin leak, denies chest pain or shortness of breath. Hypertension, had been difficult to control, now better controlled.   Diabetes    Past Medical History:   Diagnosis Date    [facility] ([address]     Cardiomyopathy, idiopathic ([address] 10/28/2010    HTN (hypertension), benign 10/28/2010    Pacemaker     ICD    PVC (premature ventricular contraction) 10/28/2010      Past Surgical History:   Procedure Laterality Date    HX OTHER SURGICAL  10/02/2018    Right eye surgery    HX PACEMAKER       Allergies   Allergen Reactions    Chocolate [Cocoa] Anaphylaxis and Swelling    Iodine Anaphylaxis    Lisinopril Anaphylaxis    Peanut Anaphylaxis and Swelling    Glimepiride Swelling    Glipizide Other (comments)     Pruritis    Metformin Diarrhea    Norvasc [Amlodipine] Other (comments)     Light Headed    Pneumovax 23 [Pneumococcal 23-Devi Ps Vaccine] Hives    Toprol Xl [Metoprolol Succinate] Other (comments)     Light headed    Vitamin D [Cholecalciferol (Vitamin D3)] Other (comments)     Dizziness/headache      Meds:  See below  Social History     Tobacco Use    Smoking status: Never Smoker    Smokeless tobacco: Never Used   Substance Use Topics    Alcohol use: No      History reviewed. No pertinent family history. REVIEW OF SYSTEMS:     []         Unable to obtain  ROS due to ---   [x]         Total of 12 systems reviewed as follows:    Constitutional: negative fever, negative chills, negative weight loss  Eyes:   negative visual changes  ENT:   negative sore throat, tongue or lip swelling  Respiratory:  negative cough, negative dyspnea  Cards:  negative for chest pain, palpitations, lower extremity edema  GI:   negative for nausea, vomiting, diarrhea, and abdominal pain  Genitourinary: negative for frequency, dysuria  Integument:  negative for rash   Hematologic:  negative for easy bruising and gum/nose bleeding  Musculoskel: negative for myalgias,  back pain  Neurological:  negative for headaches, dizziness, vertigo, weakness  Behavl/Psych: negative for feelings of anxiety, depression     Pertinent Positives include :    Objective:      Physical Exam:    Last 24hrs VS reviewed since prior progress note. Most recent are:    Visit Vitals  BP (!) 146/103 (BP Patient Position: At rest;Lying)   Pulse 88   Temp 97.5 °F (36.4 °C)   Resp 20   Ht 5' 6\" (1.676 m)   Wt 88 kg (194 lb)   SpO2 100%   BMI 31.31 kg/m²     No intake or output data in the 24 hours ending 08/03/21 1136     General Appearance: Well developed, alert, no acute distress. Ears/Nose/Mouth/Throat: Pupils equal and round, Hearing grossly normal.  Neck: Supple.   JVP within normal limits. Carotids good upstrokes, with no bruit. Chest: Lungs clear to auscultation bilaterally. Cardiovascular: Regular rate and rhythm, S1S2 normal, no murmur, rubs, gallops. Abdomen: Soft, non-tender, bowel sounds are active. No organomegaly. Extremities: No edema bilaterally. Femoral pulses +2, Distal Pulses +1. Skin: Warm and dry. Neuro: Alert , normal speech; follows simple commands  Psychiatric: Cooperative, normal affect and judgment    []         Post-cath site without hematoma, bruit, tenderness, or thrill. Distal pulses intact. Data:      Telemetry:    EKG:  []  No new EKG for review. Prior to Admission medications    Medication Sig Start Date End Date Taking? Authorizing Provider   oxyCODONE IR (ROXICODONE) 5 mg immediate release tablet Take 5 mg by mouth every four (4) hours as needed for Pain. 7/16/21  Yes Provider, Historical   cyclobenzaprine (FLEXERIL) 5 mg tablet Take 5 mg by mouth two (2) times daily as needed for Muscle Spasm(s). 7/19/21  Yes Provider, Historical   hydroCHLOROthiazide (HYDRODIURIL) 12.5 mg tablet Take 12.5 mg by mouth daily. 5/7/21  Yes Provider, Historical   amitriptyline (ELAVIL) 75 mg tablet Take 75 mg by mouth daily. 5/3/21  Yes Provider, Historical   glipiZIDE (GLUCOTROL) 5 mg tablet Take 5 mg by mouth daily. 4/29/21  Yes Provider, Historical   atorvastatin (LIPITOR) 40 mg tablet Take 1 Tab by mouth nightly. 2/19/21  Yes Arthur Dumont MD   furosemide (Lasix) 40 mg tablet 1 tablet daily  Patient taking differently: Take 40 mg by mouth daily. 1 tablet daily 2/19/21  Yes Benjamin Spencer MD   metFORMIN (GLUCOPHAGE) 1,000 mg tablet two (2) times daily (with meals). 10/12/20  Yes Provider, Historical   gabapentin (NEURONTIN) 100 mg capsule TAKE 1 CAPSULE BY MOUTH THREE TIMES A DAY 10/27/20  Yes Provider, Historical   SITagliptin (Januvia) 100 mg tablet TAKE 1 TABLET BY MOUTH EVERY DAY  Patient taking differently: Take 100 mg by mouth daily.  TAKE 1 TABLET BY MOUTH EVERY DAY 8/2/20  Yes Duane Crumble, MD   metoprolol succinate (TOPROL-XL) 25 mg XL tablet TAKE 0.5 TABS BY MOUTH DAILY. 12/16/19  Yes Ada Sickle, NP   ELIQUIS 5 mg tablet TAKE 1 TABLET BY MOUTH TWICE A DAY 12/5/19  Yes Ada Sickle, NP   cloNIDine HCL (CATAPRES) 0.2 mg tablet Take 0.2 mg by mouth two (2) times a day. 5/21/18  Yes Provider, Historical   losartan (COZAAR) 100 mg tablet Take 100 mg by mouth daily. 4/25/17  Yes Provider, Historical   PARoxetine (PAXIL) 20 mg tablet Take 20 mg by mouth daily. Yes Provider, Historical   prednisoLONE acetate (PRED FORTE) 1 % ophthalmic suspension INSTILL 1 DROP INTO RIGHT EYE 4 TIMES A DAY 3/13/21   Provider, Historical   nitroglycerin (NITROSTAT) 0.4 mg SL tablet 0.4 mg by SubLINGual route every five (5) minutes as needed for Chest Pain. Up to 3 doses. Patient not taking: Reported on 8/3/2021    Provider, Historical   omeprazole (PRILOSEC) 20 mg capsule Take 20 mg by mouth daily. 10/28/10   Provider, Historical       Recent Results (from the past 24 hour(s))   CBC WITH AUTOMATED DIFF    Collection Time: 08/02/21  6:40 PM   Result Value Ref Range    WBC 8.8 3.6 - 11.0 K/uL    RBC 4.47 3.80 - 5.20 M/uL    HGB 12.8 11.5 - 16.0 g/dL    HCT 40.5 35.0 - 47.0 %    MCV 90.6 80.0 - 99.0 FL    MCH 28.6 26.0 - 34.0 PG    MCHC 31.6 30.0 - 36.5 g/dL    RDW 15.9 (H) 11.5 - 14.5 %    PLATELET 454 (L) 112 - 400 K/uL    MPV 13.1 (H) 8.9 - 12.9 FL    NRBC 0.0 0.0  WBC    ABSOLUTE NRBC 0.00 0.00 - 0.01 K/uL    NEUTROPHILS 60 32 - 75 %    LYMPHOCYTES 29 12 - 49 %    MONOCYTES 9 5 - 13 %    EOSINOPHILS 2 0 - 7 %    BASOPHILS 0 0 - 1 %    IMMATURE GRANULOCYTES 0 0 - 0.5 %    ABS. NEUTROPHILS 5.3 1.8 - 8.0 K/UL    ABS. LYMPHOCYTES 2.6 0.8 - 3.5 K/UL    ABS. MONOCYTES 0.8 0.0 - 1.0 K/UL    ABS. EOSINOPHILS 0.1 0.0 - 0.4 K/UL    ABS. BASOPHILS 0.0 0.0 - 0.1 K/UL    ABS. IMM.  GRANS. 0.0 0.00 - 0.04 K/UL    DF AUTOMATED     METABOLIC PANEL, COMPREHENSIVE Collection Time: 08/02/21  6:40 PM   Result Value Ref Range    Sodium 140 136 - 145 mmol/L    Potassium 3.3 (L) 3.5 - 5.1 mmol/L    Chloride 107 97 - 108 mmol/L    CO2 25 21 - 32 mmol/L    Anion gap 8 5 - 15 mmol/L    Glucose 151 (H) 65 - 100 mg/dL    BUN 31 (H) 6 - 20 mg/dL    Creatinine 1.45 (H) 0.55 - 1.02 mg/dL    BUN/Creatinine ratio 21 (H) 12 - 20      GFR est AA 44 (L) >60 ml/min/1.73m2    GFR est non-AA 36 (L) >60 ml/min/1.73m2    Calcium 8.9 8.5 - 10.1 mg/dL    Bilirubin, total 0.7 0.2 - 1.0 mg/dL    AST (SGOT) 48 (H) 15 - 37 U/L    ALT (SGPT) 29 12 - 78 U/L    Alk. phosphatase 91 45 - 117 U/L    Protein, total 7.4 6.4 - 8.2 g/dL    Albumin 2.9 (L) 3.5 - 5.0 g/dL    Globulin 4.5 (H) 2.0 - 4.0 g/dL    A-G Ratio 0.6 (L) 1.1 - 2.2     CK W/ REFLX CKMB    Collection Time: 08/02/21  6:40 PM   Result Value Ref Range    .0 (H) 26 - 192 ng/mL   TROPONIN I    Collection Time: 08/02/21  6:40 PM   Result Value Ref Range    Troponin-I, Qt. 0.17 (H) <0.05 ng/mL   CK-MB,QUANT.     Collection Time: 08/02/21  6:40 PM   Result Value Ref Range    CK - MB 9.6 (H) <3.6 ng/mL    CK-MB Index 1.3     TROPONIN I    Collection Time: 08/02/21 10:02 PM   Result Value Ref Range    Troponin-I, Qt. 0.20 (H) <0.05 ng/mL   GLUCOSE, POC    Collection Time: 08/03/21  8:25 AM   Result Value Ref Range    Glucose (POC) 178 (H) 65 - 117 mg/dL    Performed by [address] [place] [address], POC    Collection Time: 08/03/21 11:07 AM   Result Value Ref Range    Glucose (POC) 191 (H) 65 - 117 mg/dL    Performed by Niels Souza MD

## 2021-08-04 NOTE — ROUTINE PROCESS
Dual skin assessment performed with Zaria Zimmerman RN. Skin clean, dry and intact. No skin abnormalities noted.

## 2021-08-04 NOTE — ACP (ADVANCE CARE PLANNING)
The purpose of the was to do an AMD on the patient. The patient was being visited by her family. However after speaking with her nurse and the family it was determined that the patient would not be able to complete the document. The  shared information with her family concerning the importance of the document. As well as answered questions regarding the specifics of the document. [address] [facility] Marlen Bush can be reached by calling the  at [facility]  [phone]

## 2021-08-04 NOTE — PROGRESS NOTES
Hospitalist Progress Note               Daily Progress Note: 8/4/2021      Subjective: The patient is seen for follow up. Patient is a 59-year-old female with history of hypertension, atrial fibrillation and hyperlipidemia who presented to the ED last night with complaints of severe lower extremity weakness. Patient had COVID-19 in February and since then has been very weak. She apparently had a fall after an AICD was placed at [facility] in May and this is caused more problems with ambulation. Per ED physician report patient had acute worsening of bilateral leg weakness just 2 days ago. The admitting physician felt like she had some right leg pain and weakness as opposed to the left and has requested orthopedics evaluation. Cardiology consultation was also requested. CT of the brain obtained last night showed a left basal ganglia lacunar infarct of undetermined acuity although certainly could account for some right leg weakness. In addition there was a left frontal and right parietal encephalomalacia from remote ischemia. When I questioned the patient, she states she feels like her left leg is weaker than the right. She told me her symptoms all started yesterday so there is clearly a lot of variation in her history depending on when she talks with    Patient does admit  to chronic back problems    CT of the lumbar spine showed advanced facet DJD but no foraminal stenosis, no spinal stenosis    PT recommending SNF versus home health with 24-hour care    --------    Dr. Patito Mcduffie spoke with Dr. Isidro Magaña (Neuro), who examined the patient. Dr. Isidro Magaña agrees with head CT findings -- age-indeterminate hypoattenuation of left caudate. He will order a repeat study to observe for any lesion changes and recommends that patient be seen by PT/OT. If head CT results show no new changes, patient can be discharged home with Neuro follow-up.    ---------    Spoke with patient, who appears more lucid today.   Per patient's daughter, patient markedly improved after being fed and hydrated. Daughter is concerned that patient is unable to adequately feed and hydrate herself. Patient does not have any new or additional complaints at this time. Per daughter, patient was admitted to [facility] 6 months ago for COVID-19 and has steadily declined since then. Patient has not been vaccinated for COVID-19. Updated the patient and daughter on current findings and plan. Awaiting PT re-evaluation and repeat head CT.       Problem List:  Problem List as of 8/4/2021 Date Reviewed: 5/12/2021        Codes Class Noted - Resolved    Gait disturbance ICD-10-CM: R26.9  ICD-9-CM: 781.2  8/2/2021 - Present        Elevated troponin I level ICD-10-CM: R77.8  ICD-9-CM: 790.6  8/2/2021 - Present        Shortness of breath ICD-10-CM: R06.02  ICD-9-CM: 786.05  2/13/2021 - Present        Fever ICD-10-CM: R50.9  ICD-9-CM: 780.60  2/13/2021 - Present        Acute CHF (congestive heart failure) ([facility] ICD-10-CM: I50.9  ICD-9-CM: 428.0  2/13/2021 - Present        COVID-19 virus infection ICD-10-CM: U07.1  ICD-9-CM: 079.89  2/13/2021 - Present        Uncontrolled type 2 diabetes mellitus with hyperglycemia ([facility] ICD-10-CM: E11.65  ICD-9-CM: 250.02  10/8/2018 - Present        Mixed hyperlipidemia ICD-10-CM: E78.2  ICD-9-CM: 272.2  10/8/2018 - Present        Implantable cardioverter-defibrillator (ICD) in situ ICD-10-CM: Z95.810  ICD-9-CM: V45.02  11/16/2010 - Present        Cardiomyopathy, idiopathic ([facility] ICD-10-CM: I42.8  ICD-9-CM: 425.4  10/28/2010 - Present        HTN (hypertension), benign ICD-10-CM: I10  ICD-9-CM: 401.1  10/28/2010 - Present        PVC (premature ventricular contraction) ICD-10-CM: I49.3  ICD-9-CM: 427.69  10/28/2010 - Present        RESOLVED: CHF (congestive heart failure) ([facility] ICD-10-CM: I50.9  ICD-9-CM: 428.0  2/13/2021 - 8/3/2021              Medications reviewed  Current Facility-Administered Medications   Medication Dose Route Frequency    sodium chloride (NS) flush 5-40 mL  5-40 mL IntraVENous Q8H    sodium chloride (NS) flush 5-40 mL  5-40 mL IntraVENous PRN    acetaminophen (TYLENOL) tablet 650 mg  650 mg Oral Q6H PRN    Or    acetaminophen (TYLENOL) suppository 650 mg  650 mg Rectal Q6H PRN    ondansetron (ZOFRAN ODT) tablet 4 mg  4 mg Oral Q6H PRN    Or    ondansetron (ZOFRAN) injection 4 mg  4 mg IntraVENous Q6H PRN    pantoprazole (PROTONIX) tablet 40 mg  40 mg Oral ACB    PARoxetine (PAXIL) tablet 20 mg  20 mg Oral DAILY    losartan (COZAAR) tablet 100 mg  100 mg Oral DAILY    nitroglycerin (NITROSTAT) tablet 0.4 mg  0.4 mg SubLINGual Q5MIN PRN    apixaban (ELIQUIS) tablet 5 mg  5 mg Oral BID    alogliptin (NESINA) tablet 12.5 mg  12.5 mg Oral DAILY    metFORMIN (GLUCOPHAGE) tablet 500 mg  500 mg Oral BID WITH MEALS    gabapentin (NEURONTIN) capsule 100 mg  100 mg Oral BID    atorvastatin (LIPITOR) tablet 40 mg  40 mg Oral QHS    glipiZIDE (GLUCOTROL) tablet 5 mg  5 mg Oral DAILY    oxyCODONE IR (ROXICODONE) tablet 5 mg  5 mg Oral Q6H PRN    cyclobenzaprine (FLEXERIL) tablet 5 mg  5 mg Oral BID PRN    hydroCHLOROthiazide (HYDRODIURIL) tablet 12.5 mg  12.5 mg Oral DAILY    amitriptyline (ELAVIL) tablet 75 mg  75 mg Oral DAILY    carvediloL (COREG) tablet 6.25 mg  6.25 mg Oral BID WITH MEALS    insulin lispro (HUMALOG) injection   SubCUTAneous AC&HS    glucose chewable tablet 16 g  4 Tablet Oral PRN    dextrose (D50W) injection syrg 12.5-25 g  25-50 mL IntraVENous PRN    glucagon (GLUCAGEN) injection 1 mg  1 mg IntraMUSCular PRN       Review of Systems:   A comprehensive review of systems was negative except for that written in the HPI.     Objective:   Physical Exam:     Visit Vitals  BP (!) 158/110 (BP 1 Location: Right upper arm, BP Patient Position: At rest;Supine)   Pulse 100   Temp 97.7 °F (36.5 °C)   Resp 18   Ht 5' 6\" (1.676 m)   Wt 88 kg (194 lb)   SpO2 98%   BMI 31.31 kg/m²    O2 Flow Rate (L/min): 3 l/min O2 Device: None (Room air)    Temp (24hrs), Av °F (36.7 °C), Min:97.7 °F (36.5 °C), Max:98.3 °F (36.8 °C)    No intake/output data recorded. No intake/output data recorded. General:   Awake but fell asleep several times during my talk with her   Lungs:   Clear to auscultation bilaterally. Chest wall:  No tenderness or deformity. Heart:  Regular rate and rhythm, S1, S2 normal, no murmur, click, rub or gallop. Abdomen:   Soft, non-tender. Bowel sounds normal. No masses,  No organomegaly. Extremities: Extremities normal, atraumatic, no cyanosis or edema. Pulses: 2+ and symmetric all extremities. Skin: Skin color, texture, turgor normal. No rashes or lesions   Neurologic: CNII-XII intact. Left leg strength 4/5, right leg strength 4+/5. Upper extremity strength is symmetric and intact         Data Review:       Recent Days:  Recent Labs     21  1840   WBC 8.8   HGB 12.8   HCT 40.5   *     Recent Labs     21  1840      K 3.3*      CO2 25   *   BUN 31*   CREA 1.45*   CA 8.9   ALB 2.9*   TBILI 0.7   ALT 29     No results for input(s): PH, PCO2, PO2, HCO3, FIO2 in the last 72 hours.     24 Hour Results:  Recent Results (from the past 24 hour(s))   TROPONIN I    Collection Time: 21 11:59 AM   Result Value Ref Range    Troponin-I, Qt. 0.20 (H) <0.05 ng/mL   EKG, 12 LEAD, SUBSEQUENT    Collection Time: 21 12:18 PM   Result Value Ref Range    Ventricular Rate 83 BPM    Atrial Rate 83 BPM    P-R Interval 188 ms    QRS Duration 116 ms    Q-T Interval 450 ms    QTC Calculation (Bezet) 528 ms    Calculated P Axis 56 degrees    Calculated R Axis -26 degrees    Calculated T Axis 63 degrees    Diagnosis       Sinus rhythm with marked sinus arrhythmia  Left ventricular hypertrophy with QRS widening  Inferior infarct , age undetermined  Prolonged QT  Abnormal ECG  When compared with ECG of 02-AUG-2021 20:27, (Unconfirmed)  Sinus rhythm has replaced [facility]  Confirmed by Mariam Hernandez ([id number]) on 8/3/2021 12:40:36 PM     GLUCOSE, POC    Collection Time: 08/03/21  3:51 PM   Result Value Ref Range    Glucose (POC) 113 65 - 117 mg/dL    Performed by [address], POC    Collection Time: 08/03/21  7:30 PM   Result Value Ref Range    Glucose (POC) 86 65 - 117 mg/dL    Performed by Sendy Wood    GLUCOSE, POC    Collection Time: 08/04/21  7:28 AM   Result Value Ref Range    Glucose (POC) 153 (H) 65 - 117 mg/dL    Performed by JOSE DE JESUS HARTMAN        CT SPINE THORAC WO CONT   Final Result      CT SPINE LUMB WO CONT   Final Result      CT HEAD WO CONT   Final Result   1. No acute hemorrhage, acute large vessel distribution infarct. 2. Left basal ganglia lacunar infarct of undetermined acuity. 3. Atrophy and small vessel ischemic disease. Bilateral small focal   encephalomalacia from remote ischemic processes. 4. Right maxillary sinus disease. XR CHEST PORT   Final Result   Enlarged cardiac silhouette, increased in size compared to prior study. This may   be due to patient rotation, but pericardial effusion is not excluded. Correlate   clinically. CT HEAD WO CONT    (Results Pending)        Assessment:  Bilateral leg weakness with ambulatory dysfunction, questionable more pronounced weakness left leg    Left basal ganglia lacunar infarct of indeterminate age. Would not explain left leg weakness    Chronic back pain    Acute kidney injury, creatinine 1.45 with baseline 1.09 in February    Indeterminate troponin elevation    Paroxysmal atrial fibrillation     Chronic systolic heart failure, EF 20% in February    AICD in situ    Plan:  Spoke with neurology who will order repeat head CT. Awaiting updated PT assessment. Continue Eliquis and other current medications  Ideally, would like to obtain MRI brain but precluded by her AICD    Will probably need SNF    Care Plan discussed with: Patient/Family    Disposition:  To be determined    Total time spent with patient: 30 minutes.     Mitali Brock MD

## 2021-08-04 NOTE — PROGRESS NOTES
NEUROLOGY  PROGRESS NOTE    Admission History/Pertinent Events  Sydni Lacy is a 78y.o. year old female who presented on 8/2/2021. Patient has a past medical history of Afib (Apixaban), Cardiomyopathy s/p Pacemaker/AICD, HTN, HL who presented with BLE weakness. Per chart review, patient had COVID-19 in February and then went to [facility] in May to get her AICD after which patient has been having difficulty with strength in the lower extremities making it difficult for her to walk as well as patient suffering from pain in the lower extremities. Patient reportedly has been evaluated by other physicians without finding an etiology to the symptoms. Emergent CT head revealed left basal ganglier ischemia of indeterminate acuity. CT of the lumbar spine revealed some degenerative changes from L3-L5 but no significant central canal or neuroforaminal stenosis. Patient had elevated BUN of 31, elevated creatinine of 1.45 and slightly elevated troponin of 0.17 which was later a little increased to 0.20. Home Stroke Meds: Apixaban 5 BID, Atorvastatin 40      ASSESSMENT/PLAN      Impression  Reviewed patient's CT of the head which does not reveal any interval changes in the left caudate lesion that would be more consistent with a subacute/evolving infarct. We will have patient continue on anticoagulation and statin therapy for neurovascular prophylaxis. Patient to work with PT/OT and follow-up with neurology on an outpatient basis for likely peripheral neuropathy.       CTH WO  NAICA  Left putaminal hypoattenuation likely reflecting ischemia that is of indeterminate chronicity   Hypodensities in the left frontal and right inferior parietal region likely reflecting remote infarcts    CT WO (8/4 Repeat)  No interval changes      Plan      Remote Right Frontal, Left Caudate, Left Inferior Parietal Ischemia  BLE Weakness & Pain, Possibly d/t Peripheral Neuropathy  Associated: ADELINA, Elevated Troponins  -Stroke Prophylaxis: Apixaban 5 BID, Atrovastatin 40   -SBP Goal < 160  -Glucose Goal < 180  -Head of Bed at 30 degrees  -PT/OT/ST as needed  -Management of metabolic/infectious derangements to referring teams  -Patient cannot get MRI of the brain due to AICD in place  -Plan for outpatient NCS/EMG of the BLE to evaluate for evidence of neuropathy    Please contact neurology with any further questions/concerns    Patient to follow-up with neurology in 2 weeks from discharge        SUBJECTIVE   No significant changes on neurological examination. Physical/Neurological Exam  Patient awake, alert; following central and peripheral commands   No expressive or receptive aphasia;  No dysarthria   Pupils react to light bilaterally; EOM Intact   No visual field deficits on gross exam   No facial droop   Motor: 4+/5 in the bilateral upper extremities, 4-5 in the bilateral lower extremities  Decreased sensation to light touch in a length dependent manner in the lower extremities  No spinal level on examination  Reflexes: Absent in the bilateral ankles and bilateral knees, trace in bilateral brachioradialis      OBJECTIVE  Vital Signs  Temp:  [97.5 °F (36.4 °C)-98.8 °F (37.1 °C)]   Pulse (Heart Rate):  []   BP: (145-176)/()   Resp Rate:  [18-22]   O2 Sat (%):  [97 %-100 %]   Weight:  [88 kg (194 lb)]     MEDICATIONS    Current Facility-Administered Medications:     sodium chloride (NS) flush 5-40 mL, 5-40 mL, IntraVENous, Q8H, Lupe Arellano MD, 10 mL at 08/03/21 2216    sodium chloride (NS) flush 5-40 mL, 5-40 mL, IntraVENous, PRN, Lupe Arellano MD    acetaminophen (TYLENOL) tablet 650 mg, 650 mg, Oral, Q6H PRN **OR** acetaminophen (TYLENOL) suppository 650 mg, 650 mg, Rectal, Q6H PRN, Lupe Arellano MD    ondansetron (ZOFRAN ODT) tablet 4 mg, 4 mg, Oral, Q6H PRN **OR** ondansetron (ZOFRAN) injection 4 mg, 4 mg, IntraVENous, Q6H PRN, Lupe Arellano MD    pantoprazole (PROTONIX) tablet 40 mg, 40 mg, Oral, ACB, Yani Griggs MD, 40 mg at 08/03/21 9190    PARoxetine (PAXIL) tablet 20 mg, 20 mg, Oral, DAILY, Yani Griggs MD, 20 mg at 08/03/21 2354    losartan (COZAAR) tablet 100 mg, 100 mg, Oral, DAILY, Yani Griggs MD, 100 mg at 08/03/21 4998    nitroglycerin (NITROSTAT) tablet 0.4 mg, 0.4 mg, SubLINGual, Q5MIN PRN, Yani Griggs MD    apixaban (ELIQUIS) tablet 5 mg, 5 mg, Oral, BID, Yani Griggs MD, 5 mg at 08/03/21 2216    alogliptin (NESINA) tablet 12.5 mg, 12.5 mg, Oral, DAILY, Yani Griggs MD, 12.5 mg at 08/03/21 0920    metFORMIN (GLUCOPHAGE) tablet 500 mg, 500 mg, Oral, BID WITH MEALS, Yani Griggs MD    gabapentin (NEURONTIN) capsule 100 mg, 100 mg, Oral, BID, Yani Griggs MD, 100 mg at 08/03/21 2215    atorvastatin (LIPITOR) tablet 40 mg, 40 mg, Oral, QHS, Yani Griggs MD, 40 mg at 08/03/21 2216    glipiZIDE (GLUCOTROL) tablet 5 mg, 5 mg, Oral, DAILY, Yani Griggs MD, 5 mg at 08/03/21 3343    oxyCODONE IR (ROXICODONE) tablet 5 mg, 5 mg, Oral, Q6H PRN, Yani Griggs MD    cyclobenzaprine (FLEXERIL) tablet 5 mg, 5 mg, Oral, BID PRN, Yani Griggs MD    hydroCHLOROthiazide (HYDRODIURIL) tablet 12.5 mg, 12.5 mg, Oral, DAILY, Yani Griggs MD, 12.5 mg at 08/03/21 0915    amitriptyline (ELAVIL) tablet 75 mg, 75 mg, Oral, DAILY, Yani Griggs MD, 75 mg at 08/03/21 0916    carvediloL (COREG) tablet 6.25 mg, 6.25 mg, Oral, BID WITH MEALS, Yani Griggs MD, 6.25 mg at 08/03/21 1607    insulin lispro (HUMALOG) injection, , SubCUTAneous, AC&HS, Yani Griggs MD, 2 Units at 08/03/21 1231    glucose chewable tablet 16 g, 4 Tablet, Oral, PRN, Yani Griggs MD    dextrose (D50W) injection syrg 12.5-25 g, 25-50 mL, IntraVENous, PRN, Yani Griggs MD    glucagon (GLUCAGEN) injection 1 mg, 1 mg, IntraMUSCular, PRN, Doretha Cornell MD      Labs: I've reviewed the labs for today     This document has been prepared by the Dragon voice recognition system, typographical errors may have occurred.  Attempts have been made to correct errors, however inadvertent errors may persist.

## 2021-08-04 NOTE — PROGRESS NOTES
CM met with patient and daughter in room to discuss DCP, patient recc for SNF vs HH with 24/7. Patient currently does not have assistance at home as the daughter reports tht patient was not being cared for properly at home. CM explained the difference between SNF and HH and the services they offer, the family requested time to discuss plan with patient before giving choice. SNF choice list provided for family to review, CM to follow up at a later time. 1:50 PM - CM attempted to follow up with patient, patient not in room at this time, CM contacted patient's daughter, Britni Turner- [phone]. She reported that they were not able to discuss DCP as many family members came into room shortly after discussion with CM, however she was on her way back to the hospital to speak with patient. CM provided MsJenifer Dago Dwight with CM number and requested that they contact CM once discussion has taken place.

## 2021-08-04 NOTE — PROGRESS NOTES
OCCUPATIONAL THERAPY EVALUATION  Patient: Tracy Padilla (80 y.o. female)  Date: 8/4/2021  Primary Diagnosis: Gait disturbance [R26.9]  Elevated troponin I level [R77.8]        Precautions: CVA?, Fall    ASSESSMENT  Patient is a 78year old female, who came to the ED with c/o severe LE weakness with inability to walk with pain in R hip and admitted for gait disturbance, elevated troponin level on 8/2/2021. Per Ortho PA Anisha Gibbs's note on 8/3/21, pt has mild degenerative disc disease of thoracic and lumbar spine and based on imaging studies and clinical exam, this does not appear to be the issue with pt's L sided weakness and gait disturbance and recommended neurology evaluation. CT of head on 8/4/21 found no acute hemorrhage, acute large vessel distribution infarct, L basal ganglia lacunar infarct of undetermined acuity, atrophy and small vessel ischemic disease, bilateral small focal encephalomalacia from remote ischemic processes. Per medical chart pt unable to have MRI due to AICD placement. Per Dr. Patricio Huynh note on 8/4/21, pt will have repeat CT of head to evaluate for any interval evolving ischemia in the L caudate. PMH includes: a-fib, idiopathic cardiomyopathy, HTN, pacemaker, PVC. Based on the objective data described below, the patient presents with impaired sitting balance, generalized weakness, confusion, decreased activity tolerance and increased need for A with self care and functional mobility/transfers. Patient semi-supine wearing 3L O2 NC upon OT/OTS arrival with 4 family members present in room with patient permission and confused yet agreeable to working with therapy. Patient A&O self and year only, disoriented to place and situation, asking 'where are we?' to a family member when asked if she knew where we were. Per pt report with confirmation from family members, pt lives with her brother in a [facility] with 5 CAROL and bilateral handrails.  Patient reports being mostly IND with ADLs/IADLs and used a cane for mobility PTA. Family reported 2 recent falls at home within the last 3 months. Patient states she does not wear O2 at home. Per CM note, pt's daughter reports that the patient is not being properly cared for at home. Patient max A x2 rolling, supine <> sit, max A scooting for bed mobility. Patient demonstrated significantly impaired sitting balance with posterior lean and decreased command following. Pt tolerated approx 1 minute sitting before demonstrating fatigue and heavy posterior lean, therefore Patient returned to semi-supine max A x2, unable to progress to standing today. Patient chux noted to be soiled, changed out chux and bed linens, pt total A bladder hygiene. Patient would benefit from continued skilled OT services to address above deficits and improve safety and independence with self care and functional mobility/transfers. Recommend discharge to SNF when medically appropriate. Current Level of Function Impacting Discharge (ADLs/self-care): total A bladder hygiene/toileting assistance. Other factors to consider for discharge: PLOF, family support, DME, severity of deficits     PLAN :  Recommendations and Planned Interventions: self care training, functional mobility training, therapeutic exercise, balance training, therapeutic activities, endurance activities and patient education    Frequency/Duration: Patient will be followed by occupational therapy 5 times a week to address goals. Recommendation for discharge: (in order for the patient to meet his/her long term goals)  SNF    This discharge recommendation:  Has been made in collaboration with the attending provider and/or case management    IF patient discharges home will need the following DME: TBD       SUBJECTIVE:   Patient stated Vlad Benjaminas are we?  to family member when asked where we were during A&O questioning.     OBJECTIVE DATA SUMMARY:   HISTORY:   Past Medical History:   Diagnosis Date    [facility] ([facility] Cardiomyopathy, idiopathic ([facility] 10/28/2010    HTN (hypertension), benign 10/28/2010    Pacemaker     ICD    PVC (premature ventricular contraction) 10/28/2010     Past Surgical History:   Procedure Laterality Date    HX OTHER SURGICAL  10/02/2018    Right eye surgery    HX PACEMAKER         Expanded or extensive additional review of patient history:     Home Situation  Home Environment: Private residence  # Steps to Enter: 5  Rails to Enter: Yes  Hand Rails : Bilateral  One/Two Story Residence: One story  Living Alone: No  Support Systems: Family member(s), Child(elvis) (Lives with brother)  Patient Expects to be Discharged to[de-identified] Skilled nursing facility  Current DME Used/Available at Home: Cane, straight  Tub or Shower Type: Tub/Shower combination    PLOF: Pt IND for ADLS/IADLS, mod I with mobility prior to admission. Pt poor historian, family in room confirms pt was mostly IND PTA. Hand dominance: Right    EXAMINATION OF PERFORMANCE DEFICITS:  Cognitive/Behavioral Status:  Neurologic State: Confused;Drowsy  Orientation Level: Oriented to person; Other (Comment); Disoriented to place; Disoriented to situation (Oriented to year)  Cognition: Decreased command following;Poor safety awareness;Decreased attention/concentration    Skin: intact where visible    Hearing: Auditory  Auditory Impairment: None    Vision/Perceptual:    Not formally assessed. Range of Motion:  AROM: Generally decreased, functional    Strength:  Strength: Generally decreased, functional     RUE Strength  Observation: Grossly observed 3+/5     LUE Strength  Observation: Grossly observed 3+/5    Tone & Sensation:  Tone: Normal    Balance:  Sitting: Impaired; With support  Sitting - Static: Poor (constant support)  Sitting - Dynamic: Poor (constant support)    Functional Mobility and Transfers for ADLs:  Bed Mobility:  Rolling: Maximum assistance;Assist x2  Supine to Sit: Maximum assistance;Assist x2  Sit to Supine: Maximum assistance;Assist x2  Scooting: Maximum assistance    ADL Intervention and task modifications: Toileting  Bladder Hygiene: Total assistance (dependent)    Therapeutic Exercise:  Patient may benefit from UE HEP, initiate at next session as able. Functional Measure:    [employer] AM-PACTM \"6 Clicks\"                                                       Daily Activity Inpatient Short Form  How much help from another person does the patient currently need. .. Total; A Lot A Little None   1. Putting on and taking off regular lower body clothing? [x]  1 []  2 []  3 []  4   2. Bathing (including washing, rinsing, drying)? [x]  1 []  2 []  3 []  4   3. Toileting, which includes using toilet, bedpan or urinal? [x] 1 []  2 []  3 []  4   4. Putting on and taking off regular upper body clothing? []  1 [x]  2 []  3 []  4   5. Taking care of personal grooming such as brushing teeth? []  1 [x]  2 []  3 []  4   6. Eating meals? []  1 [x]  2 []  3 []  4   © 2007, Trustees of [facility] [place] [employer], under license to [employer] All rights reserved     Score: 9/24     Interpretation of Tool:  Represents clinically-significant functional categories (i.e. Activities of daily living). Percentage of Impairment CH    0%   CI    1-19% CJ    20-39% CK    40-59% CL    60-79% CM    80-99% CN     100%   [facility]  Score 6-24 24 23 20-22 15-19 10-14 7-9 6        Occupational Therapy Evaluation Charge Determination   History Examination Decision-Making   LOW Complexity : Brief history review  MEDIUM Complexity : 3-5 performance deficits relating to physical, cognitive , or psychosocial skils that result in activity limitations and / or participation restrictions MEDIUM Complexity : Patient may present with comorbidities that affect occupational performnce.  Miniml to moderate modification of tasks or assistance (eg, physical or verbal ) with assesment(s) is necessary to enable patient to complete evaluation       Based on the above components, the patient evaluation is determined to be of the following complexity level: LOW     Pain Ratin/10    Activity Tolerance:   Poor    After treatment patient left in no apparent distress:    Supine in bed, Call bell within reach, Bed / chair alarm activated, Caregiver / family present and Side rails x 3    COMMUNICATION/EDUCATION:   The patients plan of care was discussed with: Registered nurse. Patient/family have participated as able in goal setting and plan of care. This patients plan of care is appropriate for delegation to PAOLA. Problem: Self Care Deficits Care Plan (Adult)  Goal: *Acute Goals and Plan of Care (Insert Text)  Description: Pt will be CGA sup <> sit in prep for EOB ADLs  Pt will be CGA grooming sitting EOB  Pt will be min A LE dressing sitting EOB/long sit  Pt will be CGA sit <>  prep for toileting LRAD  Pt will be CGA toileting/toilet transfer/cloth mgmt LRAD  Pt will be SPV following UE HEP in prep for self care tasks   Outcome: Not Met     Patient was seen by OT student, Preeti Edwards, under direct supervision of supervising OT, Eda Jane. Supervising OT has reviewed documentation for accuracy and co-signed report.     Thank you for this referral.  STACY Dixon  Time Calculation: 31 mins

## 2021-08-04 NOTE — PROGRESS NOTES
Hospitalist Progress Note               Daily Progress Note: 8/4/2021      Subjective: The patient is seen for follow up. Patient is a 79-year-old female with history of hypertension, atrial fibrillation and hyperlipidemia who presented to the ED last night with complaints of severe lower extremity weakness. Patient had COVID-19 in February and since then has been very weak. She apparently had a fall after an AICD was placed at [facility] in May and this is caused more problems with ambulation. Per ED physician report patient had acute worsening of bilateral leg weakness just 2 days ago. The admitting physician felt like she had some right leg pain and weakness as opposed to the left and has requested orthopedics evaluation. Cardiology consultation was also requested. CT of the brain obtained last night showed a left basal ganglia lacunar infarct of undetermined acuity although certainly could account for some right leg weakness. In addition there was a left frontal and right parietal encephalomalacia from remote ischemia. When I questioned the patient, she states she feels like her left leg is weaker than the right. She told me her symptoms all started yesterday so there is clearly a lot of variation in her history depending on when she talks with    Patient does admit  to chronic back problems    CT of the lumbar spine showed advanced facet DJD but no foraminal stenosis, no spinal stenosis    PT recommending SNF versus home health with 24-hour care    --------    Dr. Vonnie Freed spoke with Dr. Griselda Broker (Neuro), who examined the patient. Dr. Griselda Broker agrees with head CT findings -- age-indeterminate hypoattenuation of left caudate. He will order a repeat study to observe for any lesion changes and recommends that patient be seen by PT/OT. If head CT results show no new changes, patient can be discharged home with Neuro follow-up.    ---------    Spoke with patient, who appears more lucid today.   Per patient's daughter, patient markedly improved after being fed and hydrated. Daughter is concerned that patient is unable to adequately feed and hydrate herself. Patient does not have any new or additional complaints at this time. Per daughter, patient was admitted to [facility] 6 months ago for COVID-19 and has steadily declined since then. Patient has not been vaccinated for COVID-19. Updated the patient and daughter on current findings and plan. Awaiting PT re-evaluation and repeat head CT.       Problem List:  Problem List as of 8/4/2021 Date Reviewed: 5/12/2021        Codes Class Noted - Resolved    Gait disturbance ICD-10-CM: R26.9  ICD-9-CM: 781.2  8/2/2021 - Present        Elevated troponin I level ICD-10-CM: R77.8  ICD-9-CM: 790.6  8/2/2021 - Present        Shortness of breath ICD-10-CM: R06.02  ICD-9-CM: 786.05  2/13/2021 - Present        Fever ICD-10-CM: R50.9  ICD-9-CM: 780.60  2/13/2021 - Present        Acute CHF (congestive heart failure) ([facility] ICD-10-CM: I50.9  ICD-9-CM: 428.0  2/13/2021 - Present        COVID-19 virus infection ICD-10-CM: U07.1  ICD-9-CM: 079.89  2/13/2021 - Present        Uncontrolled type 2 diabetes mellitus with hyperglycemia ([facility] ICD-10-CM: E11.65  ICD-9-CM: 250.02  10/8/2018 - Present        Mixed hyperlipidemia ICD-10-CM: E78.2  ICD-9-CM: 272.2  10/8/2018 - Present        Implantable cardioverter-defibrillator (ICD) in situ ICD-10-CM: Z95.810  ICD-9-CM: V45.02  11/16/2010 - Present        Cardiomyopathy, idiopathic ([facility] ICD-10-CM: I42.8  ICD-9-CM: 425.4  10/28/2010 - Present        HTN (hypertension), benign ICD-10-CM: I10  ICD-9-CM: 401.1  10/28/2010 - Present        PVC (premature ventricular contraction) ICD-10-CM: I49.3  ICD-9-CM: 427.69  10/28/2010 - Present        RESOLVED: CHF (congestive heart failure) ([facility] ICD-10-CM: I50.9  ICD-9-CM: 428.0  2/13/2021 - 8/3/2021              Medications reviewed  Current Facility-Administered Medications   Medication Dose Route Frequency    sodium chloride (NS) flush 5-40 mL  5-40 mL IntraVENous Q8H    sodium chloride (NS) flush 5-40 mL  5-40 mL IntraVENous PRN    acetaminophen (TYLENOL) tablet 650 mg  650 mg Oral Q6H PRN    Or    acetaminophen (TYLENOL) suppository 650 mg  650 mg Rectal Q6H PRN    ondansetron (ZOFRAN ODT) tablet 4 mg  4 mg Oral Q6H PRN    Or    ondansetron (ZOFRAN) injection 4 mg  4 mg IntraVENous Q6H PRN    pantoprazole (PROTONIX) tablet 40 mg  40 mg Oral ACB    PARoxetine (PAXIL) tablet 20 mg  20 mg Oral DAILY    losartan (COZAAR) tablet 100 mg  100 mg Oral DAILY    nitroglycerin (NITROSTAT) tablet 0.4 mg  0.4 mg SubLINGual Q5MIN PRN    apixaban (ELIQUIS) tablet 5 mg  5 mg Oral BID    alogliptin (NESINA) tablet 12.5 mg  12.5 mg Oral DAILY    metFORMIN (GLUCOPHAGE) tablet 500 mg  500 mg Oral BID WITH MEALS    gabapentin (NEURONTIN) capsule 100 mg  100 mg Oral BID    atorvastatin (LIPITOR) tablet 40 mg  40 mg Oral QHS    glipiZIDE (GLUCOTROL) tablet 5 mg  5 mg Oral DAILY    oxyCODONE IR (ROXICODONE) tablet 5 mg  5 mg Oral Q6H PRN    cyclobenzaprine (FLEXERIL) tablet 5 mg  5 mg Oral BID PRN    hydroCHLOROthiazide (HYDRODIURIL) tablet 12.5 mg  12.5 mg Oral DAILY    amitriptyline (ELAVIL) tablet 75 mg  75 mg Oral DAILY    carvediloL (COREG) tablet 6.25 mg  6.25 mg Oral BID WITH MEALS    insulin lispro (HUMALOG) injection   SubCUTAneous AC&HS    glucose chewable tablet 16 g  4 Tablet Oral PRN    dextrose (D50W) injection syrg 12.5-25 g  25-50 mL IntraVENous PRN    glucagon (GLUCAGEN) injection 1 mg  1 mg IntraMUSCular PRN       Review of Systems:   A comprehensive review of systems was negative except for that written in the HPI.     Objective:   Physical Exam:     Visit Vitals  BP (!) 158/110 (BP 1 Location: Right upper arm, BP Patient Position: At rest;Supine)   Pulse 100   Temp 97.7 °F (36.5 °C)   Resp 18   Ht 5' 6\" (1.676 m)   Wt 194 lb (88 kg)   SpO2 98%   BMI 31.31 kg/m²    O2 Flow Rate (L/min): 3 l/min O2 Device: None (Room air)    Temp (24hrs), Av.9 °F (36.6 °C), Min:97.5 °F (36.4 °C), Max:98.3 °F (36.8 °C)    No intake/output data recorded. No intake/output data recorded. General:   Awake but fell asleep several times during my talk with her   Lungs:   Clear to auscultation bilaterally. Chest wall:  No tenderness or deformity. Heart:  Regular rate and rhythm, S1, S2 normal, no murmur, click, rub or gallop. Abdomen:   Soft, non-tender. Bowel sounds normal. No masses,  No organomegaly. Extremities: Extremities normal, atraumatic, no cyanosis or edema. Pulses: 2+ and symmetric all extremities. Skin: Skin color, texture, turgor normal. No rashes or lesions   Neurologic: CNII-XII intact. Left leg strength 4/5, right leg strength 4+/5. Upper extremity strength is symmetric and intact         Data Review:       Recent Days:  Recent Labs     21  1840   WBC 8.8   HGB 12.8   HCT 40.5   *     Recent Labs     21  1840      K 3.3*      CO2 25   *   BUN 31*   CREA 1.45*   CA 8.9   ALB 2.9*   TBILI 0.7   ALT 29     No results for input(s): PH, PCO2, PO2, HCO3, FIO2 in the last 72 hours.     24 Hour Results:  Recent Results (from the past 24 hour(s))   GLUCOSE, POC    Collection Time: 21 11:07 AM   Result Value Ref Range    Glucose (POC) 191 (H) 65 - 117 mg/dL    Performed by JOSE DE JESUS HARTMAN    TROPONIN I    Collection Time: 21 11:59 AM   Result Value Ref Range    Troponin-I, Qt. 0.20 (H) <0.05 ng/mL   EKG, 12 LEAD, SUBSEQUENT    Collection Time: 21 12:18 PM   Result Value Ref Range    Ventricular Rate 83 BPM    Atrial Rate 83 BPM    P-R Interval 188 ms    QRS Duration 116 ms    Q-T Interval 450 ms    QTC Calculation (Bezet) 528 ms    Calculated P Axis 56 degrees    Calculated R Axis -26 degrees    Calculated T Axis 63 degrees    Diagnosis       Sinus rhythm with marked sinus arrhythmia  Left ventricular hypertrophy with QRS widening  Inferior infarct , age undetermined  Prolonged QT  Abnormal ECG  When compared with ECG of 02-AUG-2021 20:27, (Unconfirmed)  Sinus rhythm has replaced [facility]  Confirmed by Terra Landis ([id number]) on 8/3/2021 12:40:36 PM     GLUCOSE, POC    Collection Time: 08/03/21  3:51 PM   Result Value Ref Range    Glucose (POC) 113 65 - 117 mg/dL    Performed by [address], POC    Collection Time: 08/03/21  7:30 PM   Result Value Ref Range    Glucose (POC) 86 65 - 117 mg/dL    Performed by Lucero Tiwari    GLUCOSE, POC    Collection Time: 08/04/21  7:28 AM   Result Value Ref Range    Glucose (POC) 153 (H) 65 - 117 mg/dL    Performed by JOSE DE JESUS HARTMAN        CT SPINE THORAC WO CONT   Final Result      CT SPINE LUMB WO CONT   Final Result      CT HEAD WO CONT   Final Result   1. No acute hemorrhage, acute large vessel distribution infarct. 2. Left basal ganglia lacunar infarct of undetermined acuity. 3. Atrophy and small vessel ischemic disease. Bilateral small focal   encephalomalacia from remote ischemic processes. 4. Right maxillary sinus disease. XR CHEST PORT   Final Result   Enlarged cardiac silhouette, increased in size compared to prior study. This may   be due to patient rotation, but pericardial effusion is not excluded. Correlate   clinically. CT HEAD WO CONT    (Results Pending)        Assessment:  Bilateral leg weakness with ambulatory dysfunction, questionable more pronounced weakness left leg    Left basal ganglia lacunar infarct of indeterminate age. Would not explain left leg weakness    Chronic back pain    Acute kidney injury, creatinine 1.45 with baseline 1.09 in February    Indeterminate troponin elevation    Paroxysmal atrial fibrillation     Chronic systolic heart failure, EF 20% in February    AICD in situ    Plan:  Spoke with neurology who will order repeat head CT. Awaiting updated PT assessment.   Continue Eliquis and other current medications  Ideally, would like to obtain MRI brain but precluded by her AICD    Will probably need SNF    Care Plan discussed with: Patient/Family    Disposition: To be determined    Total time spent with patient: 30 minutes.     Nicky Lucio

## 2021-08-04 NOTE — PROGRESS NOTES
The purpose of the visit was to do a spiritual assessment and an AMD on the patient. The patient was being visited by her family. She was resting in bed peacefully. After talking with her nurse and her family it was determined that the patient was not able to do the AMD. The patient's family shared that they were expecting her daughter to come and visit shortly. They mentioned that they are grateful that she has a family cesar. The patient's brother mentioned that he remembers the spiritual care offered by the  at [facility], and how appreciative he was. The  provided the ministry of presence and the comfort of pastoral support. [address] [facility] [address].    can be reached by calling the  at [facility]  [phone]

## 2021-08-05 NOTE — PROGRESS NOTES
OCCUPATIONAL THERAPY TREATMENT  Patient: Leelee George (9 y.o. female)  Date: 8/5/2021  Diagnosis: Gait disturbance [R26.9]  Elevated troponin I level [R77.8] <principal problem not specified>       Precautions:    Chart, occupational therapy assessment, plan of care, and goals were reviewed. ASSESSMENT  Patient continues with skilled OT services and is slowly progressing towards goals. Pt. Received semi-supine in bed and agreeable to therapy session. On entry pt yelling out for her granddaughter. Pt A&O to place and person however required re-orientation to time and situation. Pt. Performed bed mobility with Min A for trunk support - pt perform bed modified and increased time to perform. Pt. Demonstrated posterior lean and right lateral lean while seated at EOB requiring constant support/assistance and verbal cues to maintain up-right posterior. Pt. Completed sit-> stand with Mod A x2, pt able to complete a few forwards steps with Mod A x2 with use of RW for balance and stability however pt continued to demonstrated heavy right side lean requiring verbal cues and constant support. Pt. Appeared to be dragging right foot, with verbal cues provided to proper stepping pattern pt able to correct. Pt. Required seated RB for increased time. Pt. Performed UE and LE therex. Pt. Required cues for opening eyes during therapy session- pt stated t\" I'm just resting her eyes\". Pt performed sit<> stand from chair with Max A x2 and functional ambulation to [facility] with Mod A x2 for safety and cues for safety and positioning and distance to keep RW. Pt. Sat at EOB for increased time and performed self feeding with set-up assistance and Mod A to total A for self feeding. Pt. Demonstrates decreased spatial awareness during eating lunch requiring increased assistance for bring food and drink to mouth . Pt. Returned semi-supine in bed after completed lunch.  Pt. Required Max A x2 for returning semi-supine and scooting total A x2 with bed modified. Pt. Left resting comfortably with needs met. REC. SNF when medically appropriate for discharge. Current Level of Function Impacting Discharge (ADLs): increased confusion, decreased safety awareness, Mod A x2 for functional ambulation, Mod A x2 for bed mobility. Other factors to consider for discharge: PLOF, time since on set, severity of deficits. PLAN :  Patient continues to benefit from skilled intervention to address the above impairments. Continue treatment per established plan of care. to address goals. Recommendation for discharge: (in order for the patient to meet his/her long term goals)  Therapy up to 5 days/week in SNF setting    This discharge recommendation:  Has been made in collaboration with the attending provider and/or case management    IF patient discharges home will need the following DME: TBD       SUBJECTIVE:   Patient stated  i'm looking for my granddaughter .     OBJECTIVE DATA SUMMARY:   Cognitive/Behavioral Status:  Neurologic State: Alert;Confused; Lethargic  Orientation Level: Oriented to person;Disoriented to time;Disoriented to situation;Disoriented to place  Cognition: Decreased attention/concentration;Decreased command following      Functional Mobility and Transfers for ADLs:  Bed Mobility:  Rolling: Minimum assistance  Supine to Sit: Minimum assistance; Additional time  Sit to Supine: Maximum assistance;Assist x2  Scooting: Maximum assistance    Transfers:  Sit to Stand: Moderate assistance;Assist x2     Bed to Chair: Moderate assistance;Assist x2    Balance:  Sitting: Impaired; With support  Sitting - Static: Poor (constant support)  Sitting - Dynamic: Poor (constant support)  Standing: Impaired;Pull to stand; With support  Standing - Static: Constant support; Fair  Standing - Dynamic : Constant support;Fair;Poor    ADL Intervention:  Feeding  Feeding Assistance: Set-up; Moderate assistance  Container Management: Total assistance (dependent)  Cutting Food: Total assistance (dependent)  Utensil Management: Set-up; Moderate assistance  Food to Mouth: Moderate assistance; Total assistance (dependent)  Drink to Mouth: Moderate assistance; Total assistance (dependent)    Therapeutic Exercises: antonino UE's  Exercise Sets Reps AROM AAROM PROM Self PROM Comments   Shoulder flex/ext 1 10 [x] [] [] []    Elbow flex/ext 1 10 [x] [] [] []    Wrist flex/ext 1 10 [x] [] [] []       [] [] [] []      Pain:  0/ 10 pain reported    Activity Tolerance:   Fair and requires rest breaks  Please refer to the flowsheet for vital signs taken during this treatment. After treatment patient left in no apparent distress:   Supine in bed, Call bell within reach, Bed / chair alarm activated, and Side rails x 3    COMMUNICATION/COLLABORATION:   The patients plan of care was discussed with: Physical therapy assistant. Co-tx with PTA for increased assistance with transfers and functional mobility.       Kvng Orr  Time Calculation: 49 mins    Problem: Self Care Deficits Care Plan (Adult)  Goal: *Acute Goals and Plan of Care (Insert Text)  Description: Pt will be CGA sup <> sit in prep for EOB ADLs  Pt will be CGA grooming sitting EOB  Pt will be min A LE dressing sitting EOB/long sit  Pt will be CGA sit <>  prep for toileting LRAD  Pt will be CGA toileting/toilet transfer/cloth mgmt LRAD  Pt will be SPV following UE HEP in prep for self care tasks   Outcome: Progressing Towards Goal

## 2021-08-05 NOTE — PROGRESS NOTES
PHYSICAL THERAPY TREATMENT  Patient: Armand Chaves (27 y.o. female)  Date: 8/5/2021  Diagnosis: Gait disturbance [R26.9]  Elevated troponin I level [R77.8] <principal problem not specified>       Precautions:    Chart, physical therapy assessment, plan of care and goals were reviewed. ASSESSMENT  Patient continues with skilled PT services and is progressing towards goals. Pt semi supine in bed upon arrival and agreeable to session. On entry pt yelling out for her granddaughter. Pt A&O to place and person however required re-orientation to time and situation. Pt completed sup>sit with min A x 1 and additional time; patient able to do most of transfer on own however required assistance with trunk towards end of transfer. Pt noted with posterior and right lateral lean while seated EOB and required constant support/correction and vc to maintain posture at midline. Performed STS with mod A x 2 and RW for balance upon stand. Patient ambulated ~8' with RW and mod A x 2 before sitting in chair for rest break. Patient noted with heavy R lateral lean during gait that patient would not correct with VC. Patient also noted to be dragging her R foot right before her seated rest break, but with VC able to correct. Pt completed UE and LE therex (see details below) during seated rest break. At this time patient began to require increased VC to keep eyes open during therapy session, and patient stating \"i'm just resting my eyes, I'm not sleeping\". Performed STS from chair with max A x 2 and ambulated back to EOB with mod A x2. While sitting EOB pt performed self feeding with SCHULZ (see OT note for feeding details) while PTA provided constant sitting balance assistance due to R lateral and posterior lean. Pt returned to semi supine position post feeding with max A x 2. Pt left semi supine in bed with call bell in reach and all needs met. Recommending d/c to SNF when medically discharged.     Current Level of Function Impacting Discharge (mobility/balance): assistance required for all mobility    Other factors to consider for discharge: PLOF, time since onset, confusion, severity of deficits         PLAN :  Patient continues to benefit from skilled intervention to address the above impairments. Continue treatment per established plan of care. to address goals. Recommendation for discharge: (in order for the patient to meet his/her long term goals)  Therapy up to 5 days/week in SNF setting    This discharge recommendation:  Has been made in collaboration with the attending provider and/or case management    IF patient discharges home will need the following DME: to be determined (TBD)       SUBJECTIVE:   Patient stated oh you want me walk, I'll do that.     OBJECTIVE DATA SUMMARY:   Critical Behavior:  Neurologic State: Alert, Confused, Lethargic  Orientation Level: Oriented to person, Oriented to place  Cognition: Decreased attention/concentration, Decreased command following    Functional Mobility Training:  Bed Mobility:  Rolling: Minimum assistance  Supine to Sit: Minimum assistance; Additional time  Sit to Supine: Maximum assistance;Assist x2  Scooting: Maximum assistance    Transfers:  Sit to Stand: Moderate assistance;Assist x2  Stand to Sit: Moderate assistance;Assist x2  Bed to Chair: Moderate assistance;Assist x2    Balance:  Sitting: Impaired; With support  Sitting - Static: Poor (constant support)  Sitting - Dynamic: Poor (constant support)  Standing: Impaired;Pull to stand; With support  Standing - Static: Constant support; Fair  Standing - Dynamic : Constant support;Fair;Poor    Ambulation/Gait Training:  Distance (ft): 16 Feet (ft)  Assistive Device: Gait belt;Walker, rolling  Ambulation - Level of Assistance:  Moderate assistance;Assist x2  Base of Support: Widened  Speed/Jaja: Slow  Step Length: Left shortened;Right shortened    Therapeutic Exercises:   1 x 12 AP  1 x 10 LAQ  1 x 10 Marches    Pain Ratin/10    Activity Tolerance:   Fair and requires rest breaks  Please refer to the flowsheet for vital signs taken during this treatment. After treatment patient left in no apparent distress:   Supine in bed, Call bell within reach, Bed / chair alarm activated, and Side rails x 3    COMMUNICATION/COLLABORATION:   The patients plan of care was discussed with: Occupational therapy assistant. OT/PT sessions occurred together for increased patient and clinician safety    Problem: Mobility Impaired (Adult and Pediatric)  Goal: *Acute Goals and Plan of Care (Insert Text)  Description: Patient will move from supine to sit and sit to supine , scoot up and down, and roll side to side in bed with supervision/set-up within 7 day(s). Patient will transfer from bed to chair and chair to bed with minimal assistance/contact guard assist using the least restrictive device within 7 day(s). Patient will improve static standing balance to minimal assistance within 1 week(s). Patient will ambulate 50 feet with minimal assistance with least restrictive device within 1 weeks.        Outcome: Progressing Towards Goal       Martha Peralta PTA   Time Calculation: 49 mins

## 2021-08-05 NOTE — DISCHARGE SUMMARY
Physician Discharge Summary     Patient ID:    Yuan Rogers  [id number]  78 y.o.  1955    Admit date: 8/2/2021    Discharge date : 8/5/2021    Chronic Diagnoses:    Problem List as of 8/5/2021 Date Reviewed: 5/12/2021        Codes Class Noted - Resolved    Gait disturbance ICD-10-CM: R26.9  ICD-9-CM: 781.2  8/2/2021 - Present        Elevated troponin I level ICD-10-CM: R77.8  ICD-9-CM: 790.6  8/2/2021 - Present        Shortness of breath ICD-10-CM: R06.02  ICD-9-CM: 786.05  2/13/2021 - Present        Fever ICD-10-CM: R50.9  ICD-9-CM: 780.60  2/13/2021 - Present        Acute CHF (congestive heart failure) ([facility] ICD-10-CM: I50.9  ICD-9-CM: 428.0  2/13/2021 - Present        COVID-19 virus infection ICD-10-CM: U07.1  ICD-9-CM: 079.89  2/13/2021 - Present        Uncontrolled type 2 diabetes mellitus with hyperglycemia ([facility] ICD-10-CM: E11.65  ICD-9-CM: 250.02  10/8/2018 - Present        Mixed hyperlipidemia ICD-10-CM: E78.2  ICD-9-CM: 272.2  10/8/2018 - Present        Implantable cardioverter-defibrillator (ICD) in situ ICD-10-CM: Z95.810  ICD-9-CM: V45.02  11/16/2010 - Present        Cardiomyopathy, idiopathic ([facility] ICD-10-CM: I42.8  ICD-9-CM: 425.4  10/28/2010 - Present        HTN (hypertension), benign ICD-10-CM: I10  ICD-9-CM: 401.1  10/28/2010 - Present        PVC (premature ventricular contraction) ICD-10-CM: I49.3  ICD-9-CM: 427.69  10/28/2010 - Present        RESOLVED: CHF (congestive heart failure) (HCC) ICD-10-CM: I50.9  ICD-9-CM: 428.0  2/13/2021 - 8/3/2021          22    Final Diagnoses:   Gait disturbance [R26.9]  Elevated troponin I level [R77.8]  Bilateral leg weakness with ambulatory dysfunction, questionable more pronounced weakness left leg     Left basal ganglia lacunar infarct of indeterminate age.   Would not explain left leg weakness     Chronic back pain     Acute kidney injury, creatinine 1.45 with baseline 1.09 in February     Indeterminate troponin elevation     Paroxysmal atrial fibrillation      Chronic systolic heart failure, EF 20% in February     AICD in situ    Reason for Hospitalization:  Patient is a 58-year-old female with history of hypertension, atrial fibrillation and hyperlipidemia who presented to the ED last night with complaints of severe lower extremity weakness. Patient had COVID-19 in February and since then has been very weak. She apparently had a fall after an AICD was placed at [facility] in May and this is caused more problems with ambulation. Per ED physician report patient had acute worsening of bilateral leg weakness just 2 days ago. The admitting physician felt like she had some right leg pain and weakness as opposed to the left and has requested orthopedics evaluation. Cardiology consultation was also requested. CT of the brain obtained last night showed a left basal ganglia lacunar infarct of undetermined acuity although certainly could account for some right leg weakness. In addition there was a left frontal and right parietal encephalomalacia from remote ischemia.     When I questioned the patient, she states she feels like her left leg is weaker than the right. She told me her symptoms all started yesterday so there is clearly a lot of variation in her history depending on when she talks with     Patient does admit  to chronic back problems     CT of the lumbar spine showed advanced facet DJD but no foraminal stenosis, no spinal stenosis      Hospital Course:   Patient was admitted to cardiac telemetry. She was seen in consultation by neurology. CT of the brain was repeated which showed no evolving changes in the left basal ganglia infarct, suggesting this is old. There was no suspicion for acute stroke. Her weakness appears to be more generalized    PT had recommended SNF versus home health. Family was very indecisive about this.   When I spoke with the patient on 8/5 she said she wanted to go home    We are therefore discharging her home with home health today              Discharge Medications:   Current Discharge Medication List      START taking these medications    Details   carvediloL (COREG) 6.25 mg tablet Take 1 Tablet by mouth two (2) times daily (with meals). Qty: 60 Tablet, Refills: 0  Start date: 8/5/2021         CONTINUE these medications which have NOT CHANGED    Details   oxyCODONE IR (ROXICODONE) 5 mg immediate release tablet Take 5 mg by mouth every four (4) hours as needed for Pain. cyclobenzaprine (FLEXERIL) 5 mg tablet Take 5 mg by mouth two (2) times daily as needed for Muscle Spasm(s). hydroCHLOROthiazide (HYDRODIURIL) 12.5 mg tablet Take 12.5 mg by mouth daily. amitriptyline (ELAVIL) 75 mg tablet Take 75 mg by mouth daily. glipiZIDE (GLUCOTROL) 5 mg tablet Take 5 mg by mouth daily. atorvastatin (LIPITOR) 40 mg tablet Take 1 Tab by mouth nightly. Qty: 60 Tab, Refills: 1      furosemide (Lasix) 40 mg tablet 1 tablet daily  Qty: 60 Tab, Refills: 0      metFORMIN (GLUCOPHAGE) 1,000 mg tablet two (2) times daily (with meals). gabapentin (NEURONTIN) 100 mg capsule TAKE 1 CAPSULE BY MOUTH THREE TIMES A DAY      SITagliptin (Januvia) 100 mg tablet TAKE 1 TABLET BY MOUTH EVERY DAY  Qty: 90 Tab, Refills: 2    Comments: EMERGENCY HOLDOVER PROVIDED: #[id number]. [facility] GAVE 3 JANUVIA 100 MG TABLET. MD CONTACTED FOR AUTHORIZATION ON 08/01/2020      metoprolol succinate (TOPROL-XL) 25 mg XL tablet TAKE 0.5 TABS BY MOUTH DAILY. Qty: 45 Tab, Refills: 11    Associated Diagnoses: HTN (hypertension), benign; PVC (premature ventricular contraction)      ELIQUIS 5 mg tablet TAKE 1 TABLET BY MOUTH TWICE A DAY  Qty: 180 Tab, Refills: 3      cloNIDine HCL (CATAPRES) 0.2 mg tablet Take 0.2 mg by mouth two (2) times a day. Refills: 3      losartan (COZAAR) 100 mg tablet Take 100 mg by mouth daily. Refills: 2      PARoxetine (PAXIL) 20 mg tablet Take 20 mg by mouth daily.       prednisoLONE acetate (PRED FORTE) 1 % ophthalmic suspension INSTILL 1 DROP INTO RIGHT EYE 4 TIMES A DAY      nitroglycerin (NITROSTAT) 0.4 mg SL tablet 0.4 mg by SubLINGual route every five (5) minutes as needed for Chest Pain. Up to 3 doses. Associated Diagnoses: Type 2 diabetes mellitus with hyperglycemia, unspecified whether long term insulin use (HCC)      omeprazole (PRILOSEC) 20 mg capsule Take 20 mg by mouth daily. Follow up Care:    1. Lou Ernst MD in 1-2 weeks. Please call to set up an appointment shortly after discharge. Diet:  Cardiac Diet    Disposition:  Home. Advanced Directive:   FULL    DNR      Discharge Exam:  General:  Alert, cooperative, no distress, appears stated age. Lungs:   Clear to auscultation bilaterally. Chest wall:  No tenderness or deformity. Heart:  Regular rate and rhythm, S1, S2 normal, no murmur, click, rub or gallop. Abdomen:   Soft, non-tender. Bowel sounds normal. No masses,  No organomegaly. Extremities: Extremities normal, atraumatic, no cyanosis or edema. Pulses: 2+ and symmetric all extremities. Skin: Skin color, texture, turgor normal. No rashes or lesions   Neurologic: CNII-XII intact. No gross sensory or motor deficits        CONSULTATIONS: Neurology    Significant Diagnostic Studies:   8/2/2021: BUN 31 mg/dL* (Ref range: 6 - 20 mg/dL); Calcium 8.9 mg/dL (Ref range: 8.5 - 10.1 mg/dL); CO2 25 mmol/L (Ref range: 21 - 32 mmol/L); Creatinine 1.45 mg/dL* (Ref range: 0.55 - 1.02 mg/dL); Glucose 151 mg/dL* (Ref range: 65 - 100 mg/dL); HCT 40.5 % (Ref range: 35.0 - 47.0 %); HGB 12.8 g/dL (Ref range: 11.5 - 16.0 g/dL); Potassium 3.3 mmol/L* (Ref range: 3.5 - 5.1 mmol/L);  Sodium 140 mmol/L (Ref range: 136 - 145 mmol/L)  Recent Labs     08/02/21  1840   WBC 8.8   HGB 12.8   HCT 40.5   *     Recent Labs     08/02/21  1840      K 3.3*      CO2 25   BUN 31*   CREA 1.45*   *   CA 8.9     Recent Labs     08/02/21 1840   ALT 29   AP 91 TBILI 0.7   TP 7.4   ALB 2.9*   GLOB 4.5*     No results for input(s): INR, PTP, APTT, INREXT in the last 72 hours. No results for input(s): FE, TIBC, PSAT, FERR in the last 72 hours. No results for input(s): PH, PCO2, PO2 in the last 72 hours.   Recent Labs     08/02/21  1840   CKMB 9.6*     Lab Results   Component Value Date/Time    Glucose (POC) 114 08/04/2021 07:58 PM    Glucose (POC) 144 (H) 08/04/2021 05:10 PM    Glucose (POC) 177 (H) 08/04/2021 11:36 AM    Glucose (POC) 153 (H) 08/04/2021 07:28 AM    Glucose (POC) 86 08/03/2021 07:30 PM       Discharge time spent 35 minutes    Signed:  Darcy Ibarra MD  8/5/2021  9:51 AM

## 2021-08-05 NOTE — PROGRESS NOTES
CM spoke with patient's daughter, [place]- [phone], to follow-up on DCP. Ms. Roldan Self is very concerned about the patient and her returning to her home as she was not receiving adequate care. Ms. Roldan Self asked that a referral be sent to [facility] IRF, CM explained the difference between IRF and SNF and that patient was recc for SNF however we could send a referral but CM would like a back-up choice. Ms. Roldan Self stated she will review SNF choice list.      Referral sent via janice to [facility] IRF, notified [facility] liaison that patient was clear for dc today. CM continues to follow. 2:30 PM - possible bed available for patient at [facility] tomorrow 8/6/2021, however [facility] continues to review for admission.

## 2021-08-06 NOTE — PROGRESS NOTES
OCCUPATIONAL THERAPY TREATMENT  Patient: Miah Magaña (89 y.o. female)  Date: 8/6/2021  Diagnosis: Gait disturbance [R26.9]  Elevated troponin I level [R77.8] <principal problem not specified>       Precautions:    Chart, occupational therapy assessment, plan of care, and goals were reviewed. ASSESSMENT  Patient continues with skilled OT services and is progressing towards goals. Pt. Received semi-supine in bed and agreeable to therapy session. Pt. A& O to self only- re-oriented to place, time and situation. Pt. Continues to demonstrate increased confusion, decreased safety awareness, increased cueing to complete all task/ activity, and increased impulsivity. Pt. Required increased assistance with bed mobility today with Mod A x2 and demonstrated difficulty following cues for therapist for proper hand placement. Pt. Sitting balance varied throughout therapy session from Max A x2 to CGA; however mainly requiring Mod to Max A for sitting balance. Pt. Demonstrated heavy R side lateral lean and heavy posterior lean. Therapist provided constant cueing  to maintain up-right posture and return to mid-line and hand placement while seated at EOB however pt unable to hold left UE on bed rail without assistance. Pt. Required Mod A to Max A for self -feeding while seated at EOB. Pt. Continues to demonstrate decreased spatial awareness and object recognition. Pt. Returned semi-supine and required total A for scooting to [facility]. Pt. Left resting comfortably in bed with needs met and call bell within reach. REC. SNF when medically appropriate. Current Level of Function Impacting Discharge (ADLs): increased confusion, Mod A x2 for bed mobility and Mod-Max A for sitting balance. Other factors to consider for discharge: PLOF, time since on set. PLAN :  Patient continues to benefit from skilled intervention to address the above impairments. Continue treatment per established plan of care.   to address goals. Recommendation for discharge: (in order for the patient to meet his/her long term goals)  Therapy up to 5 days/week in SNF setting    This discharge recommendation:  Has been made in collaboration with the attending provider and/or case management    IF patient discharges home will need the following DME: TBD       SUBJECTIVE:   [place]    OBJECTIVE DATA SUMMARY:   Cognitive/Behavioral Status:  Neurologic State: Alert;Confused  Orientation Level: Disoriented to place; Disoriented to situation;Disoriented to time;Oriented to person  Cognition: Decreased attention/concentration;Decreased command following; Impaired decision making; Impulsive;Memory loss;Poor safety awareness    Functional Mobility and Transfers for ADLs:  Bed Mobility:  Supine to Sit: Moderate assistance;Assist x2  Sit to Supine: Moderate assistance;Assist x2  Scooting: Maximum assistance    Balance:  Sitting: Impaired; With support  Sitting - Static: Poor (constant support)  Sitting - Dynamic: Poor (constant support)      Pain:  0/ 10 pain reported    Activity Tolerance:   Poor  Please refer to the flowsheet for vital signs taken during this treatment. After treatment patient left in no apparent distress:   Supine in bed, Call bell within reach, Bed / chair alarm activated, and Side rails x 3    COMMUNICATION/COLLABORATION:   The patients plan of care was discussed with: Physical therapy assistant and Registered nurse. Co-tx with PTA for increased assistance with transfers and sitting balance while seated at EOB.       Juju Leone  Time Calculation: 30 mins    Problem: Self Care Deficits Care Plan (Adult)  Goal: *Acute Goals and Plan of Care (Insert Text)  Description: Pt will be CGA sup <> sit in prep for EOB ADLs  Pt will be CGA grooming sitting EOB  Pt will be min A LE dressing sitting EOB/long sit  Pt will be CGA sit <>  prep for toileting LRAD  Pt will be CGA toileting/toilet transfer/cloth mgmt LRAD  Pt will be SPV following UE HEP in prep for self care tasks   Outcome: Progressing Towards Goal

## 2021-08-06 NOTE — PROGRESS NOTES
Hospitalist Progress Note               Daily Progress Note: 8/5/2021    Subjective:   Hospital course to date: Patient is a 70-year-old female with history of hypertension, atrial fibrillation and hyperlipidemia who presented to the ED last night with complaints of severe lower extremity weakness. Patient had COVID-19 in February and since then has been very weak. She apparently had a fall after an AICD was placed at [facility] in May and this is caused more problems with ambulation. Per ED physician report patient had acute worsening of bilateral leg weakness just 2 days ago. The admitting physician felt like she had some right leg pain and weakness as opposed to the left and has requested orthopedics evaluation. Cardiology consultation was also requested. CT of the brain obtained last night showed a left basal ganglia lacunar infarct of undetermined acuity although certainly could account for some right leg weakness. In addition there was a left frontal and right parietal encephalomalacia from remote ischemia. When I questioned the patient, she states she feels like her left leg is weaker than the right. Patient does admit  to chronic back problems    CT of the lumbar spine showed advanced facet DJD but no foraminal stenosis, no spinal stenosis    Repeat head CT was obtained which showed no evolutionary changes in the left basal ganglia infarct. Her weakness is not felt to be due to stroke    ------    Patient is seen today for follow-up. We are working toward SNF placement.   No new problems    Problem List:  Problem List as of 8/6/2021 Date Reviewed: 5/12/2021        Codes Class Noted - Resolved    Gait disturbance ICD-10-CM: R26.9  ICD-9-CM: 781.2  8/2/2021 - Present        Elevated troponin I level ICD-10-CM: R77.8  ICD-9-CM: 790.6  8/2/2021 - Present        Shortness of breath ICD-10-CM: R06.02  ICD-9-CM: 786.05  2/13/2021 - Present        Fever ICD-10-CM: R50.9  ICD-9-CM: 780.60  2/13/2021 - Present        Acute CHF (congestive heart failure) (HCC) ICD-10-CM: I50.9  ICD-9-CM: 428.0  2/13/2021 - Present        COVID-19 virus infection ICD-10-CM: U07.1  ICD-9-CM: 079.89  2/13/2021 - Present        Uncontrolled type 2 diabetes mellitus with hyperglycemia ([facility] ICD-10-CM: E11.65  ICD-9-CM: 250.02  10/8/2018 - Present        Mixed hyperlipidemia ICD-10-CM: E78.2  ICD-9-CM: 272.2  10/8/2018 - Present        Implantable cardioverter-defibrillator (ICD) in situ ICD-10-CM: Z95.810  ICD-9-CM: V45.02  11/16/2010 - Present        Cardiomyopathy, idiopathic ([facility] ICD-10-CM: I42.8  ICD-9-CM: 425.4  10/28/2010 - Present        HTN (hypertension), benign ICD-10-CM: I10  ICD-9-CM: 401.1  10/28/2010 - Present        PVC (premature ventricular contraction) ICD-10-CM: I49.3  ICD-9-CM: 427.69  10/28/2010 - Present        RESOLVED: CHF (congestive heart failure) (HCC) ICD-10-CM: I50.9  ICD-9-CM: 428.0  2/13/2021 - 8/3/2021              Medications reviewed  Current Facility-Administered Medications   Medication Dose Route Frequency    sodium chloride (NS) flush 5-40 mL  5-40 mL IntraVENous Q8H    sodium chloride (NS) flush 5-40 mL  5-40 mL IntraVENous PRN    acetaminophen (TYLENOL) tablet 650 mg  650 mg Oral Q6H PRN    Or    acetaminophen (TYLENOL) suppository 650 mg  650 mg Rectal Q6H PRN    ondansetron (ZOFRAN ODT) tablet 4 mg  4 mg Oral Q6H PRN    Or    ondansetron (ZOFRAN) injection 4 mg  4 mg IntraVENous Q6H PRN    pantoprazole (PROTONIX) tablet 40 mg  40 mg Oral ACB    PARoxetine (PAXIL) tablet 20 mg  20 mg Oral DAILY    losartan (COZAAR) tablet 100 mg  100 mg Oral DAILY    nitroglycerin (NITROSTAT) tablet 0.4 mg  0.4 mg SubLINGual Q5MIN PRN    apixaban (ELIQUIS) tablet 5 mg  5 mg Oral BID    alogliptin (NESINA) tablet 12.5 mg  12.5 mg Oral DAILY    metFORMIN (GLUCOPHAGE) tablet 500 mg  500 mg Oral BID WITH MEALS    gabapentin (NEURONTIN) capsule 100 mg  100 mg Oral BID    atorvastatin (LIPITOR) tablet 40 mg  40 mg Oral QHS    glipiZIDE (GLUCOTROL) tablet 5 mg  5 mg Oral DAILY    oxyCODONE IR (ROXICODONE) tablet 5 mg  5 mg Oral Q6H PRN    cyclobenzaprine (FLEXERIL) tablet 5 mg  5 mg Oral BID PRN    hydroCHLOROthiazide (HYDRODIURIL) tablet 12.5 mg  12.5 mg Oral DAILY    amitriptyline (ELAVIL) tablet 75 mg  75 mg Oral DAILY    carvediloL (COREG) tablet 6.25 mg  6.25 mg Oral BID WITH MEALS    insulin lispro (HUMALOG) injection   SubCUTAneous AC&HS    glucose chewable tablet 16 g  4 Tablet Oral PRN    dextrose (D50W) injection syrg 12.5-25 g  25-50 mL IntraVENous PRN    glucagon (GLUCAGEN) injection 1 mg  1 mg IntraMUSCular PRN       Review of Systems:   A comprehensive review of systems was negative except for that written in the HPI. Objective:   Physical Exam:     Visit Vitals  BP (!) 142/96 (BP 1 Location: Left upper arm, BP Patient Position: At rest;Supine)   Pulse 80   Temp 98 °F (36.7 °C)   Resp 20   Ht 5' 6\" (1.676 m)   Wt 86.2 kg (190 lb 0.6 oz)   SpO2 98%   BMI 30.67 kg/m²    O2 Flow Rate (L/min): 2 l/min O2 Device: None (Room air)    Temp (24hrs), Av.7 °F (36.5 °C), Min:97.4 °F (36.3 °C), Max:98 °F (36.7 °C)    No intake/output data recorded. No intake/output data recorded. General:   Awake but fell asleep several times during my talk with her   Lungs:   Clear to auscultation bilaterally. Chest wall:  No tenderness or deformity. Heart:  Regular rate and rhythm, S1, S2 normal, no murmur, click, rub or gallop. Abdomen:   Soft, non-tender. Bowel sounds normal. No masses,  No organomegaly. Extremities: Extremities normal, atraumatic, no cyanosis or edema. Pulses: 2+ and symmetric all extremities. Skin: Skin color, texture, turgor normal. No rashes or lesions   Neurologic: CNII-XII intact. Left leg strength 4/5, right leg strength 4+/5.   Upper extremity strength is symmetric and intact         Data Review:       Recent Days:  No results for input(s): WBC, HGB, HCT, PLT, HGBEXT, HCTEXT, PLTEXT, HGBEXT, HCTEXT, PLTEXT in the last 72 hours. No results for input(s): NA, K, CL, CO2, GLU, BUN, CREA, CA, MG, PHOS, ALB, TBIL, TBILI, ALT, INR, INREXT, INREXT in the last 72 hours. No lab exists for component: SGOT  No results for input(s): PH, PCO2, PO2, HCO3, FIO2 in the last 72 hours. 24 Hour Results:  Recent Results (from the past 24 hour(s))   GLUCOSE, POC    Collection Time: 08/05/21 12:45 PM   Result Value Ref Range    Glucose (POC) 141 (H) 65 - 117 mg/dL    Performed by [address] 30, POC    Collection Time: 08/05/21  3:19 PM   Result Value Ref Range    Glucose (POC) 98 65 - 117 mg/dL    Performed by Rosangela 30, POC    Collection Time: 08/05/21  7:39 PM   Result Value Ref Range    Glucose (POC) 101 65 - 117 mg/dL    Performed by Aj Frazier, POC    Collection Time: 08/06/21  7:38 AM   Result Value Ref Range    Glucose (POC) 72 65 - 117 mg/dL    Performed by Blanquita Billings        CT HEAD WO CONT   Final Result   No acute intracranial process. Nonacute appearing lacunar   infarction in the left basal ganglia, stable. Other findings as above. CT SPINE [facility] WO CONT   Final Result      CT SPINE LUMB WO CONT   Final Result      CT HEAD WO CONT   Final Result   1. No acute hemorrhage, acute large vessel distribution infarct. 2. Left basal ganglia lacunar infarct of undetermined acuity. 3. Atrophy and small vessel ischemic disease. Bilateral small focal   encephalomalacia from remote ischemic processes. 4. Right maxillary sinus disease. XR CHEST PORT   Final Result   Enlarged cardiac silhouette, increased in size compared to prior study. This may   be due to patient rotation, but pericardial effusion is not excluded. Correlate   clinically. Assessment:  Generalized weakness with ambulatory dysfunction. Acute stroke ruled out    Left basal ganglia lacunar infarct of indeterminate age.   Would not explain left leg weakness    Chronic back pain    Acute kidney injury, creatinine 1.45 with baseline 1.09 in February    Indeterminate troponin elevation    Paroxysmal atrial fibrillation     Chronic systolic heart failure, EF 20% in February    AICD in situ    Plan:  Continue current medications  SNF referral underway    Care Plan discussed with: Patient/Family    Disposition: SNF    Total time spent with patient: 30 minutes.     Alexander Rivera MD

## 2021-08-06 NOTE — PROGRESS NOTES
Patient is alert, no neww complain, no complain of pain during the shifht, patient had all due medicine.  Hourly rounding done, Rn cleaned patient,Rn educated patient on fall prevention

## 2021-08-06 NOTE — PROGRESS NOTES
PHYSICAL THERAPY TREATMENT  Patient: Saira Cervantes (34 y.o. female)  Date: 8/6/2021  Diagnosis: Gait disturbance [R26.9]  Elevated troponin I level [R77.8] <principal problem not specified>       Precautions:    Chart, physical therapy assessment, plan of care and goals were reviewed. ASSESSMENT  Patient continues with skilled PT services and is not progressing towards goals. Pt semi supine in bed upon arrival and agreeable to session. Completed bed mobility with mod A x 2 this session. Noted patient to have decreased command follow and confusion, stating [place] Patient was reoriented to place. Patient completed self feeding at EOB with SCHULZ while PTA provided sitting balance support as pt unable to sit upright without constant support. Patient leaning laterally to L and posteriorly. Pt continually requiring vc to keep eyes open during session. Due to decreased command following, decreased attention/concentration, and confusion standing and ambulating held at this time. Patient completed seated therex at EOB and had continued difficulty with sitting balance. When asked to count repetitions until she got to 10 reps, she would count to seven (not on sequence with repetitions) and then stop; when asked why she stopped at seven patient stated \"I thought I did ten\". Patient repeated this 3x during session. Patient returned to semi supine position and left semi supine in bed with call bell in reach and needs met. Recommending d/c to SNF once medically appropriate. Current Level of Function Impacting Discharge (mobility/balance): assistance required for all mobility. Poor sitting balance. Other factors to consider for discharge: severity of deficits         PLAN :  Patient continues to benefit from skilled intervention to address the above impairments. Continue treatment per established plan of care. to address goals.     Recommendation for discharge: (in order for the patient to meet his/her long term goals)  Therapy up to 5 days/week in SNF setting    This discharge recommendation:  Has been made in collaboration with the attending provider and/or case management    IF patient discharges home will need the following DME: to be determined (TBD)       SUBJECTIVE:   Patient stated [place] farm    OBJECTIVE DATA SUMMARY:   Critical Behavior:  Neurologic State: Alert, Confused  Orientation Level: Disoriented to place, Disoriented to situation, Disoriented to time, Oriented to person  Cognition: Decreased attention/concentration, Decreased command following, Impaired decision making, Impulsive, Memory loss, Poor safety awareness     Functional Mobility Training:  Bed Mobility:  Supine to Sit: Moderate assistance;Assist x2  Sit to Supine: Moderate assistance;Assist x2  Scooting: Maximum assistance    Balance:  Sitting: Impaired; With support  Sitting - Static: Poor (constant support)  Sitting - Dynamic: Poor (constant support)      Therapeutic Exercises:   1 x 20 LAQ  1 x 10 Marches    Pain Ratin/10    Activity Tolerance:   Fair and requires rest breaks  Please refer to the flowsheet for vital signs taken during this treatment. After treatment patient left in no apparent distress:   Supine in bed, Call bell within reach, Bed / chair alarm activated, and Side rails x 3    COMMUNICATION/COLLABORATION:   The patients plan of care was discussed with: Occupational therapy assistant and Registered nurse. Problem: Mobility Impaired (Adult and Pediatric)  Goal: *Acute Goals and Plan of Care (Insert Text)  Description: Patient will move from supine to sit and sit to supine , scoot up and down, and roll side to side in bed with supervision/set-up within 7 day(s). Patient will transfer from bed to chair and chair to bed with minimal assistance/contact guard assist using the least restrictive device within 7 day(s).     Patient will improve static standing balance to minimal assistance within 1 week(s). Patient will ambulate 50 feet with minimal assistance with least restrictive device within 1 weeks.        Outcome: Not Progressing Towards Goal       Huan Toribio PTA   Time Calculation: 30 mins

## 2021-08-06 NOTE — PROGRESS NOTES
Hospitalist Progress Note               Daily Progress Note: 8/6/2021    Subjective:   Hospital course to date: Patient is a 48-year-old female with history of hypertension, atrial fibrillation and hyperlipidemia who presented to the ED last night with complaints of severe lower extremity weakness. Patient had COVID-19 in February and since then has been very weak. She apparently had a fall after an AICD was placed at [facility] in May and this is caused more problems with ambulation. Per ED physician report patient had acute worsening of bilateral leg weakness just 2 days ago. The admitting physician felt like she had some right leg pain and weakness as opposed to the left and has requested orthopedics evaluation. Cardiology consultation was also requested. CT of the brain obtained last night showed a left basal ganglia lacunar infarct of undetermined acuity although certainly could account for some right leg weakness. In addition there was a left frontal and right parietal encephalomalacia from remote ischemia. When I questioned the patient, she states she feels like her left leg is weaker than the right. Patient does admit  to chronic back problems    CT of the lumbar spine showed advanced facet DJD but no foraminal stenosis, no spinal stenosis    Repeat head CT was obtained which showed no evolutionary changes in the left basal ganglia infarct. Her weakness is not felt to be due to stroke    ------    Patient is seen today for follow-up. No new problems are reported.     Problem List:  Problem List as of 8/6/2021 Date Reviewed: 5/12/2021        Codes Class Noted - Resolved    Gait disturbance ICD-10-CM: R26.9  ICD-9-CM: 781.2  8/2/2021 - Present        Elevated troponin I level ICD-10-CM: R77.8  ICD-9-CM: 790.6  8/2/2021 - Present        Shortness of breath ICD-10-CM: R06.02  ICD-9-CM: 786.05  2/13/2021 - Present        Fever ICD-10-CM: R50.9  ICD-9-CM: 780.60  2/13/2021 - Present        Acute CHF (congestive heart failure) (HCC) ICD-10-CM: I50.9  ICD-9-CM: 428.0  2/13/2021 - Present        COVID-19 virus infection ICD-10-CM: U07.1  ICD-9-CM: 079.89  2/13/2021 - Present        Uncontrolled type 2 diabetes mellitus with hyperglycemia ([facility] ICD-10-CM: E11.65  ICD-9-CM: 250.02  10/8/2018 - Present        Mixed hyperlipidemia ICD-10-CM: E78.2  ICD-9-CM: 272.2  10/8/2018 - Present        Implantable cardioverter-defibrillator (ICD) in situ ICD-10-CM: Z95.810  ICD-9-CM: V45.02  11/16/2010 - Present        Cardiomyopathy, idiopathic ([facility] ICD-10-CM: I42.8  ICD-9-CM: 425.4  10/28/2010 - Present        HTN (hypertension), benign ICD-10-CM: I10  ICD-9-CM: 401.1  10/28/2010 - Present        PVC (premature ventricular contraction) ICD-10-CM: I49.3  ICD-9-CM: 427.69  10/28/2010 - Present        RESOLVED: CHF (congestive heart failure) ([facility]) ICD-10-CM: I50.9  ICD-9-CM: 428.0  2/13/2021 - 8/3/2021              Medications reviewed  Current Facility-Administered Medications   Medication Dose Route Frequency    sodium chloride (NS) flush 5-40 mL  5-40 mL IntraVENous Q8H    sodium chloride (NS) flush 5-40 mL  5-40 mL IntraVENous PRN    acetaminophen (TYLENOL) tablet 650 mg  650 mg Oral Q6H PRN    Or    acetaminophen (TYLENOL) suppository 650 mg  650 mg Rectal Q6H PRN    ondansetron (ZOFRAN ODT) tablet 4 mg  4 mg Oral Q6H PRN    Or    ondansetron (ZOFRAN) injection 4 mg  4 mg IntraVENous Q6H PRN    pantoprazole (PROTONIX) tablet 40 mg  40 mg Oral ACB    PARoxetine (PAXIL) tablet 20 mg  20 mg Oral DAILY    losartan (COZAAR) tablet 100 mg  100 mg Oral DAILY    nitroglycerin (NITROSTAT) tablet 0.4 mg  0.4 mg SubLINGual Q5MIN PRN    apixaban (ELIQUIS) tablet 5 mg  5 mg Oral BID    alogliptin (NESINA) tablet 12.5 mg  12.5 mg Oral DAILY    metFORMIN (GLUCOPHAGE) tablet 500 mg  500 mg Oral BID WITH MEALS    gabapentin (NEURONTIN) capsule 100 mg  100 mg Oral BID    atorvastatin (LIPITOR) tablet 40 mg  40 mg Oral QHS    glipiZIDE (GLUCOTROL) tablet 5 mg  5 mg Oral DAILY    oxyCODONE IR (ROXICODONE) tablet 5 mg  5 mg Oral Q6H PRN    cyclobenzaprine (FLEXERIL) tablet 5 mg  5 mg Oral BID PRN    hydroCHLOROthiazide (HYDRODIURIL) tablet 12.5 mg  12.5 mg Oral DAILY    amitriptyline (ELAVIL) tablet 75 mg  75 mg Oral DAILY    carvediloL (COREG) tablet 6.25 mg  6.25 mg Oral BID WITH MEALS    insulin lispro (HUMALOG) injection   SubCUTAneous AC&HS    glucose chewable tablet 16 g  4 Tablet Oral PRN    dextrose (D50W) injection syrg 12.5-25 g  25-50 mL IntraVENous PRN    glucagon (GLUCAGEN) injection 1 mg  1 mg IntraMUSCular PRN       Review of Systems:   A comprehensive review of systems was negative except for that written in the HPI. Objective:   Physical Exam:     Visit Vitals  BP (!) 142/96 (BP 1 Location: Left upper arm, BP Patient Position: At rest;Supine)   Pulse 80   Temp 98 °F (36.7 °C)   Resp 20   Ht 5' 6\" (1.676 m)   Wt 86.2 kg (190 lb 0.6 oz)   SpO2 98%   BMI 30.67 kg/m²    O2 Flow Rate (L/min): 2 l/min O2 Device: None (Room air)    Temp (24hrs), Av.7 °F (36.5 °C), Min:97.4 °F (36.3 °C), Max:98 °F (36.7 °C)    No intake/output data recorded. No intake/output data recorded. General:   Awake but fell asleep several times during my talk with her   Lungs:   Clear to auscultation bilaterally. Chest wall:  No tenderness or deformity. Heart:  Regular rate and rhythm, S1, S2 normal, no murmur, click, rub or gallop. Abdomen:   Soft, non-tender. Bowel sounds normal. No masses,  No organomegaly. Extremities: Extremities normal, atraumatic, no cyanosis or edema. Pulses: 2+ and symmetric all extremities. Skin: Skin color, texture, turgor normal. No rashes or lesions   Neurologic: CNII-XII intact. Left leg strength 4/5, right leg strength 4+/5.   Upper extremity strength is symmetric and intact         Data Review:       Recent Days:  No results for input(s): WBC, HGB, HCT, PLT, HGBEXT, HCTEXT, PLTEXT, HGBEXT, HCTEXT, PLTEXT in the last 72 hours. No results for input(s): NA, K, CL, CO2, GLU, BUN, CREA, CA, MG, PHOS, ALB, TBIL, TBILI, ALT, INR, INREXT, INREXT in the last 72 hours. No lab exists for component: SGOT  No results for input(s): PH, PCO2, PO2, HCO3, FIO2 in the last 72 hours. 24 Hour Results:  Recent Results (from the past 24 hour(s))   GLUCOSE, POC    Collection Time: 08/05/21 12:45 PM   Result Value Ref Range    Glucose (POC) 141 (H) 65 - 117 mg/dL    Performed by [address] 30, POC    Collection Time: 08/05/21  3:19 PM   Result Value Ref Range    Glucose (POC) 98 65 - 117 mg/dL    Performed by Rosangela 30, POC    Collection Time: 08/05/21  7:39 PM   Result Value Ref Range    Glucose (POC) 101 65 - 117 mg/dL    Performed by Jose Alberto Beasley, POC    Collection Time: 08/06/21  7:38 AM   Result Value Ref Range    Glucose (POC) 72 65 - 117 mg/dL    Performed by Vipin Trujillo        CT HEAD WO CONT   Final Result   No acute intracranial process. Nonacute appearing lacunar   infarction in the left basal ganglia, stable. Other findings as above. CT SPINE [facility] WO CONT   Final Result      CT SPINE LUMB WO CONT   Final Result      CT HEAD WO CONT   Final Result   1. No acute hemorrhage, acute large vessel distribution infarct. 2. Left basal ganglia lacunar infarct of undetermined acuity. 3. Atrophy and small vessel ischemic disease. Bilateral small focal   encephalomalacia from remote ischemic processes. 4. Right maxillary sinus disease. XR CHEST PORT   Final Result   Enlarged cardiac silhouette, increased in size compared to prior study. This may   be due to patient rotation, but pericardial effusion is not excluded. Correlate   clinically. Assessment:  Generalized weakness with ambulatory dysfunction. Acute stroke ruled out    Left basal ganglia lacunar infarct of indeterminate age.   Would not explain left leg weakness    Chronic back pain    Acute kidney injury, creatinine 1.45 with baseline 1.09 in February    Indeterminate troponin elevation    Paroxysmal atrial fibrillation     Chronic systolic heart failure, EF 20% in February    AICD in situ    Plan:  Continue current medications  SNF referral underway    Care Plan discussed with: Patient/Family    Disposition: SNF    Total time spent with patient: 30 minutes.     Nina Noguera MD

## 2021-08-06 NOTE — PROGRESS NOTES
Problem: Falls - Risk of  Goal: *Absence of Falls  Description: Document Mason Benjamin Fall Risk and appropriate interventions in the flowsheet.   Outcome: Progressing Towards Goal  Note: Fall Risk Interventions:  Mobility Interventions: PT Consult for mobility concerns    Mentation Interventions: Door open when patient unattended, Bed/chair exit alarm    Medication Interventions: Patient to call before getting OOB, Teach patient to arise slowly, Bed/chair exit alarm    Elimination Interventions: Bed/chair exit alarm, Call light in reach, Patient to call for help with toileting needs    History of Falls Interventions: Bed/chair exit alarm

## 2021-08-06 NOTE — PROGRESS NOTES
ELAYNE Plan:    -d/c to [facility] (medically stable)         SW informed patient has been accepted at [facility]. Bed availability today. RN to call report [phone].  Requesting transport @ 18:00PM.         YADIRA Gonzalez

## 2021-08-07 NOTE — PROGRESS NOTES
Hospitalist Progress Note               Daily Progress Note: 8/6/2021    Subjective:   Hospital course to date: Patient is a 70-year-old female with history of hypertension, atrial fibrillation and hyperlipidemia who presented to the ED last night with complaints of severe lower extremity weakness. Patient had COVID-19 in February and since then has been very weak. She apparently had a fall after an AICD was placed at [facility] in May and this is caused more problems with ambulation. Per ED physician report patient had acute worsening of bilateral leg weakness just 2 days ago. The admitting physician felt like she had some right leg pain and weakness as opposed to the left and has requested orthopedics evaluation. Cardiology consultation was also requested. CT of the brain obtained last night showed a left basal ganglia lacunar infarct of undetermined acuity although certainly could account for some right leg weakness. In addition there was a left frontal and right parietal encephalomalacia from remote ischemia. When I questioned the patient, she states she feels like her left leg is weaker than the right. Patient does admit  to chronic back problems    CT of the lumbar spine showed advanced facet DJD but no foraminal stenosis, no spinal stenosis    Repeat head CT was obtained which showed no evolutionary changes in the left basal ganglia infarct. Her weakness is not felt to be due to stroke    ------    Patient is seen today for follow-up.   Awake but pleasantly confused    Problem List:  Problem List as of 8/7/2021 Date Reviewed: 5/12/2021        Codes Class Noted - Resolved    Gait disturbance ICD-10-CM: R26.9  ICD-9-CM: 781.2  8/2/2021 - Present        Elevated troponin I level ICD-10-CM: R77.8  ICD-9-CM: 790.6  8/2/2021 - Present        Shortness of breath ICD-10-CM: R06.02  ICD-9-CM: 786.05  2/13/2021 - Present        Fever ICD-10-CM: R50.9  ICD-9-CM: 780.60  2/13/2021 - Present        Acute CHF (congestive heart failure) (HCC) ICD-10-CM: I50.9  ICD-9-CM: 428.0  2/13/2021 - Present        COVID-19 virus infection ICD-10-CM: U07.1  ICD-9-CM: 079.89  2/13/2021 - Present        Uncontrolled type 2 diabetes mellitus with hyperglycemia ([facility] ICD-10-CM: E11.65  ICD-9-CM: 250.02  10/8/2018 - Present        Mixed hyperlipidemia ICD-10-CM: E78.2  ICD-9-CM: 272.2  10/8/2018 - Present        Implantable cardioverter-defibrillator (ICD) in situ ICD-10-CM: Z95.810  ICD-9-CM: V45.02  11/16/2010 - Present        Cardiomyopathy, idiopathic ([facility] ICD-10-CM: I42.8  ICD-9-CM: 425.4  10/28/2010 - Present        HTN (hypertension), benign ICD-10-CM: I10  ICD-9-CM: 401.1  10/28/2010 - Present        PVC (premature ventricular contraction) ICD-10-CM: I49.3  ICD-9-CM: 427.69  10/28/2010 - Present        RESOLVED: CHF (congestive heart failure) ([facility]) ICD-10-CM: I50.9  ICD-9-CM: 428.0  2/13/2021 - 8/3/2021              Medications reviewed  Current Facility-Administered Medications   Medication Dose Route Frequency    dextrose 5 % - 0.45% NaCl infusion  75 mL/hr IntraVENous CONTINUOUS    sodium chloride (NS) flush 5-40 mL  5-40 mL IntraVENous Q8H    sodium chloride (NS) flush 5-40 mL  5-40 mL IntraVENous PRN    acetaminophen (TYLENOL) tablet 650 mg  650 mg Oral Q6H PRN    Or    acetaminophen (TYLENOL) suppository 650 mg  650 mg Rectal Q6H PRN    ondansetron (ZOFRAN ODT) tablet 4 mg  4 mg Oral Q6H PRN    Or    ondansetron (ZOFRAN) injection 4 mg  4 mg IntraVENous Q6H PRN    pantoprazole (PROTONIX) tablet 40 mg  40 mg Oral ACB    PARoxetine (PAXIL) tablet 20 mg  20 mg Oral DAILY    losartan (COZAAR) tablet 100 mg  100 mg Oral DAILY    nitroglycerin (NITROSTAT) tablet 0.4 mg  0.4 mg SubLINGual Q5MIN PRN    apixaban (ELIQUIS) tablet 5 mg  5 mg Oral BID    alogliptin (NESINA) tablet 12.5 mg  12.5 mg Oral DAILY    atorvastatin (LIPITOR) tablet 40 mg  40 mg Oral QHS    cyclobenzaprine (FLEXERIL) tablet 5 mg  5 mg Oral BID PRN    hydroCHLOROthiazide (HYDRODIURIL) tablet 12.5 mg  12.5 mg Oral DAILY    amitriptyline (ELAVIL) tablet 75 mg  75 mg Oral DAILY    carvediloL (COREG) tablet 6.25 mg  6.25 mg Oral BID WITH MEALS    insulin lispro (HUMALOG) injection   SubCUTAneous AC&HS    glucose chewable tablet 16 g  4 Tablet Oral PRN    dextrose (D50W) injection syrg 12.5-25 g  25-50 mL IntraVENous PRN    glucagon (GLUCAGEN) injection 1 mg  1 mg IntraMUSCular PRN       Review of Systems:   A comprehensive review of systems was negative except for that written in the HPI. Objective:   Physical Exam:     Visit Vitals  BP (!) 148/104 (BP 1 Location: Right lower arm, BP Patient Position: Supine)   Pulse 95   Temp 98.3 °F (36.8 °C)   Resp 18   Ht 5' 6\" (1.676 m)   Wt 86.2 kg (190 lb 0.6 oz)   SpO2 97%   BMI 30.67 kg/m²    O2 Flow Rate (L/min): 2 l/min O2 Device: None (Room air)    Temp (24hrs), Av.7 °F (36.5 °C), Min:97.2 °F (36.2 °C), Max:98.3 °F (36.8 °C)    No intake/output data recorded. No intake/output data recorded. General:   Awake but pleasantly confused   Lungs:   Clear to auscultation bilaterally. Chest wall:  No tenderness or deformity. Heart:  Regular rate and rhythm, S1, S2 normal, no murmur, click, rub or gallop. Abdomen:   Soft, non-tender. Bowel sounds normal. No masses,  No organomegaly. Extremities: Extremities normal, atraumatic, no cyanosis or edema. Pulses: 2+ and symmetric all extremities. Skin: Skin color, texture, turgor normal. No rashes or lesions   Neurologic: CNII-XII intact. Left leg strength 4/5, right leg strength 4+/5. Upper extremity strength is symmetric and intact         Data Review:       Recent Days:  No results for input(s): WBC, HGB, HCT, PLT, HGBEXT, HCTEXT, PLTEXT, HGBEXT, HCTEXT, PLTEXT in the last 72 hours. No results for input(s): NA, K, CL, CO2, GLU, BUN, CREA, CA, MG, PHOS, ALB, TBIL, TBILI, ALT, INR, INREXT, INREXT in the last 72 hours.     No lab exists for component: SGOT  Recent Labs     08/06/21  1630   PH 7.42   PCO2 36   PO2 86   HCO3 24   FIO2 21.0       24 Hour Results:  Recent Results (from the past 24 hour(s))   GLUCOSE, POC    Collection Time: 08/06/21 12:27 PM   Result Value Ref Range    Glucose (POC) 87 65 - 117 mg/dL    Performed by [address] [place] [address], POC    Collection Time: 08/06/21  2:09 PM   Result Value Ref Range    Glucose (POC) 70 65 - 117 mg/dL    Performed by [address] [place] [address],[address], POC    Collection Time: 08/06/21  2:21 PM   Result Value Ref Range    Glucose (POC) 211 (H) 65 - 117 mg/dL    Performed by [address] [place] [address],[address], POC    Collection Time: 08/06/21  3:39 PM   Result Value Ref Range    Glucose (POC) 130 (H) 65 - 117 mg/dL    Performed by Duyen Saravia    BLOOD GAS, ARTERIAL    Collection Time: 08/06/21  4:30 PM   Result Value Ref Range    pH 7.42 7.35 - 7.45      PCO2 36 35 - 45 mmHg    PO2 86 75 - 100 mmHg    O2 SAT 96 >95 %    BICARBONATE 24 22 - 26 mmol/L    BASE DEFICIT 0.7 0 - 2 mmol/L    O2 METHOD Room air      FIO2 21.0 %    SITE Right Radial      ROBBIN'S TEST PASS     GLUCOSE, POC    Collection Time: 08/06/21  8:21 PM   Result Value Ref Range    Glucose (POC) 144 (H) 65 - 117 mg/dL    Performed by Kenji Puente    GLUCOSE, POC    Collection Time: 08/07/21  6:23 AM   Result Value Ref Range    Glucose (POC) 218 (H) 65 - 117 mg/dL    Performed by NAEEM BAIG    GLUCOSE, POC    Collection Time: 08/07/21  7:13 AM   Result Value Ref Range    Glucose (POC) 137 (H) 65 - 117 mg/dL    Performed by Lizet Blount        CT HEAD WO CONT   Final Result   Age-appropriate atrophy. No acute findings. CT HEAD WO CONT   Final Result   No acute intracranial process. Nonacute appearing lacunar   infarction in the left basal ganglia, stable. Other findings as above. CT SPINE [facility] WO CONT   Final Result      CT SPINE LUMB WO CONT   Final Result      CT HEAD WO CONT   Final Result   1.  No acute hemorrhage, acute large vessel distribution infarct. 2. Left basal ganglia lacunar infarct of undetermined acuity. 3. Atrophy and small vessel ischemic disease. Bilateral small focal   encephalomalacia from remote ischemic processes. 4. Right maxillary sinus disease. XR CHEST PORT   Final Result   Enlarged cardiac silhouette, increased in size compared to prior study. This may   be due to patient rotation, but pericardial effusion is not excluded. Correlate   clinically. Assessment:  Generalized weakness with ambulatory dysfunction. Acute stroke ruled out    Left basal ganglia lacunar infarct of indeterminate age. Would not explain left leg weakness    Chronic back pain    Acute kidney injury, creatinine 1.45 with baseline 1.09 in February    Indeterminate troponin elevation    Paroxysmal atrial fibrillation     Chronic systolic heart failure, EF 20% in February    AICD in situ    Plan:  Continue current medications  SNF referral underway    Care Plan discussed with: Patient/Family    Disposition: SNF    Total time spent with patient: 30 minutes.     Laroy Cushing, MD

## 2021-08-07 NOTE — DISCHARGE SUMMARY
Physician Discharge Summary     Patient ID:    Malik Fox  [id number]  78 y.o.  1955    Admit date: 8/2/2021    Discharge date : 8/7/2021    Chronic Diagnoses:    Problem List as of 8/7/2021 Date Reviewed: 5/12/2021        Codes Class Noted - Resolved    Gait disturbance ICD-10-CM: R26.9  ICD-9-CM: 781.2  8/2/2021 - Present        Elevated troponin I level ICD-10-CM: R77.8  ICD-9-CM: 790.6  8/2/2021 - Present        Shortness of breath ICD-10-CM: R06.02  ICD-9-CM: 786.05  2/13/2021 - Present        Fever ICD-10-CM: R50.9  ICD-9-CM: 780.60  2/13/2021 - Present        Acute CHF (congestive heart failure) ([facility] ICD-10-CM: I50.9  ICD-9-CM: 428.0  2/13/2021 - Present        COVID-19 virus infection ICD-10-CM: U07.1  ICD-9-CM: 079.89  2/13/2021 - Present        Uncontrolled type 2 diabetes mellitus with hyperglycemia ([facility] ICD-10-CM: E11.65  ICD-9-CM: 250.02  10/8/2018 - Present        Mixed hyperlipidemia ICD-10-CM: E78.2  ICD-9-CM: 272.2  10/8/2018 - Present        Implantable cardioverter-defibrillator (ICD) in situ ICD-10-CM: Z95.810  ICD-9-CM: V45.02  11/16/2010 - Present        Cardiomyopathy, idiopathic ([facility] ICD-10-CM: I42.8  ICD-9-CM: 425.4  10/28/2010 - Present        HTN (hypertension), benign ICD-10-CM: I10  ICD-9-CM: 401.1  10/28/2010 - Present        PVC (premature ventricular contraction) ICD-10-CM: I49.3  ICD-9-CM: 427.69  10/28/2010 - Present        RESOLVED: CHF (congestive heart failure) (HCC) ICD-10-CM: I50.9  ICD-9-CM: 428.0  2/13/2021 - 8/3/2021          22    Final Diagnoses:   Gait disturbance [R26.9]  Elevated troponin I level [R77.8]   Generalized weakness with ambulatory dysfunction. Acute stroke ruled out     Left basal ganglia lacunar infarct of indeterminate age.   Would not explain left leg weakness     Chronic back pain     Acute kidney injury, creatinine 1.45 with baseline 1.09 in February     Indeterminate troponin elevation     Paroxysmal atrial fibrillation      Chronic systolic heart failure, EF 20% in February     AICD in situ    Reason for Hospitalization:    Lower extremity weakness    Hospital Course:   Patient is a 39-year-old female with history of hypertension, atrial fibrillation and hyperlipidemia who presented to the ED last night with complaints of severe lower extremity weakness. Patient had COVID-19 in February and since then has been very weak. She apparently had a fall after an AICD was placed at [facility] in May and this is caused more problems with ambulation. Per ED physician report patient had acute worsening of bilateral leg weakness just 2 days ago. The admitting physician felt like she had some right leg pain and weakness as opposed to the left and has requested orthopedics evaluation. Cardiology consultation was also requested. CT of the brain obtained and showed a left basal ganglia lacunar infarct of undetermined acuity although certainly could account for some right leg weakness. In addition there was a left frontal and right parietal encephalomalacia from remote ischemia. She was admitted to medical telemetry floor and seen in consultation by physical and Occupational Therapy. Recommended acute inpatient rehab placement. Deemed stable for discharge to the rehab. Noted with intermittent. Of confusion              Discharge Medications:   Current Discharge Medication List      START taking these medications    Details   carvediloL (COREG) 6.25 mg tablet Take 1 Tablet by mouth two (2) times daily (with meals). Qty: 60 Tablet, Refills: 0  Start date: 8/5/2021         CONTINUE these medications which have NOT CHANGED    Details   oxyCODONE IR (ROXICODONE) 5 mg immediate release tablet Take 5 mg by mouth every four (4) hours as needed for Pain. cyclobenzaprine (FLEXERIL) 5 mg tablet Take 5 mg by mouth two (2) times daily as needed for Muscle Spasm(s).       hydroCHLOROthiazide (HYDRODIURIL) 12.5 mg tablet Take 12.5 mg by mouth daily. amitriptyline (ELAVIL) 75 mg tablet Take 75 mg by mouth daily. glipiZIDE (GLUCOTROL) 5 mg tablet Take 5 mg by mouth daily. atorvastatin (LIPITOR) 40 mg tablet Take 1 Tab by mouth nightly. Qty: 60 Tab, Refills: 1      furosemide (Lasix) 40 mg tablet 1 tablet daily  Qty: 60 Tab, Refills: 0      metFORMIN (GLUCOPHAGE) 1,000 mg tablet two (2) times daily (with meals). gabapentin (NEURONTIN) 100 mg capsule TAKE 1 CAPSULE BY MOUTH THREE TIMES A DAY      SITagliptin (Januvia) 100 mg tablet TAKE 1 TABLET BY MOUTH EVERY DAY  Qty: 90 Tab, Refills: 2    Comments: EMERGENCY HOLDOVER PROVIDED: #[id number]. [facility] GAVE 3 JANUVIA 100 MG TABLET. MD CONTACTED FOR AUTHORIZATION ON 08/01/2020      metoprolol succinate (TOPROL-XL) 25 mg XL tablet TAKE 0.5 TABS BY MOUTH DAILY. Qty: 45 Tab, Refills: 11    Associated Diagnoses: HTN (hypertension), benign; PVC (premature ventricular contraction)      ELIQUIS 5 mg tablet TAKE 1 TABLET BY MOUTH TWICE A DAY  Qty: 180 Tab, Refills: 3      cloNIDine HCL (CATAPRES) 0.2 mg tablet Take 0.2 mg by mouth two (2) times a day. Refills: 3      losartan (COZAAR) 100 mg tablet Take 100 mg by mouth daily. Refills: 2      PARoxetine (PAXIL) 20 mg tablet Take 20 mg by mouth daily. prednisoLONE acetate (PRED FORTE) 1 % ophthalmic suspension INSTILL 1 DROP INTO RIGHT EYE 4 TIMES A DAY      nitroglycerin (NITROSTAT) 0.4 mg SL tablet 0.4 mg by SubLINGual route every five (5) minutes as needed for Chest Pain. Up to 3 doses. Associated Diagnoses: Type 2 diabetes mellitus with hyperglycemia, unspecified whether long term insulin use (HCC)      omeprazole (PRILOSEC) 20 mg capsule Take 20 mg by mouth daily. Follow up Care:    1. [place] Barron MD in 1-2 weeks. Please call to set up an appointment shortly after discharge. Diet:  Cardiac Diet    Disposition:  acute rehab.     Advanced Directive:   FULL x   DNR      Discharge Exam:  Visit Vitals  BP (!) 146/97 (BP 1 Location: Right lower arm, BP Patient Position: Supine;Lying)   Pulse 70   Temp 98.6 °F (37 °C)   Resp 14   Ht 5' 6\" (1.676 m)   Wt 86.2 kg (190 lb 0.6 oz)   SpO2 93%   BMI 30.67 kg/m²    O2 Flow Rate (L/min): 2 l/min O2 Device: None (Room air)    Temp (24hrs), Av.9 °F (36.6 °C), Min:97.2 °F (36.2 °C), Max:98.6 °F (37 °C)    No intake/output data recorded. No intake/output data recorded. General:  Alert, cooperative, no distress, appears stated age. Lungs:   Clear to auscultation bilaterally. Chest wall:  No tenderness or deformity. Heart:  Regular rate and rhythm, S1, S2 normal, no murmur, click, rub or gallop. Abdomen:   Soft, non-tender. Bowel sounds normal. No masses,  No organomegaly. Extremities: Extremities normal, atraumatic, no cyanosis or edema. Pulses: 2+ and symmetric all extremities. Skin: Skin color, texture, turgor normal. No rashes or lesions   Neurologic: CNII-XII intact. No gross sensory or motor deficits         CONSULTATIONS: Cardiology    Significant Diagnostic Studies:   2021: BUN 31 mg/dL* (Ref range: 6 - 20 mg/dL); Calcium 8.9 mg/dL (Ref range: 8.5 - 10.1 mg/dL); CO2 25 mmol/L (Ref range: 21 - 32 mmol/L); Creatinine 1.45 mg/dL* (Ref range: 0.55 - 1.02 mg/dL); Glucose 151 mg/dL* (Ref range: 65 - 100 mg/dL); HCT 40.5 % (Ref range: 35.0 - 47.0 %); HGB 12.8 g/dL (Ref range: 11.5 - 16.0 g/dL); Potassium 3.3 mmol/L* (Ref range: 3.5 - 5.1 mmol/L); Sodium 140 mmol/L (Ref range: 136 - 145 mmol/L)  No results for input(s): WBC, HGB, HCT, PLT, HGBEXT, HCTEXT, PLTEXT in the last 72 hours. No results for input(s): NA, K, CL, CO2, BUN, CREA, GLU, CA, MG, PHOS, URICA in the last 72 hours. No results for input(s): ALT, AP, TBIL, TBILI, TP, ALB, GLOB, GGT, AML, LPSE in the last 72 hours. No lab exists for component: SGOT, GPT, AMYP, HLPSE  No results for input(s): INR, PTP, APTT, INREXT in the last 72 hours.    No results for input(s): FE, TIBC, PSAT, FERR in the last 72 hours. Recent Labs     08/06/21  1630   PH 7.42   PCO2 36   PO2 86     No results for input(s): CPK, CKMB in the last 72 hours.     No lab exists for component: TROPONINI  Lab Results   Component Value Date/Time    Glucose (POC) 137 (H) 08/07/2021 07:13 AM    Glucose (POC) 218 (H) 08/07/2021 06:23 AM    Glucose (POC) 144 (H) 08/06/2021 08:21 PM    Glucose (POC) 130 (H) 08/06/2021 03:39 PM    Glucose (POC) 211 (H) 08/06/2021 02:21 PM       Total Time: 35 minutes    Signed:  Tamra Boland MD  8/7/2021  11:12 AM

## 2021-08-07 NOTE — PROGRESS NOTES
8/7/2021            RE: [id number] [facility] [id number]              To Whom It May Concern,      Due to medical reasons, Arti Montgomery visited sick mum at [facility] 8/06/21. She may return to work without restrictions.           Sincerely,          Estrella Thakkar MD

## 2021-08-07 NOTE — PROGRESS NOTES
Awaiting transport to take patient to [facility].  The ambulance said they would be here at 2pm but still not here and its 7 pm.

## 2021-08-07 NOTE — DISCHARGE INSTRUCTIONS
Patient Education        Learning About Atrial Fibrillation  What is atrial fibrillation? [facility] (say \"AY-tree-yadi osi-aaln-MAU-marisolun\") is a common type of irregular heartbeat (arrhythmia). Normally, the heart beats in a strong, steady rhythm. In atrial fibrillation, a problem with the heart's electrical system causes the two upper chambers of the heart (called the atria) to quiver, or fibrillate. [facility] can be dangerous. This is because if the heartbeat isn't strong and steady, blood can collect, or pool, in the atria. And pooled blood is more likely to form clots. Clots can travel to the brain, block blood flow, and cause a stroke. [facility] can also lead to heart failure. This condition also upsets the normal rhythm between the atria and the lower chambers of the heart. (These chambers are called the ventricles.) The ventricles may beat fast and without a regular rhythm. What are the symptoms? Some people feel symptoms when they have episodes of atrial fibrillation. But other people don't notice any symptoms. If you have symptoms, you may feel:  · A fluttering, racing, or pounding feeling in your chest called palpitations. · Weak or tired. · Dizzy or lightheaded. · Short of breath. · Chest pain. · Confused. You may notice signs of atrial fibrillation when you check your pulse. Your pulse may seem uneven or fast.  What can you expect when you have atrial fibrillation? At first, spells of atrial fibrillation may come on suddenly and last a short time. They may go away on their own or with treatment. Over time, the spells may last longer and occur more often. They often don't go away on their own. How is it treated? Treatments can help you feel better and prevent future problems, especially stroke and heart failure. Your treatment will depend on the cause of your atrial fibrillation, your symptoms, and your risk for stroke.  Types of treatment include:  · Heart rate treatment. Medicine may be used to slow your heart rate. Your heartbeat may still be irregular. But these medicines keep your heart from beating too fast. They may also help relieve symptoms. · Heart rhythm treatment. Different treatments may be used to try to stop atrial fibrillation and keep it from returning. They can also relieve symptoms. These treatments include medicine, electrical cardioversion to shock the heart back to a normal rhythm, a procedure called catheter ablation, and heart surgery. · Stroke prevention. You and your doctor can decide how to lower your risk. You may decide to take a blood-thinning medicine called an anticoagulant. What is a heart-healthy lifestyle for atrial fibrillation? You can live well and help manage atrial fibrillation by having a heart-healthy lifestyle. This lifestyle may help reduce how often you have episodes of atrial fibrillation. Don't smoke. Avoid secondhand smoke too. Quitting smoking is the best thing you can do for your heart. Be active. Talk to your doctor about what type and level of exercise is safe for you. Eat a heart-healthy diet. These foods include vegetables, fruits, nuts, beans, lean meat, fish, and whole grains. Limit sodium, alcohol, and sugar. Avoid alcohol if it triggers symptoms. Stay at a healthy weight. Lose weight if you need to. Losing weight can help relieve symptoms. Manage other health problems. These problems include diabetes, high blood pressure, and high cholesterol. If you think you may have a problem with alcohol or drug use, talk to your doctor. Manage stress. Options like yoga, biofeedback, and meditation may help. Where can you learn more? Go to http://www.gray.com/  Enter L274 in the search box to learn more about \"Learning About Atrial Fibrillation. \"  Current as of: August 31, 2020               Content Version: 12.8  © 8918-5530 [employer].    Care instructions adapted under license by [employer] (which disclaims liability or warranty for this information). If you have questions about a medical condition or this instruction, always ask your healthcare professional. [address] any warranty or liability for your use of this information. Patient Education        Deciding Between Electrical Cardioversion and Rate Control Medicines for Atrial Fibrillation  How can you decide between electrical cardioversion and rate control medicines for atrial fibrillation? What is atrial fibrillation? [facility] (say \"AY-tree-yadi sdg-ztlf-MQC-shun\") is a kind of uneven heartbeat. It can make you feel lightheaded and dizzy. You may feel weak. It also can make you more likely to have a stroke. Electrical cardioversion can return your heart to a normal rhythm. First you'll get medicines to make you sleepy and control pain. Then your doctor will use patches to send an electric current to your heart. This resets the rhythm of your heart. Not everyone with atrial fibrillation needs this treatment. For some people, taking medicines may be better. Most people can live with an uneven heartbeat. It just has to be kept under control so the heart does not beat too fast.  Use this information to help you and your doctor decide which treatment to choose for atrial fibrillation. What are colindres points about this decision? · Electrical cardioversion can return your heart to a normal rhythm. But the problem can come back. The longer you have had atrial fibrillation, the more likely it is to come back after this treatment. · Cardioversion may not work as well when an uneven heartbeat is caused by another heart disease, such as heart failure. · If your symptoms bother you a lot, you may want to try cardioversion. But even if it works, you may still need to take blood thinners to prevent a stroke.   · If you don't have symptoms, or if they don't bother you much, you can try medicines to slow your heart rate. And you can take blood thinners to prevent a stroke. · Cardioversion does have risks, such as stroke. Discuss the risks with your doctor. Make sure you understand them. · You may have more than one heart problem. Cardioversion doesn't work as well if you have more than one heart problem. Why might you choose electrical cardioversion? · It restores the normal heart rhythm for most people. · The idea of having an electric shock does not bother you. · Your symptoms bother you a lot. · You have had atrial fibrillation just one time. · You do not have other heart problems. · You may not have to take as many medicines. Or you may not need to take them as long. Why might you choose rate-control medicines? · These medicines keep many people from having symptoms. · You prefer to take medicines rather than have an electric shock. · Your symptoms don't bother you much. · If these medicines don't work, you can still try electrical cardioversion. Your decision  Thinking about the facts and your feelings can help you make a decision that is right for you. Be sure you understand the benefits and risks of your options. And think about what else you need to do before you make the decision. Where can you learn more? Go to http://www.gray.com/  Enter M647 in the search box to learn more about \"Deciding Between Electrical Cardioversion and Rate Control Medicines for Atrial Fibrillation. \"  Current as of: August 31, 2020               Content Version: 12.8  © 0362-9475 [employer]. Care instructions adapted under license by [employer] (which disclaims liability or warranty for this information). If you have questions about a medical condition or this instruction, always ask your healthcare professional. [employer] any warranty or liability for your use of this information.

## 2021-08-07 NOTE — PROGRESS NOTES
DC Plan: [facility] [address] [facility]       Room# 208 and report to call: [phone]    Cm spoke with pt's daughter - Kiesha Jacob [phone] and notified her of discharge to [facility] today. Mrs. Ricks Sessions would like to know what time pt will be transported to [facility]. CM spoke with pt's primary nurse. Nurse to contact daughter when she has a  time for pt. Discharge plan of care/case management plan validated with provider's discharge order.

## 2021-08-07 NOTE — PROGRESS NOTES
[id number] called report to [facility] accepting nurse at [facility] rehab facility. Daughter notified patient leaving around 2p. . discharge reviewed with  and patient. Copy sent with patient. Discharge plan of care/case management plan validated with provider discharge order.

## 2021-10-02 PROBLEM — I50.9 CHF EXACERBATION (HCC): Status: ACTIVE | Noted: 2021-01-01

## 2021-10-02 NOTE — ED NOTES
Report called to Loli Reich RN assuming care of pt going to room 482, 4W. Pt to be transported to room on remote tele.  NAD

## 2021-10-02 NOTE — H&P
History & Physical    Primary Care Provider: Yessica Messina MD  Source of Information: Patient, records    History of Presenting Illness:   Vicente Cunningham is a 78 y.o. female who presents from home with multiple comorbidities including sever cardiomyopathy with 15-20% lvef and grade 2 dd(2/2021 with aicd in place, htn, afib, dm2 insulin requiring. Presents with c/o increased weakness, decreased appetite and swollen legs. She had covid 1 year ago, reportedly weak since. She provides limited hx, states difficulty getting around as well 2/2 weakness. No sob, no cp, no congestion. No n/v/d. Review of Systems:  A comprehensive review of systems was negative except for that written in the History of Present Illness. Past Medical History:   Diagnosis Date    [facility]     Cardiomyopathy, idiopathic ([facility] 10/28/2010    HTN (hypertension), benign 10/28/2010    Pacemaker     ICD    PVC (premature ventricular contraction) 10/28/2010      Past Surgical History:   Procedure Laterality Date    HX OTHER SURGICAL  10/02/2018    Right eye surgery    HX PACEMAKER       Prior to Admission medications    Medication Sig Start Date End Date Taking? Authorizing Provider   carvediloL (COREG) 6.25 mg tablet Take 1 Tablet by mouth two (2) times daily (with meals). 8/5/21   Kylah Bolton MD   oxyCODONE IR (ROXICODONE) 5 mg immediate release tablet Take 5 mg by mouth every four (4) hours as needed for Pain. 7/16/21   Provider, Historical   cyclobenzaprine (FLEXERIL) 5 mg tablet Take 5 mg by mouth two (2) times daily as needed for Muscle Spasm(s). 7/19/21   Provider, Historical   hydroCHLOROthiazide (HYDRODIURIL) 12.5 mg tablet Take 12.5 mg by mouth daily. 5/7/21   Provider, Historical   amitriptyline (ELAVIL) 75 mg tablet Take 75 mg by mouth daily. 5/3/21   Provider, Historical   glipiZIDE (GLUCOTROL) 5 mg tablet Take 5 mg by mouth daily.  4/29/21   Provider, Historical   prednisoLONE acetate (PRED FORTE) 1 % ophthalmic suspension INSTILL 1 DROP INTO RIGHT EYE 4 TIMES A DAY 3/13/21   Provider, Historical   atorvastatin (LIPITOR) 40 mg tablet Take 1 Tab by mouth nightly. 2/19/21   Carlton Dumont MD   furosemide (Lasix) 40 mg tablet 1 tablet daily  Patient taking differently: Take 40 mg by mouth daily. 1 tablet daily 2/19/21   Kenyon Louis MD   metFORMIN (GLUCOPHAGE) 1,000 mg tablet two (2) times daily (with meals). 10/12/20   Provider, Historical   gabapentin (NEURONTIN) 100 mg capsule TAKE 1 CAPSULE BY MOUTH THREE TIMES A DAY 10/27/20   Provider, Historical   SITagliptin (Januvia) 100 mg tablet TAKE 1 TABLET BY MOUTH EVERY DAY  Patient taking differently: Take 100 mg by mouth daily. TAKE 1 TABLET BY MOUTH EVERY DAY 8/2/20   Anny Eid MD   metoprolol succinate (TOPROL-XL) 25 mg XL tablet TAKE 0.5 TABS BY MOUTH DAILY. 12/16/19   Mei Boyer NP   ELIQUIS 5 mg tablet TAKE 1 TABLET BY MOUTH TWICE A DAY 12/5/19   Mei Boyer NP   nitroglycerin (NITROSTAT) 0.4 mg SL tablet 0.4 mg by SubLINGual route every five (5) minutes as needed for Chest Pain. Up to 3 doses. Patient not taking: Reported on 8/3/2021    Provider, Historical   cloNIDine HCL (CATAPRES) 0.2 mg tablet Take 0.2 mg by mouth two (2) times a day. 5/21/18   Provider, Historical   losartan (COZAAR) 100 mg tablet Take 100 mg by mouth daily. 4/25/17   Provider, Historical   PARoxetine (PAXIL) 20 mg tablet Take 20 mg by mouth daily. Provider, Historical   omeprazole (PRILOSEC) 20 mg capsule Take 20 mg by mouth daily.  10/28/10   Provider, Historical     Allergies   Allergen Reactions    Chocolate [Cocoa] Anaphylaxis and Swelling    Iodine Anaphylaxis    Lisinopril Anaphylaxis    Peanut Anaphylaxis and Swelling    Glimepiride Swelling    Glipizide Other (comments)     Pruritis    Metformin Diarrhea    Norvasc [Amlodipine] Other (comments)     Light Headed    Pneumovax 23 [Pneumococcal 23-Devi Ps Vaccine] Hives    Toprol Xl [Metoprolol Succinate] Other (comments)     Light headed    Vitamin D [Cholecalciferol (Vitamin D3)] Other (comments)     Dizziness/headache      No family history on file. SOCIAL HISTORY:  Patient resides:  Independently    Assisted Living    SNF    With family care       Smoking history:   None    Former    Chronic      Alcohol history:   None    Social    Chronic      Ambulates:   Independently    w/cane    w/walker    w/wc    CODE STATUS:  DNR    Full    Other      Objective:     Physical Exam:     Visit Vitals  BP (!) 130/101   Pulse 93   Temp 97.7 °F (36.5 °C)   Resp 22   Ht 5' 6\" (1.676 m)   Wt 65.8 kg (145 lb)   SpO2 96%   BMI 23.40 kg/m²      O2 Device: None    Constitutional:       Appearance: Normal appearance. She is normal weight. HENT:      Head: Normocephalic and atraumatic. Nose: Nose normal.      Mouth/Throat:      Mouth: Mucous membranes are moist.      Pharynx: Oropharynx is clear. Eyes:      Extraocular Movements: Extraocular movements intact. Conjunctiva/sclera: Conjunctivae normal.      Pupils: Pupils are equal, round, and reactive to light. Cardiovascular:      Rate and Rhythm: Normal rate and regular rhythm. Pulses: Normal pulses. Heart sounds: Normal heart sounds. Pulmonary:      Effort: Pulmonary effort is normal. No respiratory distress. Breath sounds: Normal breath sounds. Abdominal:      General: Abdomen is flat. Bowel sounds are normal. There is no distension. Palpations: Abdomen is soft. Tenderness: There is no abdominal tenderness. There is no guarding. Musculoskeletal:         General: No swelling, tenderness, deformity or signs of injury. Normal range of motion. Cervical back: Normal range of motion and neck supple. Right lower leg: Edema present. Left lower leg: Edema present. Skin:     General: Skin is warm and dry.       Capillary Refill: Capillary refill takes less than 2 seconds. Findings: No lesion or rash. Neurological:      General: No focal deficit present. Mental Status: She is alert and oriented to person, place, and time. Mental status is at baseline. Cranial Nerves: No cranial nerve deficit. Psychiatric:         Mood and Affect: Mood normal.         Behavior: Behavior normal.         Thought Content: Thought content normal.         Judgment: Judgment normal.         Data Review:     Recent Days:  Recent Labs     10/02/21  1556   WBC 6.4   HGB 13.3   HCT 42.5   *     Recent Labs     10/02/21  1556      K 4.0      CO2 26   *   BUN 44*   CREA 2.21*   CA 9.2   MG 2.3   ALB 2.7*   TBILI 1.0   ALT 20     No results for input(s): PH, PCO2, PO2, HCO3, FIO2 in the last 72 hours. 24 Hour Results:  Recent Results (from the past 24 hour(s))   CBC WITH AUTOMATED DIFF    Collection Time: 10/02/21  3:56 PM   Result Value Ref Range    WBC 6.4 3.6 - 11.0 K/uL    RBC 4.87 3.80 - 5.20 M/uL    HGB 13.3 11.5 - 16.0 g/dL    HCT 42.5 35.0 - 47.0 %    MCV 87.3 80.0 - 99.0 FL    MCH 27.3 26.0 - 34.0 PG    MCHC 31.3 30.0 - 36.5 g/dL    RDW 18.6 (H) 11.5 - 14.5 %    PLATELET 420 (L) 780 - 400 K/uL    MPV 13.5 (H) 8.9 - 12.9 FL    NRBC 0.0 0.0  WBC    ABSOLUTE NRBC 0.00 0.00 - 0.01 K/uL    NEUTROPHILS 62 32 - 75 %    LYMPHOCYTES 24 12 - 49 %    MONOCYTES 11 5 - 13 %    EOSINOPHILS 2 0 - 7 %    BASOPHILS 0 0 - 1 %    IMMATURE GRANULOCYTES 1 (H) 0 - 0.5 %    ABS. NEUTROPHILS 4.0 1.8 - 8.0 K/UL    ABS. LYMPHOCYTES 1.5 0.8 - 3.5 K/UL    ABS. MONOCYTES 0.7 0.0 - 1.0 K/UL    ABS. EOSINOPHILS 0.1 0.0 - 0.4 K/UL    ABS. BASOPHILS 0.0 0.0 - 0.1 K/UL    ABS. IMM.  GRANS. 0.0 0.00 - 0.04 K/UL    DF AUTOMATED     METABOLIC PANEL, COMPREHENSIVE    Collection Time: 10/02/21  3:56 PM   Result Value Ref Range    Sodium 138 136 - 145 mmol/L    Potassium 4.0 3.5 - 5.1 mmol/L    Chloride 103 97 - 108 mmol/L    CO2 26 21 - 32 mmol/L    Anion gap 9 5 - 15 mmol/L Glucose 160 (H) 65 - 100 mg/dL    BUN 44 (H) 6 - 20 mg/dL    Creatinine 2.21 (H) 0.55 - 1.02 mg/dL    BUN/Creatinine ratio 20 12 - 20      GFR est AA 27 (L) >60 ml/min/1.73m2    GFR est non-AA 22 (L) >60 ml/min/1.73m2    Calcium 9.2 8.5 - 10.1 mg/dL    Bilirubin, total 1.0 0.2 - 1.0 mg/dL    AST (SGOT) 16 15 - 37 U/L    ALT (SGPT) 20 12 - 78 U/L    Alk. phosphatase 85 45 - 117 U/L    Protein, total 6.8 6.4 - 8.2 g/dL    Albumin 2.7 (L) 3.5 - 5.0 g/dL    Globulin 4.1 (H) 2.0 - 4.0 g/dL    A-G Ratio 0.7 (L) 1.1 - 2.2     TROPONIN I    Collection Time: 10/02/21  3:56 PM   Result Value Ref Range    Troponin-I, Qt. 0.34 (H) <0.05 ng/mL   BNP    Collection Time: 10/02/21  3:56 PM   Result Value Ref Range    NT pro-BNP 8,969 (H) <125 pg/mL   LACTIC ACID    Collection Time: 10/02/21  3:56 PM   Result Value Ref Range    Lactic acid 2.4 (HH) 0.4 - 2.0 mmol/L   TSH 3RD GENERATION    Collection Time: 10/02/21  3:56 PM   Result Value Ref Range    TSH 2.13 0.36 - 3.74 uIU/mL   MAGNESIUM    Collection Time: 10/02/21  3:56 PM   Result Value Ref Range    Magnesium 2.3 1.6 - 2.4 mg/dL   SARS-COV-2    Collection Time: 10/02/21  5:30 PM   Result Value Ref Range    SARS-CoV-2 Please find results under separate order     COVID-19 RAPID TEST    Collection Time: 10/02/21  5:30 PM   Result Value Ref Range    Specimen source Nasopharyngeal      COVID-19 rapid test Not Detected Not Detected           Imaging:     Assessment:     -Chf acute chronic combined  -Severe cardiomyopathy, ef 15-20 in 2/2021, grade 2 dd.  AICD in situ  -trop elevation  -Asthenia, chronic and appears progressive  -Rigo/ckd 3  -dm2 insulin treated, states not taking insulin lately with decreased appetite  -htpn  -HLD          Plan:     -IP admit > 2 nights  -diuresis, lasix daily, I/o's, weight every day, repeat tte  -cardio cx  -resume bb, hold arb 2/2 rigo  -serial trop r/o acs, resume eliquis, asa  -hold glipizide/januvia  -ssi/hypoglycemia protocol  -[facility] meds reviewed/reconciled    Vtep: eliquis  PPI  Full code  KIYA [facility]  Name Relation Home Work Mobile   Amadou Young Child [phone]       Dispo: pending course.        Signed By: Niki Mitchell MD     October 2, 2021

## 2021-10-02 NOTE — ED PROVIDER NOTES
EMERGENCY DEPARTMENT HISTORY AND PHYSICAL EXAM      Date: 10/2/2021  Patient Name: Laina Villatoro    History of Presenting Illness     Chief Complaint   Patient presents with    Decreased Appetite    Lethargy       History Provided By: Patient and Patient's Daughter    HPI: Laina Villatoro, 78 y.o. female with a past medical history significant chf presents to the ED with chief complaint of Decreased Appetite and Lethargy  . 58-year-old female recently started living with her daughter about 2 weeks ago had very swollen legs at that time the legs have gotten better. But throughout the time the patient has decreased appetite listless. No severe shortness of breath no chest pains. No nausea or vomiting. Just a lot of energy which seems to be worsening. Has been compliant with the medications. There are no other complaints, changes, or physical findings at this time. PCP: Salty Quinn MD    Current Facility-Administered Medications   Medication Dose Route Frequency Provider Last Rate Last Admin    furosemide (LASIX) injection 40 mg  40 mg IntraVENous NOW Benja Collier MD        aspirin tablet 325 mg  325 mg Oral NOW Benja Collier MD         Current Outpatient Medications   Medication Sig Dispense Refill    carvediloL (COREG) 6.25 mg tablet Take 1 Tablet by mouth two (2) times daily (with meals). 60 Tablet 0    oxyCODONE IR (ROXICODONE) 5 mg immediate release tablet Take 5 mg by mouth every four (4) hours as needed for Pain.  cyclobenzaprine (FLEXERIL) 5 mg tablet Take 5 mg by mouth two (2) times daily as needed for Muscle Spasm(s).  hydroCHLOROthiazide (HYDRODIURIL) 12.5 mg tablet Take 12.5 mg by mouth daily.  amitriptyline (ELAVIL) 75 mg tablet Take 75 mg by mouth daily.  glipiZIDE (GLUCOTROL) 5 mg tablet Take 5 mg by mouth daily.       prednisoLONE acetate (PRED FORTE) 1 % ophthalmic suspension INSTILL 1 DROP INTO RIGHT EYE 4 TIMES A DAY      atorvastatin (LIPITOR) 40 mg tablet Take 1 Tab by mouth nightly. 60 Tab 1    furosemide (Lasix) 40 mg tablet 1 tablet daily (Patient taking differently: Take 40 mg by mouth daily. 1 tablet daily) 60 Tab 0    metFORMIN (GLUCOPHAGE) 1,000 mg tablet two (2) times daily (with meals).  gabapentin (NEURONTIN) 100 mg capsule TAKE 1 CAPSULE BY MOUTH THREE TIMES A DAY      SITagliptin (Januvia) 100 mg tablet TAKE 1 TABLET BY MOUTH EVERY DAY (Patient taking differently: Take 100 mg by mouth daily. TAKE 1 TABLET BY MOUTH EVERY DAY) 90 Tab 2    metoprolol succinate (TOPROL-XL) 25 mg XL tablet TAKE 0.5 TABS BY MOUTH DAILY. 45 Tab 11    ELIQUIS 5 mg tablet TAKE 1 TABLET BY MOUTH TWICE A  Tab 3    nitroglycerin (NITROSTAT) 0.4 mg SL tablet 0.4 mg by SubLINGual route every five (5) minutes as needed for Chest Pain. Up to 3 doses. (Patient not taking: Reported on 8/3/2021)      cloNIDine HCL (CATAPRES) 0.2 mg tablet Take 0.2 mg by mouth two (2) times a day. 3    losartan (COZAAR) 100 mg tablet Take 100 mg by mouth daily. 2    PARoxetine (PAXIL) 20 mg tablet Take 20 mg by mouth daily.  omeprazole (PRILOSEC) 20 mg capsule Take 20 mg by mouth daily. Past History     Past Medical History:  Past Medical History:   Diagnosis Date    [facility] ([facility]     Cardiomyopathy, idiopathic ([facility] 10/28/2010    HTN (hypertension), benign 10/28/2010    Pacemaker     ICD    PVC (premature ventricular contraction) 10/28/2010       Past Surgical History:  Past Surgical History:   Procedure Laterality Date    HX OTHER SURGICAL  10/02/2018    Right eye surgery    HX PACEMAKER         Family History:  No family history on file. Social History:  Social History     Tobacco Use    Smoking status: Never Smoker    Smokeless tobacco: Never Used   Substance Use Topics    Alcohol use: No    Drug use: Never       Allergies:   Allergies   Allergen Reactions    Chocolate [Cocoa] Anaphylaxis and Swelling    Iodine Anaphylaxis    Lisinopril Anaphylaxis    Peanut Anaphylaxis and Swelling    Glimepiride Swelling    Glipizide Other (comments)     Pruritis    Metformin Diarrhea    Norvasc [Amlodipine] Other (comments)     Light Headed    Pneumovax 23 [Pneumococcal 23-Devi Ps Vaccine] Hives    Toprol Xl [Metoprolol Succinate] Other (comments)     Light headed    Vitamin D [Cholecalciferol (Vitamin D3)] Other (comments)     Dizziness/headache         Review of Systems   Review of Systems   Constitutional: Positive for activity change, appetite change and fatigue. Negative for chills and fever. HENT: Negative. Negative for congestion, nosebleeds and sore throat. Eyes: Negative. Negative for pain, discharge and visual disturbance. Respiratory: Negative. Negative for cough, chest tightness and shortness of breath. Cardiovascular: Positive for leg swelling. Negative for chest pain and palpitations. Gastrointestinal: Negative for abdominal pain, blood in stool, constipation, diarrhea, nausea and vomiting. Endocrine: Negative. Genitourinary: Negative. Negative for difficulty urinating, dysuria, pelvic pain and vaginal bleeding. Musculoskeletal: Negative. Negative for arthralgias, back pain and myalgias. Skin: Negative. Negative for rash and wound. Allergic/Immunologic: Negative. Neurological: Negative. Negative for dizziness, syncope, weakness, numbness and headaches. Hematological: Negative. Psychiatric/Behavioral: Negative. Negative for agitation, confusion and suicidal ideas. All other systems reviewed and are negative. Physical Exam   Physical Exam  Vitals and nursing note reviewed. Exam conducted with a chaperone present. Constitutional:       Appearance: Normal appearance. She is normal weight. HENT:      Head: Normocephalic and atraumatic. Nose: Nose normal.      Mouth/Throat:      Mouth: Mucous membranes are moist.      Pharynx: Oropharynx is clear.    Eyes: Extraocular Movements: Extraocular movements intact. Conjunctiva/sclera: Conjunctivae normal.      Pupils: Pupils are equal, round, and reactive to light. Cardiovascular:      Rate and Rhythm: Normal rate and regular rhythm. Pulses: Normal pulses. Heart sounds: Normal heart sounds. Pulmonary:      Effort: Pulmonary effort is normal. No respiratory distress. Breath sounds: Normal breath sounds. Abdominal:      General: Abdomen is flat. Bowel sounds are normal. There is no distension. Palpations: Abdomen is soft. Tenderness: There is no abdominal tenderness. There is no guarding. Musculoskeletal:         General: No swelling, tenderness, deformity or signs of injury. Normal range of motion. Cervical back: Normal range of motion and neck supple. Right lower leg: Edema present. Left lower leg: Edema present. Skin:     General: Skin is warm and dry. Capillary Refill: Capillary refill takes less than 2 seconds. Findings: No lesion or rash. Neurological:      General: No focal deficit present. Mental Status: She is alert and oriented to person, place, and time. Mental status is at baseline. Cranial Nerves: No cranial nerve deficit. Psychiatric:         Mood and Affect: Mood normal.         Behavior: Behavior normal.         Thought Content:  Thought content normal.         Judgment: Judgment normal.         Diagnostic Study Results     Labs -     Recent Results (from the past 12 hour(s))   CBC WITH AUTOMATED DIFF    Collection Time: 10/02/21  3:56 PM   Result Value Ref Range    WBC 6.4 3.6 - 11.0 K/uL    RBC 4.87 3.80 - 5.20 M/uL    HGB 13.3 11.5 - 16.0 g/dL    HCT 42.5 35.0 - 47.0 %    MCV 87.3 80.0 - 99.0 FL    MCH 27.3 26.0 - 34.0 PG    MCHC 31.3 30.0 - 36.5 g/dL    RDW 18.6 (H) 11.5 - 14.5 %    PLATELET 895 (L) 373 - 400 K/uL    MPV 13.5 (H) 8.9 - 12.9 FL    NRBC 0.0 0.0  WBC    ABSOLUTE NRBC 0.00 0.00 - 0.01 K/uL    NEUTROPHILS 62 32 - 75 %    LYMPHOCYTES 24 12 - 49 %    MONOCYTES 11 5 - 13 %    EOSINOPHILS 2 0 - 7 %    BASOPHILS 0 0 - 1 %    IMMATURE GRANULOCYTES 1 (H) 0 - 0.5 %    ABS. NEUTROPHILS 4.0 1.8 - 8.0 K/UL    ABS. LYMPHOCYTES 1.5 0.8 - 3.5 K/UL    ABS. MONOCYTES 0.7 0.0 - 1.0 K/UL    ABS. EOSINOPHILS 0.1 0.0 - 0.4 K/UL    ABS. BASOPHILS 0.0 0.0 - 0.1 K/UL    ABS. IMM. GRANS. 0.0 0.00 - 0.04 K/UL    DF AUTOMATED     METABOLIC PANEL, COMPREHENSIVE    Collection Time: 10/02/21  3:56 PM   Result Value Ref Range    Sodium 138 136 - 145 mmol/L    Potassium 4.0 3.5 - 5.1 mmol/L    Chloride 103 97 - 108 mmol/L    CO2 26 21 - 32 mmol/L    Anion gap 9 5 - 15 mmol/L    Glucose 160 (H) 65 - 100 mg/dL    BUN 44 (H) 6 - 20 mg/dL    Creatinine 2.21 (H) 0.55 - 1.02 mg/dL    BUN/Creatinine ratio 20 12 - 20      GFR est AA 27 (L) >60 ml/min/1.73m2    GFR est non-AA 22 (L) >60 ml/min/1.73m2    Calcium 9.2 8.5 - 10.1 mg/dL    Bilirubin, total 1.0 0.2 - 1.0 mg/dL    AST (SGOT) 16 15 - 37 U/L    ALT (SGPT) 20 12 - 78 U/L    Alk.  phosphatase 85 45 - 117 U/L    Protein, total 6.8 6.4 - 8.2 g/dL    Albumin 2.7 (L) 3.5 - 5.0 g/dL    Globulin 4.1 (H) 2.0 - 4.0 g/dL    A-G Ratio 0.7 (L) 1.1 - 2.2     TROPONIN I    Collection Time: 10/02/21  3:56 PM   Result Value Ref Range    Troponin-I, Qt. 0.34 (H) <0.05 ng/mL   BNP    Collection Time: 10/02/21  3:56 PM   Result Value Ref Range    NT pro-BNP 8,969 (H) <125 pg/mL   LACTIC ACID    Collection Time: 10/02/21  3:56 PM   Result Value Ref Range    Lactic acid 2.4 (HH) 0.4 - 2.0 mmol/L   TSH 3RD GENERATION    Collection Time: 10/02/21  3:56 PM   Result Value Ref Range    TSH 2.13 0.36 - 3.74 uIU/mL   MAGNESIUM    Collection Time: 10/02/21  3:56 PM   Result Value Ref Range    Magnesium 2.3 1.6 - 2.4 mg/dL       Radiologic Studies -   XR CHEST SNGL V   Final Result        CT Results  (Last 48 hours)    None        CXR Results  (Last 48 hours)               10/02/21 1614  XR CHEST SNGL V Final result    Narrative: Chest single view. Comparison single view chest August 2, 2021. Lungs clear; no interstitial or alveolar pulmonary edema. Left-sided cardiac   device with similar positioned leads overlying the heart. Cardiac silhouette   enlargement. No pneumothorax or sizable pleural effusion. Medical Decision Making and ED Course   I am the first provider for this patient. I reviewed the vital signs, available nursing notes, past medical history, past surgical history, family history and social history. Vital Signs-Reviewed the patient's vital signs. Patient Vitals for the past 12 hrs:   Temp Pulse Resp BP SpO2   10/02/21 1616  93 22 (!) 130/101 96 %   10/02/21 1534 97.7 °F (36.5 °C) 92 14 113/87 97 %       EKG interpretation:   EKG at 1559. Normal sinus rhythm PACs. Prolonged QTC. Inferior Q's. No ST changes. No T wave inversions. Reason rule out dysrhythmia. Interpreted by ER physician. Records Reviewed: Previous Hospital chart. EMS run report      ED Course:   Initial assessment performed. The patients presenting problems have been discussed, and they are in agreement with the care plan formulated and outlined with them. I have encouraged them to ask questions as they arise throughout their visit.     Orders Placed This Encounter    XR CHEST SNGL V     Standing Status:   Standing     Number of Occurrences:   1     Order Specific Question:   Transport     Answer:   BED [2]     Order Specific Question:   Reason for Exam     Answer:   weakness    CBC WITH AUTOMATED DIFF     Standing Status:   Standing     Number of Occurrences:   1    METABOLIC PANEL, COMPREHENSIVE     Standing Status:   Standing     Number of Occurrences:   1    TROPONIN I     Standing Status:   Standing     Number of Occurrences:   1    BNP     Standing Status:   Standing     Number of Occurrences:   1    URINALYSIS W/ REFLEX CULTURE     Standing Status:   Standing     Number of Occurrences:   1    LACTIC ACID Standing Status:   Standing     Number of Occurrences:   1    TSH 3RD GENERATION     Standing Status:   Standing     Number of Occurrences:   1    MAGNESIUM     Standing Status:   Standing     Number of Occurrences:   1    EKG, 12 LEAD, INITIAL     Standing Status:   Standing     Number of Occurrences:   1     Order Specific Question:   Reason for Exam:     Answer:   weakness    furosemide (LASIX) injection 40 mg    aspirin tablet 325 mg              CONSULTANTS:  Consults  Dr. Callahan Shown    Provider Notes (Medical Decision Making):   70-year-old female history of CHF compliant on her medications. Having increasing fatigue since living with her daughter despite having an improving exam.  Patient will need to be admitted for CHF extubation as her elevated troponin is present. Plan for Lasix aspirin. Discussed the case with hospitalist who will evaluate. Procedures                       Disposition       Emergency Department Disposition:  Admitted      Diagnosis     Clinical Impression:   1. Congestive heart failure, unspecified HF chronicity, unspecified heart failure type ([facility] [address]    2. ADELINA (acute kidney injury) ([facility] [address] [facility]    3. Elevated troponin        Attestations:    Renaldo Giraldo MD    Please note that this dictation was completed with [employer], the computer voice recognition software. Quite often unanticipated grammatical, syntax, homophones, and other interpretive errors are inadvertently transcribed by the computer software. Please disregard these errors. Please excuse any errors that have escaped final proofreading. Thank you.

## 2021-10-03 NOTE — PROGRESS NOTES
Bedside shift change report given to Kapil Salazar RN (oncoming nurse) by Harry Miguel RN (offgoing nurse). Report included the following information SBAR, Kardex, ED Summary, Intake/Output, MAR, Accordion, Recent Results and Cardiac Rhythm NSR.

## 2021-10-03 NOTE — PROGRESS NOTES
Hospitalist Progress Note    NAME: Gris Bolaños   :  1955   MRN:  [id number]           Subjective:     Chief Complaint / Reason for Physician Visit  Patient seen and examined at bedside, currently sleeping, does respond to painful and verbal stimuli however difficult to obtain history or review of systems secondary to patient's clinical status. Discussed with RN, this appears to be her baseline when she is asleep. Review of Systems:  Symptom Y/N Comments  Symptom Y/N Comments   Fever/Chills    Chest Pain     Poor Appetite    Edema     Cough    Abdominal Pain     Sputum    Joint Pain     SOB/WEAVER    Pruritis/Rash     Nausea/vomit    Tolerating PT/OT     Diarrhea    Tolerating Diet     Constipation    Other       Could NOT obtain due to:  Limited secondary to patient's clinical status     Objective:     VITALS:   Last 24hrs VS reviewed since prior progress note. Most recent are:  Patient Vitals for the past 24 hrs:   Temp Pulse Resp BP SpO2   10/03/21 0947 97.3 °F (36.3 °C) 87 18 (!) 122/92 99 %   10/03/21 0800  86      10/03/21 0408 (!) 96.3 °F (35.7 °C) 91 16 (!) 142/92 94 %   10/03/21 0329  91      10/03/21 0126 97.6 °F (36.4 °C) 82 16 (!) 122/95 93 %   10/02/21 2112  79  (!) 137/100    10/02/21 2004 97.1 °F (36.2 °C) 95 17 (!) 127/95 99 %   10/02/21 1915   18  96 %   10/02/21 1616  93 22 (!) 130/101 96 %   10/02/21 1534 97.7 °F (36.5 °C) 92 14 113/87 97 %     No intake or output data in the 24 hours ending 10/03/21 1147     PHYSICAL EXAM:  General: Patient appears comfortable   EENT:  EOMI. Anicteric sclerae. MMM  Resp:  Decreased air entry bilaterally in bilateral lower lung zones  CV:  Regular  rhythm, s1/s2 no m/r/g No edema  GI:  Soft, Non distended, Non tender. +Bowel sounds  Neurologic:  Alert and oriented X 3, normal speech,   Psych:   Good insight. Not anxious nor agitated  Skin:  No rashes.   No jaundice    Procedures: see electronic medical records for all procedures/Xrays and details which were not copied into this note but were reviewed prior to creation of Plan. LABS:  I reviewed today's most current labs and imaging studies. Pertinent labs include:  Recent Labs     10/03/21  0708 10/02/21  1556   WBC 7.5 6.4   HGB 12.7 13.3   HCT 39.7 42.5   PLT 80* 102*     Recent Labs     10/03/21  0708 10/02/21  1556    138   K 4.2 4.0    103   CO2 21 26   * 160*   BUN 47* 44*   CREA 1.94* 2.21*   CA 8.8 9.2   MG 2.2 2.3   ALB  --  2.7*   TBILI  --  1.0   ALT  --  20       Signed: Sravan Morales MD    X-ray chest:Lungs clear; no interstitial or alveolar pulmonary edema. Left-sided cardiac  device with similar positioned leads overlying the heart. Cardiac silhouette  enlargement. No pneumothorax or sizable pleural effusion.     Reviewed most current lab test results and cultures  YES  Reviewed most current radiology test results   YES  Review and summation of old records today    NO  Reviewed patient's current orders and MAR    YES  PMH/SH reviewed - no change compared to H&P      Assessment / Plan:  Acute decompensated heart failure with reduced ejection fractionpatient presents with above and symptomatology with significant bilateral lower extremity edema and based on patient's clinical presentation secondary to acute decompensated heart failure with reduced ejection fraction,  Continuous telemetry monitoring  Daily weights  Frequent intake and output monitoring  Continue Lasix 40 mg IV daily  Transthoracic echo reviewed  Follow cardiology recommendations    Status post fallof note patient had a fall this a.m., likely secondary to physical deconditioning, is complaining of left foot pain  Obtain x-ray of left foot further evaluation  Follow-up physical therapy as well as Occupational Therapy recommendations    Acute kidney injurylikely multifactorial, currently serum creatinine downtrending  Obtain urinary electrolytes to calculate fractional excretion of urine urea  Continue to trend serum creatinine  Obtain nephrology consult    Hypertensioncurrently holding losartan/hydrochlorthiazide in the setting of ADELINA  Continue clonidine/Coreg  Continue to monitor     [address] with restriction, replete potassium and magnesium  Full code, will clarify about surrogate decision-maker  Dispositionpending clinical improvement/PT OT evaluation, likely will need SNF placement, likely stable for discharge tomorrow      18.5 - 24.9 Normal weight / Body mass index is 23.4 kg/m². Code status: Full  Prophylaxis: Lovenox  Recommended Disposition: SNF/LTC     ________________________________________________________________________  Care Plan discussed with:    Comments   Patient x    Family      RN x    Care Manager x    Consultant  x                     x Multidiciplinary team rounds were held today with , nursing, pharmacist and clinical coordinator. Patient's plan of care was discussed; medications were reviewed and discharge planning was addressed.      ________________________________________________________________________  Total NON critical care TIME:  35   Minutes      Comments   >50% of visit spent in counseling and coordination of care x    ________________________________________________________________________  Ar tSevens MD

## 2021-10-03 NOTE — PROGRESS NOTES
Post Fall Documentation      Obinna Carpio witnessed/unwitnessed fall occurred on 10/3/21 (Date) at 46 (Time). The answers to the following questions summarize the fall: In the patient's own words,:  · What were you attempting to do when you fell? Go to the bathroom  · Do you know why you fell? yes  · Do you have any pain/discomfort or any other complaints? no  · Which part of your body made contact with the floor or other object?  knees    Nurse:  [facility] Was this an assisted fall? no   Was fall witnessed? No   If witnessed, what part of the body made contact with the floor or other object? Knees   Patients mental status after the fall/when found: Confused   Any apparent injury:  No apparent injury   Immediate interventions for injury/suspected injury? No interventions needed   Patient assisted back to bed? Assist X2           Name of family member notified and time: N/A      Immediate VS and physical assessment documented in flow sheets. Neuro assessment every hour x 4 (for potential head injury or unwitnessed fall) documented in flow sheets. Reynaldo     Problem: Heart Failure: Day 1  Goal: Activity/Safety  Outcome: Progressing Towards Goal  Note: Bed is in the lowest position and wheels are locked, call bell is within reach, bathroom light is on during evening hours, gripper socks are on and patient has been instructed to call out for assistance if needed. As of now, patient is free from falls and will continue to be monitored.      Goal: Consults, if ordered  Outcome: Progressing Towards Goal  Note: Patient has consult for cardiology  Goal: Nutrition/Diet  Outcome: Progressing Towards Goal  Note: Patient is tolerating her diet and is reporting no nausea

## 2021-10-03 NOTE — CONSULTS
Renal Consultation Note    NAME:  Ольга Rodriguez   :   1955   MRN:   [id number]     ATTENDING: Omid Almazan MD  PCP:  Zohreh Nguyen MD    Date/Time:  10/3/2021 12:30 PM      Subjective:   REQUESTING PHYSICIAN:  REASON FOR CONSULT:        Patient is a 56-year-old female with past medical history of congestive heart failure with EF of 15 to 03%, grade 2 diastolic dysfunction, hypertension, diabetes, A. fib, CKD stage III with baseline creatinine around 1.4 who presented yesterday with complaints of increased weakness decreased appetite and swollen leg. According to history she had Covid about a year ago and has been feeling unwell since. On admission her creatinine was 2.1 which prompted a renal consultation today. Creatinine has improved to 1.9 today. Review of records shows her creatinine was 1.4 in 2021. Patient seen in the room. She is very lethargic and does not respond to questions appropriately  Review of home medications shows she was on losartan 100 mg daily and hydrochlorothiazide at home prior to admission. Both are being held  She has been started on Lasix 40 mg IV daily. proBNP was around 8000 on admission. Chest x-ray does not show any pulmonary edema    Past Medical History:   Diagnosis Date    [facility] ([address]     Cardiomyopathy, idiopathic ([address] 10/28/2010    HTN (hypertension), benign 10/28/2010    Pacemaker     ICD    PVC (premature ventricular contraction) 10/28/2010      Past Surgical History:   Procedure Laterality Date    HX OTHER SURGICAL  10/02/2018    Right eye surgery    HX PACEMAKER       Social History     Tobacco Use    Smoking status: Never Smoker    Smokeless tobacco: Never Used   Substance Use Topics    Alcohol use: No      No family history on file.     Allergies   Allergen Reactions    Chocolate [Cocoa] Anaphylaxis and Swelling    Iodine Anaphylaxis    Lisinopril Anaphylaxis    Peanut Anaphylaxis and Swelling    Glimepiride Swelling    Glipizide Other (comments)     Pruritis    Metformin Diarrhea    Norvasc [Amlodipine] Other (comments)     Light Headed    Pneumovax 23 [Pneumococcal 23-Devi Ps Vaccine] Hives    Toprol Xl [Metoprolol Succinate] Other (comments)     Light headed    Vitamin D [Cholecalciferol (Vitamin D3)] Other (comments)     Dizziness/headache      Prior to Admission medications    Medication Sig Start Date End Date Taking? Authorizing Provider   carvediloL (COREG) 6.25 mg tablet Take 1 Tablet by mouth two (2) times daily (with meals). 8/5/21   Mando Bolton MD   oxyCODONE IR (ROXICODONE) 5 mg immediate release tablet Take 5 mg by mouth every four (4) hours as needed for Pain. 7/16/21   Provider, Historical   cyclobenzaprine (FLEXERIL) 5 mg tablet Take 5 mg by mouth two (2) times daily as needed for Muscle Spasm(s). 7/19/21   Provider, Historical   hydroCHLOROthiazide (HYDRODIURIL) 12.5 mg tablet Take 12.5 mg by mouth daily. 5/7/21   Provider, Historical   amitriptyline (ELAVIL) 75 mg tablet Take 75 mg by mouth daily. 5/3/21   Provider, Historical   glipiZIDE (GLUCOTROL) 5 mg tablet Take 5 mg by mouth daily. 4/29/21   Provider, Historical   prednisoLONE acetate (PRED FORTE) 1 % ophthalmic suspension INSTILL 1 DROP INTO RIGHT EYE 4 TIMES A DAY 3/13/21   Provider, Historical   atorvastatin (LIPITOR) 40 mg tablet Take 1 Tab by mouth nightly. 2/19/21   Thelma Dumnot MD   furosemide (Lasix) 40 mg tablet 1 tablet daily  Patient taking differently: Take 40 mg by mouth daily. 1 tablet daily 2/19/21   Denisa Lowery MD   metFORMIN (GLUCOPHAGE) 1,000 mg tablet two (2) times daily (with meals). 10/12/20   Provider, Historical   gabapentin (NEURONTIN) 100 mg capsule TAKE 1 CAPSULE BY MOUTH THREE TIMES A DAY 10/27/20   Provider, Historical   SITagliptin (Januvia) 100 mg tablet TAKE 1 TABLET BY MOUTH EVERY DAY  Patient taking differently: Take 100 mg by mouth daily.  TAKE 1 TABLET BY MOUTH EVERY DAY 8/2/20   Jose Raul Omer iFnn MD   metoprolol succinate (TOPROL-XL) 25 mg XL tablet TAKE 0.5 TABS BY MOUTH DAILY. 19   Corry Macias NP   ELIQUIS 5 mg tablet TAKE 1 TABLET BY MOUTH TWICE A DAY 19   Corry Macias NP   nitroglycerin (NITROSTAT) 0.4 mg SL tablet 0.4 mg by SubLINGual route every five (5) minutes as needed for Chest Pain. Up to 3 doses. Patient not taking: Reported on 8/3/2021    Provider, Historical   cloNIDine HCL (CATAPRES) 0.2 mg tablet Take 0.2 mg by mouth two (2) times a day. 18   Provider, Historical   losartan (COZAAR) 100 mg tablet Take 100 mg by mouth daily. 17   Provider, Historical   PARoxetine (PAXIL) 20 mg tablet Take 20 mg by mouth daily. Provider, Historical   omeprazole (PRILOSEC) 20 mg capsule Take 20 mg by mouth daily. 10/28/10   Provider, Historical       REVIEW OF SYSTEMS:       Unable to obtain    Objective:   VITALS:    Visit Vitals  BP (!) 122/92 (BP Patient Position: At rest;Semi fowlers)   Pulse 83   Temp 97.3 °F (36.3 °C)   Resp 18   Ht 5' 6\" (1.676 m)   Wt 65.8 kg (145 lb)   SpO2 99%   BMI 23.40 kg/m²     Temp (24hrs), Av.2 °F (36.2 °C), Min:96.3 °F (35.7 °C), Max:97.7 °F (36.5 °C)      PHYSICAL EXAM:     General: NAD, appears comfortable  Eyes: sclera anicteric  Oral Cavity: No thrush or ulcers  Neck: no JVD  Chest: Fair bilateral air entry. No Wheezing or Rhonchi. No rales. Heart: normal sounds  Abdomen: soft and non tender   : no escobedo  Lower Extremities: 1+ bilateral leg edema  Skin: no rash  Neuro: Awake.   Looks drowsy but responds to questions      LAB DATA REVIEWED:    Recent Results (from the past 24 hour(s))   CBC WITH AUTOMATED DIFF    Collection Time: 10/02/21  3:56 PM   Result Value Ref Range    WBC 6.4 3.6 - 11.0 K/uL    RBC 4.87 3.80 - 5.20 M/uL    HGB 13.3 11.5 - 16.0 g/dL    HCT 42.5 35.0 - 47.0 %    MCV 87.3 80.0 - 99.0 FL    MCH 27.3 26.0 - 34.0 PG    MCHC 31.3 30.0 - 36.5 g/dL    RDW 18.6 (H) 11.5 - 14.5 %    PLATELET 640 (L) 361 - 400 K/uL MPV 13.5 (H) 8.9 - 12.9 FL    NRBC 0.0 0.0  WBC    ABSOLUTE NRBC 0.00 0.00 - 0.01 K/uL    NEUTROPHILS 62 32 - 75 %    LYMPHOCYTES 24 12 - 49 %    MONOCYTES 11 5 - 13 %    EOSINOPHILS 2 0 - 7 %    BASOPHILS 0 0 - 1 %    IMMATURE GRANULOCYTES 1 (H) 0 - 0.5 %    ABS. NEUTROPHILS 4.0 1.8 - 8.0 K/UL    ABS. LYMPHOCYTES 1.5 0.8 - 3.5 K/UL    ABS. MONOCYTES 0.7 0.0 - 1.0 K/UL    ABS. EOSINOPHILS 0.1 0.0 - 0.4 K/UL    ABS. BASOPHILS 0.0 0.0 - 0.1 K/UL    ABS. IMM. GRANS. 0.0 0.00 - 0.04 K/UL    DF AUTOMATED     METABOLIC PANEL, COMPREHENSIVE    Collection Time: 10/02/21  3:56 PM   Result Value Ref Range    Sodium 138 136 - 145 mmol/L    Potassium 4.0 3.5 - 5.1 mmol/L    Chloride 103 97 - 108 mmol/L    CO2 26 21 - 32 mmol/L    Anion gap 9 5 - 15 mmol/L    Glucose 160 (H) 65 - 100 mg/dL    BUN 44 (H) 6 - 20 mg/dL    Creatinine 2.21 (H) 0.55 - 1.02 mg/dL    BUN/Creatinine ratio 20 12 - 20      GFR est AA 27 (L) >60 ml/min/1.73m2    GFR est non-AA 22 (L) >60 ml/min/1.73m2    Calcium 9.2 8.5 - 10.1 mg/dL    Bilirubin, total 1.0 0.2 - 1.0 mg/dL    AST (SGOT) 16 15 - 37 U/L    ALT (SGPT) 20 12 - 78 U/L    Alk.  phosphatase 85 45 - 117 U/L    Protein, total 6.8 6.4 - 8.2 g/dL    Albumin 2.7 (L) 3.5 - 5.0 g/dL    Globulin 4.1 (H) 2.0 - 4.0 g/dL    A-G Ratio 0.7 (L) 1.1 - 2.2     TROPONIN I    Collection Time: 10/02/21  3:56 PM   Result Value Ref Range    Troponin-I, Qt. 0.34 (H) <0.05 ng/mL   BNP    Collection Time: 10/02/21  3:56 PM   Result Value Ref Range    NT pro-BNP 8,969 (H) <125 pg/mL   LACTIC ACID    Collection Time: 10/02/21  3:56 PM   Result Value Ref Range    Lactic acid 2.4 (HH) 0.4 - 2.0 mmol/L   TSH 3RD GENERATION    Collection Time: 10/02/21  3:56 PM   Result Value Ref Range    TSH 2.13 0.36 - 3.74 uIU/mL   MAGNESIUM    Collection Time: 10/02/21  3:56 PM   Result Value Ref Range    Magnesium 2.3 1.6 - 2.4 mg/dL   SARS-COV-2    Collection Time: 10/02/21  5:30 PM   Result Value Ref Range    SARS-CoV-2 Please find results under separate order     COVID-19 RAPID TEST    Collection Time: 10/02/21  5:30 PM   Result Value Ref Range    Specimen source Nasopharyngeal      COVID-19 rapid test Not Detected Not Detected     TROPONIN I    Collection Time: 10/02/21  7:37 PM   Result Value Ref Range    Troponin-I, Qt. 0.35 (H) <0.05 ng/mL   GLUCOSE, POC    Collection Time: 10/02/21  9:16 PM   Result Value Ref Range    Glucose (POC) 142 (H) 65 - 117 mg/dL    Performed by Jerman Connell    METABOLIC PANEL, BASIC    Collection Time: 10/03/21  7:08 AM   Result Value Ref Range    Sodium 139 136 - 145 mmol/L    Potassium 4.2 3.5 - 5.1 mmol/L    Chloride 105 97 - 108 mmol/L    CO2 21 21 - 32 mmol/L    Anion gap 13 5 - 15 mmol/L    Glucose 130 (H) 65 - 100 mg/dL    BUN 47 (H) 6 - 20 mg/dL    Creatinine 1.94 (H) 0.55 - 1.02 mg/dL    BUN/Creatinine ratio 24 (H) 12 - 20      GFR est AA 31 (L) >60 ml/min/1.73m2    GFR est non-AA 26 (L) >60 ml/min/1.73m2    Calcium 8.8 8.5 - 10.1 mg/dL   MAGNESIUM    Collection Time: 10/03/21  7:08 AM   Result Value Ref Range    Magnesium 2.2 1.6 - 2.4 mg/dL   CBC WITH AUTOMATED DIFF    Collection Time: 10/03/21  7:08 AM   Result Value Ref Range    WBC 7.5 3.6 - 11.0 K/uL    RBC 4.55 3.80 - 5.20 M/uL    HGB 12.7 11.5 - 16.0 g/dL    HCT 39.7 35.0 - 47.0 %    MCV 87.3 80.0 - 99.0 FL    MCH 27.9 26.0 - 34.0 PG    MCHC 32.0 30.0 - 36.5 g/dL    RDW 18.5 (H) 11.5 - 14.5 %    PLATELET 80 (L) 939 - 400 K/uL    NRBC 0.0 0.0  WBC    ABSOLUTE NRBC 0.00 0.00 - 0.01 K/uL    NEUTROPHILS 51 32 - 75 %    LYMPHOCYTES 31 12 - 49 %    MONOCYTES 15 (H) 5 - 13 %    EOSINOPHILS 3 0 - 7 %    BASOPHILS 0 0 - 1 %    IMMATURE GRANULOCYTES 0 0 - 0.5 %    ABS. NEUTROPHILS 3.7 1.8 - 8.0 K/UL    ABS. LYMPHOCYTES 2.3 0.8 - 3.5 K/UL    ABS. MONOCYTES 1.1 (H) 0.0 - 1.0 K/UL    ABS. EOSINOPHILS 0.2 0.0 - 0.4 K/UL    ABS. BASOPHILS 0.0 0.0 - 0.1 K/UL    ABS. IMM.  GRANS. 0.0 0.00 - 0.04 K/UL    DF AUTOMATED     TROPONIN I    Collection Time: 10/03/21  7:08 AM   Result Value Ref Range    Troponin-I, Qt. 0.32 (H) <0.05 ng/mL   LIPID PANEL    Collection Time: 10/03/21  7:08 AM   Result Value Ref Range    LIPID PROFILE        Cholesterol, total 81 <200 mg/dL    Triglyceride 92 <150 mg/dL    HDL Cholesterol 25 mg/dL    LDL, calculated 37.6 0 - 100 mg/dL    VLDL, calculated 18.4 mg/dL    CHOL/HDL Ratio 3.2 0.0 - 5.0     GLUCOSE, POC    Collection Time: 10/03/21  8:47 AM   Result Value Ref Range    Glucose (POC) 142 (H) 65 - 117 mg/dL    Performed by Cayetano Epley    ECHO ADULT COMPLETE    Collection Time: 10/03/21  9:52 AM   Result Value Ref Range    Aortic Valve Systolic Peak Velocity 24.54 cm/s    AoV PG 3.00 mmHg    Ao Root D 2.50 cm    IVSd 1.09 (A) 0.60 - 0.90 cm    LVIDd 5.75 (A) 3.90 - 5.30 cm    LVIDs 5.42 cm    LVOT Peak Velocity 51.80 cm/s    LVOT Peak Gradient 1.00 mmHg    LVPWd 1.09 (A) 0.60 - 0.90 cm    LV E' Septal Velocity 3.61 cm/s    LV ED Vol A2C 190.00 cm3    LV ES Vol A2C 159.00 cm3    E/E' septal 50.69     Left Atrium Major Axis 3.60 cm    Mitral Valve Max Velocity 201.00 cm/s    MV Peak Gradient 16.00 mmHg    Mitral Valve E Wave Deceleration Time 162.00 ms    MV A Bubba 71.60 cm/s    MV E Bubba 183.00 cm/s    MV Mean Gradient 5.00 mmHg    Mitral Valve Annulus Velocity Time Integral 33.00 cm    MV E/A 2.56     Est. RA Pressure 15.00 mmHg    RVIDd 3.78 cm    RVSP 67.00 mmHg    Tricuspid Valve Max Velocity 360.00 cm/s    Triscuspid Valve Regurgitation Peak Gradient 52.00 mmHg    Tapse 1.50 1.50 - 2.00 cm    Right Atrial Area 4C 23.99 cm2    LA Area 4C 26.45 cm2    BP EF 12.3 55.0 - 100.0 %    LV Mass .3 67.0 - 162.0 g    LV Mass AL Index 147.9 43.0 - 95.0 g/m2    Left Atrium Minor Axis 2.07 cm   GLUCOSE, POC    Collection Time: 10/03/21 11:47 AM   Result Value Ref Range    Glucose (POC) 163 (H) 65 - 117 mg/dL    Performed by Cayetano Epley        Recommendations/Plan:   #1 acute kidney injury on CKD stage III  -presented with creatinine of 2.1 which is slightly improved to 1.9 today  -Baseline creatinine seems to be around 1.4 based on labs in August 2021  -ADELINA likely prerenal secondary to losartan/hydrochlorthiazide and cardiorenal syndrome  -Has volume overload with bilateral leg edema. Chest x-ray shows no pulmonary congestion. proBNP slightly elevated at 8000  -We will continue Lasix 40 mg IV daily  -Continue to hold losartan and hydrochlorothiazide for now  -We will check urine analysis. Quantify proteinuria  -Continue to follow renal function till creatinine stabilizes    #2 renal osteodystrophy  -Calcium is okay we will check phosphorus intact parathyroid hormone and 25-hydroxy vitamin D level    #3 acute decompensated heart failure  -Reduced EF on echo in the past  -Presented with bilateral leg swelling and shortness of breath  -Echo on 2/17/2021 showed EF of 15 to 20%  -We will continue with Lasix 40 mg IV daily. Strict I's and O's. Keep a negative fluid balance  -Losartan held because of ADELINA.   Continue to hold    #4 hypertension  -blood pressure is well controlled with clonidine and Coreg which I will continue.  -Continue holding losartan and hydrochlorothiazide because of ADELINA    #5 s/p fall  -Apparently patient fell this morning  -X-ray of the foot has been ordered    Thank you for the consultation          ________________________________________________________________________  Signed: Horace Berrios MD

## 2021-10-03 NOTE — PROGRESS NOTES
Problem: Falls - Risk of  Goal: *Absence of Falls  Description: Document Audrey Russ Fall Risk and appropriate interventions in the flowsheet. Outcome: Progressing Towards Goal  Note: Fall Risk Interventions:  Mobility Interventions: PT Consult for mobility concerns, PT Consult for assist device competence, Patient to call before getting OOB, OT consult for ADLs, Bed/chair exit alarm    Mentation Interventions: Adequate sleep, hydration, pain control, Bed/chair exit alarm, Door open when patient unattended, Increase mobility, More frequent rounding, Reorient patient, Toileting rounds, Update white board    Medication Interventions: Patient to call before getting OOB, Bed/chair exit alarm, Teach patient to arise slowly    Elimination Interventions: Call light in reach, Bed/chair exit alarm    History of Falls Interventions: Bed/chair exit alarm, Investigate reason for fall, Door open when patient unattended, Room close to nurse's station, Assess for delayed presentation/identification of injury for 48 hrs (comment for end date)         Problem: Patient Education: Go to Patient Education Activity  Goal: Patient/Family Education  Outcome: Progressing Towards Goal     Problem: Pressure Injury - Risk of  Goal: *Prevention of pressure injury  Description: Document Marshall Scale and appropriate interventions in the flowsheet.   Outcome: Progressing Towards Goal  Note: Pressure Injury Interventions:  Sensory Interventions: Float heels, Assess changes in LOC, Check visual cues for pain, Discuss PT/OT consult with provider, Minimize linen layers, Maintain/enhance activity level    Moisture Interventions: Minimize layers, Internal/External urinary devices, Check for incontinence Q2 hours and as needed, Absorbent underpads    Activity Interventions: PT/OT evaluation    Mobility Interventions: PT/OT evaluation    Nutrition Interventions: Document food/fluid/supplement intake    Friction and Shear Interventions: Minimize layers, HOB 30 degrees or less                Problem: Patient Education: Go to Patient Education Activity  Goal: Patient/Family Education  Outcome: Progressing Towards Goal     Problem: Patient Education: Go to Patient Education Activity  Goal: Patient/Family Education  Outcome: Progressing Towards Goal     Problem: Heart Failure: Day 1  Goal: Off Pathway (Use only if patient is Off Pathway)  Outcome: Progressing Towards Goal  Goal: Activity/Safety  Outcome: Progressing Towards Goal  Goal: Consults, if ordered  Outcome: Progressing Towards Goal  Goal: Diagnostic Test/Procedures  Outcome: Progressing Towards Goal  Goal: Nutrition/Diet  Outcome: Progressing Towards Goal  Goal: Discharge Planning  Outcome: Progressing Towards Goal  Goal: Medications  Outcome: Progressing Towards Goal  Goal: Respiratory  Outcome: Progressing Towards Goal  Goal: Treatments/Interventions/Procedures  Outcome: Progressing Towards Goal  Goal: Psychosocial  Outcome: Progressing Towards Goal  Goal: *Oxygen saturation within defined limits  Outcome: Progressing Towards Goal  Goal: *Hemodynamically stable  Outcome: Progressing Towards Goal  Goal: *Optimal pain control at patient's stated goal  Outcome: Progressing Towards Goal  Goal: *Anxiety reduced or absent  Outcome: Progressing Towards Goal     Problem: Heart Failure: Day 2  Goal: Off Pathway (Use only if patient is Off Pathway)  Outcome: Progressing Towards Goal  Goal: Activity/Safety  Outcome: Progressing Towards Goal  Goal: Consults, if ordered  Outcome: Progressing Towards Goal  Goal: Diagnostic Test/Procedures  Outcome: Progressing Towards Goal  Goal: Nutrition/Diet  Outcome: Progressing Towards Goal  Goal: Discharge Planning  Outcome: Progressing Towards Goal  Goal: Medications  Outcome: Progressing Towards Goal  Goal: Respiratory  Outcome: Progressing Towards Goal  Goal: Treatments/Interventions/Procedures  Outcome: Progressing Towards Goal  Goal: Psychosocial  Outcome: Progressing Towards Goal  Goal: *Oxygen saturation within defined limits  Outcome: Progressing Towards Goal  Goal: *Hemodynamically stable  Outcome: Progressing Towards Goal  Goal: *Optimal pain control at patient's stated goal  Outcome: Progressing Towards Goal  Goal: *Anxiety reduced or absent  Outcome: Progressing Towards Goal  Goal: *Demonstrates progressive activity  Outcome: Progressing Towards Goal     Problem: Heart Failure: Day 5  Goal: Off Pathway (Use only if patient is Off Pathway)  Outcome: Progressing Towards Goal  Goal: Activity/Safety  Outcome: Progressing Towards Goal  Goal: Diagnostic Test/Procedures  Outcome: Progressing Towards Goal  Goal: Nutrition/Diet  Outcome: Progressing Towards Goal  Goal: Discharge Planning  Outcome: Progressing Towards Goal  Goal: Medications  Outcome: Progressing Towards Goal  Goal: Respiratory  Outcome: Progressing Towards Goal  Goal: Treatments/Interventions/Procedures  Outcome: Progressing Towards Goal  Goal: Psychosocial  Outcome: Progressing Towards Goal     Problem: Heart Failure: Discharge Outcomes  Goal: *Demonstrates ability to perform prescribed activity without shortness of breath or discomfort  Outcome: Progressing Towards Goal  Goal: *Left ventricular function assessment completed prior to or during stay, or planned for post-discharge  Outcome: Progressing Towards Goal  Goal: *ACEI prescribed if LVEF less than 40% and no contraindications or ARB prescribed  Outcome: Progressing Towards Goal  Goal: *Verbalizes understanding and describes prescribed diet  Outcome: Progressing Towards Goal  Goal: *Verbalizes understanding/describes prescribed medications  Outcome: Progressing Towards Goal  Goal: *Describes available resources and support systems  Description: (eg: Home Health, Palliative Care, Advanced Medical Directive)  Outcome: Progressing Towards Goal  Goal: *Describes smoking cessation resources  Outcome: Progressing Towards Goal  Goal: *Understands and describes signs and symptoms to report to providers(Stroke Metric)  Outcome: Progressing Towards Goal  Goal: *Describes/verbalizes understanding of follow-up/return appt  Description: (eg: to physicians, diabetes treatment coordinator, and other resources  Outcome: Progressing Towards Goal  Goal: *Describes importance of continuing daily weights and changes to report to physician  Outcome: Progressing Towards Goal

## 2021-10-03 NOTE — PROGRESS NOTES
Attempted to see pt for evaluation. Pt sleeping and difficult to arouse at this time. Per chart, pt had a fall this morning so will attempt at a later time/date. Thank you.

## 2021-10-03 NOTE — CONSULTS
Cardiology Consult    NAME: Cristina Musa   :  1955   MRN:  [id number]     Date/Time:  10/3/2021 1:34 PM    Patient PCP: Anna Camilo MD  ________________________________________________________________________     Assessment/Plan:   Generalized weakness, falls,    Severe left ventricular systolic dysfunction, history of cardiomyopathy status post ICD    Hypertension, well controlled    History of Covid infection    History of atrial fibrillation noted on ICD interrogation, anticoagulated with apixaban           []        High complexity decision making was performed        Subjective:   CHIEF COMPLAINT:   Fall    REASON FOR CONSULT:  Congestive heart failure    HISTORY OF PRESENT ILLNESS:     Cristina Musa is a 78 y.o. BLACK/ female who is with complaints of generalized weakness. Patient also has been falling. Patient has not been feeling well since she had a Covid infection about a year ago. Denies ICD discharge. Denies chest pain. Denies shortness of breath. Does have pain in the legs. She attributes this to neuropathy. Appetite is poor. Leg edema. History of cardiomyopathy, status post ICD, PT, will severe LV dysfunction. Continues carvedilol, off losartan. Chronic kidney disease, creatinine has increased from 1.44 2021 to current 2.2 today. [facility], noted on ICD interrogation, anticoagulated with apixaban. Diabetes mellitus and well controlled.         Past Medical History:   Diagnosis Date    [facility] ([address]     Cardiomyopathy, idiopathic ([address] 10/28/2010    HTN (hypertension), benign 10/28/2010    Pacemaker     ICD    PVC (premature ventricular contraction) 10/28/2010      Past Surgical History:   Procedure Laterality Date    HX OTHER SURGICAL  10/02/2018    Right eye surgery    HX PACEMAKER       Allergies   Allergen Reactions    Chocolate [Cocoa] Anaphylaxis and Swelling    Iodine Anaphylaxis    Lisinopril Anaphylaxis  Peanut Anaphylaxis and Swelling    Glimepiride Swelling    Glipizide Other (comments)     Pruritis    Metformin Diarrhea    Norvasc [Amlodipine] Other (comments)     Light Headed    Pneumovax 23 [Pneumococcal 23-Devi Ps Vaccine] Hives    Toprol Xl [Metoprolol Succinate] Other (comments)     Light headed    Vitamin D [Cholecalciferol (Vitamin D3)] Other (comments)     Dizziness/headache      Meds:  See below  Social History     Tobacco Use    Smoking status: Never Smoker    Smokeless tobacco: Never Used   Substance Use Topics    Alcohol use: No      No family history on file. REVIEW OF SYSTEMS:     []         Unable to obtain  ROS due to ---   [x]         Total of 12 systems reviewed as follows:    Constitutional: negative fever, negative chills, negative weight loss  Eyes:   negative visual changes  ENT:   negative sore throat, tongue or lip swelling  Respiratory:  negative cough, negative dyspnea  Cards:  See HPI  GI:   negative for nausea, vomiting, diarrhea, and abdominal pain  Genitourinary: negative for frequency, dysuria  Integument:  negative for rash   Hematologic:  negative for easy bruising and gum/nose bleeding  Musculoskel: negative for myalgias,  back pain  Neurological:  negative for headaches, dizziness, vertigo, weakness  Behavl/Psych: negative for feelings of anxiety, depression     Pertinent Positives include :    Objective:      Physical Exam:    Last 24hrs VS reviewed since prior progress note. Most recent are:    Visit Vitals  BP (!) 122/92 (BP Patient Position: At rest;Semi fowlers)   Pulse 83   Temp 97.3 °F (36.3 °C)   Resp 18   Ht 5' 6\" (1.676 m)   Wt 65.8 kg (145 lb)   SpO2 99%   BMI 23.40 kg/m²     No intake or output data in the 24 hours ending 10/03/21 1334     General Appearance: Well developed, alert, no acute distress. Ears/Nose/Mouth/Throat: Pupils equal and round, Hearing grossly normal.  Neck: Supple. JVP within normal limits.  Carotids good upstrokes, with no bruit.  Chest: Lungs clear to auscultation bilaterally. Cardiovascular: Regular rate and rhythm, S1S2 normal, no murmur, rubs, gallops. Abdomen: Soft, non-tender, bowel sounds are active. No organomegaly. Extremities: No edema bilaterally. Femoral pulses +2, Distal Pulses +1. Both lower extremities are very tender to touch   skin: Warm and dry. Neuro: Alert , follows simple commands  Psychiatric: Cooperative    Data:      Telemetry:    EKG:  []  No new EKG for review. Prior to Admission medications    Medication Sig Start Date End Date Taking? Authorizing Provider   carvediloL (COREG) 6.25 mg tablet Take 1 Tablet by mouth two (2) times daily (with meals). 8/5/21   Sanjuana Bolton MD   oxyCODONE IR (ROXICODONE) 5 mg immediate release tablet Take 5 mg by mouth every four (4) hours as needed for Pain. 7/16/21   Provider, Historical   cyclobenzaprine (FLEXERIL) 5 mg tablet Take 5 mg by mouth two (2) times daily as needed for Muscle Spasm(s). 7/19/21   Provider, Historical   hydroCHLOROthiazide (HYDRODIURIL) 12.5 mg tablet Take 12.5 mg by mouth daily. 5/7/21   Provider, Historical   amitriptyline (ELAVIL) 75 mg tablet Take 75 mg by mouth daily. 5/3/21   Provider, Historical   glipiZIDE (GLUCOTROL) 5 mg tablet Take 5 mg by mouth daily. 4/29/21   Provider, Historical   prednisoLONE acetate (PRED FORTE) 1 % ophthalmic suspension INSTILL 1 DROP INTO RIGHT EYE 4 TIMES A DAY 3/13/21   Provider, Historical   atorvastatin (LIPITOR) 40 mg tablet Take 1 Tab by mouth nightly. 2/19/21   Elvira Dumont MD   furosemide (Lasix) 40 mg tablet 1 tablet daily  Patient taking differently: Take 40 mg by mouth daily. 1 tablet daily 2/19/21   Jeny Huff MD   metFORMIN (GLUCOPHAGE) 1,000 mg tablet two (2) times daily (with meals).  10/12/20   Provider, Historical   gabapentin (NEURONTIN) 100 mg capsule TAKE 1 CAPSULE BY MOUTH THREE TIMES A DAY 10/27/20   Provider, Historical   SITagliptin (Januvia) 100 mg tablet TAKE 1 TABLET BY MOUTH EVERY DAY  Patient taking differently: Take 100 mg by mouth daily. TAKE 1 TABLET BY MOUTH EVERY DAY 8/2/20   Nick Williamson MD   metoprolol succinate (TOPROL-XL) 25 mg XL tablet TAKE 0.5 TABS BY MOUTH DAILY. 12/16/19   Amira Medellin NP   ELIQUIS 5 mg tablet TAKE 1 TABLET BY MOUTH TWICE A DAY 12/5/19   Amira Medellin NP   nitroglycerin (NITROSTAT) 0.4 mg SL tablet 0.4 mg by SubLINGual route every five (5) minutes as needed for Chest Pain. Up to 3 doses. Patient not taking: Reported on 8/3/2021    Provider, Historical   cloNIDine HCL (CATAPRES) 0.2 mg tablet Take 0.2 mg by mouth two (2) times a day. 5/21/18   Provider, Historical   losartan (COZAAR) 100 mg tablet Take 100 mg by mouth daily. 4/25/17   Provider, Historical   PARoxetine (PAXIL) 20 mg tablet Take 20 mg by mouth daily. Provider, Historical   omeprazole (PRILOSEC) 20 mg capsule Take 20 mg by mouth daily. 10/28/10   Provider, Historical       Recent Results (from the past 24 hour(s))   CBC WITH AUTOMATED DIFF    Collection Time: 10/02/21  3:56 PM   Result Value Ref Range    WBC 6.4 3.6 - 11.0 K/uL    RBC 4.87 3.80 - 5.20 M/uL    HGB 13.3 11.5 - 16.0 g/dL    HCT 42.5 35.0 - 47.0 %    MCV 87.3 80.0 - 99.0 FL    MCH 27.3 26.0 - 34.0 PG    MCHC 31.3 30.0 - 36.5 g/dL    RDW 18.6 (H) 11.5 - 14.5 %    PLATELET 612 (L) 928 - 400 K/uL    MPV 13.5 (H) 8.9 - 12.9 FL    NRBC 0.0 0.0  WBC    ABSOLUTE NRBC 0.00 0.00 - 0.01 K/uL    NEUTROPHILS 62 32 - 75 %    LYMPHOCYTES 24 12 - 49 %    MONOCYTES 11 5 - 13 %    EOSINOPHILS 2 0 - 7 %    BASOPHILS 0 0 - 1 %    IMMATURE GRANULOCYTES 1 (H) 0 - 0.5 %    ABS. NEUTROPHILS 4.0 1.8 - 8.0 K/UL    ABS. LYMPHOCYTES 1.5 0.8 - 3.5 K/UL    ABS. MONOCYTES 0.7 0.0 - 1.0 K/UL    ABS. EOSINOPHILS 0.1 0.0 - 0.4 K/UL    ABS. BASOPHILS 0.0 0.0 - 0.1 K/UL    ABS. IMM.  GRANS. 0.0 0.00 - 0.04 K/UL    DF AUTOMATED     METABOLIC PANEL, COMPREHENSIVE    Collection Time: 10/02/21  3:56 PM   Result Value Ref Range Sodium 138 136 - 145 mmol/L    Potassium 4.0 3.5 - 5.1 mmol/L    Chloride 103 97 - 108 mmol/L    CO2 26 21 - 32 mmol/L    Anion gap 9 5 - 15 mmol/L    Glucose 160 (H) 65 - 100 mg/dL    BUN 44 (H) 6 - 20 mg/dL    Creatinine 2.21 (H) 0.55 - 1.02 mg/dL    BUN/Creatinine ratio 20 12 - 20      GFR est AA 27 (L) >60 ml/min/1.73m2    GFR est non-AA 22 (L) >60 ml/min/1.73m2    Calcium 9.2 8.5 - 10.1 mg/dL    Bilirubin, total 1.0 0.2 - 1.0 mg/dL    AST (SGOT) 16 15 - 37 U/L    ALT (SGPT) 20 12 - 78 U/L    Alk.  phosphatase 85 45 - 117 U/L    Protein, total 6.8 6.4 - 8.2 g/dL    Albumin 2.7 (L) 3.5 - 5.0 g/dL    Globulin 4.1 (H) 2.0 - 4.0 g/dL    A-G Ratio 0.7 (L) 1.1 - 2.2     TROPONIN I    Collection Time: 10/02/21  3:56 PM   Result Value Ref Range    Troponin-I, Qt. 0.34 (H) <0.05 ng/mL   BNP    Collection Time: 10/02/21  3:56 PM   Result Value Ref Range    NT pro-BNP 8,969 (H) <125 pg/mL   LACTIC ACID    Collection Time: 10/02/21  3:56 PM   Result Value Ref Range    Lactic acid 2.4 (HH) 0.4 - 2.0 mmol/L   TSH 3RD GENERATION    Collection Time: 10/02/21  3:56 PM   Result Value Ref Range    TSH 2.13 0.36 - 3.74 uIU/mL   MAGNESIUM    Collection Time: 10/02/21  3:56 PM   Result Value Ref Range    Magnesium 2.3 1.6 - 2.4 mg/dL   SARS-COV-2    Collection Time: 10/02/21  5:30 PM   Result Value Ref Range    SARS-CoV-2 Please find results under separate order     COVID-19 RAPID TEST    Collection Time: 10/02/21  5:30 PM   Result Value Ref Range    Specimen source Nasopharyngeal      COVID-19 rapid test Not Detected Not Detected     TROPONIN I    Collection Time: 10/02/21  7:37 PM   Result Value Ref Range    Troponin-I, Qt. 0.35 (H) <0.05 ng/mL   HEMOGLOBIN A1C WITH EAG    Collection Time: 10/02/21  7:37 PM   Result Value Ref Range    Hemoglobin A1c 7.0 (H) 4.0 - 5.6 %    Est. average glucose 154 mg/dL   GLUCOSE, POC    Collection Time: 10/02/21  9:16 PM   Result Value Ref Range    Glucose (POC) 142 (H) 65 - 117 mg/dL    Performed by Jerman Connell    METABOLIC PANEL, BASIC    Collection Time: 10/03/21  7:08 AM   Result Value Ref Range    Sodium 139 136 - 145 mmol/L    Potassium 4.2 3.5 - 5.1 mmol/L    Chloride 105 97 - 108 mmol/L    CO2 21 21 - 32 mmol/L    Anion gap 13 5 - 15 mmol/L    Glucose 130 (H) 65 - 100 mg/dL    BUN 47 (H) 6 - 20 mg/dL    Creatinine 1.94 (H) 0.55 - 1.02 mg/dL    BUN/Creatinine ratio 24 (H) 12 - 20      GFR est AA 31 (L) >60 ml/min/1.73m2    GFR est non-AA 26 (L) >60 ml/min/1.73m2    Calcium 8.8 8.5 - 10.1 mg/dL   MAGNESIUM    Collection Time: 10/03/21  7:08 AM   Result Value Ref Range    Magnesium 2.2 1.6 - 2.4 mg/dL   CBC WITH AUTOMATED DIFF    Collection Time: 10/03/21  7:08 AM   Result Value Ref Range    WBC 7.5 3.6 - 11.0 K/uL    RBC 4.55 3.80 - 5.20 M/uL    HGB 12.7 11.5 - 16.0 g/dL    HCT 39.7 35.0 - 47.0 %    MCV 87.3 80.0 - 99.0 FL    MCH 27.9 26.0 - 34.0 PG    MCHC 32.0 30.0 - 36.5 g/dL    RDW 18.5 (H) 11.5 - 14.5 %    PLATELET 80 (L) 806 - 400 K/uL    NRBC 0.0 0.0  WBC    ABSOLUTE NRBC 0.00 0.00 - 0.01 K/uL    NEUTROPHILS 51 32 - 75 %    LYMPHOCYTES 31 12 - 49 %    MONOCYTES 15 (H) 5 - 13 %    EOSINOPHILS 3 0 - 7 %    BASOPHILS 0 0 - 1 %    IMMATURE GRANULOCYTES 0 0 - 0.5 %    ABS. NEUTROPHILS 3.7 1.8 - 8.0 K/UL    ABS. LYMPHOCYTES 2.3 0.8 - 3.5 K/UL    ABS. MONOCYTES 1.1 (H) 0.0 - 1.0 K/UL    ABS. EOSINOPHILS 0.2 0.0 - 0.4 K/UL    ABS. BASOPHILS 0.0 0.0 - 0.1 K/UL    ABS. IMM.  GRANS. 0.0 0.00 - 0.04 K/UL    DF AUTOMATED     TROPONIN I    Collection Time: 10/03/21  7:08 AM   Result Value Ref Range    Troponin-I, Qt. 0.32 (H) <0.05 ng/mL   LIPID PANEL    Collection Time: 10/03/21  7:08 AM   Result Value Ref Range    LIPID PROFILE        Cholesterol, total 81 <200 mg/dL    Triglyceride 92 <150 mg/dL    HDL Cholesterol 25 mg/dL    LDL, calculated 37.6 0 - 100 mg/dL    VLDL, calculated 18.4 mg/dL    CHOL/HDL Ratio 3.2 0.0 - 5.0     GLUCOSE, POC    Collection Time: 10/03/21  8:47 AM   Result Value Ref Range    Glucose (POC) 142 (H) 65 - 117 mg/dL    Performed by Robert Chimera    ECHO ADULT COMPLETE    Collection Time: 10/03/21  9:52 AM   Result Value Ref Range    Aortic Valve Systolic Peak Velocity 14.84 cm/s    AoV PG 3.00 mmHg    Ao Root D 2.50 cm    IVSd 1.09 (A) 0.60 - 0.90 cm    LVIDd 5.75 (A) 3.90 - 5.30 cm    LVIDs 5.42 cm    LVOT Peak Velocity 51.80 cm/s    LVOT Peak Gradient 1.00 mmHg    LVPWd 1.09 (A) 0.60 - 0.90 cm    LV E' Septal Velocity 3.61 cm/s    LV ED Vol A2C 190.00 cm3    LV ES Vol A2C 159.00 cm3    E/E' septal 50.69     Left Atrium Major Axis 3.60 cm    Mitral Valve Max Velocity 201.00 cm/s    MV Peak Gradient 16.00 mmHg    Mitral Valve E Wave Deceleration Time 162.00 ms    MV A Bubba 71.60 cm/s    MV E Bubba 183.00 cm/s    MV Mean Gradient 5.00 mmHg    Mitral Valve Annulus Velocity Time Integral 33.00 cm    MV E/A 2.56     Est. RA Pressure 15.00 mmHg    RVIDd 3.78 cm    RVSP 67.00 mmHg    Tricuspid Valve Max Velocity 360.00 cm/s    Triscuspid Valve Regurgitation Peak Gradient 52.00 mmHg    Tapse 1.50 1.50 - 2.00 cm    Right Atrial Area 4C 23.99 cm2    LA Area 4C 26.45 cm2    BP EF 12.3 55.0 - 100.0 %    LV Mass .3 67.0 - 162.0 g    LV Mass AL Index 147.9 43.0 - 95.0 g/m2    Left Atrium Minor Axis 2.07 cm   GLUCOSE, POC    Collection Time: 10/03/21 11:47 AM   Result Value Ref Range    Glucose (POC) 163 (H) 65 - 117 mg/dL    Performed by Lico Castillo MD

## 2021-10-04 NOTE — PROGRESS NOTES
Reason for Admission:   Satnam [place] at home                    RUR Score:     19%             PCP: First and Last name:   Susana Antonio MD     Name of Practice:    Are you a current patient: Yes/No: yes   Approximate date of last visit: have not seen yet, just moved here. Can you participate in a virtual visit if needed:     Do you (patient/family) have any concerns for transition/discharge? Plan will be for patient to d/c to SNF              Plan for utilizing home health:   No need for home health at this time. Current Advanced Directive/Advance Care Plan:  Full Code      Healthcare Decision Maker:   Click here to complete [place] including selection of the Healthcare Decision Maker Relationship (ie \"Primary\")              Transition of Care Plan:        Patient lives at home with her daughter and has recently moved here from living with her son after daughter claim her brother was not taking care of their mom. Patient was recently at [facility] and was discharged home with her daughter. She was able to walk and was continent of urine. The patient status now is a huge change according to the daughter. Patient lethargic this am, difficult to arouse. Discussed with daughter recommendations from PT/OT for patient to go to SNF at discharge. Daughter in agreement.

## 2021-10-04 NOTE — PROGRESS NOTES
Physician Progress Note      PATIENT:               Gayle Taylor  CSN #:                  [id number]  :                       1955  ADMIT DATE:       10/2/2021 3:36 PM  [address] [place] DATE:  RESPONDING  PROVIDER #:        Anneliese Thomas MD          QUERY TEXT:    Pt admitted with ACUTE CHF and has malnutrition documented. Please further specify type of malnutrition with documentation in the medical record. The medical record reflects the following:  Risk Factors: CAD, CKD 3, CHF, HLD, DM II, ADELINA, CARDIOMYOPATHY  Clinical Indicators: 10/4 RD PN: \"Mild malnutrition. 50% or less of est energy requirements for 5 or more days. \" BMI: 27.01  Treatment: RD CONSULTED, Add Wonda Socorro) ensure compact BID, Add Wonda Socorro) ensure pudding daily. ASPEN Criteria:  https://aspenjournals. onlinelibrary. tao. com/doi/full/10.1177/6738268358083826    Thank you,  TRINA MottaN, RN, [facility], [facility] Specialist  [phone] or [email or web address]  Options provided:  -- Mild Protein calorie malnutrition  -- Moderate Protein calorie malnutrition  -- Severe Protein calorie malnutrition  -- Other - I will add my own diagnosis  -- Disagree - Not applicable / Not valid  -- Disagree - Clinically unable to determine / Unknown  -- Refer to Clinical Documentation Reviewer    PROVIDER RESPONSE TEXT:    This patient has moderate protein calorie malnutrition.     Query created by: Jovita Morin on 10/4/2021 5:13 PM      Electronically signed by:  Anneliese Thomas MD 10/4/2021 5:14 PM

## 2021-10-04 NOTE — PROGRESS NOTES
Renal Progress Note    Patient: Ольга Rodriguez MRN: [id number]  SSN: [id number]    YOB: 1955  Age: 78 y.o. Sex: female      Admit Date: 10/2/2021    LOS: 2 days     Subjective:   Patient seen at bedside. awake, no acute distress. AMS+  not giving any new complaints today.    Improved edema, stable creat around 2.0        Current Facility-Administered Medications   Medication Dose Route Frequency    [START ON 10/5/2021] furosemide (LASIX) tablet 40 mg  40 mg Oral DAILY    amitriptyline (ELAVIL) tablet 75 mg  75 mg Oral DAILY    atorvastatin (LIPITOR) tablet 40 mg  40 mg Oral QHS    cloNIDine HCL (CATAPRES) tablet 0.2 mg  0.2 mg Oral BID    cyclobenzaprine (FLEXERIL) tablet 5 mg  5 mg Oral BID PRN    apixaban (ELIQUIS) tablet 5 mg  5 mg Oral BID    gabapentin (NEURONTIN) capsule 100 mg  100 mg Oral TID    [Held by provider] hydroCHLOROthiazide (HYDRODIURIL) tablet 12.5 mg  12.5 mg Oral DAILY    [Held by provider] losartan (COZAAR) tablet 100 mg  100 mg Oral DAILY    [Held by provider] metFORMIN (GLUCOPHAGE) tablet 1,000 mg  1,000 mg Oral BID WITH MEALS    nitroglycerin (NITROSTAT) tablet 0.4 mg  0.4 mg SubLINGual Q5MIN PRN    pantoprazole (PROTONIX) tablet 20 mg  20 mg Oral DAILY    oxyCODONE IR (ROXICODONE) tablet 5 mg  5 mg Oral Q4H PRN    PARoxetine (PAXIL) tablet 20 mg  20 mg Oral DAILY    prednisoLONE acetate (PRED FORTE) 1 % ophthalmic suspension 1 Drop  1 Drop Right Eye QID    [Held by provider] alogliptin (NESINA) tablet 6.25 mg  6.25 mg Oral DAILY    carvediloL (COREG) tablet 6.25 mg  6.25 mg Oral BID WITH MEALS    sodium chloride (NS) flush 5-40 mL  5-40 mL IntraVENous Q8H    sodium chloride (NS) flush 5-40 mL  5-40 mL IntraVENous PRN    acetaminophen (TYLENOL) tablet 650 mg  650 mg Oral Q6H PRN    Or    acetaminophen (TYLENOL) suppository 650 mg  650 mg Rectal Q6H PRN    polyethylene glycol (MIRALAX) packet 17 g  17 g Oral DAILY PRN    ondansetron (ZOFRAN ODT) tablet 4 mg  4 mg Oral Q8H PRN    Or    ondansetron (ZOFRAN) injection 4 mg  4 mg IntraVENous Q6H PRN    aspirin delayed-release tablet 81 mg  81 mg Oral DAILY    insulin lispro (HUMALOG) injection   SubCUTAneous AC&HS    glucose chewable tablet 16 g  4 Tablet Oral PRN    glucagon (GLUCAGEN) injection 1 mg  1 mg IntraMUSCular PRN    dextrose (D50W) injection syrg 12.5-25 g  25-50 mL IntraVENous PRN    alum-mag hydroxide-simeth (MYLANTA) oral suspension 30 mL  30 mL Oral Q4H PRN    hydrALAZINE (APRESOLINE) 20 mg/mL injection 10 mg  10 mg IntraVENous Q6H PRN        Vitals:    10/04/21 0933 10/04/21 1558 10/04/21 1559 10/04/21 1600   BP: 125/78 128/68     Pulse: 84 81     Resp: 20 18     Temp: 97.5 °F (36.4 °C) 98.2 °F (36.8 °C)     SpO2: 96% 99%     Weight:    75.9 kg (167 lb 5.3 oz)   Height:   5' 6\" (1.676 m)      Objective:   General:awake , no acute distress. HEENT: EOMI, no Icterus, no Pallor,  mucosa moist.  Neck: Neck is supple, No JVD  Lungs: breathsounds normal,  no rhonchi, no rales,   CVS: heart sounds normal,  no murmurs, no rubs. GI: soft, nontender, normal BS. Extremeties: no cyanosis, no edema,   Neuro: Alert, awake, confused  Skin: normal skin turgor, no skin rashes. Intake and Output:  Current Shift: No intake/output data recorded.   Last three shifts: 10/02 1901 - 10/04 0700  In: -   Out: 301 [Urine:300]      Lab/Data Review:  Recent Labs     10/04/21  0745 10/03/21  0708 10/02/21  1556   WBC 6.6 7.5 6.4   HGB 13.8 12.7 13.3   HCT 43.2 39.7 42.5   PLT 69* 80* 102*     Recent Labs     10/04/21  0745 10/03/21  0708 10/02/21  1556     139 139 138   K 4.1  4.1 4.2 4.0     103 105 103   CO2 25  26 21 26   GLU 93  95 130* 160*   BUN 48*  48* 47* 44*   CREA 2.03*  2.05* 1.94* 2.21*   CA 9.3  9.3  9.2 8.8 9.2   MG 2.2 2.2 2.3   PHOS 4.4  --   --    ALB 2.4*  --  2.7*   TBILI  --   --  1.0   ALT  --   --  20     No results for input(s): PH, PCO2, PO2, HCO3, FIO2 in the last 72 hours.   Recent Results (from the past 24 hour(s))   GLUCOSE, POC    Collection Time: 10/03/21  8:46 PM   Result Value Ref Range    Glucose (POC) 113 65 - 117 mg/dL    Performed by Leandra MILLS    CBC W/O DIFF    Collection Time: 10/04/21  7:45 AM   Result Value Ref Range    WBC 6.6 3.6 - 11.0 K/uL    RBC 4.92 3.80 - 5.20 M/uL    HGB 13.8 11.5 - 16.0 g/dL    HCT 43.2 35.0 - 47.0 %    MCV 87.8 80.0 - 99.0 FL    MCH 28.0 26.0 - 34.0 PG    MCHC 31.9 30.0 - 36.5 g/dL    RDW 18.8 (H) 11.5 - 14.5 %    PLATELET 69 (L) 432 - 400 K/uL    NRBC 0.0 0.0  WBC    ABSOLUTE NRBC 0.00 0.00 - 3.25 K/uL   METABOLIC PANEL, BASIC    Collection Time: 10/04/21  7:45 AM   Result Value Ref Range    Sodium 139 136 - 145 mmol/L    Potassium 4.1 3.5 - 5.1 mmol/L    Chloride 103 97 - 108 mmol/L    CO2 26 21 - 32 mmol/L    Anion gap 10 5 - 15 mmol/L    Glucose 95 65 - 100 mg/dL    BUN 48 (H) 6 - 20 mg/dL    Creatinine 2.05 (H) 0.55 - 1.02 mg/dL    BUN/Creatinine ratio 23 (H) 12 - 20      GFR est AA 29 (L) >60 ml/min/1.73m2    GFR est non-AA 24 (L) >60 ml/min/1.73m2    Calcium 9.2 8.5 - 10.1 mg/dL   MAGNESIUM    Collection Time: 10/04/21  7:45 AM   Result Value Ref Range    Magnesium 2.2 1.6 - 2.4 mg/dL   RENAL FUNCTION PANEL    Collection Time: 10/04/21  7:45 AM   Result Value Ref Range    Sodium 140 136 - 145 mmol/L    Potassium 4.1 3.5 - 5.1 mmol/L    Chloride 104 97 - 108 mmol/L    CO2 25 21 - 32 mmol/L    Anion gap 11 5 - 15 mmol/L    Glucose 93 65 - 100 mg/dL    BUN 48 (H) 6 - 20 mg/dL    Creatinine 2.03 (H) 0.55 - 1.02 mg/dL    BUN/Creatinine ratio 24 (H) 12 - 20      GFR est AA 30 (L) >60 ml/min/1.73m2    GFR est non-AA 24 (L) >60 ml/min/1.73m2    Calcium 9.3 8.5 - 10.1 mg/dL    Phosphorus 4.4 2.6 - 4.7 mg/dL    Albumin 2.4 (L) 3.5 - 5.0 g/dL   PTH INTACT    Collection Time: 10/04/21  7:45 AM   Result Value Ref Range    Calcium 9.3 8.5 - 10.1 mg/dL    PTH, Intact 89.0 (H) 18.4 - 88.0 pg/mL   VITAMIN D, 25 HYDROXY    Collection Time: 10/04/21  7:45 AM   Result Value Ref Range    Vitamin D 25-Hydroxy 12.4 (L) 30 - 100 ng/mL   GLUCOSE, POC    Collection Time: 10/04/21  9:58 AM   Result Value Ref Range    Glucose (POC) 94 65 - 117 mg/dL    Performed by [id number], POC    Collection Time: 10/04/21 12:13 PM   Result Value Ref Range    Glucose (POC) 140 (H) 65 - 117 mg/dL    Performed by [id number], POC    Collection Time: 10/04/21  5:30 PM   Result Value Ref Range    Glucose (POC) 141 (H) 65 - 117 mg/dL    Performed by Heladio Eckert and Plan:     #1 acute kidney injury on CKD stage III  -presented with creatinine of 2.1   -Baseline creatinine seems to be around 1.4 based on labs in August 2021  -ADELINA likely prerenal secondary to losartan/hydrochlorthiazide and cardiorenal syndrome  On diuretics for fluid overload  Stable creat around 2  continue Lasix 40 mg daily  -Continue to hold losartan and hydrochlorothiazide for now  Continue to monutor renal functions   Probably has stage 3CKD as baseline    #2 acute decompensated heart failure  -Reduced EF on echo in the past  -Presented with bilateral leg swelling and shortness of breath  -Echo on 2/17/2021 showed EF of 15 to 20%  Improving with diuretics  l continue with Lasix 40 mg IV daily. Strict I's and O's. Keep a negative fluid balance  -Losartan held because of ADELINA.   Continue to hold     #4 hypertension  -blood pressure is well controlled with clonidine and Coreg which I will continue.       Signed By: Gris Cardozo MD     October 4, 2021

## 2021-10-04 NOTE — PROGRESS NOTES
10/4/2021, 1:40 PM        [facility]  Phone: [phone]      Daily Progress Note:      Patient identification:     Di Trevizo  78 y. o.female  1955      Primary Cardiologist:  Dr. Darrius Ward    Chief Complaint:  Dyspnea    Assessment:     1. Cardiomyopathy with ejection fraction 15 to 20%. Grade 2 diastolic dysfunction. History of ICD. 2. Hypertension  3. Diabetes insulin requiring  4. History of Covid 19  5. Paroxysmal atrial fibrillation. On Xarelto. Not on AAT at this time. 6. Generalized weakness with falling spells. 7. CKD    Recommendation:     1. Maintain euvolemia. 2. Low-sodium diet. 3. GDMT for cardiomyopathy. 4. Low-sodium diet. 5. Follow I's and O's as well as daily weights. 6. No cardiac barriers to discharge at this time. Follow-up as outpatient with primary cardiologist.    Interval History:  No new cardiac developments overnight. Subjective:  Pt. States no worsening angina or shortness of breath. Does complain of generalized weakness. Review of Systems:   No angina or worsening shortness of breath. No edema.     Medications:     Current Facility-Administered Medications   Medication Dose Route Frequency    [START ON 10/5/2021] furosemide (LASIX) tablet 40 mg  40 mg Oral DAILY    amitriptyline (ELAVIL) tablet 75 mg  75 mg Oral DAILY    atorvastatin (LIPITOR) tablet 40 mg  40 mg Oral QHS    cloNIDine HCL (CATAPRES) tablet 0.2 mg  0.2 mg Oral BID    cyclobenzaprine (FLEXERIL) tablet 5 mg  5 mg Oral BID PRN    apixaban (ELIQUIS) tablet 5 mg  5 mg Oral BID    gabapentin (NEURONTIN) capsule 100 mg  100 mg Oral TID    [Held by provider] hydroCHLOROthiazide (HYDRODIURIL) tablet 12.5 mg  12.5 mg Oral DAILY    [Held by provider] losartan (COZAAR) tablet 100 mg  100 mg Oral DAILY    [Held by provider] metFORMIN (GLUCOPHAGE) tablet 1,000 mg  1,000 mg Oral BID WITH MEALS    nitroglycerin (NITROSTAT) tablet 0.4 mg  0.4 mg SubLINGual Q5MIN PRN    pantoprazole (PROTONIX) tablet 20 mg  20 mg Oral DAILY    oxyCODONE IR (ROXICODONE) tablet 5 mg  5 mg Oral Q4H PRN    PARoxetine (PAXIL) tablet 20 mg  20 mg Oral DAILY    prednisoLONE acetate (PRED FORTE) 1 % ophthalmic suspension 1 Drop  1 Drop Right Eye QID    [Held by provider] alogliptin (NESINA) tablet 6.25 mg  6.25 mg Oral DAILY    carvediloL (COREG) tablet 6.25 mg  6.25 mg Oral BID WITH MEALS    sodium chloride (NS) flush 5-40 mL  5-40 mL IntraVENous Q8H    sodium chloride (NS) flush 5-40 mL  5-40 mL IntraVENous PRN    acetaminophen (TYLENOL) tablet 650 mg  650 mg Oral Q6H PRN    Or    acetaminophen (TYLENOL) suppository 650 mg  650 mg Rectal Q6H PRN    polyethylene glycol (MIRALAX) packet 17 g  17 g Oral DAILY PRN    ondansetron (ZOFRAN ODT) tablet 4 mg  4 mg Oral Q8H PRN    Or    ondansetron (ZOFRAN) injection 4 mg  4 mg IntraVENous Q6H PRN    aspirin delayed-release tablet 81 mg  81 mg Oral DAILY    insulin lispro (HUMALOG) injection   SubCUTAneous AC&HS    glucose chewable tablet 16 g  4 Tablet Oral PRN    glucagon (GLUCAGEN) injection 1 mg  1 mg IntraMUSCular PRN    dextrose (D50W) injection syrg 12.5-25 g  25-50 mL IntraVENous PRN    alum-mag hydroxide-simeth (MYLANTA) oral suspension 30 mL  30 mL Oral Q4H PRN    hydrALAZINE (APRESOLINE) 20 mg/mL injection 10 mg  10 mg IntraVENous Q6H PRN       Allergies:    Allergies   Allergen Reactions    Chocolate [Cocoa] Anaphylaxis and Swelling    Iodine Anaphylaxis    Lisinopril Anaphylaxis    Peanut Anaphylaxis and Swelling    Glimepiride Swelling    Glipizide Other (comments)     Pruritis    Metformin Diarrhea    Norvasc [Amlodipine] Other (comments)     Light Headed    Pneumovax 23 [Pneumococcal 23-Devi Ps Vaccine] Hives    Toprol Xl [Metoprolol Succinate] Other (comments)     Light headed    Vitamin D [Cholecalciferol (Vitamin D3)] Other (comments)     Dizziness/headache       Physical Exam: Visit Vitals  /78 (BP 1 Location: Left upper arm, BP Patient Position: At rest;Supine)   Pulse 84   Temp 97.5 °F (36.4 °C)   Resp 20   Ht 5' 6\" (1.676 m)   Wt 65.8 kg (145 lb)   SpO2 96%   BMI 23.40 kg/m²       General:  NAD, calm, cooperative  CVS:  Regular rate and rhythm, normal S1S2, no new murmurs, rubs or gallops  Pulm:  Normal effort, clear to auscultation bilaterally  Abd:  Soft, non-tender, non-distended  Ext:  Warm and well perfused without edema  Neuro:  Alert and oriented, answering questions appropriately    Telemetry reviewed:      Labs:    CBC  Lab Results   Component Value Date/Time    WBC 6.6 10/04/2021 07:45 AM    RBC 4.92 10/04/2021 07:45 AM    HCT 43.2 10/04/2021 07:45 AM    MCV 87.8 10/04/2021 07:45 AM    MCH 28.0 10/04/2021 07:45 AM    RDW 18.8 (H) 10/04/2021 07:45 AM    MONOS 15 (H) 10/03/2021 07:08 AM    EOS 3 10/03/2021 07:08 AM    BASOS 0 10/03/2021 07:08 AM       Basic Metabolic Profile  Lab Results   Component Value Date     10/04/2021     10/04/2021    CO2 26 10/04/2021    CO2 25 10/04/2021    BUN 48 (H) 10/04/2021    BUN 48 (H) 10/04/2021       Diagnostic Studies:    Echo Results  (Last 48 hours)    None        XR FOOT LT MIN 3 V   Final Result      XR CHEST SNGL V   Final Result            Dr. Francheska Carranza MD, Nina Jorgensen

## 2021-10-04 NOTE — DISCHARGE SUMMARY
Hospitalist Discharge Summary     Patient ID:  Alicja Luz  [id number]  78 y.o.  1955  10/2/2021    PCP on record: Ryan Villavicencio MD    Admit date: 10/2/2021  Discharge date and time: 10/4/2021    DISCHARGE DIAGNOSIS:    Acute decompensated heart failure with reduced ejection fraction/status post fall/acute kidney injury/hypertension    CONSULTATIONS:  IP CONSULT TO CARDIOLOGY  IP CONSULT TO NEPHROLOGY    Excerpted HPI from H&P of Rosalva Bernard MD:  Alicja Luz is a 78 y.o. female who presents from home with multiple comorbidities including sever cardiomyopathy with 15-20% lvef and grade 2 dd(2/2021 with aicd in place, htn, afib, dm2 insulin requiring. Presents with c/o increased weakness, decreased appetite and swollen legs. She had covid 1 year ago, reportedly weak since. She provides limited hx, states difficulty getting around as well 2/2 weakness. No sob, no cp, no congestion. No n/v/d.    ______________________________________________________________________  DISCHARGE SUMMARY/HOSPITAL COURSE:  for full details see H&P, daily progress notes, labs, consult notes. Patient was subsequently admitted to [facility] for further evaluation as well as management, of note patient remained on continuous telemetry monitoring, patient received IV diuresis, subsequent to which patient's clinical status improved, patient was evaluated by physical therapy as well as Occupational Therapy as well as nephrology, after clearance by cardiology and nephrology patient was deemed stable for discharge to skilled nursing facility with close outpatient follow-up with primary care physician as well as cardiology as well as nephrology. _______________________________________________________________________  Patient seen and examined by me on discharge day.   Pertinent Findings:  Gen:    Not in distress  Chest: Clear lungs  CVS:   Regular rhythm, s1/s2 no m/r/g  No edema  Abd:  Soft, not distended, not tender  Neuro:  Alert, Oriented at baseline  _______________________________________________________________________  DISCHARGE MEDICATIONS:   Current Discharge Medication List      START taking these medications    Details   aspirin delayed-release 81 mg tablet Take 1 Tablet by mouth daily. Qty: 30 Tablet, Refills: 0  Start date: 10/5/2021         CONTINUE these medications which have NOT CHANGED    Details   carvediloL (COREG) 6.25 mg tablet Take 1 Tablet by mouth two (2) times daily (with meals). Qty: 60 Tablet, Refills: 0      oxyCODONE IR (ROXICODONE) 5 mg immediate release tablet Take 5 mg by mouth every four (4) hours as needed for Pain. cyclobenzaprine (FLEXERIL) 5 mg tablet Take 5 mg by mouth two (2) times daily as needed for Muscle Spasm(s). hydroCHLOROthiazide (HYDRODIURIL) 12.5 mg tablet Take 12.5 mg by mouth daily. amitriptyline (ELAVIL) 75 mg tablet Take 75 mg by mouth daily. glipiZIDE (GLUCOTROL) 5 mg tablet Take 5 mg by mouth daily. prednisoLONE acetate (PRED FORTE) 1 % ophthalmic suspension INSTILL 1 DROP INTO RIGHT EYE 4 TIMES A DAY      atorvastatin (LIPITOR) 40 mg tablet Take 1 Tab by mouth nightly. Qty: 60 Tab, Refills: 1      furosemide (Lasix) 40 mg tablet 1 tablet daily  Qty: 60 Tab, Refills: 0      metFORMIN (GLUCOPHAGE) 1,000 mg tablet two (2) times daily (with meals). gabapentin (NEURONTIN) 100 mg capsule TAKE 1 CAPSULE BY MOUTH THREE TIMES A DAY      SITagliptin (Januvia) 100 mg tablet TAKE 1 TABLET BY MOUTH EVERY DAY  Qty: 90 Tab, Refills: 2    Comments: EMERGENCY HOLDOVER PROVIDED: MB#[id number]. [facility] GAVE 3 JANUVIA 100 MG TABLET. MD CONTACTED FOR AUTHORIZATION ON 08/01/2020      metoprolol succinate (TOPROL-XL) 25 mg XL tablet TAKE 0.5 TABS BY MOUTH DAILY.   Qty: 45 Tab, Refills: 11    Associated Diagnoses: HTN (hypertension), benign; PVC (premature ventricular contraction)      ELIQUIS 5 mg tablet TAKE 1 TABLET BY MOUTH TWICE A DAY  Qty: 180 Tab, Refills: 3      nitroglycerin (NITROSTAT) 0.4 mg SL tablet 0.4 mg by SubLINGual route every five (5) minutes as needed for Chest Pain. Up to 3 doses. Associated Diagnoses: Type 2 diabetes mellitus with hyperglycemia, unspecified whether long term insulin use (HCC)      cloNIDine HCL (CATAPRES) 0.2 mg tablet Take 0.2 mg by mouth two (2) times a day. Refills: 3      losartan (COZAAR) 100 mg tablet Take 100 mg by mouth daily. Refills: 2      PARoxetine (PAXIL) 20 mg tablet Take 20 mg by mouth daily. omeprazole (PRILOSEC) 20 mg capsule Take 20 mg by mouth daily. Patient Follow Up Instructions: Activity: PT/OT Eval and Treat  Diet: Diabetic Diet  Wound Care: As directed    Follow-up with PCP/Nephrology/Cardiology in 2 weeks. Follow-up tests/labs As per above physicians  Follow-up Information     Follow up With Specialties Details Why Contact Info    Reddy Figueroa MD Family Medicine In 1 week  [address] [facility] [address]      Scarlett Low MD Nephrology, Nephrology In 1 week  [address] [facility] Cardiology In 1 week  [address]  [phone]          ________________________________________________________________    Risk of deterioration: Low    Condition at Discharge:  Stable  __________________________________________________________________    Disposition  SNF/LTC    ____________________________________________________________________    Code Status: Full Code  ___________________________________________________________________      Total time in minutes spent coordinating this discharge (includes going over instructions, follow-up, prescriptions, and preparing report for sign off to her PCP) :  45 minutes    Signed:   Radha Bailey MD

## 2021-10-04 NOTE — PROGRESS NOTES
Problem: Self Care Deficits Care Plan (Adult)  Goal: *Acute Goals and Plan of Care (Insert Text)  Description: Pt will be min A sup<->sit in prep for EOB ADL's  Pt will be min A  LB dressing EOB level  Pt will be SBA  sit EOB 10 minutes in prep for EOB ADL's  Pt will be min A  grooming bed level progress to EOB  Pt will be min A sit<-> prep for toilet transfer  Pt will be min A  BSC/toilet transfer with LRAD  Pt will be min A  toileting/cloth mgmt LRAD  Pt will be min A UB bathing/dressing bed progress to EOB/chair level  Pt will be MI antonino UE HEP in prep for self care tasks      Outcome: Not Met      OCCUPATIONAL THERAPY EVALUATION  Patient: Tamra Rehman (78 y.o. female)  Date: 10/4/2021  Primary Diagnosis: CHF exacerbation ([facility] [address] [I50.9]        Precautions: Falls       ASSESSMENT  Patient is 79 y/o female came to [facility] with family reporting decreased appetite, lethargy and adm 10/2/2021 for acute decompensated HF, s/p fall at hospital, ADELINA, HTN. Pt has hx of severe cardiomyopathy with EF 15-20%, AICD placement, HTN, Afib, Dm type II, PVD, right eye surgery. Pt received semi supine in bed A&O to self and place only and agreeable for OT eval/tx. Per chart review, pt living with daughters 2 weeks prior however when asked whom pt lives with pt stating alone after which pt not answering any further PLOF informaiton questions thus unknown PLOF at this time. Per prior OT/PT eval on 8/4/2021 pt and family stated pt lives with brother in one story home with 5 steps antonino rails to enter and was independent for self care and MI for functional transfers/mobility (pt discharged to IRF from [facility]).      Pt currenty presents with decreased balance, decreased activity tolerance, generalized weakness, decreased safety awareness, decreased insight into deficits, decreased command following (requiring max cueing and encouragement to participate with therapy) and increased need for assist with self care (total A toileting/cloth mgmt bed level, total A LB dressing bed level simulated, total A simple grooming semi supine in bed) and functional transfers/mobility (max Ax2/total A rolling, sup<->sit and scooting EOB). Pt educated on and completed antonino UE AAROM/PROM for antonino UE for 1 set of 10 reps in all planes 2/2 pain and stiffness in prep for self care tasks wiht pt requiring max A for HEP. Pt would benefit from continued skilled OT services while at [facility] in order to increase safety and independence with self care and functional transfers/mobility. Recommend discharge to SNF when medically appropriate. Other factors to consider for discharge: time since onset, severity of deficits, PLOF, self limiting behaviors        PLAN :  Recommendations and Planned Interventions: self care training, functional mobility training, therapeutic exercise, balance training, therapeutic activities, endurance activities, patient education, and home safety training    Frequency/Duration: Patient will be followed by occupational therapy 3 times a week to address goals. Recommendation for discharge: (in order for the patient to meet his/her long term goals)  SNF    This discharge recommendation:  Has been made in collaboration with the attending provider and/or case management    IF patient discharges home will need the following DME: TBD       SUBJECTIVE:   Patient stated I cant do it.  when asked to try to open hand or make a fist    OBJECTIVE DATA SUMMARY:   HISTORY:   Past Medical History:   Diagnosis Date    [facility] ([address]     Cardiomyopathy, idiopathic ([address] 10/28/2010    HTN (hypertension), benign 10/28/2010    Pacemaker     ICD    PVC (premature ventricular contraction) 10/28/2010     Past Surgical History:   Procedure Laterality Date    HX OTHER SURGICAL  10/02/2018    Right eye surgery    HX PACEMAKER         Expanded or extensive additional review of patient history:          PLOF: Exact PLOF unknown at this time    EXAMINATION OF PERFORMANCE DEFICITS:  Cognitive/Behavioral Status:  Neurologic State: Alert;Confused  Orientation Level: Oriented to person;Oriented to place; Disoriented to time;Disoriented to situation  Cognition: Decreased attention/concentration;Decreased command following     Range of Motion:  AROM: Grossly decreased, non-functional  PROM: Grossly decreased, non-functional     Strength:  Strength: Grossly decreased, non-functional (antonino Ue grossly 1/5)     Tone & Sensation:  Sensation: Intact     Balance:  Sitting: Impaired; With support  Sitting - Static: Poor (constant support)  Sitting - Dynamic: Poor (constant support)    Functional Mobility and Transfers for ADLs:  Bed Mobility:  Rolling: Maximum assistance;Assist x2;Total assistance  Supine to Sit: Maximum assistance; Total assistance;Assist x2  Sit to Supine: Total assistance  Scooting: Total assistance    ADL Assessment:   Oral Facial Hygiene/Grooming: Total assistance    Lower Body Dressing: Total assistance (simulated)    Toileting: Total assistance (simulated)     ADL Intervention and task modifications:     Grooming  Grooming Assistance: Total assistance(dependent)  Position Performed:  (semi supine in bed)  Washing Face: Total assistance (dependent)    Lower Body Dressing Assistance  Socks: Total assistance (dependent)  Position Performed: Supine    Toileting  Toileting Assistance: Total assistance(dependent)  Bladder Hygiene: Total assistance (dependent)  Bowel Hygiene: Total assistance (dependent)  Clothing Management: Total assistance (dependent)    [address] [facility] [address] [address] AM-PACTM \"6 Clicks\"                                                       Daily Activity Inpatient Short Form  How much help from another person does the patient currently need. .. Total; A Lot A Little None   1. Putting on and taking off regular lower body clothing? [x]  1 []  2 []  3 []  4   2. Bathing (including washing, rinsing, drying)? [x]  1 []  2 []  3 []  4   3. Toileting, which includes using toilet, bedpan or urinal? [x] 1 []  2 []  3 []  4   4. Putting on and taking off regular upper body clothing? [x]  1 []  2 []  3 []  4   5. Taking care of personal grooming such as brushing teeth? [x]  1 []  2 []  3 []  4   6. Eating meals? [x]  1 []  2 []  3 []  4   © 2007, Trustees of [address] [place] [address] [address] [address], under license to [employer] All rights reserved     Score: 6/24     Interpretation of Tool:  Represents clinically-significant functional categories (i.e. Activities of daily living). Percentage of Impairment CH    0%   CI    1-19% CJ    20-39% CK    40-59% CL    60-79% CM    80-99% CN     100%   [facility]  Score 6-24 24 23 20-22 15-19 10-14 7-9 6        Occupational Therapy Evaluation Charge Determination   History Examination Decision-Making   LOW Complexity : Brief history review  HIGH Complexity : 5 or more performance deficits relating to physical, cognitive , or psychosocial skils that result in activity limitations and / or participation restrictions HIGH Complexity : Patient presents with comorbidities that affect occupational performance. Signifigant modification of tasks or assistance (eg, physical or verbal) with assessment (s) is necessary to enable patient to complete evaluation       Based on the above components, the patient evaluation is determined to be of the following complexity level: LOW   Pain Rating:  10/10 antonino LE's    Activity Tolerance:   Poor  Please refer to the flowsheet for vital signs taken during this treatment. After treatment patient left in no apparent distress:    Supine in bed, Call bell within reach, Bed / chair alarm activated, and Side rails x 3    COMMUNICATION/EDUCATION:   The patients plan of care was discussed with: Physical therapist and Registered nurse. PT/OT sessions occurred together for increased safety of pt and clinician. Patient/family agree to work toward stated goals and plan of care.     This patients plan of care is appropriate for delegation to [facility].     Thank you for this referral.  Jerman Andino  Time Calculation: 25 mins

## 2021-10-04 NOTE — PROGRESS NOTES
PHYSICAL THERAPY EVALUATION  Patient: Merlin Sol (61 y.o. female)  Date: 10/4/2021  Primary Diagnosis: CHF exacerbation ([address] [I50.9]        Precautions: falls    ASSESSMENT  This is a 79 y/o female came to  [facility]  ED with c/o  c/o increased weakness, decreased appetite and swollen legs, admitted on 10/2/21 for CHF exacerbation . Pt has PMH of severe cardiomyopathy with EF of 15-20%  , AICD , HTN, pacemaker ,A-fib,DM 2 , insulin dependent    Pt received semi-supine in bed , agreeable for PT/OT eval/tx . Pt is A& O x self, place , disoriented to time and situation  ,  per pt report , she lives with family but unable to provide more details and information  about PLOF. Per chart review , pt  lives with her brother in a [facility] with 5 CAROL and bilateral handrails. Pt's PLOF is unclear , pt required modA x 2  for transfers with limited ambulation with RW, modAx 2 per last PT note on 8/5/21. Based on the objective data described below, the patient presents with flat affect , c/o pain at b/l Le -10/10 ,  decreased strength , -2/5 grossly in  b/l LE , stiffness present in all LE joints with limited AROM/PROM ,  balance deficits , generalized weakness , decreased safety awareness , decreased activity tolerance and increased need for assist with functional mobility ( needs max/total assist x 2  for rolling from side to side  , max/total Ax 2 for supine <>sit transfers , poor static sitting balance . Pt found with soiled linen and wet gown ,provided with clean linen and gown , requires total assist for donning gown  . Pt educated on antonino L E HEP in prep for transfers/mobility  with pt  verbalizing understanding. Pt is self limiting and with decreased motivation  , would benefit from continued skilled PT services to address current impairments and improve overall IND and safety with  functional transfers/mobility. Recommend d/c to SNF when medically appropriate .      Current Level of Function Impacting Discharge (mobility/balance): Requires max/total assist x 2 for bed mobility    Functional Outcome Measure: The patient scored 8 on the AM PAC basic mobility outcome measure which is indicative of high complexity. Other factors to consider for discharge: PLOF , severity of deficits , time since onset         PLAN :  Recommendations and Planned Interventions: bed mobility training, transfer training, gait training, therapeutic exercises, neuromuscular re-education, patient and family training/education, and therapeutic activities      Frequency/Duration: Patient will be followed by physical therapy:  2-3x/week to address goals. Recommendation for discharge: (in order for the patient to meet his/her long term goals)  Cortez Winn    This discharge recommendation:  Has been made in collaboration with the attending provider and/or case management    IF patient discharges home will need the following DME: to be determined (TBD)         SUBJECTIVE:   Patient stated My legs hurts    OBJECTIVE DATA SUMMARY:   HISTORY:    Past Medical History:   Diagnosis Date    [facility] ([facility] [facility]     Cardiomyopathy, idiopathic ([facility] [facility] 10/28/2010    HTN (hypertension), benign 10/28/2010    Pacemaker     ICD    PVC (premature ventricular contraction) 10/28/2010     Past Surgical History:   Procedure Laterality Date    HX OTHER SURGICAL  10/02/2018    Right eye surgery    HX PACEMAKER         Personal factors and/or comorbidities impacting plan of care:     PLOF: unclear sec to pt with impaired cognition .      EXAMINATION/PRESENTATION/DECISION MAKING:   Critical Behavior:  Neurologic State: Alert, Confused  Orientation Level: Oriented to person, Oriented to place, Disoriented to time, Disoriented to situation  Cognition: Decreased attention/concentration, Decreased command following     Hearing:     Skin:  intact where exposed    Range Of Motion:  AROM: Grossly decreased, non-functional    PROM: Grossly decreased, non-functional    Strength:    Strength: Grossly decreased, non-functional (antonino Ue grossly 1/5)      Tone & Sensation:     Sensation: Intact    Functional Mobility:  Bed Mobility:  Rolling: Maximum assistance;Assist x2;Total assistance  Supine to Sit: Maximum assistance; Total assistance;Assist x2  Sit to Supine: Total assistance  Scooting: Total assistance    Balance:   Sitting: Impaired; With support  Sitting - Static: Poor (constant support)  Sitting - Dynamic: Poor (constant support)    Functional Measure:    [facility] [address] AM-PAC 6 Clicks         Basic Mobility Inpatient Short Form  How much difficulty does the patient currently have. .. Unable A Lot A Little None   1. Turning over in bed (including adjusting bedclothes, sheets and blankets)? [] 1   [x] 2   [] 3   [] 4   2. Sitting down on and standing up from a chair with arms ( e.g., wheelchair, bedside commode, etc.)   [x] 1   [] 2   [] 3   [] 4   3. Moving from lying on back to sitting on the side of the bed? [] 1   [x] 2   [] 3   [] 4          How much help from another person does the patient currently need. .. Total A Lot A Little None   4. Moving to and from a bed to a chair (including a wheelchair)? [x] 1   [] 2   [] 3   [] 4   5. Need to walk in hospital room? [x] 1   [] 2   [] 3   [] 4   6. Climbing 3-5 steps with a railing? [x] 1   [] 2   [] 3   [] 4   © 2007, Trustees of [address] [place], [place] [address], under license to [employer]. All rights reserved     Score:  Initial: 8 Most Recent: X (Date: -10/4/21- )   Interpretation of Tool:  Represents activities that are increasingly more difficult (i.e. Bed mobility, Transfers, Gait).   Score 24 23 22-20 19-15 14-10 9-7 6   Modifier CH CI CJ CK CL CM CN          Physical Therapy Evaluation Charge Determination   History Examination Presentation Decision-Making   MEDIUM  Complexity : 1-2 comorbidities / personal factors will impact the outcome/ POC  HIGH Complexity : 4+ Standardized tests and measures addressing body structure, function, activity limitation and / or participation in recreation  MEDIUM Complexity : Evolving with changing characteristics  Other Functional Measure [facility] 6 high complexity      Based on the above components, the patient evaluation is determined to be of the following complexity level: MEDIUM    Pain Rating:  Pain at b/l Le - 10/10    Activity Tolerance:   Poor  Please refer to the flowsheet for vital signs taken during this treatment. After treatment patient left in no apparent distress:   Supine in bed, Call bell within reach, Bed / chair alarm activated, and Side rails x 3    COMMUNICATION/EDUCATION:   The patients plan of care was discussed with: Occupational therapist and Registered nurse. Fall prevention education was provided and the patient/caregiver indicated understanding. and Patient/family agree to work toward stated goals and plan of care. Problem: Mobility Impaired (Adult and Pediatric)  Goal: *Acute Goals and Plan of Care (Insert Text)  Description: Pt will be I with LE HEP in 7 days. Pt will perform bed mobility with modA in 7 days. Pt will demonstrate improvement in seated static/dynamic balance from poor  to fair in 7 days. Outcome: Not Met   PT/OT sessions occurred together for increased pt's safety and maximum functional outcome.      Thank you for this referral.  Lizzie Jurado   Time Calculation: 49 mins

## 2021-10-04 NOTE — CONSULTS
Comprehensive Nutrition Assessment    Type and Reason for Visit: Consult (ONS)    Nutrition Recommendations/Plan:   Continue Regular 4 CHO, DUANE diet    Add Giovana Downs) ensure compact BID   Add Giovana Downs) ensure pudding daily      NO CHOCOLATE 2/2 POSSIBLE ALLERGY    Document % intakes and BM in I/O's    Nutrition Assessment:  Admitted for increased weakness, decreased appetite and swollen legs. RD visited pt bedside, pt lethargic and not waking up to verbal noise. B tray bedside, noticed only applesauce and apple juice consumed. RD to add ONS to help intakes. Per family member, pt does not have chocolate allergy, notified RN. Will still avoid chocolate for caution. Labs: Glu POC , BUN 48, Cr 2.05. Meds: coreg, furosemide, gabapentin, insulin, PPI, IVF. Malnutrition Assessment:  Malnutrition Status:  Mild malnutrition    Context:  Acute illness     Findings of the 6 clinical characteristics of malnutrition:   Energy Intake:  7 - 50% or less of est energy requirements for 5 or more days  Weight Loss:  Unable to assess     Body Fat Loss:  No significant body fat loss,     Muscle Mass Loss:  No significant muscle mass loss,    Fluid Accumulation:  No significant fluid accumulation,      Nutrition Related Findings:  NFPE without acute findings. No h/o dysphagia. No N/V. Soft BM 10/3. Edema LUE 1+, BLE 1+. Wounds:    None       Current Nutrition Therapies:  ADULT DIET Regular; 4 carb choices (60 gm/meal); Low Fat/Low Chol/High Fiber/DUANE    Anthropometric Measures:  · Height:  5' 6\" (167.6 cm)  · Current Body Wt:  75.9 kg (167 lb 5.3 oz)   · Usual Body Wt:  75.9 kg (167 lb 5.3 oz)     · Ideal Body Wt:  130 lbs:  128.7 %   · Adjusted Body Weight:   ; Weight Adjustment for:  (AdjBW: 63.2kg)   · BMI Category: Overweight (BMI 25.0-29. 9)     Nutrition Diagnosis:   · Inadequate oral intake related to cognitive or neurological impairment (lethargy) as evidenced by intake 0-25%    Nutrition Interventions:   Food and/or Nutrient Delivery: Continue current diet, Start oral nutrition supplement  Nutrition Education and Counseling: Education not indicated  Coordination of Nutrition Care: Continue to monitor while inpatient    Goals:  Pt to meet >65% of EEN. BG <120. Nutrition Monitoring and Evaluation:   Behavioral-Environmental Outcomes: None identified  Food/Nutrient Intake Outcomes: Food and nutrient intake, Supplement intake  Physical Signs/Symptoms Outcomes: Weight, Biochemical data    Discharge Planning:     Too soon to determine     Electronically signed by Hailee Nair RD on 10/4/2021 at 10:30 AM    Contact: [phone]

## 2021-10-05 NOTE — PROGRESS NOTES
Hospitalist Progress Note    NAME: Felicitas Escobar   :  1955   MRN:  [id number]           Subjective:     Chief Complaint / Reason for Physician Visit  Patient seen and examined at bedside, currently sleeping, does respond to painful and verbal stimuli however difficult to obtain history or review of systems secondary to patient's clinical status. Discussed with RN, this appears to be her baseline when she is asleep. Review of Systems:  Symptom Y/N Comments  Symptom Y/N Comments   Fever/Chills    Chest Pain     Poor Appetite    Edema     Cough    Abdominal Pain     Sputum    Joint Pain     SOB/WEAVER    Pruritis/Rash     Nausea/vomit    Tolerating PT/OT     Diarrhea    Tolerating Diet     Constipation    Other       Could NOT obtain due to:  Limited secondary to patient's clinical status     Objective:     VITALS:   Last 24hrs VS reviewed since prior progress note. Most recent are:  Patient Vitals for the past 24 hrs:   Temp Pulse Resp BP SpO2   10/05/21 1547 97.5 °F (36.4 °C) 70 18 109/77 98 %   10/05/21 1200  77      10/05/21 1144 97.8 °F (36.6 °C) 75 18 107/70 98 %   10/05/21 0813 97.8 °F (36.6 °C) 83 18 122/83 99 %   10/05/21 0800  72      10/05/21 0429 97.8 °F (36.6 °C) 70 18 116/84 92 %   10/05/21 0400  70      10/05/21 0023 (!) 96.5 °F (35.8 °C) 81 18 126/86 95 %   10/05/21 0000  70      10/04/21 2241 (!) 95.5 °F (35.3 °C) 84 18 117/88 96 %   10/04/21 2000  70          Intake/Output Summary (Last 24 hours) at 10/5/2021 1706  Last data filed at 10/5/2021 0608  Gross per 24 hour   Intake    Output 650 ml   Net -650 ml        PHYSICAL EXAM:  General: Patient appears comfortable   EENT:  EOMI. Anicteric sclerae. MMM  Resp:  Decreased air entry bilaterally in bilateral lower lung zones  CV:  Regular  rhythm, s1/s2 no m/r/g No edema  GI:  Soft, Non distended, Non tender. +Bowel sounds  Neurologic:  Alert and oriented X 3, normal speech,   Psych:   Good insight.  Not anxious nor agitated  Skin:  No rashes. No jaundice    Procedures: see electronic medical records for all procedures/Xrays and details which were not copied into this note but were reviewed prior to creation of Plan. LABS:  I reviewed today's most current labs and imaging studies. Pertinent labs include:  Recent Labs     10/03/21  0708 10/02/21  1556   WBC 7.5 6.4   HGB 12.7 13.3   HCT 39.7 42.5   PLT 80* 102*     Recent Labs     10/03/21  0708 10/02/21  1556    138   K 4.2 4.0    103   CO2 21 26   * 160*   BUN 47* 44*   CREA 1.94* 2.21*   CA 8.8 9.2   MG 2.2 2.3   ALB  --  2.7*   TBILI  --  1.0   ALT  --  20       Signed: Jeffrey Stapleton MD    X-ray chest:Lungs clear; no interstitial or alveolar pulmonary edema. Left-sided cardiac  device with similar positioned leads overlying the heart. Cardiac silhouette  enlargement. No pneumothorax or sizable pleural effusion.     Reviewed most current lab test results and cultures  YES  Reviewed most current radiology test results   YES  Review and summation of old records today    NO  Reviewed patient's current orders and MAR    YES  PMH/SH reviewed - no change compared to H&P      Assessment / Plan:  Acute decompensated heart failure with reduced ejection fractionpatient presents with above and symptomatology with significant bilateral lower extremity edema and based on patient's clinical presentation secondary to acute decompensated heart failure with reduced ejection fraction,  Continuous telemetry monitoring  Daily weights  Frequent intake and output monitoring  Continue Lasix 40 mg IV daily  Transthoracic echo reviewed  Follow cardiology recommendations    Status post fallof note patient had a fall this a.m., likely secondary to physical deconditioning, is complaining of left foot pain  Obtain x-ray of left foot further evaluation  Follow-up physical therapy as well as Occupational Therapy recommendations    Acute kidney injurylikely multifactorial, currently serum creatinine downtrending  Obtain urinary electrolytes to calculate fractional excretion of urine urea  Continue to trend serum creatinine  Obtain nephrology consult    Hypertensioncurrently holding losartan/hydrochlorthiazide in the setting of ADELINA  Continue clonidine/Coreg  Continue to monitor     [address] with restriction, replete potassium and magnesium  Full code, will clarify about surrogate decision-maker  Dispositionpending clinical improvement/PT OT evaluation, likely will need SNF placement, likely stable for discharge tomorrow      18.5 - 24.9 Normal weight / Body mass index is 23.4 kg/m². Code status: Full  Prophylaxis: Lovenox  Recommended Disposition: SNF/LTC     ________________________________________________________________________  Care Plan discussed with:    Comments   Patient x    Family      RN x    Care Manager x    Consultant  x                     x Multidiciplinary team rounds were held today with , nursing, pharmacist and clinical coordinator. Patient's plan of care was discussed; medications were reviewed and discharge planning was addressed.      ________________________________________________________________________  Total NON critical care TIME:  35   Minutes      Comments   >50% of visit spent in counseling and coordination of care x    ________________________________________________________________________  Safia Muir MD

## 2021-10-05 NOTE — CONSULTS
NEUROLOGY CONSULT    Name Constantine Severance Age 78 y.o. MRN [id number]  1955     Referring Physician: Aurora Farris MD      Chief Complaint: AMS     This is a 78 y.o. right handed -American female admitted on 10-02-21 with multiple medical issues and admitted with a diagnosis of acute exacerbation of CHF and malnourishment subsequently was also diagnosed with renal failure fluid overload. Apparently she was going to be discharged yesterday but probably was more confused and the discharge with hold neurology was consulted. A CT scan of the head was done which was unremarkable. At the time of my evaluation this morning the patient could tell she was in [facility] but could not answer any questions on orientation to time. She follows commands and answer other simple questions without any problem. Assessment and Plan:  1. Metabolic/hypoxic encephalopathy: Secondary to acute exacerbation of CHF, malnourishment, acute on chronic kidney disease. She had a negative CT scan of the head and I doubt any primary neurological etiology for her symptoms and confusion. At the time of my evaluation this morning patient does not have any focal deficits. No additional neurological work-up is indicated at this time. 2.  CHF with exacerbation seems to have improved  3. Paroxysmal A. fib. I will sign off the case for now, however, please do not hesitate to call if needed again.     Thank you for the consult    Allergies   Allergen Reactions    Chocolate [Cocoa] Anaphylaxis and Swelling    Iodine Anaphylaxis    Lisinopril Anaphylaxis    Peanut Anaphylaxis and Swelling    Glimepiride Swelling    Glipizide Other (comments)     Pruritis    Metformin Diarrhea    Norvasc [Amlodipine] Other (comments)     Light Headed    Pneumovax 23 [Pneumococcal 23-Devi Ps Vaccine] Hives    Toprol Xl [Metoprolol Succinate] Other (comments)     Light headed    Vitamin D [Cholecalciferol (Vitamin D3)] Other (comments)     Dizziness/headache not a true allergy.            Current Facility-Administered Medications   Medication Dose Route Frequency    amitriptyline (ELAVIL) tablet 75 mg  75 mg Oral QHS    furosemide (LASIX) tablet 40 mg  40 mg Oral DAILY    atorvastatin (LIPITOR) tablet 40 mg  40 mg Oral QHS    cloNIDine HCL (CATAPRES) tablet 0.2 mg  0.2 mg Oral BID    cyclobenzaprine (FLEXERIL) tablet 5 mg  5 mg Oral BID PRN    apixaban (ELIQUIS) tablet 5 mg  5 mg Oral BID    gabapentin (NEURONTIN) capsule 100 mg  100 mg Oral TID    [Held by provider] hydroCHLOROthiazide (HYDRODIURIL) tablet 12.5 mg  12.5 mg Oral DAILY    [Held by provider] losartan (COZAAR) tablet 100 mg  100 mg Oral DAILY    [Held by provider] metFORMIN (GLUCOPHAGE) tablet 1,000 mg  1,000 mg Oral BID WITH MEALS    nitroglycerin (NITROSTAT) tablet 0.4 mg  0.4 mg SubLINGual Q5MIN PRN    pantoprazole (PROTONIX) tablet 20 mg  20 mg Oral DAILY    oxyCODONE IR (ROXICODONE) tablet 5 mg  5 mg Oral Q4H PRN    PARoxetine (PAXIL) tablet 20 mg  20 mg Oral DAILY    prednisoLONE acetate (PRED FORTE) 1 % ophthalmic suspension 1 Drop  1 Drop Right Eye QID    [Held by provider] alogliptin (NESINA) tablet 6.25 mg  6.25 mg Oral DAILY    carvediloL (COREG) tablet 6.25 mg  6.25 mg Oral BID WITH MEALS    sodium chloride (NS) flush 5-40 mL  5-40 mL IntraVENous Q8H    sodium chloride (NS) flush 5-40 mL  5-40 mL IntraVENous PRN    acetaminophen (TYLENOL) tablet 650 mg  650 mg Oral Q6H PRN    Or    acetaminophen (TYLENOL) suppository 650 mg  650 mg Rectal Q6H PRN    polyethylene glycol (MIRALAX) packet 17 g  17 g Oral DAILY PRN    ondansetron (ZOFRAN ODT) tablet 4 mg  4 mg Oral Q8H PRN    Or    ondansetron (ZOFRAN) injection 4 mg  4 mg IntraVENous Q6H PRN    aspirin delayed-release tablet 81 mg  81 mg Oral DAILY    insulin lispro (HUMALOG) injection   SubCUTAneous AC&HS    glucose chewable tablet 16 g  4 Tablet Oral PRN    glucagon (GLUCAGEN) injection 1 mg  1 mg IntraMUSCular PRN    dextrose (D50W) injection syrg 12.5-25 g  25-50 mL IntraVENous PRN    alum-mag hydroxide-simeth (MYLANTA) oral suspension 30 mL  30 mL Oral Q4H PRN    hydrALAZINE (APRESOLINE) 20 mg/mL injection 10 mg  10 mg IntraVENous Q6H PRN       Past Medical History:   Diagnosis Date    [facility] (HCC)     Cardiomyopathy, idiopathic ([address] 10/28/2010    HTN (hypertension), benign 10/28/2010    Pacemaker     ICD    PVC (premature ventricular contraction) 10/28/2010        Social History     Tobacco Use    Smoking status: Never Smoker    Smokeless tobacco: Never Used   Substance Use Topics    Alcohol use: No    Drug use: Never            Exam  Visit Vitals  /83 (BP Patient Position: At rest;Semi fowlers)   Pulse 83   Temp 97.8 °F (36.6 °C)   Resp 18   Ht 5' 6\" (1.676 m)   Wt 75.9 kg (167 lb 5.3 oz)   SpO2 99%   BMI 27.01 kg/m²         General: Well developed, well nourished. Patient in no distress   Head: Normocephalic, atraumatic, anicteric sclera   Neck Normal ROM, No thyromegally   Lungs:  Clear to auscultation    Cardiac: Regular rate and rhythm with no murmurs. Abd: Bowel sounds were audible   Ext: No pedal edema   Skin: Supple no rash     NeurologicExam:  Mental Status: Alert and oriented to person and place, not to time   Speech: Fluent no aphasia or dysarthria. Cranial Nerves:   Pupils are equal round and reactive to light and accommodation, extraocular movements are intact and full, visual fields are intact by confrontation, no nystagmus noted, face is symmetric, sensation on face is intact and symmetric, hearing is intact and symmetric, tongue and uvula are in midline with normal movements, palate is elevating equally, shoulder shrug is intact and symmetric. Motor:  Full and symmetric strength of upper and lower ext . Normal bulk and tone. Reflexes:   Deep tendon reflexes 1/4 and symmetric.    Sensory:   Symmetric and intact    Gait: Gait is deferred   Tremor:   No tremor noted. Cerebellar:  Coordination intact for finger-nose-finger. Neurovascular: No carotid bruits.  No JVD      Lab Review  Lab Results   Component Value Date/Time    WBC 6.6 10/04/2021 07:45 AM    HCT 43.2 10/04/2021 07:45 AM    HGB 13.8 10/04/2021 07:45 AM    PLATELET 69 (L) 90/97/2513 07:45 AM     Lab Results   Component Value Date/Time    Sodium 139 10/04/2021 07:45 AM    Sodium 140 10/04/2021 07:45 AM    Potassium 4.1 10/04/2021 07:45 AM    Potassium 4.1 10/04/2021 07:45 AM    Chloride 103 10/04/2021 07:45 AM    Chloride 104 10/04/2021 07:45 AM    CO2 26 10/04/2021 07:45 AM    CO2 25 10/04/2021 07:45 AM    Glucose 95 10/04/2021 07:45 AM    Glucose 93 10/04/2021 07:45 AM    BUN 48 (H) 10/04/2021 07:45 AM    BUN 48 (H) 10/04/2021 07:45 AM    Creatinine 2.05 (H) 10/04/2021 07:45 AM    Creatinine 2.03 (H) 10/04/2021 07:45 AM    Calcium 9.2 10/04/2021 07:45 AM    Calcium 9.3 10/04/2021 07:45 AM    Calcium 9.3 10/04/2021 07:45 AM     Lab Results   Component Value Date/Time    Vitamin B12 >2,000 (H) 10/02/2021 07:37 PM    Folate 12.5 10/02/2021 07:37 PM     Lab Results   Component Value Date/Time    LDL, calculated 37.6 10/03/2021 07:08 AM     Lab Results   Component Value Date/Time    Hemoglobin A1c 7.0 (H) 10/02/2021 07:37 PM    Hemoglobin A1c (POC) 13.4 08/30/2018 09:37 AM     No components found for: TROPQUANT  No results found for: JEY

## 2021-10-05 NOTE — PROGRESS NOTES
Problem: Falls - Risk of  Goal: *Absence of Falls  Description: Document Clydene Bone Fall Risk and appropriate interventions in the flowsheet. 10/5/2021 0518 by Na Kent RN  Outcome: Progressing Towards Goal  Note: Fall Risk Interventions:  Mobility Interventions: Bed/chair exit alarm    Mentation Interventions: Adequate sleep, hydration, pain control, Bed/chair exit alarm    Medication Interventions: Bed/chair exit alarm    Elimination Interventions: Call light in reach, Bed/chair exit alarm, Toileting schedule/hourly rounds    History of Falls Interventions: Bed/chair exit alarm, Room close to nurse's station      10/5/2021 0517 by Na Kent RN  Outcome: Progressing Towards Goal  Note: Fall Risk Interventions:  Mobility Interventions: Bed/chair exit alarm    Mentation Interventions: Adequate sleep, hydration, pain control, Bed/chair exit alarm    Medication Interventions: Bed/chair exit alarm    Elimination Interventions: Call light in reach, Bed/chair exit alarm, Toileting schedule/hourly rounds    History of Falls Interventions: Bed/chair exit alarm, Room close to nurse's station         Problem: Patient Education: Go to Patient Education Activity  Goal: Patient/Family Education  10/5/2021 0518 by Na Kent RN  Outcome: Progressing Towards Goal  10/5/2021 0517 by Na Kent RN  Outcome: Progressing Towards Goal     Problem: Pressure Injury - Risk of  Goal: *Prevention of pressure injury  Description: Document Marshall Scale and appropriate interventions in the flowsheet.   10/5/2021 0518 by Na Kent RN  Outcome: Progressing Towards Goal  Note: Pressure Injury Interventions:  Sensory Interventions: Assess changes in LOC, Assess need for specialty bed, Float heels, Keep linens dry and wrinkle-free, Minimize linen layers    Moisture Interventions: Absorbent underpads    Activity Interventions: Increase time out of bed, PT/OT evaluation    Mobility Interventions: Turn and reposition approx. every two hours(pillow and wedges), Assess need for specialty bed    Nutrition Interventions: Offer support with meals,snacks and hydration    Friction and Shear Interventions: Minimize layers, Apply protective barrier, creams and emollients             10/5/2021 0517 by Roberto Cisse RN  Outcome: Progressing Towards Goal  Note: Pressure Injury Interventions:  Sensory Interventions: Assess changes in LOC, Assess need for specialty bed, Float heels, Keep linens dry and wrinkle-free, Minimize linen layers    Moisture Interventions: Absorbent underpads    Activity Interventions: Increase time out of bed, PT/OT evaluation    Mobility Interventions: Turn and reposition approx. every two hours(pillow and wedges), Assess need for specialty bed    Nutrition Interventions: Offer support with meals,snacks and hydration    Friction and Shear Interventions: Minimize layers, Apply protective barrier, creams and emollients                Problem: Pressure Injury - Risk of  Goal: *Prevention of pressure injury  Description: Document Marshall Scale and appropriate interventions in the flowsheet. 10/5/2021 0518 by Roberto Cisse RN  Outcome: Progressing Towards Goal  Note: Pressure Injury Interventions:  Sensory Interventions: Assess changes in LOC, Assess need for specialty bed, Float heels, Keep linens dry and wrinkle-free, Minimize linen layers    Moisture Interventions: Absorbent underpads    Activity Interventions: Increase time out of bed, PT/OT evaluation    Mobility Interventions: Turn and reposition approx.  every two hours(pillow and wedges), Assess need for specialty bed    Nutrition Interventions: Offer support with meals,snacks and hydration    Friction and Shear Interventions: Minimize layers, Apply protective barrier, creams and emollients             10/5/2021 0517 by Roberto Cisse RN  Outcome: Progressing Towards Goal  Note: Pressure Injury Interventions:  Sensory Interventions: Assess changes in LOC, Assess need for specialty bed, Float heels, Keep linens dry and wrinkle-free, Minimize linen layers    Moisture Interventions: Absorbent underpads    Activity Interventions: Increase time out of bed, PT/OT evaluation    Mobility Interventions: Turn and reposition approx.  every two hours(pillow and wedges), Assess need for specialty bed    Nutrition Interventions: Offer support with meals,snacks and hydration    Friction and Shear Interventions: Minimize layers, Apply protective barrier, creams and emollients                Problem: Patient Education: Go to Patient Education Activity  Goal: Patient/Family Education  10/5/2021 0518 by Kiki Victor RN  Outcome: Progressing Towards Goal  10/5/2021 0517 by Kiki Victor, RN  Outcome: Progressing Towards Goal

## 2021-10-05 NOTE — PROGRESS NOTES
Renal Progress Note    Patient: Otto Coronado MRN: [id number]  SSN: [id number]    YOB: 1955  Age: 78 y.o. Sex: female      Admit Date: 10/2/2021    LOS: 3 days     Subjective:   Patient seen at bedside this morning. awake, no acute distress. AMS+  not verbally interactive.    Improved edema, stable creat around 2.0        Current Facility-Administered Medications   Medication Dose Route Frequency    amitriptyline (ELAVIL) tablet 75 mg  75 mg Oral QHS    furosemide (LASIX) tablet 40 mg  40 mg Oral DAILY    atorvastatin (LIPITOR) tablet 40 mg  40 mg Oral QHS    cloNIDine HCL (CATAPRES) tablet 0.2 mg  0.2 mg Oral BID    cyclobenzaprine (FLEXERIL) tablet 5 mg  5 mg Oral BID PRN    apixaban (ELIQUIS) tablet 5 mg  5 mg Oral BID    gabapentin (NEURONTIN) capsule 100 mg  100 mg Oral TID    [Held by provider] hydroCHLOROthiazide (HYDRODIURIL) tablet 12.5 mg  12.5 mg Oral DAILY    [Held by provider] losartan (COZAAR) tablet 100 mg  100 mg Oral DAILY    [Held by provider] metFORMIN (GLUCOPHAGE) tablet 1,000 mg  1,000 mg Oral BID WITH MEALS    nitroglycerin (NITROSTAT) tablet 0.4 mg  0.4 mg SubLINGual Q5MIN PRN    pantoprazole (PROTONIX) tablet 20 mg  20 mg Oral DAILY    oxyCODONE IR (ROXICODONE) tablet 5 mg  5 mg Oral Q4H PRN    PARoxetine (PAXIL) tablet 20 mg  20 mg Oral DAILY    prednisoLONE acetate (PRED FORTE) 1 % ophthalmic suspension 1 Drop  1 Drop Right Eye QID    [Held by provider] alogliptin (NESINA) tablet 6.25 mg  6.25 mg Oral DAILY    carvediloL (COREG) tablet 6.25 mg  6.25 mg Oral BID WITH MEALS    sodium chloride (NS) flush 5-40 mL  5-40 mL IntraVENous Q8H    sodium chloride (NS) flush 5-40 mL  5-40 mL IntraVENous PRN    acetaminophen (TYLENOL) tablet 650 mg  650 mg Oral Q6H PRN    Or    acetaminophen (TYLENOL) suppository 650 mg  650 mg Rectal Q6H PRN    polyethylene glycol (MIRALAX) packet 17 g  17 g Oral DAILY PRN    ondansetron (ZOFRAN ODT) tablet 4 mg  4 mg Oral Q8H PRN    Or    ondansetron (ZOFRAN) injection 4 mg  4 mg IntraVENous Q6H PRN    aspirin delayed-release tablet 81 mg  81 mg Oral DAILY    insulin lispro (HUMALOG) injection   SubCUTAneous AC&HS    glucose chewable tablet 16 g  4 Tablet Oral PRN    glucagon (GLUCAGEN) injection 1 mg  1 mg IntraMUSCular PRN    dextrose (D50W) injection syrg 12.5-25 g  25-50 mL IntraVENous PRN    alum-mag hydroxide-simeth (MYLANTA) oral suspension 30 mL  30 mL Oral Q4H PRN    hydrALAZINE (APRESOLINE) 20 mg/mL injection 10 mg  10 mg IntraVENous Q6H PRN        Vitals:    10/05/21 0400 10/05/21 0429 10/05/21 0813 10/05/21 1144   BP:  116/84 122/83 107/70   Pulse: 70 70 83 75   Resp:  18 18 18   Temp:  97.8 °F (36.6 °C) 97.8 °F (36.6 °C) 97.8 °F (36.6 °C)   SpO2:  92% 99% 98%   Weight:       Height:         Objective:   General:awake , no acute distress. HEENT: EOMI, no Icterus, no Pallor,  mucosa moist.  Neck: Neck is supple, No JVD  Lungs: breathsounds normal,  no rhonchi, no rales,   CVS: heart sounds normal,  no murmurs, no rubs. GI: soft, nontender, normal BS. Extremeties: no cyanosis, no edema,   Neuro: Alert, awake, confused  Skin: normal skin turgor, no skin rashes. Intake and Output:  Current Shift: No intake/output data recorded.   Last three shifts: 10/03 1901 - 10/05 0700  In: -   Out: 650 [Urine:650]      Lab/Data Review:  Recent Labs     10/04/21  0745 10/03/21  0708 10/02/21  1556   WBC 6.6 7.5 6.4   HGB 13.8 12.7 13.3   HCT 43.2 39.7 42.5   PLT 69* 80* 102*     Recent Labs     10/05/21  0805 10/04/21  0745 10/03/21  0708 10/02/21  1556 10/02/21  1556    140  139 139   < > 138   K 3.9 4.1  4.1 4.2   < > 4.0    104  103 105   < > 103   CO2 26 25  26 21   < > 26   * 93  95 130*   < > 160*   BUN 45* 48*  48* 47*   < > 44*   CREA 2.03* 2.03*  2.05* 1.94*   < > 2.21*   CA 9.2 9.3  9.3  9.2 8.8   < > 9.2   MG  --  2.2 2.2  --  2.3   PHOS 4.5 4.4  --   --   --    ALB 2.5* 2.4*  --   -- 2.7*   TBILI  --   --   --   --  1.0   ALT  --   --   --   --  20    < > = values in this interval not displayed. No results for input(s): PH, PCO2, PO2, HCO3, FIO2 in the last 72 hours.   Recent Results (from the past 24 hour(s))   GLUCOSE, POC    Collection Time: 10/04/21  5:30 PM   Result Value Ref Range    Glucose (POC) 141 (H) 65 - 117 mg/dL    Performed by [id number], POC    Collection Time: 10/04/21 10:50 PM   Result Value Ref Range    Glucose (POC) 209 (H) 65 - 117 mg/dL    Performed by Tyrese Grossman    RENAL FUNCTION PANEL    Collection Time: 10/05/21  8:05 AM   Result Value Ref Range    Sodium 139 136 - 145 mmol/L    Potassium 3.9 3.5 - 5.1 mmol/L    Chloride 103 97 - 108 mmol/L    CO2 26 21 - 32 mmol/L    Anion gap 10 5 - 15 mmol/L    Glucose 115 (H) 65 - 100 mg/dL    BUN 45 (H) 6 - 20 mg/dL    Creatinine 2.03 (H) 0.55 - 1.02 mg/dL    BUN/Creatinine ratio 22 (H) 12 - 20      GFR est AA 30 (L) >60 ml/min/1.73m2    GFR est non-AA 24 (L) >60 ml/min/1.73m2    Calcium 9.2 8.5 - 10.1 mg/dL    Phosphorus 4.5 2.6 - 4.7 mg/dL    Albumin 2.5 (L) 3.5 - 5.0 g/dL   GLUCOSE, POC    Collection Time: 10/05/21  8:17 AM   Result Value Ref Range    Glucose (POC) 131 (H) 65 - 117 mg/dL    Performed by Mingo Poplar    GLUCOSE, POC    Collection Time: 10/05/21 11:47 AM   Result Value Ref Range    Glucose (POC) 109 65 - 117 mg/dL    Performed by Mingo Poplar         Assessment and Plan:     #1 acute kidney injury on CKD stage III  -presented with creatinine of 2.1   -Baseline creatinine seems to be around 1.4 based on labs in August 2021  -ADELINA likely prerenal secondary to losartan/hydrochlorthiazide and cardiorenal syndrome  On diuretics for fluid overload  Stable creat around 2  continue Lasix 40 mg daily  -Continue to hold losartan and hydrochlorothiazide for now  Continue to monutor renal functions   Probably has stage 3CKD as baseline    #2 acute decompensated heart failure  -Reduced EF on echo in the past  -Presented with bilateral leg swelling and shortness of breath  -Echo on 2/17/2021 showed EF of 15 to 20%  Improving with diuretics  l continue with Lasix 40 mg IV daily. Strict I's and O's. Keep a negative fluid balance  -Losartan held because of ADELINA. Continue to hold     3. hypertension  -blood pressure is well controlled with clonidine and Coreg which I will continue.     4. AMS/dementia: cotinue supportive care  Dc is planned to SNF       Signed By: Antonio Lara MD     October 5, 2021

## 2021-10-05 NOTE — PROGRESS NOTES
Progress Note      10/5/2021 1:22 PM  NAME: Cristina Musa   MRN:  [id number]   Admit Diagnosis: CHF exacerbation ([address] [I50.9]      Assessment/Plan:   Sleepy, arousable    Severe left ventricular systolic dysfunction, no evidence of decompensation    Hypertension well controlled    [facility], anticoagulated with apixaban           []       High complexity decision making was performed in this patient at high risk for decompensation with multiple organ involvement. Subjective:     Cristina Musa is sleepy, arousable  Discussed with RN events overnight. Review of Systems:      Could NOT obtain due to:  Patient being sleepy     Objective:      Physical Exam:    Last 24hrs VS reviewed since prior progress note. Most recent are:    Visit Vitals  /70 (BP Patient Position: At rest;Semi fowlers)   Pulse 75   Temp 97.8 °F (36.6 °C)   Resp 18   Ht 5' 6\" (1.676 m)   Wt 75.9 kg (167 lb 5.3 oz)   SpO2 98%   BMI 27.01 kg/m²       Intake/Output Summary (Last 24 hours) at 10/5/2021 1322  Last data filed at 10/5/2021 0608  Gross per 24 hour   Intake    Output 650 ml   Net -650 ml        General Appearance: Middle-aged female, in no distress  Ears/Nose/Mouth/Throat: Sleepy  Neck: Supple. Chest: Lungs clear to auscultation bilaterally. Cardiovascular: Regular rate and rhythm, S1S2 normal, no murmur. Abdomen: Soft, non-tender, bowel sounds are active. Extremities: No edema bilaterally. Skin: Warm and dry. []         Post-cath site without hematoma, bruit, tenderness, or thrill. Distal pulses intact. PMH/SH reviewed - no change compared to H&P    Data Review    Telemetry:     EKG:   []  No new EKG for review    Lab Data Personally Reviewed:    Recent Labs     10/04/21  0745 10/03/21  0708   WBC 6.6 7.5   HGB 13.8 12.7   HCT 43.2 39.7   PLT 69* 80*     No results for input(s): INR, PTP, APTT, INREXT in the last 72 hours.    Recent Labs     10/05/21  0805 10/04/21  0745 10/03/21  5751 10/02/21  1556 10/02/21  1556    140  139 139   < > 138   K 3.9 4.1  4.1 4.2   < > 4.0    104  103 105   < > 103   CO2 26 25  26 21   < > 26   BUN 45* 48*  48* 47*   < > 44*   CREA 2.03* 2.03*  2.05* 1.94*   < > 2.21*   * 93  95 130*   < > 160*   CA 9.2 9.3  9.3  9.2 8.8   < > 9.2   MG  --  2.2 2.2  --  2.3    < > = values in this interval not displayed. Recent Labs     10/03/21  0708 10/02/21  1937 10/02/21  1556   TROIQ 0.32* 0.35* 0.34*     Lab Results   Component Value Date/Time    Cholesterol, total 81 10/03/2021 07:08 AM    HDL Cholesterol 25 10/03/2021 07:08 AM    LDL, calculated 37.6 10/03/2021 07:08 AM    Triglyceride 92 10/03/2021 07:08 AM    CHOL/HDL Ratio 3.2 10/03/2021 07:08 AM       Recent Labs     10/05/21  0805 10/04/21  0745 10/02/21  1556   AP  --   --  85   TP  --   --  6.8   ALB 2.5* 2.4* 2.7*   GLOB  --   --  4.1*     No results for input(s): PH, PCO2, PO2 in the last 72 hours.     Medications Personally Reviewed:    Current Facility-Administered Medications   Medication Dose Route Frequency    amitriptyline (ELAVIL) tablet 75 mg  75 mg Oral QHS    furosemide (LASIX) tablet 40 mg  40 mg Oral DAILY    atorvastatin (LIPITOR) tablet 40 mg  40 mg Oral QHS    cloNIDine HCL (CATAPRES) tablet 0.2 mg  0.2 mg Oral BID    cyclobenzaprine (FLEXERIL) tablet 5 mg  5 mg Oral BID PRN    apixaban (ELIQUIS) tablet 5 mg  5 mg Oral BID    gabapentin (NEURONTIN) capsule 100 mg  100 mg Oral TID    [Held by provider] hydroCHLOROthiazide (HYDRODIURIL) tablet 12.5 mg  12.5 mg Oral DAILY    [Held by provider] losartan (COZAAR) tablet 100 mg  100 mg Oral DAILY    [Held by provider] metFORMIN (GLUCOPHAGE) tablet 1,000 mg  1,000 mg Oral BID WITH MEALS    nitroglycerin (NITROSTAT) tablet 0.4 mg  0.4 mg SubLINGual Q5MIN PRN    pantoprazole (PROTONIX) tablet 20 mg  20 mg Oral DAILY    oxyCODONE IR (ROXICODONE) tablet 5 mg  5 mg Oral Q4H PRN    PARoxetine (PAXIL) tablet 20 mg  20 mg Oral DAILY    prednisoLONE acetate (PRED FORTE) 1 % ophthalmic suspension 1 Drop  1 Drop Right Eye QID    [Held by provider] alogliptin (NESINA) tablet 6.25 mg  6.25 mg Oral DAILY    carvediloL (COREG) tablet 6.25 mg  6.25 mg Oral BID WITH MEALS    sodium chloride (NS) flush 5-40 mL  5-40 mL IntraVENous Q8H    sodium chloride (NS) flush 5-40 mL  5-40 mL IntraVENous PRN    acetaminophen (TYLENOL) tablet 650 mg  650 mg Oral Q6H PRN    Or    acetaminophen (TYLENOL) suppository 650 mg  650 mg Rectal Q6H PRN    polyethylene glycol (MIRALAX) packet 17 g  17 g Oral DAILY PRN    ondansetron (ZOFRAN ODT) tablet 4 mg  4 mg Oral Q8H PRN    Or    ondansetron (ZOFRAN) injection 4 mg  4 mg IntraVENous Q6H PRN    aspirin delayed-release tablet 81 mg  81 mg Oral DAILY    insulin lispro (HUMALOG) injection   SubCUTAneous AC&HS    glucose chewable tablet 16 g  4 Tablet Oral PRN    glucagon (GLUCAGEN) injection 1 mg  1 mg IntraMUSCular PRN    dextrose (D50W) injection syrg 12.5-25 g  25-50 mL IntraVENous PRN    alum-mag hydroxide-simeth (MYLANTA) oral suspension 30 mL  30 mL Oral Q4H PRN    hydrALAZINE (APRESOLINE) 20 mg/mL injection 10 mg  10 mg IntraVENous Q6H PRN         Adi Matos MD

## 2021-10-06 NOTE — DISCHARGE SUMMARY
Hospitalist Discharge Summary     Patient ID:  Laron Jose  [id number]  78 y.o.  1955  10/2/2021    PCP on record: Tarsha Davis MD    Admit date: 10/2/2021  Discharge date and time: 10/6/2021    DISCHARGE DIAGNOSIS:    Acute decompensated heart failure with reduced ejection fraction/status post fall/acute kidney injury/hypertension    CONSULTATIONS:  IP CONSULT TO CARDIOLOGY  IP CONSULT TO NEPHROLOGY  IP CONSULT TO NEUROLOGY    Excerpted HPI from H&P of Lima Beasley MD:  Laron Jose is a 78 y.o. female who presents from home with multiple comorbidities including sever cardiomyopathy with 15-20% lvef and grade 2 dd(2/2021 with aicd in place, htn, afib, dm2 insulin requiring. Presents with c/o increased weakness, decreased appetite and swollen legs. She had covid 1 year ago, reportedly weak since. She provides limited hx, states difficulty getting around as well 2/2 weakness. No sob, no cp, no congestion. No n/v/d.    ______________________________________________________________________  DISCHARGE SUMMARY/HOSPITAL COURSE:  for full details see H&P, daily progress notes, labs, consult notes. Patient was subsequently admitted to [facility] for further evaluation as well as management, of note patient remained on continuous telemetry monitoring, patient received IV diuresis, subsequent to which patient's clinical status improved, patient was evaluated by physical therapy as well as Occupational Therapy as well as nephrology, after clearance by cardiology and nephrology patient was deemed stable for discharge to skilled nursing facility with close outpatient follow-up with primary care physician as well as cardiology as well as nephrology. INSTRUCTIONS ON DISCHARGE     - Please note that we have stopped:           GLIPIZIDE           SITAGLIPTIN   The above two are diabetes medications, her A1c is 7, given her age is a good number.      - we have also stopped HCTZ its a blood pressure medicaiton    -F/u with PCP in 1 week    _______________________________________________________________________  Patient seen and examined by me on discharge day. Pertinent Findings:  Gen:    Not in distress  Chest: Clear lungs  CVS:   Regular rhythm, s1/s2 no m/r/g  No edema  Abd:  Soft, not distended, not tender  Neuro:  Alert, Oriented at baseline  _______________________________________________________________________  DISCHARGE MEDICATIONS:   Current Discharge Medication List      START taking these medications    Details   aspirin delayed-release 81 mg tablet Take 1 Tablet by mouth daily. Qty: 30 Tablet, Refills: 0  Start date: 10/5/2021         CONTINUE these medications which have NOT CHANGED    Details   carvediloL (COREG) 6.25 mg tablet Take 1 Tablet by mouth two (2) times daily (with meals). Qty: 60 Tablet, Refills: 0      oxyCODONE IR (ROXICODONE) 5 mg immediate release tablet Take 5 mg by mouth every four (4) hours as needed for Pain. cyclobenzaprine (FLEXERIL) 5 mg tablet Take 5 mg by mouth two (2) times daily as needed for Muscle Spasm(s). amitriptyline (ELAVIL) 75 mg tablet Take 75 mg by mouth daily. prednisoLONE acetate (PRED FORTE) 1 % ophthalmic suspension INSTILL 1 DROP INTO RIGHT EYE 4 TIMES A DAY      atorvastatin (LIPITOR) 40 mg tablet Take 1 Tab by mouth nightly. Qty: 60 Tab, Refills: 1      furosemide (Lasix) 40 mg tablet 1 tablet daily  Qty: 60 Tab, Refills: 0      metFORMIN (GLUCOPHAGE) 1,000 mg tablet two (2) times daily (with meals). gabapentin (NEURONTIN) 100 mg capsule TAKE 1 CAPSULE BY MOUTH THREE TIMES A DAY      metoprolol succinate (TOPROL-XL) 25 mg XL tablet TAKE 0.5 TABS BY MOUTH DAILY.   Qty: 45 Tab, Refills: 11    Associated Diagnoses: HTN (hypertension), benign; PVC (premature ventricular contraction)      ELIQUIS 5 mg tablet TAKE 1 TABLET BY MOUTH TWICE A DAY  Qty: 180 Tab, Refills: 3      nitroglycerin (NITROSTAT) 0.4 mg SL tablet 0.4 mg by SubLINGual route every five (5) minutes as needed for Chest Pain. Up to 3 doses. Associated Diagnoses: Type 2 diabetes mellitus with hyperglycemia, unspecified whether long term insulin use (HCC)      cloNIDine HCL (CATAPRES) 0.2 mg tablet Take 0.2 mg by mouth two (2) times a day. Refills: 3      losartan (COZAAR) 100 mg tablet Take 100 mg by mouth daily. Refills: 2      PARoxetine (PAXIL) 20 mg tablet Take 20 mg by mouth daily. omeprazole (PRILOSEC) 20 mg capsule Take 20 mg by mouth daily. STOP taking these medications       hydroCHLOROthiazide (HYDRODIURIL) 12.5 mg tablet Comments:   Reason for Stopping:         glipiZIDE (GLUCOTROL) 5 mg tablet Comments:   Reason for Stopping:         SITagliptin (Januvia) 100 mg tablet Comments:   Reason for Stopping:                 Patient Follow Up Instructions: Activity: PT/OT Eval and Treat  Diet: Diabetic Diet  Wound Care: As directed    INSTRUCTIONS ON DISCHARGE     - Please note that we have stopped:           GLIPIZIDE           SITAGLIPTIN   The above two are diabetes medications, her A1c is 7, given her age is a good number. - we have also stopped HCTZ its a blood pressure medicaiton    -F/u with PCP in 1 week    Follow-up with PCP/Nephrology/Cardiology in 2 weeks.   Follow-up tests/labs As per above physicians  Follow-up Information     Follow up With Specialties Details Why Sudeep Jamison MD Family Medicine In 1 week  [address] [place] [address]      Laila Ruby MD Nephrology, Nephrology In 1 week  [address] [facility]      No Hurley MD Cardiology In 1 week  [address]  [phone]          ________________________________________________________________    Risk of deterioration: Low    Condition at Discharge: Stable  __________________________________________________________________    Disposition  SNF/LTC    ____________________________________________________________________    Code Status: Full Code  ___________________________________________________________________      Total time in minutes spent coordinating this discharge (includes going over instructions, follow-up, prescriptions, and preparing report for sign off to her PCP) :  45 minutes    Signed:  Villa Jaime MD

## 2021-10-06 NOTE — PROGRESS NOTES
CM spoke to admissions director at [facility] regarding vaccination status of patient. Called daughter to confirm patient has not been vaccinated but daughter is requesting for patient to receive first vaccine prior to hospital discharge.

## 2021-10-06 NOTE — PROGRESS NOTES
PHYSICAL THERAPY TREATMENT  Patient: Otto Coronado (75 y.o. female)  Date: 10/6/2021  Diagnosis: CHF exacerbation ([facility] [address] [I50.9] <principal problem not specified>       Precautions:    Chart, physical therapy assessment, plan of care and goals were reviewed. ASSESSMENT  Patient continues with skilled PT services and is progressing towards goals. Patient pleasantly confused upon arrival but was agreeable to PT/OT session. Patient was able to improve with all mobility req less assistance for bed mob, transfers, and demos improved sitting balance compared to last session. Patient was min A x2 for supine<>sit but req mod A to maintain seated balance at times with heavy post lean especially when performing dynamic tasks at the EOB. Balance improved with static sitting and increased focus with close SUP. Patient performed sit to stand transfers with mod A x2 req cues for appropriate hand placement. Static standing with RW with slight left/post lean noted. Patient attempted side steps and marching in place in which pt demonstrated weakness and advancing LEs but not moving trunk forward. Progressing well overall towards goals today, cont to rec SNF at discharge. Current Level of Function Impacting Discharge (mobility/balance): weakness, poor balance    Other factors to consider for discharge:  assistance needed at home, PLOF         PLAN :  Patient continues to benefit from skilled intervention to address the above impairments. Continue treatment per established plan of care. to address goals.     Recommendation for discharge: (in order for the patient to meet his/her long term goals)  Cortez Winn    This discharge recommendation:  Has been made in collaboration with the attending provider and/or case management    IF patient discharges home will need the following DME: to be determined (TBD)       SUBJECTIVE:   Patient stated I want to wash my face    OBJECTIVE DATA SUMMARY:   Critical Behavior:  Neurologic State: Alert  Orientation Level: Oriented to person, Oriented to place  Cognition: Follows commands, Poor safety awareness     Functional Mobility Training:  Bed Mobility:  Rolling: Minimum assistance;Assist x2  Supine to Sit: Minimum assistance;Assist x2  Sit to Supine: Minimum assistance;Assist x2  Scooting: Minimum assistance        Transfers:  Sit to Stand: Moderate assistance;Assist x2  Stand to Sit: Moderate assistance;Assist x2       Balance:  Sitting: Impaired; With support  Sitting - Static: Fair (occasional)  Sitting - Dynamic: Poor (constant support)  Standing: Impaired; With support  Standing - Static: Poor;Constant support  Standing - Dynamic : Poor;Constant support    Ambulation/Gait Training:  Distance (ft): 1 Feet (ft) (Side steps)  Ambulation - Level of Assistance: Moderate assistance;Assist x2  Gait Abnormalities: Ataxic      Therapeutic Exercises:   Poor sitting balance EOB when attempting exercises    Pain Rating:  Faces 6-7 with touch to the RLE    Activity Tolerance:   Fair and requires rest breaks  Please refer to the flowsheet for vital signs taken during this treatment. After treatment patient left in no apparent distress:   Supine in bed, Call bell within reach, Bed / chair alarm activated, and Side rails x 3    COMMUNICATION/COLLABORATION:   The patients plan of care was discussed with: Occupational therapy assistant. Cotx with PAOLA for increased safety. Sin Swan   Time Calculation: 33 mins         Problem: Mobility Impaired (Adult and Pediatric)  Goal: *Acute Goals and Plan of Care (Insert Text)  Description: Pt will be I with LE HEP in 7 days. Pt will perform bed mobility with modA in 7 days. Pt will demonstrate improvement in seated static/dynamic balance from poor  to fair in 7 days.       Outcome: Progressing Towards Goal

## 2021-10-06 NOTE — PROGRESS NOTES
Renal Progress Note    Patient: Merlin Sol MRN: [id number]  SSN: [id number]    YOB: 1955  Age: 78 y.o. Sex: female      Admit Date: 10/2/2021    LOS: 4 days     Subjective:   Patient seen at bedside this morning. no acute distress. AMS+  not verbally interactive.    Improved edema,   Creatinine improved to 1.76        Current Facility-Administered Medications   Medication Dose Route Frequency    amitriptyline (ELAVIL) tablet 75 mg  75 mg Oral QHS    furosemide (LASIX) tablet 40 mg  40 mg Oral DAILY    atorvastatin (LIPITOR) tablet 40 mg  40 mg Oral QHS    cloNIDine HCL (CATAPRES) tablet 0.2 mg  0.2 mg Oral BID    cyclobenzaprine (FLEXERIL) tablet 5 mg  5 mg Oral BID PRN    apixaban (ELIQUIS) tablet 5 mg  5 mg Oral BID    gabapentin (NEURONTIN) capsule 100 mg  100 mg Oral TID    [Held by provider] hydroCHLOROthiazide (HYDRODIURIL) tablet 12.5 mg  12.5 mg Oral DAILY    [Held by provider] losartan (COZAAR) tablet 100 mg  100 mg Oral DAILY    [Held by provider] metFORMIN (GLUCOPHAGE) tablet 1,000 mg  1,000 mg Oral BID WITH MEALS    nitroglycerin (NITROSTAT) tablet 0.4 mg  0.4 mg SubLINGual Q5MIN PRN    pantoprazole (PROTONIX) tablet 20 mg  20 mg Oral DAILY    oxyCODONE IR (ROXICODONE) tablet 5 mg  5 mg Oral Q4H PRN    PARoxetine (PAXIL) tablet 20 mg  20 mg Oral DAILY    prednisoLONE acetate (PRED FORTE) 1 % ophthalmic suspension 1 Drop  1 Drop Right Eye QID    [Held by provider] alogliptin (NESINA) tablet 6.25 mg  6.25 mg Oral DAILY    carvediloL (COREG) tablet 6.25 mg  6.25 mg Oral BID WITH MEALS    sodium chloride (NS) flush 5-40 mL  5-40 mL IntraVENous Q8H    sodium chloride (NS) flush 5-40 mL  5-40 mL IntraVENous PRN    acetaminophen (TYLENOL) tablet 650 mg  650 mg Oral Q6H PRN    Or    acetaminophen (TYLENOL) suppository 650 mg  650 mg Rectal Q6H PRN    polyethylene glycol (MIRALAX) packet 17 g  17 g Oral DAILY PRN    ondansetron (ZOFRAN ODT) tablet 4 mg  4 mg Oral Q8H PRN    Or    ondansetron (ZOFRAN) injection 4 mg  4 mg IntraVENous Q6H PRN    aspirin delayed-release tablet 81 mg  81 mg Oral DAILY    insulin lispro (HUMALOG) injection   SubCUTAneous AC&HS    glucose chewable tablet 16 g  4 Tablet Oral PRN    glucagon (GLUCAGEN) injection 1 mg  1 mg IntraMUSCular PRN    dextrose (D50W) injection syrg 12.5-25 g  25-50 mL IntraVENous PRN    alum-mag hydroxide-simeth (MYLANTA) oral suspension 30 mL  30 mL Oral Q4H PRN    hydrALAZINE (APRESOLINE) 20 mg/mL injection 10 mg  10 mg IntraVENous Q6H PRN        Vitals:    10/06/21 0338 10/06/21 0747 10/06/21 1119 10/06/21 1514   BP: 115/85 112/86 106/82 108/81   Pulse: 76 74 69 68   Resp: 23 20 18 18   Temp: 97.8 °F (36.6 °C)  97.2 °F (36.2 °C)    SpO2: 97% 100% 96% 94%   Weight:       Height:         Objective:   General:awake , no acute distress. HEENT: EOMI, no Icterus, no Pallor,  mucosa moist.  Neck: Neck is supple, No JVD  Lungs: breathsounds normal,  no rhonchi, no rales,   CVS: heart sounds normal,  no murmurs, no rubs. GI: soft, nontender, normal BS. Extremeties: no cyanosis, no edema,   Neuro: Alert, awake, confused  Skin: normal skin turgor, no skin rashes. Intake and Output:  Current Shift: No intake/output data recorded. Last three shifts: 10/04 1901 - 10/06 0700  In: -   Out: 1000 [Urine:1000]      Lab/Data Review:  Recent Labs     10/04/21  0745   WBC 6.6   HGB 13.8   HCT 43.2   PLT 69*     Recent Labs     10/06/21  0724 10/05/21  0805 10/04/21  0745    139 140  139   K 3.5 3.9 4.1  4.1    103 104  103   CO2 26 26 25  26   * 115* 93  95   BUN 43* 45* 48*  48*   CREA 1.76* 2.03* 2.03*  2.05*   CA 8.9 9.2 9.3  9.3  9.2   MG  --   --  2.2   PHOS 4.2 4.5 4.4   ALB 2.2* 2.5* 2.4*     No results for input(s): PH, PCO2, PO2, HCO3, FIO2 in the last 72 hours.   Recent Results (from the past 24 hour(s))   GLUCOSE, POC    Collection Time: 10/05/21 11:17 PM   Result Value Ref Range Glucose (POC) 205 (H) 65 - 117 mg/dL    Performed by Mary Celaya    RENAL FUNCTION PANEL    Collection Time: 10/06/21  7:24 AM   Result Value Ref Range    Sodium 141 136 - 145 mmol/L    Potassium 3.5 3.5 - 5.1 mmol/L    Chloride 106 97 - 108 mmol/L    CO2 26 21 - 32 mmol/L    Anion gap 9 5 - 15 mmol/L    Glucose 109 (H) 65 - 100 mg/dL    BUN 43 (H) 6 - 20 mg/dL    Creatinine 1.76 (H) 0.55 - 1.02 mg/dL    BUN/Creatinine ratio 24 (H) 12 - 20      GFR est AA 35 (L) >60 ml/min/1.73m2    GFR est non-AA 29 (L) >60 ml/min/1.73m2    Calcium 8.9 8.5 - 10.1 mg/dL    Phosphorus 4.2 2.6 - 4.7 mg/dL    Albumin 2.2 (L) 3.5 - 5.0 g/dL   GLUCOSE, POC    Collection Time: 10/06/21  7:43 AM   Result Value Ref Range    Glucose (POC) 142 (H) 65 - 117 mg/dL    Performed by Liliana Kelley    GLUCOSE, POC    Collection Time: 10/06/21 11:24 AM   Result Value Ref Range    Glucose (POC) 121 (H) 65 - 117 mg/dL    Performed by Brett Winters         Assessment and Plan:     #1 acute kidney injury on CKD stage III  -presented with creatinine of 2.1 , improved to 1.76  -Baseline creatinine seems to be around 1.4 based on labs in August 2021  -ADELINA likely prerenal secondary to losartan/hydrochlorthiazide and cardiorenal syndrome  On diuretics for fluid overload  continue Lasix 40 mg daily  -Continue to hold losartan and hydrochlorothiazide for now  Continue to monutor renal functions   Probably has stage 3CKD as baseline    #2 acute decompensated heart failure  -Reduced EF on echo in the past  -Presented with bilateral leg swelling and shortness of breath  -Echo on 2/17/2021 showed EF of 15 to 20%  Improving with diuretics  continue with Lasix 40 mg IV daily. -Losartan held because of ADELINA. Continue to hold     3. hypertension  -blood pressure is well controlled with clonidine and Coreg which I will continue.     4. AMS/dementia: cotinue supportive care  Dc is planned to SNF       Signed By: Danielito Salguero MD     October 6, 2021

## 2021-10-06 NOTE — DISCHARGE INSTRUCTIONS
INSTRUCTIONS ON DISCHARGE     - Please note that we have stopped:           GLIPIZIDE           SITAGLIPTIN   The above two are diabetes medications, her A1c is 7, given her age is a good number.      - we have also stopped HCTZ its a blood pressure medicaiton    -F/u with PCP in 1 week

## 2021-10-06 NOTE — ROUTINE PROCESS
Report called to [address] IV removed, tolerated well. Pt alert to self, calm cooperative, vss. Pt resting quietly in bed waiting for transportation to facility.

## 2021-10-06 NOTE — PROGRESS NOTES
Progress Note      10/6/2021 1:22 PM  NAME: Abelardo Fields   MRN:  [id number]   Admit Diagnosis: CHF exacerbation ([facility] [address] [I50.9]      Assessment/Plan:   More awake today, voice is still feeeble. Says she ate more breakfast today as her family member was in. Severe left ventricular systolic dysfunction, no evidence of decompensation    Hypertension well controlled    [facility], anticoagulated with apixaban           []       High complexity decision making was performed in this patient at high risk for decompensation with multiple organ involvement. Subjective:     Abelardo Fields is sleepy, arousable  Discussed with RN events overnight. Review of Systems:      Could NOT obtain due to:  Patient being sleepy     Objective:      Physical Exam:    Last 24hrs VS reviewed since prior progress note. Most recent are:    Visit Vitals  /86 (BP Patient Position: At rest;Lying)   Pulse 74   Temp 97.8 °F (36.6 °C)   Resp 20   Ht 5' 6\" (1.676 m)   Wt 75.9 kg (167 lb 5.3 oz)   SpO2 100%   BMI 27.01 kg/m²       Intake/Output Summary (Last 24 hours) at 10/6/2021 1125  Last data filed at 10/6/2021 0641  Gross per 24 hour   Intake    Output 350 ml   Net -350 ml        General Appearance: Middle-aged female, in no distress  Ears/Nose/Mouth/Throat: Sleepy  Neck: Supple. Chest: Lungs clear to auscultation bilaterally. Cardiovascular: Regular rate and rhythm, S1S2 normal, no murmur. Abdomen: Soft, non-tender, bowel sounds are active. Extremities: No edema bilaterally. Skin: Warm and dry. []         Post-cath site without hematoma, bruit, tenderness, or thrill. Distal pulses intact. PMH/SH reviewed - no change compared to H&P    Data Review    Telemetry:     EKG:   []  No new EKG for review    Lab Data Personally Reviewed:    Recent Labs     10/04/21  0745   WBC 6.6   HGB 13.8   HCT 43.2   PLT 69*     No results for input(s): INR, PTP, APTT, INREXT, INREXT in the last 72 hours.    Recent Labs     10/06/21  0724 10/05/21  0805 10/04/21  0745    139 140  139   K 3.5 3.9 4.1  4.1    103 104  103   CO2 26 26 25  26   BUN 43* 45* 48*  48*   CREA 1.76* 2.03* 2.03*  2.05*   * 115* 93  95   CA 8.9 9.2 9.3  9.3  9.2   MG  --   --  2.2     No results for input(s): CPK, CKNDX, TROIQ in the last 72 hours. No lab exists for component: CPKMB  Lab Results   Component Value Date/Time    Cholesterol, total 81 10/03/2021 07:08 AM    HDL Cholesterol 25 10/03/2021 07:08 AM    LDL, calculated 37.6 10/03/2021 07:08 AM    Triglyceride 92 10/03/2021 07:08 AM    CHOL/HDL Ratio 3.2 10/03/2021 07:08 AM       Recent Labs     10/06/21  0724 10/05/21  0805 10/04/21  0745   ALB 2.2* 2.5* 2.4*     No results for input(s): PH, PCO2, PO2 in the last 72 hours.     Medications Personally Reviewed:    Current Facility-Administered Medications   Medication Dose Route Frequency    amitriptyline (ELAVIL) tablet 75 mg  75 mg Oral QHS    furosemide (LASIX) tablet 40 mg  40 mg Oral DAILY    atorvastatin (LIPITOR) tablet 40 mg  40 mg Oral QHS    cloNIDine HCL (CATAPRES) tablet 0.2 mg  0.2 mg Oral BID    cyclobenzaprine (FLEXERIL) tablet 5 mg  5 mg Oral BID PRN    apixaban (ELIQUIS) tablet 5 mg  5 mg Oral BID    gabapentin (NEURONTIN) capsule 100 mg  100 mg Oral TID    [Held by provider] hydroCHLOROthiazide (HYDRODIURIL) tablet 12.5 mg  12.5 mg Oral DAILY    [Held by provider] losartan (COZAAR) tablet 100 mg  100 mg Oral DAILY    [Held by provider] metFORMIN (GLUCOPHAGE) tablet 1,000 mg  1,000 mg Oral BID WITH MEALS    nitroglycerin (NITROSTAT) tablet 0.4 mg  0.4 mg SubLINGual Q5MIN PRN    pantoprazole (PROTONIX) tablet 20 mg  20 mg Oral DAILY    oxyCODONE IR (ROXICODONE) tablet 5 mg  5 mg Oral Q4H PRN    PARoxetine (PAXIL) tablet 20 mg  20 mg Oral DAILY    prednisoLONE acetate (PRED FORTE) 1 % ophthalmic suspension 1 Drop  1 Drop Right Eye QID    [Held by provider] alogliptin (NESINA) tablet 6.25 mg  6.25 mg Oral DAILY    carvediloL (COREG) tablet 6.25 mg  6.25 mg Oral BID WITH MEALS    sodium chloride (NS) flush 5-40 mL  5-40 mL IntraVENous Q8H    sodium chloride (NS) flush 5-40 mL  5-40 mL IntraVENous PRN    acetaminophen (TYLENOL) tablet 650 mg  650 mg Oral Q6H PRN    Or    acetaminophen (TYLENOL) suppository 650 mg  650 mg Rectal Q6H PRN    polyethylene glycol (MIRALAX) packet 17 g  17 g Oral DAILY PRN    ondansetron (ZOFRAN ODT) tablet 4 mg  4 mg Oral Q8H PRN    Or    ondansetron (ZOFRAN) injection 4 mg  4 mg IntraVENous Q6H PRN    aspirin delayed-release tablet 81 mg  81 mg Oral DAILY    insulin lispro (HUMALOG) injection   SubCUTAneous AC&HS    glucose chewable tablet 16 g  4 Tablet Oral PRN    glucagon (GLUCAGEN) injection 1 mg  1 mg IntraMUSCular PRN    dextrose (D50W) injection syrg 12.5-25 g  25-50 mL IntraVENous PRN    alum-mag hydroxide-simeth (MYLANTA) oral suspension 30 mL  30 mL Oral Q4H PRN    hydrALAZINE (APRESOLINE) 20 mg/mL injection 10 mg  10 mg IntraVENous Q6H PRN         Kiko Irizarry MD

## 2021-10-06 NOTE — PROGRESS NOTES
1600 pm  ESTEFANY xcalled and spoke to patients daughter regarding patient discharge and transport to [facility]. Also discussed 2nd IMM letter with daughter via telephone. 1330 pm  ESTEFANY received approval for patient to discharge to [facility] today. Patient will go to room 225.  Report may be called to [phone]

## 2021-10-06 NOTE — PROGRESS NOTES
Problem: Falls - Risk of  Goal: *Absence of Falls  Description: Document Msaoud Green Fall Risk and appropriate interventions in the flowsheet. Outcome: Resolved/Met     Problem: Patient Education: Go to Patient Education Activity  Goal: Patient/Family Education  Outcome: Resolved/Met     Problem: Pressure Injury - Risk of  Goal: *Prevention of pressure injury  Description: Document Marshall Scale and appropriate interventions in the flowsheet.   Outcome: Resolved/Met     Problem: Patient Education: Go to Patient Education Activity  Goal: Patient/Family Education  Outcome: Resolved/Met     Problem: Patient Education: Go to Patient Education Activity  Goal: Patient/Family Education  Outcome: Resolved/Met     Problem: Heart Failure: Day 1  Goal: Off Pathway (Use only if patient is Off Pathway)  Outcome: Resolved/Met  Goal: Activity/Safety  Outcome: Resolved/Met  Goal: Consults, if ordered  Outcome: Resolved/Met  Goal: Diagnostic Test/Procedures  Outcome: Resolved/Met  Goal: Nutrition/Diet  Outcome: Resolved/Met  Goal: Discharge Planning  Outcome: Resolved/Met  Goal: Medications  Outcome: Resolved/Met  Goal: Respiratory  Outcome: Resolved/Met  Goal: Treatments/Interventions/Procedures  Outcome: Resolved/Met  Goal: Psychosocial  Outcome: Resolved/Met  Goal: *Oxygen saturation within defined limits  Outcome: Resolved/Met  Goal: *Hemodynamically stable  Outcome: Resolved/Met  Goal: *Optimal pain control at patient's stated goal  Outcome: Resolved/Met  Goal: *Anxiety reduced or absent  Outcome: Resolved/Met     Problem: Heart Failure: Day 2  Goal: Off Pathway (Use only if patient is Off Pathway)  Outcome: Resolved/Met  Goal: Activity/Safety  Outcome: Resolved/Met  Goal: Consults, if ordered  Outcome: Resolved/Met  Goal: Diagnostic Test/Procedures  Outcome: Resolved/Met  Goal: Nutrition/Diet  Outcome: Resolved/Met  Goal: Discharge Planning  Outcome: Resolved/Met  Goal: Medications  Outcome: Resolved/Met  Goal: Respiratory  Outcome: Resolved/Met  Goal: Treatments/Interventions/Procedures  Outcome: Resolved/Met  Goal: Psychosocial  Outcome: Resolved/Met  Goal: *Oxygen saturation within defined limits  Outcome: Resolved/Met  Goal: *Hemodynamically stable  Outcome: Resolved/Met  Goal: *Optimal pain control at patient's stated goal  Outcome: Resolved/Met  Goal: *Anxiety reduced or absent  Outcome: Resolved/Met  Goal: *Demonstrates progressive activity  Outcome: Resolved/Met     Problem: Heart Failure: Day 3  Goal: Off Pathway (Use only if patient is Off Pathway)  Outcome: Resolved/Met  Goal: Activity/Safety  Outcome: Resolved/Met  Goal: Diagnostic Test/Procedures  Outcome: Resolved/Met  Goal: Nutrition/Diet  Outcome: Resolved/Met  Goal: Discharge Planning  Outcome: Resolved/Met  Goal: Medications  Outcome: Resolved/Met  Goal: Respiratory  Outcome: Resolved/Met  Goal: Treatments/Interventions/Procedures  Outcome: Resolved/Met  Goal: Psychosocial  Outcome: Resolved/Met  Goal: *Oxygen saturation within defined limits  Outcome: Resolved/Met  Goal: *Hemodynamically stable  Outcome: Resolved/Met  Goal: *Optimal pain control at patient's stated goal  Outcome: Resolved/Met  Goal: *Anxiety reduced or absent  Outcome: Resolved/Met  Goal: *Demonstrates progressive activity  Outcome: Resolved/Met     Problem: Heart Failure: Day 4  Goal: Off Pathway (Use only if patient is Off Pathway)  Outcome: Resolved/Met  Goal: Activity/Safety  Outcome: Resolved/Met  Goal: Diagnostic Test/Procedures  Outcome: Resolved/Met  Goal: Nutrition/Diet  Outcome: Resolved/Met  Goal: Discharge Planning  Outcome: Resolved/Met  Goal: Medications  Outcome: Resolved/Met  Goal: Respiratory  Outcome: Resolved/Met  Goal: Treatments/Interventions/Procedures  Outcome: Resolved/Met  Goal: Psychosocial  Outcome: Resolved/Met  Goal: *Oxygen saturation within defined limits  Outcome: Resolved/Met  Goal: *Hemodynamically stable  Outcome: Resolved/Met  Goal: *Optimal pain control at patient's stated goal  Outcome: Resolved/Met  Goal: *Anxiety reduced or absent  Outcome: Resolved/Met  Goal: *Demonstrates progressive activity  Outcome: Resolved/Met     Problem: Heart Failure: Day 5  Goal: Off Pathway (Use only if patient is Off Pathway)  Outcome: Resolved/Met  Goal: Activity/Safety  Outcome: Resolved/Met  Goal: Diagnostic Test/Procedures  Outcome: Resolved/Met  Goal: Nutrition/Diet  Outcome: Resolved/Met  Goal: Discharge Planning  Outcome: Resolved/Met  Goal: Medications  Outcome: Resolved/Met  Goal: Respiratory  Outcome: Resolved/Met  Goal: Treatments/Interventions/Procedures  Outcome: Resolved/Met  Goal: Psychosocial  Outcome: Resolved/Met     Problem: Heart Failure: Discharge Outcomes  Goal: *Demonstrates ability to perform prescribed activity without shortness of breath or discomfort  Outcome: Resolved/Met  Goal: *Left ventricular function assessment completed prior to or during stay, or planned for post-discharge  Outcome: Resolved/Met  Goal: *ACEI prescribed if LVEF less than 40% and no contraindications or ARB prescribed  Outcome: Resolved/Met  Goal: *Verbalizes understanding and describes prescribed diet  Outcome: Resolved/Met  Goal: *Verbalizes understanding/describes prescribed medications  Outcome: Resolved/Met  Goal: *Describes available resources and support systems  Description: (eg: Home Health, Palliative Care, Advanced Medical Directive)  Outcome: Resolved/Met  Goal: *Describes smoking cessation resources  Outcome: Resolved/Met  Goal: *Understands and describes signs and symptoms to report to providers(Stroke Metric)  Outcome: Resolved/Met  Goal: *Describes/verbalizes understanding of follow-up/return appt  Description: (eg: to physicians, diabetes treatment coordinator, and other resources  Outcome: Resolved/Met  Goal: *Describes importance of continuing daily weights and changes to report to physician  Outcome: Resolved/Met

## 2021-10-11 PROBLEM — A41.9 SEPSIS (HCC): Status: ACTIVE | Noted: 2021-01-01

## 2021-10-11 PROBLEM — R41.82 ALTERED MENTAL STATUS: Status: ACTIVE | Noted: 2021-01-01

## 2021-10-11 NOTE — PROGRESS NOTES
10/11/21. PCP is Dr. John Garcia. D/C Plan is for pt to return to a new facility. Pt is from [place] [id number] pt's daughter ( Stiven Escalante @ [phone]) verbally consented to Choice Letter for [facility] - does NOT  want pt to return to [address] [place] [address]. Referral sent via [place]. Pt will need transportation upon discharge. Pt uses walker.

## 2021-10-11 NOTE — H&P
History and Physical    NAME: Hair Farr   :  1955   MRN:  [id number]     Date/Time:  10/11/2021 9:29 AM    Patient PCP: Rebekah Gates MD  ______________________________________________________________________             Subjective:     CHIEF COMPLAINT:     ams    HISTORY OF PRESENT ILLNESS:       Patient is a 78y.o. year old female history of chronic A. fib chronic kidney disease hypertension pacemaker congestive heart failure with ejection fraction of 15 to 20% history of diabetes hypertension patient have a Covid few months ago since then but he declined patient was recently discharged from the hospital with congestive heart failure came back with altered mental status not awake not talking seen by the ER physician CT scan of the head was negative  Patient had WBC count elevated increased lactic acid concern of sepsis sepsis patient was LP done in the ER which was normal patient received 2 g of Rocephin when I saw the patient was still was altered I ordered repeat lactic acid ordered CT scan of the maxillary facial area swelling of the right parotid area and admitted to ICU for further work-up discussed with the patient daughter    According to the patient daughter since she have a Covid in  this year she is declining she is in the hospital third time this month    Patient is full code    Past Medical History:   Diagnosis Date    [facility] ([address]     Cardiomyopathy, idiopathic ([address] 10/28/2010    Chronic kidney disease     Heart failure ([address]     HTN (hypertension), benign 10/28/2010    Pacemaker     ICD    PVC (premature ventricular contraction) 10/28/2010        Past Surgical History:   Procedure Laterality Date    HX OTHER SURGICAL  10/02/2018    Right eye surgery    HX PACEMAKER         Social History     Tobacco Use    Smoking status: Never Smoker    Smokeless tobacco: Never Used   Substance Use Topics    Alcohol use: No        History reviewed.  No pertinent family history. Allergies   Allergen Reactions    Chocolate [Cocoa] Anaphylaxis and Swelling    Iodine Anaphylaxis    Lisinopril Anaphylaxis    Peanut Anaphylaxis and Swelling    Glimepiride Swelling    Glipizide Other (comments)     Pruritis    Metformin Diarrhea    Norvasc [Amlodipine] Other (comments)     Light Headed    Pneumovax 23 [Pneumococcal 23-Devi Ps Vaccine] Hives    Toprol Xl [Metoprolol Succinate] Other (comments)     Light headed    Tositumomab I-131 (Maltose) Unable to Obtain    Vitamin D [Cholecalciferol (Vitamin D3)] Other (comments)     Dizziness/headache not a true allergy. Prior to Admission medications    Medication Sig Start Date End Date Taking? Authorizing Provider   aspirin delayed-release 81 mg tablet Take 1 Tablet by mouth daily. 10/5/21   Katy Herndon MD   carvediloL (COREG) 6.25 mg tablet Take 1 Tablet by mouth two (2) times daily (with meals). 8/5/21   Choco Bolton MD   oxyCODONE IR (ROXICODONE) 5 mg immediate release tablet Take 5 mg by mouth every four (4) hours as needed for Pain. 7/16/21   Provider, Historical   cyclobenzaprine (FLEXERIL) 5 mg tablet Take 5 mg by mouth two (2) times daily as needed for Muscle Spasm(s). 7/19/21   Provider, Historical   amitriptyline (ELAVIL) 75 mg tablet Take 75 mg by mouth daily. 5/3/21   Provider, Historical   prednisoLONE acetate (PRED FORTE) 1 % ophthalmic suspension INSTILL 1 DROP INTO RIGHT EYE 4 TIMES A DAY 3/13/21   Provider, Historical   atorvastatin (LIPITOR) 40 mg tablet Take 1 Tab by mouth nightly. 2/19/21   Samm Dumont MD   furosemide (Lasix) 40 mg tablet 1 tablet daily  Patient taking differently: Take 40 mg by mouth daily. 1 tablet daily 2/19/21   Prachi Rosenberg MD   metFORMIN (GLUCOPHAGE) 1,000 mg tablet two (2) times daily (with meals).  10/12/20   Provider, Historical   gabapentin (NEURONTIN) 100 mg capsule TAKE 1 CAPSULE BY MOUTH THREE TIMES A DAY 10/27/20   Provider, Historical metoprolol succinate (TOPROL-XL) 25 mg XL tablet TAKE 0.5 TABS BY MOUTH DAILY. 12/16/19   Lawyer Nicky NP   ELIQUIS 5 mg tablet TAKE 1 TABLET BY MOUTH TWICE A DAY 12/5/19   Lawyer Nicky NP   nitroglycerin (NITROSTAT) 0.4 mg SL tablet 0.4 mg by SubLINGual route every five (5) minutes as needed for Chest Pain. Up to 3 doses. Patient not taking: Reported on 8/3/2021    Provider, Historical   cloNIDine HCL (CATAPRES) 0.2 mg tablet Take 0.2 mg by mouth two (2) times a day. 5/21/18   Provider, Historical   losartan (COZAAR) 100 mg tablet Take 100 mg by mouth daily. 4/25/17   Provider, Historical   PARoxetine (PAXIL) 20 mg tablet Take 20 mg by mouth daily. Provider, Historical   omeprazole (PRILOSEC) 20 mg capsule Take 20 mg by mouth daily.  10/28/10   Provider, Historical         Current Facility-Administered Medications:     aspirin delayed-release tablet 81 mg, 81 mg, Oral, DAILY, Ammy Dumont MD    atorvastatin (LIPITOR) tablet 40 mg, 40 mg, Oral, QHS, Ammy Dumont MD    carvediloL (COREG) tablet 6.25 mg, 6.25 mg, Oral, BID WITH MEALS, Elvira Dumont MD    apixaban (ELIQUIS) tablet 5 mg, 5 mg, Oral, BID, Elvira Dumont MD    furosemide (LASIX) tablet 40 mg, 40 mg, Oral, DAILY, Elvira Dumont MD    prednisoLONE acetate (PRED FORTE) 1 % ophthalmic suspension 1 Drop, 1 Drop, Both Eyes, TID, Elvira Dumont MD    insulin lispro (HUMALOG) injection, , SubCUTAneous, AC&HS, Elvira Dumont MD    glucose chewable tablet 16 g, 4 Tablet, Oral, PRN, Elvira Dumont MD    dextrose (D50W) injection syrg 12.5-25 g, 25-50 mL, IntraVENous, PRN, Elvira Dumont MD    glucagon (GLUCAGEN) injection 1 mg, 1 mg, IntraMUSCular, PRN, Elvira Dumont MD    acetaminophen (TYLENOL) tablet 650 mg, 650 mg, Oral, Q6H PRN **OR** acetaminophen (TYLENOL) suppository 650 mg, 650 mg, Rectal, Q6H PRN, Ammy Dumont MD    polyethylene glycol (MIRALAX) packet 17 g, 17 g, Oral, DAILY PRN, Tim Ana Maria Moreland MD    ondansetron [facility] ODT) tablet 4 mg, 4 mg, Oral, Q8H PRN **OR** ondansetron (ZOFRAN) injection 4 mg, 4 mg, IntraVENous, Q6H PRN, Ammy Dumont MD    heparin (porcine) injection 5,000 Units, 5,000 Units, SubCUTAneous, Q8H, Ammy Dumont MD    cefTRIAXone (ROCEPHIN) 1 g in sterile water (preservative free) 10 mL IV syringe, 1 g, IntraVENous, Q24H, Ammy Dumont MD    Current Outpatient Medications:     aspirin delayed-release 81 mg tablet, Take 1 Tablet by mouth daily. , Disp: 30 Tablet, Rfl: 0    carvediloL (COREG) 6.25 mg tablet, Take 1 Tablet by mouth two (2) times daily (with meals). , Disp: 60 Tablet, Rfl: 0    oxyCODONE IR (ROXICODONE) 5 mg immediate release tablet, Take 5 mg by mouth every four (4) hours as needed for Pain., Disp: , Rfl:     cyclobenzaprine (FLEXERIL) 5 mg tablet, Take 5 mg by mouth two (2) times daily as needed for Muscle Spasm(s). , Disp: , Rfl:     amitriptyline (ELAVIL) 75 mg tablet, Take 75 mg by mouth daily. , Disp: , Rfl:     prednisoLONE acetate (PRED FORTE) 1 % ophthalmic suspension, INSTILL 1 DROP INTO RIGHT EYE 4 TIMES A DAY, Disp: , Rfl:     atorvastatin (LIPITOR) 40 mg tablet, Take 1 Tab by mouth nightly., Disp: 60 Tab, Rfl: 1    furosemide (Lasix) 40 mg tablet, 1 tablet daily (Patient taking differently: Take 40 mg by mouth daily. 1 tablet daily), Disp: 60 Tab, Rfl: 0    metFORMIN (GLUCOPHAGE) 1,000 mg tablet, two (2) times daily (with meals). , Disp: , Rfl:     gabapentin (NEURONTIN) 100 mg capsule, TAKE 1 CAPSULE BY MOUTH THREE TIMES A DAY, Disp: , Rfl:     metoprolol succinate (TOPROL-XL) 25 mg XL tablet, TAKE 0.5 TABS BY MOUTH DAILY. , Disp: 45 Tab, Rfl: 11    ELIQUIS 5 mg tablet, TAKE 1 TABLET BY MOUTH TWICE A DAY, Disp: 180 Tab, Rfl: 3    nitroglycerin (NITROSTAT) 0.4 mg SL tablet, 0.4 mg by SubLINGual route every five (5) minutes as needed for Chest Pain. Up to 3 doses.  (Patient not taking: Reported on 8/3/2021), Disp: , Rfl:    cloNIDine HCL (CATAPRES) 0.2 mg tablet, Take 0.2 mg by mouth two (2) times a day., Disp: , Rfl: 3    losartan (COZAAR) 100 mg tablet, Take 100 mg by mouth daily. , Disp: , Rfl: 2    PARoxetine (PAXIL) 20 mg tablet, Take 20 mg by mouth daily. , Disp: , Rfl:     omeprazole (PRILOSEC) 20 mg capsule, Take 20 mg by mouth daily. , Disp: , Rfl:     LAB DATA REVIEWED:    Recent Results (from the past 24 hour(s))   CBC WITH AUTOMATED DIFF    Collection Time: 10/11/21 12:49 AM   Result Value Ref Range    WBC 25.6 (H) 3.6 - 11.0 K/uL    RBC 5.27 (H) 3.80 - 5.20 M/uL    HGB 14.8 11.5 - 16.0 g/dL    HCT 45.7 35.0 - 47.0 %    MCV 86.7 80.0 - 99.0 FL    MCH 28.1 26.0 - 34.0 PG    MCHC 32.4 30.0 - 36.5 g/dL    RDW 19.7 (H) 11.5 - 14.5 %    PLATELET 694 (L) 538 - 400 K/uL    NRBC 0.0 0.0  WBC    ABSOLUTE NRBC 0.00 0.00 - 0.01 K/uL    NEUTROPHILS 86 (H) 32 - 75 %    LYMPHOCYTES 7 (L) 12 - 49 %    MONOCYTES 6 5 - 13 %    EOSINOPHILS 0 0 - 7 %    BASOPHILS 0 0 - 1 %    IMMATURE GRANULOCYTES 1 (H) 0 - 0.5 %    ABS. NEUTROPHILS 22.2 (H) 1.8 - 8.0 K/UL    ABS. LYMPHOCYTES 1.7 0.8 - 3.5 K/UL    ABS. MONOCYTES 1.5 (H) 0.0 - 1.0 K/UL    ABS. EOSINOPHILS 0.0 0.0 - 0.4 K/UL    ABS. BASOPHILS 0.0 0.0 - 0.1 K/UL    ABS. IMM. GRANS. 0.2 (H) 0.00 - 0.04 K/UL    DF AUTOMATED     METABOLIC PANEL, COMPREHENSIVE    Collection Time: 10/11/21 12:49 AM   Result Value Ref Range    Sodium 135 (L) 136 - 145 mmol/L    Potassium Hemolyzed, recollect requested 3.5 - 5.1 mmol/L    Chloride 100 97 - 108 mmol/L    CO2 22 21 - 32 mmol/L    Anion gap 13 5 - 15 mmol/L    Glucose 258 (H) 65 - 100 mg/dL    BUN 58 (H) 6 - 20 mg/dL    Creatinine 2.56 (H) 0.55 - 1.02 mg/dL    BUN/Creatinine ratio 23 (H) 12 - 20      GFR est AA 23 (L) >60 ml/min/1.73m2    GFR est non-AA 19 (L) >60 ml/min/1.73m2    Calcium 9.3 8.5 - 10.1 mg/dL    Bilirubin, total 1.6 (H) 0.2 - 1.0 mg/dL    AST (SGOT) Hemolyzed, recollect requested 15 - 37 U/L    ALT (SGPT) 34 12 - 78 U/L    Alk. phosphatase 92 45 - 117 U/L    Protein, total 7.5 6.4 - 8.2 g/dL    Albumin 2.5 (L) 3.5 - 5.0 g/dL    Globulin 5.0 (H) 2.0 - 4.0 g/dL    A-G Ratio 0.5 (L) 1.1 - 2.2     URINALYSIS W/ REFLEX CULTURE    Collection Time: 10/11/21  1:33 AM    Specimen: Urine   Result Value Ref Range    Color Kathie      Appearance Clear Clear      Specific gravity 1.016 1.003 - 1.030      pH (UA) 5.0 5.0 - 8.0      Protein 30 (A) Negative mg/dL    Glucose Negative Negative mg/dL    Ketone Negative Negative mg/dL    Bilirubin Negative Negative      Blood Negative Negative      Urobilinogen 4.0 (H) 0.1 - 1.0 EU/dL    Nitrites Negative Negative      Leukocyte Esterase Negative Negative      WBC 0-4 0 - 4 /hpf    RBC 0-5 0 - 5 /hpf    Bacteria Negative Negative /hpf    UA:UC IF INDICATED Culture not indicated by UA result Culture not indicated by UA result      Mucus Trace (A) Negative /lpf    Hyaline cast 10-20 0 - 5 /lpf   LACTIC ACID    Collection Time: 10/11/21  2:25 AM   Result Value Ref Range    Lactic acid 3.8 (HH) 0.4 - 2.0 mmol/L   GLUCOSE, CSF    Collection Time: 10/11/21  5:04 AM   Result Value Ref Range    Tube No. 1      Glucose,.0 (H) 40 - 70 mg/dL   CULTURE, CSF W GRAM STAIN    Collection Time: 10/11/21  5:04 AM    Specimen: Cerebrospinal Fluid   Result Value Ref Range    Special Requests: No Special Requests      Culture result: NO POLYS NO ORGANISMS SEEN    CELL COUNT, CSF    Collection Time: 10/11/21  5:04 AM   Result Value Ref Range    CSF TUBE NO. 3      CSF COLOR Colorless Colorless      SPUN COLOR Colorless Colorless      CSF APPEARANCE Clear Clear      CSF RBCs 0 0 /cu mm    CSF WBCs 2 0 - 5 /cu mm   PROTEIN, CSF    Collection Time: 10/11/21  5:04 AM   Result Value Ref Range    Tube No. 1      Protein,CSF 41.0 15 - 45 mg/dL   BLOOD GAS, ARTERIAL    Collection Time: 10/11/21  5:45 AM   Result Value Ref Range    pH 7.42 7.35 - 7.45      PCO2 28 (L) 35 - 45 mmHg    PO2 182 (H) 75 - 100 mmHg    O2  >95 % BICARBONATE 21 (L) 22 - 26 mmol/L    BASE DEFICIT 4.7 (H) 0 - 2 mmol/L    O2 METHOD Room air      FIO2 21.0 %    SITE Left Radial      ROBBIN'S TEST PASS     POTASSIUM    Collection Time: 10/11/21  5:51 AM   Result Value Ref Range    Potassium 4.4 3.5 - 5.1 mmol/L       XR Results (most recent):  Results from Hospital Encounter encounter on 10/11/21    XR CHEST PORT    Narrative  Portable exam of the chest, one view    INDICATION: Abnormal breath sounds    COMPARISON: 10/2/2021    FINDINGS:  Mild pulmonary vascular congestion. Cardiomegaly. Left subclavian approach  pacer/ICD leads are similar to prior exam. No consolidation. Cardiopericardial  silhouette remains enlarged. There is no pneumothorax or definite pleural  effusion. Impression  1. Mild pulmonary vascular congestion. 2.  Cardiomegaly. CT MAXILLOFACIAL WO CONT   Final Result   Findings more consistent with cellulitis of the right face that   includes the right parotid gland (acute parotitis) as described above. Edema/fluid collection in the parapharyngeal space and retropharyngeal space. Suggestions as above. CT ABD PELV WO CONT   Final Result   Small amount of ascites. No focal fluid collection or free air. Density in the gallbladder which could be artifact, sludge or gallstones. No   specific CT evidence of cholecystitis. Bladder wall prominence. Other findings   as above. CT HEAD WO CONT   Final Result   1. No evidence of acute intracranial hemorrhage or mass effect. 2.  Multifocal intracranial encephalomalacia as above. XR CHEST PORT   Final Result   1. Mild pulmonary vascular congestion. 2.  Cardiomegaly. Review of Systems:  Unable to obtain  Objective:   VITALS:    Visit Vitals  BP (!) 120/102   Pulse (!) 101   Temp 97.5 °F (36.4 °C)   Resp 20   Ht 5' 6\" (1.676 m)   Wt 75.8 kg (167 lb)   SpO2 97%   BMI 26.95 kg/m²       Physical Exam:   Constitutional: Not arousable.    HENT:   Head: Normocephalic and atraumatic. Eyes: Pupils are equal, round, and reactive to light. EOM are normal.   Cardiovascular: Normal rate, regular rhythm and normal heart sounds. Pulmonary/Chest: Breath sounds normal. No wheezes. No rales. Exhibits no tenderness. Abdominal: Soft. Bowel sounds are normal. There is no abdominal tenderness. There is no rebound and no guarding. Musculoskeletal: Normal range of motion. Neurological: Not arousable    ASSESSMENT & PLAN:    Altered mental status  Metabolic encephalopathy  Sepsis  Acute proctitis  Lactic acidosis  Leukocytosis  Chronic A.  Fib  Congestive heart failure  Hypertension  Cardiomyopathy ejection fraction 15 x 20%  Chronic kidney disease  Type 2 diabetes      Patient admitted to ICU  Continue home medicine: Eliquis 5 mg twice a day  Aspirin 81 mg daily  Lipitor 40 mg daily  Coreg 6.25 twice daily  IV Zosyn and vancomycin with pharmacy protocol  Continue Lasix 40 mg daily  Sliding-scale insulin    Cardiology infectious disease and neurology consult      I spent critical care time 35 minutes independently      ________________________________________________________________________    Signed: Abhijeet Yañez MD

## 2021-10-11 NOTE — Clinical Note
Status[de-identified] INPATIENT [101]   Type of Bed: Telemetry [19]   Cardiac Monitoring Required?: Yes   Inpatient Hospitalization Certified Necessary for the Following Reasons: 3. Patient receiving treatment that can only be provided in an inpatient setting (further clarification in H&P documentation)   Admitting Diagnosis: Sepsis ([facility] [address] [[id number]]   Admitting Diagnosis: Altered mental status [780.97. ICD-9-CM]   Admitting Physician: Marnie Onofre [[id number]]   Attending Physician: Marnie Onofre [[id number]]   Estimated Length of Stay: 2 Midnights   Discharge Plan[de-identified] Home with Office Follow-up

## 2021-10-11 NOTE — ED NOTES
Attempted lab draw at this time unable to obtain labs Charge RN notified. To CT for test as ordered. TRANSFER - OUT REPORT:    Verbal report given to GENIE Montalvo (name) on [place] Ayers  being transferred to [facility] [employer] [place] (unit) for routine progression of care       Report consisted of patients Situation, Background, Assessment and   Recommendations(SBAR). Information from the following report(s) SBAR, Kardex, ED Summary and Cardiac Rhythm sinus tach was reviewed with the receiving nurse. Lines:   Peripheral IV 10/11/21 Right Wrist (Active)        Opportunity for questions and clarification was provided.       Patient transported with:   Oxygen 4 liters  Tech

## 2021-10-11 NOTE — ED NOTES
TRANSFER - OUT REPORT:    Verbal report given to [facility]) on Doretha Gandhi  being transferred to ICU(unit) for routine progression of care       Report consisted of patients Situation, Background, Assessment and   Recommendations(SBAR). Information from the following report(s) SBAR, Kardex, ED Summary, MAR, Recent Results and Cardiac Rhythm sinus rhythm was reviewed with the receiving nurse. Lines:   Peripheral IV 10/11/21 Right Wrist (Active)       Peripheral IV 10/11/21 Left Antecubital (Active)   Site Assessment Clean, dry, & intact 10/11/21 0913   Phlebitis Assessment 0 10/11/21 0913   Infiltration Assessment 0 10/11/21 0913   Dressing Status Clean, dry, & intact 10/11/21 0913   Hub Color/Line Status Pink 10/11/21 0913   Action Taken Blood drawn 10/11/21 0913   Alcohol Cap Used No 10/11/21 0913        Opportunity for questions and clarification was provided.       Patient transported with:   Monitor  O2 @ 4 liters  Registered Nurse

## 2021-10-11 NOTE — PROGRESS NOTES
Day #1 of Vancomycin    Indication for Antimicrobials: SSTi     Significant Cultures:   10/11 blood: pending  10/11 CSF: NGTD - prelim    Labs:  Recent Labs     10/11/21  0858 10/11/21  0049   CREA  --  2.56*   BUN  --  58*   WBC 28.0* 25.6*     Temp (24hrs), Av.5 °F (36.4 °C), Min:97.5 °F (36.4 °C), Max:97.5 °F (36.4 °C)      Is the Patient on Dialysis? No    Creatinine Clearance (mL/min):   CrCl (Actual Body Weight): 25.5  CrCl (Adjusted Body Weight): 22.2  CrCl (Ideal Body Weight): 20.0    Goal level: AUC/NIMA 400-500     Date Dose & Interval Associated AUC Measured Level                          Impression/Plan:   Patient has PMH of CKD  Possible ADELINA  Will order loading dose of 1500 mg (20 mg/kg)  Due to possible ADELINA on top of CKD, will not start a maintenance dose at this time  Will order a random level for tomorrow AM  Antimicrobial stop date 10/18     Pharmacy will follow daily and adjust medications as appropriate for renal function and/or serum levels.     Thank you,  [facility]

## 2021-10-11 NOTE — CONSULTS
Otolaryngology-HNS Consult    Subjective:    Jarrod Ayers   78 y.o.   1955     Chief complaint/reason for consult -   Right parotid swelling    History of present illness  78year old AA female presented with AMS and abnormal labs from SNF on 10/11/21. Her medical history consists of Atrial fib, CKD, HTN, Cardiomyopathy, and pacemaker. Today's labs show a WBC of 28.0, Lactic acid is elevated at 4.4, and Cr 2.61. Maxilofacial CT (10/11) revealed cellulitis of the right face that includes the right parotid gland (acute parotitis). She is on Vancomycin and Zosyn. Patient is resting quietly and does not awaken during assessment. Review of Systems  Review of Systems   Unable to perform ROS: Acuity of condition         Past Medical History:   Diagnosis Date    [facility] ([address]     Cardiomyopathy, idiopathic ([address] 10/28/2010    Chronic kidney disease     [facility]     HTN (hypertension), benign 10/28/2010    Pacemaker     ICD    PVC (premature ventricular contraction) 10/28/2010     Past Surgical History:   Procedure Laterality Date    HX OTHER SURGICAL  10/02/2018    Right eye surgery    HX PACEMAKER        History reviewed. No pertinent family history. Social History     Tobacco Use    Smoking status: Never Smoker    Smokeless tobacco: Never Used   Substance Use Topics    Alcohol use: No      Prior to Admission medications    Medication Sig Start Date End Date Taking? Authorizing Provider   aspirin delayed-release 81 mg tablet Take 1 Tablet by mouth daily. 10/5/21   Julian Ash MD   carvediloL (COREG) 6.25 mg tablet Take 1 Tablet by mouth two (2) times daily (with meals). 8/5/21   Tanika Bolton MD   oxyCODONE IR (ROXICODONE) 5 mg immediate release tablet Take 5 mg by mouth every four (4) hours as needed for Pain.  7/16/21   Provider, Historical   cyclobenzaprine (FLEXERIL) 5 mg tablet Take 5 mg by mouth two (2) times daily as needed for Muscle Spasm(s). 7/19/21   Provider, Historical   amitriptyline (ELAVIL) 75 mg tablet Take 75 mg by mouth daily. 5/3/21   Provider, Historical   prednisoLONE acetate (PRED FORTE) 1 % ophthalmic suspension INSTILL 1 DROP INTO RIGHT EYE 4 TIMES A DAY 3/13/21   Provider, Historical   atorvastatin (LIPITOR) 40 mg tablet Take 1 Tab by mouth nightly. 2/19/21   Sheila Dumont MD   furosemide (Lasix) 40 mg tablet 1 tablet daily  Patient taking differently: Take 40 mg by mouth daily. 1 tablet daily 2/19/21   Lavonne Frankel, MD   metFORMIN (GLUCOPHAGE) 1,000 mg tablet two (2) times daily (with meals). 10/12/20   Provider, Historical   gabapentin (NEURONTIN) 100 mg capsule TAKE 1 CAPSULE BY MOUTH THREE TIMES A DAY 10/27/20   Provider, Historical   metoprolol succinate (TOPROL-XL) 25 mg XL tablet TAKE 0.5 TABS BY MOUTH DAILY. 12/16/19   Natalia Ingram NP   ELIQUIS 5 mg tablet TAKE 1 TABLET BY MOUTH TWICE A DAY 12/5/19   Natalia Ingram NP   nitroglycerin (NITROSTAT) 0.4 mg SL tablet 0.4 mg by SubLINGual route every five (5) minutes as needed for Chest Pain. Up to 3 doses. Patient not taking: Reported on 8/3/2021    Provider, Historical   cloNIDine HCL (CATAPRES) 0.2 mg tablet Take 0.2 mg by mouth two (2) times a day. 5/21/18   Provider, Historical   losartan (COZAAR) 100 mg tablet Take 100 mg by mouth daily. 4/25/17   Provider, Historical   PARoxetine (PAXIL) 20 mg tablet Take 20 mg by mouth daily. Provider, Historical   omeprazole (PRILOSEC) 20 mg capsule Take 20 mg by mouth daily.  10/28/10   Provider, Historical        Allergies   Allergen Reactions    Chocolate [Cocoa] Anaphylaxis and Swelling    Iodine Anaphylaxis    Lisinopril Anaphylaxis    Peanut Anaphylaxis and Swelling    Glimepiride Swelling    Glipizide Other (comments)     Pruritis    Metformin Diarrhea    Norvasc [Amlodipine] Other (comments)     Light Headed    Pneumovax 23 [Pneumococcal 23-Devi Ps Vaccine] Hives    Toprol Xl [Metoprolol Succinate] Other (comments)     Light headed    Tositumomab I-131 (Maltose) Unable to Obtain    Vitamin D [Cholecalciferol (Vitamin D3)] Other (comments)     Dizziness/headache not a true allergy. Objective:     Visit Vitals  /88   Pulse 76   Temp 97.2 °F (36.2 °C)   Resp 14   Ht 5' 6\" (1.676 m)   Wt 164 lb 0.4 oz (74.4 kg)   SpO2 95%   BMI 26.47 kg/m²        Physical Exam  Constitutional:       General: She is sleeping. Appearance: She is ill-appearing. Interventions: Nasal cannula in place. HENT:      Head: Normocephalic. Jaw: Swelling present. Salivary Glands: Right salivary gland is diffusely enlarged. Comments: Dime-sized scab noted at right salivary gland; no discharge present currently. Right Ear: External ear normal.      Left Ear: External ear normal.      Nose:      Right Turbinates: Swollen. Left Turbinates: Swollen. Mouth/Throat:      Mouth: Mucous membranes are dry. Dentition: Dental caries present. Comments: Lips dry and scaling  Neck:      Comments: No cervical adenopathy or thyroidmegaly  Neurological:      Mental Status: She is unresponsive. Recent Results (from the past 24 hour(s))   CBC WITH AUTOMATED DIFF    Collection Time: 10/11/21 12:49 AM   Result Value Ref Range    WBC 25.6 (H) 3.6 - 11.0 K/uL    RBC 5.27 (H) 3.80 - 5.20 M/uL    HGB 14.8 11.5 - 16.0 g/dL    HCT 45.7 35.0 - 47.0 %    MCV 86.7 80.0 - 99.0 FL    MCH 28.1 26.0 - 34.0 PG    MCHC 32.4 30.0 - 36.5 g/dL    RDW 19.7 (H) 11.5 - 14.5 %    PLATELET 224 (L) 719 - 400 K/uL    NRBC 0.0 0.0  WBC    ABSOLUTE NRBC 0.00 0.00 - 0.01 K/uL    NEUTROPHILS 86 (H) 32 - 75 %    LYMPHOCYTES 7 (L) 12 - 49 %    MONOCYTES 6 5 - 13 %    EOSINOPHILS 0 0 - 7 %    BASOPHILS 0 0 - 1 %    IMMATURE GRANULOCYTES 1 (H) 0 - 0.5 %    ABS. NEUTROPHILS 22.2 (H) 1.8 - 8.0 K/UL    ABS. LYMPHOCYTES 1.7 0.8 - 3.5 K/UL    ABS. MONOCYTES 1.5 (H) 0.0 - 1.0 K/UL    ABS.  EOSINOPHILS 0.0 0.0 - 0.4 K/UL ABS. BASOPHILS 0.0 0.0 - 0.1 K/UL    ABS. IMM. GRANS. 0.2 (H) 0.00 - 0.04 K/UL    DF AUTOMATED     METABOLIC PANEL, COMPREHENSIVE    Collection Time: 10/11/21 12:49 AM   Result Value Ref Range    Sodium 135 (L) 136 - 145 mmol/L    Potassium Hemolyzed, recollect requested 3.5 - 5.1 mmol/L    Chloride 100 97 - 108 mmol/L    CO2 22 21 - 32 mmol/L    Anion gap 13 5 - 15 mmol/L    Glucose 258 (H) 65 - 100 mg/dL    BUN 58 (H) 6 - 20 mg/dL    Creatinine 2.56 (H) 0.55 - 1.02 mg/dL    BUN/Creatinine ratio 23 (H) 12 - 20      GFR est AA 23 (L) >60 ml/min/1.73m2    GFR est non-AA 19 (L) >60 ml/min/1.73m2    Calcium 9.3 8.5 - 10.1 mg/dL    Bilirubin, total 1.6 (H) 0.2 - 1.0 mg/dL    AST (SGOT) Hemolyzed, recollect requested 15 - 37 U/L    ALT (SGPT) 34 12 - 78 U/L    Alk.  phosphatase 92 45 - 117 U/L    Protein, total 7.5 6.4 - 8.2 g/dL    Albumin 2.5 (L) 3.5 - 5.0 g/dL    Globulin 5.0 (H) 2.0 - 4.0 g/dL    A-G Ratio 0.5 (L) 1.1 - 2.2     URINALYSIS W/ REFLEX CULTURE    Collection Time: 10/11/21  1:33 AM    Specimen: Urine   Result Value Ref Range    Color Kathie      Appearance Clear Clear      Specific gravity 1.016 1.003 - 1.030      pH (UA) 5.0 5.0 - 8.0      Protein 30 (A) Negative mg/dL    Glucose Negative Negative mg/dL    Ketone Negative Negative mg/dL    Bilirubin Negative Negative      Blood Negative Negative      Urobilinogen 4.0 (H) 0.1 - 1.0 EU/dL    Nitrites Negative Negative      Leukocyte Esterase Negative Negative      WBC 0-4 0 - 4 /hpf    RBC 0-5 0 - 5 /hpf    Bacteria Negative Negative /hpf    UA:UC IF INDICATED Culture not indicated by UA result Culture not indicated by UA result      Mucus Trace (A) Negative /lpf    Hyaline cast 10-20 0 - 5 /lpf   LACTIC ACID    Collection Time: 10/11/21  2:25 AM   Result Value Ref Range    Lactic acid 3.8 (HH) 0.4 - 2.0 mmol/L   GLUCOSE, CSF    Collection Time: 10/11/21  5:04 AM   Result Value Ref Range    Tube No. 1      Glucose,.0 (H) 40 - 70 mg/dL   CULTURE, CSF [place] STAIN    Collection Time: 10/11/21  5:04 AM    Specimen: Cerebrospinal Fluid   Result Value Ref Range    Special Requests: No Special Requests      GRAM STAIN No wbc's seen      GRAM STAIN No organisms seen      Culture result: NO POLYS NO ORGANISMS SEEN    CELL COUNT, CSF    Collection Time: 10/11/21  5:04 AM   Result Value Ref Range    CSF TUBE NO. 3      CSF COLOR Colorless Colorless      SPUN COLOR Colorless Colorless      CSF APPEARANCE Clear Clear      CSF RBCs 0 0 /cu mm    CSF WBCs 2 0 - 5 /cu mm   PROTEIN, CSF    Collection Time: 10/11/21  5:04 AM   Result Value Ref Range    Tube No. 1      Protein,CSF 41.0 15 - 45 mg/dL   BLOOD GAS, ARTERIAL    Collection Time: 10/11/21  5:45 AM   Result Value Ref Range    pH 7.42 7.35 - 7.45      PCO2 28 (L) 35 - 45 mmHg    PO2 182 (H) 75 - 100 mmHg    O2  >95 %    BICARBONATE 21 (L) 22 - 26 mmol/L    BASE DEFICIT 4.7 (H) 0 - 2 mmol/L    O2 METHOD Room air      FIO2 21.0 %    SITE Left Radial      ROBBIN'S TEST PASS     POTASSIUM    Collection Time: 10/11/21  5:51 AM   Result Value Ref Range    Potassium 4.4 3.5 - 5.1 mmol/L   CBC WITH AUTOMATED DIFF    Collection Time: 10/11/21  8:58 AM   Result Value Ref Range    WBC 28.0 (H) 3.6 - 11.0 K/uL    RBC 5.22 (H) 3.80 - 5.20 M/uL    HGB 14.5 11.5 - 16.0 g/dL    HCT 45.4 35.0 - 47.0 %    MCV 87.0 80.0 - 99.0 FL    MCH 27.8 26.0 - 34.0 PG    MCHC 31.9 30.0 - 36.5 g/dL    RDW 19.1 (H) 11.5 - 14.5 %    PLATELET 638 (L) 169 - 400 K/uL    NRBC 0.0 0.0  WBC    ABSOLUTE NRBC 0.00 0.00 - 0.01 K/uL    NEUTROPHILS 88 (H) 32 - 75 %    LYMPHOCYTES 6 (L) 12 - 49 %    MONOCYTES 5 5 - 13 %    EOSINOPHILS 0 0 - 7 %    BASOPHILS 0 0 - 1 %    IMMATURE GRANULOCYTES 1 (H) 0 - 0.5 %    ABS. NEUTROPHILS 24.7 (H) 1.8 - 8.0 K/UL    ABS. LYMPHOCYTES 1.6 0.8 - 3.5 K/UL    ABS. MONOCYTES 1.5 (H) 0.0 - 1.0 K/UL    ABS. EOSINOPHILS 0.0 0.0 - 0.4 K/UL    ABS. BASOPHILS 0.0 0.0 - 0.1 K/UL    ABS. IMM.  GRANS. 0.2 (H) 0.00 - 0.04 K/UL DF AUTOMATED     METABOLIC PANEL, COMPREHENSIVE    Collection Time: 10/11/21  8:58 AM   Result Value Ref Range    Sodium 139 136 - 145 mmol/L    Potassium 4.4 3.5 - 5.1 mmol/L    Chloride 102 97 - 108 mmol/L    CO2 24 21 - 32 mmol/L    Anion gap 13 5 - 15 mmol/L    Glucose 276 (H) 65 - 100 mg/dL    BUN 61 (H) 6 - 20 mg/dL    Creatinine 2.61 (H) 0.55 - 1.02 mg/dL    BUN/Creatinine ratio 23 (H) 12 - 20      GFR est AA 22 (L) >60 ml/min/1.73m2    GFR est non-AA 18 (L) >60 ml/min/1.73m2    Calcium 9.2 8.5 - 10.1 mg/dL    Bilirubin, total 1.3 (H) 0.2 - 1.0 mg/dL    AST (SGOT) 50 (H) 15 - 37 U/L    ALT (SGPT) 43 12 - 78 U/L    Alk. phosphatase 93 45 - 117 U/L    Protein, total 6.7 6.4 - 8.2 g/dL    Albumin 2.4 (L) 3.5 - 5.0 g/dL    Globulin 4.3 (H) 2.0 - 4.0 g/dL    A-G Ratio 0.6 (L) 1.1 - 2.2     LACTIC ACID    Collection Time: 10/11/21  8:58 AM   Result Value Ref Range    Lactic acid 4.4 (HH) 0.4 - 2.0 mmol/L   COVID-19 RAPID TEST    Collection Time: 10/11/21  9:30 AM   Result Value Ref Range    Specimen source Please find results under separate order      COVID-19 rapid test Not Detected Not Detected     GLUCOSE, POC    Collection Time: 10/11/21  9:38 AM   Result Value Ref Range    Glucose (POC) 224 (H) 65 - 117 mg/dL    Performed by Sahra Maki    MRSA SCREEN - PCR (NASAL)    Collection Time: 10/11/21 10:25 AM   Result Value Ref Range    MRSA by PCR, Nasal DETECTED (A) Not Detected     GLUCOSE, POC    Collection Time: 10/11/21 12:38 PM   Result Value Ref Range    Glucose (POC) 275 (H) 65 - 117 mg/dL    Performed by Ginger Severs I have personally reviewed all pertinent notes, labs, results, and imaging studies for this patient. Assessment/Plan:     1. Right parotitis  2. Right sialadenitis    Continue Vancomycin and Zosyn. Warm compress daily to right face. Massage daily. Continue IV hydration. Provide daily oral care to include lemon swabs or lemon wedges.      Thank you for this consult.     Thang Camp MSN, FNP-C  [address] [facility]    [address]  [place]    [address] [id number]  [place], [address] [facility]    O -   Z -

## 2021-10-11 NOTE — ED NOTES
Daughter voiced concern over the swelling right lower face/jaw to right ear. Stating that her mother had told her that she had fallen the 1st day after being admitted to [facility]. Daughter stated that she had told the  and staff at [facility] and each time they stated that this was the first time hearing about the fall.    Dr. Thuy Meng notified and orders obtained for repeat blood work and [address]

## 2021-10-11 NOTE — ED NOTES
Dr. Hemal Preston notified regarding inability to obtain arterial blood gas at this time due to patient not holding still. Also notified patient to confused for dysphagia screening so NPO at this time. Notified of patient not staying still in bed. ED tech as sitter at this time. COVID and MRSA screens obtained. Dr. Hemal Preston notified of elevated lactic acidosis.

## 2021-10-11 NOTE — ED TRIAGE NOTES
Patient comes from [facility] for altered mental status, nurse said that she is not familiar with her but felt she was not herself. Labs were ordered at the facility and revealed a elevated WBC and kidney function. Patient had oxygen saturations in the 70s on room air and was placed on 2L nc.

## 2021-10-11 NOTE — CONSULTS
Neurology Consult Note    HPI  Ms. Ольга Rodriguez is a 78 y.o.  female with a history significant for Afib (apixaban), HTN, CHF, Pacemaker In Place, CKD who presented with confusion and decreased responsiveness. Per chart review, patient is at a nursing home and patient's nurse who is usually not familiar with her felt that patient was more confused and less responsive for which patient was brought to the ED. Emergent CT head revealed no acute findings but did reveal remote left frontal, anterior right parietal, bilateral parietal occipital region infarcts along with chronic microvascular ischemic changes. In the ED, patient had tachycardia, diastolic blood pressures up to the 105's, leukocytosis of 28.0, BUN of 61, creatinine of 2.61. Patient had emergent lumbar puncture. Patient was subsequently started on vancomycin and piperacillin tazobactam.      Patient confused and not following any commands. Patient moves all extremities equally to pain stimulation. Home Stroke Prophylaxis: Apixaban 5 BID, ASA 81, Atorvastatin 40    CSF Studies  Negative/WNL: Glucose, Cx, WBC, RBC, Protein      IMPRESSION  Ms. Ольга Rodriguez is a 78 y.o.  female with the above history presented with confusion and decreased responsiveness from her nursing home facility. In the ED, emergent CT head is negative for any acute findings. Patient was found to have significant leukocytosis, elevated BUN, elevated creatinine for which patient was started on IV antibiotics. Etiology of patient's presenting symptoms likely related to underlying infectious and metabolic derangements given leukocytosis and elevated BUN. No focal findings on examination to suggest primary CNS lesion. Reviewed patient CT head which is reassuring. Patient's CSF studies without any findings consistent with CNS infection.     We will have patient on anticoagulation, antiplatelet and statin therapy for neurovascular prophylaxis and cardiovascular prophylaxis.       RECOMMENDATIONS      Encephalopathy, Likely Infectious/Metabolic (Parotitis)  Remote Left Frontal, Right Parietal, Bilateral ParitoOccipital Ischemia   Associated: Elevated BUN/Creat, Sepsis, Paroxysmal Afib  -Stroke Prophylaxis: Apixaban 5 BID, ASA 81, Atorvastatin 40   -SBP Goal < 160  -Glucose Goal < 180  -Head of Bed at 30 degrees  -PT/OT/ST as needed  -Management of metabolic/infectious derangements to referring teams      Review of Systems  Unable to ascertain due to mental status    Physical/Neurological Exam  Cardiovascular: tachycardic at times  Pulmonary: no increased work of breathing  Skin: warm and dry      Patient lethargic and not following any central peripheral commands  Unable to test for language  Pupils react to light bilaterally; disconjugate gaze  No blink to threat inconsistent bilaterally  No facial droop   No gross hearing loss   Tongue is midline   Motor: 2/5 to pain stimulation throughout  No abnormal movements   Normal tone throughout       Past Medical History:   Diagnosis Date    [facility] (HCC)     Cardiomyopathy, idiopathic ([facility] [address] 10/28/2010    Chronic kidney disease     Heart failure (HCC)     HTN (hypertension), benign 10/28/2010    Pacemaker     ICD    PVC (premature ventricular contraction) 10/28/2010      Past Surgical History:   Procedure Laterality Date    HX OTHER SURGICAL  10/02/2018    Right eye surgery    HX PACEMAKER       Allergies   Allergen Reactions    Chocolate [Cocoa] Anaphylaxis and Swelling    Iodine Anaphylaxis    Lisinopril Anaphylaxis    Peanut Anaphylaxis and Swelling    Glimepiride Swelling    Glipizide Other (comments)     Pruritis    Metformin Diarrhea    Norvasc [Amlodipine] Other (comments)     Light Headed    Pneumovax 23 [Pneumococcal 23-Devi Ps Vaccine] Hives    Toprol Xl [Metoprolol Succinate] Other (comments)     Light headed    Tositumomab I-131 (Maltose) Unable to Obtain    Vitamin D [Cholecalciferol (Vitamin D3)] Other (comments)     Dizziness/headache not a true allergy. History reviewed. No pertinent family history.      Social Connections:     Frequency of Communication with Friends and Family:     Frequency of Social Gatherings with Friends and Family:     Attends Restoration Services:     Active Member of Clubs or Organizations:     Attends Club or Organization Meetings:     Marital Status:          Medications  Current Outpatient Medications   Medication Instructions    amitriptyline (ELAVIL) 75 mg, Oral, DAILY    aspirin delayed-release 81 mg, Oral, DAILY    atorvastatin (LIPITOR) 40 mg, Oral, EVERY BEDTIME    carvediloL (COREG) 6.25 mg, Oral, 2 TIMES DAILY WITH MEALS    cloNIDine HCL (CATAPRES) 0.2 mg, Oral, 2 TIMES DAILY    cyclobenzaprine (FLEXERIL) 5 mg, Oral, 2 TIMES DAILY AS NEEDED    ELIQUIS 5 mg tablet TAKE 1 TABLET BY MOUTH TWICE A DAY    furosemide (Lasix) 40 mg tablet 1 tablet daily    gabapentin (NEURONTIN) 100 mg capsule TAKE 1 CAPSULE BY MOUTH THREE TIMES A DAY    losartan (COZAAR) 100 mg, Oral, DAILY    metFORMIN (GLUCOPHAGE) 1,000 mg tablet 2 TIMES DAILY WITH MEALS    metoprolol succinate (TOPROL-XL) 12.5 mg, Oral, DAILY    nitroglycerin (NITROSTAT) 0.4 mg, EVERY 5 MIN AS NEEDED    omeprazole (PRILOSEC) 20 mg, DAILY    oxyCODONE IR (ROXICODONE) 5 mg, Oral, EVERY 4 HOURS AS NEEDED    PARoxetine (PAXIL) 20 mg, Oral, DAILY    prednisoLONE acetate (PRED FORTE) 1 % ophthalmic suspension INSTILL 1 DROP INTO RIGHT EYE 4 TIMES A DAY       Current Facility-Administered Medications   Medication Dose Route Frequency    aspirin delayed-release tablet 81 mg  81 mg Oral DAILY    atorvastatin (LIPITOR) tablet 40 mg  40 mg Oral QHS    carvediloL (COREG) tablet 6.25 mg  6.25 mg Oral BID WITH MEALS    furosemide (LASIX) tablet 40 mg  40 mg Oral DAILY    prednisoLONE acetate (PRED FORTE) 1 % ophthalmic suspension 1 Drop  1 Drop Both Eyes TID    insulin lispro (HUMALOG) injection   SubCUTAneous AC&HS    glucose chewable tablet 16 g  4 Tablet Oral PRN    dextrose (D50W) injection syrg 12.5-25 g  25-50 mL IntraVENous PRN    glucagon (GLUCAGEN) injection 1 mg  1 mg IntraMUSCular PRN    acetaminophen (TYLENOL) tablet 650 mg  650 mg Oral Q6H PRN    Or    acetaminophen (TYLENOL) suppository 650 mg  650 mg Rectal Q6H PRN    polyethylene glycol (MIRALAX) packet 17 g  17 g Oral DAILY PRN    ondansetron (ZOFRAN ODT) tablet 4 mg  4 mg Oral Q8H PRN    Or    ondansetron (ZOFRAN) injection 4 mg  4 mg IntraVENous Q6H PRN    VANCOMYCIN INFORMATION NOTE   Other Rx Dosing/Monitoring    piperacillin-tazobactam (ZOSYN) 3.375 g in 0.9% sodium chloride (MBP/ADV) 100 mL MBP  3.375 g IntraVENous Q8H    vancomycin (VANCOCIN) 1,500 mg in 0.9% sodium chloride 500 mL IVPB  1,500 mg IntraVENous ONCE     Current Outpatient Medications   Medication Sig    aspirin delayed-release 81 mg tablet Take 1 Tablet by mouth daily.  carvediloL (COREG) 6.25 mg tablet Take 1 Tablet by mouth two (2) times daily (with meals).  oxyCODONE IR (ROXICODONE) 5 mg immediate release tablet Take 5 mg by mouth every four (4) hours as needed for Pain.  cyclobenzaprine (FLEXERIL) 5 mg tablet Take 5 mg by mouth two (2) times daily as needed for Muscle Spasm(s).  amitriptyline (ELAVIL) 75 mg tablet Take 75 mg by mouth daily.  prednisoLONE acetate (PRED FORTE) 1 % ophthalmic suspension INSTILL 1 DROP INTO RIGHT EYE 4 TIMES A DAY    atorvastatin (LIPITOR) 40 mg tablet Take 1 Tab by mouth nightly.  furosemide (Lasix) 40 mg tablet 1 tablet daily (Patient taking differently: Take 40 mg by mouth daily. 1 tablet daily)    metFORMIN (GLUCOPHAGE) 1,000 mg tablet two (2) times daily (with meals).  gabapentin (NEURONTIN) 100 mg capsule TAKE 1 CAPSULE BY MOUTH THREE TIMES A DAY    metoprolol succinate (TOPROL-XL) 25 mg XL tablet TAKE 0.5 TABS BY MOUTH DAILY.     ELIQUIS 5 mg tablet TAKE 1 TABLET BY MOUTH TWICE A DAY  nitroglycerin (NITROSTAT) 0.4 mg SL tablet 0.4 mg by SubLINGual route every five (5) minutes as needed for Chest Pain. Up to 3 doses. (Patient not taking: Reported on 8/3/2021)    cloNIDine HCL (CATAPRES) 0.2 mg tablet Take 0.2 mg by mouth two (2) times a day.  losartan (COZAAR) 100 mg tablet Take 100 mg by mouth daily.  PARoxetine (PAXIL) 20 mg tablet Take 20 mg by mouth daily.  omeprazole (PRILOSEC) 20 mg capsule Take 20 mg by mouth daily. Objective  Temp:  [97.5 °F (36.4 °C)]   Pulse (Heart Rate):  [100-103]   BP: (120-134)/()   Resp Rate:  [16-20]   O2 Sat (%):  [97 %-100 %]   Weight:  [75.8 kg (167 lb)]     Intake/Output Summary (Last 24 hours) at 10/11/2021 1110  Last data filed at 10/11/2021 0139  Gross per 24 hour   Intake    Output 300 ml   Net -300 ml     Wt Readings from Last 3 Encounters:   10/11/21 75.8 kg (167 lb)   10/04/21 75.9 kg (167 lb 5.3 oz)   08/07/21 86.1 kg (189 lb 13.1 oz)        Labs  Recent Results (from the past 24 hour(s))   CBC WITH AUTOMATED DIFF    Collection Time: 10/11/21 12:49 AM   Result Value Ref Range    WBC 25.6 (H) 3.6 - 11.0 K/uL    RBC 5.27 (H) 3.80 - 5.20 M/uL    HGB 14.8 11.5 - 16.0 g/dL    HCT 45.7 35.0 - 47.0 %    MCV 86.7 80.0 - 99.0 FL    MCH 28.1 26.0 - 34.0 PG    MCHC 32.4 30.0 - 36.5 g/dL    RDW 19.7 (H) 11.5 - 14.5 %    PLATELET 259 (L) 056 - 400 K/uL    NRBC 0.0 0.0  WBC    ABSOLUTE NRBC 0.00 0.00 - 0.01 K/uL    NEUTROPHILS 86 (H) 32 - 75 %    LYMPHOCYTES 7 (L) 12 - 49 %    MONOCYTES 6 5 - 13 %    EOSINOPHILS 0 0 - 7 %    BASOPHILS 0 0 - 1 %    IMMATURE GRANULOCYTES 1 (H) 0 - 0.5 %    ABS. NEUTROPHILS 22.2 (H) 1.8 - 8.0 K/UL    ABS. LYMPHOCYTES 1.7 0.8 - 3.5 K/UL    ABS. MONOCYTES 1.5 (H) 0.0 - 1.0 K/UL    ABS. EOSINOPHILS 0.0 0.0 - 0.4 K/UL    ABS. BASOPHILS 0.0 0.0 - 0.1 K/UL    ABS. IMM.  GRANS. 0.2 (H) 0.00 - 0.04 K/UL    DF AUTOMATED     METABOLIC PANEL, COMPREHENSIVE    Collection Time: 10/11/21 12:49 AM   Result Value Ref Range    Sodium 135 (L) 136 - 145 mmol/L    Potassium Hemolyzed, recollect requested 3.5 - 5.1 mmol/L    Chloride 100 97 - 108 mmol/L    CO2 22 21 - 32 mmol/L    Anion gap 13 5 - 15 mmol/L    Glucose 258 (H) 65 - 100 mg/dL    BUN 58 (H) 6 - 20 mg/dL    Creatinine 2.56 (H) 0.55 - 1.02 mg/dL    BUN/Creatinine ratio 23 (H) 12 - 20      GFR est AA 23 (L) >60 ml/min/1.73m2    GFR est non-AA 19 (L) >60 ml/min/1.73m2    Calcium 9.3 8.5 - 10.1 mg/dL    Bilirubin, total 1.6 (H) 0.2 - 1.0 mg/dL    AST (SGOT) Hemolyzed, recollect requested 15 - 37 U/L    ALT (SGPT) 34 12 - 78 U/L    Alk.  phosphatase 92 45 - 117 U/L    Protein, total 7.5 6.4 - 8.2 g/dL    Albumin 2.5 (L) 3.5 - 5.0 g/dL    Globulin 5.0 (H) 2.0 - 4.0 g/dL    A-G Ratio 0.5 (L) 1.1 - 2.2     URINALYSIS W/ REFLEX CULTURE    Collection Time: 10/11/21  1:33 AM    Specimen: Urine   Result Value Ref Range    Color Kathie      Appearance Clear Clear      Specific gravity 1.016 1.003 - 1.030      pH (UA) 5.0 5.0 - 8.0      Protein 30 (A) Negative mg/dL    Glucose Negative Negative mg/dL    Ketone Negative Negative mg/dL    Bilirubin Negative Negative      Blood Negative Negative      Urobilinogen 4.0 (H) 0.1 - 1.0 EU/dL    Nitrites Negative Negative      Leukocyte Esterase Negative Negative      WBC 0-4 0 - 4 /hpf    RBC 0-5 0 - 5 /hpf    Bacteria Negative Negative /hpf    UA:UC IF INDICATED Culture not indicated by UA result Culture not indicated by UA result      Mucus Trace (A) Negative /lpf    Hyaline cast 10-20 0 - 5 /lpf   LACTIC ACID    Collection Time: 10/11/21  2:25 AM   Result Value Ref Range    Lactic acid 3.8 (HH) 0.4 - 2.0 mmol/L   GLUCOSE, CSF    Collection Time: 10/11/21  5:04 AM   Result Value Ref Range    Tube No. 1      Glucose,.0 (H) 40 - 70 mg/dL   CULTURE, CSF W GRAM STAIN    Collection Time: 10/11/21  5:04 AM    Specimen: Cerebrospinal Fluid   Result Value Ref Range    Special Requests: No Special Requests      Culture result: NO POLYS NO ORGANISMS SEEN    CELL COUNT, CSF    Collection Time: 10/11/21  5:04 AM   Result Value Ref Range    CSF TUBE NO. 3      CSF COLOR Colorless Colorless      SPUN COLOR Colorless Colorless      CSF APPEARANCE Clear Clear      CSF RBCs 0 0 /cu mm    CSF WBCs 2 0 - 5 /cu mm   PROTEIN, CSF    Collection Time: 10/11/21  5:04 AM   Result Value Ref Range    Tube No. 1      Protein,CSF 41.0 15 - 45 mg/dL   BLOOD GAS, ARTERIAL    Collection Time: 10/11/21  5:45 AM   Result Value Ref Range    pH 7.42 7.35 - 7.45      PCO2 28 (L) 35 - 45 mmHg    PO2 182 (H) 75 - 100 mmHg    O2  >95 %    BICARBONATE 21 (L) 22 - 26 mmol/L    BASE DEFICIT 4.7 (H) 0 - 2 mmol/L    O2 METHOD Room air      FIO2 21.0 %    SITE Left Radial      ROBBIN'S TEST PASS     POTASSIUM    Collection Time: 10/11/21  5:51 AM   Result Value Ref Range    Potassium 4.4 3.5 - 5.1 mmol/L   CBC WITH AUTOMATED DIFF    Collection Time: 10/11/21  8:58 AM   Result Value Ref Range    WBC 28.0 (H) 3.6 - 11.0 K/uL    RBC 5.22 (H) 3.80 - 5.20 M/uL    HGB 14.5 11.5 - 16.0 g/dL    HCT 45.4 35.0 - 47.0 %    MCV 87.0 80.0 - 99.0 FL    MCH 27.8 26.0 - 34.0 PG    MCHC 31.9 30.0 - 36.5 g/dL    RDW 19.1 (H) 11.5 - 14.5 %    PLATELET 625 (L) 960 - 400 K/uL    NRBC 0.0 0.0  WBC    ABSOLUTE NRBC 0.00 0.00 - 0.01 K/uL    NEUTROPHILS 88 (H) 32 - 75 %    LYMPHOCYTES 6 (L) 12 - 49 %    MONOCYTES 5 5 - 13 %    EOSINOPHILS 0 0 - 7 %    BASOPHILS 0 0 - 1 %    IMMATURE GRANULOCYTES 1 (H) 0 - 0.5 %    ABS. NEUTROPHILS 24.7 (H) 1.8 - 8.0 K/UL    ABS. LYMPHOCYTES 1.6 0.8 - 3.5 K/UL    ABS. MONOCYTES 1.5 (H) 0.0 - 1.0 K/UL    ABS. EOSINOPHILS 0.0 0.0 - 0.4 K/UL    ABS. BASOPHILS 0.0 0.0 - 0.1 K/UL    ABS. IMM.  GRANS. 0.2 (H) 0.00 - 0.04 K/UL    DF AUTOMATED     METABOLIC PANEL, COMPREHENSIVE    Collection Time: 10/11/21  8:58 AM   Result Value Ref Range    Sodium 139 136 - 145 mmol/L    Potassium 4.4 3.5 - 5.1 mmol/L    Chloride 102 97 - 108 mmol/L    CO2 24 21 - 32 mmol/L    Anion gap 13 5 - 15 mmol/L    Glucose 276 (H) 65 - 100 mg/dL    BUN 61 (H) 6 - 20 mg/dL    Creatinine 2.61 (H) 0.55 - 1.02 mg/dL    BUN/Creatinine ratio 23 (H) 12 - 20      GFR est AA 22 (L) >60 ml/min/1.73m2    GFR est non-AA 18 (L) >60 ml/min/1.73m2    Calcium 9.2 8.5 - 10.1 mg/dL    Bilirubin, total 1.3 (H) 0.2 - 1.0 mg/dL    AST (SGOT) 50 (H) 15 - 37 U/L    ALT (SGPT) 43 12 - 78 U/L    Alk. phosphatase 93 45 - 117 U/L    Protein, total 6.7 6.4 - 8.2 g/dL    Albumin 2.4 (L) 3.5 - 5.0 g/dL    Globulin 4.3 (H) 2.0 - 4.0 g/dL    A-G Ratio 0.6 (L) 1.1 - 2.2     LACTIC ACID    Collection Time: 10/11/21  8:58 AM   Result Value Ref Range    Lactic acid 4.4 (HH) 0.4 - 2.0 mmol/L   COVID-19 RAPID TEST    Collection Time: 10/11/21  9:30 AM   Result Value Ref Range    Specimen source Please find results under separate order      COVID-19 rapid test Not Detected Not Detected     GLUCOSE, POC    Collection Time: 10/11/21  9:38 AM   Result Value Ref Range    Glucose (POC) 224 (H) 65 - 117 mg/dL    Performed by Dheeraj Lee           Significant Diagnostic Studies  All images independently visualized    CT MAXILLOFACIAL WO CONT   Final Result   Findings more consistent with cellulitis of the right face that   includes the right parotid gland (acute parotitis) as described above. Edema/fluid collection in the parapharyngeal space and retropharyngeal space. Suggestions as above. CT ABD PELV WO CONT   Final Result   Small amount of ascites. No focal fluid collection or free air. Density in the gallbladder which could be artifact, sludge or gallstones. No   specific CT evidence of cholecystitis. Bladder wall prominence. Other findings   as above. CT HEAD WO CONT   Final Result   1. No evidence of acute intracranial hemorrhage or mass effect. 2.  Multifocal intracranial encephalomalacia as above. XR CHEST PORT   Final Result   1. Mild pulmonary vascular congestion. 2.  Cardiomegaly.             This document has been prepared by the Dragon voice recognition system, typographical errors may have occurred.  Attempts have been made to correct errors, however inadvertent errors may persist.

## 2021-10-11 NOTE — CONSULTS
Infectious Disease Consult Note    Reason for Consult:  Sepsis   Date of Consultation: October 11, 2021  Date of Admission: 10/11/2021  Referring Physician: Dr Susan Bernal     HPI: [id number]-y.o BF who is a long term SNF resident, presented with AMS and abnormal labs ID consulted for sepsis. Her medical history significant for atrial fibrillation, cardiomyopathy w EF of  <15% on 10/03 s/p pacemaker placement and CKD. She was very confused/restless during my assessment, unable to provide any history, history obtained from documentations. She was noted to be hypoxic by EMS with O2 sats in the 70s on RA, improved on 2 L. She was afebrile on assessment the ED but tachycardic, tensive and maintaining O2 sats on 4 L. LP was done, CSF analysis revealed WBC of 2, protein 41 and glucose 150. Today's labs show a WBC of 28.0, up from 25.6 on 10/11. Lactic acid is elevated at 4.4, and Cr 2.61. Urinalysis from 10/11 is negative for pyuria or bacteriuria. Today's CXR shows vascular congestion and cardiomegaly. Maxilofacial CT (10/11) revealed cellulitis of the right face that includes the right parotid gland (acute parotitis). Density in the gallbladder which could be artifact, sludge or gallstones and no evidence of cholecystitis was revealed on CT of abdo/pel (10/11). No organisms or PMNs were noted on CSF fluid, culture is pending. Blood and urine cultures from 10/11 are pending. COVID 19 Ag test if neg. She is on Vanc and Zosyn.     Review of Systems: Unobtainable, confused     Past Medical History:  Past Medical History:   Diagnosis Date    [facility] ([address]     Cardiomyopathy, idiopathic ([address] 10/28/2010    Chronic kidney disease     Heart failure ([address]     HTN (hypertension), benign 10/28/2010    Pacemaker     ICD    PVC (premature ventricular contraction) 10/28/2010       Past surgical history   Past Surgical History:   Procedure Laterality Date    HX OTHER SURGICAL  10/02/2018    Right eye surgery    HX PACEMAKER          Social History   Social History     Tobacco Use    Smoking status: Never Smoker    Smokeless tobacco: Never Used   Substance Use Topics    Alcohol use: No    Drug use: Never        Family history   History reviewed. No pertinent family history. Allergies: Allergies   Allergen Reactions    Chocolate [Cocoa] Anaphylaxis and Swelling    Iodine Anaphylaxis    Lisinopril Anaphylaxis    Peanut Anaphylaxis and Swelling    Glimepiride Swelling    Glipizide Other (comments)     Pruritis    Metformin Diarrhea    Norvasc [Amlodipine] Other (comments)     Light Headed    Pneumovax 23 [Pneumococcal 23-Devi Ps Vaccine] Hives    Toprol Xl [Metoprolol Succinate] Other (comments)     Light headed    Tositumomab I-131 (Maltose) Unable to Obtain    Vitamin D [Cholecalciferol (Vitamin D3)] Other (comments)     Dizziness/headache not a true allergy. Medications:  No current facility-administered medications on file prior to encounter. Current Outpatient Medications on File Prior to Encounter   Medication Sig Dispense Refill    aspirin delayed-release 81 mg tablet Take 1 Tablet by mouth daily. 30 Tablet 0    carvediloL (COREG) 6.25 mg tablet Take 1 Tablet by mouth two (2) times daily (with meals). 60 Tablet 0    oxyCODONE IR (ROXICODONE) 5 mg immediate release tablet Take 5 mg by mouth every four (4) hours as needed for Pain.  cyclobenzaprine (FLEXERIL) 5 mg tablet Take 5 mg by mouth two (2) times daily as needed for Muscle Spasm(s).  amitriptyline (ELAVIL) 75 mg tablet Take 75 mg by mouth daily.  prednisoLONE acetate (PRED FORTE) 1 % ophthalmic suspension INSTILL 1 DROP INTO RIGHT EYE 4 TIMES A DAY      atorvastatin (LIPITOR) 40 mg tablet Take 1 Tab by mouth nightly. 60 Tab 1    furosemide (Lasix) 40 mg tablet 1 tablet daily (Patient taking differently: Take 40 mg by mouth daily.  1 tablet daily) 60 Tab 0    metFORMIN (GLUCOPHAGE) 1,000 mg tablet two (2) times daily (with meals).  gabapentin (NEURONTIN) 100 mg capsule TAKE 1 CAPSULE BY MOUTH THREE TIMES A DAY      metoprolol succinate (TOPROL-XL) 25 mg XL tablet TAKE 0.5 TABS BY MOUTH DAILY. 45 Tab 11    ELIQUIS 5 mg tablet TAKE 1 TABLET BY MOUTH TWICE A  Tab 3    nitroglycerin (NITROSTAT) 0.4 mg SL tablet 0.4 mg by SubLINGual route every five (5) minutes as needed for Chest Pain. Up to 3 doses. (Patient not taking: Reported on 8/3/2021)      cloNIDine HCL (CATAPRES) 0.2 mg tablet Take 0.2 mg by mouth two (2) times a day. 3    losartan (COZAAR) 100 mg tablet Take 100 mg by mouth daily. 2    PARoxetine (PAXIL) 20 mg tablet Take 20 mg by mouth daily.  omeprazole (PRILOSEC) 20 mg capsule Take 20 mg by mouth daily.          Physical Exam:    Vitals:   Patient Vitals for the past 24 hrs:   Temp Pulse Resp BP SpO2   10/11/21 0909  (!) 101 20 (!) 120/102 97 %   10/11/21 0812  (!) 103 20 (!) 123/98 97 %   10/11/21 0530  100 20 (!) 134/95 100 %   10/11/21 0133  (!) 103 16 (!) 134/104 98 %   10/11/21 0132     98 %   10/11/21 0130  (!) 102 19 (!) 134/104 100 %   10/11/21 0034 97.5 °F (36.4 °C) (!) 103 19 (!) 125/106 100 %   ·   · GEN: NAD, confused, restless, not following command  · HEENT: NCAT, PERRLA, dry mucosa   · HEART: S1, S2+, RRR, No murmur   · Lungs: CTA B/l, decreased at the bases, no wheeze/rhonchi   · Abdomen: soft, ND, NT, +BS   · Genitourinary: no genital discharge, no escobedo  · Extremities: no edema  · Skin: no rash or obvious wounds, right facial swelling      Labs:   Recent Labs     10/11/21  0858 10/11/21  0049   WBC 28.0* 25.6*   HGB 14.5 14.8   HCT 45.4 45.7   * 120*     Recent Labs     10/11/21  0858 10/11/21  0049   BUN 61* 58*   CREA 2.61* 2.56*       Microbiology Data:  - Urine Cx 10/11: Pending   - Blood Cx 10/11: Pending     Imaging:   Maxilofacial CT 10/11 revealed 10/11: Findings more consistent with cellulitis of the right face that  includes the right parotid gland (acute parotitis)  Edema/fluid collection in the parapharyngeal space and retropharyngeal space. CT abdo/pel 10/11: Small amount of ascites. No focal fluid collection or free air. Density in the gallbladder which could be artifact, sludge or gallstones. No  specific CT evidence of cholecystitis. Bladder wall prominence    CXR 10/11: Mild pulmonary vascular congestion. Cardiomegaly    Assessment / Plan:     1. Severe sepsis due to right parotitis w  parapharyngeal and retropharyngeal involvement, r/o PNA or other       No focal infiltrates on CXR, COVID neg, UA unremarkable, no obvious wounds       CSF analysis neg for pleocytosis, no organisms or PMNS on Gram stain       Afebrile and hemodynamically stable off pressor support, sig leukocytosis, lactic acid 4.4      Continue on vanc and Zosyn pending Cx results       Routine labs in the morning     2. Acute right parotitis w Edema/fluid collection in the parapharyngeal space and retropharyngeal space      ENT consulted, will follow recommendations     3. Acute hypoxia, may be due to CHF exacerbation, h/o cardiomyopathy w EF of <15 % earlier this month       No focal infiltrates on CXR, COVID Ag neg       Maintaining O2 sats on 4L     4. AMS, confused and restless during my assessment. CT head neg for acute findings      CSF analysis not consistent w meningitis     5.  Acute on chronic renal failure, will monitor for improvement             Soha Addison MD     10/11/2021

## 2021-10-11 NOTE — ED PROVIDER NOTES
EMERGENCY DEPARTMENT HISTORY AND PHYSICAL EXAM      Date: 10/11/2021  Patient Name: Ольга Rodriguez    History of Presenting Illness     Chief Complaint   Patient presents with    Altered mental status    Abnormal Lab Results       History Provided By: EMS    HPI: Ольга Rodriguez, 78 y.o. female with a past medical history significant [facility], hypertension, CHF presents to the ED with cc of altered mental status. Patient resident at nursing home, according to nursing home notation patient was altered over the course of the day, nursing home was not familiar with patient's baseline however felt that patient appeared to be more altered than usual.  Labs were ordered and reportedly patient had a high white cell count and worsening renal function, EMS was called, patient was noted to be hypoxic 70% on room air placed on 2 L with improvement, transported to emergency department. Currently patient awake, nonverbal,  no history obtained from patient. There are no other complaints, changes, or physical findings at this time. PCP: Zohreh Nguyen MD    Current Outpatient Medications   Medication Sig Dispense Refill    aspirin delayed-release 81 mg tablet Take 1 Tablet by mouth daily. 30 Tablet 0    carvediloL (COREG) 6.25 mg tablet Take 1 Tablet by mouth two (2) times daily (with meals). 60 Tablet 0    oxyCODONE IR (ROXICODONE) 5 mg immediate release tablet Take 5 mg by mouth every four (4) hours as needed for Pain.  cyclobenzaprine (FLEXERIL) 5 mg tablet Take 5 mg by mouth two (2) times daily as needed for Muscle Spasm(s).  amitriptyline (ELAVIL) 75 mg tablet Take 75 mg by mouth daily.  prednisoLONE acetate (PRED FORTE) 1 % ophthalmic suspension INSTILL 1 DROP INTO RIGHT EYE 4 TIMES A DAY      atorvastatin (LIPITOR) 40 mg tablet Take 1 Tab by mouth nightly. 60 Tab 1    furosemide (Lasix) 40 mg tablet 1 tablet daily (Patient taking differently: Take 40 mg by mouth daily.  1 tablet daily) 60 Tab 0    metFORMIN (GLUCOPHAGE) 1,000 mg tablet two (2) times daily (with meals).  gabapentin (NEURONTIN) 100 mg capsule TAKE 1 CAPSULE BY MOUTH THREE TIMES A DAY      metoprolol succinate (TOPROL-XL) 25 mg XL tablet TAKE 0.5 TABS BY MOUTH DAILY. 45 Tab 11    ELIQUIS 5 mg tablet TAKE 1 TABLET BY MOUTH TWICE A  Tab 3    nitroglycerin (NITROSTAT) 0.4 mg SL tablet 0.4 mg by SubLINGual route every five (5) minutes as needed for Chest Pain. Up to 3 doses. (Patient not taking: Reported on 8/3/2021)      cloNIDine HCL (CATAPRES) 0.2 mg tablet Take 0.2 mg by mouth two (2) times a day. 3    losartan (COZAAR) 100 mg tablet Take 100 mg by mouth daily. 2    PARoxetine (PAXIL) 20 mg tablet Take 20 mg by mouth daily.  omeprazole (PRILOSEC) 20 mg capsule Take 20 mg by mouth daily. Past History     Past Medical History:  Past Medical History:   Diagnosis Date    [facility] ([facility] [address]     Cardiomyopathy, idiopathic ([facility] [address] 10/28/2010    Chronic kidney disease     Heart failure ([facility] [address]     HTN (hypertension), benign 10/28/2010    Pacemaker     ICD    PVC (premature ventricular contraction) 10/28/2010       Past Surgical History:  Past Surgical History:   Procedure Laterality Date    HX OTHER SURGICAL  10/02/2018    Right eye surgery    HX PACEMAKER         Family History:  History reviewed. No pertinent family history. Social History:  Social History     Tobacco Use    Smoking status: Never Smoker    Smokeless tobacco: Never Used   Substance Use Topics    Alcohol use: No    Drug use: Never       Allergies:   Allergies   Allergen Reactions    Chocolate [Cocoa] Anaphylaxis and Swelling    Iodine Anaphylaxis    Lisinopril Anaphylaxis    Peanut Anaphylaxis and Swelling    Glimepiride Swelling    Glipizide Other (comments)     Pruritis    Metformin Diarrhea    Norvasc [Amlodipine] Other (comments)     Light Headed    Pneumovax 23 [Pneumococcal 23-Devi Ps Vaccine] Hives    Toprol Xl [Metoprolol Succinate] Other (comments)     Light headed    Tositumomab I-131 (Maltose) Unable to Obtain    Vitamin D [Cholecalciferol (Vitamin D3)] Other (comments)     Dizziness/headache not a true allergy. Review of Systems     Review of Systems   Unable to perform ROS: Mental status change       Physical Exam     Physical Exam  Vitals and nursing note reviewed. Constitutional:       Appearance: Normal appearance. She is normal weight. She is ill-appearing. HENT:      Head: Normocephalic and atraumatic. Nose: Nose normal.      Mouth/Throat:      Mouth: Mucous membranes are moist.   Eyes:      Extraocular Movements: Extraocular movements intact. Pupils: Pupils are equal, round, and reactive to light. Cardiovascular:      Rate and Rhythm: Regular rhythm. Tachycardia present. Pulses: Normal pulses. Heart sounds: Normal heart sounds. Pulmonary:      Effort: Pulmonary effort is normal.      Breath sounds: Normal breath sounds. Abdominal:      General: Abdomen is flat. Bowel sounds are normal.      Palpations: Abdomen is soft. Tenderness: There is no abdominal tenderness. There is no guarding. Musculoskeletal:         General: No swelling or tenderness. Normal range of motion. Cervical back: Normal range of motion and neck supple. No muscular tenderness. Skin:     General: Skin is warm and dry. Neurological:      General: No focal deficit present. Mental Status: She is lethargic. GCS: GCS eye subscore is 4. GCS verbal subscore is 3. GCS motor subscore is 5. Cranial Nerves: No cranial nerve deficit or facial asymmetry. Sensory: No sensory deficit. Motor: No weakness.          Diagnostic Study Results     Labs -     Recent Results (from the past 12 hour(s))   CBC WITH AUTOMATED DIFF    Collection Time: 10/11/21 12:49 AM   Result Value Ref Range    WBC 25.6 (H) 3.6 - 11.0 K/uL    RBC 5.27 (H) 3.80 - 5.20 M/uL    HGB 14.8 11.5 - 16.0 g/dL    HCT 45.7 35.0 - 47.0 %    MCV 86.7 80.0 - 99.0 FL    MCH 28.1 26.0 - 34.0 PG    MCHC 32.4 30.0 - 36.5 g/dL    RDW 19.7 (H) 11.5 - 14.5 %    PLATELET 258 (L) 415 - 400 K/uL    NRBC 0.0 0.0  WBC    ABSOLUTE NRBC 0.00 0.00 - 0.01 K/uL    NEUTROPHILS 86 (H) 32 - 75 %    LYMPHOCYTES 7 (L) 12 - 49 %    MONOCYTES 6 5 - 13 %    EOSINOPHILS 0 0 - 7 %    BASOPHILS 0 0 - 1 %    IMMATURE GRANULOCYTES 1 (H) 0 - 0.5 %    ABS. NEUTROPHILS 22.2 (H) 1.8 - 8.0 K/UL    ABS. LYMPHOCYTES 1.7 0.8 - 3.5 K/UL    ABS. MONOCYTES 1.5 (H) 0.0 - 1.0 K/UL    ABS. EOSINOPHILS 0.0 0.0 - 0.4 K/UL    ABS. BASOPHILS 0.0 0.0 - 0.1 K/UL    ABS. IMM. GRANS. 0.2 (H) 0.00 - 0.04 K/UL    DF AUTOMATED     METABOLIC PANEL, COMPREHENSIVE    Collection Time: 10/11/21 12:49 AM   Result Value Ref Range    Sodium 135 (L) 136 - 145 mmol/L    Potassium Hemolyzed, recollect requested 3.5 - 5.1 mmol/L    Chloride 100 97 - 108 mmol/L    CO2 22 21 - 32 mmol/L    Anion gap 13 5 - 15 mmol/L    Glucose 258 (H) 65 - 100 mg/dL    BUN 58 (H) 6 - 20 mg/dL    Creatinine 2.56 (H) 0.55 - 1.02 mg/dL    BUN/Creatinine ratio 23 (H) 12 - 20      GFR est AA 23 (L) >60 ml/min/1.73m2    GFR est non-AA 19 (L) >60 ml/min/1.73m2    Calcium 9.3 8.5 - 10.1 mg/dL    Bilirubin, total 1.6 (H) 0.2 - 1.0 mg/dL    AST (SGOT) Hemolyzed, recollect requested 15 - 37 U/L    ALT (SGPT) 34 12 - 78 U/L    Alk.  phosphatase 92 45 - 117 U/L    Protein, total 7.5 6.4 - 8.2 g/dL    Albumin 2.5 (L) 3.5 - 5.0 g/dL    Globulin 5.0 (H) 2.0 - 4.0 g/dL    A-G Ratio 0.5 (L) 1.1 - 2.2     URINALYSIS W/ REFLEX CULTURE    Collection Time: 10/11/21  1:33 AM    Specimen: Urine   Result Value Ref Range    Color Kathie      Appearance Clear Clear      Specific gravity 1.016 1.003 - 1.030      pH (UA) 5.0 5.0 - 8.0      Protein 30 (A) Negative mg/dL    Glucose Negative Negative mg/dL    Ketone Negative Negative mg/dL    Bilirubin Negative Negative      Blood Negative Negative      Urobilinogen 4.0 (H) 0.1 - 1.0 EU/dL    Nitrites Negative Negative      Leukocyte Esterase Negative Negative      WBC 0-4 0 - 4 /hpf    RBC 0-5 0 - 5 /hpf    Bacteria Negative Negative /hpf    UA:UC IF INDICATED Culture not indicated by UA result Culture not indicated by UA result      Mucus Trace (A) Negative /lpf    Hyaline cast 10-20 0 - 5 /lpf   LACTIC ACID    Collection Time: 10/11/21  2:25 AM   Result Value Ref Range    Lactic acid 3.8 (HH) 0.4 - 2.0 mmol/L       Radiologic Studies -   [unfilled]  CT Results  (Last 48 hours)               10/11/21 0200  CT HEAD WO CONT Final result    Impression:  1. No evidence of acute intracranial hemorrhage or mass effect. 2.  Multifocal intracranial encephalomalacia as above. Narrative:  CT HEAD WITHOUT IV CONTRAST       CLINICAL INDICATION: Altered mental status       TECHNIQUE: Routine axial images were obtained through the brain without the use   of IV contrast. Sagittal and coronal reformatted images were performed at the CT   console. Dose reduction: Per department policy, all CT scans at this facility   are performed using dose reduction optimization techniques as appropriate to a   performed examination including the following: Automated exposure control,   adjustments of the mA and/or KV according to patient size, or use of iterative   reconstruction technique. COMPARISON: 10/4/2021       FINDINGS:        No evidence of acute intracranial hemorrhage or mass effect. Scattered areas of white matter hypoattenuation are present, nonspecific, but   statistically likely to represent sequela of chronic small vessel ischemic   changes in a patient of this age. Again seen is multifocal encephalomalacia in   the left frontal and anterior right parietal and parieto-occipital lobes. Mild prominence of the extra-axial spaces, with commensurate ventricular   enlargement. No hydrocephalus. Portions of the posterior fossa not obscured by streak artifact are   unremarkable. Absent bilateral native lenses. Paranasal sinuses are predominantly clear. Tympanomastoid cavities are clear. No acute calvarial abnormality. Atherosclerotic calcification of the internal carotid arteries. CXR Results  (Last 48 hours)               10/11/21 0153  XR CHEST PORT Final result    Impression:  1. Mild pulmonary vascular congestion. 2.  Cardiomegaly. Narrative:  Portable exam of the chest, one view       INDICATION: Abnormal breath sounds       COMPARISON: 10/2/2021       FINDINGS:   Mild pulmonary vascular congestion. Cardiomegaly. Left subclavian approach   pacer/ICD leads are similar to prior exam. No consolidation. Cardiopericardial   silhouette remains enlarged. There is no pneumothorax or definite pleural   effusion. Medical Decision Making and ED Course   I am the first provider for this patient. I reviewed the vital signs, available nursing notes, past medical history, past surgical history, family history and social history. Vital Signs-Reviewed the patient's vital signs. Patient Vitals for the past 12 hrs:   Temp Pulse Resp BP SpO2   10/11/21 0133  (!) 103 16 (!) 134/104 98 %   10/11/21 0132     98 %   10/11/21 0034 97.5 °F (36.4 °C) (!) 103 19 (!) 125/106 100 %       EKG interpretation: (Preliminary)  Sinus tach 103, left anterior fascicular block, no ST elevations, left axis deviation    Records Reviewed: Nursing Notes and Old Medical Records    The patient presents with altered mental status with a differential diagnosis of CVA, intracranial hemorrhage, sepsis, dehydration, UTI, electrolyte abnormality      Provider Notes (Medical Decision Making):     MDM   58-year-old female history of hypertension, atrial fibrillation, CHF, presents emergency department from nursing home for altered mentation.     Per nursing home patient was less responsive today, blood work reportedly was sent which showed worsening renal function and elevated white count, patient sent to emergency department. Patient lethargic, nonverbal, does not follow commands however no signs of acute focal weakness, no facial asymmetry, low suspicion for CVA. Vital signs show afebrile female, mild tachycardia 100-1 05, normotensive, saturations 98% on room air, abdomen soft nontender nondistended. Patient maintaining airway, no indication to intubate at this time    Basic lab work drawn including cardiac enzymes, head CT ordered, lactate, blood cultures and urinalysis sent to investigate for possible infectious etiology. ED Course:   Initial assessment performed. The patients presenting problems have been discussed, and they are in agreement with the care plan formulated and outlined with them. I have encouraged them to ask questions as they arise throughout their visit. ED Course as of Oct 11 1555   Mon Oct 11, 2021   0229 Patient's lab work resulting, CBC shows marked leukocytosis at 25.6, urinalysis not suggestive of UTI, chest x-ray does not show any acute infiltrates or effusions, clear source of infection at this time, will administer 1 dose of zosyn. CMP still pending.     [PZ]   1907 Patient's lactate elevated at 3.8, metabolic panel shows BUN 58 creatinine 2.56, which is worse from baseline. Firstly patient's potassium was hemolyzed will need to redraw potassium. [PZ]   [id number] Patient re-assessed, remains altered, inappropriate response, vitals remained stable however given signs of sepsis significant sepsis, without clear source at this time feel will need to attempt a lumbar puncture to rule out meningitis. Will change zosyn order to  2 g Rocephin IV. [PZ]   5982 LP completed without complication, CSF appeared clear, not highly suspicious for bacterial meningitis. CSF studies sent. Patient has already received 2 g of ceftriaxone. Will admit patient to Dr. Beverly Calvo service for sepsis, altered mental status.     [PZ]   [id number] Additionally will order CT scan of abdomen pelvis without contrast given elevated lactic acid and leukocytosis with no clear source, however do not suspect acute abdominal process given benign abdominal exam at this time. [PZ]      ED Course User Index  [PZ] Tim Wooten MD         Procedures       Anya Euceda MD  Lumbar Puncture    Date/Time: 10/11/2021 4:59 AM  Performed by: Tim Wooten MD  Authorized by: Tim Wooten MD     Consent:     Consent obtained:  Emergent situation    Alternatives discussed:  No treatment and delayed treatment  Pre-procedure details:     Procedure purpose:  Diagnostic    Preparation: Patient was prepped and draped in usual sterile fashion    Anesthesia (see MAR for exact dosages): Anesthesia method:  Local infiltration    Local anesthetic:  Lidocaine 1% w/o epi  Procedure details:     Lumbar space:  L4-L5 interspace    Patient position:  L lateral decubitus    Needle gauge:  20    Needle type:  Spinal needle - Quincke tip    Needle length (in):  3.5    Ultrasound guidance: no      Number of attempts:  3    Fluid appearance:  Clear    Tubes of fluid:  4    Total volume (ml):  5  Post-procedure:     Puncture site:  Adhesive bandage applied    Patient tolerance of procedure: Tolerated well, no immediate complications                   Disposition     Admit      Diagnosis     Clinical Impression:   1. Sepsis, due to unspecified organism, unspecified whether acute organ dysfunction present ([address]    2. Altered mental status, unspecified altered mental status type    3. Acute renal insufficiency        Attestations:    Anya Euceda MD    Please note that this dictation was completed with [employer], the computer voice recognition software. Quite often unanticipated grammatical, syntax, homophones, and other interpretive errors are inadvertently transcribed by the computer software. Please disregard these errors. Please excuse any errors that have escaped final proofreading.   Thank you.

## 2021-10-11 NOTE — CONSULTS
10/11/2021, 4:27 PM        [place] [address] at [facility]  Phone: [phone]      Cardiology Consultation    10/11/2021, 4:27 PM      Tamra Rehman  78 y.o. female  1955      Admit Date:  10/11/2021    Primary Cardiologist:  Dr. Shant Heath  Reason for consult: Congestive cardiomyopathy  Requesting provider: Dr. Batsheva García:      1. Sepsis syndrome related to right parotitis with retropharyngeal involvement as suggested by ID  2. History of paroxysmal atrial fibrillation. Patient currently is maintaining AV sequential pacing. 3.  End-stage congestive cardiomyopathy with LV ejection fraction of less than 15% by recent echo. Patient at present appears well compensated from the standpoint of congestive heart failure. 4.  Status post AICD-pacemaker implantation. 5.  History of Covid infection  6. CKD       Plan:      1. Aggressive management of sepsis as being done. 2. Home regimen of anti-CHF regimen inclusive of carvedilol, Lasix and ACE inhibitor as tolerated by patient's hemodynamics. 3. Continue aspirin in addition of low dose Coreg and nitrates as tolerated by blood pressure. 4. No further cardiac work-up at this juncture is recommended. Subjective   This is a 25-year-old -American female with a history of congestive cardiomyopathy with severe LV systolic dysfunction (LV ejection fraction less than 15% by echo of 10/3/2021),, Covid infection, paroxysmal atrial fibrillation, status post AICD implantation,  CKD, hypertension diabetes mellitus who presented to emergency room with confusion and restlessness. Patient on presentation was hypoxic with room air O2 sats in 70s which improved with supplemental oxygen of 2 L by nasal cannula. Patient early on was thought to have CNS infection and underwent LP which later was unremarkable. Patient's maxillofacial CT revealed cellulitis of the right face including right parotid gland. Patient overall picture now is suggestive of sepsis for which patient is on combination of IV antibiotics and is followed by ID. Cardiology now has been consulted for management of patient's congestive cardiomyopathy. Past medical, Family & Social History   The following medical history was reviewed    Past Medical History:   Past Medical History:   Diagnosis Date    [facility] ([facility]     Cardiomyopathy, idiopathic ([facility] 10/28/2010    Chronic kidney disease     [facility]     HTN (hypertension), benign 10/28/2010    Pacemaker     ICD    PVC (premature ventricular contraction) 10/28/2010      Past Surgical History:   Procedure Laterality Date    HX OTHER SURGICAL  10/02/2018    Right eye surgery    HX PACEMAKER         Social History:  Social History     Socioeconomic History    Marital status: SINGLE     Spouse name: Not on file    Number of children: Not on file    Years of education: Not on file    Highest education level: Not on file   Occupational History    Not on file   Tobacco Use    Smoking status: Never Smoker    Smokeless tobacco: Never Used   Substance and Sexual Activity    Alcohol use: No    Drug use: Never    Sexual activity: Not on file   Other Topics Concern    Not on file   Social History Narrative    Not on file     Social Determinants of Health     Financial Resource Strain:     Difficulty of Paying Living Expenses:    Food Insecurity:     Worried About Running Out of Food in the Last Year:     Ran Out of Food in the Last Year:    Transportation Needs:     Lack of Transportation (Medical):      Lack of Transportation (Non-Medical):    Physical Activity:     Days of Exercise per Week:     Minutes of Exercise per Session:    Stress:     Feeling of Stress :    Social Connections:     Frequency of Communication with Friends and Family:     Frequency of Social Gatherings with Friends and Family:     Attends Spiritism Services:     Active Member of Clubs or Organizations:     Attends Club or Organization Meetings:     Marital Status:    Intimate Partner Violence:     Fear of Current or Ex-Partner:     Emotionally Abused:     Physically Abused:     Sexually Abused:        Family History:   History reviewed. No pertinent family history. Medications & Allergies     Allergies: Allergies   Allergen Reactions    Chocolate [Cocoa] Anaphylaxis and Swelling    Iodine Anaphylaxis    Lisinopril Anaphylaxis    Peanut Anaphylaxis and Swelling    Glimepiride Swelling    Glipizide Other (comments)     Pruritis    Metformin Diarrhea    Norvasc [Amlodipine] Other (comments)     Light Headed    Pneumovax 23 [Pneumococcal 23-Devi Ps Vaccine] Hives    Toprol Xl [Metoprolol Succinate] Other (comments)     Light headed    Tositumomab I-131 (Maltose) Unable to Obtain    Vitamin D [Cholecalciferol (Vitamin D3)] Other (comments)     Dizziness/headache not a true allergy. Home Medications:  Prior to Admission medications    Medication Sig Start Date End Date Taking? Authorizing Provider   aspirin delayed-release 81 mg tablet Take 1 Tablet by mouth daily. 10/5/21   Haylee Godwin MD   carvediloL (COREG) 6.25 mg tablet Take 1 Tablet by mouth two (2) times daily (with meals). 8/5/21   Sapna Bolton MD   oxyCODONE IR (ROXICODONE) 5 mg immediate release tablet Take 5 mg by mouth every four (4) hours as needed for Pain. 7/16/21   Provider, Historical   cyclobenzaprine (FLEXERIL) 5 mg tablet Take 5 mg by mouth two (2) times daily as needed for Muscle Spasm(s). 7/19/21   Provider, Historical   amitriptyline (ELAVIL) 75 mg tablet Take 75 mg by mouth daily. 5/3/21   Provider, Historical   prednisoLONE acetate (PRED FORTE) 1 % ophthalmic suspension INSTILL 1 DROP INTO RIGHT EYE 4 TIMES A DAY 3/13/21   Provider, Historical   atorvastatin (LIPITOR) 40 mg tablet Take 1 Tab by mouth nightly.  2/19/21   Danielle Dumont MD   furosemide (Lasix) 40 mg tablet 1 tablet daily  Patient taking differently: Take 40 mg by mouth daily. 1 tablet daily 21   Taina Freeman MD   metFORMIN (GLUCOPHAGE) 1,000 mg tablet two (2) times daily (with meals). 10/12/20   Provider, Historical   gabapentin (NEURONTIN) 100 mg capsule TAKE 1 CAPSULE BY MOUTH THREE TIMES A DAY 10/27/20   Provider, Historical   metoprolol succinate (TOPROL-XL) 25 mg XL tablet TAKE 0.5 TABS BY MOUTH DAILY. 19   Kashif Piña NP   ELIQUIS 5 mg tablet TAKE 1 TABLET BY MOUTH TWICE A DAY 19   Kashif Piña NP   nitroglycerin (NITROSTAT) 0.4 mg SL tablet 0.4 mg by SubLINGual route every five (5) minutes as needed for Chest Pain. Up to 3 doses. Patient not taking: Reported on 8/3/2021    Provider, Historical   cloNIDine HCL (CATAPRES) 0.2 mg tablet Take 0.2 mg by mouth two (2) times a day. 18   Provider, Historical   losartan (COZAAR) 100 mg tablet Take 100 mg by mouth daily. 17   Provider, Historical   PARoxetine (PAXIL) 20 mg tablet Take 20 mg by mouth daily. Provider, Historical   omeprazole (PRILOSEC) 20 mg capsule Take 20 mg by mouth daily. 10/28/10   Provider, Historical         REVIEW OF SYSTEMS  Unobtainable due to patient's altered mental status. Patient visibly not in any distress from chest pain, dyspnea or heart racing. Objective       Vitals, I/O's, Weight     24 hour vital signs  BP  Min: 103/88  Max: 144/104  Temp  Av.3 °F (36.3 °C)  Min: 97.1 °F (36.2 °C)  Max: 97.5 °F (36.4 °C)  Capillary Refill could not be evaluated. This SmartLink does not work with rows of the type: Custom List  Resp  Av.8  Min: 12  Max: 21  SpO2  Av.1 %  Min: 95 %  Max: 313 %  Systolic (81TYQ), ROR:784 , Min:103 , GXJ:663   Diastolic (26RNV), MHD:46, Min:88, Max:106    Body mass index is 26.47 kg/m².     Intake/Output Summary (Last 24 hours) at 10/11/2021 1627  Last data filed at 10/11/2021 0139  Gross per 24 hour   Intake    Output 300 ml   Net -300 ml     Patient Vitals for the past 120 hrs:   Weight   10/11/21 0034 75.8 kg (167 lb)   10/11/21 1250 74.4 kg (164 lb 0.4 oz)           Admit weight: Weight: 75.8 kg (167 lb)      Exam       Constitutional Generally well without symptoms, No weight loss, no fever, chills or nausea or vominting and Well developed, well nourished in no apparent distress   HEENT normal atraumatic, no neck masses, normal thyroid. Right maxillofacial area is swollen and tender. Cardiovascular regular heart rate, no murmurs, no JVD, S1 and S2 normal, no gallops notedboth carotids normal upstroke without bruits, radial pulses normal, pedal pulses normal both DP's and PT's,  LE edema Trace   Pulmonary clear to auscultation bilaterally   Abnominal soft, non-tender, without masses or organomegaly, normal bowel sounds, no masses palpated   Genitourinary Deferred   Muskuloskeletal No obvious joint deformities.   No clubbing   Neuro Alert and oriented   Psychiatric Demonstrates good judgement and insight into his or her medical condition   Extremities warm, well perfused   Skin Warm and dry       Tele: AV sequential pacing is noted    Labs     Lab Results   Component Value Date/Time    WBC 28.0 (H) 10/11/2021 08:58 AM    HGB 14.5 10/11/2021 08:58 AM    HCT 45.4 10/11/2021 08:58 AM    PLATELET 409 (L) 31/54/5875 08:58 AM    MCV 87.0 10/11/2021 08:58 AM     Lab Results   Component Value Date/Time    Sodium 139 10/11/2021 08:58 AM    Potassium 4.4 10/11/2021 08:58 AM    Chloride 102 10/11/2021 08:58 AM    CO2 24 10/11/2021 08:58 AM    Anion gap 13 10/11/2021 08:58 AM    Glucose 276 (H) 10/11/2021 08:58 AM    BUN 61 (H) 10/11/2021 08:58 AM    Creatinine 2.61 (H) 10/11/2021 08:58 AM    BUN/Creatinine ratio 23 (H) 10/11/2021 08:58 AM    GFR est AA 22 (L) 10/11/2021 08:58 AM    GFR est non-AA 18 (L) 10/11/2021 08:58 AM    Calcium 9.2 10/11/2021 08:58 AM      Last Lipid:    Lab Results   Component Value Date/Time    Cholesterol, total 81 10/03/2021 07:08 AM    HDL Cholesterol 25 10/03/2021 07:08 AM    LDL, calculated 37.6 10/03/2021 07:08 AM    Triglyceride 92 10/03/2021 07:08 AM    CHOL/HDL Ratio 3.2 10/03/2021 07:08 AM     Lab Results   Component Value Date/Time    CK 95 02/13/2021 05:45 PM    CK - MB 9.6 (H) 08/02/2021 06:40 PM    CK-MB Index 1.3 08/02/2021 06:40 PM    Troponin-I, Qt. 0.32 (H) 10/03/2021 07:08 AM      Lab Results   Component Value Date/Time    TSH 2.13 10/02/2021 03:56 PM    T3 Uptake 23 (L) 08/13/2018 10:48 AM    T4, Total 8.1 08/13/2018 10:48 AM          Imaging & Acillary Studies       CT MAXILLOFACIAL WO CONT   Final Result   Findings more consistent with cellulitis of the right face that   includes the right parotid gland (acute parotitis) as described above. Edema/fluid collection in the parapharyngeal space and retropharyngeal space. Suggestions as above. CT ABD PELV WO CONT   Final Result   Small amount of ascites. No focal fluid collection or free air. Density in the gallbladder which could be artifact, sludge or gallstones. No   specific CT evidence of cholecystitis. Bladder wall prominence. Other findings   as above. CT HEAD WO CONT   Final Result   1. No evidence of acute intracranial hemorrhage or mass effect. 2.  Multifocal intracranial encephalomalacia as above. XR CHEST PORT   Final Result   1. Mild pulmonary vascular congestion. 2.  Cardiomegaly. Echo:   10/02/21    ECHO ADULT COMPLETE 10/03/2021 10/3/2021    Interpretation Summary  · LV: Estimated LVEF is <15%. Dilated left ventricle. Severely reduced systolic function. Left ventricular diastolic dysfunction. · LA: Dilated left atrium. · RV: Dilated right ventricle. Mildly reduced systolic function. Pacer/ICD present. · RA: Dilated right atrium. · MV: Mitral valve leaflet calcification. Moderate mitral valve regurgitation is present. · TV: Moderate tricuspid valve regurgitation is present. · Pericardium: Small-to-moderate pericardial effusion.     Signed by: Ke Neely MD on 10/3/2021 10:15 AM          Total time spent:  61 mimutes    Dr. Sarwat Bernard.  Giselle Soto MD.PhD. [facility]

## 2021-10-11 NOTE — PROGRESS NOTES
Reason for Admission:  Sepsis                     RUR Score:  19%                PCP: First and Last name:   Erik Alvarado MD     Name of Practice:    Are you a current patient: Yes/No: Yes   Approximate date of last visit: Seen q 30 days in [place]. Can you participate in a virtual visit if needed: No    Do you (patient/family) have any concerns for transition/discharge? No                  Plan for utilizing home health:None. Pt from  [address] [place] [address]. Daughter ( Kristin Lindsay @ [phone]) verbally consented to Choice Letter for pt to go to a [facility] - does NOT want pt to return to [address] [place] [address]. Referral sent via [employer]. Pt uses walker. Current Advanced Directive/Advance Care Plan:  Full Code      Healthcare Decision Maker:               Primary Decision Maker: Noa Gay - Child - [phone]    Transition of Care Plan:  D/C Plan is placement @ [facility]. Transportation will be needed.

## 2021-10-11 NOTE — PROGRESS NOTES
Callback and family request spiritual support for their mother. Daughter stated that patient is [employer]/Hoahaoism.  provided spiritual support and care to patient and her family in the waiting area and prayed for patient outside of room. Advised of  availability. Rev.  Danielle Gonzalez, [place] [address], [address]

## 2021-10-12 PROBLEM — E44.0 MODERATE PROTEIN-CALORIE MALNUTRITION (HCC): Status: ACTIVE | Noted: 2021-01-01

## 2021-10-12 NOTE — PROGRESS NOTES
Infectious Disease Progress Note               Subjective:   Pt seen and examined at bedside. Remains in the ICU for close monitoring, not requiring pressor support. Stable right facial swelling. No acute events since last seen, leukocytosis trending down on todays labs   Objective:   Physical Exam:     Visit Vitals  /83 (BP 1 Location: Right upper arm, BP Patient Position: At rest)   Pulse 94   Temp 98.2 °F (36.8 °C)   Resp 13   Ht 5' 6\" (1.676 m)   Wt 164 lb 0.4 oz (74.4 kg)   SpO2 99%   Breastfeeding No   BMI 26.47 kg/m²    O2 Flow Rate (L/min): 2 l/min O2 Device: Nasal cannula    Temp (24hrs), Av °F (37.2 °C), Min:97 °F (36.1 °C), Max:100.1 °F (37.8 °C)    No intake/output data recorded.    10/10 1901 - 10/12 0700  In: 488 [I.V.:488]  Out: 450 [Urine:450]    General: NAD, alert, non-conversant   HEENT: SHARON, Moist mucosa, right facial induration, poor dentition   Lungs: CTA b/l, decreased at the bases, no wheeze/rhonchi   Heart: S1S2+, RRR, no murmur  Abdo: Soft, NT, ND, +BS   : + escobedo cath   Exts: no edema  + pulses b/l   Skin: No wounds, No rashes or lesions      Data Review:       Recent Days:  Recent Labs     10/12/21  0400 10/11/21  0858 10/11/21  0049   WBC 24.4* 28.0* 25.6*   HGB 13.3 14.5 14.8   HCT 41.3 45.4 45.7   PLT 89* 100* 120*     Recent Labs     10/12/21  0400 10/11/21  0858 10/11/21  0049   BUN 72* 61* 58*   CREA 2.79* 2.61* 2.56*       No results found for: CRPQN, CRP, CRPM       Microbiology     Results     Procedure Component Value Units Date/Time    CULTURE, [facility] STAIN [[id number]]     Order Status: Sent Specimen: Wound from Mouth     MRSA SCREEN - PCR (NASAL) [[id number]]  (Abnormal) Collected: 10/11/21 1025    Order Status: Completed Specimen: Swab Updated: 10/11/21 1147     MRSA by PCR, Nasal DETECTED        Comment: Results verified, phoned to and read back by Jung Browning 10/11/21 11:50       MRSA SCREEN - PCR (NASAL) [[id number]] Collected: 10/11/21 0945    Order Status: Canceled Specimen: Swab     COVID-19 RAPID TEST [[id number]] Collected: 10/11/21 0930    Order Status: Completed Specimen: Nasopharyngeal Updated: 10/11/21 1104     Specimen source       Please find results under separate order           COVID-19 rapid test Not Detected        Comment: Rapid Abbott ID Now   Rapid NAAT:  The specimen is NEGATIVE for SARS-CoV-2, the novel coronavirus associated with COVID-19. Negative results should be treated as presumptive and, if inconsistent with clinical signs and symptoms or necessary for patient management, should be tested with an alternative molecular assay. Negative results do not preclude SARS-CoV-2 infection and should not be used as the sole basis for patient management decisions. This test has been authorized by the FDA under   an Emergency Use Authorization (EUA) for use by authorized laboratories.  Fact sheet for Healthcare Providers: Eveddate.co.nz Fact sheet for Patients: Eveddate.co.nz   Methodology: Isothermal Nucleic Acid Amplification         CULTURE, URINE [[id number]] Collected: 10/11/21 0930    Order Status: Completed Specimen: Urine Updated: 10/12/21 1049     Special Requests: No Special Requests        Culture result: No Growth (<1000 cfu/mL)       CULTURE, BLOOD, LINE [[id number]]     Order Status: Sent Specimen: Blood     CULTURE, CSF  TUBE 2 [[id number]] Collected: 10/11/21 0504    Order Status: Completed Specimen: Cerebrospinal Fluid Updated: 10/12/21 1017     Special Requests: No Special Requests        GRAM STAIN No wbc's seen         No organisms seen        Culture result:       No growth thus far holding 7 days          CULTURE, BLOOD, PAIRED [[id number]] Collected: 10/11/21 0225    Order Status: Completed Specimen: Blood Updated: 10/12/21 1302     Special Requests: No Special Requests        Culture result:       Gram Positive Cocci in clusters growing in all 4 bottles drawn (no site indicated) CALLED TO AND READ BACK BY NAEL CADENA RN AT 0237 BY JANETTE          COVID-19 RAPID TEST [[id number]] Collected: 10/02/21 1730    Order Status: Completed Specimen: Nasopharyngeal Updated: 10/02/21 1814     Specimen source Nasopharyngeal        COVID-19 rapid test Not Detected        Comment: Rapid Abbott ID Now   Rapid NAAT:  The specimen is NEGATIVE for SARS-CoV-2, the novel coronavirus associated with COVID-19. Negative results should be treated as presumptive and, if inconsistent with clinical signs and symptoms or necessary for patient management, should be tested with an alternative molecular assay. Negative results do not preclude SARS-CoV-2 infection and should not be used as the sole basis for patient management decisions. This test has been authorized by the FDA under   an Emergency Use Authorization (EUA) for use by authorized laboratories. Fact sheet for Healthcare Providers: ConventionOverstock Drugstoredate.co.nz Fact sheet for Patients: Source4Style.co.nz   Methodology: Isothermal Nucleic Acid Amplification                  Diagnostics   CXR Results  (Last 48 hours)               10/11/21 0153  XR CHEST PORT Final result    Impression:  1. Mild pulmonary vascular congestion. 2.  Cardiomegaly. Narrative:  Portable exam of the chest, one view       INDICATION: Abnormal breath sounds       COMPARISON: 10/2/2021       FINDINGS:   Mild pulmonary vascular congestion. Cardiomegaly. Left subclavian approach   pacer/ICD leads are similar to prior exam. No consolidation. Cardiopericardial   silhouette remains enlarged. There is no pneumothorax or definite pleural   effusion. Assessment/Plan     1.  Severe sepsis due to right parotitis on admission, and GPC in clusters bacteremia       At risk for aspiration pneumonitis but no focal infiltrates on CXR       Tmax of 100.1F today, leukocytosis trending down on todays labs       [facility] [place] in clusters isolated from Blood Cx (10/11)       Continue on Vanc and Zosyn. Routine labs in the morning         2. Acute right parotitis, still w sig right facial induration, no fluctuance, tenderness on palpation       Edema/fluid collection in the parapharyngeal space and retropharyngeal space on CT       Seen by ENT, conservative mgt recommended      3. GPC in clusters bacteremia, suspected MRSA (Colonized)       Underlying AICD, r/o lead infection       Continue on Vanc, repeat BCx in AM       May need LETI to r/o AICD lead infection, 2 D echo recently done     4. MRSA colonization: Start nasal mupirocin     5. Acute hypoxia, may be due to CHF exacerbation, Mild pulmonary vascular congestion on CXR, on consolidation       H/o cardiomyopathy w EF of <15 % earlier this month, COVID neg       Maintaining O2 sats on 2L      4. AMS, decreased responsiveness, less restless, appear comfortable     5.  Acute on chronic renal failure, Cr trending up on todays labs     Vi Ortega MD    10/12/2021

## 2021-10-12 NOTE — CONSULTS
Renal Consultation Note    NAME:  Ольга Rodriguez   :   1955   MRN:   [id number]     ATTENDING: Darnell Mensah MD  PCP:  Zohreh Nguyen MD    Date/Time:  10/12/2021 1:00 PM      Subjective:   REQUESTING PHYSICIAN:  REASON FOR CONSULT:    ADELINA on CKD    Patient is a 69-year-old Afro-American female with history of congestive cardiomyopathy with EF of less than 15% based on echo on 10/3/2021, recent Covid infection, A. fib s/p AICD, chronic kidney disease with recent ADELINA 1 week ago, hypertension, diabetes who was brought into the ER because of restlessness and confusion. It seems she was hypoxic on admission with her oxygen sats in the 70s which improved with 2 L oxygen nasal cannula. She had an LP on admission which was unremarkable. A maxillofacial CT was done without contrast which showed cellulitis of the right face including right parotitis for which ID has been consulted and she has been started on IV antibiotics. Creatinine was 2.5 on admission which is increased to 2.7 today which is prompted a renal consultation. Creatinine was 1.7 at the time of discharge on 10/6/2021 when she had ADELINA with creatinine peaking at 2.2 then. Etiology done was prerenal secondary to losartan and hydrochlorothiazide which were held and were held at the time of discharge also. Currently patient is on Lasix 40 mg IV daily. Seen in the ICU. She is resting comfortably. Unable to provide any history because of her altered mentation. She is on 2 L nasal cannula and O2 sats are around 95 to 98%. Systolic blood pressure was very low on admission the in the 80s range  proBNP elevated at 19,000 on this admission.   Chest x-ray shows mild pulmonary congestion        Past Medical History:   Diagnosis Date    [facility] ([address]     Cardiomyopathy, idiopathic ([address] 10/28/2010    Chronic kidney disease     Heart failure ([address]     HTN (hypertension), benign 10/28/2010    Pacemaker     ICD    PVC (premature ventricular contraction) 10/28/2010      Past Surgical History:   Procedure Laterality Date    HX OTHER SURGICAL  10/02/2018    Right eye surgery    HX PACEMAKER       Social History     Tobacco Use    Smoking status: Never Smoker    Smokeless tobacco: Never Used   Substance Use Topics    Alcohol use: No      History reviewed. No pertinent family history. Allergies   Allergen Reactions    Chocolate [Cocoa] Anaphylaxis and Swelling    Iodine Anaphylaxis    Lisinopril Anaphylaxis    Peanut Anaphylaxis and Swelling    Glimepiride Swelling    Glipizide Other (comments)     Pruritis    Metformin Diarrhea    Norvasc [Amlodipine] Other (comments)     Light Headed    Pneumovax 23 [Pneumococcal 23-Devi Ps Vaccine] Hives    Toprol Xl [Metoprolol Succinate] Other (comments)     Light headed    Tositumomab I-131 (Maltose) Unable to Obtain    Vitamin D [Cholecalciferol (Vitamin D3)] Other (comments)     Dizziness/headache not a true allergy. Prior to Admission medications    Medication Sig Start Date End Date Taking? Authorizing Provider   aspirin delayed-release 81 mg tablet Take 1 Tablet by mouth daily. 10/5/21   Fabio Spurling, MD   carvediloL (COREG) 6.25 mg tablet Take 1 Tablet by mouth two (2) times daily (with meals). 8/5/21   Laila Bolton MD   oxyCODONE IR (ROXICODONE) 5 mg immediate release tablet Take 5 mg by mouth every four (4) hours as needed for Pain. 7/16/21   Provider, Historical   cyclobenzaprine (FLEXERIL) 5 mg tablet Take 5 mg by mouth two (2) times daily as needed for Muscle Spasm(s). 7/19/21   Provider, Historical   amitriptyline (ELAVIL) 75 mg tablet Take 75 mg by mouth daily. 5/3/21   Provider, Historical   prednisoLONE acetate (PRED FORTE) 1 % ophthalmic suspension INSTILL 1 DROP INTO RIGHT EYE 4 TIMES A DAY 3/13/21   Provider, Historical   atorvastatin (LIPITOR) 40 mg tablet Take 1 Tab by mouth nightly.  2/19/21   Carmela Dumont MD   furosemide (Lasix) 40 mg tablet 1 tablet daily  Patient taking differently: Take 40 mg by mouth daily. 1 tablet daily 21   Kerry Quinonez MD   metFORMIN (GLUCOPHAGE) 1,000 mg tablet two (2) times daily (with meals). 10/12/20   Provider, Historical   gabapentin (NEURONTIN) 100 mg capsule TAKE 1 CAPSULE BY MOUTH THREE TIMES A DAY 10/27/20   Provider, Historical   metoprolol succinate (TOPROL-XL) 25 mg XL tablet TAKE 0.5 TABS BY MOUTH DAILY. 19   Luis Fernando Martinez NP   ELIQUIS 5 mg tablet TAKE 1 TABLET BY MOUTH TWICE A DAY 19   Luis Fernando Martinez NP   nitroglycerin (NITROSTAT) 0.4 mg SL tablet 0.4 mg by SubLINGual route every five (5) minutes as needed for Chest Pain. Up to 3 doses. Patient not taking: Reported on 8/3/2021    Provider, Historical   cloNIDine HCL (CATAPRES) 0.2 mg tablet Take 0.2 mg by mouth two (2) times a day. 18   Provider, Historical   losartan (COZAAR) 100 mg tablet Take 100 mg by mouth daily. 17   Provider, Historical   PARoxetine (PAXIL) 20 mg tablet Take 20 mg by mouth daily. Provider, Historical   omeprazole (PRILOSEC) 20 mg capsule Take 20 mg by mouth daily. 10/28/10   Provider, Historical       REVIEW OF SYSTEMS:       Unable to obtain because of patient's mentation      Objective:   VITALS:    Visit Vitals  BP (!) 129/102 (BP 1 Location: Right upper arm, BP Patient Position: At rest)   Pulse 87   Temp 99.7 °F (37.6 °C)   Resp 19   Ht 5' 6\" (1.676 m)   Wt 74.4 kg (164 lb 0.4 oz)   SpO2 95%   Breastfeeding No   BMI 26.47 kg/m²     Temp (24hrs), Av.9 °F (37.2 °C), Min:97 °F (36.1 °C), Max:100.1 °F (37.8 °C)      PHYSICAL EXAM:     General: NAD, awake. Lethargic  Eyes: sclera anicteric  Oral Cavity: No thrush or ulcers  Neck: no JVD  Chest: Fair bilateral air entry. No Wheezing or Rhonchi. No rales. Heart: normal sounds  Abdomen: soft and non tender   :  escobedo  Lower Extremities: no edema  Skin: no rash  Neuro: She is lethargic.   Withdraws to pain  Psychiatric: Unable to assess        LAB DATA REVIEWED:    Recent Results (from the past 24 hour(s))   BLOOD GAS, ARTERIAL    Collection Time: 10/11/21  4:55 PM   Result Value Ref Range    pH 7.44 7.35 - 7.45      PCO2 29 (L) 35 - 45 mmHg    PO2 185 (H) 75 - 100 mmHg    O2  >95 %    BICARBONATE 20 (L) 22 - 26 mmol/L    BASE DEFICIT 3.0 (H) 0 - 2 mmol/L    O2 METHOD No charge      O2 FLOW RATE 4 L/min    FIO2 36 %    EPAP/CPAP/PEEP 0      SITE Right Radial      ROBBIN'S TEST PASS     GLUCOSE, POC    Collection Time: 10/11/21  5:30 PM   Result Value Ref Range    Glucose (POC) 209 (H) 65 - 117 mg/dL    Performed by Winnie Bella 124, POC    Collection Time: 10/11/21  8:27 PM   Result Value Ref Range    Glucose (POC) 179 (H) 65 - 117 mg/dL    Performed by Ania Sellers    METABOLIC PANEL, COMPREHENSIVE    Collection Time: 10/12/21  4:00 AM   Result Value Ref Range    Sodium 142 136 - 145 mmol/L    Potassium 3.9 3.5 - 5.1 mmol/L    Chloride 108 97 - 108 mmol/L    CO2 23 21 - 32 mmol/L    Anion gap 11 5 - 15 mmol/L    Glucose 125 (H) 65 - 100 mg/dL    BUN 72 (H) 6 - 20 mg/dL    Creatinine 2.79 (H) 0.55 - 1.02 mg/dL    BUN/Creatinine ratio 26 (H) 12 - 20      GFR est AA 21 (L) >60 ml/min/1.73m2    GFR est non-AA 17 (L) >60 ml/min/1.73m2    Calcium 9.1 8.5 - 10.1 mg/dL    Bilirubin, total 1.0 0.2 - 1.0 mg/dL    AST (SGOT) 71 (H) 15 - 37 U/L    ALT (SGPT) 61 12 - 78 U/L    Alk.  phosphatase 82 45 - 117 U/L    Protein, total 5.9 (L) 6.4 - 8.2 g/dL    Albumin 1.9 (L) 3.5 - 5.0 g/dL    Globulin 4.0 2.0 - 4.0 g/dL    A-G Ratio 0.5 (L) 1.1 - 2.2     CBC WITH AUTOMATED DIFF    Collection Time: 10/12/21  4:00 AM   Result Value Ref Range    WBC 24.4 (H) 3.6 - 11.0 K/uL    RBC 4.84 3.80 - 5.20 M/uL    HGB 13.3 11.5 - 16.0 g/dL    HCT 41.3 35.0 - 47.0 %    MCV 85.3 80.0 - 99.0 FL    MCH 27.5 26.0 - 34.0 PG    MCHC 32.2 30.0 - 36.5 g/dL    RDW 18.9 (H) 11.5 - 14.5 %    PLATELET 89 (L) 874 - 400 K/uL    NRBC 0.1 (H) 0.0  WBC    ABSOLUTE NRBC 0.03 (H) 0.00 - 0.01 K/uL    NEUTROPHILS 87 (H) 32 - 75 %    LYMPHOCYTES 6 (L) 12 - 49 %    MONOCYTES 6 5 - 13 %    EOSINOPHILS 0 0 - 7 %    BASOPHILS 0 0 - 1 %    IMMATURE GRANULOCYTES 1 (H) 0 - 0.5 %    ABS. NEUTROPHILS 21.1 (H) 1.8 - 8.0 K/UL    ABS. LYMPHOCYTES 1.5 0.8 - 3.5 K/UL    ABS. MONOCYTES 1.5 (H) 0.0 - 1.0 K/UL    ABS. EOSINOPHILS 0.0 0.0 - 0.4 K/UL    ABS. BASOPHILS 0.0 0.0 - 0.1 K/UL    ABS. IMM. GRANS. 0.3 (H) 0.00 - 0.04 K/UL    DF AUTOMATED     NT-PRO BNP    Collection Time: 10/12/21  4:28 AM   Result Value Ref Range    NT pro-BNP 19,182 (H) <125 pg/mL   VANCOMYCIN, RANDOM    Collection Time: 10/12/21  7:10 AM   Result Value Ref Range    Vancomycin, random 15.2 ug/mL    Reported dose date Not provided      Reported dose: Not provided Units   GLUCOSE, POC    Collection Time: 10/12/21  8:05 AM   Result Value Ref Range    Glucose (POC) 130 (H) 65 - 117 mg/dL    Performed by SAMANTHA BROUSSARD    GLUCOSE, POC    Collection Time: 10/12/21 12:42 PM   Result Value Ref Range    Glucose (POC) 176 (H) 65 - 117 mg/dL    Performed by Kayley Moreau        Recommendations/Plan:     #1 acute kidney injury on chronic kidney disease stage III  -She presented with a creatinine of 2.5 which increased to 2.7 today  -ADELINA likely prerenal secondary to sepsis/possible cardiorenal syndrome  -Patient noted to be hypotensive with systolic blood pressure in the 80s on admission  -Recently discharged with creatinine 1.6 on 10/6  -She is on Lasix 40 mg IV daily which I will continue for now as BNP is elevated and chest x-ray shows pulmonary congestion. However clinically she has no volume overload. No edema in both legs. Chest sounds clear  -Urine is bland  -CT abdomen without contrast on 10/11 shows no renal obstruction. No need for renal ultrasound  -I have gone through her medication list.  Not on any potential nephrotoxins preadmission  -We'll follow-up on renal function in a.m.     #2 severe sepsis   -because of possible right parotitis with parapharyngeal and retropharyngeal  involvement per CT scan  -She had LP on admission which was negative  -Urine is bland. Covid negative. No infiltrate on chest x-ray  -She has been started on vancomycin and Zosyn pending cultures  -WBC count 24.4. Afebrile currently but had fever yesterday  -ID is on the case    #3 acute right parotitis with edema/fluid in parapharyngeal space  -ENT has been consulted. Plan for conservative management with IV antibiotics for now    #4 acute hypoxia  -Due to CHF exacerbation. She has history of cardiomyopathy with EF less than 15%  -Chest x-ray shows mild pulmonary congestion. BNP 15,000  -Continue Lasix 40 mg IV daily and monitor urine output    #5 altered mentation  -Could be secondary to metabolic derangements from sepsis. Unsure of her baseline mentation  -CT head was negative on admission    #6 renal osteodystrophy  -Check intact parathyroid hormone  -Corrected calcium is high. Not on any vitamin D or calcium supplements. We'll continue to monitor.   Could be from intravascular depletion    Thank you for the consultation      ________________________________________________________________________  Signed: Michael Pratt MD

## 2021-10-12 NOTE — PROGRESS NOTES
General Daily Progress Note          Patient Name:   Tamra Rehman       YOB: 1955       Age:  78 y.o.       Admit Date: 10/11/2021      Subjective:     Patient is a 78y.o. year old female history of chronic A. fib chronic kidney disease hypertension pacemaker congestive heart failure with ejection fraction of 15 to 20% history of diabetes hypertension patient have a Covid few months ago since then but he declined patient was recently discharged from the hospital with congestive heart failure came back with altered mental status not awake not talking seen by the ER physician CT scan of the head was negative  Patient had WBC count elevated increased lactic acid concern of sepsis sepsis patient was LP done in the ER which was normal patient received 2 g of Rocephin when I saw the patient was still was altered I ordered repeat lactic acid ordered CT scan of the maxillary facial area swelling of the right parotid area and admitted to ICU for further work-up discussed with the patient daughter     According to the patient daughter since she have a Covid in March April this year she is declining she is in the hospital third time this month    Patient resting in the bed not in distress  Not able to eat and drink    For restless and agitation patient on Precedex drip             Objective:     Visit Vitals  BP (!) 129/102 (BP 1 Location: Right upper arm, BP Patient Position: At rest)   Pulse 87   Temp 99.9 °F (37.7 °C)   Resp 19   Ht 5' 6\" (1.676 m)   Wt 74.4 kg (164 lb 0.4 oz)   SpO2 95%   Breastfeeding No   BMI 26.47 kg/m²        Recent Results (from the past 24 hour(s))   GLUCOSE, POC    Collection Time: 10/11/21 12:38 PM   Result Value Ref Range    Glucose (POC) 275 (H) 65 - 117 mg/dL    Performed by Estrellita Michele    BLOOD GAS, ARTERIAL    Collection Time: 10/11/21  4:55 PM   Result Value Ref Range    pH 7.44 7.35 - 7.45      PCO2 29 (L) 35 - 45 mmHg    PO2 185 (H) 75 - 100 mmHg    O2  >95 % BICARBONATE 20 (L) 22 - 26 mmol/L    BASE DEFICIT 3.0 (H) 0 - 2 mmol/L    O2 METHOD No charge      O2 FLOW RATE 4 L/min    FIO2 36 %    EPAP/CPAP/PEEP 0      SITE Right Radial      ROBBIN'S TEST PASS     GLUCOSE, POC    Collection Time: 10/11/21  5:30 PM   Result Value Ref Range    Glucose (POC) 209 (H) 65 - 117 mg/dL    Performed by Winnie Bella 124, POC    Collection Time: 10/11/21  8:27 PM   Result Value Ref Range    Glucose (POC) 179 (H) 65 - 117 mg/dL    Performed by Jackeline Patel    METABOLIC PANEL, COMPREHENSIVE    Collection Time: 10/12/21  4:00 AM   Result Value Ref Range    Sodium 142 136 - 145 mmol/L    Potassium 3.9 3.5 - 5.1 mmol/L    Chloride 108 97 - 108 mmol/L    CO2 23 21 - 32 mmol/L    Anion gap 11 5 - 15 mmol/L    Glucose 125 (H) 65 - 100 mg/dL    BUN 72 (H) 6 - 20 mg/dL    Creatinine 2.79 (H) 0.55 - 1.02 mg/dL    BUN/Creatinine ratio 26 (H) 12 - 20      GFR est AA 21 (L) >60 ml/min/1.73m2    GFR est non-AA 17 (L) >60 ml/min/1.73m2    Calcium 9.1 8.5 - 10.1 mg/dL    Bilirubin, total 1.0 0.2 - 1.0 mg/dL    AST (SGOT) 71 (H) 15 - 37 U/L    ALT (SGPT) 61 12 - 78 U/L    Alk. phosphatase 82 45 - 117 U/L    Protein, total 5.9 (L) 6.4 - 8.2 g/dL    Albumin 1.9 (L) 3.5 - 5.0 g/dL    Globulin 4.0 2.0 - 4.0 g/dL    A-G Ratio 0.5 (L) 1.1 - 2.2     CBC WITH AUTOMATED DIFF    Collection Time: 10/12/21  4:00 AM   Result Value Ref Range    WBC 24.4 (H) 3.6 - 11.0 K/uL    RBC 4.84 3.80 - 5.20 M/uL    HGB 13.3 11.5 - 16.0 g/dL    HCT 41.3 35.0 - 47.0 %    MCV 85.3 80.0 - 99.0 FL    MCH 27.5 26.0 - 34.0 PG    MCHC 32.2 30.0 - 36.5 g/dL    RDW 18.9 (H) 11.5 - 14.5 %    PLATELET 89 (L) 498 - 400 K/uL    NRBC 0.1 (H) 0.0  WBC    ABSOLUTE NRBC 0.03 (H) 0.00 - 0.01 K/uL    NEUTROPHILS 87 (H) 32 - 75 %    LYMPHOCYTES 6 (L) 12 - 49 %    MONOCYTES 6 5 - 13 %    EOSINOPHILS 0 0 - 7 %    BASOPHILS 0 0 - 1 %    IMMATURE GRANULOCYTES 1 (H) 0 - 0.5 %    ABS. NEUTROPHILS 21.1 (H) 1.8 - 8.0 K/UL    ABS.  LYMPHOCYTES 1.5 0.8 - 3.5 K/UL    ABS. MONOCYTES 1.5 (H) 0.0 - 1.0 K/UL    ABS. EOSINOPHILS 0.0 0.0 - 0.4 K/UL    ABS. BASOPHILS 0.0 0.0 - 0.1 K/UL    ABS. IMM. GRANS. 0.3 (H) 0.00 - 0.04 K/UL    DF AUTOMATED     VANCOMYCIN, RANDOM    Collection Time: 10/12/21  7:10 AM   Result Value Ref Range    Vancomycin, random 15.2 ug/mL    Reported dose date Not provided      Reported dose: Not provided Units   GLUCOSE, POC    Collection Time: 10/12/21  8:05 AM   Result Value Ref Range    Glucose (POC) 130 (H) 65 - 117 mg/dL    Performed by Esther Perrin      [unfilled]      Review of Systems    Unable to obtain e. Physical Exam:      Constitutional: Not much verbal HENT:   Head: Normocephalic and atraumatic. Eyes: Pupils are equal, round, and reactive to light. EOM are normal.   Cardiovascular: Normal rate, regular rhythm and normal heart sounds. Pulmonary/Chest: Breath sounds normal. No wheezes. No rales. Exhibits no tenderness. Abdominal: Soft. Bowel sounds are normal. There is no abdominal tenderness. There is no rebound and no guarding. Musculoskeletal: Normal range of motion. Neurological: Moves extremities    CT MAXILLOFACIAL WO CONT   Final Result   Findings more consistent with cellulitis of the right face that   includes the right parotid gland (acute parotitis) as described above. Edema/fluid collection in the parapharyngeal space and retropharyngeal space. Suggestions as above. CT ABD PELV WO CONT   Final Result   Small amount of ascites. No focal fluid collection or free air. Density in the gallbladder which could be artifact, sludge or gallstones. No   specific CT evidence of cholecystitis. Bladder wall prominence. Other findings   as above. CT HEAD WO CONT   Final Result   1. No evidence of acute intracranial hemorrhage or mass effect. 2.  Multifocal intracranial encephalomalacia as above. XR CHEST PORT   Final Result   1. Mild pulmonary vascular congestion.    2. Cardiomegaly. Recent Results (from the past 24 hour(s))   GLUCOSE, POC    Collection Time: 10/11/21 12:38 PM   Result Value Ref Range    Glucose (POC) 275 (H) 65 - 117 mg/dL    Performed by Kannan Salas 10 GAS, ARTERIAL    Collection Time: 10/11/21  4:55 PM   Result Value Ref Range    pH 7.44 7.35 - 7.45      PCO2 29 (L) 35 - 45 mmHg    PO2 185 (H) 75 - 100 mmHg    O2  >95 %    BICARBONATE 20 (L) 22 - 26 mmol/L    BASE DEFICIT 3.0 (H) 0 - 2 mmol/L    O2 METHOD No charge      O2 FLOW RATE 4 L/min    FIO2 36 %    EPAP/CPAP/PEEP 0      SITE Right Radial      ROBBIN'S TEST PASS     GLUCOSE, POC    Collection Time: 10/11/21  5:30 PM   Result Value Ref Range    Glucose (POC) 209 (H) 65 - 117 mg/dL    Performed by Winnie Bella 124, POC    Collection Time: 10/11/21  8:27 PM   Result Value Ref Range    Glucose (POC) 179 (H) 65 - 117 mg/dL    Performed by Glen Cadena    METABOLIC PANEL, COMPREHENSIVE    Collection Time: 10/12/21  4:00 AM   Result Value Ref Range    Sodium 142 136 - 145 mmol/L    Potassium 3.9 3.5 - 5.1 mmol/L    Chloride 108 97 - 108 mmol/L    CO2 23 21 - 32 mmol/L    Anion gap 11 5 - 15 mmol/L    Glucose 125 (H) 65 - 100 mg/dL    BUN 72 (H) 6 - 20 mg/dL    Creatinine 2.79 (H) 0.55 - 1.02 mg/dL    BUN/Creatinine ratio 26 (H) 12 - 20      GFR est AA 21 (L) >60 ml/min/1.73m2    GFR est non-AA 17 (L) >60 ml/min/1.73m2    Calcium 9.1 8.5 - 10.1 mg/dL    Bilirubin, total 1.0 0.2 - 1.0 mg/dL    AST (SGOT) 71 (H) 15 - 37 U/L    ALT (SGPT) 61 12 - 78 U/L    Alk.  phosphatase 82 45 - 117 U/L    Protein, total 5.9 (L) 6.4 - 8.2 g/dL    Albumin 1.9 (L) 3.5 - 5.0 g/dL    Globulin 4.0 2.0 - 4.0 g/dL    A-G Ratio 0.5 (L) 1.1 - 2.2     CBC WITH AUTOMATED DIFF    Collection Time: 10/12/21  4:00 AM   Result Value Ref Range    WBC 24.4 (H) 3.6 - 11.0 K/uL    RBC 4.84 3.80 - 5.20 M/uL    HGB 13.3 11.5 - 16.0 g/dL    HCT 41.3 35.0 - 47.0 %    MCV 85.3 80.0 - 99.0 FL    MCH 27.5 26.0 - 34.0 PG    MCHC 32.2 30.0 - 36.5 g/dL    RDW 18.9 (H) 11.5 - 14.5 %    PLATELET 89 (L) 944 - 400 K/uL    NRBC 0.1 (H) 0.0  WBC    ABSOLUTE NRBC 0.03 (H) 0.00 - 0.01 K/uL    NEUTROPHILS 87 (H) 32 - 75 %    LYMPHOCYTES 6 (L) 12 - 49 %    MONOCYTES 6 5 - 13 %    EOSINOPHILS 0 0 - 7 %    BASOPHILS 0 0 - 1 %    IMMATURE GRANULOCYTES 1 (H) 0 - 0.5 %    ABS. NEUTROPHILS 21.1 (H) 1.8 - 8.0 K/UL    ABS. LYMPHOCYTES 1.5 0.8 - 3.5 K/UL    ABS. MONOCYTES 1.5 (H) 0.0 - 1.0 K/UL    ABS. EOSINOPHILS 0.0 0.0 - 0.4 K/UL    ABS. BASOPHILS 0.0 0.0 - 0.1 K/UL    ABS. IMM. GRANS. 0.3 (H) 0.00 - 0.04 K/UL    DF AUTOMATED     VANCOMYCIN, RANDOM    Collection Time: 10/12/21  7:10 AM   Result Value Ref Range    Vancomycin, random 15.2 ug/mL    Reported dose date Not provided      Reported dose: Not provided Units   GLUCOSE, POC    Collection Time: 10/12/21  8:05 AM   Result Value Ref Range    Glucose (POC) 130 (H) 65 - 117 mg/dL    Performed by Amy Arana        Results     Procedure Component Value Units Date/Time    CULTURE, Tyler Cagle STAIN [[id number]]     Order Status: Sent Specimen: Wound from Mouth     [facility] SCREEN - PCR (NASAL) [[id number]]  (Abnormal) Collected: 10/11/21 1025    Order Status: Completed Specimen: Swab Updated: 10/11/21 1147     [facility] by PCR, Nasal DETECTED        Comment: Results verified, phoned to and read back by Priyanka Cortes 10/11/21 11:50       [facility] SCREEN - PCR (NASAL) [[id number]] Collected: 10/11/21 0945    Order Status: Canceled Specimen: Swab     COVID-19 RAPID TEST [[id number]] Collected: 10/11/21 0930    Order Status: Completed Specimen: Nasopharyngeal Updated: 10/11/21 1104     Specimen source       Please find results under separate order           COVID-19 rapid test Not Detected        Comment: Rapid Abbott ID Now   Rapid NAAT:  The specimen is NEGATIVE for SARS-CoV-2, the novel coronavirus associated with COVID-19.    Negative results should be treated as presumptive and, if inconsistent with clinical signs and symptoms or necessary for patient management, should be tested with an alternative molecular assay. Negative results do not preclude SARS-CoV-2 infection and should not be used as the sole basis for patient management decisions. This test has been authorized by the FDA under   an Emergency Use Authorization (EUA) for use by authorized laboratories. Fact sheet for Healthcare Providers: [employer] Identdate.co.nz Fact sheet for Patients: [employer] Pharm[employer]   Methodology: Isothermal Nucleic Acid Amplification         CULTURE, URINE [[id number]] Collected: 10/11/21 0930    Order Status: Completed Specimen: Urine Updated: 10/12/21 1049     Special Requests: No Special Requests        Culture result: No Growth (<1000 cfu/mL)       CULTURE, BLOOD, LINE [[id number]]     Order Status: Sent Specimen: Blood     CULTURE, CSF  TUBE 2 [[id number]] Collected: 10/11/21 0504    Order Status: Completed Specimen: Cerebrospinal Fluid Updated: 10/12/21 1017     Special Requests: No Special Requests        GRAM STAIN No wbc's seen         No organisms seen        Culture result:       No growth thus far holding 7 days          CULTURE, BLOOD, PAIRED [[id number]] Collected: 10/11/21 0225    Order Status: Completed Specimen: Blood Updated: 10/11/21 0236    COVID-19 RAPID TEST [[id number]] Collected: 10/02/21 1730    Order Status: Completed Specimen: Nasopharyngeal Updated: 10/02/21 1814     Specimen source Nasopharyngeal        COVID-19 rapid test Not Detected        Comment: Rapid Abbott ID Now   Rapid NAAT:  The specimen is NEGATIVE for SARS-CoV-2, the novel coronavirus associated with COVID-19. Negative results should be treated as presumptive and, if inconsistent with clinical signs and symptoms or necessary for patient management, should be tested with an alternative molecular assay.  Negative results do not preclude SARS-CoV-2 infection and should not be used as the sole basis for patient management decisions. This test has been authorized by the FDA under   an Emergency Use Authorization (EUA) for use by authorized laboratories. Fact sheet for Healthcare Providers: [employer]Update.[employer]nz Fact sheet for Patients: [employer]nz   Methodology: Isothermal Nucleic Acid Amplification                Labs:     Recent Labs     10/12/21  0400 10/11/21  0858   WBC 24.4* 28.0*   HGB 13.3 14.5   HCT 41.3 45.4   PLT 89* 100*     Recent Labs     10/12/21  0400 10/11/21  0858 10/11/21  0551 10/11/21  0049 10/11/21  0049    139  --   --  135*   K 3.9 4.4 4.4   < > Hemolyzed, recollect requested    102  --   --  100   CO2 23 24  --   --  22   BUN 72* 61*  --   --  58*   CREA 2.79* 2.61*  --   --  2.56*   * 276*  --   --  258*   CA 9.1 9.2  --   --  9.3    < > = values in this interval not displayed. Recent Labs     10/12/21  0400 10/11/21  0858 10/11/21  0049   ALT 61 43 34   AP 82 93 92   TBILI 1.0 1.3* 1.6*   TP 5.9* 6.7 7.5   ALB 1.9* 2.4* 2.5*   GLOB 4.0 4.3* 5.0*     No results for input(s): INR, PTP, APTT, INREXT in the last 72 hours. No results for input(s): FE, TIBC, PSAT, FERR in the last 72 hours. Lab Results   Component Value Date/Time    Folate 12.5 10/02/2021 07:37 PM      Recent Labs     10/11/21  1655 10/11/21  0545   PH 7.44 7.42   PCO2 29* 28*   PO2 185* 182*     No results for input(s): CPK, CKNDX, TROIQ in the last 72 hours.     No lab exists for component: CPKMB  Lab Results   Component Value Date/Time    Cholesterol, total 81 10/03/2021 07:08 AM    HDL Cholesterol 25 10/03/2021 07:08 AM    LDL, calculated 37.6 10/03/2021 07:08 AM    Triglyceride 92 10/03/2021 07:08 AM    CHOL/HDL Ratio 3.2 10/03/2021 07:08 AM     Lab Results   Component Value Date/Time    Glucose (POC) 130 (H) 10/12/2021 08:05 AM    Glucose (POC) 179 (H) 10/11/2021 08:27 PM    Glucose (POC) 209 (H) 10/11/2021 05:30 PM    Glucose (POC) 275 (H) 10/11/2021 12:38 PM    Glucose (POC) 224 (H) 10/11/2021 09:38 AM     Lab Results   Component Value Date/Time    Color Kathie 10/11/2021 01:33 AM    Appearance Clear 10/11/2021 01:33 AM    Specific gravity 1.016 10/11/2021 01:33 AM    pH (UA) 5.0 10/11/2021 01:33 AM    Protein 30 (A) 10/11/2021 01:33 AM    Glucose Negative 10/11/2021 01:33 AM    Ketone Negative 10/11/2021 01:33 AM    Bilirubin Negative 10/11/2021 01:33 AM    Urobilinogen 4.0 (H) 10/11/2021 01:33 AM    Nitrites Negative 10/11/2021 01:33 AM    Leukocyte Esterase Negative 10/11/2021 01:33 AM    Bacteria Negative 10/11/2021 01:33 AM    WBC 0-4 10/11/2021 01:33 AM    RBC 0-5 10/11/2021 01:33 AM         Assessment:     Altered mental status  Metabolic encephalopathy  Sepsis  Acute proctitis  Lactic acidosis  Leukocytosis  Chronic A.  Fib  Congestive heart failure  Hypertension  Cardiomyopathy ejection fraction 15 x 20%  Acute on chronic kidney disease  Type 2 diabetes         Plan:     GI consulted for PEG tube placement  Nephrology consulted for acute on chronic kidney disease    Continue IV Zosyn and vancomycin      ICU nurse discussed with the patient family to make him DNR and GI consult for PEG tube    Overall prognosis poor  Monitor renal function    Repeat the labs        Current Facility-Administered Medications:     vancomycin (VANCOCIN) 1,000 mg in 0.9% sodium chloride 250 mL (VIAL-MATE), 1,000 mg, IntraVENous, ONCE, Torie Mei MD    [START ON 10/13/2021] Vancomycin random level to be drawn on 10/13/21 at 0600 am., , Other, ONCE, Torie Mei MD    aspirin delayed-release tablet 81 mg, 81 mg, Oral, DAILY, Ammy Dumont MD    atorvastatin (LIPITOR) tablet 40 mg, 40 mg, Oral, QHS, Ammy Dumont MD    carvediloL (COREG) tablet 6.25 mg, 6.25 mg, Oral, BID WITH MEALS, Ammy Dumont MD    furosemide (LASIX) tablet 40 mg, 40 mg, Oral, DAILY, Ammy Dumont MD    prednisoLONE acetate (PRED FORTE) 1 % ophthalmic suspension 1 Drop, 1 Drop, Both Eyes, TID, Ammy Dumont MD, 1 Drop at 10/12/21 0947    insulin lispro (HUMALOG) injection, , SubCUTAneous, AC&HS, Ammy Dumont MD, 3 Units at 10/11/21 2118    glucose chewable tablet 16 g, 4 Tablet, Oral, PRN, Ana Maria Dumont MD    dextrose (D50W) injection syrg 12.5-25 g, 25-50 mL, IntraVENous, PRN, Ana Maria Dumont MD    glucagon (GLUCAGEN) injection 1 mg, 1 mg, IntraMUSCular, PRN, Ana Maria Dumont MD    acetaminophen (TYLENOL) tablet 650 mg, 650 mg, Oral, Q6H PRN **OR** acetaminophen (TYLENOL) suppository 650 mg, 650 mg, Rectal, Q6H PRN, Ana Maria Dumont MD    polyethylene glycol (MIRALAX) packet 17 g, 17 g, Oral, DAILY PRN, Ana Maria Dumont MD    ondansetron (ZOFRAN ODT) tablet 4 mg, 4 mg, Oral, Q8H PRN **OR** ondansetron (ZOFRAN) injection 4 mg, 4 mg, IntraVENous, Q6H PRN, Ammy Dumont MD    VANCOMYCIN INFORMATION NOTE, , Other, Rx Dosing/Monitoring, Ana Maria Dumont MD    piperacillin-tazobactam (ZOSYN) 3.375 g in 0.9% sodium chloride (MBP/ADV) 100 mL MBP, 3.375 g, IntraVENous, Q8H, Ammy Dumont MD, Last Rate: 200 mL/hr at 10/12/21 0947, 3.375 g at 10/12/21 0947    dexmedeTOMidine (PRECEDEX) 400 mcg in 0.9% sodium chloride (MBP/ADV) 100 mL MBP, 0.1-1.5 mcg/kg/hr, IntraVENous, TITRATE, Ammy Dumont MD, Last Rate: 3.8 mL/hr at 10/12/21 0717, 0.2 mcg/kg/hr at 10/12/21 8363

## 2021-10-12 NOTE — CONSULTS
Consult    Patient: Marivel Lares MRN: [id number]  SSN: [id number]    YOB: 1955  Age: 78 y.o. Sex: female      Subjective:      Marivel Lares is a 78 y.o. female who is being seen for poor feeding, possible feeding tube placement    History from medical records. In review to the hospital for the mental status change, had a Covid few months ago since then but he declined patient including physical condition mental condition, not eating, came back with altered mental status not awake not talking seen by the ER physician CT scan of the head was negative  Patient had WBC count elevated increased lactic acid concern of sepsis,  CT scan of the maxillary facial area swelling of the right parotid area and admitted to ICU for further work-up, has been followed by ENT, patient was not able to eat and drink, not to place NG tube due to the neck swallow condition, GI consultation placed for possible PEG tube placement     No family member in room, admitting team had discussed with patient daughter, she agree with secondary nutrition support  Past Medical History:   Diagnosis Date    [facility] ([address]     Cardiomyopathy, idiopathic ([address] 10/28/2010    Chronic kidney disease     Heart failure ([address]     HTN (hypertension), benign 10/28/2010    Pacemaker     ICD    PVC (premature ventricular contraction) 10/28/2010     Past Surgical History:   Procedure Laterality Date    HX OTHER SURGICAL  10/02/2018    Right eye surgery    HX PACEMAKER        History reviewed. No pertinent family history.   Social History     Tobacco Use    Smoking status: Never Smoker    Smokeless tobacco: Never Used   Substance Use Topics    Alcohol use: No      Current Facility-Administered Medications   Medication Dose Route Frequency Provider Last Rate Last Admin    [START ON 10/13/2021] Vancomycin random level to be drawn on 10/13/21 at 0600 am.   Other ONCE Kiko Mujica MD        aspirin delayed-release tablet 81 mg  81 mg Oral DAILY Jose Dumont MD        atorvastatin (LIPITOR) tablet 40 mg  40 mg Oral QHS Ammy Dumont MD        carvediloL (COREG) tablet 6.25 mg  6.25 mg Oral BID WITH MEALS Jose Dumont MD        furosemide (LASIX) tablet 40 mg  40 mg Oral DAILY Jose Dumont MD        prednisoLONE acetate (PRED FORTE) 1 % ophthalmic suspension 1 Drop  1 Drop Both Eyes TID Ammy Dumont MD   1 Drop at 10/12/21 0947    insulin lispro (HUMALOG) injection   SubCUTAneous AC&HS Jose Dumont MD   2 Units at 10/12/21 1347    glucose chewable tablet 16 g  4 Tablet Oral PRN Jose Dumont MD        dextrose (D50W) injection syrg 12.5-25 g  25-50 mL IntraVENous PRN Jose Dumont MD        glucagon (GLUCAGEN) injection 1 mg  1 mg IntraMUSCular PRN Jose Dumont MD        acetaminophen (TYLENOL) tablet 650 mg  650 mg Oral Q6H PRN Jose Dumont MD        Or   [employer] acetaminophen (TYLENOL) suppository 650 mg  650 mg Rectal Q6H PRN Jose Dumont MD        polyethylene glycol (MIRALAX) packet 17 g  17 g Oral DAILY PRN Jose Dumont MD        ondansetron (ZOFRAN ODT) tablet 4 mg  4 mg Oral Q8H PRN Jose Dumont MD        Or    [facility]ron [facility]) injection 4 mg  4 mg IntraVENous Q6H PRN Carlin Church MD        VANCOMYCIN INFORMATION NOTE   Other Rx Dosing/Monitoring Jose Dumont MD        piperacillin-tazobactam (ZOSYN) 3.375 g in 0.9% sodium chloride (MBP/ADV) 100 mL MBP  3.375 g IntraVENous Q8H Ammy Dumont  mL/hr at 10/12/21 0947 3.375 g at 10/12/21 0947    dexmedeTOMidine (PRECEDEX) 400 mcg in 0.9% sodium chloride (MBP/ADV) 100 mL MBP  0.1-1.5 mcg/kg/hr IntraVENous TITRATE Ammy Dumont MD 3.8 mL/hr at 10/12/21 0717 0.2 mcg/kg/hr at 10/12/21 8138        Allergies   Allergen Reactions    Chocolate [Cocoa] Anaphylaxis and Swelling    Iodine Anaphylaxis    Lisinopril Anaphylaxis    Peanut Anaphylaxis and Swelling    Glimepiride Swelling    Glipizide Other (comments)     Pruritis    Metformin Diarrhea    Norvasc [Amlodipine] Other (comments)     Light Headed    Pneumovax 23 [Pneumococcal 23-Devi Ps Vaccine] Hives    Toprol Xl [Metoprolol Succinate] Other (comments)     Light headed    Tositumomab I-131 (Maltose) Unable to Obtain    Vitamin D [Cholecalciferol (Vitamin D3)] Other (comments)     Dizziness/headache not a true allergy. Review of Systems:  Review of Systems   Unable to perform ROS: Mental acuity        Objective:     Vitals:    10/12/21 1001 10/12/21 1100 10/12/21 1200 10/12/21 1300   BP:  (!) 159/113 110/83 112/80   Pulse:  94     Resp:  17 15 16   Temp:  99.7 °F (37.6 °C)     SpO2:  99% 99% 95%   Weight:       Height: 5' 6\" (1.676 m)           Physical Exam:  Physical Exam  Constitutional:       Appearance: She is ill-appearing. HENT:      Head: Normocephalic and atraumatic. Mouth/Throat:      Mouth: Mucous membranes are dry. Pharynx: Posterior oropharyngeal erythema present. Comments: Maxillofacial swellings at right side  Eyes:      General: No scleral icterus. Cardiovascular:      Rate and Rhythm: Regular rhythm. Heart sounds: Normal heart sounds. Pulmonary:      Breath sounds: Normal breath sounds. Abdominal:      General: Abdomen is flat. Bowel sounds are normal.      Palpations: Abdomen is soft. Tenderness: There is no abdominal tenderness. Musculoskeletal:         General: No swelling or deformity. Cervical back: Tenderness present. Right lower leg: No edema. Skin:     Findings: No bruising. Neurological:      Motor: No weakness.           Recent Results (from the past 24 hour(s))   BLOOD GAS, ARTERIAL    Collection Time: 10/11/21  4:55 PM   Result Value Ref Range    pH 7.44 7.35 - 7.45      PCO2 29 (L) 35 - 45 mmHg    PO2 185 (H) 75 - 100 mmHg    O2  >95 %    BICARBONATE 20 (L) 22 - 26 mmol/L    BASE DEFICIT 3.0 (H) 0 - 2 mmol/L    O2 METHOD No charge      O2 FLOW RATE 4 L/min    FIO2 36 %    EPAP/CPAP/PEEP 0      SITE Right Radial      ROBBIN'S TEST PASS     GLUCOSE, POC    Collection Time: 10/11/21  5:30 PM   Result Value Ref Range    Glucose (POC) 209 (H) 65 - 117 mg/dL    Performed by Winnie Bella 124, POC    Collection Time: 10/11/21  8:27 PM   Result Value Ref Range    Glucose (POC) 179 (H) 65 - 117 mg/dL    Performed by Mirela Min    METABOLIC PANEL, COMPREHENSIVE    Collection Time: 10/12/21  4:00 AM   Result Value Ref Range    Sodium 142 136 - 145 mmol/L    Potassium 3.9 3.5 - 5.1 mmol/L    Chloride 108 97 - 108 mmol/L    CO2 23 21 - 32 mmol/L    Anion gap 11 5 - 15 mmol/L    Glucose 125 (H) 65 - 100 mg/dL    BUN 72 (H) 6 - 20 mg/dL    Creatinine 2.79 (H) 0.55 - 1.02 mg/dL    BUN/Creatinine ratio 26 (H) 12 - 20      GFR est AA 21 (L) >60 ml/min/1.73m2    GFR est non-AA 17 (L) >60 ml/min/1.73m2    Calcium 9.1 8.5 - 10.1 mg/dL    Bilirubin, total 1.0 0.2 - 1.0 mg/dL    AST (SGOT) 71 (H) 15 - 37 U/L    ALT (SGPT) 61 12 - 78 U/L    Alk. phosphatase 82 45 - 117 U/L    Protein, total 5.9 (L) 6.4 - 8.2 g/dL    Albumin 1.9 (L) 3.5 - 5.0 g/dL    Globulin 4.0 2.0 - 4.0 g/dL    A-G Ratio 0.5 (L) 1.1 - 2.2     CBC WITH AUTOMATED DIFF    Collection Time: 10/12/21  4:00 AM   Result Value Ref Range    WBC 24.4 (H) 3.6 - 11.0 K/uL    RBC 4.84 3.80 - 5.20 M/uL    HGB 13.3 11.5 - 16.0 g/dL    HCT 41.3 35.0 - 47.0 %    MCV 85.3 80.0 - 99.0 FL    MCH 27.5 26.0 - 34.0 PG    MCHC 32.2 30.0 - 36.5 g/dL    RDW 18.9 (H) 11.5 - 14.5 %    PLATELET 89 (L) 039 - 400 K/uL    NRBC 0.1 (H) 0.0  WBC    ABSOLUTE NRBC 0.03 (H) 0.00 - 0.01 K/uL    NEUTROPHILS 87 (H) 32 - 75 %    LYMPHOCYTES 6 (L) 12 - 49 %    MONOCYTES 6 5 - 13 %    EOSINOPHILS 0 0 - 7 %    BASOPHILS 0 0 - 1 %    IMMATURE GRANULOCYTES 1 (H) 0 - 0.5 %    ABS. NEUTROPHILS 21.1 (H) 1.8 - 8.0 K/UL    ABS. LYMPHOCYTES 1.5 0.8 - 3.5 K/UL    ABS. MONOCYTES 1.5 (H) 0.0 - 1.0 K/UL    ABS.  EOSINOPHILS 0.0 0.0 - 0.4 K/UL    ABS. BASOPHILS 0.0 0.0 - 0.1 K/UL    ABS. IMM. GRANS. 0.3 (H) 0.00 - 0.04 K/UL    DF AUTOMATED     NT-PRO BNP    Collection Time: 10/12/21  4:28 AM   Result Value Ref Range    NT pro-BNP 19,182 (H) <125 pg/mL   VANCOMYCIN, RANDOM    Collection Time: 10/12/21  7:10 AM   Result Value Ref Range    Vancomycin, random 15.2 ug/mL    Reported dose date Not provided      Reported dose: Not provided Units   GLUCOSE, POC    Collection Time: 10/12/21  8:05 AM   Result Value Ref Range    Glucose (POC) 130 (H) 65 - 117 mg/dL    Performed by SAMANTHA BROUSSARD    GLUCOSE, POC    Collection Time: 10/12/21 12:42 PM   Result Value Ref Range    Glucose (POC) 176 (H) 65 - 117 mg/dL    Performed by Marcos Blackburn         CT MAXILLOFACIAL WO CONT   Final Result   Findings more consistent with cellulitis of the right face that   includes the right parotid gland (acute parotitis) as described above. Edema/fluid collection in the parapharyngeal space and retropharyngeal space. Suggestions as above. CT ABD PELV WO CONT   Final Result   Small amount of ascites. No focal fluid collection or free air. Density in the gallbladder which could be artifact, sludge or gallstones. No   specific CT evidence of cholecystitis. Bladder wall prominence. Other findings   as above. CT HEAD WO CONT   Final Result   1. No evidence of acute intracranial hemorrhage or mass effect. 2.  Multifocal intracranial encephalomalacia as above. XR CHEST PORT   Final Result   1. Mild pulmonary vascular congestion. 2.  Cardiomegaly.          Assessment:     Hospital Problems  Date Reviewed: 5/12/2021        Codes Class Noted POA    Moderate protein-calorie malnutrition ([facility] ICD-10-CM: E44.0  ICD-9-CM: 263.0  10/12/2021 Yes        Altered mental status ICD-10-CM: R41.82  ICD-9-CM: 780.97  10/11/2021 Unknown        Sepsis ([facility] ICD-10-CM: A41.9  ICD-9-CM: 038.9, 995.91  10/11/2021 Unknown          : Swallow  Difficulty, Chronic,  Moderate to severe malnutrition, secondary to poor p.o. intake  Altered mental status  Metabolic encephalopathy  Sepsis  Acute proctitis  Lactic acidosis  Leukocytosis    Plan:   Continue on IV Zosyn and vancomycin  ENT to follow the patient swallowing, rule out facial cellulitis, sinus infection  Continue on aspirin,  Nutrition to follow,  Discussed with patient nurse, consent has been obtained from patient daughter,  Schedule patient for a upper GI endoscopy guided PEG tube placement    Discussed with patient family member regarding the indication, risks, options in detail  Signed By: Viktoriya Sanchez MD     October 12, 2021         Thank you for allowing me to participate in this patients care  Cc Referring Physician   Reddy Figueroa MD

## 2021-10-12 NOTE — PROGRESS NOTES
Comprehensive Nutrition Assessment    Type and Reason for Visit: Initial (NST)    Nutrition Recommendations/Plan:   As NG placed and available for use, initiate TF of Jevity 1.5 at 20mL/hr     Increase by 20mL q4hr to goal rate 55mL/hr, continuous  Flush with 100mL free water q4hr  Goal feeds provide 1980kcal, 84g protein, 1603mL fluid (94%kcal, 125%pro, 96%fluid)     **TF regimen exceeds protein needs; monitor renal fx    Document TF rate, water flushes, and GRVs in EMR    If plans to continue care, pt would likely benefit from PEG placement    Nutrition Assessment:  Worsening AMS, renal fx pta. Increasing fx of hospital admits over past few months- dx COVID ~3/2021. CT finding cellulitis to R face. Dx sepsis 2/2 R parotitis. AMS not improving, plans for NG placement today. Admit 1x week ago, pt with extremely poor intakes. Labs: BUN 72, Cr 2.79, , BG , lytes wnl. Meds: precedex, insulin, furosemide, zosyn, statin. Malnutrition Assessment:  Malnutrition Status: Moderate malnutrition    Context:  Acute illness     Findings of the 6 clinical characteristics of malnutrition:   Energy Intake:  7 - 50% or less of est energy requirements for 5 or more days (suspect poor PO pta 2/2 AMS)  Weight Loss:  1 - 1% to 2% over 1 week     Body Fat Loss:  1 - Mild body fat loss, Triceps   Muscle Mass Loss:  1 - Mild muscle mass loss, Temples (temporalis), Calf  Fluid Accumulation:  No significant fluid accumulation,      Estimated Daily Nutrient Needs:  Energy (kcal): 2100kcal (28kcal/kg); Weight Used for Energy Requirements: Current  Protein (g): 67g (0.9g/kg) (stress vs CKD III); Weight Used for Protein Requirements: Current  Fluid (ml/day): 1675mL; Method Used for Fluid Requirements: ml/kg    Nutrition Related Findings:  NFPE findings mild muscle, fat wasting- limited 2/2 agitation. RN attempted to place NG, unsuccessful- plans to reattempt later. Swelling to lower face/ jaw noted.  No documented hx dysphagia, n/v, or c/d. Wounds:    None       Current Nutrition Therapies:  DIET NPO    Anthropometric Measures:  · Height:  5' 6\" (167.6 cm)  · Current Body Wt:  74.4 kg (164 lb 0.4 oz) (10/11)   · Ideal Body Wt:  130 lbs:  126.2 %   · BMI Category: Overweight (BMI 25.0-29. 9)     Wt Readings from Last 3 Encounters:   10/11/21 74.4 kg (164 lb 0.4 oz)   10/04/21 75.9 kg (167 lb 5.3 oz)   08/07/21 86.1 kg (189 lb 13.1 oz)   Wt hx indicates 1.5kg (2%BW) wt loss x 1 week per wt hx; 11.5kg (14%BW) loss x 2 months. Nutrition Diagnosis:   · Inadequate oral intake related to cognitive or neurological impairment as evidenced by NPO or clear liquid status due to medical condition    Nutrition Interventions:   Food and/or Nutrient Delivery: Continue NPO, Start tube feeding  Nutrition Education and Counseling: No recommendations at this time  Coordination of Nutrition Care: Continue to monitor while inpatient    Goals:  Initiate means of nutrition to meet >50% EENs in 5 days, Lytes wnl, Wt maintenance +/- 0.5kg per week, Maintain skin integrity       Nutrition Monitoring and Evaluation:   Behavioral-Environmental Outcomes: None identified  Food/Nutrient Intake Outcomes: Enteral nutrition intake/tolerance  Physical Signs/Symptoms Outcomes: Biochemical data, Weight, Skin    Discharge Planning:     Too soon to determine     Electronically signed by Black Mckeon on 10/12/2021 at 9:48 AM    Contact:

## 2021-10-12 NOTE — PROGRESS NOTES
10/12/21. Pt accepted @ [facility] via [facility] upon discharge. Daughter ( Shaunna Currie @ [phone]) informed & thankful. Pt remains in ICU - discharge undetermined @ this time. Updates sent via [employer].

## 2021-10-12 NOTE — CONSULTS
RENAL      Consult request appeared on list, patient recently discharged and was followed by Dr. Benito Webster. Al, have let Dr. Mina Allred know.   Darryl Mallory MD

## 2021-10-12 NOTE — PROGRESS NOTES
10/12/2021    [facility]   HD 2    No acute issues  Non verbal    Visit Vitals  BP (!) 112/91 (BP 1 Location: Right upper arm, BP Patient Position: At rest)   Pulse 87   Temp 99.9 °F (37.7 °C)   Resp 18   Ht 5' 6\" (1.676 m)   Wt 164 lb 0.4 oz (74.4 kg)   SpO2 92%   BMI 26.47 kg/m²       Comfortably resting  Responds to pain, palpation of R parotid is tender  R facial swelling, no overlying skin changes, firm without fluctuance  Intraoral mucosa dry, thick purulence from [place]ens[place] duct, cultures taken      WBC 28 --> 24    A/P  R parotitis  AMS  Sepsis  - Antibiotics per ID - agree with broad spectrum  - Cultures obtained of purulence from stensons duct  - Cont oral care  - Discussed with ICU RN re: sialogogues and warm compresses if possible  - Cont IV hydration as medically tolerated  - Following with you  - If concerns re: worsening mental status, can consider neck US to eval for IJ thrombosis (as pt with elevated Cr)      MD Gabriel RuelasShiprock[facility]  [address] [place] [address]  Office Phone: [phone]

## 2021-10-12 NOTE — PROGRESS NOTES
NEUROLOGY  PROGRESS NOTE    Admission History/Pertinent Events  Gris Bolaños is a 78y.o. year old female who presented on 10/11/2021. Patient has a past medical history of Afib (apixaban), HTN, CHF, Pacemaker In Place, CKD who presented with confusion and decreased responsiveness. Per chart review, patient is at a nursing home and patient's nurse who is usually not familiar with her felt that patient was more confused and less responsive for which patient was brought to the ED. Emergent CT head revealed no acute findings but did reveal remote left frontal, anterior right parietal, bilateral parietal occipital region infarcts along with chronic microvascular ischemic changes. In the ED, patient had tachycardia, diastolic blood pressures up to the 105's, leukocytosis of 28.0, BUN of 61, creatinine of 2.61. Patient had emergent lumbar puncture. Patient was subsequently started on vancomycin and piperacillin tazobactam.      Home Stroke Prophylaxis: Apixaban 5 BID, ASA 81, Atorvastatin 40    CSF Studies  Negative/WNL: Glucose, Cx, WBC, RBC, Protein        ASSESSMENT/PLAN      Impression  Patient with some improvement in neurological examination. Feel that the etiology of patient's presenting encephalopathy unlikely to be of a primary neurologic pathology. We will continue patient on antiplatelet and statin therapy for neurovascular prophylaxis. Plan      Encephalopathy, Likely Infectious/Metabolic (Parotitis)  Remote Left Frontal, Right Parietal, Bilateral ParitoOccipital Ischemia   Associated: Elevated BUN/Creat, Sepsis, Paroxysmal Afib, Bacteremia  -Stroke Prophylaxis: ASA 81, Atorvastatin 40   -SBP Goal < 160  -Glucose Goal < 180  -Head of Bed at 30 degrees  -PT/OT/ST as needed  -Management of metabolic/infectious derangements to referring teams    Please contact neurology with any further questions/concerns        SUBJECTIVE   Patient with improved examination is she is awake today.   Still not speaking but following simple commands in the bilateral upper extremities and bilateral lower extremities.       Physical/Neurological Exam  Patient lethargic but awakens and follows some simple peripheral commands  Unable to test for language  Pupils react to light bilaterally; disconjugate gaze  Blink to threat inconsistent bilaterally  No facial droop   Motor: 2/5 to command throughout      OBJECTIVE  Vital Signs  Temp:  [97 °F (36.1 °C)-100.1 °F (37.8 °C)]   Pulse (Heart Rate):  []   BP: ()/()   Resp Rate:  [10-21]   O2 Sat (%):  [92 %-100 %]   Weight:  [74.4 kg (164 lb 0.4 oz)-75.8 kg (167 lb)]     MEDICATIONS    Current Facility-Administered Medications:     aspirin delayed-release tablet 81 mg, 81 mg, Oral, DAILY, Daxa Dumont MD    atorvastatin (LIPITOR) tablet 40 mg, 40 mg, Oral, QHS, Ammy Dumont MD    carvediloL (COREG) tablet 6.25 mg, 6.25 mg, Oral, BID WITH MEALS, Daxa Dumont MD    furosemide (LASIX) tablet 40 mg, 40 mg, Oral, DAILY, Daxa Dumont MD    prednisoLONE acetate (PRED FORTE) 1 % ophthalmic suspension 1 Drop, 1 Drop, Both Eyes, TID, Ammy Dumont MD, 1 Drop at 10/11/21 2125    insulin lispro (HUMALOG) injection, , SubCUTAneous, AC&HS, Ammy Dumont MD, 3 Units at 10/11/21 2118    glucose chewable tablet 16 g, 4 Tablet, Oral, PRN, Daxa Dumont MD    dextrose (D50W) injection syrg 12.5-25 g, 25-50 mL, IntraVENous, PRN, Daxa Dumont MD    glucagon (GLUCAGEN) injection 1 mg, 1 mg, IntraMUSCular, PRN, Daxa Dumont MD    acetaminophen (TYLENOL) tablet 650 mg, 650 mg, Oral, Q6H PRN **OR** acetaminophen (TYLENOL) suppository 650 mg, 650 mg, Rectal, Q6H PRN, Daxa Dumont MD    polyethylene glycol (MIRALAX) packet 17 g, 17 g, Oral, DAILY PRN, Ammy Dumont MD    ondansetron (ZOFRAN ODT) tablet 4 mg, 4 mg, Oral, Q8H PRN **OR** ondansetron (ZOFRAN) injection 4 mg, 4 mg, IntraVENous, Q6H PRN, MD Curry Kang VANCOMYCIN INFORMATION NOTE, , Other, Rx Dosing/Monitoring, Ammy Dumont MD    piperacillin-tazobactam (ZOSYN) 3.375 g in 0.9% sodium chloride (MBP/ADV) 100 mL MBP, 3.375 g, IntraVENous, Q8H, Ammy Dumont MD, Last Rate: 200 mL/hr at 10/12/21 0102, 3.375 g at 10/12/21 0102    dexmedeTOMidine (PRECEDEX) 400 mcg in 0.9% sodium chloride (MBP/ADV) 100 mL MBP, 0.1-1.5 mcg/kg/hr, IntraVENous, TITRATE, Ammy Dumont MD, Last Rate: 3.8 mL/hr at 10/12/21 0717, 0.2 mcg/kg/hr at 10/12/21 9781      Labs: I've reviewed the labs for today     This document has been prepared by the Dragon voice recognition system, typographical errors may have occurred.  Attempts have been made to correct errors, however inadvertent errors may persist.

## 2021-10-13 NOTE — PROGRESS NOTES
Physician Progress Note      PATIENT:               See Silva  CSN #:                  [id number]  :                       1955  ADMIT DATE:       10/11/2021 12:19 AM  DISCH DATE:  RESPONDING  PROVIDER #:        JESSENIA BURKS MD          QUERY TEXT:    Pt admitted with SEPSIS and has CHF documented. If possible, please document in progress notes and discharge summary further specificity regarding the type and acuity of CHF:    The medical record reflects the following:  Risk Factors: CHF, SEPSIS, ENCEPHALOPATHY, CKD, HTN, MALNUTRITION  Clinical Indicators: H&P: \" congestive heart failure with ejection fraction of 15 to 20%\" BNP: 19,182. CXR: \"Mild pulmonary vascular congestion. \"  Treatment: CXR, LASIX IV 40MG DAILY, MONITORING I&O, LAB MONITORING. Thank you,  TRINA BurnsN, RN, [place], [facility] Specialist  [phone] or [email or web address]  Options provided:  -- Acute on Chronic Systolic CHF/HFrEF  -- Acute on Chronic Systolic and Diastolic CHF  -- Acute Systolic CHF/HFrEF  -- Acute Systolic and Diastolic CHF  -- Chronic Systolic CHF/HFrEF  -- Chronic Systolic and Diastolic CHF  -- Other - I will add my own diagnosis  -- Disagree - Not applicable / Not valid  -- Disagree - Clinically unable to determine / Unknown  -- Refer to Clinical Documentation Reviewer    PROVIDER RESPONSE TEXT:    This patient is in acute on chronic systolic CHF/HFrEF.     Query created by: Allison Downs on 10/13/2021 7:58 AM      Electronically signed by:  Panchito Rivas MD 10/13/2021 8:27 AM

## 2021-10-13 NOTE — PROGRESS NOTES
General Daily Progress Note          Patient Name:   Ольга Rodriguez       YOB: 1955       Age:  78 y.o.       Admit Date: 10/11/2021      Subjective:     Patient is a 78y.o. year old female history of chronic A. fib chronic kidney disease hypertension pacemaker congestive heart failure with ejection fraction of 15 to 20% history of diabetes hypertension patient have a Covid few months ago since then but he declined patient was recently discharged from the hospital with congestive heart failure came back with altered mental status not awake not talking seen by the ER physician CT scan of the head was negative  Patient had WBC count elevated increased lactic acid concern of sepsis sepsis patient was LP done in the ER which was normal patient received 2 g of Rocephin when I saw the patient was still was altered I ordered repeat lactic acid ordered CT scan of the maxillary facial area swelling of the right parotid area and admitted to ICU for further work-up discussed with the patient daughter     According to the patient daughter since she have a Covid in March April this year she is declining she is in the hospital third time this month    Patient resting in the bed not in distress  Not able to eat and drink    For restless and agitation patient on Precedex drip    Patient was seen by the GI plan for PEG tube placement today             Objective:     Visit Vitals  BP (!) 120/91 (BP Patient Position: At rest)   Pulse 89   Temp 97.5 °F (36.4 °C)   Resp 15   Ht 5' 6\" (1.676 m)   Wt 75 kg (165 lb 5.5 oz)   SpO2 98%   Breastfeeding No   BMI 26.69 kg/m²        Recent Results (from the past 24 hour(s))   GLUCOSE, POC    Collection Time: 10/12/21 12:42 PM   Result Value Ref Range    Glucose (POC) 176 (H) 65 - 117 mg/dL    Performed by Kayley Moreau    GLUCOSE, POC    Collection Time: 10/12/21  4:42 PM   Result Value Ref Range    Glucose (POC) 167 (H) 65 - 117 mg/dL    Performed by Kayley Moreau GLUCOSE, POC    Collection Time: 10/12/21  8:34 PM   Result Value Ref Range    Glucose (POC) 121 (H) 65 - 117 mg/dL    Performed by Bhupinder Carpenter    CBC WITH AUTOMATED DIFF    Collection Time: 10/13/21  5:00 AM   Result Value Ref Range    WBC 26.3 (H) 3.6 - 11.0 K/uL    RBC 5.03 3.80 - 5.20 M/uL    HGB 13.7 11.5 - 16.0 g/dL    HCT 43.9 35.0 - 47.0 %    MCV 87.3 80.0 - 99.0 FL    MCH 27.2 26.0 - 34.0 PG    MCHC 31.2 30.0 - 36.5 g/dL    RDW 19.7 (H) 11.5 - 14.5 %    PLATELET 93 (L) 787 - 400 K/uL    NRBC 0.1 (H) 0.0  WBC    ABSOLUTE NRBC 0.02 (H) 0.00 - 0.01 K/uL    NEUTROPHILS 90 (H) 32 - 75 %    LYMPHOCYTES 4 (L) 12 - 49 %    MONOCYTES 5 5 - 13 %    EOSINOPHILS 0 0 - 7 %    BASOPHILS 0 0 - 1 %    IMMATURE GRANULOCYTES 1 (H) 0 - 0.5 %    ABS. NEUTROPHILS 23.7 (H) 1.8 - 8.0 K/UL    ABS. LYMPHOCYTES 1.0 0.8 - 3.5 K/UL    ABS. MONOCYTES 1.3 (H) 0.0 - 1.0 K/UL    ABS. EOSINOPHILS 0.0 0.0 - 0.4 K/UL    ABS. BASOPHILS 0.0 0.0 - 0.1 K/UL    ABS. IMM. GRANS. 0.3 (H) 0.00 - 0.04 K/UL    DF AUTOMATED     METABOLIC PANEL, COMPREHENSIVE    Collection Time: 10/13/21  5:00 AM   Result Value Ref Range    Sodium 147 (H) 136 - 145 mmol/L    Potassium 3.9 3.5 - 5.1 mmol/L    Chloride 112 (H) 97 - 108 mmol/L    CO2 23 21 - 32 mmol/L    Anion gap 12 5 - 15 mmol/L    Glucose 115 (H) 65 - 100 mg/dL    BUN 76 (H) 6 - 20 mg/dL    Creatinine 2.71 (H) 0.55 - 1.02 mg/dL    BUN/Creatinine ratio 28 (H) 12 - 20      GFR est AA 21 (L) >60 ml/min/1.73m2    GFR est non-AA 18 (L) >60 ml/min/1.73m2    Calcium 9.2 8.5 - 10.1 mg/dL    Bilirubin, total 1.2 (H) 0.2 - 1.0 mg/dL    AST (SGOT) 55 (H) 15 - 37 U/L    ALT (SGPT) 68 12 - 78 U/L    Alk.  phosphatase 95 45 - 117 U/L    Protein, total 5.7 (L) 6.4 - 8.2 g/dL    Albumin 1.7 (L) 3.5 - 5.0 g/dL    Globulin 4.0 2.0 - 4.0 g/dL    A-G Ratio 0.4 (L) 1.1 - 2.2     VANCOMYCIN, RANDOM    Collection Time: 10/13/21  5:30 AM   Result Value Ref Range    Vancomycin, random 22.8 ug/mL   RENAL FUNCTION PANEL Collection Time: 10/13/21  6:30 AM   Result Value Ref Range    Sodium 146 (H) 136 - 145 mmol/L    Potassium 3.7 3.5 - 5.1 mmol/L    Chloride 111 (H) 97 - 108 mmol/L    CO2 23 21 - 32 mmol/L    Anion gap 12 5 - 15 mmol/L    Glucose 129 (H) 65 - 100 mg/dL    BUN 73 (H) 6 - 20 mg/dL    Creatinine 2.72 (H) 0.55 - 1.02 mg/dL    BUN/Creatinine ratio 27 (H) 12 - 20      GFR est AA 21 (L) >60 ml/min/1.73m2    GFR est non-AA 17 (L) >60 ml/min/1.73m2    Calcium 9.5 8.5 - 10.1 mg/dL    Phosphorus 5.1 (H) 2.6 - 4.7 mg/dL    Albumin 1.7 (L) 3.5 - 5.0 g/dL   GLUCOSE, POC    Collection Time: 10/13/21  7:29 AM   Result Value Ref Range    Glucose (POC) 112 65 - 117 mg/dL    Performed by Alton Pace      [unfilled]      Review of Systems    Unable to obtain e. Physical Exam:      Constitutional: Not much verbal HENT:   Head: Normocephalic and atraumatic. Eyes: Pupils are equal, round, and reactive to light. EOM are normal.   Cardiovascular: Normal rate, regular rhythm and normal heart sounds. Pulmonary/Chest: Breath sounds normal. No wheezes. No rales. Exhibits no tenderness. Abdominal: Soft. Bowel sounds are normal. There is no abdominal tenderness. There is no rebound and no guarding. Musculoskeletal: Normal range of motion. Neurological: Moves extremities    CT MAXILLOFACIAL WO CONT   Final Result   Findings more consistent with cellulitis of the right face that   includes the right parotid gland (acute parotitis) as described above. Edema/fluid collection in the parapharyngeal space and retropharyngeal space. Suggestions as above. CT ABD PELV WO CONT   Final Result   Small amount of ascites. No focal fluid collection or free air. Density in the gallbladder which could be artifact, sludge or gallstones. No   specific CT evidence of cholecystitis. Bladder wall prominence. Other findings   as above. CT HEAD WO CONT   Final Result   1.   No evidence of acute intracranial hemorrhage or mass effect. 2.  Multifocal intracranial encephalomalacia as above. XR CHEST PORT   Final Result   1. Mild pulmonary vascular congestion. 2.  Cardiomegaly. Recent Results (from the past 24 hour(s))   GLUCOSE, POC    Collection Time: 10/12/21 12:42 PM   Result Value Ref Range    Glucose (POC) 176 (H) 65 - 117 mg/dL    Performed by Lust have it!    GLUCOSE, POC    Collection Time: 10/12/21  4:42 PM   Result Value Ref Range    Glucose (POC) 167 (H) 65 - 117 mg/dL    Performed by [facility] have it!    GLUCOSE, POC    Collection Time: 10/12/21  8:34 PM   Result Value Ref Range    Glucose (POC) 121 (H) 65 - 117 mg/dL    Performed by CELIO JORGE    CBC WITH AUTOMATED DIFF    Collection Time: 10/13/21  5:00 AM   Result Value Ref Range    WBC 26.3 (H) 3.6 - 11.0 K/uL    RBC 5.03 3.80 - 5.20 M/uL    HGB 13.7 11.5 - 16.0 g/dL    HCT 43.9 35.0 - 47.0 %    MCV 87.3 80.0 - 99.0 FL    MCH 27.2 26.0 - 34.0 PG    MCHC 31.2 30.0 - 36.5 g/dL    RDW 19.7 (H) 11.5 - 14.5 %    PLATELET 93 (L) 102 - 400 K/uL    NRBC 0.1 (H) 0.0  WBC    ABSOLUTE NRBC 0.02 (H) 0.00 - 0.01 K/uL    NEUTROPHILS 90 (H) 32 - 75 %    LYMPHOCYTES 4 (L) 12 - 49 %    MONOCYTES 5 5 - 13 %    EOSINOPHILS 0 0 - 7 %    BASOPHILS 0 0 - 1 %    IMMATURE GRANULOCYTES 1 (H) 0 - 0.5 %    ABS. NEUTROPHILS 23.7 (H) 1.8 - 8.0 K/UL    ABS. LYMPHOCYTES 1.0 0.8 - 3.5 K/UL    ABS. MONOCYTES 1.3 (H) 0.0 - 1.0 K/UL    ABS. EOSINOPHILS 0.0 0.0 - 0.4 K/UL    ABS. BASOPHILS 0.0 0.0 - 0.1 K/UL    ABS. IMM.  GRANS. 0.3 (H) 0.00 - 0.04 K/UL    DF AUTOMATED     METABOLIC PANEL, COMPREHENSIVE    Collection Time: 10/13/21  5:00 AM   Result Value Ref Range    Sodium 147 (H) 136 - 145 mmol/L    Potassium 3.9 3.5 - 5.1 mmol/L    Chloride 112 (H) 97 - 108 mmol/L    CO2 23 21 - 32 mmol/L    Anion gap 12 5 - 15 mmol/L    Glucose 115 (H) 65 - 100 mg/dL    BUN 76 (H) 6 - 20 mg/dL    Creatinine 2.71 (H) 0.55 - 1.02 mg/dL    BUN/Creatinine ratio 28 (H) 12 - 20      GFR est AA 21 (L) >60 ml/min/1.73m2    GFR est non-AA 18 (L) >60 ml/min/1.73m2    Calcium 9.2 8.5 - 10.1 mg/dL    Bilirubin, total 1.2 (H) 0.2 - 1.0 mg/dL    AST (SGOT) 55 (H) 15 - 37 U/L    ALT (SGPT) 68 12 - 78 U/L    Alk.  phosphatase 95 45 - 117 U/L    Protein, total 5.7 (L) 6.4 - 8.2 g/dL    Albumin 1.7 (L) 3.5 - 5.0 g/dL    Globulin 4.0 2.0 - 4.0 g/dL    A-G Ratio 0.4 (L) 1.1 - 2.2     VANCOMYCIN, RANDOM    Collection Time: 10/13/21  5:30 AM   Result Value Ref Range    Vancomycin, random 22.8 ug/mL   RENAL FUNCTION PANEL    Collection Time: 10/13/21  6:30 AM   Result Value Ref Range    Sodium 146 (H) 136 - 145 mmol/L    Potassium 3.7 3.5 - 5.1 mmol/L    Chloride 111 (H) 97 - 108 mmol/L    CO2 23 21 - 32 mmol/L    Anion gap 12 5 - 15 mmol/L    Glucose 129 (H) 65 - 100 mg/dL    BUN 73 (H) 6 - 20 mg/dL    Creatinine 2.72 (H) 0.55 - 1.02 mg/dL    BUN/Creatinine ratio 27 (H) 12 - 20      GFR est AA 21 (L) >60 ml/min/1.73m2    GFR est non-AA 17 (L) >60 ml/min/1.73m2    Calcium 9.5 8.5 - 10.1 mg/dL    Phosphorus 5.1 (H) 2.6 - 4.7 mg/dL    Albumin 1.7 (L) 3.5 - 5.0 g/dL   GLUCOSE, POC    Collection Time: 10/13/21  7:29 AM   Result Value Ref Range    Glucose (POC) 112 65 - 117 mg/dL    Performed by [facility]        Results     Procedure Component Value Units Date/Time    CULTURE, BLOOD [[id number]] Collected: 10/13/21 0502    Order Status: Completed Specimen: Blood Updated: 10/13/21 0526    CULTURE, [place] Daria STAIN [[id number]]     Order Status: Sent Specimen: Wound from Mouth     MRSA SCREEN - PCR (NASAL) [[id number]]  (Abnormal) Collected: 10/11/21 1025    Order Status: Completed Specimen: Swab Updated: 10/11/21 1147     MRSA by PCR, Nasal DETECTED        Comment: Results verified, phoned to and read back by Gerry Lemos 10/11/21 11:50       MRSA SCREEN - PCR (NASAL) [[id number]] Collected: 10/11/21 0945    Order Status: Canceled Specimen: Swab     COVID-19 RAPID TEST [[id number]] Collected: 10/11/21 0930    Order Status: Completed Specimen: Nasopharyngeal Updated: 10/11/21 1104     Specimen source       Please find results under separate order           COVID-19 rapid test Not Detected        Comment: Rapid Abbott ID Now   Rapid NAAT:  The specimen is NEGATIVE for SARS-CoV-2, the novel coronavirus associated with COVID-19. Negative results should be treated as presumptive and, if inconsistent with clinical signs and symptoms or necessary for patient management, should be tested with an alternative molecular assay. Negative results do not preclude SARS-CoV-2 infection and should not be used as the sole basis for patient management decisions. This test has been authorized by the FDA under   an Emergency Use Authorization (EUA) for use by authorized laboratories.  Fact sheet for Healthcare Providers: Traveler | VIPdate.co.nz Fact sheet for Patients: Traveler | VIPdate.co.nz   Methodology: Isothermal Nucleic Acid Amplification         CULTURE, URINE [[id number]] Collected: 10/11/21 0930    Order Status: Completed Specimen: Urine Updated: 10/12/21 1049     Special Requests: No Special Requests        Culture result: No Growth (<1000 cfu/mL)       CULTURE, BLOOD, LINE [[id number]]     Order Status: Sent Specimen: Blood     CULTURE, CSF  TUBE 2 [[id number]] Collected: 10/11/21 0504    Order Status: Completed Specimen: Cerebrospinal Fluid Updated: 10/12/21 1017     Special Requests: No Special Requests        GRAM STAIN No wbc's seen         No organisms seen        Culture result:       No growth thus far holding 7 days          CULTURE, BLOOD, PAIRED [[id number]]  (Abnormal) Collected: 10/11/21 0225    Order Status: Completed Specimen: Blood Updated: 10/13/21 0732     Special Requests: No Special Requests        Culture result:       Gram Positive Cocci in clusters growing in all 4 bottles drawn (SITE NOT INDICATED)          COVID-19 RAPID TEST [[id number]] Collected: 10/02/21 1730    Order Status: Completed Specimen: Nasopharyngeal Updated: 10/02/21 1814     Specimen source Nasopharyngeal        COVID-19 rapid test Not Detected        Comment: Rapid Abbott ID Now   Rapid NAAT:  The specimen is NEGATIVE for SARS-CoV-2, the novel coronavirus associated with COVID-19. Negative results should be treated as presumptive and, if inconsistent with clinical signs and symptoms or necessary for patient management, should be tested with an alternative molecular assay. Negative results do not preclude SARS-CoV-2 infection and should not be used as the sole basis for patient management decisions. This test has been authorized by the FDA under   an Emergency Use Authorization (EUA) for use by authorized laboratories. Fact sheet for Healthcare Providers: ConventionUpdate.co.nz Fact sheet for Patients: ConventionUpdate.co.nz   Methodology: Isothermal Nucleic Acid Amplification                Labs:     Recent Labs     10/13/21  0500 10/12/21  0400   WBC 26.3* 24.4*   HGB 13.7 13.3   HCT 43.9 41.3   PLT 93* 89*     Recent Labs     10/13/21  0630 10/13/21  0500 10/12/21  0400   * 147* 142   K 3.7 3.9 3.9   * 112* 108   CO2 23 23 23   BUN 73* 76* 72*   CREA 2.72* 2.71* 2.79*   * 115* 125*   CA 9.5 9.2 9.1   PHOS 5.1*  --   --      Recent Labs     10/13/21  0630 10/13/21  0500 10/12/21  0400 10/11/21  0858 10/11/21  0858   ALT  --  68 61  --  43   AP  --  95 82  --  93   TBILI  --  1.2* 1.0  --  1.3*   TP  --  5.7* 5.9*  --  6.7   ALB 1.7* 1.7* 1.9*   < > 2.4*   GLOB  --  4.0 4.0  --  4.3*    < > = values in this interval not displayed. No results for input(s): INR, PTP, APTT, INREXT, INREXT in the last 72 hours. No results for input(s): FE, TIBC, PSAT, FERR in the last 72 hours.    Lab Results   Component Value Date/Time    Folate 12.5 10/02/2021 07:37 PM      Recent Labs     10/11/21  1655 10/11/21  0545   PH 7.44 7.42   PCO2 29* 28*   PO2 185* 182*     No results for input(s): CPK, CKNDX, TROIQ in the last 72 hours. No lab exists for component: CPKMB  Lab Results   Component Value Date/Time    Cholesterol, total 81 10/03/2021 07:08 AM    HDL Cholesterol 25 10/03/2021 07:08 AM    LDL, calculated 37.6 10/03/2021 07:08 AM    Triglyceride 92 10/03/2021 07:08 AM    CHOL/HDL Ratio 3.2 10/03/2021 07:08 AM     Lab Results   Component Value Date/Time    Glucose (POC) 112 10/13/2021 07:29 AM    Glucose (POC) 121 (H) 10/12/2021 08:34 PM    Glucose (POC) 167 (H) 10/12/2021 04:42 PM    Glucose (POC) 176 (H) 10/12/2021 12:42 PM    Glucose (POC) 130 (H) 10/12/2021 08:05 AM     Lab Results   Component Value Date/Time    Color Kathie 10/11/2021 01:33 AM    Appearance Clear 10/11/2021 01:33 AM    Specific gravity 1.016 10/11/2021 01:33 AM    pH (UA) 5.0 10/11/2021 01:33 AM    Protein 30 (A) 10/11/2021 01:33 AM    Glucose Negative 10/11/2021 01:33 AM    Ketone Negative 10/11/2021 01:33 AM    Bilirubin Negative 10/11/2021 01:33 AM    Urobilinogen 4.0 (H) 10/11/2021 01:33 AM    Nitrites Negative 10/11/2021 01:33 AM    Leukocyte Esterase Negative 10/11/2021 01:33 AM    Bacteria Negative 10/11/2021 01:33 AM    WBC 0-4 10/11/2021 01:33 AM    RBC 0-5 10/11/2021 01:33 AM         Assessment:     Altered mental status  Metabolic encephalopathy  Sepsis  Sepsis with bacteremia with gram-positive cocci  Acute proctitis  Lactic acidosis  Leukocytosis  Chronic A.  Fib  Congestive heart failure  Hypertension  Cardiomyopathy ejection fraction 15 x 20%  Acute on chronic kidney disease  Type 2 diabetes         Plan:       Continue vancomycin and Zosyn    PEG tube placement today  Nephrology consulted for acute on chronic kidney disease    Continue aspirin 81 mg daily  Lipitor 40 daily  Coreg 6.25 twice daily  Lasix 40 daily  On Precedex drip for sedation    Overall prognosis poor  Monitor renal function    Repeat the labs        Current Facility-Administered Medications:     vancomycin (VANCOCIN) 500 mg in 0.9% sodium chloride (MBP/ADV) 100 mL MBP, 500 mg, IntraVENous, ONCE, Torie Mei MD    [START ON 10/14/2021] Vancomycin random level to be drawn on 10/14/21 at 1000 am., , Other, ONCE, Torie Mei MD    mupirocin (BACTROBAN) 2 % ointment, , Topical, BID, Maicol Godoy MD, Given at 10/13/21 0909    aspirin delayed-release tablet 81 mg, 81 mg, Oral, DAILY, Danielle Dumont MD    atorvastatin (LIPITOR) tablet 40 mg, 40 mg, Oral, QHS, Ammy Dumont MD    carvediloL (COREG) tablet 6.25 mg, 6.25 mg, Oral, BID WITH MEALS, Danielle Dumont MD    furosemide (LASIX) tablet 40 mg, 40 mg, Oral, DAILY, Danielle Dumont MD    prednisoLONE acetate (PRED FORTE) 1 % ophthalmic suspension 1 Drop, 1 Drop, Both Eyes, TID, Ammy Dumont MD, 1 Drop at 10/13/21 0909    insulin lispro (HUMALOG) injection, , SubCUTAneous, AC&HS, Ammy Dumont MD, 2 Units at 10/12/21 1709    glucose chewable tablet 16 g, 4 Tablet, Oral, PRN, Danielle Dumont MD    dextrose (D50W) injection syrg 12.5-25 g, 25-50 mL, IntraVENous, PRN, Danielle Dumont MD    glucagon (GLUCAGEN) injection 1 mg, 1 mg, IntraMUSCular, PRN, Danielle Dumont MD    acetaminophen (TYLENOL) tablet 650 mg, 650 mg, Oral, Q6H PRN **OR** acetaminophen (TYLENOL) suppository 650 mg, 650 mg, Rectal, Q6H PRN, Danielle Dumont MD    polyethylene glycol (MIRALAX) packet 17 g, 17 g, Oral, DAILY PRN, Danielle Dumont MD    ondansetron (ZOFRAN ODT) tablet 4 mg, 4 mg, Oral, Q8H PRN **OR** ondansetron (ZOFRAN) injection 4 mg, 4 mg, IntraVENous, Q6H PRN, Ammy Dumont MD    VANCOMYCIN INFORMATION NOTE, , Other, Rx Dosing/Monitoring, Danielle Dumont MD    piperacillin-tazobactam (ZOSYN) 3.375 g in 0.9% sodium chloride (MBP/ADV) 100 mL MBP, 3.375 g, IntraVENous, Q8H, Ammy Dumont MD, Last Rate: 200 mL/hr at 10/13/21 0909, 3.375 g at 10/13/21 0909    dexmedeTOMidine (PRECEDEX) 400 mcg in 0.9% sodium chloride (MBP/ADV) 100 mL MBP, 0.1-1.5 mcg/kg/hr, IntraVENous, TITRATE, Ammy Dumont MD, Last Rate: 3.8 mL/hr at 10/13/21 0912, 0.2 mcg/kg/hr at 10/13/21 1703

## 2021-10-13 NOTE — PROGRESS NOTES
Infectious Disease Progress Note               Subjective:   Remains stable, no acute events since last seen, underwent peg tube placement earlier today.    Objective:   Physical Exam:     Visit Vitals  /87 (BP Patient Position: At rest)   Pulse 88   Temp 97.3 °F (36.3 °C)   Resp 13   Ht 5' 6\" (1.676 m)   Wt 165 lb 5.5 oz (75 kg)   SpO2 99%   Breastfeeding No   BMI 26.69 kg/m²    O2 Flow Rate (L/min): 2 l/min O2 Device: Nasal cannula    Temp (24hrs), Av.6 °F (36.4 °C), Min:97 °F (36.1 °C), Max:98.1 °F (36.7 °C)    10/13 0701 - 10/13 1900  In: -   Out: 350 [Urine:350]   10/11 1901 - 10/13 0700  In: 632.2 [I.V.:632.2]  Out: 650 [Urine:650]    General: NAD, alert,nonconversant   HEENT: SHARON, Moist mucosa, still w right facial swelling and induration, some tenderness on palpation   Lungs: CTA b/l, decreased at the bases, no wheeze/rhonchi   Heart: S1S2+, RRR, no murmur  Abdo: Soft, NT, ND, +BS, peg tube in place   : + escobedo cath   Exts: no edema  + pulses b/l   Skin: No wounds, No rashes or lesions      Data Review:       Recent Days:  Recent Labs     10/13/21  0500 10/12/21  0400 10/11/21  0858   WBC 26.3* 24.4* 28.0*   HGB 13.7 13.3 14.5   HCT 43.9 41.3 45.4   PLT 93* 89* 100*     Recent Labs     10/13/21  0630 10/13/21  0500 10/12/21  0400   BUN 73* 76* 72*   CREA 2.72* 2.71* 2.79*       No results found for: CRPQN, CRP, CRPM       Microbiology     Results     Procedure Component Value Units Date/Time    CULTURE, BLOOD [[id number]] Collected: 10/13/21 0500    Order Status: Completed Specimen: Blood Updated: 10/13/21 1101     Special Requests: No Special Requests        Culture result: No growth after 5 hours       CULTURE, WOUND [place] Monk STAIN [[id number]]     Order Status: Sent Specimen: Wound from Mouth     MRSA SCREEN - PCR (NASAL) [[id number]]  (Abnormal) Collected: 10/11/21 1025    Order Status: Completed Specimen: Swab Updated: 10/11/21 1147     MRSA by PCR, Nasal DETECTED Comment: Results verified, phoned to and read back by Fredi Stevens 10/11/21 11:50       MRSA SCREEN - PCR (NASAL) [[id number]] Collected: 10/11/21 0945    Order Status: Canceled Specimen: Swab     COVID-19 RAPID TEST [[id number]] Collected: 10/11/21 0930    Order Status: Completed Specimen: Nasopharyngeal Updated: 10/11/21 1104     Specimen source       Please find results under separate order           COVID-19 rapid test Not Detected        Comment: Rapid Abbott ID Now   Rapid NAAT:  The specimen is NEGATIVE for SARS-CoV-2, the novel coronavirus associated with COVID-19. Negative results should be treated as presumptive and, if inconsistent with clinical signs and symptoms or necessary for patient management, should be tested with an alternative molecular assay. Negative results do not preclude SARS-CoV-2 infection and should not be used as the sole basis for patient management decisions. This test has been authorized by the FDA under   an Emergency Use Authorization (EUA) for use by authorized laboratories.  Fact sheet for Healthcare Providers: ConventionUpdate.co.nz Fact sheet for Patients: ConventionUpdate.co.nz   Methodology: Isothermal Nucleic Acid Amplification         CULTURE, URINE [[id number]] Collected: 10/11/21 0930    Order Status: Completed Specimen: Urine Updated: 10/12/21 1049     Special Requests: No Special Requests        Culture result: No Growth (<1000 cfu/mL)       CULTURE, BLOOD, LINE [[id number]]     Order Status: Sent Specimen: Blood     CULTURE, CSF  TUBE 2 [[id number]] Collected: 10/11/21 0504    Order Status: Completed Specimen: Cerebrospinal Fluid Updated: 10/12/21 1017     Special Requests: No Special Requests        GRAM STAIN No wbc's seen         No organisms seen        Culture result:       No growth thus far holding 7 days          CULTURE, BLOOD, PAIRED [[id number]] Collected: 10/11/21 0225    Order Status: Completed Specimen: Blood Updated: 10/13/21 2067     Special Requests: No Special Requests        Culture result:       Preliminary report of Methicillin Resistant Staphylococcus aureus by antigen detection. Confirmation and Sensitivities to follow. growing in all 4 bottles drawn  (SITE NOT INDICATED)  CALLED TO AND READ BACK BY  NAEL CADENA RN AT 3437 BY JF              checking for possible 2nd strain of staphylococcus aureus          COVID-19 RAPID TEST [[id number]] Collected: 10/02/21 1730    Order Status: Completed Specimen: Nasopharyngeal Updated: 10/02/21 1814     Specimen source Nasopharyngeal        COVID-19 rapid test Not Detected        Comment: Rapid Abbott ID Now   Rapid NAAT:  The specimen is NEGATIVE for SARS-CoV-2, the novel coronavirus associated with COVID-19. Negative results should be treated as presumptive and, if inconsistent with clinical signs and symptoms or necessary for patient management, should be tested with an alternative molecular assay. Negative results do not preclude SARS-CoV-2 infection and should not be used as the sole basis for patient management decisions. This test has been authorized by the FDA under   an Emergency Use Authorization (EUA) for use by authorized laboratories. Fact sheet for Healthcare Providers: kstattoo.com Fact sheet for Patients: kstattoo.com   Methodology: Isothermal Nucleic Acid Amplification                  Diagnostics   CXR Results  (Last 48 hours)    None             Assessment/Plan     1. MRSA bacteremia: Isolated from 2 sets of BCx collected on admission       Repeat Bcx from 10/13 is pending       Afebrile, hemodynamically stable off pressor support, mild rise in WBC on todays labs       Continue on Vanc for MRSA coverage, will consider adding Ceftaroline for double MRSA coverage if bacteremia persist      Routine labs in the morning. Again recommend a LETI to r/o valvular veg or AICD lead infection         2. Acute right parotitis, slightly decreased swelling today, still w sig tenderness (10/13)       Edema/fluid collection in the parapharyngeal space and retropharyngeal space on CT       Continue on vanc and Zosyn for now        3. MRSA colonization: On day # 2 of Mupirocin     5. Acute hypoxia, may be due to CHF exacerbation, Mild pulmonary vascular congestion on CXR, on consolidation       H/o cardiomyopathy w EF of <15 % earlier this month, COVID neg       Maintaining O2 sats on 2L, no increased O2 requirements      4. Dysphagia: S/p Peg tube placement earlier today for nutrition     5.  Acute on chronic renal failure, No sig rise in Cr     Kolby Black MD    10/13/2021

## 2021-10-13 NOTE — PROGRESS NOTES
Progress Note      10/13/2021 9:44 AM  NAME: Preston Carrillo   MRN:  [id number]   Admit Diagnosis: Sepsis ([facility] [address] [A41.9]  Altered mental status [R41.82]        Assessment/Plan:   Poorly responsive/altered mental status    Sepsis/cellulitis, parotitis    Cardiomyopathy, status post ICD, recent deterioration of LV function    History of paroxysmal atrial fibrillation, was anticoagulated with apixaban, currently off    Kidney disease, acute on chronic injury           []       High complexity decision making was performed in this patient at high risk for decompensation with multiple organ involvement. Subjective:     Preston Carrillo is poorly responsive discussed with RN events overnight. Review of Systems:    Symptom Y/N Comments  Symptom Y/N Comments   Fever/Chills N   Chest Pain N    Poor Appetite N   Edema N    Cough N   Abdominal Pain N    Sputum N   Joint Pain N    SOB/WEAVER N   Pruritis/Rash N    Nausea/vomit N   Tolerating PT/OT Y    Diarrhea N   Tolerating Diet Y    Constipation N   Other       Could NOT obtain due to:  Patient is poorly responsive     Objective:      Physical Exam:    Last 24hrs VS reviewed since prior progress note. Most recent are:    Visit Vitals  BP (!) 120/91 (BP Patient Position: At rest)   Pulse 89   Temp 97.5 °F (36.4 °C)   Resp 15   Ht 5' 6\" (1.676 m)   Wt 75 kg (165 lb 5.5 oz)   SpO2 98%   Breastfeeding No   BMI 26.69 kg/m²       Intake/Output Summary (Last 24 hours) at 10/13/2021 0944  Last data filed at 10/13/2021 0400  Gross per 24 hour   Intake 632.23 ml   Output 500 ml   Net 132.23 ml        General Appearance:  no acute distress. Ears/Nose/Mouth/Throat: Hearing grossly normal; moist mucous membranes  Neck: Supple. Chest: Lungs clear to auscultation bilaterally. Cardiovascular: Regular rate and rhythm, S1S2 normal, no murmur. Abdomen: Soft, non-tender, bowel sounds are active. Extremities: No edema bilaterally. Skin: Warm and dry.     []         Post-cath site without hematoma, bruit, tenderness, or thrill. Distal pulses intact. PMH/SH reviewed - no change compared to H&P    Data Review    Telemetry:     EKG:   []  No new EKG for review    Lab Data Personally Reviewed:    Recent Labs     10/13/21  0500 10/12/21  0400   WBC 26.3* 24.4*   HGB 13.7 13.3   HCT 43.9 41.3   PLT 93* 89*     No results for input(s): INR, PTP, APTT, INREXT in the last 72 hours. Recent Labs     10/13/21  0630 10/13/21  0500 10/12/21  0400   * 147* 142   K 3.7 3.9 3.9   * 112* 108   CO2 23 23 23   BUN 73* 76* 72*   CREA 2.72* 2.71* 2.79*   * 115* 125*   CA 9.5 9.2 9.1     No results for input(s): CPK, CKNDX, TROIQ in the last 72 hours. No lab exists for component: CPKMB  Lab Results   Component Value Date/Time    Cholesterol, total 81 10/03/2021 07:08 AM    HDL Cholesterol 25 10/03/2021 07:08 AM    LDL, calculated 37.6 10/03/2021 07:08 AM    Triglyceride 92 10/03/2021 07:08 AM    CHOL/HDL Ratio 3.2 10/03/2021 07:08 AM       Recent Labs     10/13/21  0630 10/13/21  0500 10/12/21  0400 10/11/21  0858 10/11/21  0858   AP  --  95 82  --  93   TP  --  5.7* 5.9*  --  6.7   ALB 1.7* 1.7* 1.9*   < > 2.4*   GLOB  --  4.0 4.0  --  4.3*    < > = values in this interval not displayed.      Recent Labs     10/11/21  1655 10/11/21  0545   PH 7.44 7.42   PCO2 29* 28*   PO2 185* 182*       Medications Personally Reviewed:    Current Facility-Administered Medications   Medication Dose Route Frequency    vancomycin (VANCOCIN) 500 mg in 0.9% sodium chloride (MBP/ADV) 100 mL MBP  500 mg IntraVENous ONCE    [START ON 10/14/2021] Vancomycin random level to be drawn on 10/14/21 at 1000 am.   Other ONCE    mupirocin (BACTROBAN) 2 % ointment   Topical BID    aspirin delayed-release tablet 81 mg  81 mg Oral DAILY    atorvastatin (LIPITOR) tablet 40 mg  40 mg Oral QHS    carvediloL (COREG) tablet 6.25 mg  6.25 mg Oral BID WITH MEALS    furosemide (LASIX) tablet 40 mg  40 mg Oral DAILY  prednisoLONE acetate (PRED FORTE) 1 % ophthalmic suspension 1 Drop  1 Drop Both Eyes TID    insulin lispro (HUMALOG) injection   SubCUTAneous AC&HS    glucose chewable tablet 16 g  4 Tablet Oral PRN    dextrose (D50W) injection syrg 12.5-25 g  25-50 mL IntraVENous PRN    glucagon (GLUCAGEN) injection 1 mg  1 mg IntraMUSCular PRN    acetaminophen (TYLENOL) tablet 650 mg  650 mg Oral Q6H PRN    Or    acetaminophen (TYLENOL) suppository 650 mg  650 mg Rectal Q6H PRN    polyethylene glycol (MIRALAX) packet 17 g  17 g Oral DAILY PRN    ondansetron (ZOFRAN ODT) tablet 4 mg  4 mg Oral Q8H PRN    Or    ondansetron (ZOFRAN) injection 4 mg  4 mg IntraVENous Q6H PRN    VANCOMYCIN INFORMATION NOTE   Other Rx Dosing/Monitoring    piperacillin-tazobactam (ZOSYN) 3.375 g in 0.9% sodium chloride (MBP/ADV) 100 mL MBP  3.375 g IntraVENous Q8H    dexmedeTOMidine (PRECEDEX) 400 mcg in 0.9% sodium chloride (MBP/ADV) 100 mL MBP  0.1-1.5 mcg/kg/hr IntraVENous TITRATE         Lazarus Aldo, MD

## 2021-10-13 NOTE — PROGRESS NOTES
Renal Note    NAME:  Gris Bolaños   :   1955   MRN:   [id number]     ATTENDING: Chester Rodriguez MD  PCP:  Nelsy Alvares MD    Date/Time:  10/13/2021 1:00 PM      Subjective:     Pt seen in icu. She is currently having PEG tube placement. No significant change in mentation   seems to be in no distress  On Precedex drip for restlessness and agitation  Creatinine is 2.7 today. Blood pressure is better      Past Medical History:   Diagnosis Date    [facility] ([facility] [address]     Cardiomyopathy, idiopathic ([facility] [address] 10/28/2010    Chronic kidney disease     Heart failure ([facility] [address]     HTN (hypertension), benign 10/28/2010    Pacemaker     ICD    PVC (premature ventricular contraction) 10/28/2010      Past Surgical History:   Procedure Laterality Date    HX OTHER SURGICAL  10/02/2018    Right eye surgery    HX PACEMAKER       Social History     Tobacco Use    Smoking status: Never Smoker    Smokeless tobacco: Never Used   Substance Use Topics    Alcohol use: No      History reviewed. No pertinent family history. Allergies   Allergen Reactions    Chocolate [Cocoa] Anaphylaxis and Swelling    Iodine Anaphylaxis    Lisinopril Anaphylaxis    Peanut Anaphylaxis and Swelling    Glimepiride Swelling    Glipizide Other (comments)     Pruritis    Metformin Diarrhea    Norvasc [Amlodipine] Other (comments)     Light Headed    Pneumovax 23 [Pneumococcal 23-Devi Ps Vaccine] Hives    Toprol Xl [Metoprolol Succinate] Other (comments)     Light headed    Tositumomab I-131 (Maltose) Unable to Obtain    Vitamin D [Cholecalciferol (Vitamin D3)] Other (comments)     Dizziness/headache not a true allergy. Prior to Admission medications    Medication Sig Start Date End Date Taking? Authorizing Provider   aspirin delayed-release 81 mg tablet Take 1 Tablet by mouth daily. 10/5/21   Randy Haskins MD   carvediloL (COREG) 6.25 mg tablet Take 1 Tablet by mouth two (2) times daily (with meals). 8/5/21   Mitchell Bolton MD   oxyCODONE IR (ROXICODONE) 5 mg immediate release tablet Take 5 mg by mouth every four (4) hours as needed for Pain. 7/16/21   Provider, Historical   cyclobenzaprine (FLEXERIL) 5 mg tablet Take 5 mg by mouth two (2) times daily as needed for Muscle Spasm(s). 7/19/21   Provider, Historical   amitriptyline (ELAVIL) 75 mg tablet Take 75 mg by mouth daily. 5/3/21   Provider, Historical   prednisoLONE acetate (PRED FORTE) 1 % ophthalmic suspension INSTILL 1 DROP INTO RIGHT EYE 4 TIMES A DAY 3/13/21   Provider, Historical   atorvastatin (LIPITOR) 40 mg tablet Take 1 Tab by mouth nightly. 2/19/21   Jamel Dumont MD   furosemide (Lasix) 40 mg tablet 1 tablet daily  Patient taking differently: Take 40 mg by mouth daily. 1 tablet daily 2/19/21   Yola Noble MD   metFORMIN (GLUCOPHAGE) 1,000 mg tablet two (2) times daily (with meals). 10/12/20   Provider, Historical   gabapentin (NEURONTIN) 100 mg capsule TAKE 1 CAPSULE BY MOUTH THREE TIMES A DAY 10/27/20   Provider, Historical   metoprolol succinate (TOPROL-XL) 25 mg XL tablet TAKE 0.5 TABS BY MOUTH DAILY. 12/16/19   Jocelyn Tafoya NP   ELIQUIS 5 mg tablet TAKE 1 TABLET BY MOUTH TWICE A DAY 12/5/19   Jocelyn Tafoya NP   nitroglycerin (NITROSTAT) 0.4 mg SL tablet 0.4 mg by SubLINGual route every five (5) minutes as needed for Chest Pain. Up to 3 doses. Patient not taking: Reported on 8/3/2021    Provider, Historical   cloNIDine HCL (CATAPRES) 0.2 mg tablet Take 0.2 mg by mouth two (2) times a day. 5/21/18   Provider, Historical   losartan (COZAAR) 100 mg tablet Take 100 mg by mouth daily. 4/25/17   Provider, Historical   PARoxetine (PAXIL) 20 mg tablet Take 20 mg by mouth daily. Provider, Historical   omeprazole (PRILOSEC) 20 mg capsule Take 20 mg by mouth daily.  10/28/10   Provider, Historical       REVIEW OF SYSTEMS:       Unable to obtain because of patient's mentation      Objective:   VITALS:    Visit Vitals  /79 Pulse 88   Temp 97 °F (36.1 °C)   Resp 16   Ht 5' 6\" (1.676 m)   Wt 75 kg (165 lb 5.5 oz)   SpO2 100%   Breastfeeding No   BMI 26.69 kg/m²     Temp (24hrs), Av.6 °F (36.4 °C), Min:97 °F (36.1 °C), Max:98.1 °F (36.7 °C)      PHYSICAL EXAM:     General: NAD, awake. Lethargic  Eyes: sclera anicteric  Oral Cavity: No thrush or ulcers  Neck: no JVD  Chest: Fair bilateral air entry. No Wheezing or Rhonchi. No rales. Heart: normal sounds  Abdomen: soft and non tender   :  escobedo  Lower Extremities: no edema  Skin: no rash  Neuro: She is lethargic. Withdraws to pain  Psychiatric: Unable to assess        LAB DATA REVIEWED:    Recent Results (from the past 24 hour(s))   GLUCOSE, POC    Collection Time: 10/12/21  4:42 PM   Result Value Ref Range    Glucose (POC) 167 (H) 65 - 117 mg/dL    Performed by Sandro Azul    GLUCOSE, POC    Collection Time: 10/12/21  8:34 PM   Result Value Ref Range    Glucose (POC) 121 (H) 65 - 117 mg/dL    Performed by CELIO JORGE    CBC WITH AUTOMATED DIFF    Collection Time: 10/13/21  5:00 AM   Result Value Ref Range    WBC 26.3 (H) 3.6 - 11.0 K/uL    RBC 5.03 3.80 - 5.20 M/uL    HGB 13.7 11.5 - 16.0 g/dL    HCT 43.9 35.0 - 47.0 %    MCV 87.3 80.0 - 99.0 FL    MCH 27.2 26.0 - 34.0 PG    MCHC 31.2 30.0 - 36.5 g/dL    RDW 19.7 (H) 11.5 - 14.5 %    PLATELET 93 (L) 941 - 400 K/uL    NRBC 0.1 (H) 0.0  WBC    ABSOLUTE NRBC 0.02 (H) 0.00 - 0.01 K/uL    NEUTROPHILS 90 (H) 32 - 75 %    LYMPHOCYTES 4 (L) 12 - 49 %    MONOCYTES 5 5 - 13 %    EOSINOPHILS 0 0 - 7 %    BASOPHILS 0 0 - 1 %    IMMATURE GRANULOCYTES 1 (H) 0 - 0.5 %    ABS. NEUTROPHILS 23.7 (H) 1.8 - 8.0 K/UL    ABS. LYMPHOCYTES 1.0 0.8 - 3.5 K/UL    ABS. MONOCYTES 1.3 (H) 0.0 - 1.0 K/UL    ABS. EOSINOPHILS 0.0 0.0 - 0.4 K/UL    ABS. BASOPHILS 0.0 0.0 - 0.1 K/UL    ABS. IMM.  GRANS. 0.3 (H) 0.00 - 0.04 K/UL    DF AUTOMATED     METABOLIC PANEL, COMPREHENSIVE    Collection Time: 10/13/21  5:00 AM   Result Value Ref Range    Sodium 147 (H) 136 - 145 mmol/L    Potassium 3.9 3.5 - 5.1 mmol/L    Chloride 112 (H) 97 - 108 mmol/L    CO2 23 21 - 32 mmol/L    Anion gap 12 5 - 15 mmol/L    Glucose 115 (H) 65 - 100 mg/dL    BUN 76 (H) 6 - 20 mg/dL    Creatinine 2.71 (H) 0.55 - 1.02 mg/dL    BUN/Creatinine ratio 28 (H) 12 - 20      GFR est AA 21 (L) >60 ml/min/1.73m2    GFR est non-AA 18 (L) >60 ml/min/1.73m2    Calcium 9.2 8.5 - 10.1 mg/dL    Bilirubin, total 1.2 (H) 0.2 - 1.0 mg/dL    AST (SGOT) 55 (H) 15 - 37 U/L    ALT (SGPT) 68 12 - 78 U/L    Alk.  phosphatase 95 45 - 117 U/L    Protein, total 5.7 (L) 6.4 - 8.2 g/dL    Albumin 1.7 (L) 3.5 - 5.0 g/dL    Globulin 4.0 2.0 - 4.0 g/dL    A-G Ratio 0.4 (L) 1.1 - 2.2     CULTURE, BLOOD    Collection Time: 10/13/21  5:00 AM    Specimen: Blood   Result Value Ref Range    Special Requests: No Special Requests      Culture result: No growth after 5 hours     VANCOMYCIN, RANDOM    Collection Time: 10/13/21  5:30 AM   Result Value Ref Range    Vancomycin, random 22.8 ug/mL   RENAL FUNCTION PANEL    Collection Time: 10/13/21  6:30 AM   Result Value Ref Range    Sodium 146 (H) 136 - 145 mmol/L    Potassium 3.7 3.5 - 5.1 mmol/L    Chloride 111 (H) 97 - 108 mmol/L    CO2 23 21 - 32 mmol/L    Anion gap 12 5 - 15 mmol/L    Glucose 129 (H) 65 - 100 mg/dL    BUN 73 (H) 6 - 20 mg/dL    Creatinine 2.72 (H) 0.55 - 1.02 mg/dL    BUN/Creatinine ratio 27 (H) 12 - 20      GFR est AA 21 (L) >60 ml/min/1.73m2    GFR est non-AA 17 (L) >60 ml/min/1.73m2    Calcium 9.5 8.5 - 10.1 mg/dL    Phosphorus 5.1 (H) 2.6 - 4.7 mg/dL    Albumin 1.7 (L) 3.5 - 5.0 g/dL   GLUCOSE, POC    Collection Time: 10/13/21  7:29 AM   Result Value Ref Range    Glucose (POC) 112 65 - 117 mg/dL    Performed by [address] [place] [address], POC    Collection Time: 10/13/21 11:23 AM   Result Value Ref Range    Glucose (POC) 133 (H) 65 - 117 mg/dL    Performed by Alton Pace        Recommendations/Plan:     #1 acute kidney injury on chronic kidney disease stage III  -She presented with a creatinine of 2.5   -Creatinine increased to 2.7. Stable at 2.7 today  -ADELINA likely prerenal secondary to sepsis/possible cardiorenal syndrome  -Patient noted to be hypotensive with systolic blood pressure in 80s on admission  -Recently discharged with creatinine 1.6 on 10/6  -She is on Lasix 40 mg IV daily which I will change to every 48 hours because of hypernatremia   -BNP is elevated and chest x-ray shows pulmonary congestion. However clinically she has no volume overload. No edema in both legs. Chest sounds clear  -Urine is bland  -CT abdomen without contrast on 10/11 shows no renal obstruction. No need for renal ultrasound  -I have gone through her medication list.  Not on any potential nephrotoxins preadmission  -We'll follow-up on renal function in a.m. #2 severe sepsis   -because of possible right parotitis with parapharyngeal and retropharyngeal  involvement per CT scan  -She had LP on admission which was negative  -Urine is bland. Covid negative. No infiltrate on chest x-ray  -She has been started on vancomycin and Zosyn pending cultures  -WBC count 24.4. Increased to 26.3 today . afebrile currently but had fever yesterday  -ID is on the case    #3 acute right parotitis with edema/fluid in parapharyngeal space  -ENT has been consulted. Plan for conservative management with IV antibiotics for now    #4 acute hypoxia  -Due to CHF exacerbation. She has history of cardiomyopathy with EF less than 15%  -Chest x-ray shows mild pulmonary congestion. BNP 15,000  -Continue Lasix 40 mg IV daily and monitor urine output    #5 altered mentation  -Could be secondary to metabolic derangements from sepsis. Unsure of her baseline mentation  -CT head was negative on admission    #6 renal osteodystrophy  -Pending results of intact parathyroid hormone  -Corrected calcium is high. Not on any vitamin D or calcium supplements. We'll continue to monitor.   Could be from intravascular depletion.   I will decrease Lasix to every 48 hours          ________________________________________________________________________  Signed: Amarilys Marr, MD

## 2021-10-13 NOTE — ANESTHESIA PREPROCEDURE EVALUATION
Anesthetic History   No history of anesthetic complications            Review of Systems / Medical History  Patient summary reviewed, nursing notes reviewed and pertinent labs reviewed    Pulmonary  Within defined limits                 Neuro/Psych         Psychiatric history     Cardiovascular    Hypertension      CHF (ef = 43%)  Dysrhythmias   Pacemaker (placed 2006) and hyperlipidemia      Comments: Echo:  · LV: Estimated LVEF is <15%. Dilated left ventricle. Severely reduced systolic function. Left ventricular diastolic dysfunction. · LA: Dilated left atrium. · RV: Dilated right ventricle. Mildly reduced systolic function. Pacer/ICD present. · RA: Dilated right atrium. · MV: Mitral valve leaflet calcification. Moderate mitral valve regurgitation is present. · TV: Moderate tricuspid valve regurgitation is present. · Pericardium: Small-to-moderate pericardial effusion.    GI/Hepatic/Renal         Renal disease: CRI       Endo/Other    Diabetes         Other Findings            Past Medical History:   Diagnosis Date    [facility] ([address]     Cardiomyopathy, idiopathic ([address] 10/28/2010    Chronic kidney disease     Heart failure ([facility] [address]     HTN (hypertension), benign 10/28/2010    Pacemaker     ICD    PVC (premature ventricular contraction) 10/28/2010       Past Surgical History:   Procedure Laterality Date    HX OTHER SURGICAL  10/02/2018    Right eye surgery    HX PACEMAKER         Current Outpatient Medications   Medication Instructions    amitriptyline (ELAVIL) 75 mg, Oral, DAILY    aspirin delayed-release 81 mg, Oral, DAILY    atorvastatin (LIPITOR) 40 mg, Oral, EVERY BEDTIME    carvediloL (COREG) 6.25 mg, Oral, 2 TIMES DAILY WITH MEALS    cloNIDine HCL (CATAPRES) 0.2 mg, Oral, 2 TIMES DAILY    cyclobenzaprine (FLEXERIL) 5 mg, Oral, 2 TIMES DAILY AS NEEDED    ELIQUIS 5 mg tablet TAKE 1 TABLET BY MOUTH TWICE A DAY    furosemide (Lasix) 40 mg tablet 1 tablet daily    gabapentin (NEURONTIN) 100 mg capsule TAKE 1 CAPSULE BY MOUTH THREE TIMES A DAY    losartan (COZAAR) 100 mg, Oral, DAILY    metFORMIN (GLUCOPHAGE) 1,000 mg tablet 2 TIMES DAILY WITH MEALS    metoprolol succinate (TOPROL-XL) 12.5 mg, Oral, DAILY    nitroglycerin (NITROSTAT) 0.4 mg, EVERY 5 MIN AS NEEDED    omeprazole (PRILOSEC) 20 mg, DAILY    oxyCODONE IR (ROXICODONE) 5 mg, Oral, EVERY 4 HOURS AS NEEDED    PARoxetine (PAXIL) 20 mg, Oral, DAILY    prednisoLONE acetate (PRED FORTE) 1 % ophthalmic suspension INSTILL 1 DROP INTO RIGHT EYE 4 TIMES A DAY       Current Facility-Administered Medications   Medication Dose Route Frequency    vancomycin (VANCOCIN) 500 mg in 0.9% sodium chloride (MBP/ADV) 100 mL MBP  500 mg IntraVENous ONCE    [START ON 10/14/2021] Vancomycin random level to be drawn on 10/14/21 at 1000 am.   Other ONCE    mupirocin (BACTROBAN) 2 % ointment   Topical BID    aspirin delayed-release tablet 81 mg  81 mg Oral DAILY    atorvastatin (LIPITOR) tablet 40 mg  40 mg Oral QHS    carvediloL (COREG) tablet 6.25 mg  6.25 mg Oral BID WITH MEALS    furosemide (LASIX) tablet 40 mg  40 mg Oral DAILY    prednisoLONE acetate (PRED FORTE) 1 % ophthalmic suspension 1 Drop  1 Drop Both Eyes TID    insulin lispro (HUMALOG) injection   SubCUTAneous AC&HS    glucose chewable tablet 16 g  4 Tablet Oral PRN    dextrose (D50W) injection syrg 12.5-25 g  25-50 mL IntraVENous PRN    glucagon (GLUCAGEN) injection 1 mg  1 mg IntraMUSCular PRN    acetaminophen (TYLENOL) tablet 650 mg  650 mg Oral Q6H PRN    Or    acetaminophen (TYLENOL) suppository 650 mg  650 mg Rectal Q6H PRN    polyethylene glycol (MIRALAX) packet 17 g  17 g Oral DAILY PRN    ondansetron (ZOFRAN ODT) tablet 4 mg  4 mg Oral Q8H PRN    Or    ondansetron (ZOFRAN) injection 4 mg  4 mg IntraVENous Q6H PRN    VANCOMYCIN INFORMATION NOTE   Other Rx Dosing/Monitoring    piperacillin-tazobactam (ZOSYN) 3.375 g in 0.9% sodium chloride (MBP/ADV) 100 mL MBP  3.375 g IntraVENous Q8H    dexmedeTOMidine (PRECEDEX) 400 mcg in 0.9% sodium chloride (MBP/ADV) 100 mL MBP  0.1-1.5 mcg/kg/hr IntraVENous TITRATE       Patient Vitals for the past 24 hrs:   Temp Pulse Resp BP SpO2   10/13/21 1345  92 22 116/88 100 %   10/13/21 1251  88 19 117/82 100 %   10/13/21 1127 36.7 °C (98.1 °F) 88 14 116/89 100 %   10/13/21 1100 36.7 °C (98.1 °F)       10/13/21 1030  85 18 122/89 99 %   10/13/21 0943  86 13 120/81 100 %   10/13/21 0907  89 15 (!) 120/91 98 %   10/13/21 0700 36.4 °C (97.5 °F) 86 10 (!) 121/97 100 %   10/13/21 0600  84 13 114/86 100 %   10/13/21 0500  80 17 116/81 100 %   10/13/21 0400  77 14 120/86 100 %   10/13/21 0300 36.3 °C (97.3 °F) 78 10 113/80 100 %   10/13/21 0200  79 15 110/88 100 %   10/13/21 0100 36.3 °C (97.4 °F) 80 16 118/88 100 %   10/13/21 0000 36.4 °C (97.5 °F) 82 11 (!) 116/91 100 %   10/12/21 2300 36.5 °C (97.7 °F) 83 15 109/80 99 %   10/12/21 2200 36.5 °C (97.7 °F) 83 18 (!) 108/97 100 %   10/12/21 2100  77  100/80 100 %   10/12/21 2000  83 24 100/79 100 %   10/12/21 1900 36.5 °C (97.7 °F) 83 19 105/80 100 %   10/12/21 1800   16 111/84 100 %   10/12/21 1700   13 103/81 94 %   10/12/21 1600   16 110/87 98 %   10/12/21 1500 36.8 °C (98.2 °F)  13 113/83 99 %       Lab Results   Component Value Date/Time    WBC 26.3 (H) 10/13/2021 05:00 AM    HGB 13.7 10/13/2021 05:00 AM    HCT 43.9 10/13/2021 05:00 AM    PLATELET 93 (L) 05/91/8761 05:00 AM    MCV 87.3 10/13/2021 05:00 AM     Lab Results   Component Value Date/Time    Sodium 146 (H) 10/13/2021 06:30 AM    Potassium 3.7 10/13/2021 06:30 AM    Chloride 111 (H) 10/13/2021 06:30 AM    CO2 23 10/13/2021 06:30 AM    Anion gap 12 10/13/2021 06:30 AM    Glucose 129 (H) 10/13/2021 06:30 AM    BUN 73 (H) 10/13/2021 06:30 AM    Creatinine 2.72 (H) 10/13/2021 06:30 AM    BUN/Creatinine ratio 27 (H) 10/13/2021 06:30 AM    GFR est AA 21 (L) 10/13/2021 06:30 AM    GFR est non-AA 17 (L) 10/13/2021 06:30 AM    Calcium 9.5 10/13/2021 06:30 AM     No results found for: APTT, PTP, INR, INREXT  Lab Results   Component Value Date/Time    Glucose 129 (H) 10/13/2021 06:30 AM    Glucose (POC) 133 (H) 10/13/2021 11:23 AM     Physical Exam    Airway  Mallampati: III  TM Distance: 4 - 6 cm  Neck ROM: normal range of motion   Mouth opening: Normal     Cardiovascular    Rhythm: regular  Rate: normal         Dental    Dentition: Lower dentition intact and Upper dentition intact     Pulmonary  Breath sounds clear to auscultation               Abdominal  GI exam deferred       Other Findings            Anesthetic Plan    ASA: 4  Anesthesia type: general          Induction: Intravenous  Anesthetic plan and risks discussed with: Patient and Family

## 2021-10-14 NOTE — PROGRESS NOTES
Infectious Disease Progress Note           Subjective:   Stable off pressors, incomprehensible speech. Afebrile, tolerating tube feeds. No acute events since last seen   Objective:   Physical Exam:     Visit Vitals  BP (!) 124/94 (BP Patient Position: At rest)   Pulse 90   Temp 97.5 °F (36.4 °C)   Resp 25   Ht 5' 6\" (1.676 m)   Wt 173 lb 1 oz (78.5 kg)   SpO2 100%   Breastfeeding No   BMI 27.93 kg/m²    O2 Flow Rate (L/min): 2 l/min O2 Device: None (Room air)    Temp (24hrs), Av.4 °F (36.3 °C), Min:96.8 °F (36 °C), Max:100.4 °F (38 °C)    10/14 0701 - 10/14 1900  In: 401.9 [I.V.:1.9]  Out: -    10/12 1901 - 10/14 0700  In: 696.1 [I.V.:496.1]  Out: 1250 [Urine:1250]    General: NAD, alert, incomprehensible speech   HEENT: SHARON, Moist mucosa, Stable right facial induration, no fluctuance   Lungs: CTA b/l, decreased at the bases, no wheeze/rhonchi   Heart: S1S2+, RRR, no murmur  Abdo: Soft, NT, ND, +BS, peg tube in place   : + escobedo cath   Exts: no edema  + pulses b/l   Skin: No wounds, No rashes or lesions      Data Review:       Recent Days:  Recent Labs     10/14/21  0540 10/13/21  0500 10/12/21  0400   WBC 27.7* 26.3* 24.4*   HGB 14.1 13.7 13.3   HCT 44.8 43.9 41.3   PLT 84* 93* 89*     Recent Labs     10/14/21  0540 10/13/21  0630 10/13/21  0500   BUN 77* 73* 76*   CREA 2.63* 2.72* 2.71*       No results found for: CRPQN, CRP, CRPM       Microbiology     Results     Procedure Component Value Units Date/Time    CULTURE, BLOOD [[id number]] Collected: 10/13/21 0500    Order Status: Completed Specimen: Blood Updated: 10/14/21 1320     Special Requests: No Special Requests        Culture result:       Gram Positive Cocci in clusters growing in both bottles drawn CALLED TO AND READ BACK BY HARVINDER NORIEGA ([facility]) ON 10/14/21 AT 1157.  [facility]                  CALLED TO AND READ BACK BY  Romel Whitfield R.N. 9072 10/14/2021 BY DPW      CULTURE, Alexander Daria KEVIN [[id number]]     Order Status: Sent Specimen: Wound from Mouth     MRSA SCREEN - PCR (NASAL) [[id number]]  (Abnormal) Collected: 10/11/21 1025    Order Status: Completed Specimen: Swab Updated: 10/11/21 1147     MRSA by PCR, Nasal DETECTED        Comment: Results verified, phoned to and read back by Elizabeth Ferrera 10/11/21 11:50       MRSA SCREEN - PCR (NASAL) [[id number]] Collected: 10/11/21 0945    Order Status: Canceled Specimen: Swab     COVID-19 RAPID TEST [[id number]] Collected: 10/11/21 0930    Order Status: Completed Specimen: Nasopharyngeal Updated: 10/11/21 1104     Specimen source       Please find results under separate order           COVID-19 rapid test Not Detected        Comment: Rapid Abbott ID Now   Rapid NAAT:  The specimen is NEGATIVE for SARS-CoV-2, the novel coronavirus associated with COVID-19. Negative results should be treated as presumptive and, if inconsistent with clinical signs and symptoms or necessary for patient management, should be tested with an alternative molecular assay. Negative results do not preclude SARS-CoV-2 infection and should not be used as the sole basis for patient management decisions. This test has been authorized by the FDA under   an Emergency Use Authorization (EUA) for use by authorized laboratories.  Fact sheet for Healthcare Providers: ConventionUpdate.co.nz Fact sheet for Patients: ConventionUpdate.co.nz   Methodology: Isothermal Nucleic Acid Amplification         CULTURE, BLOOD, LINE [[id number]] Collected: 10/11/21 0930    Order Status: Canceled Specimen: Blood     CULTURE, URINE [[id number]] Collected: 10/11/21 0930    Order Status: Completed Specimen: Urine Updated: 10/12/21 1049     Special Requests: No Special Requests        Culture result: No Growth (<1000 cfu/mL)       CULTURE, CSF  TUBE 2 [[id number]] Collected: 10/11/21 0504    Order Status: Completed Specimen: Cerebrospinal Fluid Updated: 10/12/21 1017     Special Requests: No Special Requests        GRAM STAIN No wbc's seen         No organisms seen        Culture result:       No growth thus far holding 7 days          CULTURE, BLOOD, PAIRED [[id number]]  (Abnormal) Collected: 10/11/21 0225    Order Status: Completed Specimen: Blood Updated: 10/14/21 0927     Special Requests: No Special Requests        Culture result:       Preliminary report of Methicillin Resistant Staphylococcus aureus by antigen detection. Confirmation and Sensitivities to follow. growing in all 4 bottles drawn  (SITE NOT INDICATED)      COVID-19 RAPID TEST [[id number]] Collected: 10/02/21 1730    Order Status: Completed Specimen: Nasopharyngeal Updated: 10/02/21 1814     Specimen source Nasopharyngeal        COVID-19 rapid test Not Detected        Comment: Rapid Abbott ID Now   Rapid NAAT:  The specimen is NEGATIVE for SARS-CoV-2, the novel coronavirus associated with COVID-19. Negative results should be treated as presumptive and, if inconsistent with clinical signs and symptoms or necessary for patient management, should be tested with an alternative molecular assay. Negative results do not preclude SARS-CoV-2 infection and should not be used as the sole basis for patient management decisions. This test has been authorized by the FDA under   an Emergency Use Authorization (EUA) for use by authorized laboratories. Fact sheet for Healthcare Providers: ConventionUpdate.co.nz Fact sheet for Patients: ConventionUpdate.co.nz   Methodology: Isothermal Nucleic Acid Amplification                  Diagnostics   CXR Results  (Last 48 hours)    None             Assessment/Plan     1.  MRSA bacteremia:BCx positive on 10/11, repeat  10/13 still positive       Afebrile, rising WBC on todays labs       Again suspect deep tissue infection including IE       Recommend LETI if bacteremia persist (if no Contraindications)       Continue on Vanc, Ceftaroline added for double MRSA coverage, also started on renally dosed Rifampin       Repeat BC ordered for AM as well as CRP, Procal and ESR         2. Acute right parotitis per CT, stable right facial swelling/indration       Continue conservative mgt w abx as above       3. MRSA colonization: On day # 3 of Mupirocin     4. Cardiomyopathy w baseline EF of <15 % earlier this month      No peripheral edema on exam, remains on RA      4. Dysphagia: S/p Peg tube placement on 10/13, started on tube feeds     5.  Acute on chronic renal failure, renally dose antibiotics     Rachel Ramires MD    10/14/2021

## 2021-10-14 NOTE — PROGRESS NOTES
Bedside shift change report given to   [facility] . RN  by Estephania Brock. Mary Jo Adrian

## 2021-10-14 NOTE — PROGRESS NOTES
General Daily Progress Note          Patient Name:   Felicitas Escobar       YOB: 1955       Age:  78 y.o.       Admit Date: 10/11/2021      Subjective:     Patient is a 78y.o. year old female history of chronic A. fib chronic kidney disease hypertension pacemaker congestive heart failure with ejection fraction of 15 to 20% history of diabetes hypertension patient have a Covid few months ago since then but he declined patient was recently discharged from the hospital with congestive heart failure came back with altered mental status not awake not talking seen by the ER physician CT scan of the head was negative  Patient had WBC count elevated increased lactic acid concern of sepsis sepsis patient was LP done in the ER which was normal patient received 2 g of Rocephin when I saw the patient was still was altered I ordered repeat lactic acid ordered CT scan of the maxillary facial area swelling of the right parotid area and admitted to ICU for further work-up discussed with the patient daughter     According to the patient daughter since she have a Covid in March April this year she is declining she is in the hospital third time this month        Patient resting the bed awake but not talking  Static post PEG tube placement             Objective:     Visit Vitals  BP (!) 131/94 (BP Patient Position: At rest)   Pulse 88   Temp 96.8 °F (36 °C)   Resp 10   Ht 5' 6\" (1.676 m)   Wt 78.5 kg (173 lb 1 oz)   SpO2 100%   Breastfeeding No   BMI 27.93 kg/m²        Recent Results (from the past 24 hour(s))   GLUCOSE, POC    Collection Time: 10/13/21 11:23 AM   Result Value Ref Range    Glucose (POC) 133 (H) 65 - 117 mg/dL    Performed by [address] [place] [address], POC    Collection Time: 10/13/21  3:32 PM   Result Value Ref Range    Glucose (POC) 147 (H) 65 - 117 mg/dL    Performed by [address] [place] [address], POC    Collection Time: 10/13/21  9:27 PM   Result Value Ref Range    Glucose (POC) 120 (H) 65 - 117 mg/dL    Performed by Sarah Martinez (CNA)    CBC WITH AUTOMATED DIFF    Collection Time: 10/14/21  5:40 AM   Result Value Ref Range    WBC 27.7 (H) 3.6 - 11.0 K/uL    RBC 5.07 3.80 - 5.20 M/uL    HGB 14.1 11.5 - 16.0 g/dL    HCT 44.8 35.0 - 47.0 %    MCV 88.4 80.0 - 99.0 FL    MCH 27.8 26.0 - 34.0 PG    MCHC 31.5 30.0 - 36.5 g/dL    RDW 19.6 (H) 11.5 - 14.5 %    PLATELET 84 (L) 671 - 400 K/uL    NRBC 0.1 (H) 0.0  WBC    ABSOLUTE NRBC 0.02 (H) 0.00 - 0.01 K/uL    NEUTROPHILS 89 (H) 32 - 75 %    LYMPHOCYTES 4 (L) 12 - 49 %    MONOCYTES 6 5 - 13 %    EOSINOPHILS 0 0 - 7 %    BASOPHILS 0 0 - 1 %    IMMATURE GRANULOCYTES 1 (H) 0 - 0.5 %    ABS. NEUTROPHILS 24.6 (H) 1.8 - 8.0 K/UL    ABS. LYMPHOCYTES 1.2 0.8 - 3.5 K/UL    ABS. MONOCYTES 1.7 (H) 0.0 - 1.0 K/UL    ABS. EOSINOPHILS 0.1 0.0 - 0.4 K/UL    ABS. BASOPHILS 0.1 0.0 - 0.1 K/UL    ABS. IMM. GRANS. 0.2 (H) 0.00 - 0.04 K/UL    DF AUTOMATED     METABOLIC PANEL, COMPREHENSIVE    Collection Time: 10/14/21  5:40 AM   Result Value Ref Range    Sodium 150 (H) 136 - 145 mmol/L    Potassium 3.7 3.5 - 5.1 mmol/L    Chloride 114 (H) 97 - 108 mmol/L    CO2 24 21 - 32 mmol/L    Anion gap 12 5 - 15 mmol/L    Glucose 180 (H) 65 - 100 mg/dL    BUN 77 (H) 6 - 20 mg/dL    Creatinine 2.63 (H) 0.55 - 1.02 mg/dL    BUN/Creatinine ratio 29 (H) 12 - 20      GFR est AA 22 (L) >60 ml/min/1.73m2    GFR est non-AA 18 (L) >60 ml/min/1.73m2    Calcium 8.8 8.5 - 10.1 mg/dL    Bilirubin, total 1.3 (H) 0.2 - 1.0 mg/dL    AST (SGOT) 25 15 - 37 U/L    ALT (SGPT) 53 12 - 78 U/L    Alk. phosphatase 88 45 - 117 U/L    Protein, total 5.7 (L) 6.4 - 8.2 g/dL    Albumin 1.7 (L) 3.5 - 5.0 g/dL    Globulin 4.0 2.0 - 4.0 g/dL    A-G Ratio 0.4 (L) 1.1 - 2.2     GLUCOSE, POC    Collection Time: 10/14/21  7:28 AM   Result Value Ref Range    Glucose (POC) 191 (H) 65 - 117 mg/dL    Performed by Noni Cintron      [unfilled]      Review of Systems    Unable to obtain e.         Physical Exam: Constitutional: Not much verbal HENT:   Head: Normocephalic and atraumatic. Eyes: Pupils are equal, round, and reactive to light. EOM are normal.   Cardiovascular: Normal rate, regular rhythm and normal heart sounds. Pulmonary/Chest: Breath sounds normal. No wheezes. No rales. Exhibits no tenderness. Abdominal: Soft. Bowel sounds are normal. There is no abdominal tenderness. There is no rebound and no guarding. Musculoskeletal: Normal range of motion. Neurological: Moves extremities    CT MAXILLOFACIAL WO CONT   Final Result   Findings more consistent with cellulitis of the right face that   includes the right parotid gland (acute parotitis) as described above. Edema/fluid collection in the parapharyngeal space and retropharyngeal space. Suggestions as above. CT ABD PELV WO CONT   Final Result   Small amount of ascites. No focal fluid collection or free air. Density in the gallbladder which could be artifact, sludge or gallstones. No   specific CT evidence of cholecystitis. Bladder wall prominence. Other findings   as above. CT HEAD WO CONT   Final Result   1. No evidence of acute intracranial hemorrhage or mass effect. 2.  Multifocal intracranial encephalomalacia as above. XR CHEST PORT   Final Result   1. Mild pulmonary vascular congestion. 2.  Cardiomegaly.            Recent Results (from the past 24 hour(s))   GLUCOSE, POC    Collection Time: 10/13/21 11:23 AM   Result Value Ref Range    Glucose (POC) 133 (H) 65 - 117 mg/dL    Performed by [address] [place] [address], POC    Collection Time: 10/13/21  3:32 PM   Result Value Ref Range    Glucose (POC) 147 (H) 65 - 117 mg/dL    Performed by [address] [place] [address], POC    Collection Time: 10/13/21  9:27 PM   Result Value Ref Range    Glucose (POC) 120 (H) 65 - 117 mg/dL    Performed by Micha Winchestre (ANGEL)    CBC WITH AUTOMATED DIFF    Collection Time: 10/14/21  5:40 AM   Result Value Ref Range    WBC 27.7 (H) 3.6 - 11.0 K/uL    RBC 5.07 3.80 - 5.20 M/uL    HGB 14.1 11.5 - 16.0 g/dL    HCT 44.8 35.0 - 47.0 %    MCV 88.4 80.0 - 99.0 FL    MCH 27.8 26.0 - 34.0 PG    MCHC 31.5 30.0 - 36.5 g/dL    RDW 19.6 (H) 11.5 - 14.5 %    PLATELET 84 (L) 499 - 400 K/uL    NRBC 0.1 (H) 0.0  WBC    ABSOLUTE NRBC 0.02 (H) 0.00 - 0.01 K/uL    NEUTROPHILS 89 (H) 32 - 75 %    LYMPHOCYTES 4 (L) 12 - 49 %    MONOCYTES 6 5 - 13 %    EOSINOPHILS 0 0 - 7 %    BASOPHILS 0 0 - 1 %    IMMATURE GRANULOCYTES 1 (H) 0 - 0.5 %    ABS. NEUTROPHILS 24.6 (H) 1.8 - 8.0 K/UL    ABS. LYMPHOCYTES 1.2 0.8 - 3.5 K/UL    ABS. MONOCYTES 1.7 (H) 0.0 - 1.0 K/UL    ABS. EOSINOPHILS 0.1 0.0 - 0.4 K/UL    ABS. BASOPHILS 0.1 0.0 - 0.1 K/UL    ABS. IMM. GRANS. 0.2 (H) 0.00 - 0.04 K/UL    DF AUTOMATED     METABOLIC PANEL, COMPREHENSIVE    Collection Time: 10/14/21  5:40 AM   Result Value Ref Range    Sodium 150 (H) 136 - 145 mmol/L    Potassium 3.7 3.5 - 5.1 mmol/L    Chloride 114 (H) 97 - 108 mmol/L    CO2 24 21 - 32 mmol/L    Anion gap 12 5 - 15 mmol/L    Glucose 180 (H) 65 - 100 mg/dL    BUN 77 (H) 6 - 20 mg/dL    Creatinine 2.63 (H) 0.55 - 1.02 mg/dL    BUN/Creatinine ratio 29 (H) 12 - 20      GFR est AA 22 (L) >60 ml/min/1.73m2    GFR est non-AA 18 (L) >60 ml/min/1.73m2    Calcium 8.8 8.5 - 10.1 mg/dL    Bilirubin, total 1.3 (H) 0.2 - 1.0 mg/dL    AST (SGOT) 25 15 - 37 U/L    ALT (SGPT) 53 12 - 78 U/L    Alk.  phosphatase 88 45 - 117 U/L    Protein, total 5.7 (L) 6.4 - 8.2 g/dL    Albumin 1.7 (L) 3.5 - 5.0 g/dL    Globulin 4.0 2.0 - 4.0 g/dL    A-G Ratio 0.4 (L) 1.1 - 2.2     GLUCOSE, POC    Collection Time: 10/14/21  7:28 AM   Result Value Ref Range    Glucose (POC) 191 (H) 65 - 117 mg/dL    Performed by Verita Matsu        Results     Procedure Component Value Units Date/Time    CULTURE, BLOOD [[id number]] Collected: 10/13/21 0500    Order Status: Completed Specimen: Blood Updated: 10/14/21 0959     Special Requests: No Special Requests        Culture result: No growth 1 day       CULTURE, Alexander Daria STAIN [[id number]]     Order Status: Sent Specimen: Wound from Mouth     MRSA SCREEN - PCR (NASAL) [[id number]]  (Abnormal) Collected: 10/11/21 1025    Order Status: Completed Specimen: Swab Updated: 10/11/21 1147     MRSA by PCR, Nasal DETECTED        Comment: Results verified, phoned to and read back by Gerry Lemos 10/11/21 11:50       MRSA SCREEN - PCR (NASAL) [[id number]] Collected: 10/11/21 0945    Order Status: Canceled Specimen: Swab     COVID-19 RAPID TEST [[id number]] Collected: 10/11/21 0930    Order Status: Completed Specimen: Nasopharyngeal Updated: 10/11/21 1104     Specimen source       Please find results under separate order           COVID-19 rapid test Not Detected        Comment: Rapid Abbott ID Now   Rapid NAAT:  The specimen is NEGATIVE for SARS-CoV-2, the novel coronavirus associated with COVID-19. Negative results should be treated as presumptive and, if inconsistent with clinical signs and symptoms or necessary for patient management, should be tested with an alternative molecular assay. Negative results do not preclude SARS-CoV-2 infection and should not be used as the sole basis for patient management decisions. This test has been authorized by the FDA under   an Emergency Use Authorization (EUA) for use by authorized laboratories.  Fact sheet for Healthcare Providers: ConventionUpdate.co.nz Fact sheet for Patients: ConventionUpdate.co.nz   Methodology: Isothermal Nucleic Acid Amplification         CULTURE, BLOOD, LINE [[id number]] Collected: 10/11/21 0930    Order Status: Canceled Specimen: Blood     CULTURE, URINE [[id number]] Collected: 10/11/21 0930    Order Status: Completed Specimen: Urine Updated: 10/12/21 1049     Special Requests: No Special Requests        Culture result: No Growth (<1000 cfu/mL)       CULTURE, CSF  TUBE 2 [[id number]] Collected: 10/11/21 0504    Order Status: Completed Specimen: Cerebrospinal Fluid Updated: 10/12/21 1017     Special Requests: No Special Requests        GRAM STAIN No wbc's seen         No organisms seen        Culture result:       No growth thus far holding 7 days          CULTURE, BLOOD, PAIRED [[id number]]  (Abnormal) Collected: 10/11/21 0225    Order Status: Completed Specimen: Blood Updated: 10/14/21 0927     Special Requests: No Special Requests        Culture result:       Preliminary report of Methicillin Resistant Staphylococcus aureus by antigen detection. Confirmation and Sensitivities to follow. growing in all 4 bottles drawn  (SITE NOT INDICATED)      COVID-19 RAPID TEST [[id number]] Collected: 10/02/21 1730    Order Status: Completed Specimen: Nasopharyngeal Updated: 10/02/21 1814     Specimen source Nasopharyngeal        COVID-19 rapid test Not Detected        Comment: Rapid Abbott ID Now   Rapid NAAT:  The specimen is NEGATIVE for SARS-CoV-2, the novel coronavirus associated with COVID-19. Negative results should be treated as presumptive and, if inconsistent with clinical signs and symptoms or necessary for patient management, should be tested with an alternative molecular assay. Negative results do not preclude SARS-CoV-2 infection and should not be used as the sole basis for patient management decisions. This test has been authorized by the FDA under   an Emergency Use Authorization (EUA) for use by authorized laboratories.  Fact sheet for Healthcare Providers: ConventionUpdate.co.nz Fact sheet for Patients: ConventionUpdate.co.nz   Methodology: Isothermal Nucleic Acid Amplification                Labs:     Recent Labs     10/14/21  0540 10/13/21  0500   WBC 27.7* 26.3*   HGB 14.1 13.7   HCT 44.8 43.9   PLT 84* 93*     Recent Labs     10/14/21  0540 10/13/21  0630 10/13/21  0500   * 146* 147*   K 3.7 3.7 3.9   * 111* 112*   CO2 24 23 23   BUN 77* 73* 76*   CREA 2.63* 2.72* 2.71*   * 129* 115*   CA 8.8 9.5 9.5  9.2   PHOS  --  5.1*  --      Recent Labs     10/14/21  0540 10/13/21  0630 10/13/21  0500 10/12/21  0400 10/12/21  0400   ALT 53  --  68  --  61   AP 88  --  95  --  82   TBILI 1.3*  --  1.2*  --  1.0   TP 5.7*  --  5.7*  --  5.9*   ALB 1.7* 1.7* 1.7*   < > 1.9*   GLOB 4.0  --  4.0  --  4.0    < > = values in this interval not displayed. No results for input(s): INR, PTP, APTT, INREXT, INREXT in the last 72 hours. No results for input(s): FE, TIBC, PSAT, FERR in the last 72 hours. Lab Results   Component Value Date/Time    Folate 12.5 10/02/2021 07:37 PM      Recent Labs     10/11/21  1655   PH 7.44   PCO2 29*   PO2 185*     No results for input(s): CPK, CKNDX, TROIQ in the last 72 hours.     No lab exists for component: CPKMB  Lab Results   Component Value Date/Time    Cholesterol, total 81 10/03/2021 07:08 AM    HDL Cholesterol 25 10/03/2021 07:08 AM    LDL, calculated 37.6 10/03/2021 07:08 AM    Triglyceride 92 10/03/2021 07:08 AM    CHOL/HDL Ratio 3.2 10/03/2021 07:08 AM     Lab Results   Component Value Date/Time    Glucose (POC) 191 (H) 10/14/2021 07:28 AM    Glucose (POC) 120 (H) 10/13/2021 09:27 PM    Glucose (POC) 147 (H) 10/13/2021 03:32 PM    Glucose (POC) 133 (H) 10/13/2021 11:23 AM    Glucose (POC) 112 10/13/2021 07:29 AM     Lab Results   Component Value Date/Time    Color Kathie 10/11/2021 01:33 AM    Appearance Clear 10/11/2021 01:33 AM    Specific gravity 1.016 10/11/2021 01:33 AM    pH (UA) 5.0 10/11/2021 01:33 AM    Protein 30 (A) 10/11/2021 01:33 AM    Glucose Negative 10/11/2021 01:33 AM    Ketone Negative 10/11/2021 01:33 AM    Bilirubin Negative 10/11/2021 01:33 AM    Urobilinogen 4.0 (H) 10/11/2021 01:33 AM    Nitrites Negative 10/11/2021 01:33 AM    Leukocyte Esterase Negative 10/11/2021 01:33 AM    Bacteria Negative 10/11/2021 01:33 AM    WBC 0-4 10/11/2021 01:33 AM    RBC 0-5 10/11/2021 01:33 AM         Assessment:     Altered mental status  Metabolic encephalopathy  Sepsis  Sepsis with bacteremia with MRSA  Acute proctitis  Lactic acidosis  Leukocytosis  Chronic A.  Fib  Congestive heart failure  Hypertension  Cardiomyopathy ejection fraction 15 x 20%  Acute on chronic kidney disease  Type 2 diabetes  Static post PEG tube placement  Hyponatremia    Plan:   Patient consult for PEG tube feeding  Nephrology consult for hyper natremia  Continue vancomycin and Zosyn  Nephrology consulted for acute on chronic kidney disease    Continue aspirin 81 mg daily  Lipitor 40 daily  Coreg 6.25 twice daily  Lasix 40 daily      Overall prognosis poor  Monitor renal function/sodium level    Repeat the labs        Current Facility-Administered Medications:     Vancomycin random level to be drawn on 10/14/21 at 1000 am., , Other, Torie White MD    [START ON 10/15/2021] furosemide (LASIX) tablet 40 mg, 40 mg, Oral, Q48H, Felix Jacobson MD    mupirocin (BACTROBAN) 2 % ointment, , Topical, BID, Torie Mei MD, Given at 10/14/21 0845    aspirin delayed-release tablet 81 mg, 81 mg, Oral, DAILY, Ammy Dumont MD    atorvastatin (LIPITOR) tablet 40 mg, 40 mg, Oral, QHS, Ammy Dumont MD, 40 mg at 10/13/21 2116    carvediloL (COREG) tablet 6.25 mg, 6.25 mg, Oral, BID WITH MEALS, Ammy Dumont MD, 6.25 mg at 10/14/21 0845    prednisoLONE acetate (PRED FORTE) 1 % ophthalmic suspension 1 Drop, 1 Drop, Both Eyes, TID, Ammy Dumont MD, 1 Drop at 10/14/21 0844    insulin lispro (HUMALOG) injection, , SubCUTAneous, AC&HS, Ammy Dumont MD, 3 Units at 10/14/21 0844    glucose chewable tablet 16 g, 4 Tablet, Oral, PRN, Gabriella Dumont MD    dextrose (D50W) injection syrg 12.5-25 g, 25-50 mL, IntraVENous, PRN, Gabriella Dumont MD    glucagon (GLUCAGEN) injection 1 mg, 1 mg, IntraMUSCular, PRN, Gabriella Dumont MD    acetaminophen (TYLENOL) tablet 650 mg, 650 mg, Oral, Q6H PRN **OR** acetaminophen (TYLENOL) suppository 650 mg, 650 mg, Rectal, Q6H PRN, Naomi Dumont MD    polyethylene glycol (MIRALAX) packet 17 g, 17 g, Oral, DAILY PRN, Naomi Dumont MD    ondansetron (ZOFRAN ODT) tablet 4 mg, 4 mg, Oral, Q8H PRN **OR** ondansetron (ZOFRAN) injection 4 mg, 4 mg, IntraVENous, Q6H PRN, Ammy Dumont MD    VANCOMYCIN INFORMATION NOTE, , Other, Rx Dosing/Monitoring, Naomi Dumont MD    piperacillin-tazobactam (ZOSYN) 3.375 g in 0.9% sodium chloride (MBP/ADV) 100 mL MBP, 3.375 g, IntraVENous, Q8H, Ammy Dumont MD, Last Rate: 200 mL/hr at 10/14/21 0844, 3.375 g at 10/14/21 0844    dexmedeTOMidine (PRECEDEX) 400 mcg in 0.9% sodium chloride (MBP/ADV) 100 mL MBP, 0.1-1.5 mcg/kg/hr, IntraVENous, TITRATE, Ammy Dumont MD, Last Rate: 1.9 mL/hr at 10/14/21 0909, 0.1 mcg/kg/hr at 10/14/21 8196

## 2021-10-14 NOTE — PROGRESS NOTES
[facility]  HD 4  Precedex gtt    No acute issues  Appears more alert, following some commands for me this AM    Visit Vitals  BP (!) 121/95 (BP 1 Location: Right upper arm, BP Patient Position: At rest)   Pulse 89   Temp 96.8 °F (36 °C)   Resp 20   Ht 5' 6\" (1.676 m)   Wt 173 lb 1 oz (78.5 kg)   SpO2 100%   Breastfeeding No   BMI 27.93 kg/m²       Breathing comfortably  Opens mouth etc on instruction  R facial swelling, firm. More or less unchanged - not much better, has not progressed  No skin changes  Oral mucosa very dry   Thick purulence again from [facility] duct    WBC 27      Blood cx   Preliminary report of Methicillin Resistant Staphylococcus aureus by antigen detection. Confirmation and Sensitivities to follow. growing in all 4 bottles drawn   (SITE NOT INDICATED)   CALLED TO AND READ BACK BY   NAEL CADENA RN AT 8168 BY JANETTE     A/P MRSA sepsis  Parotitis   - She does have purulence draining from [place] duct  - Very dry oral mucosa  - S/p g tube yesterday  - Cont hydration as medically tolerated with CHF   - Oral care  - Warm compresses and massage   - Slight increase in WBC though she overall appears clinically more alert, and she is continuing to drain purulence from the duct. Would cont abx for now.  Could consider repeat imaging if plateaus    MD Gabriel SanchezKristina Ville [id number] [facility]  [address] [place] [address] [address]  [place]  Office Phone: [phone]

## 2021-10-14 NOTE — PROGRESS NOTES
10/14/2021, 10:04 AM        [facility]  Phone: [phone]      Daily Progress Note:      Patient identification:     Di Trevizo  78 y. o.female  1955      Primary Cardiologist:  Dr. Darrius Ward    Chief Complaint: Cardiomyopathy    Assessment:     1. Sepsis/cellulitis/parotitis with retropharyngeal involvement. 2. History of cardiomyopathy. Status post ICD. LVEF less than 15% by echocardiogram 10/3/2021. Grade 2 diastolic dysfunction. Not on ACE/ARB due to CKD  3. Paroxysmal atrial fibrillation. Previously on apixaban. 4. Acute on chronic kidney disease. 5. Moderate mitral regurgitation, moderate tricuspid regurgitation. 6. Small to moderate sized pericardial effusion. 7. History of diabetes insulin requiring. 8. History of Covid 19  9. History of CKD    Recommendation:     1. Guideline directed medical therapy for cardiomyopathy. 2. Maintain euvolemia. 3. Continue supportive care. 4. Antibiotics per primary team.  5. We can consider adding combination of Imdur and hydralazine in lieu of ACE/ARB once medically stable. Interval History:  No new interval cardiac developments. Subjective:  Patient very somnolent does not provide any meaningful history at this time.     Review of Systems:   Unobtainable    Medications:     Current Facility-Administered Medications   Medication Dose Route Frequency    Vancomycin random level to be drawn on 10/14/21 at 1000 am.   Other ONCE    [START ON 10/15/2021] furosemide (LASIX) tablet 40 mg  40 mg Oral Q48H    mupirocin (BACTROBAN) 2 % ointment   Topical BID    aspirin delayed-release tablet 81 mg  81 mg Oral DAILY    atorvastatin (LIPITOR) tablet 40 mg  40 mg Oral QHS    carvediloL (COREG) tablet 6.25 mg  6.25 mg Oral BID WITH MEALS    prednisoLONE acetate (PRED FORTE) 1 % ophthalmic suspension 1 Drop  1 Drop Both Eyes TID    insulin lispro (HUMALOG) injection   SubCUTAneous AC&HS  glucose chewable tablet 16 g  4 Tablet Oral PRN    dextrose (D50W) injection syrg 12.5-25 g  25-50 mL IntraVENous PRN    glucagon (GLUCAGEN) injection 1 mg  1 mg IntraMUSCular PRN    acetaminophen (TYLENOL) tablet 650 mg  650 mg Oral Q6H PRN    Or    acetaminophen (TYLENOL) suppository 650 mg  650 mg Rectal Q6H PRN    polyethylene glycol (MIRALAX) packet 17 g  17 g Oral DAILY PRN    ondansetron (ZOFRAN ODT) tablet 4 mg  4 mg Oral Q8H PRN    Or    ondansetron (ZOFRAN) injection 4 mg  4 mg IntraVENous Q6H PRN    VANCOMYCIN INFORMATION NOTE   Other Rx Dosing/Monitoring    piperacillin-tazobactam (ZOSYN) 3.375 g in 0.9% sodium chloride (MBP/ADV) 100 mL MBP  3.375 g IntraVENous Q8H    dexmedeTOMidine (PRECEDEX) 400 mcg in 0.9% sodium chloride (MBP/ADV) 100 mL MBP  0.1-1.5 mcg/kg/hr IntraVENous TITRATE       Allergies: Allergies   Allergen Reactions    Chocolate [Cocoa] Anaphylaxis and Swelling    Iodine Anaphylaxis    Lisinopril Anaphylaxis    Peanut Anaphylaxis and Swelling    Glimepiride Swelling    Glipizide Other (comments)     Pruritis    Metformin Diarrhea    Norvasc [Amlodipine] Other (comments)     Light Headed    Pneumovax 23 [Pneumococcal 23-Devi Ps Vaccine] Hives    Toprol Xl [Metoprolol Succinate] Other (comments)     Light headed    Tositumomab I-131 (Maltose) Unable to Obtain    Vitamin D [Cholecalciferol (Vitamin D3)] Other (comments)     Dizziness/headache not a true allergy.        Physical Exam:   Visit Vitals  BP (!) 131/94 (BP Patient Position: At rest)   Pulse 88   Temp 96.8 °F (36 °C)   Resp 10   Ht 5' 6\" (1.676 m)   Wt 78.5 kg (173 lb 1 oz)   SpO2 100%   Breastfeeding No   BMI 27.93 kg/m²       General:  NAD, somnolent  CVS:  Regular rate and rhythm, normal S1S2, no new murmurs, rubs or gallops  Pulm:  Normal effort, clear to auscultation bilaterally  Abd:  Soft, non-tender, non-distended  Ext:  Warm and well perfused without edema  Neuro: Somnolent    Telemetry reviewed: Normal sinus rhythm    Labs:    CBC  Lab Results   Component Value Date/Time    WBC 27.7 (H) 10/14/2021 05:40 AM    RBC 5.07 10/14/2021 05:40 AM    HCT 44.8 10/14/2021 05:40 AM    MCV 88.4 10/14/2021 05:40 AM    MCH 27.8 10/14/2021 05:40 AM    RDW 19.6 (H) 10/14/2021 05:40 AM    MONOS 6 10/14/2021 05:40 AM    EOS 0 10/14/2021 05:40 AM    BASOS 0 10/14/2021 05:40 AM       Basic Metabolic Profile  Lab Results   Component Value Date     (H) 10/14/2021    CO2 24 10/14/2021    BUN 77 (H) 10/14/2021       Diagnostic Studies:    Echo Results  (Last 48 hours)    None        CT MAXILLOFACIAL WO CONT   Final Result   Findings more consistent with cellulitis of the right face that   includes the right parotid gland (acute parotitis) as described above. Edema/fluid collection in the parapharyngeal space and retropharyngeal space. Suggestions as above. CT ABD PELV WO CONT   Final Result   Small amount of ascites. No focal fluid collection or free air. Density in the gallbladder which could be artifact, sludge or gallstones. No   specific CT evidence of cholecystitis. Bladder wall prominence. Other findings   as above. CT HEAD WO CONT   Final Result   1. No evidence of acute intracranial hemorrhage or mass effect. 2.  Multifocal intracranial encephalomalacia as above. XR CHEST PORT   Final Result   1. Mild pulmonary vascular congestion. 2.  Cardiomegaly.             Dr. Emanuel Ortiz MD, [facility]

## 2021-10-14 NOTE — PROGRESS NOTES
Renal Note    NAME:  Merlin Sol   :   1955   MRN:   [id number]     ATTENDING: Horace Spatz, MD  PCP:  Anthony Delgado MD    Date/Time:  10/14/2021 1:00 PM      Subjective:     Pt seen in icu. She is more awake today. Had PEG tube placement yesterday s  Tolerating tube feeding at 30 mils per hour  Precedex as of  Creatinine is 2.6 today. Blood pressure is better      Past Medical History:   Diagnosis Date    [facility]     Cardiomyopathy, idiopathic ([facility] 10/28/2010    Chronic kidney disease     Heart failure ([facility]     HTN (hypertension), benign 10/28/2010    Pacemaker     ICD    PVC (premature ventricular contraction) 10/28/2010      Past Surgical History:   Procedure Laterality Date    HX OTHER SURGICAL  10/02/2018    Right eye surgery    HX PACEMAKER       Social History     Tobacco Use    Smoking status: Never Smoker    Smokeless tobacco: Never Used   Substance Use Topics    Alcohol use: No      History reviewed. No pertinent family history. Allergies   Allergen Reactions    Chocolate [Cocoa] Anaphylaxis and Swelling    Iodine Anaphylaxis    Lisinopril Anaphylaxis    Peanut Anaphylaxis and Swelling    Glimepiride Swelling    Glipizide Other (comments)     Pruritis    Metformin Diarrhea    Norvasc [Amlodipine] Other (comments)     Light Headed    Pneumovax 23 [Pneumococcal 23-Devi Ps Vaccine] Hives    Toprol Xl [Metoprolol Succinate] Other (comments)     Light headed    Tositumomab I-131 (Maltose) Unable to Obtain    Vitamin D [Cholecalciferol (Vitamin D3)] Other (comments)     Dizziness/headache not a true allergy. Prior to Admission medications    Medication Sig Start Date End Date Taking? Authorizing Provider   aspirin delayed-release 81 mg tablet Take 1 Tablet by mouth daily. 10/5/21   Pari Garcia MD   carvediloL (COREG) 6.25 mg tablet Take 1 Tablet by mouth two (2) times daily (with meals).  21   Kirk Gordon MD oxyCODONE IR (ROXICODONE) 5 mg immediate release tablet Take 5 mg by mouth every four (4) hours as needed for Pain. 7/16/21   Provider, Historical   cyclobenzaprine (FLEXERIL) 5 mg tablet Take 5 mg by mouth two (2) times daily as needed for Muscle Spasm(s). 7/19/21   Provider, Historical   amitriptyline (ELAVIL) 75 mg tablet Take 75 mg by mouth daily. 5/3/21   Provider, Historical   prednisoLONE acetate (PRED FORTE) 1 % ophthalmic suspension INSTILL 1 DROP INTO RIGHT EYE 4 TIMES A DAY 3/13/21   Provider, Historical   atorvastatin (LIPITOR) 40 mg tablet Take 1 Tab by mouth nightly. 2/19/21   Danielle Dumont MD   furosemide (Lasix) 40 mg tablet 1 tablet daily  Patient taking differently: Take 40 mg by mouth daily. 1 tablet daily 2/19/21   Trinity Gatica MD   metFORMIN (GLUCOPHAGE) 1,000 mg tablet two (2) times daily (with meals). 10/12/20   Provider, Historical   gabapentin (NEURONTIN) 100 mg capsule TAKE 1 CAPSULE BY MOUTH THREE TIMES A DAY 10/27/20   Provider, Historical   metoprolol succinate (TOPROL-XL) 25 mg XL tablet TAKE 0.5 TABS BY MOUTH DAILY. 12/16/19   Diana Foster NP   ELIQUIS 5 mg tablet TAKE 1 TABLET BY MOUTH TWICE A DAY 12/5/19   Diana Foster NP   nitroglycerin (NITROSTAT) 0.4 mg SL tablet 0.4 mg by SubLINGual route every five (5) minutes as needed for Chest Pain. Up to 3 doses. Patient not taking: Reported on 8/3/2021    Provider, Historical   cloNIDine HCL (CATAPRES) 0.2 mg tablet Take 0.2 mg by mouth two (2) times a day. 5/21/18   Provider, Historical   losartan (COZAAR) 100 mg tablet Take 100 mg by mouth daily. 4/25/17   Provider, Historical   PARoxetine (PAXIL) 20 mg tablet Take 20 mg by mouth daily. Provider, Historical   omeprazole (PRILOSEC) 20 mg capsule Take 20 mg by mouth daily.  10/28/10   Provider, Historical       REVIEW OF SYSTEMS:       Unable to obtain because of patient's mentation      Objective:   VITALS:    Visit Vitals  BP (!) 127/94 (BP Patient Position: At rest) Pulse 87   Temp 97.2 °F (36.2 °C)   Resp 20   Ht 5' 6\" (1.676 m)   Wt 78.5 kg (173 lb 1 oz)   SpO2 100%   Breastfeeding No   BMI 27.93 kg/m²     Temp (24hrs), Av.4 °F (36.3 °C), Min:96.8 °F (36 °C), Max:100.4 °F (38 °C)      PHYSICAL EXAM:     General: NAD, awake. Lethargic  Eyes: sclera anicteric  Oral Cavity: No thrush or ulcers  Neck: no JVD  Chest: Fair bilateral air entry. No Wheezing or Rhonchi. No rales. Heart: normal sounds  Abdomen: soft and non tender   :  escobedo  Lower Extremities: no edema  Skin: no rash  Neuro: She is lethargic. Withdraws to pain  Psychiatric: Unable to assess        LAB DATA REVIEWED:    Recent Results (from the past 24 hour(s))   GLUCOSE, POC    Collection Time: 10/13/21  3:32 PM   Result Value Ref Range    Glucose (POC) 147 (H) 65 - 117 mg/dL    Performed by [address] [place] [address], POC    Collection Time: 10/13/21  9:27 PM   Result Value Ref Range    Glucose (POC) 120 (H) 65 - 117 mg/dL    Performed by Rosalino Basurto ([facility])    CBC WITH AUTOMATED DIFF    Collection Time: 10/14/21  5:40 AM   Result Value Ref Range    WBC 27.7 (H) 3.6 - 11.0 K/uL    RBC 5.07 3.80 - 5.20 M/uL    HGB 14.1 11.5 - 16.0 g/dL    HCT 44.8 35.0 - 47.0 %    MCV 88.4 80.0 - 99.0 FL    MCH 27.8 26.0 - 34.0 PG    MCHC 31.5 30.0 - 36.5 g/dL    RDW 19.6 (H) 11.5 - 14.5 %    PLATELET 84 (L) 815 - 400 K/uL    NRBC 0.1 (H) 0.0  WBC    ABSOLUTE NRBC 0.02 (H) 0.00 - 0.01 K/uL    NEUTROPHILS 89 (H) 32 - 75 %    LYMPHOCYTES 4 (L) 12 - 49 %    MONOCYTES 6 5 - 13 %    EOSINOPHILS 0 0 - 7 %    BASOPHILS 0 0 - 1 %    IMMATURE GRANULOCYTES 1 (H) 0 - 0.5 %    ABS. NEUTROPHILS 24.6 (H) 1.8 - 8.0 K/UL    ABS. LYMPHOCYTES 1.2 0.8 - 3.5 K/UL    ABS. MONOCYTES 1.7 (H) 0.0 - 1.0 K/UL    ABS. EOSINOPHILS 0.1 0.0 - 0.4 K/UL    ABS. BASOPHILS 0.1 0.0 - 0.1 K/UL    ABS. IMM.  GRANS. 0.2 (H) 0.00 - 0.04 K/UL    DF AUTOMATED     METABOLIC PANEL, COMPREHENSIVE    Collection Time: 10/14/21  5:40 AM   Result Value Ref Range Sodium 150 (H) 136 - 145 mmol/L    Potassium 3.7 3.5 - 5.1 mmol/L    Chloride 114 (H) 97 - 108 mmol/L    CO2 24 21 - 32 mmol/L    Anion gap 12 5 - 15 mmol/L    Glucose 180 (H) 65 - 100 mg/dL    BUN 77 (H) 6 - 20 mg/dL    Creatinine 2.63 (H) 0.55 - 1.02 mg/dL    BUN/Creatinine ratio 29 (H) 12 - 20      GFR est AA 22 (L) >60 ml/min/1.73m2    GFR est non-AA 18 (L) >60 ml/min/1.73m2    Calcium 8.8 8.5 - 10.1 mg/dL    Bilirubin, total 1.3 (H) 0.2 - 1.0 mg/dL    AST (SGOT) 25 15 - 37 U/L    ALT (SGPT) 53 12 - 78 U/L    Alk. phosphatase 88 45 - 117 U/L    Protein, total 5.7 (L) 6.4 - 8.2 g/dL    Albumin 1.7 (L) 3.5 - 5.0 g/dL    Globulin 4.0 2.0 - 4.0 g/dL    A-G Ratio 0.4 (L) 1.1 - 2.2     GLUCOSE, POC    Collection Time: 10/14/21  7:28 AM   Result Value Ref Range    Glucose (POC) 191 (H) 65 - 117 mg/dL    Performed by Elie Moreland, RANDOM    Collection Time: 10/14/21 10:10 AM   Result Value Ref Range    Vancomycin, random 25.1 ug/mL    Reported dose date Not provided      Reported dose: Not provided Units   GLUCOSE, POC    Collection Time: 10/14/21 11:41 AM   Result Value Ref Range    Glucose (POC) 184 (H) 65 - 117 mg/dL    Performed by Whitney Ricks        Recommendations/Plan:     #1 acute kidney injury on chronic kidney disease stage III  -She presented with a creatinine of 2.5   -Creatinine increased to 2.7. Stable at 2.6 today  -ADELINA likely prerenal secondary to sepsis/possible cardiorenal syndrome  -Patient noted to be hypotensive with systolic blood pressure in 80s on admission  -Recently discharged with creatinine 1.6 on 10/6  -Lasix was changed yesterday to 40mg every 48 hours. Will continue same dose  -BNP is elevated and chest x-ray shows pulmonary congestion. However clinically she has no volume overload. No edema in both legs. Chest sounds clear  -Urine is bland  -CT abdomen without contrast on 10/11 shows no renal obstruction.   No need for renal ultrasound  -I have gone through her medication list.  Not on any potential nephrotoxins preadmission  -We'll follow-up on renal function in a.m. #2 severe sepsis   -because of possible right parotitis with parapharyngeal and retropharyngeal  involvement per CT scan  -She had LP on admission which was negative  -Urine is bland. Covid negative. No infiltrate on chest x-ray  -She has been started on vancomycin and Zosyn pending cultures  -WBC count 27.7 today . afebrile today  -ID is on the case    #3 acute right parotitis with edema/fluid in parapharyngeal space  -ENT has been consulted. Plan for conservative management with IV antibiotics for now    #4 acute hypoxia  -Due to CHF exacerbation. She has history of cardiomyopathy with EF less than 15%  -Chest x-ray shows mild pulmonary congestion. BNP 15,000  -Continue Lasix 40 mg IV every other day and monitor urine output    #5 altered mentation  -Could be secondary to metabolic derangements from sepsis. Unsure of her baseline mentation  -CT head was negative on admission    #6 renal osteodystrophy  -PTH is 85. Corrected calcium is 10.6  -Likely has primary hyperparathyroidism.   Will start Sensipar 30 mg daily  -Phosphorus is okay at 5.1          ________________________________________________________________________  Signed: Idalia Aguero MD

## 2021-10-14 NOTE — PROGRESS NOTES
Progress Note    Patient: Neda Pan MRN: [id number]  SSN: [id number]    YOB: 1955  Age: 78 y.o.   Sex: female      Admit Date: 10/11/2021    LOS: 3 days     Subjective:   Reports of PEG tube placement, confused,  Past Medical History:   Diagnosis Date    [facility] ([facility]     Cardiomyopathy, idiopathic ([facility] 10/28/2010    Chronic kidney disease     Heart failure ([facility]     HTN (hypertension), benign 10/28/2010    Pacemaker     ICD    PVC (premature ventricular contraction) 10/28/2010        Current Facility-Administered Medications:     hydrOXYzine (VISTARIL) injection 25 mg, 25 mg, IntraMUSCular, Q6H PRN, Ammy Dumont MD    traMADoL (ULTRAM) tablet 50 mg, 50 mg, Oral, Q6H PRN, Ammy Dumont MD, 50 mg at 10/14/21 1225    [START ON 10/15/2021] furosemide (LASIX) tablet 40 mg, 40 mg, Oral, Q48H, Edison Maharaj MD    mupirocin (BACTROBAN) 2 % ointment, , Topical, BID, Torie Mei MD, Given at 10/14/21 0845    aspirin delayed-release tablet 81 mg, 81 mg, Oral, DAILY, Ammy Dumont MD    atorvastatin (LIPITOR) tablet 40 mg, 40 mg, Oral, QHS, Ammy Dumont MD, 40 mg at 10/13/21 2116    carvediloL (COREG) tablet 6.25 mg, 6.25 mg, Oral, BID WITH MEALS, Ammy Dumont MD, 6.25 mg at 10/14/21 0845    prednisoLONE acetate (PRED FORTE) 1 % ophthalmic suspension 1 Drop, 1 Drop, Both Eyes, TID, Ammy Dumont MD, 1 Drop at 10/14/21 0844    insulin lispro (HUMALOG) injection, , SubCUTAneous, AC&HS, Ammy Dumont MD, 3 Units at 10/14/21 1153    glucose chewable tablet 16 g, 4 Tablet, Oral, PRN, Ana Maria Dumont MD    dextrose (D50W) injection syrg 12.5-25 g, 25-50 mL, IntraVENous, PRN, Ana Maria Dumont MD    glucagon (GLUCAGEN) injection 1 mg, 1 mg, IntraMUSCular, PRN, Ana Maria Dumont MD    acetaminophen (TYLENOL) tablet 650 mg, 650 mg, Oral, Q6H PRN **OR** acetaminophen (TYLENOL) suppository 650 mg, 650 mg, Rectal, Q6H PRN, Tim Ana Maria Moreland MD    polyethylene glycol [facility]) packet 17 g, 17 g, Oral, DAILY PRN, Ana Maria Dumont MD    ondansetron (ZOFRAN ODT) tablet 4 mg, 4 mg, Oral, Q8H PRN **OR** ondansetron (ZOFRAN) injection 4 mg, 4 mg, IntraVENous, Q6H PRN, Ammy Dumont MD    VANCOMYCIN INFORMATION NOTE, , Other, Rx Dosing/Monitoring, Ana Maria Dumont MD    piperacillin-tazobactam (ZOSYN) 3.375 g in 0.9% sodium chloride (MBP/ADV) 100 mL MBP, 3.375 g, IntraVENous, Q8H, Ammy Dumont MD, Last Rate: 200 mL/hr at 10/14/21 0844, 3.375 g at 10/14/21 0844    dexmedeTOMidine (PRECEDEX) 400 mcg in 0.9% sodium chloride (MBP/ADV) 100 mL MBP, 0.1-1.5 mcg/kg/hr, IntraVENous, TITRATE, Ana Maria Dumont MD, Stopped at 10/14/21 1013    Objective:     Vitals:    10/14/21 0907 10/14/21 1011 10/14/21 1117 10/14/21 1205   BP: (!) 131/94 (!) 124/94 (!) 119/90 (!) 127/93   Pulse: 88 85 86 90   Resp: 10 14 18 19   Temp:       SpO2: 100% 95% 96% 100%   Weight:       Height:            Intake and Output:  Current Shift: 10/14 0701 - 10/14 1900  In: 101.9 [I.V.:1.9]  Out: -   Last three shifts: 10/12 1901 - 10/14 0700  In: 696.1 [I.V.:496.1]  Out: 1250 [Urine:1250]    Physical Exam:   Physical Exam  Constitutional:       Appearance: She is ill-appearing. Eyes:      Extraocular Movements: Extraocular movements intact. Cardiovascular:      Rate and Rhythm: Normal rate and regular rhythm. Pulmonary:      Effort: Pulmonary effort is normal.   Abdominal:      General: Abdomen is flat. Comments: PEG tube site has no bleeding, no drainage, no infection   Musculoskeletal:         General: Normal range of motion. Skin:     General: Skin is warm. Neurological:      General: No focal deficit present.           Lab/Data Review:  Recent Results (from the past 24 hour(s))   GLUCOSE, POC    Collection Time: 10/13/21  3:32 PM   Result Value Ref Range    Glucose (POC) 147 (H) 65 - 117 mg/dL    Performed by [address], POC    Collection Time: 10/13/21  9:27 PM   Result Value Ref Range    Glucose (POC) 120 (H) 65 - 117 mg/dL    Performed by Roslyn MARTINES)    CBC WITH AUTOMATED DIFF    Collection Time: 10/14/21  5:40 AM   Result Value Ref Range    WBC 27.7 (H) 3.6 - 11.0 K/uL    RBC 5.07 3.80 - 5.20 M/uL    HGB 14.1 11.5 - 16.0 g/dL    HCT 44.8 35.0 - 47.0 %    MCV 88.4 80.0 - 99.0 FL    MCH 27.8 26.0 - 34.0 PG    MCHC 31.5 30.0 - 36.5 g/dL    RDW 19.6 (H) 11.5 - 14.5 %    PLATELET 84 (L) 848 - 400 K/uL    NRBC 0.1 (H) 0.0  WBC    ABSOLUTE NRBC 0.02 (H) 0.00 - 0.01 K/uL    NEUTROPHILS 89 (H) 32 - 75 %    LYMPHOCYTES 4 (L) 12 - 49 %    MONOCYTES 6 5 - 13 %    EOSINOPHILS 0 0 - 7 %    BASOPHILS 0 0 - 1 %    IMMATURE GRANULOCYTES 1 (H) 0 - 0.5 %    ABS. NEUTROPHILS 24.6 (H) 1.8 - 8.0 K/UL    ABS. LYMPHOCYTES 1.2 0.8 - 3.5 K/UL    ABS. MONOCYTES 1.7 (H) 0.0 - 1.0 K/UL    ABS. EOSINOPHILS 0.1 0.0 - 0.4 K/UL    ABS. BASOPHILS 0.1 0.0 - 0.1 K/UL    ABS. IMM. GRANS. 0.2 (H) 0.00 - 0.04 K/UL    DF AUTOMATED     METABOLIC PANEL, COMPREHENSIVE    Collection Time: 10/14/21  5:40 AM   Result Value Ref Range    Sodium 150 (H) 136 - 145 mmol/L    Potassium 3.7 3.5 - 5.1 mmol/L    Chloride 114 (H) 97 - 108 mmol/L    CO2 24 21 - 32 mmol/L    Anion gap 12 5 - 15 mmol/L    Glucose 180 (H) 65 - 100 mg/dL    BUN 77 (H) 6 - 20 mg/dL    Creatinine 2.63 (H) 0.55 - 1.02 mg/dL    BUN/Creatinine ratio 29 (H) 12 - 20      GFR est AA 22 (L) >60 ml/min/1.73m2    GFR est non-AA 18 (L) >60 ml/min/1.73m2    Calcium 8.8 8.5 - 10.1 mg/dL    Bilirubin, total 1.3 (H) 0.2 - 1.0 mg/dL    AST (SGOT) 25 15 - 37 U/L    ALT (SGPT) 53 12 - 78 U/L    Alk.  phosphatase 88 45 - 117 U/L    Protein, total 5.7 (L) 6.4 - 8.2 g/dL    Albumin 1.7 (L) 3.5 - 5.0 g/dL    Globulin 4.0 2.0 - 4.0 g/dL    A-G Ratio 0.4 (L) 1.1 - 2.2     GLUCOSE, POC    Collection Time: 10/14/21  7:28 AM   Result Value Ref Range    Glucose (POC) 191 (H) 65 - 117 mg/dL    Performed by MARGIE Hernandez Collection Time: 10/14/21 10:10 AM   Result Value Ref Range    Vancomycin, random 25.1 ug/mL    Reported dose date Not provided      Reported dose: Not provided Units   GLUCOSE, POC    Collection Time: 10/14/21 11:41 AM   Result Value Ref Range    Glucose (POC) 184 (H) 65 - 117 mg/dL    Performed by Francy Montgomery         CT MAXILLOFACIAL WO CONT   Final Result   Findings more consistent with cellulitis of the right face that   includes the right parotid gland (acute parotitis) as described above. Edema/fluid collection in the parapharyngeal space and retropharyngeal space. Suggestions as above. CT ABD PELV WO CONT   Final Result   Small amount of ascites. No focal fluid collection or free air. Density in the gallbladder which could be artifact, sludge or gallstones. No   specific CT evidence of cholecystitis. Bladder wall prominence. Other findings   as above. CT HEAD WO CONT   Final Result   1. No evidence of acute intracranial hemorrhage or mass effect. 2.  Multifocal intracranial encephalomalacia as above. XR CHEST PORT   Final Result   1. Mild pulmonary vascular congestion. 2.  Cardiomegaly.            Assessment:     Active Problems:    Altered mental status (10/11/2021)      Sepsis ([address] (10/11/2021)      Moderate protein-calorie malnutrition ([address] (10/12/2021)    Start postop PEG tube placement for nutrition support    Plan:   Continue IV hydrations  Continue tube feeding  Nutrition to follow  Sign off  Signed By: Aneta Wong MD     October 14, 2021        Thank you for allowing me to participate in this patients care  Cc Referring Physician   Sam Quintana MD

## 2021-10-14 NOTE — PROGRESS NOTES
10/14/21. Pt remains in ICU. D/C Plan is for [facility]. Updates sent via [employer].

## 2021-10-15 NOTE — PROGRESS NOTES
Consult for Vancomycin Dosing by Pharmacy by Dr. Uli Church provided for this 78y.o. year old female , for indication of MRSA bacteremia. Day of Therapy: 5  Goal of Level(s): trough = 15-20mcg/dL    Other Current Antibiotics: Teflaro    Significant Cultures:       Date                             Culture                                       Organism      10/11                            Blood x4                                     MRSA    Serum Creatinine Creatinine   Date Value Ref Range Status   10/15/2021 2.35 (H) 0.55 - 1.02 mg/dL Final   10/14/2021 2.63 (H) 0.55 - 1.02 mg/dL Final   10/13/2021 2.72 (H) 0.55 - 1.02 mg/dL Final      Creatinine Clearance Estimated Creatinine Clearance: 24.3 mL/min (A) (based on SCr of 2.35 mg/dL (H)). BUN Lab Results   Component Value Date/Time    BUN 75 (H) 10/15/2021 05:10 AM      WBC Lab Results   Component Value Date/Time    WBC 20.4 (H) 10/15/2021 05:10 AM      Temp Temp Readings from Last 1 Encounters:   10/15/21 98 °F (36.7 °C)        Last Level:    Vancomycin, random   Date/Time Value Ref Range Status   10/15/2021 05:10 AM 16.8 ug/mL Final     Comment:     No reference range has been established. Ht Readings from Last 1 Encounters:   10/12/21 167.6 cm (66\")        Wt Readings from Last 1 Encounters:   10/15/21 76.6 kg (168 lb 14 oz)     Ideal body weight: 59.3 kg (130 lb 11.7 oz)  Adjusted ideal body weight: 66.2 kg (145 lb 15.8 oz)     Previous Regimen: 500mg IV x1 (10/13)    New Regimen:   Patient has ADELINA on CKD III. Start Vancomycin 500mg IV x1 today. Pharmacy will order a random level tomorrow. Pharmacy to follow daily and will make changes to dose and/or frequency based on clinical status.   _________________________________     Pharmacist Jenny Alvarado

## 2021-10-15 NOTE — PROGRESS NOTES
Infectious Disease Progress Note           Subjective:   Remains confused and minimally responsive. WBC trending down on todays labs, afebrile and off pressor support   Objective:   Physical Exam:     Visit Vitals  BP (!) 124/103 (BP 1 Location: Right upper arm, BP Patient Position: At rest)   Pulse 91   Temp 98 °F (36.7 °C)   Resp 15   Ht 5' 6\" (1.676 m)   Wt 168 lb 14 oz (76.6 kg)   SpO2 96%   Breastfeeding No   BMI 27.26 kg/m²    O2 Flow Rate (L/min): 2 l/min O2 Device: Nasal cannula    Temp (24hrs), Av.9 °F (36.6 °C), Min:97.5 °F (36.4 °C), Max:98.2 °F (36.8 °C)    No intake/output data recorded. 10/13 1901 - 10/15 0700  In: 2437.1 [I.V.:257.1]  Out: 1200 [Urine:1200]    General: NAD, alert, mumble incomprehensible speech   HEENT: SHARON, Moist mucosa, Stable right facial induration, no fluctuance   Lungs: CTA b/l, decreased at the bases, no wheeze/rhonchi   Heart: S1S2+, RRR, no murmur  Abdo: Soft, NT, ND, +BS, peg tube in place   : + escobedo cath   Exts: no edema  + pulses b/l   Skin: No wounds, No rashes or lesions      Data Review:       Recent Days:  Recent Labs     10/15/21  0510 10/14/21  0540 10/13/21  0500   WBC 20.4* 27.7* 26.3*   HGB 12.9 14.1 13.7   HCT 41.5 44.8 43.9   PLT 71* 84* 93*     Recent Labs     10/15/21  0510 10/14/21  0540 10/13/21  0630   BUN 75* 77* 73*   CREA 2.35* 2.63* 2.72*       No results found for: CRPQN, CRP, CRPM       Microbiology     Results     Procedure Component Value Units Date/Time    CULTURE, BLOOD [[id number]]     Order Status: Sent Specimen: Blood     CULTURE, BLOOD [[id number]]  (Abnormal) Collected: 10/13/21 0500    Order Status: Completed Specimen: Blood Updated: 10/15/21 1247     Special Requests: No Special Requests        Culture result:       Gram Positive Cocci in clusters growing in both bottles drawn CALLED TO AND READ BACK BY Phil Abraham ([facility]) ON 10/14/21 AT 1157.  HH                  * methicillin resistant staphylococcus aureus * growing in both bottles drawn (SITE NOT INDICATED) REFER TO [id number] FOR SENSITIVITIES            (NOTE) KNOWN MRSA PATIENT    CULTURE, Baljeet Wilder STAIN [[id number]] Collected: 10/12/21 0945    Order Status: Canceled Specimen: Wound from Mouth     MRSA SCREEN - PCR (NASAL) [[id number]]  (Abnormal) Collected: 10/11/21 1025    Order Status: Completed Specimen: Swab Updated: 10/11/21 1147     MRSA by PCR, Nasal DETECTED        Comment: Results verified, phoned to and read back by Julienne Muse 10/11/21 11:50       MRSA SCREEN - PCR (NASAL) [[id number]] Collected: 10/11/21 0945    Order Status: Canceled Specimen: Swab     COVID-19 RAPID TEST [[id number]] Collected: 10/11/21 0930    Order Status: Completed Specimen: Nasopharyngeal Updated: 10/11/21 1104     Specimen source       Please find results under separate order           COVID-19 rapid test Not Detected        Comment: Rapid Abbott ID Now   Rapid NAAT:  The specimen is NEGATIVE for SARS-CoV-2, the novel coronavirus associated with COVID-19. Negative results should be treated as presumptive and, if inconsistent with clinical signs and symptoms or necessary for patient management, should be tested with an alternative molecular assay. Negative results do not preclude SARS-CoV-2 infection and should not be used as the sole basis for patient management decisions. This test has been authorized by the FDA under   an Emergency Use Authorization (EUA) for use by authorized laboratories.  Fact sheet for Healthcare Providers: ConventionUpdate.co.nz Fact sheet for Patients: ConventionUpdate.co.nz   Methodology: Isothermal Nucleic Acid Amplification         CULTURE, BLOOD, LINE [[id number]] Collected: 10/11/21 0930    Order Status: Canceled Specimen: Blood     CULTURE, URINE [[id number]] Collected: 10/11/21 0930    Order Status: Completed Specimen: Urine Updated: 10/12/21 1049     Special Requests: No Special Requests Culture result: No Growth (<1000 cfu/mL)       CULTURE, CSF  TUBE 2 [[id number]] Collected: 10/11/21 0504    Order Status: Completed Specimen: Cerebrospinal Fluid Updated: 10/12/21 1017     Special Requests: No Special Requests        GRAM STAIN No wbc's seen         No organisms seen        Culture result:       No growth thus far holding 7 days          CULTURE, BLOOD, PAIRED [[id number]]  (Abnormal)  (Susceptibility) Collected: 10/11/21 0225    Order Status: Completed Specimen: Blood Updated: 10/15/21 0921     Special Requests: No Special Requests        Culture result:       * methicillin resistant staphylococcus aureus * growing in all 4 bottles drawn (SITES NOT INDICATED)          Susceptibility      Staphylococcus aureus Methcillin Resistant      NIMA      Ciprofloxacin ($) Susceptible      Clindamycin ($) Susceptible      Daptomycin ($$$$$) Susceptible      Doxycycline ($$) Susceptible      Erythromycin ($$$$) Resistant      Gentamicin ($) Susceptible      Levofloxacin ($) Susceptible      Linezolid ($$$$$) Susceptible      Oxacillin Resistant      Rifampin ($$$$) Susceptible  [1]       Tetracycline Susceptible      Trimeth/Sulfa Susceptible      Vancomycin ($) Susceptible              [1]  Rifampin is not to be used for mono-therapy. Linear View                   COVID-19 RAPID TEST [[id number]] Collected: 10/02/21 1730    Order Status: Completed Specimen: Nasopharyngeal Updated: 10/02/21 1814     Specimen source Nasopharyngeal        COVID-19 rapid test Not Detected        Comment: Rapid Abbott ID Now   Rapid NAAT:  The specimen is NEGATIVE for SARS-CoV-2, the novel coronavirus associated with COVID-19. Negative results should be treated as presumptive and, if inconsistent with clinical signs and symptoms or necessary for patient management, should be tested with an alternative molecular assay.  Negative results do not preclude SARS-CoV-2 infection and should not be used as the sole basis for patient management decisions. This test has been authorized by the FDA under   an Emergency Use Authorization (EUA) for use by authorized laboratories. Fact sheet for Healthcare Providers: ConventionUpdate.co.nz Fact sheet for Patients: ConventionUpdate.co.nz   Methodology: Isothermal Nucleic Acid Amplification                  Diagnostics   CXR Results  (Last 48 hours)    None             Assessment/Plan     1. MRSA bacteremia:BCx positive on 10/11, repeat  10/13 still positive       Afebrile, off pressor support, leukocytosis trending down on todays labs       No tenderness, erythema or fluctuance over left chest wall AICD box       Repeat BCx from 10/14 is pending       On day # 5 of renally dosed Vanc and Day # 2 of Ceftaroline and Rifampin       Routine labs in the morning. Will be needing long term IV antibiotics upon d/c       Repeat BCx ordered for 10/18        2. Acute right parotitis per CT, no worsening of right facial swelling/induration     3. MRSA colonization: On day # 4 of Mupirocin     4. Cardiomyopathy w baseline EF of <15 % earlier this month      No peripheral edema on exam, remains on RA      4. Dysphagia: S/p Peg tube placement on 10/13, tolerating tube feeds     5.  Acute on chronic renal failure, stable, Cr, closely monitor Vanc trough, keep b/w 15-20    Vivienne Perez MD    10/15/2021

## 2021-10-15 NOTE — PROGRESS NOTES
General Daily Progress Note          Patient Name:   Harriett Clayton       YOB: 1955       Age:  78 y.o.       Admit Date: 10/11/2021      Subjective:     Patient is a 78y.o. year old female history of chronic A. fib chronic kidney disease hypertension pacemaker congestive heart failure with ejection fraction of 15 to 20% history of diabetes hypertension patient have a Covid few months ago since then but he declined patient was recently discharged from the hospital with congestive heart failure came back with altered mental status not awake not talking seen by the ER physician CT scan of the head was negative  Patient had WBC count elevated increased lactic acid concern of sepsis sepsis patient was LP done in the ER which was normal patient received 2 g of Rocephin when I saw the patient was still was altered I ordered repeat lactic acid ordered CT scan of the maxillary facial area swelling of the right parotid area and admitted to ICU for further work-up discussed with the patient daughter     According to the patient daughter since she have a Covid in March April this year she is declining she is in the hospital third time this month        Patient resting the bed awake but not talking  Static post PEG tube placement    MRSA bacteremia             Objective:     Visit Vitals  BP (!) 119/96 (BP 1 Location: Right upper arm, BP Patient Position: At rest)   Pulse 96   Temp 98 °F (36.7 °C)   Resp (!) 91   Ht 5' 6\" (1.676 m)   Wt 76.6 kg (168 lb 14 oz)   SpO2 100%   Breastfeeding No   BMI 27.26 kg/m²        Recent Results (from the past 24 hour(s))   VANCOMYCIN, RANDOM    Collection Time: 10/14/21 10:10 AM   Result Value Ref Range    Vancomycin, random 25.1 ug/mL    Reported dose date Not provided      Reported dose: Not provided Units   GLUCOSE, POC    Collection Time: 10/14/21 11:41 AM   Result Value Ref Range    Glucose (POC) 184 (H) 65 - 117 mg/dL    Performed by Kevin Berwind    GLUCOSE, POC Collection Time: 10/14/21  4:11 PM   Result Value Ref Range    Glucose (POC) 249 (H) 65 - 117 mg/dL    Performed by Peg Brady    GLUCOSE, POC    Collection Time: 10/14/21  9:53 PM   Result Value Ref Range    Glucose (POC) 328 (H) 65 - 117 mg/dL    Performed by Chacha Covington    CBC WITH AUTOMATED DIFF    Collection Time: 10/15/21  5:10 AM   Result Value Ref Range    WBC 20.4 (H) 3.6 - 11.0 K/uL    RBC 4.71 3.80 - 5.20 M/uL    HGB 12.9 11.5 - 16.0 g/dL    HCT 41.5 35.0 - 47.0 %    MCV 88.1 80.0 - 99.0 FL    MCH 27.4 26.0 - 34.0 PG    MCHC 31.1 30.0 - 36.5 g/dL    RDW 19.8 (H) 11.5 - 14.5 %    PLATELET 71 (L) 761 - 400 K/uL    NRBC 0.1 (H) 0.0  WBC    ABSOLUTE NRBC 0.02 (H) 0.00 - 0.01 K/uL    NEUTROPHILS 83 (H) 32 - 75 %    LYMPHOCYTES 6 (L) 12 - 49 %    MONOCYTES 9 5 - 13 %    EOSINOPHILS 1 0 - 7 %    BASOPHILS 0 0 - 1 %    IMMATURE GRANULOCYTES 1 (H) 0 - 0.5 %    ABS. NEUTROPHILS 17.1 (H) 1.8 - 8.0 K/UL    ABS. LYMPHOCYTES 1.2 0.8 - 3.5 K/UL    ABS. MONOCYTES 1.8 (H) 0.0 - 1.0 K/UL    ABS. EOSINOPHILS 0.1 0.0 - 0.4 K/UL    ABS. BASOPHILS 0.0 0.0 - 0.1 K/UL    ABS. IMM. GRANS. 0.2 (H) 0.00 - 0.04 K/UL    DF AUTOMATED     METABOLIC PANEL, COMPREHENSIVE    Collection Time: 10/15/21  5:10 AM   Result Value Ref Range    Sodium 149 (H) 136 - 145 mmol/L    Potassium 3.2 (L) 3.5 - 5.1 mmol/L    Chloride 114 (H) 97 - 108 mmol/L    CO2 27 21 - 32 mmol/L    Anion gap 8 5 - 15 mmol/L    Glucose 247 (H) 65 - 100 mg/dL    BUN 75 (H) 6 - 20 mg/dL    Creatinine 2.35 (H) 0.55 - 1.02 mg/dL    BUN/Creatinine ratio 32 (H) 12 - 20      GFR est AA 25 (L) >60 ml/min/1.73m2    GFR est non-AA 21 (L) >60 ml/min/1.73m2    Calcium 8.8 8.5 - 10.1 mg/dL    Bilirubin, total 1.0 0.2 - 1.0 mg/dL    AST (SGOT) 16 15 - 37 U/L    ALT (SGPT) 42 12 - 78 U/L    Alk.  phosphatase 101 45 - 117 U/L    Protein, total 5.8 (L) 6.4 - 8.2 g/dL    Albumin 1.8 (L) 3.5 - 5.0 g/dL    Globulin 4.0 2.0 - 4.0 g/dL    A-G Ratio 0.5 (L) 1.1 - 2.2 VANCOMYCIN, RANDOM    Collection Time: 10/15/21  5:10 AM   Result Value Ref Range    Vancomycin, random 16.8 ug/mL     [unfilled]      Review of Systems    Unable to obtain e. Physical Exam:      Constitutional: Not much verbal HENT:   Head: Normocephalic and atraumatic. Eyes: Pupils are equal, round, and reactive to light. EOM are normal.   Cardiovascular: Normal rate, regular rhythm and normal heart sounds. Pulmonary/Chest: Breath sounds normal. No wheezes. No rales. Exhibits no tenderness. Abdominal: Soft. Bowel sounds are normal. There is no abdominal tenderness. There is no rebound and no guarding. Musculoskeletal: Normal range of motion. Neurological: Moves extremities    CT MAXILLOFACIAL WO CONT   Final Result   Findings more consistent with cellulitis of the right face that   includes the right parotid gland (acute parotitis) as described above. Edema/fluid collection in the parapharyngeal space and retropharyngeal space. Suggestions as above. CT ABD PELV WO CONT   Final Result   Small amount of ascites. No focal fluid collection or free air. Density in the gallbladder which could be artifact, sludge or gallstones. No   specific CT evidence of cholecystitis. Bladder wall prominence. Other findings   as above. CT HEAD WO CONT   Final Result   1. No evidence of acute intracranial hemorrhage or mass effect. 2.  Multifocal intracranial encephalomalacia as above. XR CHEST PORT   Final Result   1. Mild pulmonary vascular congestion. 2.  Cardiomegaly.            Recent Results (from the past 24 hour(s))   VANCOMYCIN, RANDOM    Collection Time: 10/14/21 10:10 AM   Result Value Ref Range    Vancomycin, random 25.1 ug/mL    Reported dose date Not provided      Reported dose: Not provided Units   GLUCOSE, POC    Collection Time: 10/14/21 11:41 AM   Result Value Ref Range    Glucose (POC) 184 (H) 65 - 117 mg/dL    Performed by Wali Silva    GLUCOSE, POC Collection Time: 10/14/21  4:11 PM   Result Value Ref Range    Glucose (POC) 249 (H) 65 - 117 mg/dL    Performed by Peg Brady    GLUCOSE, POC    Collection Time: 10/14/21  9:53 PM   Result Value Ref Range    Glucose (POC) 328 (H) 65 - 117 mg/dL    Performed by Chacha Covington    CBC WITH AUTOMATED DIFF    Collection Time: 10/15/21  5:10 AM   Result Value Ref Range    WBC 20.4 (H) 3.6 - 11.0 K/uL    RBC 4.71 3.80 - 5.20 M/uL    HGB 12.9 11.5 - 16.0 g/dL    HCT 41.5 35.0 - 47.0 %    MCV 88.1 80.0 - 99.0 FL    MCH 27.4 26.0 - 34.0 PG    MCHC 31.1 30.0 - 36.5 g/dL    RDW 19.8 (H) 11.5 - 14.5 %    PLATELET 71 (L) 627 - 400 K/uL    NRBC 0.1 (H) 0.0  WBC    ABSOLUTE NRBC 0.02 (H) 0.00 - 0.01 K/uL    NEUTROPHILS 83 (H) 32 - 75 %    LYMPHOCYTES 6 (L) 12 - 49 %    MONOCYTES 9 5 - 13 %    EOSINOPHILS 1 0 - 7 %    BASOPHILS 0 0 - 1 %    IMMATURE GRANULOCYTES 1 (H) 0 - 0.5 %    ABS. NEUTROPHILS 17.1 (H) 1.8 - 8.0 K/UL    ABS. LYMPHOCYTES 1.2 0.8 - 3.5 K/UL    ABS. MONOCYTES 1.8 (H) 0.0 - 1.0 K/UL    ABS. EOSINOPHILS 0.1 0.0 - 0.4 K/UL    ABS. BASOPHILS 0.0 0.0 - 0.1 K/UL    ABS. IMM. GRANS. 0.2 (H) 0.00 - 0.04 K/UL    DF AUTOMATED     METABOLIC PANEL, COMPREHENSIVE    Collection Time: 10/15/21  5:10 AM   Result Value Ref Range    Sodium 149 (H) 136 - 145 mmol/L    Potassium 3.2 (L) 3.5 - 5.1 mmol/L    Chloride 114 (H) 97 - 108 mmol/L    CO2 27 21 - 32 mmol/L    Anion gap 8 5 - 15 mmol/L    Glucose 247 (H) 65 - 100 mg/dL    BUN 75 (H) 6 - 20 mg/dL    Creatinine 2.35 (H) 0.55 - 1.02 mg/dL    BUN/Creatinine ratio 32 (H) 12 - 20      GFR est AA 25 (L) >60 ml/min/1.73m2    GFR est non-AA 21 (L) >60 ml/min/1.73m2    Calcium 8.8 8.5 - 10.1 mg/dL    Bilirubin, total 1.0 0.2 - 1.0 mg/dL    AST (SGOT) 16 15 - 37 U/L    ALT (SGPT) 42 12 - 78 U/L    Alk.  phosphatase 101 45 - 117 U/L    Protein, total 5.8 (L) 6.4 - 8.2 g/dL    Albumin 1.8 (L) 3.5 - 5.0 g/dL    Globulin 4.0 2.0 - 4.0 g/dL    A-G Ratio 0.5 (L) 1.1 - 2.2 VANCOMYCIN, RANDOM    Collection Time: 10/15/21  5:10 AM   Result Value Ref Range    Vancomycin, random 16.8 ug/mL       Results     Procedure Component Value Units Date/Time    CULTURE, BLOOD [[id number]]     Order Status: Sent Specimen: Blood     CULTURE, BLOOD [[id number]]  (Abnormal) Collected: 10/13/21 0500    Order Status: Completed Specimen: Blood Updated: 10/15/21 0846     Special Requests: No Special Requests        Culture result:       Gram Positive Cocci in clusters growing in both bottles drawn [facility]) ON 10/14/21 AT 1157. HH                  Staphylococcus aureus growing in both bottles drawn (SITE NOT INDICATED)          CULTURE, Macieedwin Jonnie [[id number]] Collected: 10/12/21 0945    Order Status: Canceled Specimen: Wound from Mouth     MRSA SCREEN - PCR (NASAL) [[id number]]  (Abnormal) Collected: 10/11/21 1025    Order Status: Completed Specimen: Swab Updated: 10/11/21 1147     MRSA by PCR, Nasal DETECTED        Comment: Results verified, phoned to and read back by Swapna Jimenes 10/11/21 11:50       MRSA SCREEN - PCR (NASAL) [[id number]] Collected: 10/11/21 0945    Order Status: Canceled Specimen: Swab     COVID-19 RAPID TEST [[id number]] Collected: 10/11/21 0930    Order Status: Completed Specimen: Nasopharyngeal Updated: 10/11/21 1104     Specimen source       Please find results under separate order           COVID-19 rapid test Not Detected        Comment: Rapid Abbott ID Now   Rapid NAAT:  The specimen is NEGATIVE for SARS-CoV-2, the novel coronavirus associated with COVID-19. Negative results should be treated as presumptive and, if inconsistent with clinical signs and symptoms or necessary for patient management, should be tested with an alternative molecular assay. Negative results do not preclude SARS-CoV-2 infection and should not be used as the sole basis for patient management decisions.    This test has been authorized by the FDA under   an Emergency Use Authorization (EUA) for use by authorized laboratories. Fact sheet for Healthcare Providers: ConventionUpdate.co.nz Fact sheet for Patients: ConventionUpdate.co.nz   Methodology: Isothermal Nucleic Acid Amplification         CULTURE, BLOOD, LINE [[id number]] Collected: 10/11/21 0930    Order Status: Canceled Specimen: Blood     CULTURE, URINE [[id number]] Collected: 10/11/21 0930    Order Status: Completed Specimen: Urine Updated: 10/12/21 1049     Special Requests: No Special Requests        Culture result: No Growth (<1000 cfu/mL)       CULTURE, CSF  TUBE 2 [[id number]] Collected: 10/11/21 0504    Order Status: Completed Specimen: Cerebrospinal Fluid Updated: 10/12/21 1017     Special Requests: No Special Requests        GRAM STAIN No wbc's seen         No organisms seen        Culture result:       No growth thus far holding 7 days          CULTURE, BLOOD, PAIRED [[id number]]  (Abnormal)  (Susceptibility) Collected: 10/11/21 0225    Order Status: Completed Specimen: Blood Updated: 10/15/21 0921     Special Requests: No Special Requests        Culture result:       * methicillin resistant staphylococcus aureus * growing in all 4 bottles drawn (SITES NOT INDICATED)          Susceptibility      Staphylococcus aureus Methcillin Resistant      NIMA      Ciprofloxacin ($) Susceptible      Clindamycin ($) Susceptible      Daptomycin ($$$$$) Susceptible      Doxycycline ($$) Susceptible      Erythromycin ($$$$) Resistant      Gentamicin ($) Susceptible      Levofloxacin ($) Susceptible      Linezolid ($$$$$) Susceptible      Oxacillin Resistant      Rifampin ($$$$) Susceptible  [1]       Tetracycline Susceptible      Trimeth/Sulfa Susceptible      Vancomycin ($) Susceptible              [1]  Rifampin is not to be used for mono-therapy.           Linear View                   COVID-19 RAPID TEST [[id number]] Collected: 10/02/21 1730    Order Status: Completed Specimen: Nasopharyngeal Updated: 10/02/21 1814     Specimen source Nasopharyngeal        COVID-19 rapid test Not Detected        Comment: Rapid Abbott ID Now   Rapid NAAT:  The specimen is NEGATIVE for SARS-CoV-2, the novel coronavirus associated with COVID-19. Negative results should be treated as presumptive and, if inconsistent with clinical signs and symptoms or necessary for patient management, should be tested with an alternative molecular assay. Negative results do not preclude SARS-CoV-2 infection and should not be used as the sole basis for patient management decisions. This test has been authorized by the FDA under   an Emergency Use Authorization (EUA) for use by authorized laboratories. Fact sheet for Healthcare Providers: ConventionUpdate.co.nz Fact sheet for Patients: ConventionUpdate.co.nz   Methodology: Isothermal Nucleic Acid Amplification                Labs:     Recent Labs     10/15/21  0510 10/14/21  0540   WBC 20.4* 27.7*   HGB 12.9 14.1   HCT 41.5 44.8   PLT 71* 84*     Recent Labs     10/15/21  0510 10/14/21  0540 10/13/21  0630   * 150* 146*   K 3.2* 3.7 3.7   * 114* 111*   CO2 27 24 23   BUN 75* 77* 73*   CREA 2.35* 2.63* 2.72*   * 180* 129*   CA 8.8 8.8 9.5   PHOS  --   --  5.1*     Recent Labs     10/15/21  0510 10/14/21  0540 10/13/21  0630 10/13/21  0500 10/13/21  0500   ALT 42 53  --   --  68    88  --   --  95   TBILI 1.0 1.3*  --   --  1.2*   TP 5.8* 5.7*  --   --  5.7*   ALB 1.8* 1.7* 1.7*   < > 1.7*   GLOB 4.0 4.0  --   --  4.0    < > = values in this interval not displayed. No results for input(s): INR, PTP, APTT, INREXT, INREXT in the last 72 hours. No results for input(s): FE, TIBC, PSAT, FERR in the last 72 hours. Lab Results   Component Value Date/Time    Folate 12.5 10/02/2021 07:37 PM      No results for input(s): PH, PCO2, PO2 in the last 72 hours.   No results for input(s): CPK, CKNDX, TROIQ in the last 72 hours. No lab exists for component: CPKMB  Lab Results   Component Value Date/Time    Cholesterol, total 81 10/03/2021 07:08 AM    HDL Cholesterol 25 10/03/2021 07:08 AM    LDL, calculated 37.6 10/03/2021 07:08 AM    Triglyceride 92 10/03/2021 07:08 AM    CHOL/HDL Ratio 3.2 10/03/2021 07:08 AM     Lab Results   Component Value Date/Time    Glucose (POC) 328 (H) 10/14/2021 09:53 PM    Glucose (POC) 249 (H) 10/14/2021 04:11 PM    Glucose (POC) 184 (H) 10/14/2021 11:41 AM    Glucose (POC) 191 (H) 10/14/2021 07:28 AM    Glucose (POC) 120 (H) 10/13/2021 09:27 PM     Lab Results   Component Value Date/Time    Color Kathie 10/11/2021 01:33 AM    Appearance Clear 10/11/2021 01:33 AM    Specific gravity 1.016 10/11/2021 01:33 AM    pH (UA) 5.0 10/11/2021 01:33 AM    Protein 30 (A) 10/11/2021 01:33 AM    Glucose Negative 10/11/2021 01:33 AM    Ketone Negative 10/11/2021 01:33 AM    Bilirubin Negative 10/11/2021 01:33 AM    Urobilinogen 4.0 (H) 10/11/2021 01:33 AM    Nitrites Negative 10/11/2021 01:33 AM    Leukocyte Esterase Negative 10/11/2021 01:33 AM    Bacteria Negative 10/11/2021 01:33 AM    WBC 0-4 10/11/2021 01:33 AM    RBC 0-5 10/11/2021 01:33 AM         Assessment:     Altered mental status  Metabolic encephalopathy  Sepsis  Sepsis with bacteremia with MRSA  Acute proctitis  Lactic acidosis  Leukocytosis  Chronic A.  Fib  Congestive heart failure  Hypertension  Cardiomyopathy ejection fraction 15 x 20%  Acute on chronic kidney disease  Type 2 diabetes  Static post PEG tube placement  Hyponatremia  Hypokalemia    Plan:   Patient consult for PEG tube feeding  Nephrology consult for hyper natremia  Continue vancomycin and Zosyn  Nephrology consulted for acute on chronic kidney disease      Continue aspirin 81 mg daily  Lipitor 40 daily  Coreg 6.25 twice daily  Lasix 40 daily  Replace potassium  Teflaro 300 mg every 12 hours    Overall prognosis poor  Monitor renal function/sodium level  Patient is DNR  Repeat the labs        Current Facility-Administered Medications:     hydrOXYzine (VISTARIL) injection 25 mg, 25 mg, IntraMUSCular, Q6H PRN, Ammy Dumont MD, 25 mg at 10/14/21 2142    traMADoL (ULTRAM) tablet 50 mg, 50 mg, Oral, Q6H PRN, Ammy Dumont MD, 50 mg at 10/14/21 2136    cinacalcet (SENSIPAR) tablet 30 mg, 30 mg, Oral, DAILY WITH BREAKFAST, Johnny Pena MD, 30 mg at 10/15/21 0751    zinc oxide-white petrolatum 17-57 % topical paste, , Topical, TID, Ammy Dumont MD, Given at 10/15/21 0927    rifAMPin (RIFADIN) capsule 300 mg, 300 mg, Oral, DAILY, Toire Mei MD, 300 mg at 10/15/21 0927    cefTARoline (TEFLARO) 300 mg in 0.9% sodium chloride 50 mL IVPB, 300 mg, IntraVENous, Q12H, Torie Mei MD, Last Rate: 50 mL/hr at 10/15/21 0743, 300 mg at 10/15/21 0743    furosemide (LASIX) tablet 40 mg, 40 mg, Oral, Q48H, Johnny Pena MD, 40 mg at 10/15/21 6619    aspirin delayed-release tablet 81 mg, 81 mg, Oral, DAILY, Ammy Dumont MD, 81 mg at 10/15/21 9810    atorvastatin (LIPITOR) tablet 40 mg, 40 mg, Oral, QHS, Ammy Dumont MD, 40 mg at 10/14/21 2135    carvediloL (COREG) tablet 6.25 mg, 6.25 mg, Oral, BID WITH MEALS, Ammy Dumont MD, 6.25 mg at 10/15/21 0751    prednisoLONE acetate (PRED FORTE) 1 % ophthalmic suspension 1 Drop, 1 Drop, Both Eyes, TID, Ammy Dumotn MD, 1 Drop at 10/15/21 1656    insulin lispro (HUMALOG) injection, , SubCUTAneous, AC&HS, Ammy Dumont MD, 4 Units at 10/15/21 0754    glucose chewable tablet 16 g, 4 Tablet, Oral, PRN, Thelma Dumont MD    dextrose (D50W) injection syrg 12.5-25 g, 25-50 mL, IntraVENous, PRN, Thelma Dumont MD    glucagon (GLUCAGEN) injection 1 mg, 1 mg, IntraMUSCular, PRN, Thelma Dumont MD    acetaminophen (TYLENOL) tablet 650 mg, 650 mg, Oral, Q6H PRN **OR** acetaminophen (TYLENOL) suppository 650 mg, 650 mg, Rectal, Q6H PRN, Thelma Dumont MD    polyethylene glycol (MIRALAX) packet 17 g, 17 g, Oral, DAILY PRN, Joseph Dumont MD    ondansetron (ZOFRAN ODT) tablet 4 mg, 4 mg, Oral, Q8H PRN **OR** ondansetron (ZOFRAN) injection 4 mg, 4 mg, IntraVENous, Q6H PRN, Ammy Dumont MD    VANCOMYCIN INFORMATION NOTE, , Other, Rx Dosing/Monitoring, Joseph Dumont MD    dexmedeTOMidine (PRECEDEX) 400 mcg in 0.9% sodium chloride (MBP/ADV) 100 mL MBP, 0.1-1.5 mcg/kg/hr, IntraVENous, TITRATE, Joseph Dumont MD, Stopped at 10/14/21 1013

## 2021-10-15 NOTE — PROGRESS NOTES
Renal Note    NAME:  Trinidad Coles   :   1955   MRN:   [id number]     ATTENDING: Je Nix MD  PCP:  Eugene Thapa MD    Date/Time:  10/15/2021 1:00 PM      Subjective:     Pt seen in icu. She is more awake today. Water flushes were increased yesterday tolerating tube feeding at 30 mils per hour  Creatinine is improved to 2.3 today. Blood pressure is better      Past Medical History:   Diagnosis Date    [facility] ([facility]     Cardiomyopathy, idiopathic ([facility] 10/28/2010    Chronic kidney disease     Heart failure ([facility]     HTN (hypertension), benign 10/28/2010    Pacemaker     ICD    PVC (premature ventricular contraction) 10/28/2010      Past Surgical History:   Procedure Laterality Date    HX OTHER SURGICAL  10/02/2018    Right eye surgery    HX PACEMAKER       Social History     Tobacco Use    Smoking status: Never Smoker    Smokeless tobacco: Never Used   Substance Use Topics    Alcohol use: No      History reviewed. No pertinent family history. Allergies   Allergen Reactions    Chocolate [Cocoa] Anaphylaxis and Swelling    Iodine Anaphylaxis    Lisinopril Anaphylaxis    Peanut Anaphylaxis and Swelling    Glimepiride Swelling    Glipizide Other (comments)     Pruritis    Metformin Diarrhea    Norvasc [Amlodipine] Other (comments)     Light Headed    Pneumovax 23 [Pneumococcal 23-Devi Ps Vaccine] Hives    Toprol Xl [Metoprolol Succinate] Other (comments)     Light headed    Tositumomab I-131 (Maltose) Unable to Obtain    Vitamin D [Cholecalciferol (Vitamin D3)] Other (comments)     Dizziness/headache not a true allergy. Prior to Admission medications    Medication Sig Start Date End Date Taking? Authorizing Provider   aspirin delayed-release 81 mg tablet Take 1 Tablet by mouth daily. 10/5/21   Ara Primrose, MD   carvediloL (COREG) 6.25 mg tablet Take 1 Tablet by mouth two (2) times daily (with meals).  21   Renae Nicholson MD oxyCODONE IR (ROXICODONE) 5 mg immediate release tablet Take 5 mg by mouth every four (4) hours as needed for Pain. 7/16/21   Provider, Historical   cyclobenzaprine (FLEXERIL) 5 mg tablet Take 5 mg by mouth two (2) times daily as needed for Muscle Spasm(s). 7/19/21   Provider, Historical   amitriptyline (ELAVIL) 75 mg tablet Take 75 mg by mouth daily. 5/3/21   Provider, Historical   prednisoLONE acetate (PRED FORTE) 1 % ophthalmic suspension INSTILL 1 DROP INTO RIGHT EYE 4 TIMES A DAY 3/13/21   Provider, Historical   atorvastatin (LIPITOR) 40 mg tablet Take 1 Tab by mouth nightly. 2/19/21   Mohiuddin, Gretel Lundborg, MD   furosemide (Lasix) 40 mg tablet 1 tablet daily  Patient taking differently: Take 40 mg by mouth daily. 1 tablet daily 2/19/21   Charla Jeans, MD   metFORMIN (GLUCOPHAGE) 1,000 mg tablet two (2) times daily (with meals). 10/12/20   Provider, Historical   gabapentin (NEURONTIN) 100 mg capsule TAKE 1 CAPSULE BY MOUTH THREE TIMES A DAY 10/27/20   Provider, Historical   metoprolol succinate (TOPROL-XL) 25 mg XL tablet TAKE 0.5 TABS BY MOUTH DAILY. 12/16/19   João Hughes NP   ELIQUIS 5 mg tablet TAKE 1 TABLET BY MOUTH TWICE A DAY 12/5/19   João Hughes NP   nitroglycerin (NITROSTAT) 0.4 mg SL tablet 0.4 mg by SubLINGual route every five (5) minutes as needed for Chest Pain. Up to 3 doses. Patient not taking: Reported on 8/3/2021    Provider, Historical   cloNIDine HCL (CATAPRES) 0.2 mg tablet Take 0.2 mg by mouth two (2) times a day. 5/21/18   Provider, Historical   losartan (COZAAR) 100 mg tablet Take 100 mg by mouth daily. 4/25/17   Provider, Historical   PARoxetine (PAXIL) 20 mg tablet Take 20 mg by mouth daily. Provider, Historical   omeprazole (PRILOSEC) 20 mg capsule Take 20 mg by mouth daily.  10/28/10   Provider, Historical       REVIEW OF SYSTEMS:       Unable to obtain because of patient's mentation      Objective:   VITALS:    Visit Vitals  BP (!) 134/97 (BP 1 Location: Right upper arm, BP Patient Position: At rest)   Pulse 88   Temp 98 °F (36.7 °C)   Resp 11   Ht 5' 6\" (1.676 m)   Wt 76.6 kg (168 lb 14 oz)   SpO2 95%   Breastfeeding No   BMI 27.26 kg/m²     Temp (24hrs), Av.8 °F (36.6 °C), Min:97.3 °F (36.3 °C), Max:98.2 °F (36.8 °C)      PHYSICAL EXAM:     General: NAD, awake. Lethargic  Eyes: sclera anicteric  Oral Cavity: No thrush or ulcers  Neck: no JVD  Chest: Fair bilateral air entry. No Wheezing or Rhonchi. No rales. Heart: normal sounds  Abdomen: soft and non tender   :  escobedo  Lower Extremities: no edema  Skin: no rash  Neuro: She is lethargic. Withdraws to pain  Psychiatric: Unable to assess        LAB DATA REVIEWED:    Recent Results (from the past 24 hour(s))   GLUCOSE, POC    Collection Time: 10/14/21  4:11 PM   Result Value Ref Range    Glucose (POC) 249 (H) 65 - 117 mg/dL    Performed by Bill Lane    GLUCOSE, POC    Collection Time: 10/14/21  9:53 PM   Result Value Ref Range    Glucose (POC) 328 (H) 65 - 117 mg/dL    Performed by Dieter Addison    CBC WITH AUTOMATED DIFF    Collection Time: 10/15/21  5:10 AM   Result Value Ref Range    WBC 20.4 (H) 3.6 - 11.0 K/uL    RBC 4.71 3.80 - 5.20 M/uL    HGB 12.9 11.5 - 16.0 g/dL    HCT 41.5 35.0 - 47.0 %    MCV 88.1 80.0 - 99.0 FL    MCH 27.4 26.0 - 34.0 PG    MCHC 31.1 30.0 - 36.5 g/dL    RDW 19.8 (H) 11.5 - 14.5 %    PLATELET 71 (L) 481 - 400 K/uL    NRBC 0.1 (H) 0.0  WBC    ABSOLUTE NRBC 0.02 (H) 0.00 - 0.01 K/uL    NEUTROPHILS 83 (H) 32 - 75 %    LYMPHOCYTES 6 (L) 12 - 49 %    MONOCYTES 9 5 - 13 %    EOSINOPHILS 1 0 - 7 %    BASOPHILS 0 0 - 1 %    IMMATURE GRANULOCYTES 1 (H) 0 - 0.5 %    ABS. NEUTROPHILS 17.1 (H) 1.8 - 8.0 K/UL    ABS. LYMPHOCYTES 1.2 0.8 - 3.5 K/UL    ABS. MONOCYTES 1.8 (H) 0.0 - 1.0 K/UL    ABS. EOSINOPHILS 0.1 0.0 - 0.4 K/UL    ABS. BASOPHILS 0.0 0.0 - 0.1 K/UL    ABS. IMM.  GRANS. 0.2 (H) 0.00 - 0.04 K/UL    DF AUTOMATED     METABOLIC PANEL, COMPREHENSIVE    Collection Time: 10/15/21  5:10 AM Result Value Ref Range    Sodium 149 (H) 136 - 145 mmol/L    Potassium 3.2 (L) 3.5 - 5.1 mmol/L    Chloride 114 (H) 97 - 108 mmol/L    CO2 27 21 - 32 mmol/L    Anion gap 8 5 - 15 mmol/L    Glucose 247 (H) 65 - 100 mg/dL    BUN 75 (H) 6 - 20 mg/dL    Creatinine 2.35 (H) 0.55 - 1.02 mg/dL    BUN/Creatinine ratio 32 (H) 12 - 20      GFR est AA 25 (L) >60 ml/min/1.73m2    GFR est non-AA 21 (L) >60 ml/min/1.73m2    Calcium 8.8 8.5 - 10.1 mg/dL    Bilirubin, total 1.0 0.2 - 1.0 mg/dL    AST (SGOT) 16 15 - 37 U/L    ALT (SGPT) 42 12 - 78 U/L    Alk. phosphatase 101 45 - 117 U/L    Protein, total 5.8 (L) 6.4 - 8.2 g/dL    Albumin 1.8 (L) 3.5 - 5.0 g/dL    Globulin 4.0 2.0 - 4.0 g/dL    A-G Ratio 0.5 (L) 1.1 - 2.2     VANCOMYCIN, RANDOM    Collection Time: 10/15/21  5:10 AM   Result Value Ref Range    Vancomycin, random 16.8 ug/mL   GLUCOSE, POC    Collection Time: 10/15/21 10:40 AM   Result Value Ref Range    Glucose (POC) 239 (H) 65 - 117 mg/dL    Performed by Benjamin Moreno        Recommendations/Plan:     #1 acute kidney injury on chronic kidney disease stage III  -She presented with a creatinine of 2.5   -Creatinine increased to 2.7.  improved to 2.3 today  -ADELINA likely prerenal secondary to sepsis/possible cardiorenal syndrome  -Patient noted to be hypotensive with systolic blood pressure in 80s on admission  -Recently discharged with creatinine 1.6 on 10/6  -On Lasix 40 mg every 48 hours which I will hold for now since her respiratory status is better  -clinically she has no volume overload. No edema in both legs. Chest sounds clear  -Urine is bland  -CT abdomen without contrast on 10/11 shows no renal obstruction. No need for renal ultrasound  -I have gone through her medication list.  Not on any potential nephrotoxins preadmission  -We'll follow-up on renal function in a.m.     #2 severe sepsis   -because of possible right parotitis with parapharyngeal and retropharyngeal  involvement per CT scan  -She had LP on admission which was negative  -Urine is bland. Covid negative. No infiltrate on chest x-ray  -She has been started on vancomycin and Zosyn pending cultures  -WBC count has improved to 20.4 today  -ID is on the case    #3 acute right parotitis with edema/fluid in parapharyngeal space  -ENT has been consulted. Plan for conservative management with IV antibiotics for now    #4  Hypernatremia  -Sodium has improved slightly 150--> 149 after increasing water flushes to 300 mL every 4 hours. I will change to 300 mL every 3 hours.    -Also will hold Lasix    #5 acute hypoxia  -Due to CHF exacerbation. She has history of cardiomyopathy with EF less than 15%  -Chest x-ray shows mild pulmonary congestion. BNP 15,000  -on Lasix 40 mg IV every 48 hrs which I will hold and monitor urine output    #5 altered mentation  -Could be secondary to metabolic derangements from sepsis. Unsure of her baseline mentation  -CT head was negative on admission    #6  Hypercalcemia  -PTH is 85. Corrected calcium is 10.6  -Likely has primary hyperparathyroidism.   Will start Sensipar 30 mg daily  -Phosphorus is okay at 5.1          ________________________________________________________________________  Signed: Rebekah David MD

## 2021-10-15 NOTE — PROGRESS NOTES
OtoHNS   HD 5  Precedex drip  Vanc, rifampin    Comfortable, non verbal, alert.    Mental status stable from yesterday    Visit Vitals  BP (!) 119/96 (BP 1 Location: Right upper arm, BP Patient Position: At rest)   Pulse 96   Temp 98 °F (36.7 °C)   Resp (!) 91   Ht 5' 6\" (1.676 m)   Wt 173 lb 1 oz (78.5 kg)   SpO2 100%   Breastfeeding No   BMI 27.93 kg/m²     Breathing comfortably  Oral mucosa dry  Not cooperative with her oral exam today  Right facial swelling, perhaps improved  Tender on palpation  No skin changes  No fluctuance    WBC 27-->20    Blood cx MRSA 10/11  Blood cx GPC 10/13    A/P   R parotitis   AMS  - WBC trending down finally  - Neck exam a bit improved today as well  - Oral mucosa remains very dry  - Hydrate as medically tolerated (CHF)   - Cont abx per ID  - ESR and CRP pending  - Following for improvement    MD Masoud Carreonova [facility]  [address] [place] [address]  Office Phone: [phone]

## 2021-10-16 NOTE — PROGRESS NOTES
Infectious Disease Progress Note           Subjective:   Patient followed by Dr. Israel Castro for MRSA bacteremia and acute parotitis, on Vancomycin, Ceftaroline and Rifampin. Blood cultures repeat yesterday and are pending at this time. She is afebrile with decreasing WBC. Patient is confused and disoriented at this time.   Objective:   Physical Exam:     Visit Vitals  BP (!) 111/94 (BP 1 Location: Right upper arm, BP Patient Position: At rest)   Pulse 91   Temp 98 °F (36.7 °C)   Resp 18   Ht 5' 6\" (1.676 m)   Wt 168 lb 14 oz (76.6 kg)   SpO2 99%   Breastfeeding No   BMI 27.26 kg/m²    O2 Flow Rate (L/min): 3 l/min O2 Device: Nasal cannula    Temp (24hrs), Av.6 °F (36.4 °C), Min:97.4 °F (36.3 °C), Max:98 °F (36.7 °C)    10/16 0701 - 10/16 1900  In: 300   Out: -    10/14 1901 - 10/16 0700  In: 2782 [I.V.:50]  Out: 900 [Urine:900]    General: Moderately ill appearing, moderated distress   HEENT:  Right parotid firm, ?tender   Lungs: bilateral rhonchi, no wheezes   Heart: RRR, no murmur  Abdo: Soft, NT, ND, +BS, peg tube in place   :  Quinn catheter   Exts: no edema; mitts on both hands   Skin: No wounds, No rashes or lesions      Data Review:       Recent Days:  Recent Labs     10/15/21  0510 10/14/21  0540   WBC 20.4* 27.7*   HGB 12.9 14.1   HCT 41.5 44.8   PLT 71* 84*     Recent Labs     10/15/21  0510 10/14/21  0540   BUN 75* 77*   CREA 2.35* 2.63*       No results found for: CRPQN, CRP, CRPM       Microbiology     Blood cultures (10/15)  No growth thus far  Results     Procedure Component Value Units Date/Time    CULTURE, BLOOD [[id number]] Collected: 10/15/21 1650    Order Status: Completed Specimen: Blood Updated: 10/15/21 1701    CULTURE, BLOOD [[id number]]  (Abnormal) Collected: 10/13/21 7330    Order Status: Completed Specimen: Blood Updated: 10/15/21 1245     Special Requests: No Special Requests        Culture result:       Gram Positive Cocci in clusters growing in both bottles drawn CALLED TO AND READ BACK BY HARVINDER Tolliver ([facility]) ON 10/14/21 AT 1157. HH                  * methicillin resistant staphylococcus aureus * growing in both bottles drawn (SITE NOT INDICATED) REFER TO [id number] FOR SENSITIVITIES            (NOTE) KNOWN MRSA PATIENT    CULTURE, Ivar Pratt STAIN [[id number]] Collected: 10/12/21 0945    Order Status: Canceled Specimen: Wound from Mouth     MRSA SCREEN - PCR (NASAL) [[id number]]  (Abnormal) Collected: 10/11/21 1025    Order Status: Completed Specimen: Swab Updated: 10/11/21 1147     MRSA by PCR, Nasal DETECTED        Comment: Results verified, phoned to and read back by Dorie Urbano 10/11/21 11:50       MRSA SCREEN - PCR (NASAL) [[id number]] Collected: 10/11/21 0945    Order Status: Canceled Specimen: Swab     COVID-19 RAPID TEST [[id number]] Collected: 10/11/21 0930    Order Status: Completed Specimen: Nasopharyngeal Updated: 10/11/21 1104     Specimen source       Please find results under separate order           COVID-19 rapid test Not Detected        Comment: Rapid Abbott ID Now   Rapid NAAT:  The specimen is NEGATIVE for SARS-CoV-2, the novel coronavirus associated with COVID-19. Negative results should be treated as presumptive and, if inconsistent with clinical signs and symptoms or necessary for patient management, should be tested with an alternative molecular assay. Negative results do not preclude SARS-CoV-2 infection and should not be used as the sole basis for patient management decisions. This test has been authorized by the FDA under   an Emergency Use Authorization (EUA) for use by authorized laboratories.  Fact sheet for Healthcare Providers: ConventionUpdate.co.nz Fact sheet for Patients: ConventionUpdate.co.nz   Methodology: Isothermal Nucleic Acid Amplification         CULTURE, BLOOD, LINE [[id number]] Collected: 10/11/21 0930    Order Status: Canceled Specimen: Blood     CULTURE, URINE [[id number]] Collected: 10/11/21 0930    Order Status: Completed Specimen: Urine Updated: 10/12/21 1049     Special Requests: No Special Requests        Culture result: No Growth (<1000 cfu/mL)       CULTURE, CSF  TUBE 2 [[id number]] Collected: 10/11/21 0504    Order Status: Completed Specimen: Cerebrospinal Fluid Updated: 10/12/21 1017     Special Requests: No Special Requests        GRAM STAIN No wbc's seen         No organisms seen        Culture result:       No growth thus far holding 7 days          CULTURE, BLOOD, PAIRED [[id number]]  (Abnormal)  (Susceptibility) Collected: 10/11/21 0225    Order Status: Completed Specimen: Blood Updated: 10/15/21 0921     Special Requests: No Special Requests        Culture result:       * methicillin resistant staphylococcus aureus * growing in all 4 bottles drawn (SITES NOT INDICATED)          Susceptibility      Staphylococcus aureus Methcillin Resistant      NIMA      Ciprofloxacin ($) Susceptible      Clindamycin ($) Susceptible      Daptomycin ($$$$$) Susceptible      Doxycycline ($$) Susceptible      Erythromycin ($$$$) Resistant      Gentamicin ($) Susceptible      Levofloxacin ($) Susceptible      Linezolid ($$$$$) Susceptible      Oxacillin Resistant      Rifampin ($$$$) Susceptible  [1]       Tetracycline Susceptible      Trimeth/Sulfa Susceptible      Vancomycin ($) Susceptible              [1]  Rifampin is not to be used for mono-therapy. Linear View                   COVID-19 RAPID TEST [[id number]] Collected: 10/02/21 1730    Order Status: Completed Specimen: Nasopharyngeal Updated: 10/02/21 1814     Specimen source Nasopharyngeal        COVID-19 rapid test Not Detected        Comment: Rapid Abbott ID Now   Rapid NAAT:  The specimen is NEGATIVE for SARS-CoV-2, the novel coronavirus associated with COVID-19.    Negative results should be treated as presumptive and, if inconsistent with clinical signs and symptoms or necessary for patient management, should be tested with an alternative molecular assay. Negative results do not preclude SARS-CoV-2 infection and should not be used as the sole basis for patient management decisions. This test has been authorized by the FDA under   an Emergency Use Authorization (EUA) for use by authorized laboratories. Fact sheet for Healthcare Providers: ConventionUpdate.co.nz Fact sheet for Patients: ConventionUpdate.co.nz   Methodology: Isothermal Nucleic Acid Amplification                  Diagnostics   CXR Results  (Last 48 hours)    None             Assessment/Plan     1. MRSA bacteremia, Day #6 IV Vancomycin, Day #3 Ceftaroline and Rifampin      2. Acute right ?suppurative parotitis per CT, possibly secondary to MRSA   3. MRSA colonization, day # 5 of Mupirocin   4. Cardiomyopathy, severe  5. Acute on chronic renal failure   6. AICD in situ, no clear evidence of infection    1. Continue IV Vancomycin, Ceftaroline, Rifampin  2. In am, repeat CBC and blood cultures  3. Follow-up repeat blood cultures  4. If blood cultures remain positive, it may be prudent to repeat TTE since previous one was done prior to bacteremia  5. Also consider repeat maxillofacial CT with contrast to rule out abscess, but would defer to ENT.     Mk Salmeron MD    10/16/2021

## 2021-10-16 NOTE — PROGRESS NOTES
Consult for Vancomycin Dosing by Pharmacy by Dr. Carolyn Rodrigues provided for this 78y.o. year old female , for indication of MRSA bacteremia. Day of Therapy: 6  Goal of Level(s): trough = 15-20mcg/dL     Other Current Antibiotics: Teflaro     Significant Cultures:       Date                             Culture                                       Organism      10/11                            Blood x4                                     MRSA      Random level therapeutic today.  Will give Vanc 500 mg x1 today and Random tomorrow AM.

## 2021-10-16 NOTE — PROGRESS NOTES
Renal Note    NAME:  Yu Serrano   :   1955   MRN:   [id number]     ATTENDING: Anita Nobel MD  PCP:  Noni Rogers MD    Date/Time:  10/16/2021 1:00 PM      Subjective:     Pt seen in icu. Na 149  Creatinine is improved to 2.3 today. Blood pressure is better      Past Medical History:   Diagnosis Date    [facility] ([facility])     Cardiomyopathy, idiopathic ([facility]) 10/28/2010    Chronic kidney disease     Heart failure ([facility])     HTN (hypertension), benign 10/28/2010    Pacemaker     ICD    PVC (premature ventricular contraction) 10/28/2010      Past Surgical History:   Procedure Laterality Date    HX OTHER SURGICAL  10/02/2018    Right eye surgery    HX PACEMAKER       Social History     Tobacco Use    Smoking status: Never Smoker    Smokeless tobacco: Never Used   Substance Use Topics    Alcohol use: No      History reviewed. No pertinent family history. Allergies   Allergen Reactions    Chocolate [Cocoa] Anaphylaxis and Swelling    Iodine Anaphylaxis    Lisinopril Anaphylaxis    Peanut Anaphylaxis and Swelling    Glimepiride Swelling    Glipizide Other (comments)     Pruritis    Metformin Diarrhea    Norvasc [Amlodipine] Other (comments)     Light Headed    Pneumovax 23 [Pneumococcal 23-Devi Ps Vaccine] Hives    Toprol Xl [Metoprolol Succinate] Other (comments)     Light headed    Tositumomab I-131 (M[place]ose) Unable to Obtain    Vitamin D [Cholecalciferol (Vitamin D3)] Other (comments)     Dizziness/headache not a true allergy. Prior to Admission medications    Medication Sig Start Date End Date Taking? Authorizing Provider   aspirin delayed-release 81 mg tablet Take 1 Tablet by mouth daily. 10/5/21   Dale Noe MD   carvediloL (COREG) 6.25 mg tablet Take 1 Tablet by mouth two (2) times daily (with meals).  21   Jacque Bolton MD   oxyCODONE IR (ROXICODONE) 5 mg immediate release tablet Take 5 mg by mouth every four (4) hours as needed for Pain. 7/16/21   Provider, Historical   cyclobenzaprine (FLEXERIL) 5 mg tablet Take 5 mg by mouth two (2) times daily as needed for Muscle Spasm(s). 7/19/21   Provider, Historical   amitriptyline (ELAVIL) 75 mg tablet Take 75 mg by mouth daily. 5/3/21   Provider, Historical   prednisoLONE acetate (PRED FORTE) 1 % ophthalmic suspension INSTILL 1 DROP INTO RIGHT EYE 4 TIMES A DAY 3/13/21   Provider, Historical   atorvastatin (LIPITOR) 40 mg tablet Take 1 Tab by mouth nightly. 2/19/21   Carlton Dumont MD   furosemide (Lasix) 40 mg tablet 1 tablet daily  Patient taking differently: Take 40 mg by mouth daily. 1 tablet daily 2/19/21   Kenyon Louis MD   metFORMIN (GLUCOPHAGE) 1,000 mg tablet two (2) times daily (with meals). 10/12/20   Provider, Historical   gabapentin (NEURONTIN) 100 mg capsule TAKE 1 CAPSULE BY MOUTH THREE TIMES A DAY 10/27/20   Provider, Historical   metoprolol succinate (TOPROL-XL) 25 mg XL tablet TAKE 0.5 TABS BY MOUTH DAILY. 12/16/19   Mei Boyer NP   ELIQUIS 5 mg tablet TAKE 1 TABLET BY MOUTH TWICE A DAY 12/5/19   Mei Boyer NP   nitroglycerin (NITROSTAT) 0.4 mg SL tablet 0.4 mg by SubLINGual route every five (5) minutes as needed for Chest Pain. Up to 3 doses. Patient not taking: Reported on 8/3/2021    Provider, Historical   cloNIDine HCL (CATAPRES) 0.2 mg tablet Take 0.2 mg by mouth two (2) times a day. 5/21/18   Provider, Historical   losartan (COZAAR) 100 mg tablet Take 100 mg by mouth daily. 4/25/17   Provider, Historical   PARoxetine (PAXIL) 20 mg tablet Take 20 mg by mouth daily. Provider, Historical   omeprazole (PRILOSEC) 20 mg capsule Take 20 mg by mouth daily.  10/28/10   Provider, Historical       REVIEW OF SYSTEMS:       Unable to obtain because of patient's mentation      Objective:   VITALS:    Visit Vitals  BP (!) 111/94 (BP 1 Location: Right upper arm, BP Patient Position: At rest)   Pulse 91   Temp 98 °F (36.7 °C)   Resp 18   Ht 5' 6\" (1.676 m)   Wt 76.6 kg (168 lb 14 oz)   SpO2 99%   Breastfeeding No   BMI 27.26 kg/m²     Temp (24hrs), Av.6 °F (36.4 °C), Min:97.4 °F (36.3 °C), Max:98 °F (36.7 °C)      PHYSICAL EXAM:     General: NAD, awake. Lethargic  Eyes: sclera anicteric  Oral Cavity: No thrush or ulcers  Neck: no JVD  Chest: Fair bilateral air entry. No Wheezing or Rhonchi. No rales. Heart: normal sounds  Abdomen: soft and non tender   :  escobedo  Lower Extremities: no edema  Skin: no rash  Neuro: She is lethargic. Withdraws to pain  Psychiatric: Unable to assess        LAB DATA REVIEWED:    Recent Results (from the past 24 hour(s))   GLUCOSE, POC    Collection Time: 10/15/21  3:33 PM   Result Value Ref Range    Glucose (POC) 224 (H) 65 - 117 mg/dL    Performed by Joanie Cuevas, POC    Collection Time: 10/15/21 11:14 PM   Result Value Ref Range    Glucose (POC) 220 (H) 65 - 117 mg/dL    Performed by Dave Graff, RANDOM    Collection Time: 10/16/21  4:27 AM   Result Value Ref Range    Vancomycin, random 18.2 ug/mL   GLUCOSE, POC    Collection Time: 10/16/21  7:27 AM   Result Value Ref Range    Glucose (POC) 387 (H) 65 - 117 mg/dL    Performed by Eber Bates        Recommendations/Plan:     1 acute kidney injury on chronic kidney disease stage III:  -likely prerenal secondary to sepsis/possible cardiorenal syndrome  -She presented with a creatinine of 2.5   -Creatinine increased to 2.7.  improved to 2.3   -Recently discharged with creatinine 1.6 on 10/6  -clinically she has no volume overload. No edema in both legs. -Urine is bland  -CT abdomen without contrast on 10/11 shows no renal obstruction.    -Not on any potential nephrotoxins preadmission      2 severe sepsis :  -because of possible right parotitis with parapharyngeal and retropharyngeal  involvement per CT scan  -She had LP on admission which was negative  -Urine is bland. Covid negative.   No infiltrate on chest x-ray  -She has been started on vancomycin and Zosyn pending cultures  -WBC count has improved to 20.4   -ID is on the case      3 Hypernatremia:  -Sodium has improved slightly 150--> 149 .  -will c/w 300 mL every 3 hours.    -Also will hold Lasix    4 CHF:  -She has history of cardiomyopathy with EF less than 15%  -Chest x-ray shows mild pulmonary congestion. BNP 15,000  -on Lasix 40 mg IV every 48 hrs which I will hold and monitor urine output    5 altered mentation:  -Could be secondary to metabolic derangements from sepsis.    -Unsure of her baseline mentation  -CT head was negative on admission    6  Hypercalcemia  -PTH is 85.   Corrected calcium is 10.6  -Phosphorus is okay at 5.1          ________________________________________________________________________  Signed: Ele Barney MD

## 2021-10-16 NOTE — PROGRESS NOTES
[facility] [employer] used. Transferred patient via bed to room. Updates given to Erin Miranda. Transferred patient into bed.

## 2021-10-16 NOTE — PROGRESS NOTES
Progress Note    Patient: Christian Garcia MRN: [id number]  SSN: [id number]    YOB: 1955  Age: 78 y.o. Sex: female      Admit Date: 10/11/2021    LOS: 5 days     Subjective:     MRSA bacteremia, acute right parotitis, cardiomyopathy EF of 15% acute on chronic renal failure. Patient has been seen by infectious disease and ENT    Objective:     Vitals:    10/16/21 0500 10/16/21 0600 10/16/21 0700 10/16/21 0801   BP:   (!) 111/94    Pulse: 82 91     Resp:   18    Temp:   98 °F (36.7 °C)    SpO2:   99% 99%   Weight:       Height:            Intake and Output:  Current Shift: 10/16 0701 - 10/16 1900  In: 300   Out: -   Last three shifts: 10/14 1901 - 10/16 0700  In: 2590 [I.V.:50]  Out: 900 [Urine:900]    Physical Exam:   General:  Alert, cooperative, no distress, appears stated age. Eyes:  Conjunctivae/corneas clear. PERRL, EOMs intact. Fundi benign   Ears:  Normal TMs and external ear canals both ears. Nose: Nares normal. Septum midline. Mucosa normal. No drainage or sinus tenderness. Mouth/Throat: Lips, mucosa, and tongue normal. Teeth and gums normal.   Neck: Supple, symmetrical, trachea midline, no adenopathy, thyroid: no enlargment/tenderness/nodules, no carotid bruit and no JVD. Back:   Symmetric, no curvature. ROM normal. No CVA tenderness. Lungs:   Clear to auscultation bilaterally. Heart:  Regular rate and rhythm, S1, S2 normal, no murmur, click, rub or gallop. Abdomen:   Soft, non-tender. Bowel sounds normal. No masses,  No organomegaly. Extremities: Extremities normal, atraumatic, no cyanosis or edema. Pulses: 2+ and symmetric all extremities. Skin: Skin color, texture, turgor normal. No rashes or lesions   Lymph nodes: Cervical, supraclavicular, and axillary nodes normal.   Neurologic: CNII-XII intact. Normal strength, sensation and reflexes throughout. Lab/Data Review: All lab results for the last 24 hours reviewed.      Recent Results (from the past 24 hour(s))   GLUCOSE, POC    Collection Time: 10/15/21  3:33 PM   Result Value Ref Range    Glucose (POC) 224 (H) 65 - 117 mg/dL    Performed by Snehal Ortiz, POC    Collection Time: 10/15/21 11:14 PM   Result Value Ref Range    Glucose (POC) 220 (H) 65 - 117 mg/dL    Performed by Melissa Shultz, RANDOM    Collection Time: 10/16/21  4:27 AM   Result Value Ref Range    Vancomycin, random 18.2 ug/mL   GLUCOSE, POC    Collection Time: 10/16/21  7:27 AM   Result Value Ref Range    Glucose (POC) 387 (H) 65 - 117 mg/dL    Performed by Clara Monsivais          Assessment:     Active Problems:    Altered mental status (10/11/2021)      Sepsis ([facility] [address] (10/11/2021)      Moderate protein-calorie malnutrition ([facility] [address] (10/12/2021)    MRSA bacteremia,  Acute right parotitis, cardiomyopathy EF of 15%  Acute on chronic renal failure    Plan:     Continue with IV antibiotics  Monitor inflammatory markers  ENT and ID following    Signed By: Barrie Saeed MD     October 16, 2021

## 2021-10-17 NOTE — PROGRESS NOTES
Progress Note    Patient: Vicente Cunningham MRN: [id number]  SSN: [id number]    YOB: 1955  Age: 78 y.o. Sex: female      Admit Date: 10/11/2021    LOS: 6 days     Subjective:     78years old with parotitis Gram-positive bacteremia confused    Objective:     Vitals:    10/17/21 1002 10/17/21 1027 10/17/21 1239 10/17/21 1607   BP:  (!) 133/102  128/82   Pulse:  72 (!) 102 96   Resp:  20 19   Temp:  98.2 °F (36.8 °C)  97.8 °F (36.6 °C)   SpO2: 96% 95%  100%   Weight:       Height:            Intake and Output:  Current Shift: 10/17 0701 - 10/17 1900  In: 1000   Out: 0   Last three shifts: 10/15 1901 - 10/17 0700  In: 2890 [I.V.:150]  Out: 1300 [Urine:1300]    Physical Exam:   General:   Confused s, appears stated age. Moaning   Eyes:  Conjunctivae/corneas clear. PERRL, EOMs intact. Fundi benign   Ears:  Normal TMs and external ear canals both ears. Nose: Nares normal. Septum midline. Mucosa normal. No drainage or sinus tenderness. Mouth/Throat: Lips, mucosa, and tongue normal. Teeth and gums normal.   Neck: Supple, symmetrical, trachea midline, no adenopathy, thyroid: no enlargment/tenderness/nodules, no carotid bruit and no JVD. Back:   Symmetric, no curvature. ROM normal. No CVA tenderness. Lungs:   Clear to auscultation bilaterally. Heart:  Regular rate and rhythm, S1, S2 normal, no murmur, click, rub or gallop. Abdomen:   Soft, non-tender. Bowel sounds normal. No masses,  No organomegaly. Extremities: Extremities normal, atraumatic, no cyanosis or edema. Pulses: 2+ and symmetric all extremities. Skin: Skin color, texture, turgor normal. No rashes or lesions   Lymph nodes: Cervical, supraclavicular, and axillary nodes normal.   Neurologic: CNII-XII intact. Normal strength, sensation and reflexes throughout. Lab/Data Review: All lab results for the last 24 hours reviewed.      Recent Results (from the past 24 hour(s))   GLUCOSE, POC    Collection Time: 10/16/21  9:21 PM   Result Value Ref Range    Glucose (POC) 158 (H) 65 - 117 mg/dL    Performed by MARGIE Mir    Collection Time: 10/17/21  6:06 AM   Result Value Ref Range    Vancomycin, random 19.4 ug/mL    Reported dose date Not provided      Reported dose: Not provided Units   RENAL FUNCTION PANEL    Collection Time: 10/17/21  6:06 AM   Result Value Ref Range    Sodium 147 (H) 136 - 145 mmol/L    Potassium 4.2 3.5 - 5.1 mmol/L    Chloride 114 (H) 97 - 108 mmol/L    CO2 23 21 - 32 mmol/L    Anion gap 10 5 - 15 mmol/L    Glucose 238 (H) 65 - 100 mg/dL    BUN 62 (H) 6 - 20 mg/dL    Creatinine 1.86 (H) 0.55 - 1.02 mg/dL    BUN/Creatinine ratio 33 (H) 12 - 20      GFR est AA 33 (L) >60 ml/min/1.73m2    GFR est non-AA 27 (L) >60 ml/min/1.73m2    Calcium 8.7 8.5 - 10.1 mg/dL    Phosphorus 2.2 (L) 2.6 - 4.7 mg/dL    Albumin 1.9 (L) 3.5 - 5.0 g/dL   GLUCOSE, POC    Collection Time: 10/17/21  8:34 AM   Result Value Ref Range    Glucose (POC) 264 (H) 65 - 117 mg/dL    Performed by REYES SANDRA    CBC WITH AUTOMATED DIFF    Collection Time: 10/17/21 11:32 AM   Result Value Ref Range    WBC 16.4 (H) 3.6 - 11.0 K/uL    RBC 4.71 3.80 - 5.20 M/uL    HGB 13.0 11.5 - 16.0 g/dL    HCT 41.0 35.0 - 47.0 %    MCV 87.0 80.0 - 99.0 FL    MCH 27.6 26.0 - 34.0 PG    MCHC 31.7 30.0 - 36.5 g/dL    RDW 20.0 (H) 11.5 - 14.5 %    PLATELET 66 (L) 529 - 400 K/uL    NRBC 0.3 (H) 0.0  WBC    ABSOLUTE NRBC 0.05 (H) 0.00 - 0.01 K/uL    NEUTROPHILS 83 (H) 32 - 75 %    LYMPHOCYTES 9 (L) 12 - 49 %    MONOCYTES 7 5 - 13 %    EOSINOPHILS 0 0 - 7 %    BASOPHILS 0 0 - 1 %    IMMATURE GRANULOCYTES 1 (H) 0 - 0.5 %    ABS. NEUTROPHILS 13.5 (H) 1.8 - 8.0 K/UL    ABS. LYMPHOCYTES 1.5 0.8 - 3.5 K/UL    ABS. MONOCYTES 1.2 (H) 0.0 - 1.0 K/UL    ABS. EOSINOPHILS 0.1 0.0 - 0.4 K/UL    ABS. BASOPHILS 0.0 0.0 - 0.1 K/UL    ABS. IMM.  GRANS. 0.2 (H) 0.00 - 0.04 K/UL    DF AUTOMATED     GLUCOSE, POC    Collection Time: 10/17/21 11:40 AM   Result Value Ref Range    Glucose (POC) 309 (H) 65 - 117 mg/dL    Performed by [facility])    GLUCOSE, POC    Collection Time: 10/17/21  4:15 PM   Result Value Ref Range    Glucose (POC) 271 (H) 65 - 117 mg/dL    Performed by [facility])          Assessment:     Active Problems:    Altered mental status (10/11/2021)      Sepsis ([facility] [address] (10/11/2021)      Moderate protein-calorie malnutrition ([facility] [address] (10/12/2021)    Bacteremia  Parotitis    Plan:     Continue with present treatment    Signed By: Toribio Bartlett MD     October 17, 2021

## 2021-10-17 NOTE — PROGRESS NOTES
Day #7 of Vancomycin    Indication for Antimicrobials: SSTI, bacteremia     Significant Cultures:   10/11 blood: MRSA - Final  10/11 CSF: NGTD - prelim  10/11 urine: NGTD - Final  10/11 mrsa screen: detected - Final  10/13 blood: GPC in clusters 2/2, MRSA - Final  10/15 blood: ngtd - prelim    Labs:  Recent Labs     10/17/21  0606 10/15/21  0510   CREA 1.86* 2.35*   BUN 62* 75*   WBC  --  20.4*     Temp (24hrs), Av.9 °F (36.6 °C), Min:97.1 °F (36.2 °C), Max:98.9 °F (37.2 °C)      Is the Patient on Dialysis? No    Creatinine Clearance (mL/min):   CrCl (Actual Body Weight): 35.5  CrCl (Adjusted Body Weight): 30.7  CrCl (Ideal Body Weight): 27.5    Goal level: AUC/NIMA 400-600         Impression/Plan:   Will initiate vancomycin 500 mg IV q18h regimen  --projected steady state AUC ~537 with trough 19  Will get trough on 10/19      Pharmacy will follow daily and adjust medications as appropriate for renal function and/or serum levels.     Thank you,  [facility]

## 2021-10-17 NOTE — PROGRESS NOTES
Problem: Falls - Risk of  Goal: *Absence of Falls  Description: Document Lucille Garsia Fall Risk and appropriate interventions in the flowsheet.   Outcome: Progressing Towards Goal  Note: Fall Risk Interventions:  Mobility Interventions: Bed/chair exit alarm, OT consult for ADLs, Patient to call before getting OOB, PT Consult for assist device competence, PT Consult for mobility concerns    Mentation Interventions: Adequate sleep, hydration, pain control, Bed/chair exit alarm, Family/sitter at bedside, More frequent rounding, Familiar objects from home    Medication Interventions: Assess postural VS orthostatic hypotension, Bed/chair exit alarm, Patient to call before getting OOB, Utilize gait belt for transfers/ambulation    Elimination Interventions: Bed/chair exit alarm, Call light in reach, Patient to call for help with toileting needs    History of Falls Interventions: Bed/chair exit alarm, Door open when patient unattended

## 2021-10-17 NOTE — PROGRESS NOTES
Infectious Disease Progress Note           Subjective:   Patient followed by Dr. Peyton Douglas for MRSA bacteremia and acute parotitis, on Vancomycin, Ceftaroline and Rifampin. Patient has been transferred out of the ICU. Blood cultures repeated on 10/15 again showing GPC in clusters. She is afebrile with decreasing WBC. She remains confused and disoriented. Followed closely by ENT with reported continued purulent secretions from right Panfilo's duct. Patient is confused and disoriented at this time. Repeat imaging being considered. Objective:   Physical Exam:     Visit Vitals  BP (!) 133/102 (BP 1 Location: Right upper arm, BP Patient Position: At rest)   Pulse (!) 102   Temp 98.2 °F (36.8 °C)   Resp 20   Ht 5' 6\" (1.676 m)   Wt 168 lb 14 oz (76.6 kg)   SpO2 95%   Breastfeeding No   BMI 27.26 kg/m²    O2 Flow Rate (L/min): 3 l/min O2 Device: Nasal cannula    Temp (24hrs), Av.9 °F (36.6 °C), Min:97.1 °F (36.2 °C), Max:98.9 °F (37.2 °C)    No intake/output data recorded.    10/15 1901 - 10/17 0700  In: 2890 [I.V.:150]  Out: 1300 [Urine:1300]    General: Moderately ill appearing, moderated distress, Nasal cannula 3L/min   HEENT:  Right parotid firm, ?tender   Lungs: clear bilaterally   Heart: RRR, no murmur  Abdo: Soft, NT, ND, +BS, peg tube in place   :  External urinary catheter disconneted   Exts: no edema; mitts on both hands   Skin: No wounds, No rashes or lesions      Data Review:       Recent Days:  Recent Labs     10/17/21  1132 10/15/21  0510   WBC 16.4* 20.4*   HGB 13.0 12.9   HCT 41.0 41.5   PLT 66* 71*     Recent Labs     10/17/21  0606 10/15/21  0510   BUN 62* 75*   CREA 1.86* 2.35*       No results found for: CRPQN, CRP, CRPM       Microbiology     Blood cultures (10/15)  Gram positive cocci in clusters  Results     Procedure Component Value Units Date/Time    CULTURE, BLOOD [[id number]]  (Abnormal) Collected: 10/15/21 2746    Order Status: Completed Specimen: Blood Updated: 10/17/21 1326     Special Requests: No Special Requests        Culture result:       Gram Positive Cocci in clusters GROWING IN THE ANAEROBIC BOTTLE (NO SITE)            *KNOWN MRSA PATIENT    CULTURE, BLOOD [[id number]]  (Abnormal) Collected: 10/13/21 0500    Order Status: Completed Specimen: Blood Updated: 10/15/21 1247     Special Requests: No Special Requests        Culture result:       Gram Positive Cocci in clusters growing in both bottles drawn [facility]) ON 10/14/21 AT 1157. HH                  * methicillin resistant staphylococcus aureus * growing in both bottles drawn (SITE NOT INDICATED) REFER TO [id number] FOR SENSITIVITIES            (NOTE) KNOWN MRSA PATIENT    CULTURE, Diana Nipper STAIN [[id number]] Collected: 10/12/21 0945    Order Status: Canceled Specimen: Wound from Mouth     MRSA SCREEN - PCR (NASAL) [[id number]]  (Abnormal) Collected: 10/11/21 1025    Order Status: Completed Specimen: Swab Updated: 10/11/21 1147     MRSA by PCR, Nasal DETECTED        Comment: Results verified, phoned to and read back by Bart Christian 10/11/21 11:50       MRSA SCREEN - PCR (NASAL) [[id number]] Collected: 10/11/21 0945    Order Status: Canceled Specimen: Swab     COVID-19 RAPID TEST [[id number]] Collected: 10/11/21 0930    Order Status: Completed Specimen: Nasopharyngeal Updated: 10/11/21 1104     Specimen source       Please find results under separate order           COVID-19 rapid test Not Detected        Comment: Rapid Abbott ID Now   Rapid NAAT:  The specimen is NEGATIVE for SARS-CoV-2, the novel coronavirus associated with COVID-19. Negative results should be treated as presumptive and, if inconsistent with clinical signs and symptoms or necessary for patient management, should be tested with an alternative molecular assay. Negative results do not preclude SARS-CoV-2 infection and should not be used as the sole basis for patient management decisions.    This test has been authorized by the FDA under   an Emergency Use Authorization (EUA) for use by authorized laboratories. Fact sheet for Healthcare Providers: ConventionUpdate.co.nz Fact sheet for Patients: ConventionUpdate.co.nz   Methodology: Isothermal Nucleic Acid Amplification         CULTURE, BLOOD, LINE [[id number]] Collected: 10/11/21 0930    Order Status: Canceled Specimen: Blood     CULTURE, URINE [[id number]] Collected: 10/11/21 0930    Order Status: Completed Specimen: Urine Updated: 10/12/21 1049     Special Requests: No Special Requests        Culture result: No Growth (<1000 cfu/mL)       CULTURE, CSF  TUBE 2 [[id number]] Collected: 10/11/21 0504    Order Status: Completed Specimen: Cerebrospinal Fluid Updated: 10/12/21 1017     Special Requests: No Special Requests        GRAM STAIN No wbc's seen         No organisms seen        Culture result:       No growth thus far holding 7 days          CULTURE, BLOOD, PAIRED [[id number]]  (Abnormal)  (Susceptibility) Collected: 10/11/21 0225    Order Status: Completed Specimen: Blood Updated: 10/15/21 0921     Special Requests: No Special Requests        Culture result:       * methicillin resistant staphylococcus aureus * growing in all 4 bottles drawn (SITES NOT INDICATED)          Susceptibility      Staphylococcus aureus Methcillin Resistant      NIMA      Ciprofloxacin ($) Susceptible      Clindamycin ($) Susceptible      Daptomycin ($$$$$) Susceptible      Doxycycline ($$) Susceptible      Erythromycin ($$$$) Resistant      Gentamicin ($) Susceptible      Levofloxacin ($) Susceptible      Linezolid ($$$$$) Susceptible      Oxacillin Resistant      Rifampin ($$$$) Susceptible  [1]       Tetracycline Susceptible      Trimeth/Sulfa Susceptible      Vancomycin ($) Susceptible              [1]  Rifampin is not to be used for mono-therapy.           Linear View                            Diagnostics   CXR Results  (Last 48 hours)    None Assessment/Plan     1. Persistent MRSA bacteremia, Day #7 IV Vancomycin, Day #4 Ceftaroline and Rifampin      2. Acute right ?suppurative parotitis per CT, likely secondary to MRSA   3. MRSA colonization, status post Mupirocin   4. Cardiomyopathy, severe  5. Acute on chronic renal failure   6. AICD in situ, no clear evidence of infection    Comment:  Patient on combination therapy, suggesting persistent bacteremia may be a problem with source control in her right parotid with purulent secretions still coming from Panfilo's duct. Being followed closely by ENT. 1. Continue IV Vancomycin, Ceftaroline, Rifampin  2. In am, repeat CBC and blood cultures  3. Follow-up repeat blood cultures  4. It may be prudent to repeat TTE since previous one was done prior to bacteremia  5. Also consider repeat maxillofacial CT with contrast to rule out abscess, but would defer to ENT.     Allyson Reyes MD    10/17/2021

## 2021-10-17 NOTE — PROGRESS NOTES
[facility]   HD 7  Vanc, ceftaroline, rifampin    Comfortable, non verbal, alert. Transferred to 2E    Visit Vitals  BP (!) 133/102 (BP 1 Location: Right upper arm, BP Patient Position: At rest)   Pulse 72   Temp 98.2 °F (36.8 °C)   Resp 20   Ht 5' 6\" (1.676 m)   Wt 168 lb 14 oz (76.6 kg)   SpO2 95%   Breastfeeding No   BMI 27.26 kg/m²     Breathing comfortably  Oral mucosa dry  Not cooperative with her oral exam requiring nursing to help  Right facial swelling, perhaps improved  Appears less tender to manipulation  No skin changes  No fluctuance  Intraorally, she continues to have thick purulence from right stensons duct      WBC 27-->20    Blood cx MRSA 10/11  Blood cx MRSA 10/13    Glucose  Results for Sotero Ramirez (MRN [id number]) as of 10/17/2021 11:05   Ref. Range 10/16/2021 13:19 10/16/2021 15:44 10/16/2021 21:21 10/17/2021 06:06 10/17/2021 08:34   GLUCOSE,FAST - POC Latest Ref Range: 65 - 117 mg/dL 422 (H) 336 (H) 158 (H)  264 (H)         A/P   R parotitis   AMS  - WBC trending down finally; last WBC from Friday   - Other inflammatory markers are also still standing; have not been drawn  - Would recheck at least WBC tomorrow to trend white count for improvement   - Neck exam a bit improved today as well  - Does not appear the warm compresses etc are being done consistently   - Oral mucosa remains very dry - oral care though pt is difficult in cooperation  - Hydrate as medically tolerated (CHF)   - Cont abx per ID  - Glucose control will be important in infection management as well   - If WBC in wrong direction tomorrow can consider repeat imaging, though non contrasted scan is not ideal     ---------  - Addendum; spoke to patients daughter and updated her with my plan.  She agrees that [facility] mental status has improved significantly as well over the last week      MD Kerline Srinivasan [address] [facility]  [facility] [address] [facility] Phone: [phone]

## 2021-10-17 NOTE — PROGRESS NOTES
Renal Note    NAME:  Nkechi Birmingham   :   1955   MRN:   [id number]     ATTENDING: Chirag Chow MD  PCP:  Sam Angel MD    Date/Time:  10/17/2021 1:00 PM      Subjective:     Pt seen in icu. Na 147 (improving)  Creatinine has improved to 1.8 today. Past Medical History:   Diagnosis Date    [facility] ([facility]     Cardiomyopathy, idiopathic ([facility] [facility] 10/28/2010    Chronic kidney disease     Heart failure ([facility]     HTN (hypertension), benign 10/28/2010    Pacemaker     ICD    PVC (premature ventricular contraction) 10/28/2010      Past Surgical History:   Procedure Laterality Date    HX OTHER SURGICAL  10/02/2018    Right eye surgery    HX PACEMAKER       Social History     Tobacco Use    Smoking status: Never Smoker    Smokeless tobacco: Never Used   Substance Use Topics    Alcohol use: No      History reviewed. No pertinent family history. Allergies   Allergen Reactions    Chocolate [Cocoa] Anaphylaxis and Swelling    Iodine Anaphylaxis    Lisinopril Anaphylaxis    Peanut Anaphylaxis and Swelling    Glimepiride Swelling    Glipizide Other (comments)     Pruritis    Metformin Diarrhea    Norvasc [Amlodipine] Other (comments)     Light Headed    Pneumovax 23 [Pneumococcal 23-Devi Ps Vaccine] Hives    Toprol Xl [Metoprolol Succinate] Other (comments)     Light headed    Tositumomab I-131 (Maltose) Unable to Obtain    Vitamin D [Cholecalciferol (Vitamin D3)] Other (comments)     Dizziness/headache not a true allergy. Prior to Admission medications    Medication Sig Start Date End Date Taking? Authorizing Provider   aspirin delayed-release 81 mg tablet Take 1 Tablet by mouth daily. 10/5/21   Russel Siegel MD   carvediloL (COREG) 6.25 mg tablet Take 1 Tablet by mouth two (2) times daily (with meals). 21   Pippa Bolton MD   oxyCODONE IR (ROXICODONE) 5 mg immediate release tablet Take 5 mg by mouth every four (4) hours as needed for Pain. 7/16/21   Provider, Historical   cyclobenzaprine (FLEXERIL) 5 mg tablet Take 5 mg by mouth two (2) times daily as needed for Muscle Spasm(s). 7/19/21   Provider, Historical   amitriptyline (ELAVIL) 75 mg tablet Take 75 mg by mouth daily. 5/3/21   Provider, Historical   prednisoLONE acetate (PRED FORTE) 1 % ophthalmic suspension INSTILL 1 DROP INTO RIGHT EYE 4 TIMES A DAY 3/13/21   Provider, Historical   atorvastatin (LIPITOR) 40 mg tablet Take 1 Tab by mouth nightly. 2/19/21   López Dumont MD   furosemide (Lasix) 40 mg tablet 1 tablet daily  Patient taking differently: Take 40 mg by mouth daily. 1 tablet daily 2/19/21   Yessica Gould MD   metFORMIN (GLUCOPHAGE) 1,000 mg tablet two (2) times daily (with meals). 10/12/20   Provider, Historical   gabapentin (NEURONTIN) 100 mg capsule TAKE 1 CAPSULE BY MOUTH THREE TIMES A DAY 10/27/20   Provider, Historical   metoprolol succinate (TOPROL-XL) 25 mg XL tablet TAKE 0.5 TABS BY MOUTH DAILY. 12/16/19   Silver Murphy NP   ELIQUIS 5 mg tablet TAKE 1 TABLET BY MOUTH TWICE A DAY 12/5/19   Silver Murphy NP   nitroglycerin (NITROSTAT) 0.4 mg SL tablet 0.4 mg by SubLINGual route every five (5) minutes as needed for Chest Pain. Up to 3 doses. Patient not taking: Reported on 8/3/2021    Provider, Historical   cloNIDine HCL (CATAPRES) 0.2 mg tablet Take 0.2 mg by mouth two (2) times a day. 5/21/18   Provider, Historical   losartan (COZAAR) 100 mg tablet Take 100 mg by mouth daily. 4/25/17   Provider, Historical   PARoxetine (PAXIL) 20 mg tablet Take 20 mg by mouth daily. Provider, Historical   omeprazole (PRILOSEC) 20 mg capsule Take 20 mg by mouth daily.  10/28/10   Provider, Historical       REVIEW OF SYSTEMS:       Unable to obtain because of patient's mentation      Objective:   VITALS:    Visit Vitals  BP (!) 133/102 (BP 1 Location: Right upper arm, BP Patient Position: At rest)   Pulse (!) 102   Temp 98.2 °F (36.8 °C)   Resp 20   Ht 5' 6\" (1.676 m)   Wt 76.6 kg (168 lb 14 oz)   SpO2 95%   Breastfeeding No   BMI 27.26 kg/m²     Temp (24hrs), Av.8 °F (36.6 °C), Min:97.1 °F (36.2 °C), Max:98.9 °F (37.2 °C)      PHYSICAL EXAM:     General: NAD, awake. Lethargic  Eyes: sclera anicteric  Oral Cavity: No thrush or ulcers  Neck: no JVD  Chest: Fair bilateral air entry. No Wheezing or Rhonchi. No rales. Heart: normal sounds  Abdomen: soft and non tender   :  escobedo  Lower Extremities: no edema  Skin: no rash  Neuro: She is lethargic.   Withdraws to pain  Psychiatric: Unable to assess        LAB DATA REVIEWED:    Recent Results (from the past 24 hour(s))   GLUCOSE, POC    Collection Time: 10/16/21  1:19 PM   Result Value Ref Range    Glucose (POC) 422 (H) 65 - 117 mg/dL    Performed by  [facility], POC    Collection Time: 10/16/21  3:44 PM   Result Value Ref Range    Glucose (POC) 336 (H) 65 - 117 mg/dL    Performed by Winnie Evans, POC    Collection Time: 10/16/21  9:21 PM   Result Value Ref Range    Glucose (POC) 158 (H) 65 - 117 mg/dL    Performed by Real Dakin, RANDOM    Collection Time: 10/17/21  6:06 AM   Result Value Ref Range    Vancomycin, random 19.4 ug/mL    Reported dose date Not provided      Reported dose: Not provided Units   RENAL FUNCTION PANEL    Collection Time: 10/17/21  6:06 AM   Result Value Ref Range    Sodium 147 (H) 136 - 145 mmol/L    Potassium 4.2 3.5 - 5.1 mmol/L    Chloride 114 (H) 97 - 108 mmol/L    CO2 23 21 - 32 mmol/L    Anion gap 10 5 - 15 mmol/L    Glucose 238 (H) 65 - 100 mg/dL    BUN 62 (H) 6 - 20 mg/dL    Creatinine 1.86 (H) 0.55 - 1.02 mg/dL    BUN/Creatinine ratio 33 (H) 12 - 20      GFR est AA 33 (L) >60 ml/min/1.73m2    GFR est non-AA 27 (L) >60 ml/min/1.73m2    Calcium 8.7 8.5 - 10.1 mg/dL    Phosphorus 2.2 (L) 2.6 - 4.7 mg/dL    Albumin 1.9 (L) 3.5 - 5.0 g/dL   GLUCOSE, POC    Collection Time: 10/17/21  8:34 AM   Result Value Ref Range    Glucose (POC) 264 (H) 65 - 117 mg/dL    Performed by Nikky Lui KERWIN    GLUCOSE, POC    Collection Time: 10/17/21 11:40 AM   Result Value Ref Range    Glucose (POC) 309 (H) 65 - 117 mg/dL    Performed by Naty Mane [facility])        Recommendations/Plan:     1 acute kidney injury on chronic kidney disease stage III:  -likely prerenal secondary to sepsis/possible cardiorenal syndrome  -She presented with a creatinine of 2.5   -Creatinine increased to 2.7.  improved to 1.8   -Recently discharged with creatinine 1.6 on 10/6  -clinically she has no volume overload. No edema in both legs. -Urine is bland  -CT abdomen without contrast on 10/11 shows no renal obstruction.    -Not on any potential nephrotoxins preadmission  -off IVF      2 severe sepsis :  -because of possible right parotitis with parapharyngeal and retropharyngeal  involvement per CT scan  -She had LP on admission which was negative  -Urine is bland. Covid negative. No infiltrate on chest x-ray  -She has been started on vancomycin and Zosyn pending cultures  -WBC count has improved to 20.4   -ID is on the case      3 Hypernatremia:  -Sodium has improved 150--> 147 .  -will c/w 300 mL every 3 hours.    -Also will continue to hold Lasix    4 CHF:  -She has history of cardiomyopathy with EF less than 15%  -Chest x-ray shows mild pulmonary congestion. BNP 15,000  -on Lasix 40 mg IV every 48 hrs which I will hold and monitor urine output    5 altered mentation:  -Could be secondary to metabolic derangements from sepsis.    -Unsure of her baseline mentation  -CT head was negative on admission    6  Hypercalcemia  -PTH is 85.   Corrected calcium is 10.3  -Phosphorus is okay at 5.1            ________________________________________________________________________  Signed: Ronny Lucio MD

## 2021-10-18 NOTE — PROGRESS NOTES
Pt seen and examined at bedside. Remains bacteremia despite optimization of antimicrobial therapy.  No acute events since last seen   Objective:   Physical Exam:     Visit Vitals  BP (!) 115/93 (BP 1 Location: Right upper arm, BP Patient Position: At rest)   Pulse 63   Temp (!) 96.2 °F (35.7 °C)   Resp 16   Ht 5' 6\" (1.676 m)   Wt 170 lb 3.1 oz (77.2 kg)   SpO2 96%   Breastfeeding No   BMI 27.47 kg/m²    O2 Flow Rate (L/min): 3 l/min O2 Device: Nasal cannula    Temp (24hrs), Av.3 °F (36.3 °C), Min:96.2 °F (35.7 °C), Max:97.8 °F (36.6 °C)    10/18 0701 - 10/18 1900  In: 230   Out: -    10/16 1901 - 10/18 0700  In: 1000   Out: 0     General: NAD, alert, mumble incomprehensible speech   HEENT: SHARON, Moist mucosa, Stable right facial induration, no fluctuance   Lungs: CTA b/l, decreased at the bases, no wheeze/rhonchi   Heart: S1S2+, RRR, no murmur  Abdo: Soft, NT, ND, +BS, peg tube in place   : + escobedo cath   Exts: no edema  + pulses b/l   Skin: No wounds, No rashes or lesions      Data Review:       Recent Days:  Recent Labs     10/17/21  1132   WBC 16.4*   HGB 13.0   HCT 41.0   PLT 66*     Recent Labs     10/17/21  0606   BUN 62*   CREA 1.86*       No results found for: CRPQN, CRP, CRPM       Microbiology     Results     Procedure Component Value Units Date/Time    CULTURE, BLOOD [[id number]]     Order Status: Sent Specimen: Blood     CULTURE, BLOOD [[id number]] Collected: 10/15/21 1650    Order Status: Completed Specimen: Blood Updated: 10/18/21 0649     Special Requests: No Special Requests        Culture result:       Probable Staphylococcus aureus GROWING IN THE ANAEROBIC BOTTLE (NO SITE)            *KNOWN MRSA PATIENT    CULTURE, BLOOD [[id number]]  (Abnormal) Collected: 10/13/21 0500    Order Status: Completed Specimen: Blood Updated: 10/15/21 1247     Special Requests: No Special Requests        Culture result:       Gram Positive Cocci in clusters growing in both bottles drawn CALLED TO AND READ BACK BY Sherren Spell ([facility]) ON 10/14/21 AT 1157. HH                  * methicillin resistant staphylococcus aureus * growing in both bottles drawn (SITE NOT INDICATED) REFER TO [id number] FOR SENSITIVITIES            (NOTE) KNOWN MRSA PATIENT    CULTURE, Lamonte Sung STAIN [[id number]] Collected: 10/12/21 0945    Order Status: Canceled Specimen: Wound from Mouth     MRSA SCREEN - PCR (NASAL) [[id number]]  (Abnormal) Collected: 10/11/21 1025    Order Status: Completed Specimen: Swab Updated: 10/11/21 1147     MRSA by PCR, Nasal DETECTED        Comment: Results verified, phoned to and read back by Brielle Mcgregor 10/11/21 11:50       MRSA SCREEN - PCR (NASAL) [[id number]] Collected: 10/11/21 0945    Order Status: Canceled Specimen: Swab     COVID-19 RAPID TEST [[id number]] Collected: 10/11/21 0930    Order Status: Completed Specimen: Nasopharyngeal Updated: 10/11/21 1104     Specimen source       Please find results under separate order           COVID-19 rapid test Not Detected        Comment: Rapid Abbott ID Now   Rapid NAAT:  The specimen is NEGATIVE for SARS-CoV-2, the novel coronavirus associated with COVID-19. Negative results should be treated as presumptive and, if inconsistent with clinical signs and symptoms or necessary for patient management, should be tested with an alternative molecular assay. Negative results do not preclude SARS-CoV-2 infection and should not be used as the sole basis for patient management decisions. This test has been authorized by the FDA under   an Emergency Use Authorization (EUA) for use by authorized laboratories.  Fact sheet for Healthcare Providers: ConventionUpdate.co.nz Fact sheet for Patients: ConventionUpdate.co.nz   Methodology: Isothermal Nucleic Acid Amplification         CULTURE, BLOOD, LINE [[id number]] Collected: 10/11/21 0930    Order Status: Canceled Specimen: Blood     CULTURE, URINE [[id number]] Collected: 10/11/21 0930 Order Status: Completed Specimen: Urine Updated: 10/12/21 1049     Special Requests: No Special Requests        Culture result: No Growth (<1000 cfu/mL)       CULTURE, CSF  TUBE 2 [[id number]] Collected: 10/11/21 0504    Order Status: Completed Specimen: Cerebrospinal Fluid Updated: 10/12/21 1017     Special Requests: No Special Requests        GRAM STAIN No wbc's seen         No organisms seen        Culture result:       No growth thus far holding 7 days          CULTURE, BLOOD, PAIRED [[id number]]  (Abnormal)  (Susceptibility) Collected: 10/11/21 0225    Order Status: Completed Specimen: Blood Updated: 10/15/21 0921     Special Requests: No Special Requests        Culture result:       * methicillin resistant staphylococcus aureus * growing in all 4 bottles drawn (SITES NOT INDICATED)          Susceptibility      Staphylococcus aureus Methcillin Resistant      NIMA      Ciprofloxacin ($) Susceptible      Clindamycin ($) Susceptible      Daptomycin ($$$$$) Susceptible      Doxycycline ($$) Susceptible      Erythromycin ($$$$) Resistant      Gentamicin ($) Susceptible      Levofloxacin ($) Susceptible      Linezolid ($$$$$) Susceptible      Oxacillin Resistant      Rifampin ($$$$) Susceptible  [1]       Tetracycline Susceptible      Trimeth/Sulfa Susceptible      Vancomycin ($) Susceptible              [1]  Rifampin is not to be used for mono-therapy. Linear View                            Diagnostics   CXR Results  (Last 48 hours)    None             Assessment/Plan     1. MRSA bacteremia, unresponsive to antimicrobial therapy:BCx positive on 10/11, 10/13 and 10/15      Afebrile, WBC still elevated, hemodynamically stable       On day # 8 of renally dosed Vanc and Day # 5 of Ceftaroline and Rifampin       Repeat BCx for today yet to be done       2 D echo ordered to eval for valvular vegetation      D/c Vanc, Start on Daptomycin at 6 mg/kg. CPK w AM labs     2.  Acute right parotitis per CT, Stable right sided facial swelling and induration       Will repeat maxilofacial CT to eval for a drainable collection            3. MRSA colonization: S/p 5 days of Mupirocin     4. Cardiomyopathy w baseline EF of <15 % earlier this month, AICD insitu      4. Dysphagia: S/p Peg tube placement on 10/13, tolerating tube feeds     5.  Acute on chronic renal failure, trending down on serial labs     Paradise Herrera MD    10/18/2021

## 2021-10-18 NOTE — PROGRESS NOTES
OT eval order received and acknowledged. OT eval attempted at 2:15pm however pt lethargic, unable to maintain arousal and not following commands at this time thus will hold OT eval at this time. Will continue to follow pt and attempt OT eval at a later time. Thank you.

## 2021-10-18 NOTE — PROGRESS NOTES
Problem: Diabetes Self-Management  Goal: *Disease process and treatment process  Description: Define diabetes and identify own type of diabetes; list 3 options for treating diabetes. Outcome: Progressing Towards Goal  Goal: *Incorporating nutritional management into lifestyle  Description: Describe effect of type, amount and timing of food on blood glucose; list 3 methods for planning meals. Outcome: Progressing Towards Goal  Goal: *Incorporating physical activity into lifestyle  Description: State effect of exercise on blood glucose levels. Outcome: Progressing Towards Goal  Goal: *Developing strategies to promote health/change behavior  Description: Define the ABC's of diabetes; identify appropriate screenings, schedule and personal plan for screenings. Outcome: Progressing Towards Goal  Goal: *Using medications safely  Description: State effect of diabetes medications on diabetes; name diabetes medication taking, action and side effects. Outcome: Progressing Towards Goal  Goal: *Monitoring blood glucose, interpreting and using results  Description: Identify recommended blood glucose targets  and personal targets. Outcome: Progressing Towards Goal  Goal: *Prevention, detection, treatment of acute complications  Description: List symptoms of hyper- and hypoglycemia; describe how to treat low blood sugar and actions for lowering  high blood glucose level. Outcome: Progressing Towards Goal  Goal: *Prevention, detection and treatment of chronic complications  Description: Define the natural course of diabetes and describe the relationship of blood glucose levels to long term complications of diabetes.   Outcome: Progressing Towards Goal  Goal: *Developing strategies to address psychosocial issues  Description: Describe feelings about living with diabetes; identify support needed and support network  Outcome: Progressing Towards Goal  Goal: *Insulin pump training  Outcome: Progressing Towards Goal  Goal: *Sick day guidelines  Outcome: Progressing Towards Goal  Goal: *Patient Specific Goal (EDIT GOAL, INSERT TEXT)  Outcome: Progressing Towards Goal     Problem: Patient Education: Go to Patient Education Activity  Goal: Patient/Family Education  Outcome: Progressing Towards Goal     Problem: Falls - Risk of  Goal: *Absence of Falls  Description: Document Mark Baker Fall Risk and appropriate interventions in the flowsheet. Outcome: Progressing Towards Goal  Note: Fall Risk Interventions:  Mobility Interventions: Bed/chair exit alarm    Mentation Interventions: Bed/chair exit alarm    Medication Interventions: Bed/chair exit alarm    Elimination Interventions: Bed/chair exit alarm    History of Falls Interventions: Bed/chair exit alarm, Door open when patient unattended, Room close to nurse's station         Problem: Patient Education: Go to Patient Education Activity  Goal: Patient/Family Education  Outcome: Progressing Towards Goal     Problem: Risk for Spread of Infection  Goal: Prevent transmission of infectious organism to others  Description: Prevent the transmission of infectious organisms to other patients, staff members, and visitors. Outcome: Progressing Towards Goal     Problem: Patient Education:  Go to Education Activity  Goal: Patient/Family Education  Outcome: Progressing Towards Goal     Problem: Pressure Injury - Risk of  Goal: *Prevention of pressure injury  Description: Document Marshall Scale and appropriate interventions in the flowsheet. Outcome: Progressing Towards Goal  Note: Pressure Injury Interventions:  Sensory Interventions: Float heels, Keep linens dry and wrinkle-free, Maintain/enhance activity level, Minimize linen layers, Turn and reposition approx.  every two hours (pillows and wedges if needed)    Moisture Interventions: Absorbent underpads, Apply protective barrier, creams and emollients, Minimize layers, Moisture barrier    Activity Interventions: PT/OT evaluation, Assess need for specialty bed    Mobility Interventions: Float heels, HOB 30 degrees or less, PT/OT evaluation, Turn and reposition approx.  every two hours(pillow and wedges)    Nutrition Interventions: Document food/fluid/supplement intake, Discuss nutritional consult with provider, Offer support with meals,snacks and hydration    Friction and Shear Interventions: Apply protective barrier, creams and emollients, HOB 30 degrees or less, Minimize layers                Problem: Patient Education: Go to Patient Education Activity  Goal: Patient/Family Education  Outcome: Progressing Towards Goal

## 2021-10-18 NOTE — PROGRESS NOTES
Comprehensive Nutrition Assessment    Type and Reason for Visit: Reassess (Goal)    Nutrition Recommendations/Plan:    Continue TF via PEG of Jevity 1.5 at new goal of 45mL/hr, continuous  Flush 130mL q4hrs or per Nephrology/MD  Providing 1620kcal (100%), 69g pro (120%), 1600mL fluid (100%)          Monitor renal fx with excess pro provision    Adjust insulin for goal of eugylcemia    Document TF rate, water flushes, and GRVs in EMR  Continue weekly measured wts    Nutrition Assessment:  Worsening AMS, renal fx pta. Increasing frequency of hospital admits over past few months- dx COVID ~3/2021. CT finding cellulitis to R face. Dx sepsis 2/2 R parotitis, MRSA bacteremia. Nephrology monitoring ADELINA on CKD III. Pt altered, unable to take PO or place NGT d/t concerns for neck and facial swelling. S/p PEG (10/13) TF initiated 10/14 of Jevity 1.5 at 30mL/hr, continuous, not advanced per RD recs, possible d/t renal status. Advanced to 50mL/hr by 10/15 and remains at this rate with good tolerance per RN, EMR. Will adjust TF as appropriate, pt now transferred out of ICU. Admit 1x week prior to admit, pt with extremely poor intakes. Labs (10/17) Na 147, BUN 62, Cr 1.86, , POC . Meds: Statin, coreg, dilaudid, lantus, SSI, vanc, tramadol. Malnutrition Assessment:  Malnutrition Status: Moderate malnutrition    Context:  Acute illness     Findings of the 6 clinical characteristics of malnutrition:   Energy Intake:  1 - 75% or less of est energy req for 7 or more days (suspect poor PO pta, received TF below goal rate)  Weight Loss:  7.00 - Greater than 7.5% over 3 months (per EMR wt hx; unable to confirm)     Body Fat Loss:  1 - Mild body fat loss, Triceps   Muscle Mass Loss:  1 - Mild muscle mass loss, Temples (temporalis)  Fluid Accumulation:  No significant fluid accumulation,        Estimated Daily Nutrient Needs:  Energy (kcal): 1600kcal (25kcal/kg Adj BW);  Weight Used for Energy Requirements: Adjusted  Protein (g): 57g (0.9g/kg Adj BW); Weight Used for Protein Requirements: Adjusted  Fluid (ml/day): 1600mL; Method Used for Fluid Requirements: 1 ml/kcal      Nutrition Related Findings:  NFPE findings mild muscle, fat wasting- limited 2/2 agitation. PEG to abd, infusing. No documented hx dysphagia, n/v, or c/d. Swelling to lower face/ jaw noted. Wounds:    None       Current Nutrition Therapies:  DIET NPO  ADULT TUBE FEEDING PEG; Standard with Fiber; Delivery Method: Continuous; Continuous Initial Rate (mL/hr): 50; Continuous Advance Tube Feeding: No; Water Flush Volume (mL): 300; Water Flush Frequency: Q 3 hours    Anthropometric Measures:  · Height:  5' 6\" (167.6 cm)  · Current Body Wt:  77.2 kg (170 lb 3.1 oz) (10/17)   · Ideal Body Wt:  130 lbs:  130.9 %   · Adjusted Body Weight:   ; Weight Adjustment for:  (63.6kg)   · BMI Category: Overweight (BMI 25.0-29. 9)     Wt Readings from Last 10 Encounters:   10/17/21 77.2 kg (170 lb 3.1 oz)   10/04/21 75.9 kg (167 lb 5.3 oz)   08/07/21 86.1 kg (189 lb 13.1 oz)   05/12/21 88.1 kg (194 lb 3.2 oz)   02/18/21 86.2 kg (190 lb 0.6 oz)   01/20/21 85.2 kg (187 lb 12.8 oz)   11/16/20 85.7 kg (189 lb)   10/28/19 84.8 kg (187 lb)   Possible 10% BW loss x3 months - severe in nature vs fluid shifts    Nutrition Diagnosis:   · Swallowing difficulty related to cognitive or neurological impairment (edema) as evidenced by NPO or clear liquid status due to medical condition, nutrition support-enteral nutrition      Nutrition Interventions:   Food and/or Nutrient Delivery: Continue NPO, Modify tube feeding  Nutrition Education and Counseling: No recommendations at this time  Coordination of Nutrition Care: Continue to monitor while inpatient    Goals:  Meet >75% est needs x 7 days, Lytes WNL, Wt maintenance +/- 0.5kg per week, Maintain skin integrity       Nutrition Monitoring and Evaluation:   Behavioral-Environmental Outcomes: None identified  Food/Nutrient Intake Outcomes: Enteral nutrition intake/tolerance  Physical Signs/Symptoms Outcomes: Biochemical data, Weight, Skin    Discharge Planning:    Enteral nutrition     Electronically signed by Bob Devi on 10/18/2021 at 9:54 AM    Contact: [phone] or via [employer]

## 2021-10-18 NOTE — PROGRESS NOTES
General Daily Progress Note          Patient Name:   Obinna Carpio       YOB: 1955       Age:  78 y.o.       Admit Date: 10/11/2021      Subjective:     Patient is a 78y.o. year old female history of chronic A. fib chronic kidney disease hypertension pacemaker congestive heart failure with ejection fraction of 15 to 20% history of diabetes hypertension patient have a Covid few months ago since then but he declined patient was recently discharged from the hospital with congestive heart failure came back with altered mental status not awake not talking seen by the ER physician CT scan of the head was negative  Patient had WBC count elevated increased lactic acid concern of sepsis sepsis patient was LP done in the ER which was normal patient received 2 g of Rocephin when I saw the patient was still was altered I ordered repeat lactic acid ordered CT scan of the maxillary facial area swelling of the right parotid area and admitted to ICU for further work-up discussed with the patient daughter     According to the patient daughter since she have a Covid in March April this year she is declining she is in the hospital third time this month        Patient awake not much verbal  Static post PEG tube placement    MRSA bacteremia             Objective:     Visit Vitals  BP (!) 130/96 (BP 1 Location: Right upper arm, BP Patient Position: At rest)   Pulse 95   Temp 96.8 °F (36 °C)   Resp 16   Ht 5' 6\" (1.676 m)   Wt 77.2 kg (170 lb 3.1 oz)   SpO2 97%   Breastfeeding No   BMI 27.47 kg/m²        Recent Results (from the past 24 hour(s))   CBC WITH AUTOMATED DIFF    Collection Time: 10/17/21 11:32 AM   Result Value Ref Range    WBC 16.4 (H) 3.6 - 11.0 K/uL    RBC 4.71 3.80 - 5.20 M/uL    HGB 13.0 11.5 - 16.0 g/dL    HCT 41.0 35.0 - 47.0 %    MCV 87.0 80.0 - 99.0 FL    MCH 27.6 26.0 - 34.0 PG    MCHC 31.7 30.0 - 36.5 g/dL    RDW 20.0 (H) 11.5 - 14.5 %    PLATELET 66 (L) 602 - 400 K/uL    NRBC 0.3 (H) 0.0 PER 100 WBC    ABSOLUTE NRBC 0.05 (H) 0.00 - 0.01 K/uL    NEUTROPHILS 83 (H) 32 - 75 %    LYMPHOCYTES 9 (L) 12 - 49 %    MONOCYTES 7 5 - 13 %    EOSINOPHILS 0 0 - 7 %    BASOPHILS 0 0 - 1 %    IMMATURE GRANULOCYTES 1 (H) 0 - 0.5 %    ABS. NEUTROPHILS 13.5 (H) 1.8 - 8.0 K/UL    ABS. LYMPHOCYTES 1.5 0.8 - 3.5 K/UL    ABS. MONOCYTES 1.2 (H) 0.0 - 1.0 K/UL    ABS. EOSINOPHILS 0.1 0.0 - 0.4 K/UL    ABS. BASOPHILS 0.0 0.0 - 0.1 K/UL    ABS. IMM. GRANS. 0.2 (H) 0.00 - 0.04 K/UL    DF AUTOMATED     GLUCOSE, POC    Collection Time: 10/17/21 11:40 AM   Result Value Ref Range    Glucose (POC) 309 (H) 65 - 117 mg/dL    Performed by Miguelangel Yung ([place])    GLUCOSE, POC    Collection Time: 10/17/21  4:15 PM   Result Value Ref Range    Glucose (POC) 271 (H) 65 - 117 mg/dL    Performed by Miguelangel Yung (Float [place])    GLUCOSE, POC    Collection Time: 10/17/21  8:40 PM   Result Value Ref Range    Glucose (POC) 222 (H) 65 - 117 mg/dL    Performed by Howard Badillo, POC    Collection Time: 10/18/21  7:59 AM   Result Value Ref Range    Glucose (POC) 206 (H) 65 - 117 mg/dL    Performed by Anca Anaya      [unfilled]      Review of Systems    Unable to obtain e. Physical Exam:      Constitutional: Not much verbal HENT:   Head: Normocephalic and atraumatic. Eyes: Pupils are equal, round, and reactive to light. EOM are normal.   Cardiovascular: Normal rate, regular rhythm and normal heart sounds. Pulmonary/Chest: Breath sounds normal. No wheezes. No rales. Exhibits no tenderness. Abdominal: Soft. Bowel sounds are normal. There is no abdominal tenderness. There is no rebound and no guarding. Musculoskeletal: Normal range of motion. Neurological: Moves extremities    CT MAXILLOFACIAL WO CONT   Final Result   Findings more consistent with cellulitis of the right face that   includes the right parotid gland (acute parotitis) as described above.    Edema/fluid collection in the parapharyngeal space and retropharyngeal space. Suggestions as above. CT ABD PELV WO CONT   Final Result   Small amount of ascites. No focal fluid collection or free air. Density in the gallbladder which could be artifact, sludge or gallstones. No   specific CT evidence of cholecystitis. Bladder wall prominence. Other findings   as above. CT HEAD WO CONT   Final Result   1. No evidence of acute intracranial hemorrhage or mass effect. 2.  Multifocal intracranial encephalomalacia as above. XR CHEST PORT   Final Result   1. Mild pulmonary vascular congestion. 2.  Cardiomegaly. Recent Results (from the past 24 hour(s))   CBC WITH AUTOMATED DIFF    Collection Time: 10/17/21 11:32 AM   Result Value Ref Range    WBC 16.4 (H) 3.6 - 11.0 K/uL    RBC 4.71 3.80 - 5.20 M/uL    HGB 13.0 11.5 - 16.0 g/dL    HCT 41.0 35.0 - 47.0 %    MCV 87.0 80.0 - 99.0 FL    MCH 27.6 26.0 - 34.0 PG    MCHC 31.7 30.0 - 36.5 g/dL    RDW 20.0 (H) 11.5 - 14.5 %    PLATELET 66 (L) 900 - 400 K/uL    NRBC 0.3 (H) 0.0  WBC    ABSOLUTE NRBC 0.05 (H) 0.00 - 0.01 K/uL    NEUTROPHILS 83 (H) 32 - 75 %    LYMPHOCYTES 9 (L) 12 - 49 %    MONOCYTES 7 5 - 13 %    EOSINOPHILS 0 0 - 7 %    BASOPHILS 0 0 - 1 %    IMMATURE GRANULOCYTES 1 (H) 0 - 0.5 %    ABS. NEUTROPHILS 13.5 (H) 1.8 - 8.0 K/UL    ABS. LYMPHOCYTES 1.5 0.8 - 3.5 K/UL    ABS. MONOCYTES 1.2 (H) 0.0 - 1.0 K/UL    ABS. EOSINOPHILS 0.1 0.0 - 0.4 K/UL    ABS. BASOPHILS 0.0 0.0 - 0.1 K/UL    ABS. IMM.  GRANS. 0.2 (H) 0.00 - 0.04 K/UL    DF AUTOMATED     GLUCOSE, POC    Collection Time: 10/17/21 11:40 AM   Result Value Ref Range    Glucose (POC) 309 (H) 65 - 117 mg/dL    Performed by Tonio Bowman ([place])    GLUCOSE, POC    Collection Time: 10/17/21  4:15 PM   Result Value Ref Range    Glucose (POC) 271 (H) 65 - 117 mg/dL    Performed by Tonio Bowman (Float [place])    GLUCOSE, POC    Collection Time: 10/17/21  8:40 PM   Result Value Ref Range    Glucose (POC) 222 (H) 65 - 117 mg/dL Performed by Binu Us    GLUCOSE, POC    Collection Time: 10/18/21  7:59 AM   Result Value Ref Range    Glucose (POC) 206 (H) 65 - 117 mg/dL    Performed by Dhiraj Carrero        Results     Procedure Component Value Units Date/Time    CULTURE, BLOOD [[id number]]     Order Status: Sent Specimen: Blood     CULTURE, BLOOD [[id number]] Collected: 10/15/21 1650    Order Status: Completed Specimen: Blood Updated: 10/18/21 0649     Special Requests: No Special Requests        Culture result:       Probable Staphylococcus aureus GROWING IN THE ANAEROBIC BOTTLE (NO SITE)            *KNOWN MRSA PATIENT    CULTURE, BLOOD [[id number]]  (Abnormal) Collected: 10/13/21 0500    Order Status: Completed Specimen: Blood Updated: 10/15/21 1247     Special Requests: No Special Requests        Culture result:       Gram Positive Cocci in clusters growing in both bottles drawn [facility] ([address]) ON 10/14/21 AT 1157.  HH                  * methicillin resistant staphylococcus aureus * growing in both bottles drawn (SITE NOT INDICATED) REFER TO [id number] FOR SENSITIVITIES            (NOTE) KNOWN MRSA PATIENT    CULTURE, Betty Hacking STAIN [[id number]] Collected: 10/12/21 0945    Order Status: Canceled Specimen: Wound from Mouth     MRSA SCREEN - PCR (NASAL) [[id number]]  (Abnormal) Collected: 10/11/21 1025    Order Status: Completed Specimen: Swab Updated: 10/11/21 1147     MRSA by PCR, Nasal DETECTED        Comment: Results verified, phoned to and read back by Masoud Dey 10/11/21 11:50       MRSA SCREEN - PCR (NASAL) [[id number]] Collected: 10/11/21 0945    Order Status: Canceled Specimen: Swab     COVID-19 RAPID TEST [[id number]] Collected: 10/11/21 0930    Order Status: Completed Specimen: Nasopharyngeal Updated: 10/11/21 1104     Specimen source       Please find results under separate order           COVID-19 rapid test Not Detected        Comment: Rapid Abbott ID Now   Rapid NAAT:  The specimen is NEGATIVE for SARS-CoV-2, the novel coronavirus associated with COVID-19. Negative results should be treated as presumptive and, if inconsistent with clinical signs and symptoms or necessary for patient management, should be tested with an alternative molecular assay. Negative results do not preclude SARS-CoV-2 infection and should not be used as the sole basis for patient management decisions. This test has been authorized by the FDA under   an Emergency Use Authorization (EUA) for use by authorized laboratories.  Fact sheet for Healthcare Providers: [employer] Fact sheet for Patients: [employer]   Methodology: Isothermal Nucleic Acid Amplification         CULTURE, BLOOD, LINE [[id number]] Collected: 10/11/21 0930    Order Status: Canceled Specimen: Blood     CULTURE, URINE [[id number]] Collected: 10/11/21 0930    Order Status: Completed Specimen: Urine Updated: 10/12/21 1049     Special Requests: No Special Requests        Culture result: No Growth (<1000 cfu/mL)       CULTURE, CSF  TUBE 2 [[id number]] Collected: 10/11/21 0504    Order Status: Completed Specimen: Cerebrospinal Fluid Updated: 10/12/21 1017     Special Requests: No Special Requests        GRAM STAIN No wbc's seen         No organisms seen        Culture result:       No growth thus far holding 7 days          CULTURE, BLOOD, PAIRED [[id number]]  (Abnormal)  (Susceptibility) Collected: 10/11/21 0225    Order Status: Completed Specimen: Blood Updated: 10/15/21 0921     Special Requests: No Special Requests        Culture result:       * methicillin resistant staphylococcus aureus * growing in all 4 bottles drawn (SITES NOT INDICATED)          Susceptibility      Staphylococcus aureus Methcillin Resistant      NIMA      Ciprofloxacin ($) Susceptible      Clindamycin ($) Susceptible      Daptomycin ($$$$$) Susceptible      Doxycycline ($$) Susceptible      Erythromycin ($$$$) Resistant Gentamicin ($) Susceptible      Levofloxacin ($) Susceptible      Linezolid ($$$$$) Susceptible      Oxacillin Resistant      Rifampin ($$$$) Susceptible  [1]       Tetracycline Susceptible      Trimeth/Sulfa Susceptible      Vancomycin ($) Susceptible              [1]  Rifampin is not to be used for mono-therapy. [facility]                          Labs:     Recent Labs     10/17/21  1132   WBC 16.4*   HGB 13.0   HCT 41.0   PLT 66*     Recent Labs     10/17/21  0606   *   K 4.2   *   CO2 23   BUN 62*   CREA 1.86*   *   CA 8.7   PHOS 2.2*     Recent Labs     10/17/21  0606   ALB 1.9*     No results for input(s): INR, PTP, APTT, INREXT, INREXT in the last 72 hours. No results for input(s): FE, TIBC, PSAT, FERR in the last 72 hours. Lab Results   Component Value Date/Time    Folate 12.5 10/02/2021 07:37 PM      No results for input(s): PH, PCO2, PO2 in the last 72 hours. No results for input(s): CPK, CKNDX, TROIQ in the last 72 hours.     No lab exists for component: CPKMB  Lab Results   Component Value Date/Time    Cholesterol, total 81 10/03/2021 07:08 AM    HDL Cholesterol 25 10/03/2021 07:08 AM    LDL, calculated 37.6 10/03/2021 07:08 AM    Triglyceride 92 10/03/2021 07:08 AM    CHOL/HDL Ratio 3.2 10/03/2021 07:08 AM     Lab Results   Component Value Date/Time    Glucose (POC) 206 (H) 10/18/2021 07:59 AM    Glucose (POC) 222 (H) 10/17/2021 08:40 PM    Glucose (POC) 271 (H) 10/17/2021 04:15 PM    Glucose (POC) 309 (H) 10/17/2021 11:40 AM    Glucose (POC) 264 (H) 10/17/2021 08:34 AM     Lab Results   Component Value Date/Time    Color Kathie 10/11/2021 01:33 AM    Appearance Clear 10/11/2021 01:33 AM    Specific gravity 1.016 10/11/2021 01:33 AM    pH (UA) 5.0 10/11/2021 01:33 AM    Protein 30 (A) 10/11/2021 01:33 AM    Glucose Negative 10/11/2021 01:33 AM    Ketone Negative 10/11/2021 01:33 AM    Bilirubin Negative 10/11/2021 01:33 AM    Urobilinogen 4.0 (H) 10/11/2021 01:33 AM Nitrites Negative 10/11/2021 01:33 AM    Leukocyte Esterase Negative 10/11/2021 01:33 AM    Bacteria Negative 10/11/2021 01:33 AM    WBC 0-4 10/11/2021 01:33 AM    RBC 0-5 10/11/2021 01:33 AM         Assessment:     Altered mental status  Metabolic encephalopathy  Sepsis  Sepsis with bacteremia with MRSA  Acute proctitis  Lactic acidosis  Leukocytosis  Chronic A. Fib  Congestive heart failure  Hypertension  Cardiomyopathy ejection fraction 15 x 20%  Acute on chronic kidney disease  Type 2 diabetes  Static post PEG tube placement  Hyponatremia  Hypokalemia    Plan:             Patient on vancomycin ceftaroline and rifampin  Repeat endoscopy rule out endocarditis    Overall prognosis poor  Monitor renal function/sodium level  Patient is DNR  Repeat the labs        Current Facility-Administered Medications:     aspirin chewable tablet 81 mg, 81 mg, Oral, DAILY, Ammy Dumont MD, 81 mg at 10/18/21 0847    vancomycin (VANCOCIN) 500 mg in 0.9% sodium chloride (MBP/ADV) 100 mL MBP, 500 mg, IntraVENous, Q18H, Ammy Dumont MD, Last Rate: 100 mL/hr at 10/18/21 0506, 500 mg at 10/18/21 0506    [START ON 10/19/2021] Vancomycin trough level - please draw prior to dose on 10/19 @ 1700.  Thanks!, , Other, ONCE, Ammy Dumont MD    HYDROmorphone (DILAUDID) syringe 0.5 mg, 0.5 mg, IntraVENous, Q6H PRN, Sourav Lewis MD    glucose chewable tablet 16 g, 4 Tablet, Oral, PRN, Miri DOLAN MD    dextrose (D50W) injection syrg 12.5-25 g, 25-50 mL, IntraVENous, PRN, Sourav So MD    glucagon (GLUCAGEN) injection 1 mg, 1 mg, IntraMUSCular, PRN, Sourav Lewis MD    insulin glargine (LANTUS) injection 10 Units, 10 Units, SubCUTAneous, DAILY, Sourav So MD, 10 Units at 10/18/21 0900    hydrOXYzine (VISTARIL) injection 25 mg, 25 mg, IntraMUSCular, Q6H PRN, Ammy Dumont MD, 25 mg at 10/17/21 2136    traMADoL (ULTRAM) tablet 50 mg, 50 mg, Oral, Q6H PRN, Ammy Dumont MD, 50 mg at 10/15/21 2249    zinc oxide-white petrolatum 17-57 % topical paste, , Topical, TID, Ammy Dumont MD, Given at 10/18/21 0847    rifAMPin (RIFADIN) capsule 300 mg, 300 mg, Oral, DAILY, Torie Mei MD, 300 mg at 10/18/21 0847    cefTARoline (TEFLARO) 300 mg in 0.9% sodium chloride 50 mL IVPB, 300 mg, IntraVENous, Q12H, Torie Mei MD, Last Rate: 50 mL/hr at 10/18/21 0539, 300 mg at 10/18/21 0539    [Held by provider] furosemide (LASIX) tablet 40 mg, 40 mg, Oral, Q48H, Sunil Leiva MD, 40 mg at 10/15/21 6435    atorvastatin (LIPITOR) tablet 40 mg, 40 mg, Oral, QHS, Ammy Dumont MD, 40 mg at 10/17/21 2137    carvediloL (COREG) tablet 6.25 mg, 6.25 mg, Oral, BID WITH MEALS, Mohiuddin, Gretel Lundborg, MD, 6.25 mg at 10/18/21 0847    prednisoLONE acetate (PRED FORTE) 1 % ophthalmic suspension 1 Drop, 1 Drop, Both Eyes, TID, Ammy Dumont MD, 1 Drop at 10/18/21 0848    insulin lispro (HUMALOG) injection, , SubCUTAneous, AC&HS, Ammy Dumont MD, 4 Units at 10/18/21 0730    glucose chewable tablet 16 g, 4 Tablet, Oral, PRN, Mohiuddin, Gretel Lundborg, MD    dextrose (D50W) injection syrg 12.5-25 g, 25-50 mL, IntraVENous, PRN, Mohiuddin, Gretel Lundborg, MD    glucagon (GLUCAGEN) injection 1 mg, 1 mg, IntraMUSCular, PRN, Mohiuddin, Gretel Lundborg, MD    acetaminophen (TYLENOL) tablet 650 mg, 650 mg, Oral, Q6H PRN **OR** acetaminophen (TYLENOL) suppository 650 mg, 650 mg, Rectal, Q6H PRN, Ammy Dumont MD    polyethylene glycol (MIRALAX) packet 17 g, 17 g, Oral, DAILY PRN, Ammy Dumont MD    ondansetron (ZOFRAN ODT) tablet 4 mg, 4 mg, Oral, Q8H PRN **OR** ondansetron (ZOFRAN) injection 4 mg, 4 mg, IntraVENous, Q6H PRN, Mohiuddin, Gretel Lundborg, MD    VANCOMYCIN INFORMATION NOTE, , Other, Rx Dosing/Monitoring, Lieutenant Arnaud MD

## 2021-10-18 NOTE — PROGRESS NOTES
Renal Note    NAME:  Otto Coronado   :   1955   MRN:   [id number]     ATTENDING: Ling Joy MD  PCP:  Panchito Bates MD    Date/Time:  10/18/2021 1:00 PM      Subjective:     Pt seen in icu. Na 147 (improving)  Creatinine has improved to 1.8. Labs are pending      Past Medical History:   Diagnosis Date    [facility] ([facility]     Cardiomyopathy, idiopathic ([facility] [address] [facility] 10/28/2010    Chronic kidney disease     Heart failure ([facility]     HTN (hypertension), benign 10/28/2010    Pacemaker     ICD    PVC (premature ventricular contraction) 10/28/2010      Past Surgical History:   Procedure Laterality Date    HX OTHER SURGICAL  10/02/2018    Right eye surgery    HX PACEMAKER       Social History     Tobacco Use    Smoking status: Never Smoker    Smokeless tobacco: Never Used   Substance Use Topics    Alcohol use: No      History reviewed. No pertinent family history. Allergies   Allergen Reactions    Chocolate [Cocoa] Anaphylaxis and Swelling    Iodine Anaphylaxis    Lisinopril Anaphylaxis    Peanut Anaphylaxis and Swelling    Glimepiride Swelling    Glipizide Other (comments)     Pruritis    Metformin Diarrhea    Norvasc [Amlodipine] Other (comments)     Light Headed    Pneumovax 23 [Pneumococcal 23-Devi Ps Vaccine] Hives    Toprol Xl [Metoprolol Succinate] Other (comments)     Light headed    Tositumomab I-131 (Maltose) Unable to Obtain    Vitamin D [Cholecalciferol (Vitamin D3)] Other (comments)     Dizziness/headache not a true allergy. Prior to Admission medications    Medication Sig Start Date End Date Taking? Authorizing Provider   aspirin delayed-release 81 mg tablet Take 1 Tablet by mouth daily. 10/5/21   Gloria Connell MD   carvediloL (COREG) 6.25 mg tablet Take 1 Tablet by mouth two (2) times daily (with meals).  21   Chong Bolton MD   oxyCODONE IR (ROXICODONE) 5 mg immediate release tablet Take 5 mg by mouth every four (4) hours as needed for Pain. 7/16/21   Provider, Historical   cyclobenzaprine (FLEXERIL) 5 mg tablet Take 5 mg by mouth two (2) times daily as needed for Muscle Spasm(s). 7/19/21   Provider, Historical   amitriptyline (ELAVIL) 75 mg tablet Take 75 mg by mouth daily. 5/3/21   Provider, Historical   prednisoLONE acetate (PRED FORTE) 1 % ophthalmic suspension INSTILL 1 DROP INTO RIGHT EYE 4 TIMES A DAY 3/13/21   Provider, Historical   atorvastatin (LIPITOR) 40 mg tablet Take 1 Tab by mouth nightly. 2/19/21   Naomi Dumont MD   furosemide (Lasix) 40 mg tablet 1 tablet daily  Patient taking differently: Take 40 mg by mouth daily. 1 tablet daily 2/19/21   Kerry Quinonez MD   metFORMIN (GLUCOPHAGE) 1,000 mg tablet two (2) times daily (with meals). 10/12/20   Provider, Historical   gabapentin (NEURONTIN) 100 mg capsule TAKE 1 CAPSULE BY MOUTH THREE TIMES A DAY 10/27/20   Provider, Historical   metoprolol succinate (TOPROL-XL) 25 mg XL tablet TAKE 0.5 TABS BY MOUTH DAILY. 12/16/19   Luis Fernando Martinez NP   ELIQUIS 5 mg tablet TAKE 1 TABLET BY MOUTH TWICE A DAY 12/5/19   Luis Fernando Martinez NP   nitroglycerin (NITROSTAT) 0.4 mg SL tablet 0.4 mg by SubLINGual route every five (5) minutes as needed for Chest Pain. Up to 3 doses. Patient not taking: Reported on 8/3/2021    Provider, Historical   cloNIDine HCL (CATAPRES) 0.2 mg tablet Take 0.2 mg by mouth two (2) times a day. 5/21/18   Provider, Historical   losartan (COZAAR) 100 mg tablet Take 100 mg by mouth daily. 4/25/17   Provider, Historical   PARoxetine (PAXIL) 20 mg tablet Take 20 mg by mouth daily. Provider, Historical   omeprazole (PRILOSEC) 20 mg capsule Take 20 mg by mouth daily.  10/28/10   Provider, Historical       REVIEW OF SYSTEMS:       Unable to obtain because of patient's mentation      Objective:   VITALS:    Visit Vitals  BP (!) 130/96 (BP 1 Location: Right upper arm, BP Patient Position: At rest)   Pulse 95   Temp 96.8 °F (36 °C)   Resp 16   Ht 5' 6\" (1.676 m)   Wt 77.2 kg (170 lb 3.1 oz)   SpO2 97%   Breastfeeding No   BMI 27.47 kg/m²     Temp (24hrs), Av.7 °F (36.5 °C), Min:96.8 °F (36 °C), Max:98.2 °F (36.8 °C)      PHYSICAL EXAM:     General: NAD, awake. Lethargic  Eyes: sclera anicteric  Oral Cavity: No thrush or ulcers  Neck: no JVD  Chest: Fair bilateral air entry. No Wheezing or Rhonchi. No rales. Heart: normal sounds  Abdomen: soft and non tender   :  escobedo  Lower Extremities: no edema  Skin: no rash  Neuro: She is lethargic. Withdraws to pain  Psychiatric: Unable to assess        LAB DATA REVIEWED:    Recent Results (from the past 24 hour(s))   CBC WITH AUTOMATED DIFF    Collection Time: 10/17/21 11:32 AM   Result Value Ref Range    WBC 16.4 (H) 3.6 - 11.0 K/uL    RBC 4.71 3.80 - 5.20 M/uL    HGB 13.0 11.5 - 16.0 g/dL    HCT 41.0 35.0 - 47.0 %    MCV 87.0 80.0 - 99.0 FL    MCH 27.6 26.0 - 34.0 PG    MCHC 31.7 30.0 - 36.5 g/dL    RDW 20.0 (H) 11.5 - 14.5 %    PLATELET 66 (L) 726 - 400 K/uL    NRBC 0.3 (H) 0.0  WBC    ABSOLUTE NRBC 0.05 (H) 0.00 - 0.01 K/uL    NEUTROPHILS 83 (H) 32 - 75 %    LYMPHOCYTES 9 (L) 12 - 49 %    MONOCYTES 7 5 - 13 %    EOSINOPHILS 0 0 - 7 %    BASOPHILS 0 0 - 1 %    IMMATURE GRANULOCYTES 1 (H) 0 - 0.5 %    ABS. NEUTROPHILS 13.5 (H) 1.8 - 8.0 K/UL    ABS. LYMPHOCYTES 1.5 0.8 - 3.5 K/UL    ABS. MONOCYTES 1.2 (H) 0.0 - 1.0 K/UL    ABS. EOSINOPHILS 0.1 0.0 - 0.4 K/UL    ABS. BASOPHILS 0.0 0.0 - 0.1 K/UL    ABS. IMM.  GRANS. 0.2 (H) 0.00 - 0.04 K/UL    DF AUTOMATED     GLUCOSE, POC    Collection Time: 10/17/21 11:40 AM   Result Value Ref Range    Glucose (POC) 309 (H) 65 - 117 mg/dL    Performed by Deniz Garzon ([place])    GLUCOSE, POC    Collection Time: 10/17/21  4:15 PM   Result Value Ref Range    Glucose (POC) 271 (H) 65 - 117 mg/dL    Performed by Deniz Garzon ([place])    GLUCOSE, POC    Collection Time: 10/17/21  8:40 PM   Result Value Ref Range    Glucose (POC) 222 (H) 65 - 117 mg/dL    Performed by Collins Herman POC Collection Time: 10/18/21  7:59 AM   Result Value Ref Range    Glucose (POC) 206 (H) 65 - 117 mg/dL    Performed by Barbara Valle        Recommendations/Plan:     1 acute kidney injury on chronic kidney disease stage III:  -likely prerenal secondary to sepsis/possible cardiorenal syndrome  -She presented with a creatinine of 2.5   -Creatinine increased to 2.7.  improved to 1.8   -Recently discharged with creatinine 1.6 on 10/6  -clinically she has no volume overload. No edema in both legs. -Urine is bland  -CT abdomen without contrast on 10/11 shows no renal obstruction.    -Not on any potential nephrotoxins preadmission  -off IVF      2  MRSA bacteremia:  -acute right parotitis with parapharyngeal and retropharyngeal  involvement per CT scan  -She had LP on admission which was negative  -Urine is bland. Covid negative. No infiltrate on chest x-ray  -She has been started on vancomycin and Ceftaroline and Rifampicin. -ID is on the case      3 Hypernatremia:  -Sodium has improved 150--> 147.  -will c/w 300 mL every 3 hours.    -Also will continue to hold Lasix    4 CHF:  -She has history of cardiomyopathy with EF less than 15%  -Chest x-ray shows mild pulmonary congestion. BNP 15,000  -on Lasix 40 mg IV every 48 hrs which I will hold and monitor urine output    5 altered mentation:  -Could be secondary to metabolic derangements from sepsis.    -Unsure of her baseline mentation  -CT head was negative on admission    6  Hypercalcemia  -PTH is 85.   Corrected calcium is 10.3  -Phosphorus is okay at 5.1            ________________________________________________________________________  Signed: Radha Lord MD

## 2021-10-18 NOTE — PROGRESS NOTES
CM reviewed chart. Sent updates through [facility] to [facility]. DC plan is [facility].

## 2021-10-18 NOTE — CONSULTS
Otolaryngology  Hospital day 8  Continues on Vancomycin, Ceftaroline, and rifampin  Transferred from ICU to 2E    Patient with difficulty communicating, attempts to respond but not appropriate. Alert, appearing restless with larry to right hand. WBCs decreasing; blood culture on 10/15 revealing Gram + Cocci, MRSA      Visit Vitals  /84 (BP 1 Location: Right upper arm, BP Patient Position: At rest)   Pulse 72   Temp 97.4 °F (36.3 °C)   Resp 18   Ht 5' 6\" (1.676 m)   Wt 170 lb 3.1 oz (77.2 kg)   SpO2 99%   Breastfeeding No   BMI 27.47 kg/m²     PHYSICAL EXAM:    CONST: Alert, restless, non-verbal, larry to right hand  INTEGU: no changes  ENT: Right facial swelling; tender upon palpation, grimaces. Lips dry and scaly, poor dentation,  Purulent exudate noted from right stensons duct. GI: liquid stool noted on chux and flatulence noted. Exam difficult even with help from nursing staff, biting down on tongue blades when attempting to visualize oral mucosa. ASSESSMENT/PLAN:    1. Right parotitis  2. AMS  3. MRSA    Oral mucosa remains extremely dry. Right facial swelling continues to be tender. Daily massage, warm compress, and oral care consistently would be beneficial.   Continue IV antibiotics. Hydration per CHF protocol. Will repeat CT of neck for persistent bacteremia. Continue to monitor WBCs and attempting glucose control.        Jacques Keita MSN, FNP-C  [facility]  [address] [place] [address] [place] [facility], [place] [phone]

## 2021-10-19 NOTE — PROGRESS NOTES
Renal Note    NAME:  David Jean   :   1955   MRN:   [id number]     ATTENDING: Abhijeet Yañez MD  PCP:  Magdalena Rodriguez MD    Date/Time:  10/19/2021 1:00 PM      Subjective:     Pt seen in icu. Na 148  Creatinine has improved to 1.7. Past Medical History:   Diagnosis Date    [facility] ([facility]     Cardiomyopathy, idiopathic ([facility] [address] 10/28/2010    Chronic kidney disease     Heart failure ([facility] [address]     HTN (hypertension), benign 10/28/2010    Pacemaker     ICD    PVC (premature ventricular contraction) 10/28/2010      Past Surgical History:   Procedure Laterality Date    HX OTHER SURGICAL  10/02/2018    Right eye surgery    HX PACEMAKER       Social History     Tobacco Use    Smoking status: Never Smoker    Smokeless tobacco: Never Used   Substance Use Topics    Alcohol use: No      History reviewed. No pertinent family history. Allergies   Allergen Reactions    Chocolate [Cocoa] Anaphylaxis and Swelling    Iodine Anaphylaxis    Lisinopril Anaphylaxis    Peanut Anaphylaxis and Swelling    Glimepiride Swelling    Glipizide Other (comments)     Pruritis    Metformin Diarrhea    Norvasc [Amlodipine] Other (comments)     Light Headed    Pneumovax 23 [Pneumococcal 23-Devi Ps Vaccine] Hives    Toprol Xl [Metoprolol Succinate] Other (comments)     Light headed    Tositumomab I-131 (Maltose) Unable to Obtain    Vitamin D [Cholecalciferol (Vitamin D3)] Other (comments)     Dizziness/headache not a true allergy. Prior to Admission medications    Medication Sig Start Date End Date Taking? Authorizing Provider   aspirin delayed-release 81 mg tablet Take 1 Tablet by mouth daily. 10/5/21   Katy Herndon MD   carvediloL (COREG) 6.25 mg tablet Take 1 Tablet by mouth two (2) times daily (with meals). 21   Choco Bolton MD   oxyCODONE IR (ROXICODONE) 5 mg immediate release tablet Take 5 mg by mouth every four (4) hours as needed for Pain.  21   Provider, Historical   cyclobenzaprine (FLEXERIL) 5 mg tablet Take 5 mg by mouth two (2) times daily as needed for Muscle Spasm(s). 7/19/21   Provider, Historical   amitriptyline (ELAVIL) 75 mg tablet Take 75 mg by mouth daily. 5/3/21   Provider, Historical   prednisoLONE acetate (PRED FORTE) 1 % ophthalmic suspension INSTILL 1 DROP INTO RIGHT EYE 4 TIMES A DAY 3/13/21   Provider, Historical   atorvastatin (LIPITOR) 40 mg tablet Take 1 Tab by mouth nightly. 2/19/21   Omid Dumont MD   furosemide (Lasix) 40 mg tablet 1 tablet daily  Patient taking differently: Take 40 mg by mouth daily. 1 tablet daily 2/19/21   Alejandro Menjivar MD   metFORMIN (GLUCOPHAGE) 1,000 mg tablet two (2) times daily (with meals). 10/12/20   Provider, Historical   gabapentin (NEURONTIN) 100 mg capsule TAKE 1 CAPSULE BY MOUTH THREE TIMES A DAY 10/27/20   Provider, Historical   metoprolol succinate (TOPROL-XL) 25 mg XL tablet TAKE 0.5 TABS BY MOUTH DAILY. 12/16/19   Maria C Varma NP   ELIQUIS 5 mg tablet TAKE 1 TABLET BY MOUTH TWICE A DAY 12/5/19   Maria C Varma NP   nitroglycerin (NITROSTAT) 0.4 mg SL tablet 0.4 mg by SubLINGual route every five (5) minutes as needed for Chest Pain. Up to 3 doses. Patient not taking: Reported on 8/3/2021    Provider, Historical   cloNIDine HCL (CATAPRES) 0.2 mg tablet Take 0.2 mg by mouth two (2) times a day. 5/21/18   Provider, Historical   losartan (COZAAR) 100 mg tablet Take 100 mg by mouth daily. 4/25/17   Provider, Historical   PARoxetine (PAXIL) 20 mg tablet Take 20 mg by mouth daily. Provider, Historical   omeprazole (PRILOSEC) 20 mg capsule Take 20 mg by mouth daily.  10/28/10   Provider, Historical       REVIEW OF SYSTEMS:       Unable to obtain because of patient's mentation      Objective:   VITALS:    Visit Vitals  BP (!) 126/94   Pulse 70   Temp 97.8 °F (36.6 °C)   Resp 16   Ht 5' 6\" (1.676 m)   Wt 77.2 kg (170 lb 3.1 oz)   SpO2 (!) 87%   Breastfeeding No   BMI 27.47 kg/m²     Temp (24hrs), Av.2 °F (36.2 °C), Min:96.2 °F (35.7 °C), Max:97.8 °F (36.6 °C)      PHYSICAL EXAM:     General: NAD, awake. Lethargic  Eyes: sclera anicteric  Oral Cavity: No thrush or ulcers  Neck: no JVD  Chest: Fair bilateral air entry. No Wheezing or Rhonchi. No rales. Heart: normal sounds  Abdomen: soft and non tender   :  escobedo  Lower Extremities: no edema  Skin: no rash  Neuro: She is lethargic.   Withdraws to pain  Psychiatric: Unable to assess        LAB DATA REVIEWED:    Recent Results (from the past 24 hour(s))   GLUCOSE, POC    Collection Time: 10/18/21 11:06 AM   Result Value Ref Range    Glucose (POC) 305 (H) 65 - 117 mg/dL    Performed by Toby Tidwell    ECHO ADULT FOLLOW-UP OR LIMITED    Collection Time: 10/18/21 12:24 PM   Result Value Ref Range    LV ED Vol A2C 132.00 cm3    LV ED Vol A4C 129.00 cm3    LV ED Vol A2C 201.00 cm3    LV ES Vol A4C 108.00 cm3    LV ES Vol A2C 179.00 cm3    IVSd 1.00 (A) 0.60 - 0.90 cm    LVIDd 5.86 (A) 3.90 - 5.30 cm    LVIDs 5.64 cm    Left Ventricular Outflow Tract Mean Gradient 1.00 mmHg    LVOT VTI 8.15 cm    LVOT VTI 8.15 cm    LVOT Peak Velocity 53.00 cm/s    LVOT Peak Gradient 1.00 mmHg    LVPWd 1.28 (A) 0.60 - 0.90 cm    Left Atrium Major Axis 6.54 cm    LA Area 2C 20.30 cm2    LA Area 4C 26.00 cm2    LA Volume DISK BP 83.00 22.0 - 52.0 cm3    Left Atrium Major Axis 4.90 cm    Left Atrium Major Axis 4.90 cm    Aortic Valve Systolic Mean Gradient 4.62 mmHg    AoV VTI 15.20 cm    Aortic valve mean velocity 69.30 cm/s    Aortic Valve Systolic Peak Velocity 84.10 cm/s    AoV PG 4.00 mmHg    TV MG 58.00 mmHg    Mitral Regurgitant Velocity Time Integral 163.00 cm    MV Mean Gradient 5.00 mmHg    Mitral Valve Annulus Velocity Time Integral 30.20 cm    Mitral Valve Max Velocity 183.00 cm/s    MV Peak Gradient 13.00 mmHg    Mitral Valve Deceleration [place] 13,120.00 mm/s2    Mitral Valve Deceleration [place] 13,120.00 mm/s2    Mitral Valve Pressure Half-time 38.00 ms    MV A Bubba 66.60 cm/s    MV E Bubba 172.00 cm/s    MVA (PHT) 5.79 cm2    MV E/A 2.58     Pulmonic Regurgitant End Max Velocity 156.00 cm/s    Pulmonic Valve Systolic Peak Instantaneous Gradient 10.00 mmHg    Pulmonic Valve Systolic Mean Gradient 2.07 mmHg    Pulmonic Valve Systolic Velocity Time Integral 9.77 cm    Pulmonic Valve Max Velocity 45.20 cm/s    Pulmonic Valve Systolic Peak Instantaneous Gradient 1.00 mmHg    Tricuspid Valve Max Velocity 287.00 cm/s    Triscuspid Valve Regurgitation Peak Gradient 33.00 mmHg    Right Atrial Area 4C 16.20 cm2    LV Mass .9 67.0 - 162.0 g    LV Mass AL Index 150.8 43.0 - 95.0 g/m2   RENAL FUNCTION PANEL    Collection Time: 10/18/21 12:30 PM   Result Value Ref Range    Sodium 148 (H) 136 - 145 mmol/L    Potassium 4.1 3.5 - 5.1 mmol/L    Chloride 115 (H) 97 - 108 mmol/L    CO2 22 21 - 32 mmol/L    Anion gap 11 5 - 15 mmol/L    Glucose 279 (H) 65 - 100 mg/dL    BUN 64 (H) 6 - 20 mg/dL    Creatinine 1.71 (H) 0.55 - 1.02 mg/dL    BUN/Creatinine ratio 37 (H) 12 - 20      GFR est AA 36 (L) >60 ml/min/1.73m2    GFR est non-AA 30 (L) >60 ml/min/1.73m2    Calcium 8.4 (L) 8.5 - 10.1 mg/dL    Phosphorus 3.0 2.6 - 4.7 mg/dL    Albumin 1.8 (L) 3.5 - 5.0 g/dL   CBC WITH AUTOMATED DIFF    Collection Time: 10/18/21 12:30 PM   Result Value Ref Range    WBC 17.7 (H) 3.6 - 11.0 K/uL    RBC 4.63 3.80 - 5.20 M/uL    HGB 13.0 11.5 - 16.0 g/dL    HCT 40.0 35.0 - 47.0 %    MCV 86.4 80.0 - 99.0 FL    MCH 28.1 26.0 - 34.0 PG    MCHC 32.5 30.0 - 36.5 g/dL    RDW 19.9 (H) 11.5 - 14.5 %    PLATELET 68 (L) 405 - 400 K/uL    NRBC 0.5 (H) 0.0  WBC    ABSOLUTE NRBC 0.09 (H) 0.00 - 0.01 K/uL    NEUTROPHILS 78 (H) 32 - 75 %    LYMPHOCYTES 13 12 - 49 %    MONOCYTES 7 5 - 13 %    EOSINOPHILS 1 0 - 7 %    BASOPHILS 0 0 - 1 %    IMMATURE GRANULOCYTES 1 (H) 0 - 0.5 %    ABS. NEUTROPHILS 13.8 (H) 1.8 - 8.0 K/UL    ABS. LYMPHOCYTES 2.2 0.8 - 3.5 K/UL    ABS. MONOCYTES 1.3 (H) 0.0 - 1.0 K/UL    ABS.  EOSINOPHILS 0.2 0.0 - 0.4 K/UL    ABS. BASOPHILS 0.0 0.0 - 0.1 K/UL    ABS. IMM. GRANS. 0.2 (H) 0.00 - 0.04 K/UL    DF AUTOMATED     GLUCOSE, POC    Collection Time: 10/18/21  4:19 PM   Result Value Ref Range    Glucose (POC) 308 (H) 65 - 117 mg/dL    Performed by Jennifer Michele    GLUCOSE, POC    Collection Time: 10/18/21  9:28 PM   Result Value Ref Range    Glucose (POC) 248 (H) 65 - 117 mg/dL    Performed by Robel [facility]    METABOLIC PANEL, COMPREHENSIVE    Collection Time: 10/19/21  7:10 AM   Result Value Ref Range    Sodium 147 (H) 136 - 145 mmol/L    Potassium 4.2 3.5 - 5.1 mmol/L    Chloride 114 (H) 97 - 108 mmol/L    CO2 23 21 - 32 mmol/L    Anion gap 10 5 - 15 mmol/L    Glucose 320 (H) 65 - 100 mg/dL    BUN 65 (H) 6 - 20 mg/dL    Creatinine 1.67 (H) 0.55 - 1.02 mg/dL    BUN/Creatinine ratio 39 (H) 12 - 20      GFR est AA 37 (L) >60 ml/min/1.73m2    GFR est non-AA 31 (L) >60 ml/min/1.73m2    Calcium 8.4 (L) 8.5 - 10.1 mg/dL    Bilirubin, total 0.8 0.2 - 1.0 mg/dL    AST (SGOT) 52 (H) 15 - 37 U/L    ALT (SGPT) 47 12 - 78 U/L    Alk.  phosphatase 208 (H) 45 - 117 U/L    Protein, total 6.1 (L) 6.4 - 8.2 g/dL    Albumin 1.8 (L) 3.5 - 5.0 g/dL    Globulin 4.3 (H) 2.0 - 4.0 g/dL    A-G Ratio 0.4 (L) 1.1 - 2.2     RENAL FUNCTION PANEL    Collection Time: 10/19/21  7:10 AM   Result Value Ref Range    Sodium 148 (H) 136 - 145 mmol/L    Potassium 4.2 3.5 - 5.1 mmol/L    Chloride 115 (H) 97 - 108 mmol/L    CO2 21 21 - 32 mmol/L    Anion gap 12 5 - 15 mmol/L    Glucose 317 (H) 65 - 100 mg/dL    BUN 65 (H) 6 - 20 mg/dL    Creatinine 1.70 (H) 0.55 - 1.02 mg/dL    BUN/Creatinine ratio 38 (H) 12 - 20      GFR est AA 36 (L) >60 ml/min/1.73m2    GFR est non-AA 30 (L) >60 ml/min/1.73m2    Calcium 8.4 (L) 8.5 - 10.1 mg/dL    Phosphorus 3.2 2.6 - 4.7 mg/dL    Albumin 1.8 (L) 3.5 - 5.0 g/dL   CK    Collection Time: 10/19/21  7:10 AM   Result Value Ref Range     26 - 192 U/L   GLUCOSE, POC    Collection Time: 10/19/21  8:29 AM   Result Value Ref Range    Glucose (POC) 383 (H) 65 - 117 mg/dL    Performed by Kesha Bowles        Recommendations/Plan:     1 acute kidney injury on chronic kidney disease stage III:  -likely prerenal secondary to sepsis/possible cardiorenal syndrome  -She presented with a creatinine of 2.5   -Creatinine increased to 2.7.  improved to 1.7  -Recently discharged with creatinine 1.6 on 10/6  -clinically she has no volume overload. No edema in both legs. -Urine is bland  -CT abdomen without contrast on 10/11 shows no renal obstruction.    -Not on any potential nephrotoxins preadmission  -off IVF      2  MRSA bacteremia:  -acute right parotitis with parapharyngeal and retropharyngeal  involvement per CT scan  -She had LP on admission which was negative  -Urine is bland. Covid negative. No infiltrate on chest x-ray  -She has been started on vancomycin and Ceftaroline and Rifampicin. -ID is on the case      3 Hypernatremia:  -Sodium has improved 150--> 148.  -will c/w 300 mL every 3 hours.    -Also will continue to hold Lasix    4 CHF:  -She has history of cardiomyopathy with EF less than 15%  -Chest x-ray shows mild pulmonary congestion. BNP 15,000  -on Lasix 40 mg IV every 48 hrs which I will hold and monitor urine output    5 altered mentation:  -Could be secondary to metabolic derangements from sepsis.    -Unsure of her baseline mentation  -CT head was negative on admission    6  Hypercalcemia  -PTH is 85.   Corrected calcium is 10.3  -Phosphorus is okay at 5.1            ________________________________________________________________________  Signed: Carla Weber MD

## 2021-10-19 NOTE — PROGRESS NOTES
PT eval order received and acknowledged. PT eval attempted at 933am however per nsg pt is not following commands and is not appropriate for mobility at this time. Will continue to follow patient and attempt PT eval at a later time. Thank you.

## 2021-10-19 NOTE — PROGRESS NOTES
OT eval order received and acknowledged and attempted at 1122 during pt with increased alertness compared to prior day however not following commands in order to actively participate with therapy. Will continue to follow pt and attempt OT eval at a later time. Thank you.

## 2021-10-19 NOTE — PROGRESS NOTES
Ay Pt seen, alert but non-verbal, no acute events since last seen. Repeat  Neck CT today revealed decreased swelling of the right parotid gland.  Pt is tolerating current antibiotics w/o reported side effects   Objective:   Physical Exam:     Visit Vitals  BP (!) 138/98 (BP 1 Location: Right upper arm, BP Patient Position: At rest)   Pulse 62   Temp 97.8 °F (36.6 °C)   Resp 16   Ht 5' 6\" (1.676 m)   Wt 170 lb 3.1 oz (77.2 kg)   SpO2 90%   Breastfeeding No   BMI 27.47 kg/m²    O2 Flow Rate (L/min): 2 l/min O2 Device: Nasal cannula    Temp (24hrs), Av.7 °F (36.5 °C), Min:96.7 °F (35.9 °C), Max:98.3 °F (36.8 °C)    10/19 0701 - 10/19 1900  In: 200   Out: -    10/17 1901 - 10/19 0700  In: 7964 [I.V.:100]  Out: 200 [Urine:200]    General: NAD, alert, non-verbal   HEENT: SHARON, Moist mucosa, decreased right facial swelling   Lungs: CTA b/l, decreased at the bases, no wheeze/rhonchi   Heart: S1S2+, RRR, no murmur  Abdo: Soft, NT, ND, +BS, peg tube in place   : + escobedo cath   Exts: no edema  + pulses b/l   Skin: No wounds, No rashes or lesions      Data Review:       Recent Days:  Recent Labs     10/19/21  0710 10/18/21  1230 10/17/21  1132   WBC 16.6* 17.7* 16.4*   HGB 13.4 13.0 13.0   HCT 43.9 40.0 41.0   PLT 67* 68* 66*     Recent Labs     10/19/21  0710 10/18/21  1230 10/17/21  0606   BUN 65*  65* 64* 62*   CREA 1.70*  1.67* 1.71* 1.86*       No results found for: CRPQN, CRP, CRPM       Microbiology     Results     Procedure Component Value Units Date/Time    CULTURE, BLOOD [[id number]] Collected: 10/18/21 1230    Order Status: Completed Specimen: Blood Updated: 10/19/21 1153     Special Requests: No Special Requests        Culture result: No growth after 22 hours       CULTURE, BLOOD [[id number]]  (Abnormal) Collected: 10/15/21 1650    Order Status: Completed Specimen: Blood Updated: 10/18/21 1335     Special Requests: No Special Requests        Culture result:       * methicillin resistant staphylococcus aureus * GROWING IN THE ANAEROBIC BOTTLE (NO SITE)            *KNOWN MRSA PATIENT            Please refer to previous blood culture  [id number] COLLECTED 10/12/21      CULTURE, BLOOD [[id number]]  (Abnormal) Collected: 10/13/21 0500    Order Status: Completed Specimen: Blood Updated: 10/15/21 1247     Special Requests: No Special Requests        Culture result:       Gram Positive Cocci in clusters growing in both bottles drawn [facility]) ON 10/14/21 AT 1157. HH                  * methicillin resistant staphylococcus aureus * growing in both bottles drawn (SITE NOT INDICATED) REFER TO [id number] FOR SENSITIVITIES            (NOTE) KNOWN MRSA PATIENT    CULTURE, Diana Nipper STAIN [[id number]] Collected: 10/12/21 0945    Order Status: Canceled Specimen: Wound from Mouth     MRSA SCREEN - PCR (NASAL) [[id number]]  (Abnormal) Collected: 10/11/21 1025    Order Status: Completed Specimen: Swab Updated: 10/11/21 1147     MRSA by PCR, Nasal DETECTED        Comment: Results verified, phoned to and read back by Bart Christian 10/11/21 11:50       MRSA SCREEN - PCR (NASAL) [[id number]] Collected: 10/11/21 0945    Order Status: Canceled Specimen: Swab     COVID-19 RAPID TEST [[id number]] Collected: 10/11/21 0930    Order Status: Completed Specimen: Nasopharyngeal Updated: 10/11/21 1104     Specimen source       Please find results under separate order           COVID-19 rapid test Not Detected        Comment: Rapid Abbott ID Now   Rapid NAAT:  The specimen is NEGATIVE for SARS-CoV-2, the novel coronavirus associated with COVID-19. Negative results should be treated as presumptive and, if inconsistent with clinical signs and symptoms or necessary for patient management, should be tested with an alternative molecular assay. Negative results do not preclude SARS-CoV-2 infection and should not be used as the sole basis for patient management decisions.    This test has been authorized by the FDA under   an Emergency Use Authorization (EUA) for use by authorized laboratories. Fact sheet for Healthcare Providers: ConventionUpdate.co.nz Fact sheet for Patients: ConventionUpdate[employer]nz   Methodology: Isothermal Nucleic Acid Amplification         CULTURE, BLOOD, LINE [[id number]] Collected: 10/11/21 0930    Order Status: Canceled Specimen: Blood     CULTURE, URINE [[id number]] Collected: 10/11/21 0930    Order Status: Completed Specimen: Urine Updated: 10/12/21 1049     Special Requests: No Special Requests        Culture result: No Growth (<1000 cfu/mL)       CULTURE, CSF  TUBE 2 [[id number]] Collected: 10/11/21 0504    Order Status: Completed Specimen: Cerebrospinal Fluid Updated: 10/18/21 1450     Special Requests: No Special Requests        GRAM STAIN No wbc's seen         No organisms seen        Culture result:       no growth on solid media at 4 days                  no growth in Thio broth at 7 days          CULTURE, BLOOD, PAIRED [[id number]]  (Abnormal)  (Susceptibility) Collected: 10/11/21 0225    Order Status: Completed Specimen: Blood Updated: 10/15/21 0921     Special Requests: No Special Requests        Culture result:       * methicillin resistant staphylococcus aureus * growing in all 4 bottles drawn (SITES NOT INDICATED)          Susceptibility      Staphylococcus aureus Methcillin Resistant      NIMA      Ciprofloxacin ($) Susceptible      Clindamycin ($) Susceptible      Daptomycin ($$$$$) Susceptible      Doxycycline ($$) Susceptible      Erythromycin ($$$$) Resistant      Gentamicin ($) Susceptible      Levofloxacin ($) Susceptible      Linezolid ($$$$$) Susceptible      Oxacillin Resistant      Rifampin ($$$$) Susceptible  [1]       Tetracycline Susceptible      Trimeth/Sulfa Susceptible      Vancomycin ($) Susceptible              [1]  Rifampin is not to be used for mono-therapy.           Linear View                            Diagnostics   CXR Results (Last 48 hours)    None             Assessment/Plan     1. MRSA bacteremia, persistent. BCx positive on 10/11, 10/13 and 10/15      Mild decrease in WBC on todays labs, remains afebrile and hemodynamically stable       Non-specific thickening of Mitral valve w moderate to severe mitral valve regurgitation on echo (10/18), w/o mention of valvular veg (will discuss findings w cardiology)       Repeat BCx from 10/18, neg at 22 hours      On day # 2 of Daptomycin and # 6 of Rifampin/Ceftaroline       Routine labs including CRP and ESR in AM.         2. Acute right parotitis, suppurative: Objectively decreased swelling, still w sig induration, no fluctuance       Repeat CT today (10/19), showed decreased parotid swelling and edema in the deep spaces, no collection         3. MRSA colonization: S/p 5 days of Mupirocin     4. Cardiomyopathy w baseline EF of <15 % (10/19), does not appear to be in acute exacerbation      5. Thrombocytopenia: Unclear if related to antimicrobial therapy or other     5.  Acute on chronic renal failure, trending down on serial labs     Priscilla Sauceda MD    10/19/2021

## 2021-10-19 NOTE — PROGRESS NOTES
Problem: Falls - Risk of  Goal: *Absence of Falls  Description: Document Seamus Salcedo Fall Risk and appropriate interventions in the flowsheet. Outcome: Progressing Towards Goal  Note: Fall Risk Interventions:  Mobility Interventions: Bed/chair exit alarm, PT Consult for mobility concerns, Strengthening exercises (ROM-active/passive), Utilize walker, cane, or other assistive device    Mentation Interventions: Adequate sleep, hydration, pain control, Bed/chair exit alarm, Reorient patient, Room close to nurse's station    Medication Interventions: Bed/chair exit alarm    Elimination Interventions: Bed/chair exit alarm    History of Falls Interventions: Bed/chair exit alarm     Problem: Risk for Spread of Infection  Goal: Prevent transmission of infectious organism to others  Description: Prevent the transmission of infectious organisms to other patients, staff members, and visitors. Outcome: Progressing Towards Goal     Problem: Patient Education:  Go to Education Activity  Goal: Patient/Family Education  Outcome: Progressing Towards Goal     Problem: Pressure Injury - Risk of  Goal: *Prevention of pressure injury  Description: Document Marshall Scale and appropriate interventions in the flowsheet. Outcome: Progressing Towards Goal  Note: Pressure Injury Interventions:  Sensory Interventions: Assess changes in LOC, Float heels, Keep linens dry and wrinkle-free, Maintain/enhance activity level, Minimize linen layers, Turn and reposition approx.  every two hours (pillows and wedges if needed), Use 30-degree side-lying position    Moisture Interventions: Absorbent underpads, Apply protective barrier, creams and emollients, Assess need for specialty bed, Check for incontinence Q2 hours and as needed, Internal/External urinary devices, Limit adult briefs, Maintain skin hydration (lotion/cream), Minimize layers, Moisture barrier    Activity Interventions: Assess need for specialty bed    Mobility Interventions: Float heels, HOB 30 degrees or less, Turn and reposition approx.  every two hours(pillow and wedges)    Nutrition Interventions: Document food/fluid/supplement intake    Friction and Shear Interventions: Apply protective barrier, creams and emollients, Foam dressings/transparent film/skin sealants       Problem: Patient Education: Go to Patient Education Activity  Goal: Patient/Family Education  Outcome: Progressing Towards Goal     Problem: Nutrition Deficit  Goal: *Optimize nutritional status  Outcome: Progressing Towards Goal  Note: Continue tube feeding and weighing patient daily

## 2021-10-19 NOTE — PROGRESS NOTES
General Daily Progress Note          Patient Name:   Doretha Gandhi       YOB: 1955       Age:  78 y.o.       Admit Date: 10/11/2021      Subjective:     Patient is a 78y.o. year old female history of chronic A. fib chronic kidney disease hypertension pacemaker congestive heart failure with ejection fraction of 15 to 20% history of diabetes hypertension patient have a Covid few months ago since then but he declined patient was recently discharged from the hospital with congestive heart failure came back with altered mental status not awake not talking seen by the ER physician CT scan of the head was negative  Patient had WBC count elevated increased lactic acid concern of sepsis sepsis patient was LP done in the ER which was normal patient received 2 g of Rocephin when I saw the patient was still was altered I ordered repeat lactic acid ordered CT scan of the maxillary facial area swelling of the right parotid area and admitted to ICU for further work-up discussed with the patient daughter     According to the patient daughter since she have a Covid in March April this year she is declining she is in the hospital third time this month        Patient awake not much verbal  Static post PEG tube placement    MRSA bacteremia             Objective:     Visit Vitals  BP (!) 126/94 (BP 1 Location: Right upper arm, BP Patient Position: At rest)   Pulse 70   Temp 97.8 °F (36.6 °C)   Resp 16   Ht 5' 6\" (1.676 m)   Wt 77.2 kg (170 lb 3.1 oz)   SpO2 91%   Breastfeeding No   BMI 27.47 kg/m²        Recent Results (from the past 24 hour(s))   ECHO ADULT FOLLOW-UP OR LIMITED    Collection Time: 10/18/21 12:24 PM   Result Value Ref Range    LV ED Vol A2C 132.00 cm3    LV ED Vol A4C 129.00 cm3    LV ED Vol A2C 201.00 cm3    LV ES Vol A4C 108.00 cm3    LV ES Vol A2C 179.00 cm3    IVSd 1.00 (A) 0.60 - 0.90 cm    LVIDd 5.86 (A) 3.90 - 5.30 cm    LVIDs 5.64 cm    Left Ventricular Outflow Tract Mean Gradient 1.00 mmHg LVOT VTI 8.15 cm    LVOT VTI 8.15 cm    LVOT Peak Velocity 53.00 cm/s    LVOT Peak Gradient 1.00 mmHg    LVPWd 1.28 (A) 0.60 - 0.90 cm    Left Atrium Major Axis 6.54 cm    LA Area 2C 20.30 cm2    LA Area 4C 26.00 cm2    LA Volume DISK BP 83.00 22.0 - 52.0 cm3    Left Atrium Major Axis 4.90 cm    Left Atrium Major Axis 4.90 cm    Aortic Valve Systolic Mean Gradient 6.71 mmHg    AoV VTI 15.20 cm    Aortic valve mean velocity 69.30 cm/s    Aortic Valve Systolic Peak Velocity 88.10 cm/s    AoV PG 4.00 mmHg    TV MG 58.00 mmHg    Mitral Regurgitant Velocity Time Integral 163.00 cm    MV Mean Gradient 5.00 mmHg    Mitral Valve Annulus Velocity Time Integral 30.20 cm    Mitral Valve Max Velocity 183.00 cm/s    MV Peak Gradient 13.00 mmHg    Mitral Valve Deceleration [place] 13,120.00 mm/s2    Mitral Valve Deceleration [place] 13,120.00 mm/s2    Mitral Valve Pressure Half-time 38.00 ms    MV A Bubba 66.60 cm/s    MV E Bubba 172.00 cm/s    MVA (PHT) 5.79 cm2    MV E/A 2.58     Pulmonic Regurgitant End Max Velocity 156.00 cm/s    Pulmonic Valve Systolic Peak Instantaneous Gradient 10.00 mmHg    Pulmonic Valve Systolic Mean Gradient 1.25 mmHg    Pulmonic Valve Systolic Velocity Time Integral 9.77 cm    Pulmonic Valve Max Velocity 45.20 cm/s    Pulmonic Valve Systolic Peak Instantaneous Gradient 1.00 mmHg    Tricuspid Valve Max Velocity 287.00 cm/s    Triscuspid Valve Regurgitation Peak Gradient 33.00 mmHg    Right Atrial Area 4C 16.20 cm2    LV Mass .9 67.0 - 162.0 g    LV Mass AL Index 150.8 43.0 - 95.0 g/m2   RENAL FUNCTION PANEL    Collection Time: 10/18/21 12:30 PM   Result Value Ref Range    Sodium 148 (H) 136 - 145 mmol/L    Potassium 4.1 3.5 - 5.1 mmol/L    Chloride 115 (H) 97 - 108 mmol/L    CO2 22 21 - 32 mmol/L    Anion gap 11 5 - 15 mmol/L    Glucose 279 (H) 65 - 100 mg/dL    BUN 64 (H) 6 - 20 mg/dL    Creatinine 1.71 (H) 0.55 - 1.02 mg/dL    BUN/Creatinine ratio 37 (H) 12 - 20      GFR est AA 36 (L) >60 ml/min/1.73m2    GFR est non-AA 30 (L) >60 ml/min/1.73m2    Calcium 8.4 (L) 8.5 - 10.1 mg/dL    Phosphorus 3.0 2.6 - 4.7 mg/dL    Albumin 1.8 (L) 3.5 - 5.0 g/dL   CBC WITH AUTOMATED DIFF    Collection Time: 10/18/21 12:30 PM   Result Value Ref Range    WBC 17.7 (H) 3.6 - 11.0 K/uL    RBC 4.63 3.80 - 5.20 M/uL    HGB 13.0 11.5 - 16.0 g/dL    HCT 40.0 35.0 - 47.0 %    MCV 86.4 80.0 - 99.0 FL    MCH 28.1 26.0 - 34.0 PG    MCHC 32.5 30.0 - 36.5 g/dL    RDW 19.9 (H) 11.5 - 14.5 %    PLATELET 68 (L) 118 - 400 K/uL    NRBC 0.5 (H) 0.0  WBC    ABSOLUTE NRBC 0.09 (H) 0.00 - 0.01 K/uL    NEUTROPHILS 78 (H) 32 - 75 %    LYMPHOCYTES 13 12 - 49 %    MONOCYTES 7 5 - 13 %    EOSINOPHILS 1 0 - 7 %    BASOPHILS 0 0 - 1 %    IMMATURE GRANULOCYTES 1 (H) 0 - 0.5 %    ABS. NEUTROPHILS 13.8 (H) 1.8 - 8.0 K/UL    ABS. LYMPHOCYTES 2.2 0.8 - 3.5 K/UL    ABS. MONOCYTES 1.3 (H) 0.0 - 1.0 K/UL    ABS. EOSINOPHILS 0.2 0.0 - 0.4 K/UL    ABS. BASOPHILS 0.0 0.0 - 0.1 K/UL    ABS. IMM. GRANS. 0.2 (H) 0.00 - 0.04 K/UL    DF AUTOMATED     GLUCOSE, POC    Collection Time: 10/18/21  4:19 PM   Result Value Ref Range    Glucose (POC) 308 (H) 65 - 117 mg/dL    Performed by [facility]    GLUCOSE, POC    Collection Time: 10/18/21  9:28 PM   Result Value Ref Range    Glucose (POC) 248 (H) 65 - 117 mg/dL    Performed by Jacy Keen    CBC WITH AUTOMATED DIFF    Collection Time: 10/19/21  7:10 AM   Result Value Ref Range    WBC 16.6 (H) 3.6 - 11.0 K/uL    RBC 4.97 3.80 - 5.20 M/uL    HGB 13.4 11.5 - 16.0 g/dL    HCT 43.9 35.0 - 47.0 %    MCV 88.3 80.0 - 99.0 FL    MCH 27.0 26.0 - 34.0 PG    MCHC 30.5 30.0 - 36.5 g/dL    RDW 20.4 (H) 11.5 - 14.5 %    PLATELET 67 (L) 586 - 400 K/uL    NRBC 0.7 (H) 0.0  WBC    ABSOLUTE NRBC 0.12 (H) 0.00 - 0.01 K/uL    NEUTROPHILS 81 (H) 32 - 75 %    LYMPHOCYTES 10 (L) 12 - 49 %    MONOCYTES 7 5 - 13 %    EOSINOPHILS 1 0 - 7 %    BASOPHILS 0 0 - 1 %    IMMATURE GRANULOCYTES 1 (H) 0 - 0.5 %    ABS.  NEUTROPHILS 13. 4 (H) 1.8 - 8.0 K/UL    ABS. LYMPHOCYTES 1.6 0.8 - 3.5 K/UL    ABS. MONOCYTES 1.2 (H) 0.0 - 1.0 K/UL    ABS. EOSINOPHILS 0.2 0.0 - 0.4 K/UL    ABS. BASOPHILS 0.0 0.0 - 0.1 K/UL    ABS. IMM. GRANS. 0.2 (H) 0.00 - 0.04 K/UL    DF AUTOMATED     METABOLIC PANEL, COMPREHENSIVE    Collection Time: 10/19/21  7:10 AM   Result Value Ref Range    Sodium 147 (H) 136 - 145 mmol/L    Potassium 4.2 3.5 - 5.1 mmol/L    Chloride 114 (H) 97 - 108 mmol/L    CO2 23 21 - 32 mmol/L    Anion gap 10 5 - 15 mmol/L    Glucose 320 (H) 65 - 100 mg/dL    BUN 65 (H) 6 - 20 mg/dL    Creatinine 1.67 (H) 0.55 - 1.02 mg/dL    BUN/Creatinine ratio 39 (H) 12 - 20      GFR est AA 37 (L) >60 ml/min/1.73m2    GFR est non-AA 31 (L) >60 ml/min/1.73m2    Calcium 8.4 (L) 8.5 - 10.1 mg/dL    Bilirubin, total 0.8 0.2 - 1.0 mg/dL    AST (SGOT) 52 (H) 15 - 37 U/L    ALT (SGPT) 47 12 - 78 U/L    Alk.  phosphatase 208 (H) 45 - 117 U/L    Protein, total 6.1 (L) 6.4 - 8.2 g/dL    Albumin 1.8 (L) 3.5 - 5.0 g/dL    Globulin 4.3 (H) 2.0 - 4.0 g/dL    A-G Ratio 0.4 (L) 1.1 - 2.2     RENAL FUNCTION PANEL    Collection Time: 10/19/21  7:10 AM   Result Value Ref Range    Sodium 148 (H) 136 - 145 mmol/L    Potassium 4.2 3.5 - 5.1 mmol/L    Chloride 115 (H) 97 - 108 mmol/L    CO2 21 21 - 32 mmol/L    Anion gap 12 5 - 15 mmol/L    Glucose 317 (H) 65 - 100 mg/dL    BUN 65 (H) 6 - 20 mg/dL    Creatinine 1.70 (H) 0.55 - 1.02 mg/dL    BUN/Creatinine ratio 38 (H) 12 - 20      GFR est AA 36 (L) >60 ml/min/1.73m2    GFR est non-AA 30 (L) >60 ml/min/1.73m2    Calcium 8.4 (L) 8.5 - 10.1 mg/dL    Phosphorus 3.2 2.6 - 4.7 mg/dL    Albumin 1.8 (L) 3.5 - 5.0 g/dL   CK    Collection Time: 10/19/21  7:10 AM   Result Value Ref Range     26 - 192 U/L   GLUCOSE, POC    Collection Time: 10/19/21  8:29 AM   Result Value Ref Range    Glucose (POC) 383 (H) 65 - 117 mg/dL    Performed by Marcella Alarcon    GLUCOSE, POC    Collection Time: 10/19/21 11:05 AM   Result Value Ref Range    Glucose (POC) 282 (H) 65 - 117 mg/dL    Performed by Paulette Thomas      [unfilled]      Review of Systems    Unable to obtain e. Physical Exam:      Constitutional: Not much verbal HENT:   Head: Normocephalic and atraumatic. Eyes: Pupils are equal, round, and reactive to light. EOM are normal.   Cardiovascular: Normal rate, regular rhythm and normal heart sounds. Pulmonary/Chest: Breath sounds normal. No wheezes. No rales. Exhibits no tenderness. Abdominal: Soft. Bowel sounds are normal. There is no abdominal tenderness. There is no rebound and no guarding. Musculoskeletal: Normal range of motion. Neurological: Moves extremities    CT MAXILLOFACIAL WO CONT   Final Result   Findings more consistent with cellulitis of the right face that   includes the right parotid gland (acute parotitis) as described above. Edema/fluid collection in the parapharyngeal space and retropharyngeal space. Suggestions as above. CT ABD PELV WO CONT   Final Result   Small amount of ascites. No focal fluid collection or free air. Density in the gallbladder which could be artifact, sludge or gallstones. No   specific CT evidence of cholecystitis. Bladder wall prominence. Other findings   as above. CT HEAD WO CONT   Final Result   1. No evidence of acute intracranial hemorrhage or mass effect. 2.  Multifocal intracranial encephalomalacia as above. XR CHEST PORT   Final Result   1. Mild pulmonary vascular congestion. 2.  Cardiomegaly.       CT NECK SOFT TISSUE WO CONT    (Results Pending)        Recent Results (from the past 24 hour(s))   ECHO ADULT FOLLOW-UP OR LIMITED    Collection Time: 10/18/21 12:24 PM   Result Value Ref Range    LV ED Vol A2C 132.00 cm3    LV ED Vol A4C 129.00 cm3    LV ED Vol A2C 201.00 cm3    LV ES Vol A4C 108.00 cm3    LV ES Vol A2C 179.00 cm3    IVSd 1.00 (A) 0.60 - 0.90 cm    LVIDd 5.86 (A) 3.90 - 5.30 cm    LVIDs 5.64 cm    Left Ventricular Outflow Tract Mean Gradient 1.00 mmHg    LVOT VTI 8.15 cm    LVOT VTI 8.15 cm    LVOT Peak Velocity 53.00 cm/s    LVOT Peak Gradient 1.00 mmHg    LVPWd 1.28 (A) 0.60 - 0.90 cm    Left Atrium Major Axis 6.54 cm    LA Area 2C 20.30 cm2    LA Area 4C 26.00 cm2    LA Volume DISK BP 83.00 22.0 - 52.0 cm3    Left Atrium Major Axis 4.90 cm    Left Atrium Major Axis 4.90 cm    Aortic Valve Systolic Mean Gradient 2.82 mmHg    AoV VTI 15.20 cm    Aortic valve mean velocity 69.30 cm/s    Aortic Valve Systolic Peak Velocity 19.90 cm/s    AoV PG 4.00 mmHg    TV MG 58.00 mmHg    Mitral Regurgitant Velocity Time Integral 163.00 cm    MV Mean Gradient 5.00 mmHg    Mitral Valve Annulus Velocity Time Integral 30.20 cm    Mitral Valve Max Velocity 183.00 cm/s    MV Peak Gradient 13.00 mmHg    Mitral Valve Deceleration [place] 13,120.00 mm/s2    Mitral Valve Deceleration [place] 13,120.00 mm/s2    Mitral Valve Pressure Half-time 38.00 ms    MV A Bubba 66.60 cm/s    MV E Bubba 172.00 cm/s    MVA (PHT) 5.79 cm2    MV E/A 2.58     Pulmonic Regurgitant End Max Velocity 156.00 cm/s    Pulmonic Valve Systolic Peak Instantaneous Gradient 10.00 mmHg    Pulmonic Valve Systolic Mean Gradient 2.25 mmHg    Pulmonic Valve Systolic Velocity Time Integral 9.77 cm    Pulmonic Valve Max Velocity 45.20 cm/s    Pulmonic Valve Systolic Peak Instantaneous Gradient 1.00 mmHg    Tricuspid Valve Max Velocity 287.00 cm/s    Triscuspid Valve Regurgitation Peak Gradient 33.00 mmHg    Right Atrial Area 4C 16.20 cm2    LV Mass .9 67.0 - 162.0 g    LV Mass AL Index 150.8 43.0 - 95.0 g/m2   RENAL FUNCTION PANEL    Collection Time: 10/18/21 12:30 PM   Result Value Ref Range    Sodium 148 (H) 136 - 145 mmol/L    Potassium 4.1 3.5 - 5.1 mmol/L    Chloride 115 (H) 97 - 108 mmol/L    CO2 22 21 - 32 mmol/L    Anion gap 11 5 - 15 mmol/L    Glucose 279 (H) 65 - 100 mg/dL    BUN 64 (H) 6 - 20 mg/dL    Creatinine 1.71 (H) 0.55 - 1.02 mg/dL    BUN/Creatinine ratio 37 (H) 12 - 20      GFR est AA 36 (L) >60 ml/min/1.73m2    GFR est non-AA 30 (L) >60 ml/min/1.73m2    Calcium 8.4 (L) 8.5 - 10.1 mg/dL    Phosphorus 3.0 2.6 - 4.7 mg/dL    Albumin 1.8 (L) 3.5 - 5.0 g/dL   CBC WITH AUTOMATED DIFF    Collection Time: 10/18/21 12:30 PM   Result Value Ref Range    WBC 17.7 (H) 3.6 - 11.0 K/uL    RBC 4.63 3.80 - 5.20 M/uL    HGB 13.0 11.5 - 16.0 g/dL    HCT 40.0 35.0 - 47.0 %    MCV 86.4 80.0 - 99.0 FL    MCH 28.1 26.0 - 34.0 PG    MCHC 32.5 30.0 - 36.5 g/dL    RDW 19.9 (H) 11.5 - 14.5 %    PLATELET 68 (L) 143 - 400 K/uL    NRBC 0.5 (H) 0.0  WBC    ABSOLUTE NRBC 0.09 (H) 0.00 - 0.01 K/uL    NEUTROPHILS 78 (H) 32 - 75 %    LYMPHOCYTES 13 12 - 49 %    MONOCYTES 7 5 - 13 %    EOSINOPHILS 1 0 - 7 %    BASOPHILS 0 0 - 1 %    IMMATURE GRANULOCYTES 1 (H) 0 - 0.5 %    ABS. NEUTROPHILS 13.8 (H) 1.8 - 8.0 K/UL    ABS. LYMPHOCYTES 2.2 0.8 - 3.5 K/UL    ABS. MONOCYTES 1.3 (H) 0.0 - 1.0 K/UL    ABS. EOSINOPHILS 0.2 0.0 - 0.4 K/UL    ABS. BASOPHILS 0.0 0.0 - 0.1 K/UL    ABS. IMM. GRANS. 0.2 (H) 0.00 - 0.04 K/UL    DF AUTOMATED     GLUCOSE, POC    Collection Time: 10/18/21  4:19 PM   Result Value Ref Range    Glucose (POC) 308 (H) 65 - 117 mg/dL    Performed by [facility]    GLUCOSE, POC    Collection Time: 10/18/21  9:28 PM   Result Value Ref Range    Glucose (POC) 248 (H) 65 - 117 mg/dL    Performed by Jacy Keen    CBC WITH AUTOMATED DIFF    Collection Time: 10/19/21  7:10 AM   Result Value Ref Range    WBC 16.6 (H) 3.6 - 11.0 K/uL    RBC 4.97 3.80 - 5.20 M/uL    HGB 13.4 11.5 - 16.0 g/dL    HCT 43.9 35.0 - 47.0 %    MCV 88.3 80.0 - 99.0 FL    MCH 27.0 26.0 - 34.0 PG    MCHC 30.5 30.0 - 36.5 g/dL    RDW 20.4 (H) 11.5 - 14.5 %    PLATELET 67 (L) 752 - 400 K/uL    NRBC 0.7 (H) 0.0  WBC    ABSOLUTE NRBC 0.12 (H) 0.00 - 0.01 K/uL    NEUTROPHILS 81 (H) 32 - 75 %    LYMPHOCYTES 10 (L) 12 - 49 %    MONOCYTES 7 5 - 13 %    EOSINOPHILS 1 0 - 7 %    BASOPHILS 0 0 - 1 %    IMMATURE GRANULOCYTES 1 (H) 0 - 0.5 %    ABS.  NEUTROPHILS (POC) 282 (H) 65 - 117 mg/dL    Performed by Kellie Rodriguez        Results     Procedure Component Value Units Date/Time    CULTURE, BLOOD [[id number]] Collected: 10/18/21 1230    Order Status: Completed Specimen: Blood Updated: 10/18/21 1318    CULTURE, BLOOD [[id number]]  (Abnormal) Collected: 10/15/21 1650    Order Status: Completed Specimen: Blood Updated: 10/18/21 1335     Special Requests: No Special Requests        Culture result:       * methicillin resistant staphylococcus aureus * GROWING IN THE ANAEROBIC BOTTLE (NO SITE)            *KNOWN MRSA PATIENT            Please refer to previous blood culture  [id number] COLLECTED 10/12/21      CULTURE, BLOOD [[id number]]  (Abnormal) Collected: 10/13/21 0500    Order Status: Completed Specimen: Blood Updated: 10/15/21 1247     Special Requests: No Special Requests        Culture result:       Gram Positive Cocci in clusters growing in both bottles drawn [facility] ([address] ON 10/14/21 AT 1157.  HH                  * methicillin resistant staphylococcus aureus * growing in both bottles drawn (SITE NOT INDICATED) REFER TO [id number] FOR SENSITIVITIES            (NOTE) KNOWN MRSA PATIENT    CULTURE, Avshawn Luis STAIN [[id number]] Collected: 10/12/21 0945    Order Status: Canceled Specimen: Wound from Mouth     MRSA SCREEN - PCR (NASAL) [[id number]]  (Abnormal) Collected: 10/11/21 1025    Order Status: Completed Specimen: Swab Updated: 10/11/21 1147     MRSA by PCR, Nasal DETECTED        Comment: Results verified, phoned to and read back by Atul Keen 10/11/21 11:50       MRSA SCREEN - PCR (NASAL) [[id number]] Collected: 10/11/21 0945    Order Status: Canceled Specimen: Swab     COVID-19 RAPID TEST [[id number]] Collected: 10/11/21 0930    Order Status: Completed Specimen: Nasopharyngeal Updated: 10/11/21 1104     Specimen source       Please find results under separate order           COVID-19 rapid test Not Detected        Comment: Rapid Abbott ID Now   Rapid NAAT:  The specimen is NEGATIVE for SARS-CoV-2, the novel coronavirus associated with COVID-19. Negative results should be treated as presumptive and, if inconsistent with clinical signs and symptoms or necessary for patient management, should be tested with an alternative molecular assay. Negative results do not preclude SARS-CoV-2 infection and should not be used as the sole basis for patient management decisions. This test has been authorized by the FDA under   an Emergency Use Authorization (EUA) for use by authorized laboratories.  Fact sheet for Healthcare Providers: [employer] Fact sheet for Patients: [employer]nz   Methodology: Isothermal Nucleic Acid Amplification         CULTURE, BLOOD, LINE [[id number]] Collected: 10/11/21 0930    Order Status: Canceled Specimen: Blood     CULTURE, URINE [[id number]] Collected: 10/11/21 0930    Order Status: Completed Specimen: Urine Updated: 10/12/21 1049     Special Requests: No Special Requests        Culture result: No Growth (<1000 cfu/mL)       CULTURE, CSF  TUBE 2 [[id number]] Collected: 10/11/21 0504    Order Status: Completed Specimen: Cerebrospinal Fluid Updated: 10/18/21 1450     Special Requests: No Special Requests        GRAM STAIN No wbc's seen         No organisms seen        Culture result:       no growth on solid media at 4 days                  no growth in Thio broth at 7 days          CULTURE, BLOOD, PAIRED [[id number]]  (Abnormal)  (Susceptibility) Collected: 10/11/21 0225    Order Status: Completed Specimen: Blood Updated: 10/15/21 0921     Special Requests: No Special Requests        Culture result:       * methicillin resistant staphylococcus aureus * growing in all 4 bottles drawn (SITES NOT INDICATED)          Susceptibility      Staphylococcus aureus Methcillin Resistant      NIMA      Ciprofloxacin ($) Susceptible      Clindamycin ($) Susceptible Daptomycin ($$$$$) Susceptible      Doxycycline ($$) Susceptible      Erythromycin ($$$$) Resistant      Gentamicin ($) Susceptible      Levofloxacin ($) Susceptible      Linezolid ($$$$$) Susceptible      Oxacillin Resistant      Rifampin ($$$$) Susceptible  [1]       Tetracycline Susceptible      Trimeth/Sulfa Susceptible      Vancomycin ($) Susceptible              [1]  Rifampin is not to be used for mono-therapy. [facility]                          Labs:     Recent Labs     10/19/21  0710 10/18/21  1230   WBC 16.6* 17.7*   HGB 13.4 13.0   HCT 43.9 40.0   PLT 67* 68*     Recent Labs     10/19/21  0710 10/18/21  1230 10/17/21  0606   *  147* 148* 147*   K 4.2  4.2 4.1 4.2   *  114* 115* 114*   CO2 21  23 22 23   BUN 65*  65* 64* 62*   CREA 1.70*  1.67* 1.71* 1.86*   *  320* 279* 238*   CA 8.4*  8.4* 8.4* 8.7   PHOS 3.2 3.0 2.2*     Recent Labs     10/19/21  0710 10/18/21  1230 10/17/21  0606   ALT 47  --   --    *  --   --    TBILI 0.8  --   --    TP 6.1*  --   --    ALB 1.8*  1.8* 1.8* 1.9*   GLOB 4.3*  --   --      No results for input(s): INR, PTP, APTT, INREXT, INREXT in the last 72 hours. No results for input(s): FE, TIBC, PSAT, FERR in the last 72 hours. Lab Results   Component Value Date/Time    Folate 12.5 10/02/2021 07:37 PM      No results for input(s): PH, PCO2, PO2 in the last 72 hours.   Recent Labs     10/19/21  0710        Lab Results   Component Value Date/Time    Cholesterol, total 81 10/03/2021 07:08 AM    HDL Cholesterol 25 10/03/2021 07:08 AM    LDL, calculated 37.6 10/03/2021 07:08 AM    Triglyceride 92 10/03/2021 07:08 AM    CHOL/HDL Ratio 3.2 10/03/2021 07:08 AM     Lab Results   Component Value Date/Time    Glucose (POC) 282 (H) 10/19/2021 11:05 AM    Glucose (POC) 383 (H) 10/19/2021 08:29 AM    Glucose (POC) 248 (H) 10/18/2021 09:28 PM    Glucose (POC) 308 (H) 10/18/2021 04:19 PM    Glucose (POC) 305 (H) 10/18/2021 11:06 AM     Lab Results   Component Value Date/Time    Color Kathie 10/11/2021 01:33 AM    Appearance Clear 10/11/2021 01:33 AM    Specific gravity 1.016 10/11/2021 01:33 AM    pH (UA) 5.0 10/11/2021 01:33 AM    Protein 30 (A) 10/11/2021 01:33 AM    Glucose Negative 10/11/2021 01:33 AM    Ketone Negative 10/11/2021 01:33 AM    Bilirubin Negative 10/11/2021 01:33 AM    Urobilinogen 4.0 (H) 10/11/2021 01:33 AM    Nitrites Negative 10/11/2021 01:33 AM    Leukocyte Esterase Negative 10/11/2021 01:33 AM    Bacteria Negative 10/11/2021 01:33 AM    WBC 0-4 10/11/2021 01:33 AM    RBC 0-5 10/11/2021 01:33 AM         Assessment:     Altered mental status  Metabolic encephalopathy  Sepsis  Sepsis with bacteremia with MRSA  Acute proctitis  Lactic acidosis  Leukocytosis  Chronic A.  Fib  Congestive heart failure  Hypertension  Cardiomyopathy ejection fraction 15 x 20%  Acute on chronic kidney disease  Type 2 diabetes  Static post PEG tube placement  Hyponatremia  Hypokalemia    Plan:             Patient on vancomycin ceftaroline and rifampin  Repeat ECHO  rule out endocarditis    Overall prognosis poor  Monitor renal function/sodium level  Patient is DNR  Repeat the labs    Discussed with the patient's daughter   She recommend her mother go to a [address] [place] [address] Road after discharge        Current Facility-Administered Medications:     DAPTOmycin (CUBICIN) 450 mg in 0.9% sodium chloride 50 mL IVPB, 6 mg/kg, IntraVENous, Q24H, Torie Mei MD, Last Rate: 100 mL/hr at 10/18/21 1423, 450 mg at 10/18/21 1423    aspirin chewable tablet 81 mg, 81 mg, Oral, DAILY, Ammy Dumont MD, 81 mg at 10/19/21 0948    HYDROmorphone (DILAUDID) syringe 0.5 mg, 0.5 mg, IntraVENous, Q6H PRN, Sourav Brooks MD    glucose chewable tablet 16 g, 4 Tablet, Oral, PRN, Sourav Brooks MD    dextrose (D50W) injection syrg 12.5-25 g, 25-50 mL, IntraVENous, PRN, Sourav So MD    glucagon (GLUCAGEN) injection 1 mg, 1 mg, IntraMUSCular, PRN, Bolivar Lebron I, MD    insulin glargine (LANTUS) injection 10 Units, 10 Units, SubCUTAneous, DAILY, Sourav So MD, 10 Units at 10/19/21 0948    hydrOXYzine (VISTARIL) injection 25 mg, 25 mg, IntraMUSCular, Q6H PRN, Ammy Dumont MD, 25 mg at 10/18/21 2349    traMADoL (ULTRAM) tablet 50 mg, 50 mg, Oral, Q6H PRN, Ammy Dumont MD, 50 mg at 10/19/21 0009    zinc oxide-white petrolatum 17-57 % topical paste, , Topical, TID, Ammy Dumont MD, Given at 10/19/21 0948    rifAMPin (RIFADIN) capsule 300 mg, 300 mg, Oral, DAILY, Torie Mei MD, 300 mg at 10/19/21 0948    cefTARoline (TEFLARO) 300 mg in 0.9% sodium chloride 50 mL IVPB, 300 mg, IntraVENous, Q12H, Torie Mei MD, Last Rate: 50 mL/hr at 10/19/21 0628, 300 mg at 10/19/21 0628    [Held by provider] furosemide (LASIX) tablet 40 mg, 40 mg, Oral, Q48H, Johnny Pena MD, 40 mg at 10/15/21 0712    atorvastatin (LIPITOR) tablet 40 mg, 40 mg, Oral, QHS, Ammy Dumont MD, 40 mg at 10/18/21 2354    carvediloL (COREG) tablet 6.25 mg, 6.25 mg, Oral, BID WITH MEALS, Ammy Dumont MD, 6.25 mg at 10/19/21 0948    prednisoLONE acetate (PRED FORTE) 1 % ophthalmic suspension 1 Drop, 1 Drop, Both Eyes, TID, Ammy Dumont MD, 1 Drop at 10/19/21 0949    insulin lispro (HUMALOG) injection, , SubCUTAneous, AC&HS, Ammy Dumont MD, 2 Units at 10/18/21 2353    glucose chewable tablet 16 g, 4 Tablet, Oral, PRN, Thelma Dumont MD    dextrose (D50W) injection syrg 12.5-25 g, 25-50 mL, IntraVENous, PRN, Thelma Dumont MD    glucagon (GLUCAGEN) injection 1 mg, 1 mg, IntraMUSCular, PRN, Thelma Dumont MD    acetaminophen (TYLENOL) tablet 650 mg, 650 mg, Oral, Q6H PRN **OR** acetaminophen (TYLENOL) suppository 650 mg, 650 mg, Rectal, Q6H PRN, Ammy Dumont MD    polyethylene glycol (MIRALAX) packet 17 g, 17 g, Oral, DAILY PRN, Ammy Dumont MD    ondansetron (ZOFRAN ODT) tablet 4 mg, 4 mg, Oral, Q8H PRN **OR** ondansetron [facility]) injection 4 mg, 4 mg, IntraVENous, Q6H PRN, Emma Quiles MD

## 2021-10-19 NOTE — PROGRESS NOTES
Problem: Diabetes Self-Management  Goal: *Disease process and treatment process  Description: Define diabetes and identify own type of diabetes; list 3 options for treating diabetes. Outcome: Progressing Towards Goal  Goal: *Incorporating nutritional management into lifestyle  Description: Describe effect of type, amount and timing of food on blood glucose; list 3 methods for planning meals. Outcome: Progressing Towards Goal  Goal: *Incorporating physical activity into lifestyle  Description: State effect of exercise on blood glucose levels. Outcome: Progressing Towards Goal  Goal: *Developing strategies to promote health/change behavior  Description: Define the ABC's of diabetes; identify appropriate screenings, schedule and personal plan for screenings. Outcome: Progressing Towards Goal  Goal: *Using medications safely  Description: State effect of diabetes medications on diabetes; name diabetes medication taking, action and side effects. Outcome: Progressing Towards Goal  Goal: *Monitoring blood glucose, interpreting and using results  Description: Identify recommended blood glucose targets  and personal targets. Outcome: Progressing Towards Goal  Goal: *Prevention, detection, treatment of acute complications  Description: List symptoms of hyper- and hypoglycemia; describe how to treat low blood sugar and actions for lowering  high blood glucose level. Outcome: Progressing Towards Goal  Goal: *Prevention, detection and treatment of chronic complications  Description: Define the natural course of diabetes and describe the relationship of blood glucose levels to long term complications of diabetes.   Outcome: Progressing Towards Goal  Goal: *Developing strategies to address psychosocial issues  Description: Describe feelings about living with diabetes; identify support needed and support network  Outcome: Progressing Towards Goal  Goal: *Insulin pump training  Outcome: Progressing Towards Goal  Goal: *Sick day guidelines  Outcome: Progressing Towards Goal  Goal: *Patient Specific Goal (EDIT GOAL, INSERT TEXT)  Outcome: Progressing Towards Goal     Problem: Patient Education: Go to Patient Education Activity  Goal: Patient/Family Education  Outcome: Progressing Towards Goal     Problem: Falls - Risk of  Goal: *Absence of Falls  Description: Document Moosic Fall Risk and appropriate interventions in the flowsheet. Outcome: Progressing Towards Goal  Note: Fall Risk Interventions:  Mobility Interventions: Bed/chair exit alarm, PT Consult for mobility concerns, Strengthening exercises (ROM-active/passive), Utilize walker, cane, or other assistive device    Mentation Interventions: Adequate sleep, hydration, pain control, Bed/chair exit alarm, Reorient patient, Room close to nurse's station    Medication Interventions: Bed/chair exit alarm    Elimination Interventions: Bed/chair exit alarm    History of Falls Interventions: Bed/chair exit alarm         Problem: Patient Education: Go to Patient Education Activity  Goal: Patient/Family Education  Outcome: Progressing Towards Goal     Problem: Risk for Spread of Infection  Goal: Prevent transmission of infectious organism to others  Description: Prevent the transmission of infectious organisms to other patients, staff members, and visitors. Outcome: Progressing Towards Goal     Problem: Patient Education:  Go to Education Activity  Goal: Patient/Family Education  Outcome: Progressing Towards Goal     Problem: Pressure Injury - Risk of  Goal: *Prevention of pressure injury  Description: Document Marshall Scale and appropriate interventions in the flowsheet. Outcome: Progressing Towards Goal  Note: Pressure Injury Interventions:  Sensory Interventions: Float heels, Keep linens dry and wrinkle-free, Maintain/enhance activity level, Minimize linen layers, Turn and reposition approx.  every two hours (pillows and wedges if needed), Assess need for specialty bed    Moisture Interventions: Absorbent underpads, Apply protective barrier, creams and emollients, Check for incontinence Q2 hours and as needed, Internal/External urinary devices, Minimize layers, Moisture barrier    Activity Interventions: PT/OT evaluation, Assess need for specialty bed    Mobility Interventions: Float heels, HOB 30 degrees or less, PT/OT evaluation, Turn and reposition approx.  every two hours(pillow and wedges)    Nutrition Interventions: Document food/fluid/supplement intake, Offer support with meals,snacks and hydration    Friction and Shear Interventions: Apply protective barrier, creams and emollients, Minimize layers                Problem: Patient Education: Go to Patient Education Activity  Goal: Patient/Family Education  Outcome: Progressing Towards Goal     Problem: Nutrition Deficit  Goal: *Optimize nutritional status  Outcome: Progressing Towards Goal

## 2021-10-20 NOTE — PROGRESS NOTES
OT eval order received and acknowledged. OT eval attempts have been made 3 times 3 days in a row with pt remaining inappropriate for therapy for all 3 attempts and unable to follow commands in order to actively participate with therapy. At this time, will D/C pt from skilled OT services after 3rd attempt. Please re-consult if pt status changes and pt is able to actively participate with therapy. Recommend LTC at this time. Thank you.

## 2021-10-20 NOTE — PROGRESS NOTES
PT eval order received and acknowledged. PT eval attempted at 1418 however pt is more alert this session but continues to not following commands making mobility at this time unsafe. Will continue to follow patient and attempt PT eval at a later time. Thank you.

## 2021-10-20 NOTE — PROGRESS NOTES
Renal Note    NAME:  Ольга Rodriguez   :   1955   MRN:   [id number]     ATTENDING: Darnell Mensah MD  PCP:  Zohreh Nguyen MD    Date/Time:  10/20/2021 1:00 PM      Subjective:     Pt seen in room. Na 148  Creatinine has improved to 1.5. Past Medical History:   Diagnosis Date    [facility] [facility] [facility]     Cardiomyopathy, idiopathic ([facility] [address] 10/28/2010    Chronic kidney disease     Heart failure ([facility] [address] [facility]     HTN (hypertension), benign 10/28/2010    Pacemaker     ICD    PVC (premature ventricular contraction) 10/28/2010      Past Surgical History:   Procedure Laterality Date    HX OTHER SURGICAL  10/02/2018    Right eye surgery    HX PACEMAKER       Social History     Tobacco Use    Smoking status: Never Smoker    Smokeless tobacco: Never Used   Substance Use Topics    Alcohol use: No      History reviewed. No pertinent family history. Allergies   Allergen Reactions    Chocolate [Cocoa] Anaphylaxis and Swelling    Iodine Anaphylaxis    Lisinopril Anaphylaxis    Peanut Anaphylaxis and Swelling    Glimepiride Swelling    Glipizide Other (comments)     Pruritis    Metformin Diarrhea    Norvasc [Amlodipine] Other (comments)     Light Headed    Pneumovax 23 [Pneumococcal 23-Devi Ps Vaccine] Hives    Toprol Xl [Metoprolol Succinate] Other (comments)     Light headed    Tositumomab I-131 (Maltose) Unable to Obtain    Vitamin D [Cholecalciferol (Vitamin D3)] Other (comments)     Dizziness/headache not a true allergy. Prior to Admission medications    Medication Sig Start Date End Date Taking? Authorizing Provider   aspirin delayed-release 81 mg tablet Take 1 Tablet by mouth daily. 10/5/21   Leonard Wood MD   carvediloL (COREG) 6.25 mg tablet Take 1 Tablet by mouth two (2) times daily (with meals). 21   Carmenza Bolton MD   oxyCODONE IR (ROXICODONE) 5 mg immediate release tablet Take 5 mg by mouth every four (4) hours as needed for Pain.  21   Provider, Historical   cyclobenzaprine (FLEXERIL) 5 mg tablet Take 5 mg by mouth two (2) times daily as needed for Muscle Spasm(s). 7/19/21   Provider, Historical   amitriptyline (ELAVIL) 75 mg tablet Take 75 mg by mouth daily. 5/3/21   Provider, Historical   prednisoLONE acetate (PRED FORTE) 1 % ophthalmic suspension INSTILL 1 DROP INTO RIGHT EYE 4 TIMES A DAY 3/13/21   Provider, Historical   atorvastatin (LIPITOR) 40 mg tablet Take 1 Tab by mouth nightly. 2/19/21   Thelma Dumont MD   furosemide (Lasix) 40 mg tablet 1 tablet daily  Patient taking differently: Take 40 mg by mouth daily. 1 tablet daily 2/19/21   Denisa Lowery MD   metFORMIN (GLUCOPHAGE) 1,000 mg tablet two (2) times daily (with meals). 10/12/20   Provider, Historical   gabapentin (NEURONTIN) 100 mg capsule TAKE 1 CAPSULE BY MOUTH THREE TIMES A DAY 10/27/20   Provider, Historical   metoprolol succinate (TOPROL-XL) 25 mg XL tablet TAKE 0.5 TABS BY MOUTH DAILY. 12/16/19   Maria Fernanda Rockwell NP   ELIQUIS 5 mg tablet TAKE 1 TABLET BY MOUTH TWICE A DAY 12/5/19   Maria Fernanda Rockwell NP   nitroglycerin (NITROSTAT) 0.4 mg SL tablet 0.4 mg by SubLINGual route every five (5) minutes as needed for Chest Pain. Up to 3 doses. Patient not taking: Reported on 8/3/2021    Provider, Historical   cloNIDine HCL (CATAPRES) 0.2 mg tablet Take 0.2 mg by mouth two (2) times a day. 5/21/18   Provider, Historical   losartan (COZAAR) 100 mg tablet Take 100 mg by mouth daily. 4/25/17   Provider, Historical   PARoxetine (PAXIL) 20 mg tablet Take 20 mg by mouth daily. Provider, Historical   omeprazole (PRILOSEC) 20 mg capsule Take 20 mg by mouth daily.  10/28/10   Provider, Historical       REVIEW OF SYSTEMS:       Unable to obtain because of patient's mentation      Objective:   VITALS:    Visit Vitals  /84 (BP 1 Location: Right upper arm, BP Patient Position: At rest)   Pulse 62   Temp (!) 96.5 °F (35.8 °C)   Resp 17   Ht 5' 6\" (1.676 m)   Wt 76.8 kg (169 lb 5 oz)   SpO2 93% Breastfeeding No   BMI 27.33 kg/m²     Temp (24hrs), Av.8 °F (36.6 °C), Min:96.5 °F (35.8 °C), Max:98.7 °F (37.1 °C)      PHYSICAL EXAM:     General: NAD, awake. Lethargic  Eyes: sclera anicteric  Oral Cavity: No thrush or ulcers  Neck: no JVD  Chest: Fair bilateral air entry. No Wheezing or Rhonchi. No rales. Heart: normal sounds  Abdomen: soft and non tender   :  escobedo  Lower Extremities: no edema  Skin: no rash  Neuro: She is lethargic.   Withdraws to pain  Psychiatric: Unable to assess        LAB DATA REVIEWED:    Recent Results (from the past 24 hour(s))   GLUCOSE, POC    Collection Time: 10/19/21  4:50 PM   Result Value Ref Range    Glucose (POC) 206 (H) 65 - 117 mg/dL    Performed by Nash Barron    GLUCOSE, POC    Collection Time: 10/20/21 12:28 AM   Result Value Ref Range    Glucose (POC) 142 (H) 65 - 117 mg/dL    Performed by Michelle Dubose (RUTHANNN)    GLUCOSE, POC    Collection Time: 10/20/21  8:55 AM   Result Value Ref Range    Glucose (POC) 163 (H) 65 - 117 mg/dL    Performed by Peter Browning    METABOLIC PANEL, BASIC    Collection Time: 10/20/21  9:06 AM   Result Value Ref Range    Sodium 148 (H) 136 - 145 mmol/L    Potassium 4.3 3.5 - 5.1 mmol/L    Chloride 114 (H) 97 - 108 mmol/L    CO2 23 21 - 32 mmol/L    Anion gap 11 5 - 15 mmol/L    Glucose 208 (H) 65 - 100 mg/dL    BUN 63 (H) 6 - 20 mg/dL    Creatinine 1.54 (H) 0.55 - 1.02 mg/dL    BUN/Creatinine ratio 41 (H) 12 - 20      GFR est AA 41 (L) >60 ml/min/1.73m2    GFR est non-AA 34 (L) >60 ml/min/1.73m2    Calcium 8.3 (L) 8.5 - 10.1 mg/dL   GLUCOSE, POC    Collection Time: 10/20/21 11:18 AM   Result Value Ref Range    Glucose (POC) 222 (H) 65 - 117 mg/dL    Performed by Peter Brownnig        Recommendations/Plan:     1 acute kidney injury on chronic kidney disease stage III:  -likely prerenal secondary to sepsis/possible cardiorenal syndrome  -She presented with a creatinine of 2.5   -Creatinine increased to 2.7.  improved to 1.5  -Recently discharged with creatinine 1.6 on 10/6  -clinically she has no volume overload. No edema in both legs. -Urine is bland  -CT abdomen without contrast on 10/11 shows no renal obstruction.    -Not on any potential nephrotoxins preadmission  -off IVF      2  MRSA bacteremia:  -acute right parotitis with parapharyngeal and retropharyngeal  involvement per CT scan  -She had LP on admission which was negative  -Urine is bland. Covid negative. No infiltrate on chest x-ray  -She has been started on vancomycin and Ceftaroline and Rifampicin. -ID is on the case      3 Hypernatremia:  -Sodium has improved 150--> 148.  -will c/w 300 mL every 3 hours.    -Also will continue to hold Lasix    4 CHF:  -She has history of cardiomyopathy with EF less than 15%  -Chest x-ray shows mild pulmonary congestion. BNP 15,000  -on Lasix 40 mg IV every 48 hrs which I will hold and monitor urine output    5 altered mentation:  -Could be secondary to metabolic derangements from sepsis.    -Unsure of her baseline mentation  -CT head was negative on admission    6  Hypercalcemia  -PTH is 85.   Corrected calcium is 10.3  -Phosphorus is okay at 5.1            ________________________________________________________________________  Signed: Catie Interiano MD

## 2021-10-20 NOTE — PROGRESS NOTES
[facility]   HD 12  Vanc, ceftaroline, rifampin    Comfortable    Visit Vitals  /84 (BP 1 Location: Right upper arm, BP Patient Position: At rest)   Pulse 62   Temp (!) 96.5 °F (35.8 °C)   Resp 17   Ht 5' 6\" (1.676 m)   Wt 169 lb 5 oz (76.8 kg)   SpO2 93%   Breastfeeding No   BMI 27.33 kg/m²     Breathing comfortably  Right facial swelling improved but still with firm parotid overall   No skin changes  No fluctuance  Oral mucosa slightly more moist than prior exams    Results for Tiffany Schumacher (MRN [id number]) as of 10/20/2021 14:39   Ref. Range 10/15/2021 05:10 10/17/2021 11:32 10/18/2021 12:30 10/19/2021 07:10   WBC Latest Ref Range: 3.6 - 11.0 K/uL 20.4 (H) 16.4 (H) 17.7 (H) 16.6 (H)       Blood cx MRSA 10/11  Blood cx MRSA 10/13  Blood cx NG 10/18     CT ST neck  IMPRESSION  Persistent swelling of the right parotid gland although slightly  less prominent. Resolving cellulitic changes in the right neck. Decrease in the  retropharyngeal and right parapharyngeal edema.     Follow-up in 2 weeks preferably with intravenous contrast (if there are no  contraindications) is suggested to exclude associated neoplastic process of the  right parotid gland.       A/P   R parotitis   AMS  - WBC trending down finally  - ADELINA improving as well   - Cont warm compresses  - Oral mucosa appears slightly more hydrated   - Cont oral care and compresses please as patient allows   - Hydrate as medically tolerated (CHF)   - Cont abx per ID        MD Kerline Martinez [facility]  [address] [place] [address] 6  [facility]  Office Phone: [phone]

## 2021-10-20 NOTE — PROGRESS NOTES
General Daily Progress Note          Patient Name:   Kadie Stanley       YOB: 1955       Age:  78 y.o.       Admit Date: 10/11/2021      Subjective:     Patient is a 78y.o. year old female history of chronic A. fib chronic kidney disease hypertension pacemaker congestive heart failure with ejection fraction of 15 to 20% history of diabetes hypertension patient have a Covid few months ago since then but he declined patient was recently discharged from the hospital with congestive heart failure came back with altered mental status not awake not talking seen by the ER physician CT scan of the head was negative  Patient had WBC count elevated increased lactic acid concern of sepsis sepsis patient was LP done in the ER which was normal patient received 2 g of Rocephin when I saw the patient was still was altered I ordered repeat lactic acid ordered CT scan of the maxillary facial area swelling of the right parotid area and admitted to ICU for further work-up discussed with the patient daughter     According to the patient daughter since she have a Covid in March April this year she is declining she is in the hospital third time this month        Patient awake not much verbal/restless  Static post PEG tube placement    MRSA bacteremia             Objective:     Visit Vitals  /84 (BP 1 Location: Right upper arm, BP Patient Position: At rest)   Pulse 62   Temp (!) 96.5 °F (35.8 °C)   Resp 17   Ht 5' 6\" (1.676 m)   Wt 76.8 kg (169 lb 5 oz)   SpO2 90%   Breastfeeding No   BMI 27.33 kg/m²        Recent Results (from the past 24 hour(s))   GLUCOSE, POC    Collection Time: 10/19/21 11:05 AM   Result Value Ref Range    Glucose (POC) 282 (H) 65 - 117 mg/dL    Performed by [address], POC    Collection Time: 10/19/21  4:50 PM   Result Value Ref Range    Glucose (POC) 206 (H) 65 - 117 mg/dL    Performed by Denise Pires    GLUCOSE, POC    Collection Time: 10/20/21 12:28 AM   Result Value Ref Range    Glucose (POC) 142 (H) 65 - 117 mg/dL    Performed by Denise Frost (BRODY)    GLUCOSE, POC    Collection Time: 10/20/21  8:55 AM   Result Value Ref Range    Glucose (POC) 163 (H) 65 - 117 mg/dL    Performed by Edwin Muhammad      [unfilled]      Review of Systems    Unable to obtain e. Physical Exam:      Constitutional: Not much verbal HENT:   Head: Normocephalic and atraumatic. Eyes: Pupils are equal, round, and reactive to light. EOM are normal.   Cardiovascular: Normal rate, regular rhythm and normal heart sounds. Pulmonary/Chest: Breath sounds normal. No wheezes. No rales. Exhibits no tenderness. Abdominal: Soft. Bowel sounds are normal. There is no abdominal tenderness. There is no rebound and no guarding. Musculoskeletal: Normal range of motion. Neurological: Moves extremities    CT NECK SOFT TISSUE WO CONT   Final Result   Persistent swelling of the right parotid gland although slightly   less prominent. Resolving cellulitic changes in the right neck. Decrease in the   retropharyngeal and right parapharyngeal edema. Follow-up in 2 weeks preferably with intravenous contrast (if there are no   contraindications) is suggested to exclude associated neoplastic process of the   right parotid gland. CT MAXILLOFACIAL WO CONT   Final Result   Findings more consistent with cellulitis of the right face that   includes the right parotid gland (acute parotitis) as described above. Edema/fluid collection in the parapharyngeal space and retropharyngeal space. Suggestions as above. CT ABD PELV WO CONT   Final Result   Small amount of ascites. No focal fluid collection or free air. Density in the gallbladder which could be artifact, sludge or gallstones. No   specific CT evidence of cholecystitis. Bladder wall prominence. Other findings   as above. CT HEAD WO CONT   Final Result   1. No evidence of acute intracranial hemorrhage or mass effect.    2.  Multifocal intracranial encephalomalacia as above. XR CHEST PORT   Final Result   1. Mild pulmonary vascular congestion. 2.  Cardiomegaly. Recent Results (from the past 24 hour(s))   GLUCOSE, POC    Collection Time: 10/19/21 11:05 AM   Result Value Ref Range    Glucose (POC) 282 (H) 65 - 117 mg/dL    Performed by [address], POC    Collection Time: 10/19/21  4:50 PM   Result Value Ref Range    Glucose (POC) 206 (H) 65 - 117 mg/dL    Performed by Flower Michele    GLUCOSE, POC    Collection Time: 10/20/21 12:28 AM   Result Value Ref Range    Glucose (POC) 142 (H) 65 - 117 mg/dL    Performed by Heike Watts (LPN)    GLUCOSE, POC    Collection Time: 10/20/21  8:55 AM   Result Value Ref Range    Glucose (POC) 163 (H) 65 - 117 mg/dL    Performed by Salima Mahoney        Results     Procedure Component Value Units Date/Time    CULTURE, BLOOD [[id number]] Collected: 10/18/21 1230    Order Status: Completed Specimen: Blood Updated: 10/20/21 0800     Special Requests: No Special Requests        Culture result: No growth 2 days       CULTURE, BLOOD [[id number]]  (Abnormal) Collected: 10/15/21 1650    Order Status: Completed Specimen: Blood Updated: 10/18/21 1335     Special Requests: No Special Requests        Culture result:       * methicillin resistant staphylococcus aureus * GROWING IN THE ANAEROBIC BOTTLE (NO SITE)            *KNOWN MRSA PATIENT            Please refer to previous blood culture  [id number] COLLECTED 10/12/21      CULTURE, BLOOD [[id number]]  (Abnormal) Collected: 10/13/21 0500    Order Status: Completed Specimen: Blood Updated: 10/15/21 1247     Special Requests: No Special Requests        Culture result:       Gram Positive Cocci in clusters growing in both bottles drawn [facility] ([address]) ON 10/14/21 AT 1157.  HH                  * methicillin resistant staphylococcus aureus * growing in both bottles drawn (SITE NOT INDICATED) REFER TO [id number] FOR SENSITIVITIES            (NOTE) KNOWN MRSA PATIENT    CULTURE, Dorothe David STAIN [[id number]] Collected: 10/12/21 0945    Order Status: Canceled Specimen: Wound from Mouth     MRSA SCREEN - PCR (NASAL) [[id number]]  (Abnormal) Collected: 10/11/21 1025    Order Status: Completed Specimen: Swab Updated: 10/11/21 1147     MRSA by PCR, Nasal DETECTED        Comment: Results verified, phoned to and read back by Rocío Quach 10/11/21 11:50       MRSA SCREEN - PCR (NASAL) [[id number]] Collected: 10/11/21 0945    Order Status: Canceled Specimen: Swab     COVID-19 RAPID TEST [[id number]] Collected: 10/11/21 0930    Order Status: Completed Specimen: Nasopharyngeal Updated: 10/11/21 1104     Specimen source       Please find results under separate order           COVID-19 rapid test Not Detected        Comment: Rapid Abbott ID Now   Rapid NAAT:  The specimen is NEGATIVE for SARS-CoV-2, the novel coronavirus associated with COVID-19. Negative results should be treated as presumptive and, if inconsistent with clinical signs and symptoms or necessary for patient management, should be tested with an alternative molecular assay. Negative results do not preclude SARS-CoV-2 infection and should not be used as the sole basis for patient management decisions. This test has been authorized by the FDA under   an Emergency Use Authorization (EUA) for use by authorized laboratories.  Fact sheet for Healthcare Providers: ConventionUpdate.co.nz Fact sheet for Patients: ConventionUpdate.co.nz   Methodology: Isothermal Nucleic Acid Amplification         CULTURE, BLOOD, LINE [[id number]] Collected: 10/11/21 0930    Order Status: Canceled Specimen: Blood     CULTURE, URINE [[id number]] Collected: 10/11/21 0930    Order Status: Completed Specimen: Urine Updated: 10/12/21 1049     Special Requests: No Special Requests        Culture result: No Growth (<1000 cfu/mL)       CULTURE, CSF  TUBE 2 [[id number]] Collected: 10/11/21 0504    Order Status: Completed Specimen: Cerebrospinal Fluid Updated: 10/18/21 1450     Special Requests: No Special Requests        GRAM STAIN No wbc's seen         No organisms seen        Culture result:       no growth on solid media at 4 days                  no growth in Thio broth at 7 days          CULTURE, BLOOD, PAIRED [[id number]]  (Abnormal)  (Susceptibility) Collected: 10/11/21 0225    Order Status: Completed Specimen: Blood Updated: 10/15/21 0921     Special Requests: No Special Requests        Culture result:       * methicillin resistant staphylococcus aureus * growing in all 4 bottles drawn (SITES NOT INDICATED)          Susceptibility      Staphylococcus aureus Methcillin Resistant      NIMA      Ciprofloxacin ($) Susceptible      Clindamycin ($) Susceptible      Daptomycin ($$$$$) Susceptible      Doxycycline ($$) Susceptible      Erythromycin ($$$$) Resistant      Gentamicin ($) Susceptible      Levofloxacin ($) Susceptible      Linezolid ($$$$$) Susceptible      Oxacillin Resistant      Rifampin ($$$$) Susceptible  [1]       Tetracycline Susceptible      Trimeth/Sulfa Susceptible      Vancomycin ($) Susceptible              [1]  Rifampin is not to be used for mono-therapy. [facility]                          Labs:     Recent Labs     10/19/21  0710 10/18/21  1230   WBC 16.6* 17.7*   HGB 13.4 13.0   HCT 43.9 40.0   PLT 67* 68*     Recent Labs     10/19/21  0710 10/18/21  1230   *  147* 148*   K 4.2  4.2 4.1   *  114* 115*   CO2 21  23 22   BUN 65*  65* 64*   CREA 1.70*  1.67* 1.71*   *  320* 279*   CA 8.4*  8.4* 8.4*   PHOS 3.2 3.0     Recent Labs     10/19/21  0710 10/18/21  1230   ALT 47  --    *  --    TBILI 0.8  --    TP 6.1*  --    ALB 1.8*  1.8* 1.8*   GLOB 4.3*  --      No results for input(s): INR, PTP, APTT, INREXT, INREXT in the last 72 hours. No results for input(s): FE, TIBC, PSAT, FERR in the last 72 hours.    Lab Results   Component Value Date/Time    Folate 12.5 10/02/2021 07:37 PM      No results for input(s): PH, PCO2, PO2 in the last 72 hours. Recent Labs     10/19/21  0710        Lab Results   Component Value Date/Time    Cholesterol, total 81 10/03/2021 07:08 AM    HDL Cholesterol 25 10/03/2021 07:08 AM    LDL, calculated 37.6 10/03/2021 07:08 AM    Triglyceride 92 10/03/2021 07:08 AM    CHOL/HDL Ratio 3.2 10/03/2021 07:08 AM     Lab Results   Component Value Date/Time    Glucose (POC) 163 (H) 10/20/2021 08:55 AM    Glucose (POC) 142 (H) 10/20/2021 12:28 AM    Glucose (POC) 206 (H) 10/19/2021 04:50 PM    Glucose (POC) 282 (H) 10/19/2021 11:05 AM    Glucose (POC) 383 (H) 10/19/2021 08:29 AM     Lab Results   Component Value Date/Time    Color Kathie 10/11/2021 01:33 AM    Appearance Clear 10/11/2021 01:33 AM    Specific gravity 1.016 10/11/2021 01:33 AM    pH (UA) 5.0 10/11/2021 01:33 AM    Protein 30 (A) 10/11/2021 01:33 AM    Glucose Negative 10/11/2021 01:33 AM    Ketone Negative 10/11/2021 01:33 AM    Bilirubin Negative 10/11/2021 01:33 AM    Urobilinogen 4.0 (H) 10/11/2021 01:33 AM    Nitrites Negative 10/11/2021 01:33 AM    Leukocyte Esterase Negative 10/11/2021 01:33 AM    Bacteria Negative 10/11/2021 01:33 AM    WBC 0-4 10/11/2021 01:33 AM    RBC 0-5 10/11/2021 01:33 AM         Assessment:     Altered mental status  Metabolic encephalopathy  Sepsis  Sepsis with bacteremia with MRSA  Acute proctitis  Lactic acidosis  Leukocytosis  Chronic A. Fib  Congestive heart failure  Hypertension  Cardiomyopathy ejection fraction 15 x 20%  Acute on chronic kidney disease  Type 2 diabetes  Static post PEG tube placement  Hyponatremia  Hypokalemia    Plan:     Repeat CT   Persistent swelling of the right parotid gland although slightly less prominent. Resolving cellulitic changes in the right neck.  Decrease in the retropharyngeal and right parapharyngeal edema      Patient on daptomycin ceftaroline and rifampin  Repeat ECHO  rule out endocarditis    Overall prognosis poor  Monitor renal function/sodium level  Patient is DNR  Repeat the labs    Discussed with the patient's daughter   She recommend her mother go to a [facility] after discharge        Current Facility-Administered Medications:     DAPTOmycin (CUBICIN) 450 mg in 0.9% sodium chloride 50 mL IVPB, 6 mg/kg, IntraVENous, Q24H, Torie Mei MD, Last Rate: 100 mL/hr at 10/19/21 1346, 450 mg at 10/19/21 1346    aspirin chewable tablet 81 mg, 81 mg, Oral, DAILY, Ammy Dumont MD, 81 mg at 10/19/21 0948    glucose chewable tablet 16 g, 4 Tablet, Oral, PRN, Estevan DOLAN MD    dextrose (D50W) injection syrg 12.5-25 g, 25-50 mL, IntraVENous, PRN, Sourav So MD    glucagon (GLUCAGEN) injection 1 mg, 1 mg, IntraMUSCular, PRN, Sundeep Chacon MD    insulin glargine (LANTUS) injection 10 Units, 10 Units, SubCUTAneous, DAILY, Sourav So MD, 10 Units at 10/19/21 0948    hydrOXYzine (VISTARIL) injection 25 mg, 25 mg, IntraMUSCular, Q6H PRN, Emma Quiles MD, 25 mg at 10/18/21 2349    traMADoL (ULTRAM) tablet 50 mg, 50 mg, Oral, Q6H PRN, Emma Quiles MD, 50 mg at 10/20/21 0553    zinc oxide-white petrolatum 17-57 % topical paste, , Topical, TID, Ammy Dumont MD, Given at 10/19/21 2330    rifAMPin (RIFADIN) capsule 300 mg, 300 mg, Oral, DAILY, Torie Mei MD, 300 mg at 10/19/21 0948    cefTARoline (TEFLARO) 300 mg in 0.9% sodium chloride 50 mL IVPB, 300 mg, IntraVENous, Q12H, Torie Mei MD, Last Rate: 50 mL/hr at 10/20/21 0600, 300 mg at 10/20/21 0600    [Held by provider] furosemide (LASIX) tablet 40 mg, 40 mg, Oral, Q48H, Alexys Jacobson MD, 40 mg at 10/15/21 1062    atorvastatin (LIPITOR) tablet 40 mg, 40 mg, Oral, QHS, Ammy Dumont MD, 40 mg at 10/20/21 0023    carvediloL (COREG) tablet 6.25 mg, 6.25 mg, Oral, BID WITH MEALS, Ammy Dumont MD, 6.25 mg at 10/19/21 1726    prednisoLONE acetate (PRED FORTE) 1 % ophthalmic suspension 1 Drop, 1 Drop, Both Eyes, TID, Ammy Dumont MD, 1 Drop at 10/20/21 0044    insulin lispro (HUMALOG) injection, , SubCUTAneous, AC&HS, Ammy Dumont MD, 4 Units at 10/19/21 1725    glucose chewable tablet 16 g, 4 Tablet, Oral, PRN, Stephen Dumont MD    dextrose (D50W) injection syrg 12.5-25 g, 25-50 mL, IntraVENous, PRN, Stephen Dumont MD    glucagon (GLUCAGEN) injection 1 mg, 1 mg, IntraMUSCular, PRN, Stephen Dumont MD    acetaminophen (TYLENOL) tablet 650 mg, 650 mg, Oral, Q6H PRN **OR** acetaminophen (TYLENOL) suppository 650 mg, 650 mg, Rectal, Q6H PRN, Stephen Dumont MD    polyethylene glycol (MIRALAX) packet 17 g, 17 g, Oral, DAILY PRN, Ammy Dumont MD    ondansetron (ZOFRAN ODT) tablet 4 mg, 4 mg, Oral, Q8H PRN **OR** ondansetron (ZOFRAN) injection 4 mg, 4 mg, IntraVENous, Q6H PRN, Emma Quiles MD

## 2021-10-20 NOTE — PROGRESS NOTES
Infectious Disease Progress Note           Subjective:   Pt seen and examined at bedside. Doing well denies new complaints, no acute events since last seen    Objective:   Physical Exam:     Visit Vitals  /84 (BP 1 Location: Right upper arm, BP Patient Position: At rest)   Pulse 62   Temp (!) 96.5 °F (35.8 °C)   Resp 17   Ht 5' 6\" (1.676 m)   Wt 169 lb 5 oz (76.8 kg)   SpO2 93%   Breastfeeding No   BMI 27.33 kg/m²    O2 Flow Rate (L/min): 2 l/min (will not keep nasal canula on) O2 Device: None (Room air)    Temp (24hrs), Av.8 °F (36.6 °C), Min:96.5 °F (35.8 °C), Max:98.7 °F (37.1 °C)    No intake/output data recorded.    10/18 190 - 10/20 0700  In: 2821 [I.V.:150]  Out: 300 [Urine:300]    General: NAD, alert,non-verbal   HEENT: SHARON, Moist mucosa, decreasing size of right facial swelling   Lungs: CTA b/l, decreased at the bases, no wheeze/rhonchi   Heart: S1S2+, RRR, no murmur  Abdo: Soft, NT, ND, +BS, peg tube in place   : + escobedo cath   Exts: no edema  + pulses b/l   Skin: No wounds, No rashes or lesions      Data Review:       Recent Days:  Recent Labs     10/19/21  0710 10/18/21  1230   WBC 16.6* 17.7*   HGB 13.4 13.0   HCT 43.9 40.0   PLT 67* 68*     Recent Labs     10/20/21  0906 10/19/21  0710 10/18/21  1230   BUN 63* 65*  65* 64*   CREA 1.54* 1.70*  1.67* 1.71*       No results found for: CRPQN, CRP, CRPM       Microbiology     Results     Procedure Component Value Units Date/Time    CULTURE, BLOOD [[id number]] Collected: 10/18/21 1230    Order Status: Completed Specimen: Blood Updated: 10/20/21 08     Special Requests: No Special Requests        Culture result: No growth 2 days       CULTURE, BLOOD [[id number]]  (Abnormal) Collected: 10/15/21 1650    Order Status: Completed Specimen: Blood Updated: 10/18/21 1335     Special Requests: No Special Requests        Culture result:       * methicillin resistant staphylococcus aureus * GROWING IN THE ANAEROBIC BOTTLE (NO SITE) *KNOWN MRSA PATIENT            Please refer to previous blood culture  [id number] COLLECTED 10/12/21      CULTURE, BLOOD [[id number]]  (Abnormal) Collected: 10/13/21 0500    Order Status: Completed Specimen: Blood Updated: 10/15/21 1247     Special Requests: No Special Requests        Culture result:       Gram Positive Cocci in clusters growing in both bottles drawn [facility]) ON 10/14/21 AT 1157. HH                  * methicillin resistant staphylococcus aureus * growing in both bottles drawn (SITE NOT INDICATED) REFER TO [id number] FOR SENSITIVITIES            (NOTE) KNOWN MRSA PATIENT    CULTURE, Bernice Coral STAIN [[id number]] Collected: 10/12/21 0945    Order Status: Canceled Specimen: Wound from Mouth     MRSA SCREEN - PCR (NASAL) [[id number]]  (Abnormal) Collected: 10/11/21 1025    Order Status: Completed Specimen: Swab Updated: 10/11/21 1147     MRSA by PCR, Nasal DETECTED        Comment: Results verified, phoned to and read back by Paul Oro 10/11/21 11:50       MRSA SCREEN - PCR (NASAL) [[id number]] Collected: 10/11/21 0945    Order Status: Canceled Specimen: Swab     COVID-19 RAPID TEST [[id number]] Collected: 10/11/21 0930    Order Status: Completed Specimen: Nasopharyngeal Updated: 10/11/21 1104     Specimen source       Please find results under separate order           COVID-19 rapid test Not Detected        Comment: Rapid Abbott ID Now   Rapid NAAT:  The specimen is NEGATIVE for SARS-CoV-2, the novel coronavirus associated with COVID-19. Negative results should be treated as presumptive and, if inconsistent with clinical signs and symptoms or necessary for patient management, should be tested with an alternative molecular assay. Negative results do not preclude SARS-CoV-2 infection and should not be used as the sole basis for patient management decisions.    This test has been authorized by the FDA under   an Emergency Use Authorization (EUA) for use by authorized laboratories. Fact sheet for Healthcare Providers: kstattoo.com Fact sheet for Patients: kstattoo.com   Methodology: Isothermal Nucleic Acid Amplification         CULTURE, BLOOD, LINE [[id number]] Collected: 10/11/21 0930    Order Status: Canceled Specimen: Blood     CULTURE, URINE [[id number]] Collected: 10/11/21 0930    Order Status: Completed Specimen: Urine Updated: 10/12/21 1049     Special Requests: No Special Requests        Culture result: No Growth (<1000 cfu/mL)       CULTURE, CSF  TUBE 2 [[id number]] Collected: 10/11/21 0504    Order Status: Completed Specimen: Cerebrospinal Fluid Updated: 10/18/21 1450     Special Requests: No Special Requests        GRAM STAIN No wbc's seen         No organisms seen        Culture result:       no growth on solid media at 4 days                  no growth in Thio broth at 7 days          CULTURE, BLOOD, PAIRED [[id number]]  (Abnormal)  (Susceptibility) Collected: 10/11/21 0225    Order Status: Completed Specimen: Blood Updated: 10/15/21 0921     Special Requests: No Special Requests        Culture result:       * methicillin resistant staphylococcus aureus * growing in all 4 bottles drawn (SITES NOT INDICATED)          Susceptibility      Staphylococcus aureus Methcillin Resistant      NIMA      Ciprofloxacin ($) Susceptible      Clindamycin ($) Susceptible      Daptomycin ($$$$$) Susceptible      Doxycycline ($$) Susceptible      Erythromycin ($$$$) Resistant      Gentamicin ($) Susceptible      Levofloxacin ($) Susceptible      Linezolid ($$$$$) Susceptible      Oxacillin Resistant      Rifampin ($$$$) Susceptible  [1]       Tetracycline Susceptible      Trimeth/Sulfa Susceptible      Vancomycin ($) Susceptible              [1]  Rifampin is not to be used for mono-therapy.           [facility]                            Diagnostics   CXR Results  (Last 48 hours)    None           Assessment/Plan 1. MRSA bacteremia, persistent. BCx positive on 10/11, 10/13 and 10/15      Discussed w Dr Olga Kingston and he confirmed no evidence of valvular veg on 2 D echo 10/19, recs LETI if high index of suspicion        Repeat BCx from 10/18 is staying neg       Afebrile, routine labs not done today       On day # 3 of Daptomycin and # 7 of Rifampin/Ceftaroline       Routine labs ordered for AM. PICC line placement for out pt antibiotic if Cxs remain neg     2. Acute right parotitis, suppurative, w purulent drainage from stensens duct per ENT       Right facial swelling continues to improve         3. MRSA colonization: S/p 5 days of Mupirocin      4. Cardiomyopathy w baseline EF of <15 % (10/19)     5. Thrombocytopenia: Todays labs are pending      6.  Acute on chronic renal failure, Cr trending down on serial labs        Tg Childress MD    10/20/2021

## 2021-10-20 NOTE — PROGRESS NOTES
CM reviewed chart. Updates have been sent through [employer] to [address] [place] [address]. DC plan is [employer] and rehab.

## 2021-10-21 NOTE — PROGRESS NOTES
Comprehensive Nutrition Assessment    Type and Reason for Visit: Reassess (Interim)    Nutrition Recommendations/Plan:     Continue TF via PEG of Jevity 1.5 at 45mL/hr, continuous  Increase flushes to 150mL q4hrs or per Nephrology/MD  Providing 1620kcal (100%), 69g pro (120%), 1720mL fluid (108%)          Monitor renal fx with excess pro provision    If bolus feeds desired, goal of Jevity 1.5 at 270mL q6hrs  Flush 220mL q6hrs with each feed  May TIME feeds at 6AM, 12PM, 6PM, 12AM  Providing 1620kcal (100%), 69g pro (120%), 1700mL fluid (106%)    Increase/adjust insulin for goal of euglycemia   Document TF rate, water flushes, and GRVs in EMR      Nutrition Assessment:  Worsening AMS, renal fx pta. Increasing frequency of hospital admits over past few months- dx COVID ~3/2021. CT finding cellulitis to R face. Dx sepsis 2/2 R parotitis, MRSA bacteremia. Nephrology monitoring ADELINA on CKD III. Pt altered, unable to take PO or place NGT d/t concerns for neck and facial swelling. S/p PEG (10/13) TF initiated 10/14 of Jevity 1.5 at 30mL/hr, continuous, not advanced per RD recs, possible d/t renal status. Advanced to 50mL/hr by 10/15 and remains at this rate with good tolerance per RN, EMR. Per updated wts and stable on floor, TF adjusted 10/18-remains at new goal with good tolerance per RN and EMR. Will add bolus recs in preparation for d/c, may improve glycemic control. Admit 1x week prior to admit, pt with extremely poor intakes. Labs: Na 148, BUN 74, Cr 1.85, , POC . Meds: Statin, coreg, daptomycin, lantus, SSI, tramadol. Malnutrition Assessment:  Malnutrition Status: Moderate malnutrition    Context:  Acute illness       Estimated Daily Nutrient Needs:  Energy (kcal): 1600kcal (25kcal/kg Adj BW); Weight Used for Energy Requirements: Adjusted  Protein (g): 57g (0.9g/kg Adj BW); Weight Used for Protein Requirements: Adjusted  Fluid (ml/day): 1600mL;  Method Used for Fluid Requirements: 1 ml/kcal      Nutrition Related Findings:  NFPE findings mild muscle, fat wasting- limited 2/2 agitation. PEG to abd, infusing. No documented hx dysphagia, n/v, or c/d. BM 10/21 recorded. Swelling to lower face/ jaw noted. Wounds:    None       Current Nutrition Therapies:  DIET NPO  ADULT TUBE FEEDING PEG; Standard with Fiber; Delivery Method: Continuous; Continuous Initial Rate (mL/hr): 45; Continuous Advance Tube Feeding: No; Water Flush Volume (mL): 130; Water Flush Frequency: Q 4 hours    Anthropometric Measures:  · Height:  5' 6\" (167.6 cm)  · Current Body Wt:  77.2 kg (170 lb 3.1 oz) (10/17)   · Ideal Body Wt:  130 lbs:  130.9 %   · Adjusted Body Weight:   ; Weight Adjustment for:  (63.6kg)   · BMI Category: Overweight (BMI 25.0-29. 9)       Nutrition Diagnosis:   · Swallowing difficulty related to cognitive or neurological impairment (edema) as evidenced by NPO or clear liquid status due to medical condition, nutrition support-enteral nutrition      Nutrition Interventions:   Food and/or Nutrient Delivery: Continue NPO, Modify tube feeding  Nutrition Education and Counseling: No recommendations at this time  Coordination of Nutrition Care: Continue to monitor while inpatient    Goals:  Meet >75% est needs x 7 days, Lytes WNL, Wt maintenance +/- 0.5kg per week, Maintain skin integrity       Nutrition Monitoring and Evaluation:   Behavioral-Environmental Outcomes: None identified  Food/Nutrient Intake Outcomes: Enteral nutrition intake/tolerance  Physical Signs/Symptoms Outcomes: Biochemical data, Weight, Skin    Discharge Planning:    Enteral nutrition     Electronically signed by Mary Beltran on 10/21/2021 at 3:40 PM    Contact: [phone] or via qualifyor

## 2021-10-21 NOTE — DISCHARGE INSTRUCTIONS
Discharge Instructions       PATIENT ID: Hair Farr  MRN: [id number]   YOB: 1955    DATE OF ADMISSION: 10/11/2021 12:19 AM    DATE OF DISCHARGE: 10/21/2021    PRIMARY CARE PROVIDER: Rebekah Gates MD     ATTENDING PHYSICIAN: Carli Teixeira MD  DISCHARGING PROVIDER: Susan Patel MD    To contact this individual call [phone] and ask the  to page. If unavailable ask to be transferred the Adult Hospitalist Department. DISCHARGE DIAGNOSES sepsis    CONSULTATIONS: IP CONSULT TO NEUROLOGY  IP CONSULT TO INFECTIOUS DISEASES  IP CONSULT TO OTOLARYNGOLOGY  IP CONSULT TO GASTROENTEROLOGY  IP CONSULT TO NEPHROLOGY  IP CONSULT TO NEPHROLOGY  IP CONSULT TO CARDIOLOGY    PROCEDURES/SURGERIES: Procedure(s):  PERCUTANEOUS ENDOSCOPIC GASTROSTOMY TUBE INSERTION    PENDING TEST RESULTS:   At the time of discharge the following test results are still pending: Continue antibiotics    FOLLOW UP APPOINTMENTS:   Follow-up Information     Follow up With Specialties Details Why Denis Montenegro MD Family Medicine In 1 week  [address] [place] [address]  [phone]             ADDITIONAL CARE RECOMMENDATIONS: Continue IV antibiotics    DIET: Regular Diet      ACTIVITY: Activity as tolerated    Wound care: Wound Care Order: submitted to [facility] Please view [email or web address]    EQUIPMENT needed: None      DISCHARGE MEDICATIONS:   See Medication Reconciliation Form    · It is important that you take the medication exactly as they are prescribed. · Keep your medication in the bottles provided by the pharmacist and keep a list of the medication names, dosages, and times to be taken in your wallet. · Do not take other medications without consulting your doctor. NOTIFY YOUR PHYSICIAN FOR ANY OF THE FOLLOWING:   Fever over 101 degrees for 24 hours.    Chest pain, shortness of breath, fever, chills, nausea, vomiting, diarrhea, change in mentation, falling, weakness, bleeding. Severe pain or pain not relieved by medications. Or, any other signs or symptoms that you may have questions about. DISPOSITION:    Home With:   OT  PT  HH  RN       SNF/Inpatient Rehab/LTAC    Independent/assisted living    Hospice    Other:         PROBLEM LIST Updated:  Yes ***       Signed:   Nano Oneil MD  10/21/2021  10:37 AM     Discharge Instructions       PATIENT ID: Kailyn Aguilar  MRN: [id number]   YOB: 1955    DATE OF ADMISSION: 10/11/2021 12:19 AM    DATE OF DISCHARGE: 10/21/2021    PRIMARY CARE PROVIDER: Dominga Coronado MD     ATTENDING PHYSICIAN: Elina Little MD  DISCHARGING PROVIDER: Nano Oneil MD    To contact this individual call [phone] and ask the  to page. If unavailable ask to be transferred the Adult Hospitalist Department. DISCHARGE DIAGNOSES ***    CONSULTATIONS: IP CONSULT TO NEUROLOGY  IP CONSULT TO INFECTIOUS DISEASES  IP CONSULT TO OTOLARYNGOLOGY  IP CONSULT TO GASTROENTEROLOGY  IP CONSULT TO NEPHROLOGY  IP CONSULT TO NEPHROLOGY  IP CONSULT TO CARDIOLOGY    PROCEDURES/SURGERIES: Procedure(s):  PERCUTANEOUS ENDOSCOPIC GASTROSTOMY TUBE INSERTION    PENDING TEST RESULTS:   At the time of discharge the following test results are still pending: ***    FOLLOW UP APPOINTMENTS:   Follow-up Information     Follow up With Specialties Details Why Yves Andujar MD Family Medicine In 1 week  [address]  [phone]             ADDITIONAL CARE RECOMMENDATIONS: ***    DIET: {diet:99555}      ACTIVITY: {discharge activity:55762}    Wound care: {[facility] Wound Care Instructions:40636}    EQUIPMENT needed: ***      DISCHARGE MEDICATIONS:   See Medication Reconciliation Form    · It is important that you take the medication exactly as they are prescribed.    · Keep your medication in the bottles provided by the pharmacist and keep a list of the medication names, dosages, and times to be taken in your wallet. · Do not take other medications without consulting your doctor. NOTIFY YOUR PHYSICIAN FOR ANY OF THE FOLLOWING:   Fever over 101 degrees for 24 hours. Chest pain, shortness of breath, fever, chills, nausea, vomiting, diarrhea, change in mentation, falling, weakness, bleeding. Severe pain or pain not relieved by medications. Or, any other signs or symptoms that you may have questions about.       DISPOSITION:    Home With:   OT  PT  HH  RN       SNF/Inpatient Rehab/LTAC    Independent/assisted living    Hospice    Other:         PROBLEM LIST Updated:  Yes ***       Signed:   Opal Arrington MD  10/21/2021  10:38 AM

## 2021-10-21 NOTE — PROGRESS NOTES
Renal Note    NAME:  Marivel Lares   :   1955   MRN:   [id number]     ATTENDING: Yaquelin Guillen MD  PCP:  Danyel Garzon MD    Date/Time:  10/21/2021 1:00 PM      Subjective:     Pt seen in room. Na 148  Creatinine has improved to 1.5. Labs are pending     Past Medical History:   Diagnosis Date    [facility] [facility]     Cardiomyopathy, idiopathic ([facility] 10/28/2010    Chronic kidney disease     Heart failure ([facility]     HTN (hypertension), benign 10/28/2010    Pacemaker     ICD    PVC (premature ventricular contraction) 10/28/2010      Past Surgical History:   Procedure Laterality Date    HX OTHER SURGICAL  10/02/2018    Right eye surgery    HX PACEMAKER       Social History     Tobacco Use    Smoking status: Never Smoker    Smokeless tobacco: Never Used   Substance Use Topics    Alcohol use: No      History reviewed. No pertinent family history. Allergies   Allergen Reactions    Chocolate [Cocoa] Anaphylaxis and Swelling    Iodine Anaphylaxis    Lisinopril Anaphylaxis    Peanut Anaphylaxis and Swelling    Glimepiride Swelling    Glipizide Other (comments)     Pruritis    Metformin Diarrhea    Norvasc [Amlodipine] Other (comments)     Light Headed    Pneumovax 23 [Pneumococcal 23-Devi Ps Vaccine] Hives    Toprol Xl [Metoprolol Succinate] Other (comments)     Light headed    Tositumomab I-131 (Maltose) Unable to Obtain    Vitamin D [Cholecalciferol (Vitamin D3)] Other (comments)     Dizziness/headache not a true allergy. Prior to Admission medications    Medication Sig Start Date End Date Taking? Authorizing Provider   cefTARoline 300 mg IVPB 300 mg by IntraVENous route every twelve (12) hours every twelve (12) hours. 10/21/21  Yes Jennifer Dumont MD   DAPTOmycin (CUBICIN) IVPB 463.2 mg by IntraVENous route every twenty-four (24) hours.  10/21/21  Yes Chen Irizarry MD   insulin glargine (LANTUS) 100 unit/mL injection daily 10/22/21  Yes Chen Irizarry MD rifAMPin (RIFADIN) 300 mg capsule Take 1 Capsule by mouth daily for 7 doses. 10/22/21 10/29/21 Yes Samm Dumont MD   furosemide (Lasix) 20 mg tablet daily 10/23/21  Yes Prachi Rosenberg MD   aspirin delayed-release 81 mg tablet Take 1 Tablet by mouth daily. 10/5/21   Katy Herndon MD   carvediloL (COREG) 6.25 mg tablet Take 1 Tablet by mouth two (2) times daily (with meals). 8/5/21   Choco Bolton MD   oxyCODONE IR (ROXICODONE) 5 mg immediate release tablet Take 5 mg by mouth every four (4) hours as needed for Pain. 7/16/21   Provider, Historical   cyclobenzaprine (FLEXERIL) 5 mg tablet Take 5 mg by mouth two (2) times daily as needed for Muscle Spasm(s). 7/19/21   Provider, Historical   amitriptyline (ELAVIL) 75 mg tablet Take 75 mg by mouth daily. 5/3/21   Provider, Historical   prednisoLONE acetate (PRED FORTE) 1 % ophthalmic suspension INSTILL 1 DROP INTO RIGHT EYE 4 TIMES A DAY 3/13/21   Provider, Historical   atorvastatin (LIPITOR) 40 mg tablet Take 1 Tab by mouth nightly. 2/19/21   Samm Dumont MD   furosemide (Lasix) 40 mg tablet 1 tablet daily  Patient taking differently: Take 40 mg by mouth daily. 1 tablet daily 2/19/21   Prachi Rosenberg MD   metFORMIN (GLUCOPHAGE) 1,000 mg tablet two (2) times daily (with meals). 10/12/20   Provider, Historical   gabapentin (NEURONTIN) 100 mg capsule TAKE 1 CAPSULE BY MOUTH THREE TIMES A DAY 10/27/20   Provider, Historical   metoprolol succinate (TOPROL-XL) 25 mg XL tablet TAKE 0.5 TABS BY MOUTH DAILY. 12/16/19   Angel Lilly NP   ELIQUIS 5 mg tablet TAKE 1 TABLET BY MOUTH TWICE A DAY 12/5/19   Angel Lilly NP   nitroglycerin (NITROSTAT) 0.4 mg SL tablet 0.4 mg by SubLINGual route every five (5) minutes as needed for Chest Pain. Up to 3 doses. Patient not taking: Reported on 8/3/2021    Provider, Historical   cloNIDine HCL (CATAPRES) 0.2 mg tablet Take 0.2 mg by mouth two (2) times a day.  5/21/18   Provider, Historical   losartan (COZAAR) 100 mg tablet Take 100 mg by mouth daily. 17   Provider, Historical   PARoxetine (PAXIL) 20 mg tablet Take 20 mg by mouth daily. Provider, Historical   omeprazole (PRILOSEC) 20 mg capsule Take 20 mg by mouth daily. 10/28/10   Provider, Historical       REVIEW OF SYSTEMS:       Unable to obtain because of patient's mentation      Objective:   VITALS:    Visit Vitals  BP (!) 128/99 (BP 1 Location: Right upper arm, BP Patient Position: At rest;Lying)   Pulse 90   Temp 97 °F (36.1 °C)   Resp 16   Ht 5' 6\" (1.676 m)   Wt 77 kg (169 lb 12.1 oz)   SpO2 92%   Breastfeeding No   BMI 27.40 kg/m²     Temp (24hrs), Av.9 °F (36.6 °C), Min:97 °F (36.1 °C), Max:98.7 °F (37.1 °C)      PHYSICAL EXAM:     General: NAD, awake. Lethargic  Eyes: sclera anicteric  Oral Cavity: No thrush or ulcers  Neck: no JVD  Chest: Fair bilateral air entry. No Wheezing or Rhonchi. No rales. Heart: normal sounds  Abdomen: soft and non tender   :  escobedo  Lower Extremities: no edema  Skin: no rash  Neuro: She is lethargic. Withdraws to pain  Psychiatric: Unable to assess        LAB DATA REVIEWED:    Recent Results (from the past 24 hour(s))   GLUCOSE, POC    Collection Time: 10/20/21  5:27 PM   Result Value Ref Range    Glucose (POC) 144 (H) 65 - 117 mg/dL    Performed by Annmarie Figueroa, POC    Collection Time: 10/20/21  9:54 PM   Result Value Ref Range    Glucose (POC) 198 (H) 65 - 117 mg/dL    Performed by Chad Nuñez    GLUCOSE, POC    Collection Time: 10/21/21  8:08 AM   Result Value Ref Range    Glucose (POC) 342 (H) 65 - 117 mg/dL    Performed by Haim Oleary        Recommendations/Plan:     1 acute kidney injury on chronic kidney disease stage III:  -likely prerenal secondary to sepsis/possible cardiorenal syndrome  -She presented with a creatinine of 2.5   -Creatinine increased to 2.7.  improved to 1.5  -Recently discharged with creatinine 1.6 on 10/6  -clinically she has no volume overload.   No edema in both legs.   -Urine is bland  -CT abdomen without contrast on 10/11 shows no renal obstruction.    -Not on any potential nephrotoxins preadmission  -off IVF      2  MRSA bacteremia:  -acute right parotitis with parapharyngeal and retropharyngeal  involvement per CT scan  -She had LP on admission which was negative  -Urine is bland. Covid negative. No infiltrate on chest x-ray  -She has been started on vancomycin and Ceftaroline and Rifampicin. -ID is on the case      3 Hypernatremia:  -Sodium has improved 150--> 148.  -will c/w 300 mL every 3 hours.    -Also will continue to hold Lasix    4 CHF:  -She has history of cardiomyopathy with EF less than 15%  -Chest x-ray shows mild pulmonary congestion. BNP 15,000  -on Lasix 40 mg IV every 48 hrs which I will hold and monitor urine output    5 altered mentation:  -Could be secondary to metabolic derangements from sepsis.    -Unsure of her baseline mentation  -CT head was negative on admission    6  Hypercalcemia  -PTH is 85.   Corrected calcium is 10.3  -Phosphorus is okay at 5.1            ________________________________________________________________________  Signed: Shanita Walker MD

## 2021-10-21 NOTE — DISCHARGE SUMMARY
Discharge Summary       PATIENT ID: Laina Villatoro  MRN: [id number]   YOB: 1955    DATE OF ADMISSION: 10/11/2021 12:19 AM    DATE OF DISCHARGE:   PRIMARY CARE PROVIDER: Salty Quinn MD     ATTENDING PHYSICIAN: Jolie Dumont  DISCHARGING PROVIDER: Jolie Dumont      CONSULTATIONS: IP CONSULT TO NEUROLOGY  IP CONSULT TO INFECTIOUS DISEASES  IP CONSULT TO OTOLARYNGOLOGY  IP CONSULT TO GASTROENTEROLOGY  IP CONSULT TO NEPHROLOGY  IP CONSULT TO NEPHROLOGY  IP CONSULT TO CARDIOLOGY    PROCEDURES/SURGERIES: Procedure(s):  PERCUTANEOUS ENDOSCOPIC GASTROSTOMY TUBE INSERTION    ADMITTING DIAGNOSES:    Patient Active Problem List    Diagnosis Date Noted    Moderate protein-calorie malnutrition ([address] 10/12/2021    Altered mental status 10/11/2021    Sepsis ([address] 10/11/2021    CHF exacerbation ([address] 10/02/2021    Gait disturbance 08/02/2021    Elevated troponin I level 08/02/2021    Shortness of breath 02/13/2021    Fever 02/13/2021    Acute CHF (congestive heart failure) ([address] 02/13/2021    COVID-19 virus infection 02/13/2021    Uncontrolled type 2 diabetes mellitus with hyperglycemia ([address] 10/08/2018    Mixed hyperlipidemia 10/08/2018    Implantable cardioverter-defibrillator (ICD) in situ 11/16/2010    Cardiomyopathy, idiopathic ([address] 10/28/2010    HTN (hypertension), benign 10/28/2010    PVC (premature ventricular contraction) 10/28/2010       DISCHARGE DIAGNOSES / PLAN:      Altered mental status  Metabolic encephalopathy  Sepsis  Sepsis with bacteremia with MRSA  Acute proctitis  Lactic acidosis  Leukocytosis  Chronic A.  Fib  Congestive heart failure  Hypertension  Cardiomyopathy ejection fraction 15 x 20%  Acute on chronic kidney disease  Type 2 diabetes  Static post PEG tube placement  Hyponatremia  Hypokalemia              Patient is Mucia[age] y.o. year old female history of chronic A. fib chronic kidney disease hypertension pacemaker congestive heart failure with ejection fraction of 15 to 20% history of diabetes hypertension patient have a Covid few months ago since then but he declined patient was recently discharged from the hospital with congestive heart failure came back with altered mental status not awake not talking seen by the ER physician CT scan of the head was negative  Patient had WBC count elevated increased lactic acid concern of sepsis sepsis patient was LP done in the ER which was normal patient received 2 g of Rocephin when I saw the patient was still was altered I ordered repeat lactic acid ordered CT scan of the maxillary facial area swelling of the right parotid area and admitted to ICU for further work-up discussed with the patient daughter     According to the patient daughter since she have a Covid in March April this year she is declining she is in the hospital third time this month           Patient awake not much verbal/restless  Static post PEG tube placement     MRSA bacteremia    During this admission patient seen by the infectious disease  Repeat CT scan of the neck and soft tissue shows    Persistent swelling of the right parotid gland although slightly less prominent. Resolving cellulitic changes in the right neck. Decrease in the retropharyngeal and right parapharyngeal edema      Patient on Teflaro and daptomycin IV and p.o. rifampin    Overall prognosis is poor    Platelet count still low but stable    Patient is DNR    Patient off Eliquis secondary to thrombocytopenia      Patient can be discharged to nursing home if cleared by the infectious disease    Patient already have a PICC line    Medication reconciliation done    Time discharge patient 35 minutes 50% time spent counseling and coordination of care        DISCHARGE MEDICATIONS:  Current Discharge Medication List      START taking these medications    Details   cefTARoline 300 mg IVPB 300 mg by IntraVENous route every twelve (12) hours every twelve (12) hours.   Qty: 24 Dose, Refills: 0  Start date: 10/21/2021      DAPTOmycin (CUBICIN) IVPB 463.2 mg by IntraVENous route every twenty-four (24) hours. Qty: 14 Dose, Refills: 0  Start date: 10/21/2021      insulin glargine (LANTUS) 100 unit/mL injection daily  Qty: 1 mL, Refills: 1  Start date: 10/22/2021      rifAMPin (RIFADIN) 300 mg capsule Take 1 Capsule by mouth daily for 7 doses. Qty: 7 Capsule, Refills: 0  Start date: 10/22/2021, End date: 10/29/2021         CONTINUE these medications which have NOT CHANGED    Details   aspirin delayed-release 81 mg tablet Take 1 Tablet by mouth daily. Qty: 30 Tablet, Refills: 0      carvediloL (COREG) 6.25 mg tablet Take 1 Tablet by mouth two (2) times daily (with meals). Qty: 60 Tablet, Refills: 0      prednisoLONE acetate (PRED FORTE) 1 % ophthalmic suspension INSTILL 1 DROP INTO RIGHT EYE 4 TIMES A DAY      atorvastatin (LIPITOR) 40 mg tablet Take 1 Tab by mouth nightly.   Qty: 60 Tab, Refills: 1         STOP taking these medications       oxyCODONE IR (ROXICODONE) 5 mg immediate release tablet Comments:   Reason for Stopping:         cyclobenzaprine (FLEXERIL) 5 mg tablet Comments:   Reason for Stopping:         amitriptyline (ELAVIL) 75 mg tablet Comments:   Reason for Stopping:         furosemide (Lasix) 40 mg tablet Comments:   Reason for Stopping:         metFORMIN (GLUCOPHAGE) 1,000 mg tablet Comments:   Reason for Stopping:         gabapentin (NEURONTIN) 100 mg capsule Comments:   Reason for Stopping:         metoprolol succinate (TOPROL-XL) 25 mg XL tablet Comments:   Reason for Stopping:         ELIQUIS 5 mg tablet Comments:   Reason for Stopping:         nitroglycerin (NITROSTAT) 0.4 mg SL tablet Comments:   Reason for Stopping:         cloNIDine HCL (CATAPRES) 0.2 mg tablet Comments:   Reason for Stopping:         losartan (COZAAR) 100 mg tablet Comments:   Reason for Stopping:         PARoxetine (PAXIL) 20 mg tablet Comments:   Reason for Stopping:         omeprazole (PRILOSEC) 20 mg capsule Comments: Reason for Stopping:                 NOTIFY YOUR PHYSICIAN FOR ANY OF THE FOLLOWING:   Fever over 101 degrees for 24 hours. Chest pain, shortness of breath, fever, chills, nausea, vomiting, diarrhea, change in mentation, falling, weakness, bleeding. Severe pain or pain not relieved by medications. Or, any other signs or symptoms that you may have questions about. DISPOSITION:  x  Home With:   OT  PT  HH  RN       Long term SNF/Inpatient Rehab    Independent/assisted living    Hospice    Other:       PATIENT CONDITION AT DISCHARGE: Stable      PHYSICAL EXAMINATION AT DISCHARGE:  General:          Alert, cooperative, no distress, appears stated age. HEENT:           Atraumatic, anicteric sclerae, pink conjunctivae                          No oral ulcers, mucosa moist, throat clear, dentition fair  Neck:               Supple, symmetrical  Lungs:             Clear to auscultation bilaterally. No Wheezing or Rhonchi. No rales. Chest wall:      No tenderness  No Accessory muscle use. Heart:              Regular  rhythm,  No  murmur   No edema  Abdomen:        Soft, non-tender. Not distended. Bowel sounds normal  Extremities:     No cyanosis. No clubbing,                            Skin turgor normal, Capillary refill normal  Skin:                Not pale. Not Jaundiced  No rashes   Psych:             Not anxious or agitated. Neurologic:      Alert, moves all extremities, answers questions appropriately and responds to commands     CT NECK SOFT TISSUE WO CONT   Final Result   Persistent swelling of the right parotid gland although slightly   less prominent. Resolving cellulitic changes in the right neck. Decrease in the   retropharyngeal and right parapharyngeal edema. Follow-up in 2 weeks preferably with intravenous contrast (if there are no   contraindications) is suggested to exclude associated neoplastic process of the   right parotid gland.       CT MAXILLOFACIAL WO CONT   Final Result Findings more consistent with cellulitis of the right face that   includes the right parotid gland (acute parotitis) as described above. Edema/fluid collection in the parapharyngeal space and retropharyngeal space. Suggestions as above. CT ABD PELV WO CONT   Final Result   Small amount of ascites. No focal fluid collection or free air. Density in the gallbladder which could be artifact, sludge or gallstones. No   specific CT evidence of cholecystitis. Bladder wall prominence. Other findings   as above. CT HEAD WO CONT   Final Result   1. No evidence of acute intracranial hemorrhage or mass effect. 2.  Multifocal intracranial encephalomalacia as above. XR CHEST PORT   Final Result   1. Mild pulmonary vascular congestion. 2.  Cardiomegaly.            Recent Results (from the past 24 hour(s))   GLUCOSE, POC    Collection Time: 10/20/21 11:18 AM   Result Value Ref Range    Glucose (POC) 222 (H) 65 - 117 mg/dL    Performed by Era Councilman, POC    Collection Time: 10/20/21  5:27 PM   Result Value Ref Range    Glucose (POC) 144 (H) 65 - 117 mg/dL    Performed by Era Councilman, POC    Collection Time: 10/20/21  9:54 PM   Result Value Ref Range    Glucose (POC) 198 (H) 65 - 117 mg/dL    Performed by Hilton Maher    GLUCOSE, POC    Collection Time: 10/21/21  8:08 AM   Result Value Ref Range    Glucose (POC) 342 (H) 65 - 117 mg/dL    Performed by Bong Finley                 Signed:   Jayjay Hughes MD  10/21/2021  10:38 AM

## 2021-10-21 NOTE — PROGRESS NOTES
Infectious Disease Progress Note           Subjective:   Pt seen and examined at bedside. Doing well, denies new complaints. No acute events since last seen, decreasing right sided facial swelling   Objective:   Physical Exam:     Visit Vitals  /73 (BP 1 Location: Right upper arm, BP Patient Position: At rest;Lying)   Pulse 92   Temp 97.2 °F (36.2 °C)   Resp 18   Ht 5' 6\" (1.676 m)   Wt 169 lb 12.1 oz (77 kg)   SpO2 92%   Breastfeeding No   BMI 27.40 kg/m²    O2 Flow Rate (L/min): 2 l/min (Refuses to wear canula) O2 Device: None    Temp (24hrs), Av.8 °F (36.6 °C), Min:97 °F (36.1 °C), Max:98.7 °F (37.1 °C)    No intake/output data recorded.    10/19 1901 - 10/21 0700  In: 959   Out: -     General: NAD, alert,non-verbal   HEENT: SHARON, Moist mucosa, decreasing size of right facial swelling   Lungs: CTA b/l, decreased at the bases, no wheeze/rhonchi   Heart: S1S2+, RRR, no murmur  Abdo: Soft, NT, ND, +BS, peg tube in place   : + escobedo cath   Exts: no edema  + pulses b/l   Skin: No wounds, No rashes or lesions      Data Review:       Recent Days:  Recent Labs     10/19/21  0710 10/18/21  1230   WBC 16.6* 17.7*   HGB 13.4 13.0   HCT 43.9 40.0   PLT 67* 68*     Recent Labs     10/20/21  0906 10/19/21  0710 10/18/21  1230   BUN 63* 65*  65* 64*   CREA 1.54* 1.70*  1.67* 1.71*       No results found for: CRPQN, CRP, CRPM       Microbiology     Results     Procedure Component Value Units Date/Time    CULTURE, BLOOD [[id number]]     Order Status: Sent Specimen: Blood     CULTURE, BLOOD [[id number]] Collected: 10/18/21 1230    Order Status: Completed Specimen: Blood Updated: 10/21/21 1022     Special Requests: No Special Requests        Culture result: No growth 3 days       CULTURE, BLOOD [[id number]]  (Abnormal) Collected: 10/15/21 1059    Order Status: Completed Specimen: Blood Updated: 10/18/21 8758     Special Requests: No Special Requests        Culture result:       * methicillin resistant staphylococcus aureus * GROWING IN THE ANAEROBIC BOTTLE (NO SITE)-            *KNOWN MRSA PATIENT            Please refer to previous blood culture  [id number] COLLECTED 10/12/21      CULTURE, BLOOD [[id number]]  (Abnormal) Collected: 10/13/21 0500    Order Status: Completed Specimen: Blood Updated: 10/15/21 1247     Special Requests: No Special Requests        Culture result:       Gram Positive Cocci in clusters growing in both bottles drawn [facility]) ON 10/14/21 AT 1157. HH-                  * methicillin resistant staphylococcus aureus * growing in both bottles drawn (SITE NOT INDICATED) REFER TO [id number] FOR SENSITIVITIES            (NOTE) KNOWN MRSA PATIENT    CULTURE, Alexander Daria STAIN [[id number]] Collected: 10/12/21 0945    Order Status: Canceled Specimen: Wound from Mouth     MRSA SCREEN - PCR (NASAL) [[id number]]  (Abnormal) Collected: 10/11/21 1025    Order Status: Completed Specimen: Swab Updated: 10/11/21 1147     MRSA by PCR, Nasal DETECTED-        Comment: Results verified, phoned to and read back by Gerry Lemos 10/11/21 11:50       MRSA SCREEN - PCR (NASAL) [[id number]] Collected: 10/11/21 0945    Order Status: Canceled Specimen: Swab     COVID-19 RAPID TEST [[id number]] Collected: 10/11/21 0930    Order Status: Completed Specimen: Nasopharyngeal Updated: 10/11/21 1104     Specimen source       Please find results under separate order           COVID-19 rapid test Not Detected        Comment: Rapid [employer] ID Now   Rapid NAAT:  The specimen is NEGATIVE for SARS-CoV-2, the novel coronavirus associated with COVID-19. Negative results should be treated as presumptive and, if inconsistent with clinical signs and symptoms or necessary for patient management, should be tested with an alternative molecular assay. Negative results do not preclude SARS-CoV-2 infection and should not be used as the sole basis for patient management decisions.    This test has been authorized by the FDA under   an Emergency Use Authorization (EUA) for use by authorized laboratories. Fact sheet for Healthcare Providers: ConventionUpdate.co.nz Fact sheet for Patients: ConventionUpdate[employer]nz   Methodology: Isothermal Nucleic Acid Amplification         CULTURE, BLOOD, LINE [[id number]] Collected: 10/11/21 0930    Order Status: Canceled Specimen: Blood     CULTURE, URINE [[id number]] Collected: 10/11/21 0930    Order Status: Completed Specimen: Urine Updated: 10/12/21 1049     Special Requests: No Special Requests        Culture result: No Growth (<1000 cfu/mL)       CULTURE, CSF  TUBE 2 [[id number]] Collected: 10/11/21 0504    Order Status: Completed Specimen: Cerebrospinal Fluid Updated: 10/18/21 1450     Special Requests: No Special Requests        GRAM STAIN No wbc's seen         No organisms seen        Culture result:       no growth on solid media at 4 days                  no growth in Thio broth at 7 days          CULTURE, BLOOD, PAIRED [[id number]]  (Abnormal)  (Susceptibility) Collected: 10/11/21 0225    Order Status: Completed Specimen: Blood Updated: 10/15/21 0921     Special Requests: No Special Requests        Culture result:       * methicillin resistant staphylococcus aureus * growing in all 4 bottles drawn (SITES NOT INDICATED)-          Susceptibility      Staphylococcus aureus Methcillin Resistant      NIMA      Ciprofloxacin ($) Susceptible      Clindamycin ($) Susceptible      Daptomycin ($$$$$) Susceptible      Doxycycline ($$) Susceptible      Erythromycin ($$$$) Resistant      Gentamicin ($) Susceptible      Levofloxacin ($) Susceptible      Linezolid ($$$$$) Susceptible      Oxacillin Resistant      Rifampin ($$$$) Susceptible  [1]       Tetracycline Susceptible      Trimeth/Sulfa Susceptible      Vancomycin ($) Susceptible              [1]  Rifampin is not to be used for mono-therapy.          - [facility]   CXR Results  (Last 48 hours)    None           Assessment/Plan     1. MRSA bacteremia, persistent. BCx positive on 10/11, 10/13 and 10/15      Repeat BCx from 10/18 is staying neg, no valvular veg on 2 D echo       Afebrile, routine labs not done today      On day #4  of Daptomycin and # 8 of Rifampin/Ceftaroline while hospitalized       PICC line placement in the AM if Bcxs remain neg       Plan on discharging on Vanc and Rifampin for 6 wks from 10/18. Script for Vanc on Chart for CM, plan on keep trough b/w 15-20    2. Acute right parotitis, suppurative, w purulent drainage from stensens duct per ENT       Right facial swelling continues to improve, still w some tenderness         3. MRSA colonization: S/p 5 days of Mupirocin      4. Cardiomyopathy w baseline EF of <15 % (10/19)     5. Thrombocytopenia: Plts are staying stable      6.  Acute on chronic renal failure, Cr continues to improve        Juan Manuel Nichole MD    10/21/2021

## 2021-10-22 NOTE — DISCHARGE SUMMARY
Discharge Summary       PATIENT ID: Neda Pan  MRN: [id number]   YOB: 1955    DATE OF ADMISSION: 10/11/2021 12:19 AM    DATE OF DISCHARGE:   PRIMARY CARE PROVIDER: Erik Alvarado MD     ATTENDING PHYSICIAN: Ana Maria Dumont  DISCHARGING PROVIDER: Ana Maria Dumont      CONSULTATIONS: IP CONSULT TO NEUROLOGY  IP CONSULT TO INFECTIOUS DISEASES  IP CONSULT TO OTOLARYNGOLOGY  IP CONSULT TO GASTROENTEROLOGY  IP CONSULT TO NEPHROLOGY  IP CONSULT TO NEPHROLOGY  IP CONSULT TO GASTROENTEROLOGY  IP CONSULT TO CARDIOLOGY    PROCEDURES/SURGERIES: Procedure(s):  PERCUTANEOUS ENDOSCOPIC GASTROSTOMY TUBE INSERTION    ADMITTING DIAGNOSES:    Patient Active Problem List    Diagnosis Date Noted    Moderate protein-calorie malnutrition ([address] 10/12/2021    Altered mental status 10/11/2021    Sepsis ([address] 10/11/2021    CHF exacerbation ([address] 10/02/2021    Gait disturbance 08/02/2021    Elevated troponin I level 08/02/2021    Shortness of breath 02/13/2021    Fever 02/13/2021    Acute CHF (congestive heart failure) ([address] 02/13/2021    COVID-19 virus infection 02/13/2021    Uncontrolled type 2 diabetes mellitus with hyperglycemia ([address] 10/08/2018    Mixed hyperlipidemia 10/08/2018    Implantable cardioverter-defibrillator (ICD) in situ 11/16/2010    Cardiomyopathy, idiopathic ([address] 10/28/2010    HTN (hypertension), benign 10/28/2010    PVC (premature ventricular contraction) 10/28/2010       DISCHARGE DIAGNOSES / PLAN:      Altered mental status  Metabolic encephalopathy  Sepsis  Sepsis with bacteremia with MRSA  Acute proctitis  Lactic acidosis  Leukocytosis  Chronic A.  Fib  Congestive heart failure  Hypertension  Cardiomyopathy ejection fraction 15 x 20%  Acute on chronic kidney disease  Type 2 diabetes  Static post PEG tube placement  Hyponatremia  Hypokalemia              Patient is [age] y.o. year old female history of chronic A. fib chronic kidney disease hypertension pacemaker congestive heart failure with ejection fraction of 15 to 20% history of diabetes hypertension patient have a Covid few months ago since then but he declined patient was recently discharged from the hospital with congestive heart failure came back with altered mental status not awake not talking seen by the ER physician CT scan of the head was negative  Patient had WBC count elevated increased lactic acid concern of sepsis sepsis patient was LP done in the ER which was normal patient received 2 g of Rocephin when I saw the patient was still was altered I ordered repeat lactic acid ordered CT scan of the maxillary facial area swelling of the right parotid area and admitted to ICU for further work-up discussed with the patient daughter     According to the patient daughter since she have a Covid in March April this year she is declining she is in the hospital third time this month           Patient awake not much verbal/restless  Static post PEG tube placement     MRSA bacteremia    During this admission patient seen by the infectious disease  Repeat CT scan of the neck and soft tissue shows    Persistent swelling of the right parotid gland although slightly less prominent. Resolving cellulitic changes in the right neck.  Decrease in the retropharyngeal and right parapharyngeal edema      Patient on Teflaro and daptomycin IV and p.o. rifampin    Overall prognosis is poor    Platelet count still low but stable    Patient is DNR    Patient off Eliquis secondary to thrombocytopenia      Patient can be discharged to nursing home if cleared by the infectious disease    Patient already have a PICC line      As patient waiting for the placement  Last night while cleaning the patient by the nurse patient pulled out PEG tube    Patient resting the bed alert awake but confused  We will repeat the blood work tomorrow  GI consult for replacement of PEG tube        DISCHARGE MEDICATIONS:  Current Discharge Medication List      START taking these medications    Details   insulin glargine (LANTUS) 100 unit/mL injection daily  Qty: 1 mL, Refills: 1  Start date: 10/22/2021      rifAMPin (RIFADIN) 300 mg capsule Take 1 Capsule by mouth daily for 7 doses. Qty: 7 Capsule, Refills: 0  Start date: 10/22/2021, End date: 10/29/2021      vancomycin (VANCOCIN) 10 gram solr 1,250 mg by IntraVENous route daily for 38 doses. Check Vanc trough, BUN/Cr, CRP and ESR wkly while on Vanc   Keep trough b/w 15-20  Qty: 38 Each, Refills: 0  Start date: 10/21/2021, End date: 11/28/2021         CONTINUE these medications which have CHANGED    Details   furosemide (Lasix) 20 mg tablet daily  Qty: 10 Tablet, Refills: 0  Start date: 10/23/2021         CONTINUE these medications which have NOT CHANGED    Details   aspirin delayed-release 81 mg tablet Take 1 Tablet by mouth daily. Qty: 30 Tablet, Refills: 0      carvediloL (COREG) 6.25 mg tablet Take 1 Tablet by mouth two (2) times daily (with meals). Qty: 60 Tablet, Refills: 0      prednisoLONE acetate (PRED FORTE) 1 % ophthalmic suspension INSTILL 1 DROP INTO RIGHT EYE 4 TIMES A DAY      atorvastatin (LIPITOR) 40 mg tablet Take 1 Tab by mouth nightly.   Qty: 60 Tab, Refills: 1         STOP taking these medications       oxyCODONE IR (ROXICODONE) 5 mg immediate release tablet Comments:   Reason for Stopping:         cyclobenzaprine (FLEXERIL) 5 mg tablet Comments:   Reason for Stopping:         amitriptyline (ELAVIL) 75 mg tablet Comments:   Reason for Stopping:         metFORMIN (GLUCOPHAGE) 1,000 mg tablet Comments:   Reason for Stopping:         gabapentin (NEURONTIN) 100 mg capsule Comments:   Reason for Stopping:         metoprolol succinate (TOPROL-XL) 25 mg XL tablet Comments:   Reason for Stopping:         ELIQUIS 5 mg tablet Comments:   Reason for Stopping:         nitroglycerin (NITROSTAT) 0.4 mg SL tablet Comments:   Reason for Stopping:         cloNIDine HCL (CATAPRES) 0.2 mg tablet Comments:   Reason for Stopping: losartan (COZAAR) 100 mg tablet Comments:   Reason for Stopping:         PARoxetine (PAXIL) 20 mg tablet Comments:   Reason for Stopping:         omeprazole (PRILOSEC) 20 mg capsule Comments:   Reason for Stopping:                 NOTIFY YOUR PHYSICIAN FOR ANY OF THE FOLLOWING:   Fever over 101 degrees for 24 hours. Chest pain, shortness of breath, fever, chills, nausea, vomiting, diarrhea, change in mentation, falling, weakness, bleeding. Severe pain or pain not relieved by medications. Or, any other signs or symptoms that you may have questions about. DISPOSITION:  x  Home With:   OT  PT  HH  RN       Long term SNF/Inpatient Rehab    Independent/assisted living    Hospice    Other:       PATIENT CONDITION AT DISCHARGE: Stable      PHYSICAL EXAMINATION AT DISCHARGE:  General:          Alert, cooperative, no distress, appears stated age. HEENT:           Atraumatic, anicteric sclerae, pink conjunctivae                          No oral ulcers, mucosa moist, throat clear, dentition fair  Neck:               Supple, symmetrical  Lungs:             Clear to auscultation bilaterally. No Wheezing or Rhonchi. No rales. Chest wall:      No tenderness  No Accessory muscle use. Heart:              Regular  rhythm,  No  murmur   No edema  Abdomen:        Soft, non-tender. Not distended. Bowel sounds normal  Extremities:     No cyanosis. No clubbing,                            Skin turgor normal, Capillary refill normal  Skin:                Not pale. Not Jaundiced  No rashes   Psych:             Not anxious or agitated. Neurologic:      Alert, moves all extremities, answers questions appropriately and responds to commands     CT NECK SOFT TISSUE WO CONT   Final Result   Persistent swelling of the right parotid gland although slightly   less prominent. Resolving cellulitic changes in the right neck. Decrease in the   retropharyngeal and right parapharyngeal edema.       Follow-up in 2 weeks preferably with intravenous contrast (if there are no   contraindications) is suggested to exclude associated neoplastic process of the   right parotid gland. CT MAXILLOFACIAL WO CONT   Final Result   Findings more consistent with cellulitis of the right face that   includes the right parotid gland (acute parotitis) as described above. Edema/fluid collection in the parapharyngeal space and retropharyngeal space. Suggestions as above. CT ABD PELV WO CONT   Final Result   Small amount of ascites. No focal fluid collection or free air. Density in the gallbladder which could be artifact, sludge or gallstones. No   specific CT evidence of cholecystitis. Bladder wall prominence. Other findings   as above. CT HEAD WO CONT   Final Result   1. No evidence of acute intracranial hemorrhage or mass effect. 2.  Multifocal intracranial encephalomalacia as above. XR CHEST PORT   Final Result   1. Mild pulmonary vascular congestion. 2.  Cardiomegaly. Recent Results (from the past 24 hour(s))   GLUCOSE, POC    Collection Time: 10/21/21 12:14 PM   Result Value Ref Range    Glucose (POC) 326 (H) 65 - 117 mg/dL    Performed by Enmanuel Payne    CBC WITH AUTOMATED DIFF    Collection Time: 10/21/21 12:40 PM   Result Value Ref Range    WBC 15.0 (H) 3.6 - 11.0 K/uL    RBC 4.56 3.80 - 5.20 M/uL    HGB 12.7 11.5 - 16.0 g/dL    HCT 39.8 35.0 - 47.0 %    MCV 87.3 80.0 - 99.0 FL    MCH 27.9 26.0 - 34.0 PG    MCHC 31.9 30.0 - 36.5 g/dL    RDW 20.4 (H) 11.5 - 14.5 %    PLATELET 78 (L) 818 - 400 K/uL    NRBC 1.1 (H) 0.0  WBC    ABSOLUTE NRBC 0.16 (H) 0.00 - 0.01 K/uL    NEUTROPHILS 78 (H) 32 - 75 %    LYMPHOCYTES 15 12 - 49 %    MONOCYTES 6 5 - 13 %    EOSINOPHILS 0 0 - 7 %    BASOPHILS 0 0 - 1 %    IMMATURE GRANULOCYTES 1 (H) 0 - 0.5 %    ABS. NEUTROPHILS 11.7 (H) 1.8 - 8.0 K/UL    ABS. LYMPHOCYTES 2.2 0.8 - 3.5 K/UL    ABS. MONOCYTES 0.9 0.0 - 1.0 K/UL    ABS. EOSINOPHILS 0.0 0.0 - 0.4 K/UL    ABS.  BASOPHILS 0.0 0.0 - 0.1 K/UL    ABS. IMM. GRANS. 0.1 (H) 0.00 - 0.04 K/UL    DF AUTOMATED     METABOLIC PANEL, COMPREHENSIVE    Collection Time: 10/21/21 12:40 PM   Result Value Ref Range    Sodium 148 (H) 136 - 145 mmol/L    Potassium 4.4 3.5 - 5.1 mmol/L    Chloride 116 (H) 97 - 108 mmol/L    CO2 23 21 - 32 mmol/L    Anion gap 9 5 - 15 mmol/L    Glucose 342 (H) 65 - 100 mg/dL    BUN 74 (H) 6 - 20 mg/dL    Creatinine 1.85 (H) 0.55 - 1.02 mg/dL    BUN/Creatinine ratio 40 (H) 12 - 20      GFR est AA 33 (L) >60 ml/min/1.73m2    GFR est non-AA 27 (L) >60 ml/min/1.73m2    Calcium 8.5 8.5 - 10.1 mg/dL    Bilirubin, total 1.0 0.2 - 1.0 mg/dL    AST (SGOT) 62 (H) 15 - 37 U/L    ALT (SGPT) 74 12 - 78 U/L    Alk.  phosphatase 232 (H) 45 - 117 U/L    Protein, total 6.3 (L) 6.4 - 8.2 g/dL    Albumin 2.0 (L) 3.5 - 5.0 g/dL    Globulin 4.3 (H) 2.0 - 4.0 g/dL    A-G Ratio 0.5 (L) 1.1 - 2.2     GLUCOSE, POC    Collection Time: 10/21/21  5:52 PM   Result Value Ref Range    Glucose (POC) 248 (H) 65 - 117 mg/dL    Performed by [id number], POC    Collection Time: 10/21/21  7:39 PM   Result Value Ref Range    Glucose (POC) 179 (H) 65 - 117 mg/dL    Performed by Helena MARTINES)    GLUCOSE, POC    Collection Time: 10/22/21  7:23 AM   Result Value Ref Range    Glucose (POC) 190 (H) 65 - 117 mg/dL    Performed by Rosalie Moe                 Signed:   Jayjay Hughes MD  10/22/2021  10:38 AM

## 2021-10-22 NOTE — PROGRESS NOTES
Patient pulled out Peg tube; covered with 4x4 and paper tape; Dr. Bell Castorena informed; order received

## 2021-10-22 NOTE — PROGRESS NOTES
Patient is going down today for replacing peg tube. Per doctor Lorenza note he is planning on checking labs in AM. Should be good to go tomorrow.   .  UT plan is [facility] rehab once medically stable

## 2021-10-22 NOTE — PROGRESS NOTES
Dr. Pool Parrish consulted for replacement of peg tube. Patient's peg tube was initially placed on 10/13 by Dr. Aristeo Tolentino. Will defer to Dr. Aristeo Tolentino, if  Dr. Aristeo Tolentino is unavailable please let us know.     Fabiola TOURE

## 2021-10-22 NOTE — PROGRESS NOTES
Infectious Disease Progress Note           Subjective:   Doing well, denies new complaints. No acute events since last seen. Confused and agitated   Objective:   Physical Exam:     Visit Vitals  /81 (BP 1 Location: Right lower arm, BP Patient Position: At rest)   Pulse 85   Temp 97.3 °F (36.3 °C)   Resp 20   Ht 5' 6\" (1.676 m)   Wt 167 lb 8.8 oz (76 kg)   SpO2 95%   Breastfeeding No   BMI 27.04 kg/m²    O2 Flow Rate (L/min): 2 l/min (Refuses to wear canula) O2 Device: None (Room air)    Temp (24hrs), Av.5 °F (36.4 °C), Min:97.3 °F (36.3 °C), Max:97.8 °F (36.6 °C)    No intake/output data recorded.    10/20 1901 - 10/22 0700  In: 3536 [I.V.:50]  Out: -     General: NAD, alert,non-verbal, confused   HEENT: SHARON, Moist mucosa, decreasing size of right facial swelling   Lungs: CTA b/l, decreased at the bases, no wheeze/rhonchi   Heart: S1S2+, RRR, no murmur  Abdo: Soft, NT, ND, +BS, peg tube in place   : + escobedo cath   Exts: no edema  + pulses b/l   Skin: No wounds, No rashes or lesions      Data Review:       Recent Days:  Recent Labs     10/21/21  1240   WBC 15.0*   HGB 12.7   HCT 39.8   PLT 78*     Recent Labs     10/21/21  1240 10/20/21  0906   BUN 74* 63*   CREA 1.85* 1.54*       No results found for: CRPQN, CRP, CRPM       Microbiology     Results     Procedure Component Value Units Date/Time    CULTURE, BLOOD [[id number]] Collected: 10/21/21 1240    Order Status: Completed Specimen: Blood Updated: 10/21/21 1252    CULTURE, BLOOD [[id number]] Collected: 10/18/21 1230    Order Status: Completed Specimen: Blood Updated: 10/21/21 1022     Special Requests: No Special Requests        Culture result: No growth 3 days       CULTURE, BLOOD [[id number]]  (Abnormal) Collected: 10/15/21 1650    Order Status: Completed Specimen: Blood Updated: 10/18/21 4023     Special Requests: No Special Requests        Culture result:       * methicillin resistant staphylococcus aureus * GROWING IN THE ANAEROBIC BOTTLE (NO SITE)            *KNOWN MRSA PATIENT            Please refer to previous blood culture  [id number] COLLECTED 10/12/21      CULTURE, BLOOD [[id number]]  (Abnormal) Collected: 10/13/21 0500    Order Status: Completed Specimen: Blood Updated: 10/15/21 1247     Special Requests: No Special Requests        Culture result:       Gram Positive Cocci in clusters growing in both bottles drawn [facility]) ON 10/14/21 AT 1157. HH                  * methicillin resistant staphylococcus aureus * growing in both bottles drawn (SITE NOT INDICATED) REFER TO [id number] FOR SENSITIVITIES            (NOTE) KNOWN MRSA PATIENT    CULTURE, Biagio Holstein STAIN [[id number]] Collected: 10/12/21 0945    Order Status: Canceled Specimen: Wound from Mouth     MRSA SCREEN - PCR (NASAL) [[id number]]  (Abnormal) Collected: 10/11/21 1025    Order Status: Completed Specimen: Swab Updated: 10/11/21 1147     MRSA by PCR, Nasal DETECTED        Comment: Results verified, phoned to and read back by Jung Browning 10/11/21 11:50       MRSA SCREEN - PCR (NASAL) [[id number]] Collected: 10/11/21 0945    Order Status: Canceled Specimen: Swab     COVID-19 RAPID TEST [[id number]] Collected: 10/11/21 0930    Order Status: Completed Specimen: Nasopharyngeal Updated: 10/11/21 1104     Specimen source       Please find results under separate order           COVID-19 rapid test Not Detected        Comment: Rapid [employer] ID Now   Rapid NAAT:  The specimen is NEGATIVE for SARS-CoV-2, the novel coronavirus associated with COVID-19. Negative results should be treated as presumptive and, if inconsistent with clinical signs and symptoms or necessary for patient management, should be tested with an alternative molecular assay. Negative results do not preclude SARS-CoV-2 infection and should not be used as the sole basis for patient management decisions.    This test has been authorized by the FDA under   an Emergency Use Authorization (EUA) for use by authorized laboratories. Fact sheet for Healthcare Providers: ConventionUpdate.co.nz Fact sheet for Patients: ConventionUpdate.co.nz   Methodology: Isothermal Nucleic Acid Amplification         CULTURE, BLOOD, LINE [[id number]] Collected: 10/11/21 0930    Order Status: Canceled Specimen: Blood     CULTURE, URINE [[id number]] Collected: 10/11/21 0930    Order Status: Completed Specimen: Urine Updated: 10/12/21 1049     Special Requests: No Special Requests        Culture result: No Growth (<1000 cfu/mL)       CULTURE, CSF  TUBE 2 [[id number]] Collected: 10/11/21 0504    Order Status: Completed Specimen: Cerebrospinal Fluid Updated: 10/18/21 1450     Special Requests: No Special Requests        GRAM STAIN No wbc's seen         No organisms seen        Culture result:       no growth on solid media at 4 days                  no growth in Thio broth at 7 days          CULTURE, BLOOD, PAIRED [[id number]]  (Abnormal)  (Susceptibility) Collected: 10/11/21 0225    Order Status: Completed Specimen: Blood Updated: 10/15/21 0921     Special Requests: No Special Requests        Culture result:       * methicillin resistant staphylococcus aureus * growing in all 4 bottles drawn (SITES NOT INDICATED)          Susceptibility      Staphylococcus aureus Methcillin Resistant      NIMA      Ciprofloxacin ($) Susceptible      Clindamycin ($) Susceptible      Daptomycin ($$$$$) Susceptible      Doxycycline ($$) Susceptible      Erythromycin ($$$$) Resistant      Gentamicin ($) Susceptible      Levofloxacin ($) Susceptible      Linezolid ($$$$$) Susceptible      Oxacillin Resistant      Rifampin ($$$$) Susceptible  [1]       Tetracycline Susceptible      Trimeth/Sulfa Susceptible      Vancomycin ($) Susceptible              [1]  Rifampin is not to be used for mono-therapy.           [facility]                          Diagnostics   CXR Results  (Last 48 hours) None           Assessment/Plan     1. MRSA bacteremia, persistent. BCx positive on 10/11, 10/13 and 10/15      Repeat BCx from 10/18 is staying neg, no valvular veg on 2 D echo       On day #5  of Daptomycin and # 8 of Rifampin/Ceftaroline while hospitalized       D/c on Vanc and Rifampin for 6 wks from 10/18      D/c Scripts given to CM. Cleared for d/c from ID stand point    2. Acute right parotitis, suppurative, Continued improvement in right facial swelling         3. MRSA colonization: S/p 5 days of Mupirocin      4. Cardiomyopathy w baseline EF of <15 % (10/19)     5. Thrombocytopenia: Plts are staying stable      6.  Acute on chronic renal failure, mild rise in Cr on todays labs      Epi Feldman MD    10/22/2021

## 2021-10-22 NOTE — PROGRESS NOTES
Patient's daughter Josep Ortiz, [phone]) came to visit and would like to be informed of patient's status after PEG Tube placement.

## 2021-10-22 NOTE — PROGRESS NOTES
Renal Note    NAME:  Laina Villatoro   :   1955   MRN:   [id number]     ATTENDING: Xiomara Thomas MD  PCP:  Salty Quinn MD    Date/Time:  10/22/2021 1:00 PM      Subjective:     Pt seen in room. Na 148  Creatinine has bumped again 1.8   Labs are pending today    Past Medical History:   Diagnosis Date    [facility] ([facility]     Cardiomyopathy, idiopathic ([facility] 10/28/2010    Chronic kidney disease     Heart failure ([facility]     HTN (hypertension), benign 10/28/2010    Pacemaker     ICD    PVC (premature ventricular contraction) 10/28/2010      Past Surgical History:   Procedure Laterality Date    HX OTHER SURGICAL  10/02/2018    Right eye surgery    HX PACEMAKER       Social History     Tobacco Use    Smoking status: Never Smoker    Smokeless tobacco: Never Used   Substance Use Topics    Alcohol use: No      History reviewed. No pertinent family history. Allergies   Allergen Reactions    Chocolate [Cocoa] Anaphylaxis and Swelling    Iodine Anaphylaxis    Lisinopril Anaphylaxis    Peanut Anaphylaxis and Swelling    Glimepiride Swelling    Glipizide Other (comments)     Pruritis    Metformin Diarrhea    Norvasc [Amlodipine] Other (comments)     Light Headed    Pneumovax 23 [Pneumococcal 23-Devi Ps Vaccine] Hives    Toprol Xl [Metoprolol Succinate] Other (comments)     Light headed    Tositumomab I-131 (Maltose) Unable to Obtain    Vitamin D [Cholecalciferol (Vitamin D3)] Other (comments)     Dizziness/headache not a true allergy. Prior to Admission medications    Medication Sig Start Date End Date Taking? Authorizing Provider   insulin glargine (LANTUS) 100 unit/mL injection daily 10/22/21  Yes Carie Samuels MD   rifAMPin (RIFADIN) 300 mg capsule Take 1 Capsule by mouth daily for 7 doses.  10/22/21 10/29/21 Yes Jolie Dumont MD   furosemide (Lasix) 20 mg tablet daily 10/23/21  Yes Carie Samuels MD   vancomycin (VANCOCIN) 10 gram solr 1,250 mg by IntraVENous route daily for 38 doses. Check Vanc trough, BUN/Cr, CRP and ESR wkly while on Vanc   Keep trough b/w 15-20 10/21/21 11/28/21 Yes Torie Mei MD   aspirin delayed-release 81 mg tablet Take 1 Tablet by mouth daily. 10/5/21   Yas Rodriguez MD   carvediloL (COREG) 6.25 mg tablet Take 1 Tablet by mouth two (2) times daily (with meals). 8/5/21   Osmany Bolton MD   oxyCODONE IR (ROXICODONE) 5 mg immediate release tablet Take 5 mg by mouth every four (4) hours as needed for Pain. 7/16/21   Provider, Historical   cyclobenzaprine (FLEXERIL) 5 mg tablet Take 5 mg by mouth two (2) times daily as needed for Muscle Spasm(s). 7/19/21   Provider, Historical   amitriptyline (ELAVIL) 75 mg tablet Take 75 mg by mouth daily. 5/3/21   Provider, Historical   prednisoLONE acetate (PRED FORTE) 1 % ophthalmic suspension INSTILL 1 DROP INTO RIGHT EYE 4 TIMES A DAY 3/13/21   Provider, Historical   atorvastatin (LIPITOR) 40 mg tablet Take 1 Tab by mouth nightly. 2/19/21   Taty Dumont MD   furosemide (Lasix) 40 mg tablet 1 tablet daily  Patient taking differently: Take 40 mg by mouth daily. 1 tablet daily 2/19/21   Estevan Tang MD   metFORMIN (GLUCOPHAGE) 1,000 mg tablet two (2) times daily (with meals). 10/12/20   Provider, Historical   gabapentin (NEURONTIN) 100 mg capsule TAKE 1 CAPSULE BY MOUTH THREE TIMES A DAY 10/27/20   Provider, Historical   metoprolol succinate (TOPROL-XL) 25 mg XL tablet TAKE 0.5 TABS BY MOUTH DAILY. 12/16/19   Amanda Barron NP   ELIQUIS 5 mg tablet TAKE 1 TABLET BY MOUTH TWICE A DAY 12/5/19   Amanda Barron NP   nitroglycerin (NITROSTAT) 0.4 mg SL tablet 0.4 mg by SubLINGual route every five (5) minutes as needed for Chest Pain. Up to 3 doses. Patient not taking: Reported on 8/3/2021    Provider, Historical   cloNIDine HCL (CATAPRES) 0.2 mg tablet Take 0.2 mg by mouth two (2) times a day.  5/21/18   Provider, Historical   losartan (COZAAR) 100 mg tablet Take 100 mg by mouth daily. 17   Provider, Historical   PARoxetine (PAXIL) 20 mg tablet Take 20 mg by mouth daily. Provider, Historical   omeprazole (PRILOSEC) 20 mg capsule Take 20 mg by mouth daily. 10/28/10   Provider, Historical       REVIEW OF SYSTEMS:       Unable to obtain because of patient's mentation      Objective:   VITALS:    Visit Vitals  BP (!) 105/43 (BP 1 Location: Right lower arm, BP Patient Position: At rest)   Pulse 84   Temp 97.5 °F (36.4 °C)   Resp 18   Ht 5' 6\" (1.676 m)   Wt 76 kg (167 lb 8.8 oz)   SpO2 97%   Breastfeeding No   BMI 27.04 kg/m²     Temp (24hrs), Av.5 °F (36.4 °C), Min:97.2 °F (36.2 °C), Max:97.8 °F (36.6 °C)      PHYSICAL EXAM:     General: NAD, awake. Lethargic  Eyes: sclera anicteric  Oral Cavity: No thrush or ulcers  Neck: no JVD  Chest: Fair bilateral air entry. No Wheezing or Rhonchi. No rales. Heart: normal sounds  Abdomen: soft and non tender   :  escobedo  Lower Extremities: no edema  Skin: no rash  Neuro: She is lethargic. Withdraws to pain  Psychiatric: Unable to assess        LAB DATA REVIEWED:    Recent Results (from the past 24 hour(s))   GLUCOSE, POC    Collection Time: 10/21/21 12:14 PM   Result Value Ref Range    Glucose (POC) 326 (H) 65 - 117 mg/dL    Performed by Pecola Plate    CBC WITH AUTOMATED DIFF    Collection Time: 10/21/21 12:40 PM   Result Value Ref Range    WBC 15.0 (H) 3.6 - 11.0 K/uL    RBC 4.56 3.80 - 5.20 M/uL    HGB 12.7 11.5 - 16.0 g/dL    HCT 39.8 35.0 - 47.0 %    MCV 87.3 80.0 - 99.0 FL    MCH 27.9 26.0 - 34.0 PG    MCHC 31.9 30.0 - 36.5 g/dL    RDW 20.4 (H) 11.5 - 14.5 %    PLATELET 78 (L) 554 - 400 K/uL    NRBC 1.1 (H) 0.0  WBC    ABSOLUTE NRBC 0.16 (H) 0.00 - 0.01 K/uL    NEUTROPHILS 78 (H) 32 - 75 %    LYMPHOCYTES 15 12 - 49 %    MONOCYTES 6 5 - 13 %    EOSINOPHILS 0 0 - 7 %    BASOPHILS 0 0 - 1 %    IMMATURE GRANULOCYTES 1 (H) 0 - 0.5 %    ABS. NEUTROPHILS 11.7 (H) 1.8 - 8.0 K/UL    ABS. LYMPHOCYTES 2.2 0.8 - 3.5 K/UL    ABS.  MONOCYTES 0.9 0.0 - 1.0 K/UL    ABS. EOSINOPHILS 0.0 0.0 - 0.4 K/UL    ABS. BASOPHILS 0.0 0.0 - 0.1 K/UL    ABS. IMM. GRANS. 0.1 (H) 0.00 - 0.04 K/UL    DF AUTOMATED     METABOLIC PANEL, COMPREHENSIVE    Collection Time: 10/21/21 12:40 PM   Result Value Ref Range    Sodium 148 (H) 136 - 145 mmol/L    Potassium 4.4 3.5 - 5.1 mmol/L    Chloride 116 (H) 97 - 108 mmol/L    CO2 23 21 - 32 mmol/L    Anion gap 9 5 - 15 mmol/L    Glucose 342 (H) 65 - 100 mg/dL    BUN 74 (H) 6 - 20 mg/dL    Creatinine 1.85 (H) 0.55 - 1.02 mg/dL    BUN/Creatinine ratio 40 (H) 12 - 20      GFR est AA 33 (L) >60 ml/min/1.73m2    GFR est non-AA 27 (L) >60 ml/min/1.73m2    Calcium 8.5 8.5 - 10.1 mg/dL    Bilirubin, total 1.0 0.2 - 1.0 mg/dL    AST (SGOT) 62 (H) 15 - 37 U/L    ALT (SGPT) 74 12 - 78 U/L    Alk. phosphatase 232 (H) 45 - 117 U/L    Protein, total 6.3 (L) 6.4 - 8.2 g/dL    Albumin 2.0 (L) 3.5 - 5.0 g/dL    Globulin 4.3 (H) 2.0 - 4.0 g/dL    A-G Ratio 0.5 (L) 1.1 - 2.2     GLUCOSE, POC    Collection Time: 10/21/21  5:52 PM   Result Value Ref Range    Glucose (POC) 248 (H) 65 - 117 mg/dL    Performed by [id number], POC    Collection Time: 10/21/21  7:39 PM   Result Value Ref Range    Glucose (POC) 179 (H) 65 - 117 mg/dL    Performed by Omer MARTINES)    GLUCOSE, POC    Collection Time: 10/22/21  7:23 AM   Result Value Ref Range    Glucose (POC) 190 (H) 65 - 117 mg/dL    Performed by Bernie Dimas        Recommendations/Plan:     1 acute kidney injury on chronic kidney disease stage III:  -likely prerenal secondary to sepsis/possible cardiorenal syndrome  -She presented with a creatinine of 2.5   -Creatinine increased to 2.7.  improved to 1.5  -BUN/Cr bumped again to 74/1.8 (from hypotension)  -labs are pending today. -Recently discharged with creatinine 1.6 on 10/6  -clinically she has no volume overload. No edema in both legs.    -Urine is bland  -CT abdomen without contrast on 10/11 shows no renal obstruction.    -Not on any potential nephrotoxins preadmission  -off IVF      2  MRSA bacteremia:  -acute right parotitis with parapharyngeal and retropharyngeal  involvement per CT scan  -She had LP on admission which was negative  -Urine is bland. Covid negative. No infiltrate on chest x-ray  -She has been started on vancomycin and Ceftaroline and Rifampicin. -ID is on the case      3 Hypernatremia:  -Sodium has improved 150--> 148.  -will c/w 300 mL every 3 hours.    -Also will continue to hold Lasix    4 CHF:  -She has history of cardiomyopathy with EF less than 15%  -Chest x-ray shows mild pulmonary congestion. BNP 15,000  -on Lasix 40 mg IV every 48 hrs which I will hold and monitor urine output    5 altered mentation:  -Could be secondary to metabolic derangements from sepsis.    -Unsure of her baseline mentation  -CT head was negative on admission    6  Hypercalcemia  -PTH is 85.   Corrected calcium is 10.3  -Phosphorus is okay at 5.1            ________________________________________________________________________  Signed: Gonzalez Warner MD

## 2021-10-23 NOTE — PROGRESS NOTES
Infectious Disease Progress Note           Subjective:   Pt seen and examined at bedside, confused, agitated, nonpurposeful movement of exts. No acute events since last seen. PEG tube had to be replaced on 10/22, plans for discharge back to SNF today per RN   Objective:   Physical Exam:     Visit Vitals  BP (!) 138/92 (BP 1 Location: Left upper arm, BP Patient Position: At rest)   Pulse 71   Temp 97.6 °F (36.4 °C)   Resp 20   Ht 5' 6\" (1.676 m)   Wt 167 lb 8.8 oz (76 kg)   SpO2 (!) 88%   Breastfeeding No   BMI 27.04 kg/m²    O2 Flow Rate (L/min): 2 l/min (Refuses to wear canula) O2 Device: None (Room air)    Temp (24hrs), Av.4 °F (36.3 °C), Min:97 °F (36.1 °C), Max:97.6 °F (36.4 °C)    No intake/output data recorded.    10/21 1901 - 10/23 0700  In: 3806 [I.V.:450]  Out: 50 [Urine:50]    General: NAD, alert,non-verbal, confused   HEENT: SHAORN, Moist mucosa, decreasing size of right facial swelling   Lungs: CTA b/l, decreased at the bases, no wheeze/rhonchi   Heart: S1S2+, RRR, no murmur  Abdo: Soft, NT, ND, +BS, peg tube in place   : + escobedo cath   Exts: no edema  + pulses b/l   Skin: No wounds, No rashes or lesions    Data Review:       Recent Days:  Recent Labs     10/23/21  0458 10/21/21  1240   WBC 16.3* 15.0*   HGB 13.2 12.7   HCT 41.5 39.8   PLT 73* 78*     Recent Labs     10/23/21  0458 10/21/21  1240   BUN 70* 74*   CREA 1.75* 1.85*       No results found for: CRPQN, CRP, CRPM       Microbiology     Results     Procedure Component Value Units Date/Time    CULTURE, BLOOD [[id number]] Collected: 10/21/21 124    Order Status: Completed Specimen: Blood Updated: 10/23/21 105     Special Requests: No Special Requests        Culture result: No growth 1 day       CULTURE, BLOOD [[id number]] Collected: 10/18/21 1230    Order Status: Completed Specimen: Blood Updated: 10/23/21 1050     Special Requests: No Special Requests        Culture result: No growth 5 days       CULTURE, BLOOD [[id number]]  (Abnormal) Collected: 10/15/21 1650    Order Status: Completed Specimen: Blood Updated: 10/18/21 1335     Special Requests: No Special Requests        Culture result:       * methicillin resistant staphylococcus aureus * GROWING IN THE ANAEROBIC BOTTLE (NO SITE)            *KNOWN MRSA PATIENT            Please refer to previous blood culture  [id number] COLLECTED 10/12/21      CULTURE, BLOOD [[id number]]  (Abnormal) Collected: 10/13/21 0500    Order Status: Completed Specimen: Blood Updated: 10/15/21 1247     Special Requests: No Special Requests        Culture result:       Gram Positive Cocci in clusters growing in both bottles drawn [facility] ([address] ON 10/14/21 AT 1157. HH                  * methicillin resistant staphylococcus aureus * growing in both bottles drawn (SITE NOT INDICATED) REFER TO [id number] FOR SENSITIVITIES            (NOTE) KNOWN MRSA PATIENT    CULTURE, Baljeet Wilder STAIN [[id number]] Collected: 10/12/21 0945    Order Status: Canceled Specimen: Wound from Mouth     MRSA SCREEN - PCR (NASAL) [[id number]]  (Abnormal) Collected: 10/11/21 1025    Order Status: Completed Specimen: Swab Updated: 10/11/21 1147     MRSA by PCR, Nasal DETECTED        Comment: Results verified, phoned to and read back by Julienne Muse 10/11/21 11:50       MRSA SCREEN - PCR (NASAL) [[id number]] Collected: 10/11/21 0945    Order Status: Canceled Specimen: Swab     COVID-19 RAPID TEST [[id number]] Collected: 10/11/21 0930    Order Status: Completed Specimen: Nasopharyngeal Updated: 10/11/21 1104     Specimen source       Please find results under separate order           COVID-19 rapid test Not Detected        Comment: Rapid Abbott ID Now   Rapid NAAT:  The specimen is NEGATIVE for SARS-CoV-2, the novel coronavirus associated with COVID-19.    Negative results should be treated as presumptive and, if inconsistent with clinical signs and symptoms or necessary for patient management, should be tested with an alternative molecular assay. Negative results do not preclude SARS-CoV-2 infection and should not be used as the sole basis for patient management decisions. This test has been authorized by the FDA under   an Emergency Use Authorization (EUA) for use by authorized laboratories.  Fact sheet for Healthcare Providers: Astley ClarkedaTrending Taste.co.nz Fact sheet for Patients: Expect Labs.co.nz   Methodology: Isothermal Nucleic Acid Amplification         CULTURE, BLOOD, LINE [[id number]] Collected: 10/11/21 0930    Order Status: Canceled Specimen: Blood     CULTURE, URINE [[id number]] Collected: 10/11/21 0930    Order Status: Completed Specimen: Urine Updated: 10/12/21 1049     Special Requests: No Special Requests        Culture result: No Growth (<1000 cfu/mL)       CULTURE, CSF  TUBE 2 [[id number]] Collected: 10/11/21 0504    Order Status: Completed Specimen: Cerebrospinal Fluid Updated: 10/18/21 1450     Special Requests: No Special Requests        GRAM STAIN No wbc's seen         No organisms seen        Culture result:       no growth on solid media at 4 days                  no growth in Thio broth at 7 days          CULTURE, BLOOD, PAIRED [[id number]]  (Abnormal)  (Susceptibility) Collected: 10/11/21 0225    Order Status: Completed Specimen: Blood Updated: 10/15/21 0921     Special Requests: No Special Requests        Culture result:       * methicillin resistant staphylococcus aureus * growing in all 4 bottles drawn (SITES NOT INDICATED)          Susceptibility      Staphylococcus aureus Methcillin Resistant      NIMA      Ciprofloxacin ($) Susceptible      Clindamycin ($) Susceptible      Daptomycin ($$$$$) Susceptible      Doxycycline ($$) Susceptible      Erythromycin ($$$$) Resistant      Gentamicin ($) Susceptible      Levofloxacin ($) Susceptible      Linezolid ($$$$$) Susceptible      Oxacillin Resistant      Rifampin ($$$$) Susceptible [1]       Tetracycline Susceptible      Trimeth/Sulfa Susceptible      Vancomycin ($) Susceptible              [1]  Rifampin is not to be used for mono-therapy. [facility]                          Diagnostics   CXR Results  (Last 48 hours)    None           Assessment/Plan     1. MRSA bacteremia, persistent. BCx positive on 10/11, 10/13 and 10/15      Repeat BCx from 10/18 and 10/21 are both neg. No valvular veg on 2 D echo       Afebrile, leukocytosis persist, w/o new acute source of infection      D/c ceftaroline and daptomycin since blood Cx now sterile, resume Vanc and rifampin for the remainder of therapy      Recommend 6 wks of vancomycin and rifampin from 10/18    2. Acute right parotitis, suppurative, decreasing right facial swelling         3.  MRSA colonization: S/p 5 days of Mupirocin      4. Cardiomyopathy w baseline EF of <15 % (10/19)     5. Acute on chronic renal failure, Cr trending down on todays labs       Artemio Orr MD    10/23/2021

## 2021-10-23 NOTE — PROGRESS NOTES
Problem: Diabetes Self-Management  Goal: *Disease process and treatment process  Description: Define diabetes and identify own type of diabetes; list 3 options for treating diabetes. Outcome: Progressing Towards Goal  Goal: *Incorporating nutritional management into lifestyle  Description: Describe effect of type, amount and timing of food on blood glucose; list 3 methods for planning meals. Outcome: Progressing Towards Goal  Goal: *Incorporating physical activity into lifestyle  Description: State effect of exercise on blood glucose levels. Outcome: Progressing Towards Goal  Goal: *Developing strategies to promote health/change behavior  Description: Define the ABC's of diabetes; identify appropriate screenings, schedule and personal plan for screenings. Outcome: Progressing Towards Goal  Goal: *Using medications safely  Description: State effect of diabetes medications on diabetes; name diabetes medication taking, action and side effects. Outcome: Progressing Towards Goal  Goal: *Monitoring blood glucose, interpreting and using results  Description: Identify recommended blood glucose targets  and personal targets. Outcome: Progressing Towards Goal  Goal: *Prevention, detection, treatment of acute complications  Description: List symptoms of hyper- and hypoglycemia; describe how to treat low blood sugar and actions for lowering  high blood glucose level. Outcome: Progressing Towards Goal  Goal: *Prevention, detection and treatment of chronic complications  Description: Define the natural course of diabetes and describe the relationship of blood glucose levels to long term complications of diabetes.   Outcome: Progressing Towards Goal  Goal: *Developing strategies to address psychosocial issues  Description: Describe feelings about living with diabetes; identify support needed and support network  Outcome: Progressing Towards Goal  Goal: *Insulin pump training  Outcome: Progressing Towards Goal  Goal: *Sick day guidelines  Outcome: Progressing Towards Goal  Goal: *Patient Specific Goal (EDIT GOAL, INSERT TEXT)  Outcome: Progressing Towards Goal     Problem: Patient Education: Go to Patient Education Activity  Goal: Patient/Family Education  Outcome: Progressing Towards Goal     Problem: Falls - Risk of  Goal: *Absence of Falls  Description: Document Anatoly Hayward Fall Risk and appropriate interventions in the flowsheet. Outcome: Progressing Towards Goal  Note: Fall Risk Interventions:  Mobility Interventions: Bed/chair exit alarm    Mentation Interventions: Bed/chair exit alarm    Medication Interventions: Bed/chair exit alarm    Elimination Interventions: Bed/chair exit alarm    History of Falls Interventions: Bed/chair exit alarm         Problem: Patient Education: Go to Patient Education Activity  Goal: Patient/Family Education  Outcome: Progressing Towards Goal     Problem: Risk for Spread of Infection  Goal: Prevent transmission of infectious organism to others  Description: Prevent the transmission of infectious organisms to other patients, staff members, and visitors. Outcome: Progressing Towards Goal     Problem: Patient Education:  Go to Education Activity  Goal: Patient/Family Education  Outcome: Progressing Towards Goal     Problem: Pressure Injury - Risk of  Goal: *Prevention of pressure injury  Description: Document Marshall Scale and appropriate interventions in the flowsheet.   Outcome: Progressing Towards Goal  Note: Pressure Injury Interventions:  Sensory Interventions: Avoid rigorous massage over bony prominences, Keep linens dry and wrinkle-free, Minimize linen layers    Moisture Interventions: Apply protective barrier, creams and emollients, Minimize layers    Activity Interventions: Assess need for specialty bed    Mobility Interventions: Assess need for specialty bed, PT/OT evaluation    Nutrition Interventions: Document food/fluid/supplement intake, Offer support with meals,snacks and hydration    Friction and Shear Interventions: Minimize layers                Problem: Patient Education: Go to Patient Education Activity  Goal: Patient/Family Education  Outcome: Progressing Towards Goal     Problem: Nutrition Deficit  Goal: *Optimize nutritional status  Outcome: Progressing Towards Goal     Problem: Patient Education: Go to Patient Education Activity  Goal: Patient/Family Education  Outcome: Progressing Towards Goal

## 2021-10-23 NOTE — PROGRESS NOTES
Day #1 of Vancomycin (Restart)  Per Dr. Marcela Moy    Indication for antimicrobials: SSTI, MRSA bacteremia   Other antimicrobials: Rifampin (s/p 10 days of ceftaroline and 5 days of daptomycin)    Significant Cultures:   10/11 blood: MRSA   10/11 CSF: NGTD   10/11 urine: NGTD   10/11 mrsa screen: detected   10/13 blood: MRSA  10/15 blood: MRSA  10/18 blood: NGTD  10/21 blood: NGTD (pending)    Labs:  Recent Labs     10/23/21  0458 10/21/21  1240   CREA 1.75* 1.85*   BUN 70* 74*   WBC 16.3* 15.0*     Temp (24hrs), Av.6 °F (36.4 °C), Min:97.6 °F (36.4 °C), Max:97.6 °F (36.4 °C)    Is the Patient on Dialysis? No    Creatinine Clearance (mL/min):   CrCl (Actual Body Weight): 37.4  CrCl (Adjusted Body Weight): 32.5  CrCl (Ideal Body Weight): 29.2    Goal level: AUC/NIMA 400-600     Impression/Plan:   Previous course of vancomycin 10/11 - 10/18. Per Dr. Marcela Moy note today: restart and recommend 6 wks of vancomycin and rifampin from 10/18  Gave 1250 mg loading dose  Start 750 mg q24H thereafter. Predicted AUC/NIMA 420. Will order level for 10/25 at 1000     Pharmacy will follow daily and adjust medications as appropriate for renal function and/or serum levels.     Thank you,  Andrzej Moreland, PHARMD

## 2021-10-23 NOTE — PROGRESS NOTES
Pt Bs 30 and 65 on repeat. Dextrose IV administered. Attempted to reach  Twice. No answer. D5 @100 infusing. Will continue to follow care plan.

## 2021-10-23 NOTE — PROGRESS NOTES
Recheck blood sugar is 55, another 12.5 dextrose Iv given. BNP ordered. Dr. Hemal Preston notified and ordered [email or web address]

## 2021-10-23 NOTE — PROCEDURES
Procedure note,    Patient had a PEG tube placement couple days ago, patient put a PEG tube yesterday, Quinn catheter was in place, overnight patient no signs of bleeding     changeable 18-gauge gastric tube placed through the ostoma into the back without issue,    We will do a KUB with contrast to confirm the location before  used the tube.

## 2021-10-23 NOTE — PROGRESS NOTES
Message sent to admissions at [facility]. Awaiting a response from admissions re: bed availability. Assigned RN Estefany Nolasco, informed writer patient is still pending peg placement.           YADIRA Reddy

## 2021-10-23 NOTE — DISCHARGE SUMMARY
Discharge Summary       PATIENT ID: Bre Morales  MRN: [id number]   YOB: 1955    DATE OF ADMISSION: 10/11/2021 12:19 AM    DATE OF DISCHARGE:   PRIMARY CARE PROVIDER: Alberto Ibrahim MD     ATTENDING PHYSICIAN: Danielle Dumont  DISCHARGING PROVIDER: Danielle Dumont      CONSULTATIONS: IP CONSULT TO NEUROLOGY  IP CONSULT TO INFECTIOUS DISEASES  IP CONSULT TO OTOLARYNGOLOGY  IP CONSULT TO GASTROENTEROLOGY  IP CONSULT TO NEPHROLOGY  IP CONSULT TO NEPHROLOGY  IP CONSULT TO GASTROENTEROLOGY  IP CONSULT TO CARDIOLOGY    PROCEDURES/SURGERIES: Procedure(s):  PERCUTANEOUS ENDOSCOPIC GASTROSTOMY TUBE INSERTION    ADMITTING DIAGNOSES:    Patient Active Problem List    Diagnosis Date Noted    Moderate protein-calorie malnutrition ([address] 10/12/2021    Altered mental status 10/11/2021    Sepsis ([address] 10/11/2021    CHF exacerbation ([address] 10/02/2021    Gait disturbance 08/02/2021    Elevated troponin I level 08/02/2021    Shortness of breath 02/13/2021    Fever 02/13/2021    Acute CHF (congestive heart failure) ([address] 02/13/2021    COVID-19 virus infection 02/13/2021    Uncontrolled type 2 diabetes mellitus with hyperglycemia ([address] 10/08/2018    Mixed hyperlipidemia 10/08/2018    Implantable cardioverter-defibrillator (ICD) in situ 11/16/2010    Cardiomyopathy, idiopathic ([address] 10/28/2010    HTN (hypertension), benign 10/28/2010    PVC (premature ventricular contraction) 10/28/2010       DISCHARGE DIAGNOSES / PLAN:      Altered mental status  Metabolic encephalopathy  Sepsis  Sepsis with bacteremia with MRSA  Acute proctitis  Lactic acidosis  Leukocytosis  Chronic A.  Fib  Congestive heart failure  Hypertension  Cardiomyopathy ejection fraction 15 x 20%  Acute on chronic kidney disease  Type 2 diabetes  Static post PEG tube placement  Hyponatremia  Hypokalemia              Patient is [age] y.o. year old female history of chronic A. fib chronic kidney disease hypertension pacemaker congestive heart failure with ejection fraction of 15 to 20% history of diabetes hypertension patient have a Covid few months ago since then but he declined patient was recently discharged from the hospital with congestive heart failure came back with altered mental status not awake not talking seen by the ER physician CT scan of the head was negative  Patient had WBC count elevated increased lactic acid concern of sepsis sepsis patient was LP done in the ER which was normal patient received 2 g of Rocephin when I saw the patient was still was altered I ordered repeat lactic acid ordered CT scan of the maxillary facial area swelling of the right parotid area and admitted to ICU for further work-up discussed with the patient daughter     According to the patient daughter since she have a Covid in March April this year she is declining she is in the hospital third time this month           Patient awake not much verbal/restless  Static post PEG tube placement     MRSA bacteremia    During this admission patient seen by the infectious disease  Repeat CT scan of the neck and soft tissue shows    Persistent swelling of the right parotid gland although slightly less prominent. Resolving cellulitic changes in the right neck.  Decrease in the retropharyngeal and right parapharyngeal edema      Patient on Teflaro and daptomycin IV and p.o. rifampin    Overall prognosis is poor    Platelet count still low but stable    Patient is DNR    Patient off Eliquis secondary to thrombocytopenia      Patient can be discharged to nursing home if cleared by the infectious disease    Patient already have a PICC line      As patient waiting for the placement  Last night while cleaning the patient by the nurse patient pulled out PEG tube    Patient resting the bed alert awake but confused  We will repeat the blood work tomorrow  GI consult for replacement of PEG tube    Start IV fluids as patient unable to eat    Hypernatremia  Acute kidney injury    Start IV fluids        DISCHARGE MEDICATIONS:  Current Discharge Medication List      START taking these medications    Details   insulin glargine (LANTUS) 100 unit/mL injection daily  Qty: 1 mL, Refills: 1  Start date: 10/22/2021      rifAMPin (RIFADIN) 300 mg capsule Take 1 Capsule by mouth daily for 7 doses. Qty: 7 Capsule, Refills: 0  Start date: 10/22/2021, End date: 10/29/2021      vancomycin (VANCOCIN) 10 gram solr 1,250 mg by IntraVENous route daily for 38 doses. Check Vanc trough, BUN/Cr, CRP and ESR wkly while on Vanc   Keep trough b/w 15-20  Qty: 38 Each, Refills: 0  Start date: 10/21/2021, End date: 11/28/2021         CONTINUE these medications which have CHANGED    Details   furosemide (Lasix) 20 mg tablet daily  Qty: 10 Tablet, Refills: 0  Start date: 10/23/2021         CONTINUE these medications which have NOT CHANGED    Details   aspirin delayed-release 81 mg tablet Take 1 Tablet by mouth daily. Qty: 30 Tablet, Refills: 0      carvediloL (COREG) 6.25 mg tablet Take 1 Tablet by mouth two (2) times daily (with meals). Qty: 60 Tablet, Refills: 0      prednisoLONE acetate (PRED FORTE) 1 % ophthalmic suspension INSTILL 1 DROP INTO RIGHT EYE 4 TIMES A DAY      atorvastatin (LIPITOR) 40 mg tablet Take 1 Tab by mouth nightly.   Qty: 60 Tab, Refills: 1         STOP taking these medications       oxyCODONE IR (ROXICODONE) 5 mg immediate release tablet Comments:   Reason for Stopping:         cyclobenzaprine (FLEXERIL) 5 mg tablet Comments:   Reason for Stopping:         amitriptyline (ELAVIL) 75 mg tablet Comments:   Reason for Stopping:         metFORMIN (GLUCOPHAGE) 1,000 mg tablet Comments:   Reason for Stopping:         gabapentin (NEURONTIN) 100 mg capsule Comments:   Reason for Stopping:         metoprolol succinate (TOPROL-XL) 25 mg XL tablet Comments:   Reason for Stopping:         ELIQUIS 5 mg tablet Comments:   Reason for Stopping:         nitroglycerin (NITROSTAT) 0.4 mg SL tablet Comments:   Reason for Stopping:         cloNIDine HCL (CATAPRES) 0.2 mg tablet Comments:   Reason for Stopping:         losartan (COZAAR) 100 mg tablet Comments:   Reason for Stopping:         PARoxetine (PAXIL) 20 mg tablet Comments:   Reason for Stopping:         omeprazole (PRILOSEC) 20 mg capsule Comments:   Reason for Stopping:                 NOTIFY YOUR PHYSICIAN FOR ANY OF THE FOLLOWING:   Fever over 101 degrees for 24 hours. Chest pain, shortness of breath, fever, chills, nausea, vomiting, diarrhea, change in mentation, falling, weakness, bleeding. Severe pain or pain not relieved by medications. Or, any other signs or symptoms that you may have questions about. DISPOSITION:  x  Home With:   OT  PT  HH  RN       Long term SNF/Inpatient Rehab    Independent/assisted living    Hospice    Other:       PATIENT CONDITION AT DISCHARGE: Stable      PHYSICAL EXAMINATION AT DISCHARGE:  General:          Alert, cooperative, no distress, appears stated age. HEENT:           Atraumatic, anicteric sclerae, pink conjunctivae                          No oral ulcers, mucosa moist, throat clear, dentition fair  Neck:               Supple, symmetrical  Lungs:             Clear to auscultation bilaterally. No Wheezing or Rhonchi. No rales. Chest wall:      No tenderness  No Accessory muscle use. Heart:              Regular  rhythm,  No  murmur   No edema  Abdomen:        Soft, non-tender. Not distended. Bowel sounds normal  Extremities:     No cyanosis. No clubbing,                            Skin turgor normal, Capillary refill normal  Skin:                Not pale. Not Jaundiced  No rashes   Psych:             Not anxious or agitated. Neurologic:      Alert, moves all extremities, answers questions appropriately and responds to commands     CT NECK SOFT TISSUE WO CONT   Final Result   Persistent swelling of the right parotid gland although slightly   less prominent. Resolving cellulitic changes in the right neck.  Decrease in the retropharyngeal and right parapharyngeal edema. Follow-up in 2 weeks preferably with intravenous contrast (if there are no   contraindications) is suggested to exclude associated neoplastic process of the   right parotid gland. CT MAXILLOFACIAL WO CONT   Final Result   Findings more consistent with cellulitis of the right face that   includes the right parotid gland (acute parotitis) as described above. Edema/fluid collection in the parapharyngeal space and retropharyngeal space. Suggestions as above. CT ABD PELV WO CONT   Final Result   Small amount of ascites. No focal fluid collection or free air. Density in the gallbladder which could be artifact, sludge or gallstones. No   specific CT evidence of cholecystitis. Bladder wall prominence. Other findings   as above. CT HEAD WO CONT   Final Result   1. No evidence of acute intracranial hemorrhage or mass effect. 2.  Multifocal intracranial encephalomalacia as above. XR CHEST PORT   Final Result   1. Mild pulmonary vascular congestion. 2.  Cardiomegaly.            Recent Results (from the past 24 hour(s))   GLUCOSE, POC    Collection Time: 10/22/21 11:32 AM   Result Value Ref Range    Glucose (POC) 207 (H) 65 - 117 mg/dL    Performed by [facility] [id number] Peggy Banuelos, POC    Collection Time: 10/22/21  2:54 PM   Result Value Ref Range    Glucose (POC) 149 (H) 65 - 117 mg/dL    Performed by Nathalie, POC    Collection Time: 10/22/21  9:30 PM   Result Value Ref Range    Glucose (POC) 101 65 - 117 mg/dL    Performed by Mike Yousif, POC    Collection Time: 10/23/21  3:43 AM   Result Value Ref Range    Glucose (POC) 30 (LL) 65 - 117 mg/dL    Performed by Griffin Harrison, POC    Collection Time: 10/23/21  3:47 AM   Result Value Ref Range    Glucose (POC) 65 65 - 117 mg/dL    Performed by Griffin Harrison, POC    Collection Time: 10/23/21  4:39 AM   Result Value Ref Range    Glucose (POC) 55 (L) 65 - 117 mg/dL    Performed by Chato Toscano    CBC WITH AUTOMATED DIFF    Collection Time: 10/23/21  4:58 AM   Result Value Ref Range    WBC 16.3 (H) 3.6 - 11.0 K/uL    RBC 4.69 3.80 - 5.20 M/uL    HGB 13.2 11.5 - 16.0 g/dL    HCT 41.5 35.0 - 47.0 %    MCV 88.5 80.0 - 99.0 FL    MCH 28.1 26.0 - 34.0 PG    MCHC 31.8 30.0 - 36.5 g/dL    RDW 21.4 (H) 11.5 - 14.5 %    PLATELET 73 (L) 980 - 400 K/uL    NRBC 1.3 (H) 0.0  WBC    ABSOLUTE NRBC 0.21 (H) 0.00 - 0.01 K/uL    NEUTROPHILS 77 (H) 32 - 75 %    LYMPHOCYTES 13 12 - 49 %    MONOCYTES 9 5 - 13 %    EOSINOPHILS 0 0 - 7 %    BASOPHILS 0 0 - 1 %    IMMATURE GRANULOCYTES 1 (H) 0 - 0.5 %    ABS. NEUTROPHILS 12.6 (H) 1.8 - 8.0 K/UL    ABS. LYMPHOCYTES 2.1 0.8 - 3.5 K/UL    ABS. MONOCYTES 1.4 (H) 0.0 - 1.0 K/UL    ABS. EOSINOPHILS 0.1 0.0 - 0.4 K/UL    ABS. BASOPHILS 0.0 0.0 - 0.1 K/UL    ABS. IMM. GRANS. 0.1 (H) 0.00 - 0.04 K/UL    DF AUTOMATED     METABOLIC PANEL, COMPREHENSIVE    Collection Time: 10/23/21  4:58 AM   Result Value Ref Range    Sodium 150 (H) 136 - 145 mmol/L    Potassium 4.8 3.5 - 5.1 mmol/L    Chloride 119 (H) 97 - 108 mmol/L    CO2 24 21 - 32 mmol/L    Anion gap 7 5 - 15 mmol/L    Glucose 79 65 - 100 mg/dL    BUN 70 (H) 6 - 20 mg/dL    Creatinine 1.75 (H) 0.55 - 1.02 mg/dL    BUN/Creatinine ratio 40 (H) 12 - 20      GFR est AA 35 (L) >60 ml/min/1.73m2    GFR est non-AA 29 (L) >60 ml/min/1.73m2    Calcium 8.8 8.5 - 10.1 mg/dL    Bilirubin, total 1.0 0.2 - 1.0 mg/dL    AST (SGOT) 71 (H) 15 - 37 U/L    ALT (SGPT) 74 12 - 78 U/L    Alk.  phosphatase 139 (H) 45 - 117 U/L    Protein, total 6.7 6.4 - 8.2 g/dL    Albumin 1.9 (L) 3.5 - 5.0 g/dL    Globulin 4.8 (H) 2.0 - 4.0 g/dL    A-G Ratio 0.4 (L) 1.1 - 2.2     GLUCOSE, POC    Collection Time: 10/23/21  8:31 AM   Result Value Ref Range    Glucose (POC) 67 65 - 117 mg/dL    Performed by Nisha MARTINES)    GLUCOSE, POC    Collection Time: 10/23/21  9:02 AM   Result Value Ref Range    Glucose (POC) 116 65 - 117 mg/dL    Performed by Bailey Guzman                 Signed:   Cielo Daniel MD  10/23/2021  10:38 AM

## 2021-10-23 NOTE — PROGRESS NOTES
Renal Note    NAME:  Bree Morales   :   1955   MRN:   [id number]     ATTENDING: Reginaldo Berkowitz MD  PCP:  Eliseo Moffett MD    Date/Time:  10/23/2021 1:00 PM      Subjective:     Pt seen in room. Na 150  Creatinine 1.7      Past Medical History:   Diagnosis Date    [facility] ([facility]     Cardiomyopathy, idiopathic ([facility] 10/28/2010    Chronic kidney disease     Heart failure ([facility]     HTN (hypertension), benign 10/28/2010    Pacemaker     ICD    PVC (premature ventricular contraction) 10/28/2010      Past Surgical History:   Procedure Laterality Date    HX OTHER SURGICAL  10/02/2018    Right eye surgery    HX PACEMAKER       Social History     Tobacco Use    Smoking status: Never Smoker    Smokeless tobacco: Never Used   Substance Use Topics    Alcohol use: No      History reviewed. No pertinent family history. Allergies   Allergen Reactions    Chocolate [Cocoa] Anaphylaxis and Swelling    Iodine Anaphylaxis    Lisinopril Anaphylaxis    Peanut Anaphylaxis and Swelling    Glimepiride Swelling    Glipizide Other (comments)     Pruritis    Metformin Diarrhea    Norvasc [Amlodipine] Other (comments)     Light Headed    Pneumovax 23 [Pneumococcal 23-Devi Ps Vaccine] Hives    Toprol Xl [Metoprolol Succinate] Other (comments)     Light headed    Tositumomab I-131 (Maltose) Unable to Obtain    Vitamin D [Cholecalciferol (Vitamin D3)] Other (comments)     Dizziness/headache not a true allergy. Prior to Admission medications    Medication Sig Start Date End Date Taking? Authorizing Provider   insulin glargine (LANTUS) 100 unit/mL injection daily 10/22/21  Yes Yessica Gould MD   rifAMPin (RIFADIN) 300 mg capsule Take 1 Capsule by mouth daily for 7 doses. 10/22/21 10/29/21 Yes López Dumont MD   furosemide (Lasix) 20 mg tablet daily 10/23/21  Yes Yessica Gould MD   vancomycin (VANCOCIN) 10 gram solr 1,250 mg by IntraVENous route daily for 38 doses.  Check Vanc trough, BUN/Cr, CRP and ESR wkly while on Vanc   Keep trough b/w 15-20 10/21/21 11/28/21 Yes Torie Mei MD   aspirin delayed-release 81 mg tablet Take 1 Tablet by mouth daily. 10/5/21   Verónica Durham MD   carvediloL (COREG) 6.25 mg tablet Take 1 Tablet by mouth two (2) times daily (with meals). 8/5/21   Devi Bolton MD   oxyCODONE IR (ROXICODONE) 5 mg immediate release tablet Take 5 mg by mouth every four (4) hours as needed for Pain. 7/16/21   Provider, Historical   cyclobenzaprine (FLEXERIL) 5 mg tablet Take 5 mg by mouth two (2) times daily as needed for Muscle Spasm(s). 7/19/21   Provider, Historical   amitriptyline (ELAVIL) 75 mg tablet Take 75 mg by mouth daily. 5/3/21   Provider, Historical   prednisoLONE acetate (PRED FORTE) 1 % ophthalmic suspension INSTILL 1 DROP INTO RIGHT EYE 4 TIMES A DAY 3/13/21   Provider, Historical   atorvastatin (LIPITOR) 40 mg tablet Take 1 Tab by mouth nightly. 2/19/21   Lu Dumont MD   furosemide (Lasix) 40 mg tablet 1 tablet daily  Patient taking differently: Take 40 mg by mouth daily. 1 tablet daily 2/19/21   Elina Little MD   metFORMIN (GLUCOPHAGE) 1,000 mg tablet two (2) times daily (with meals). 10/12/20   Provider, Historical   gabapentin (NEURONTIN) 100 mg capsule TAKE 1 CAPSULE BY MOUTH THREE TIMES A DAY 10/27/20   Provider, Historical   metoprolol succinate (TOPROL-XL) 25 mg XL tablet TAKE 0.5 TABS BY MOUTH DAILY. 12/16/19   Skip WILLIE Hermosillo   ELIQUIS 5 mg tablet TAKE 1 TABLET BY MOUTH TWICE A DAY 12/5/19   Moise Hermosillo NP   nitroglycerin (NITROSTAT) 0.4 mg SL tablet 0.4 mg by SubLINGual route every five (5) minutes as needed for Chest Pain. Up to 3 doses. Patient not taking: Reported on 8/3/2021    Provider, Historical   cloNIDine HCL (CATAPRES) 0.2 mg tablet Take 0.2 mg by mouth two (2) times a day. 5/21/18   Provider, Historical   losartan (COZAAR) 100 mg tablet Take 100 mg by mouth daily.  4/25/17   Provider, Historical PARoxetine (PAXIL) 20 mg tablet Take 20 mg by mouth daily. Provider, Historical   omeprazole (PRILOSEC) 20 mg capsule Take 20 mg by mouth daily. 10/28/10   Provider, Historical       REVIEW OF SYSTEMS:       Unable to obtain because of patient's mentation      Objective:   VITALS:    Visit Vitals  BP (!) 138/92 (BP 1 Location: Left upper arm, BP Patient Position: At rest)   Pulse 71   Temp 97.6 °F (36.4 °C)   Resp 20   Ht 5' 6\" (1.676 m)   Wt 76 kg (167 lb 8.8 oz)   SpO2 (!) 88%   Breastfeeding No   BMI 27.04 kg/m²     Temp (24hrs), Av.4 °F (36.3 °C), Min:97 °F (36.1 °C), Max:97.6 °F (36.4 °C)      PHYSICAL EXAM:     General: NAD, awake. Lethargic  Eyes: sclera anicteric  Oral Cavity: No thrush or ulcers  Neck: no JVD  Chest: Fair bilateral air entry. No Wheezing or Rhonchi. No rales. Heart: normal sounds  Abdomen: soft and non tender   :  escobedo  Lower Extremities: no edema  Skin: no rash  Neuro: She is lethargic.   Withdraws to pain  Psychiatric: Unable to assess        LAB DATA REVIEWED:    Recent Results (from the past 24 hour(s))   GLUCOSE, POC    Collection Time: 10/22/21 11:32 AM   Result Value Ref Range    Glucose (POC) 207 (H) 65 - 117 mg/dL    Performed by [facility] [id number] Peggy Banuelos, POC    Collection Time: 10/22/21  2:54 PM   Result Value Ref Range    Glucose (POC) 149 (H) 65 - 117 mg/dL    Performed by Nathalie, POC    Collection Time: 10/22/21  9:30 PM   Result Value Ref Range    Glucose (POC) 101 65 - 117 mg/dL    Performed by Stacie Pathak    GLUCOSE, POC    Collection Time: 10/23/21  3:43 AM   Result Value Ref Range    Glucose (POC) 30 (LL) 65 - 117 mg/dL    Performed by Faye Gaston, POC    Collection Time: 10/23/21  3:47 AM   Result Value Ref Range    Glucose (POC) 65 65 - 117 mg/dL    Performed by Faye Gaston, POC    Collection Time: 10/23/21  4:39 AM   Result Value Ref Range    Glucose (POC) 55 (L) 65 - 117 mg/dL    Performed by Gely Nicholas CBC WITH AUTOMATED DIFF    Collection Time: 10/23/21  4:58 AM   Result Value Ref Range    WBC 16.3 (H) 3.6 - 11.0 K/uL    RBC 4.69 3.80 - 5.20 M/uL    HGB 13.2 11.5 - 16.0 g/dL    HCT 41.5 35.0 - 47.0 %    MCV 88.5 80.0 - 99.0 FL    MCH 28.1 26.0 - 34.0 PG    MCHC 31.8 30.0 - 36.5 g/dL    RDW 21.4 (H) 11.5 - 14.5 %    PLATELET 73 (L) 410 - 400 K/uL    NRBC 1.3 (H) 0.0  WBC    ABSOLUTE NRBC 0.21 (H) 0.00 - 0.01 K/uL    NEUTROPHILS 77 (H) 32 - 75 %    LYMPHOCYTES 13 12 - 49 %    MONOCYTES 9 5 - 13 %    EOSINOPHILS 0 0 - 7 %    BASOPHILS 0 0 - 1 %    IMMATURE GRANULOCYTES 1 (H) 0 - 0.5 %    ABS. NEUTROPHILS 12.6 (H) 1.8 - 8.0 K/UL    ABS. LYMPHOCYTES 2.1 0.8 - 3.5 K/UL    ABS. MONOCYTES 1.4 (H) 0.0 - 1.0 K/UL    ABS. EOSINOPHILS 0.1 0.0 - 0.4 K/UL    ABS. BASOPHILS 0.0 0.0 - 0.1 K/UL    ABS. IMM. GRANS. 0.1 (H) 0.00 - 0.04 K/UL    DF AUTOMATED     METABOLIC PANEL, COMPREHENSIVE    Collection Time: 10/23/21  4:58 AM   Result Value Ref Range    Sodium 150 (H) 136 - 145 mmol/L    Potassium 4.8 3.5 - 5.1 mmol/L    Chloride 119 (H) 97 - 108 mmol/L    CO2 24 21 - 32 mmol/L    Anion gap 7 5 - 15 mmol/L    Glucose 79 65 - 100 mg/dL    BUN 70 (H) 6 - 20 mg/dL    Creatinine 1.75 (H) 0.55 - 1.02 mg/dL    BUN/Creatinine ratio 40 (H) 12 - 20      GFR est AA 35 (L) >60 ml/min/1.73m2    GFR est non-AA 29 (L) >60 ml/min/1.73m2    Calcium 8.8 8.5 - 10.1 mg/dL    Bilirubin, total 1.0 0.2 - 1.0 mg/dL    AST (SGOT) 71 (H) 15 - 37 U/L    ALT (SGPT) 74 12 - 78 U/L    Alk.  phosphatase 139 (H) 45 - 117 U/L    Protein, total 6.7 6.4 - 8.2 g/dL    Albumin 1.9 (L) 3.5 - 5.0 g/dL    Globulin 4.8 (H) 2.0 - 4.0 g/dL    A-G Ratio 0.4 (L) 1.1 - 2.2     GLUCOSE, POC    Collection Time: 10/23/21  8:31 AM   Result Value Ref Range    Glucose (POC) 67 65 - 117 mg/dL    Performed by Bruna MARTINES)    GLUCOSE, POC    Collection Time: 10/23/21  9:02 AM   Result Value Ref Range    Glucose (POC) 116 65 - 117 mg/dL    Performed by Shakila Mohamud Recommendations/Plan:     1 acute kidney injury on chronic kidney disease stage III:  -likely prerenal secondary to sepsis/possible cardiorenal syndrome  -She presented with a creatinine of 2.5   -Creatinine increased to 2.7.  improved to 1.5  -BUN/Cr bumped again to 74/1.8 (from hypotension)  -Cr 1.7   -Recently discharged with creatinine 1.6 on 10/6  -clinically she has no volume overload. No edema in both legs. -Urine is bland  -CT abdomen without contrast on 10/11 shows no renal obstruction.    -Not on any potential nephrotoxins preadmission  -off IVF      2  MRSA bacteremia:  -acute right parotitis with parapharyngeal and retropharyngeal  involvement per CT scan  -She had LP on admission which was negative  -Urine is bland. Covid negative. No infiltrate on chest x-ray  -She has been started on vancomycin and Ceftaroline and Rifampicin. -ID is on the case      3 Hypernatremia:  -Sodium has improved 150.  -will c/w 300 mL every 3 hours.    -Also will continue to hold Lasix    4 CHF:  -She has history of cardiomyopathy with EF less than 15%  -Chest x-ray shows mild pulmonary congestion. BNP 15,000  -on Lasix 40 mg IV every 48 hrs which I will hold and monitor urine output    5 altered mentation:  -Could be secondary to metabolic derangements from sepsis.    -Unsure of her baseline mentation  -CT head was negative on admission    6  Hypercalcemia  -PTH is 85.   Corrected calcium is 10.3  -Phosphorus is okay at 5.1            ________________________________________________________________________  Signed: Lexa Chester MD

## 2021-10-24 NOTE — PROGRESS NOTES
Pt heard coughing. Nurse entered room and patient has moved all the way down the bed laying flat, vomit noted to patient side of mouth. Tube feeding stopped  Patient pulled up in bed and suctioning provided. Vital signs obtain. Bp was unable to obtain. O2 sats are in the 80's. Rapid response called. Stat cxr obtained. Respiratory called and deep suctioning provided. Dr. Rosalie Teran notified, orders obtained. Transferred to ICU.

## 2021-10-24 NOTE — PROGRESS NOTES
After multiple attempts, unable to position patient with head elevated. PO morning meds (see MAR) held due to high aspiration risk.

## 2021-10-24 NOTE — PROGRESS NOTES
Patient noted to be apneic on the monitor with rhythm change. Entered room, patient unresponsive, pulseless, apneic. No heart sounds auscultated. TOD 19:20 verified by 2 RN's. Patient's daughter Charlotte Phipps at bedside, notified of death. Dr. Kane Thomas notified at 19:30. Nursing supervisor Arvind Hill notified at 7:38.     [employer] coordinator Leana Mcardle notified of TOD at 19:40

## 2021-10-24 NOTE — PROGRESS NOTES
The purpose of the visit was in response to request from the patient's daughter to provide care for the patient's family. The patient was not able to share during the visit. The patient's were in the her room, but as well they were mostly gathered in the ICU waiting room. The family each expressed tearful grief about the patient's decline in health. The patient's daughter mentioned saddened yet strengthened by her cesar. She shared that members of her family had not been supportive and it she was disappointed, but she was standing of her cesar. She tearfully shared that her mother has been a woman of cesar that has encouraged many through her care. The family mentioned how they each loved her and cherished the patient. The  listened empathically and reflectively. As well the  at the families request shared prayer. [address] [facility] Marlen Bush    can be reached by calling the  at [facility]  [phone]

## 2021-10-24 NOTE — PROGRESS NOTES
General Daily Progress Note          Patient Name:   Tamra Rehman       YOB: 1955       Age:  78 y.o.       Admit Date: 10/11/2021      Subjective:     Patient is a 78y.o. year old female history of chronic A. fib chronic kidney disease hypertension pacemaker congestive heart failure with ejection fraction of 15 to 20% history of diabetes hypertension patient have a Covid few months ago since then but he declined patient was recently discharged from the hospital with congestive heart failure came back with altered mental status not awake not talking seen by the ER physician CT scan of the head was negative  Patient had WBC count elevated increased lactic acid concern of sepsis sepsis patient was LP done in the ER which was normal patient received 2 g of Rocephin when I saw the patient was still was altered I ordered repeat lactic acid ordered CT scan of the maxillary facial area swelling of the right parotid area and admitted to ICU for further work-up discussed with the patient daughter     According to the patient daughter since she have a Covid in March April this year she is declining she is in the hospital third time this month          Patient have PEG tube placement again yesterday after she pulled out day before yesterday    Last night she aspirated transferred to ICU    Patient placed on nonrebreather 15 L    Patient is hypotensive  Spiked fever 100.4             Objective:     Visit Vitals  BP (!) 87/76 (BP 1 Location: Left upper arm, BP Patient Position: At rest)   Pulse 88   Temp 100.4 °F (38 °C)   Resp 22   Ht 5' 6\" (1.676 m)   Wt 76.5 kg (168 lb 10.4 oz)   SpO2 98%   Breastfeeding No   BMI 27.22 kg/m²        Recent Results (from the past 24 hour(s))   GLUCOSE, POC    Collection Time: 10/23/21 12:14 PM   Result Value Ref Range    Glucose (POC) 32 (LL) 65 - 117 mg/dL    Performed by Quan Freeman    GLUCOSE, POC    Collection Time: 10/23/21 12:16 PM   Result Value Ref Range Glucose (POC) 138 (H) 65 - 117 mg/dL    Performed by Quan Freeman    GLUCOSE, POC    Collection Time: 10/23/21  4:42 PM   Result Value Ref Range    Glucose (POC) 175 (H) 65 - 117 mg/dL    Performed by Vince Breen (ANGEL)    GLUCOSE, POC    Collection Time: 10/23/21  8:33 PM   Result Value Ref Range    Glucose (POC) 157 (H) 65 - 117 mg/dL    Performed by [address] [place] [address] Drive, POC    Collection Time: 10/24/21  2:05 AM   Result Value Ref Range    Glucose (POC) 170 (H) 65 - 117 mg/dL    Performed by Sierra Sanches    CBC WITH AUTOMATED DIFF    Collection Time: 10/24/21  2:50 AM   Result Value Ref Range    WBC 7.1 3.6 - 11.0 K/uL    RBC 5.48 (H) 3.80 - 5.20 M/uL    HGB 15.1 11.5 - 16.0 g/dL    HCT 49.3 (H) 35.0 - 47.0 %    MCV 90.0 80.0 - 99.0 FL    MCH 27.6 26.0 - 34.0 PG    MCHC 30.6 30.0 - 36.5 g/dL    RDW 22.2 (H) 11.5 - 14.5 %    PLATELET 58 (L) 762 - 400 K/uL    NRBC 3.5 (H) 0.0  WBC    ABSOLUTE NRBC 0.25 (H) 0.00 - 0.01 K/uL    NEUTROPHILS 75 32 - 75 %    BAND NEUTROPHILS 17 (H) 0 - 6 %    LYMPHOCYTES 3 (L) 12 - 49 %    MONOCYTES 5 5 - 13 %    EOSINOPHILS 0 0 - 7 %    BASOPHILS 0 0 - 1 %    NRBC 2.0  WBC    IMMATURE GRANULOCYTES 0 %    ABS. NEUTROPHILS 6.5 1.8 - 8.0 K/UL    ABS. LYMPHOCYTES 0.2 (L) 0.8 - 3.5 K/UL    ABS. MONOCYTES 0.4 0.0 - 1.0 K/UL    ABS. EOSINOPHILS 0.0 0.0 - 0.4 K/UL    ABS. BASOPHILS 0.0 0.0 - 0.1 K/UL    ABSOLUTE NRBC 0.14 K/uL    ABS. IMM.  GRANS. 0.0 K/UL    DF Manual      RBC COMMENTS Ovalocytes  1+        RBC COMMENTS Maria G cells  1+        RBC COMMENTS Anisocytosis  1+        RBC COMMENTS Target Cells  1+        RBC COMMENTS Teardrop cells  1+       METABOLIC PANEL, COMPREHENSIVE    Collection Time: 10/24/21  2:50 AM   Result Value Ref Range    Sodium 148 (H) 136 - 145 mmol/L    Potassium 4.4 3.5 - 5.1 mmol/L    Chloride 112 (H) 97 - 108 mmol/L    CO2 23 21 - 32 mmol/L    Anion gap 13 5 - 15 mmol/L    Glucose 178 (H) 65 - 100 mg/dL    BUN 71 (H) 6 - 20 mg/dL Creatinine 2.22 (H) 0.55 - 1.02 mg/dL    BUN/Creatinine ratio 32 (H) 12 - 20      GFR est AA 27 (L) >60 ml/min/1.73m2    GFR est non-AA 22 (L) >60 ml/min/1.73m2    Calcium 8.4 (L) 8.5 - 10.1 mg/dL    Bilirubin, total 1.3 (H) 0.2 - 1.0 mg/dL    AST (SGOT) 45 (H) 15 - 37 U/L    ALT (SGPT) 64 12 - 78 U/L    Alk. phosphatase 130 (H) 45 - 117 U/L    Protein, total 6.5 6.4 - 8.2 g/dL    Albumin 1.8 (L) 3.5 - 5.0 g/dL    Globulin 4.7 (H) 2.0 - 4.0 g/dL    A-G Ratio 0.4 (L) 1.1 - 2.2     LACTIC ACID    Collection Time: 10/24/21  2:50 AM   Result Value Ref Range    Lactic acid 7.7 (HH) 0.4 - 2.0 mmol/L   BLOOD GAS, ARTERIAL    Collection Time: 10/24/21  2:55 AM   Result Value Ref Range    pH 7.44 7.35 - 7.45      PCO2 24 (L) 35 - 45 mmHg    PO2 180 (H) 75 - 100 mmHg    O2  >95 %    BICARBONATE 20 (L) 22 - 26 mmol/L    BASE DEFICIT 5.6 (H) 0 - 2 mmol/L    O2 METHOD NRB mask      O2 FLOW RATE 15.0 L/min    FIO2 100.0 %    SITE Left Femoral     GLUCOSE, POC    Collection Time: 10/24/21  7:11 AM   Result Value Ref Range    Glucose (POC) 100 65 - 117 mg/dL    Performed by Anuj Tyler      [unfilled]      Review of Systems    Unable to obtain e. Physical Exam:      Constitutional: Not much verbal HENT:   Head: Normocephalic and atraumatic. Eyes: Pupils are equal, round, and reactive to light. EOM are normal.   Cardiovascular: Normal rate, regular rhythm and normal heart sounds. Pulmonary/Chest: Breath sounds normal. No wheezes. No rales. Exhibits no tenderness. Abdominal: Soft. Bowel sounds are normal. There is no abdominal tenderness. There is no rebound and no guarding. Musculoskeletal: Normal range of motion. Neurological: Moves extremities    XR CHEST PORT   Final Result   Left lung base atelectasis, airspace process and/or effusion. Lucency on the right hemidiaphragm and potentially extending across the midline   could indicate free air.  Further evaluation for possible perforation or other cause of free air is recommended if this patient is not postoperative. The inpatient nursing station was contacted and the findings were conveyed at   the time of this report. They will have the covering physician call me back with   any questions. I spoke with nurse Shaniqua Mckeon      XR ABD (KUB)   Final Result   Findings/IMPRESSION: Cardiopericardial enlargement. Right atrial and right   ventricular AICD leads. Gastric tube overlies the upper abdomen, location   uncertain. Part of the tube appears kinked. Nonobstructive intestinal gas   pattern. No evidence of soft tissue mass or worrisome calcification. CT NECK SOFT TISSUE WO CONT   Final Result   Persistent swelling of the right parotid gland although slightly   less prominent. Resolving cellulitic changes in the right neck. Decrease in the   retropharyngeal and right parapharyngeal edema. Follow-up in 2 weeks preferably with intravenous contrast (if there are no   contraindications) is suggested to exclude associated neoplastic process of the   right parotid gland. CT MAXILLOFACIAL WO CONT   Final Result   Findings more consistent with cellulitis of the right face that   includes the right parotid gland (acute parotitis) as described above. Edema/fluid collection in the parapharyngeal space and retropharyngeal space. Suggestions as above. CT ABD PELV WO CONT   Final Result   Small amount of ascites. No focal fluid collection or free air. Density in the gallbladder which could be artifact, sludge or gallstones. No   specific CT evidence of cholecystitis. Bladder wall prominence. Other findings   as above. CT HEAD WO CONT   Final Result   1. No evidence of acute intracranial hemorrhage or mass effect. 2.  Multifocal intracranial encephalomalacia as above. XR CHEST PORT   Final Result   1. Mild pulmonary vascular congestion. 2.  Cardiomegaly.            Recent Results (from the past 24 hour(s))   GLUCOSE, POC Collection Time: 10/23/21 12:14 PM   Result Value Ref Range    Glucose (POC) 32 (LL) 65 - 117 mg/dL    Performed by Ciro Rodriguez    GLUCOSE, POC    Collection Time: 10/23/21 12:16 PM   Result Value Ref Range    Glucose (POC) 138 (H) 65 - 117 mg/dL    Performed by Quan Freeman    GLUCOSE, POC    Collection Time: 10/23/21  4:42 PM   Result Value Ref Range    Glucose (POC) 175 (H) 65 - 117 mg/dL    Performed by Nicol Kumar (CNA)    GLUCOSE, POC    Collection Time: 10/23/21  8:33 PM   Result Value Ref Range    Glucose (POC) 157 (H) 65 - 117 mg/dL    Performed by [address] [place] [address] Drive, POC    Collection Time: 10/24/21  2:05 AM   Result Value Ref Range    Glucose (POC) 170 (H) 65 - 117 mg/dL    Performed by [employer].    CBC WITH AUTOMATED DIFF    Collection Time: 10/24/21  2:50 AM   Result Value Ref Range    WBC 7.1 3.6 - 11.0 K/uL    RBC 5.48 (H) 3.80 - 5.20 M/uL    HGB 15.1 11.5 - 16.0 g/dL    HCT 49.3 (H) 35.0 - 47.0 %    MCV 90.0 80.0 - 99.0 FL    MCH 27.6 26.0 - 34.0 PG    MCHC 30.6 30.0 - 36.5 g/dL    RDW 22.2 (H) 11.5 - 14.5 %    PLATELET 58 (L) 054 - 400 K/uL    NRBC 3.5 (H) 0.0  WBC    ABSOLUTE NRBC 0.25 (H) 0.00 - 0.01 K/uL    NEUTROPHILS 75 32 - 75 %    BAND NEUTROPHILS 17 (H) 0 - 6 %    LYMPHOCYTES 3 (L) 12 - 49 %    MONOCYTES 5 5 - 13 %    EOSINOPHILS 0 0 - 7 %    BASOPHILS 0 0 - 1 %    NRBC 2.0  WBC    IMMATURE GRANULOCYTES 0 %    ABS. NEUTROPHILS 6.5 1.8 - 8.0 K/UL    ABS. LYMPHOCYTES 0.2 (L) 0.8 - 3.5 K/UL    ABS. MONOCYTES 0.4 0.0 - 1.0 K/UL    ABS. EOSINOPHILS 0.0 0.0 - 0.4 K/UL    ABS. BASOPHILS 0.0 0.0 - 0.1 K/UL    ABSOLUTE NRBC 0.14 K/uL    ABS. IMM.  GRANS. 0.0 K/UL    DF Manual      RBC COMMENTS Ovalocytes  1+        RBC COMMENTS Maria G cells  1+        RBC COMMENTS Anisocytosis  1+        RBC COMMENTS Target Cells  1+        RBC COMMENTS Teardrop cells  1+       METABOLIC PANEL, COMPREHENSIVE    Collection Time: 10/24/21  2:50 AM   Result Value Ref Range    Sodium 148 (H) 136 - 145 mmol/L    Potassium 4.4 3.5 - 5.1 mmol/L    Chloride 112 (H) 97 - 108 mmol/L    CO2 23 21 - 32 mmol/L    Anion gap 13 5 - 15 mmol/L    Glucose 178 (H) 65 - 100 mg/dL    BUN 71 (H) 6 - 20 mg/dL    Creatinine 2.22 (H) 0.55 - 1.02 mg/dL    BUN/Creatinine ratio 32 (H) 12 - 20      GFR est AA 27 (L) >60 ml/min/1.73m2    GFR est non-AA 22 (L) >60 ml/min/1.73m2    Calcium 8.4 (L) 8.5 - 10.1 mg/dL    Bilirubin, total 1.3 (H) 0.2 - 1.0 mg/dL    AST (SGOT) 45 (H) 15 - 37 U/L    ALT (SGPT) 64 12 - 78 U/L    Alk.  phosphatase 130 (H) 45 - 117 U/L    Protein, total 6.5 6.4 - 8.2 g/dL    Albumin 1.8 (L) 3.5 - 5.0 g/dL    Globulin 4.7 (H) 2.0 - 4.0 g/dL    A-G Ratio 0.4 (L) 1.1 - 2.2     LACTIC ACID    Collection Time: 10/24/21  2:50 AM   Result Value Ref Range    Lactic acid 7.7 (HH) 0.4 - 2.0 mmol/L   BLOOD GAS, ARTERIAL    Collection Time: 10/24/21  2:55 AM   Result Value Ref Range    pH 7.44 7.35 - 7.45      PCO2 24 (L) 35 - 45 mmHg    PO2 180 (H) 75 - 100 mmHg    O2  >95 %    BICARBONATE 20 (L) 22 - 26 mmol/L    BASE DEFICIT 5.6 (H) 0 - 2 mmol/L    O2 METHOD NRB mask      O2 FLOW RATE 15.0 L/min    FIO2 100.0 %    SITE Left Femoral     GLUCOSE, POC    Collection Time: 10/24/21  7:11 AM   Result Value Ref Range    Glucose (POC) 100 65 - 117 mg/dL    Performed by Hans Deleon        Results     Procedure Component Value Units Date/Time    CULTURE, BLOOD [[id number]] Collected: 10/21/21 1240    Order Status: Completed Specimen: Blood Updated: 10/24/21 1001     Special Requests: No Special Requests        Culture result: No growth 2 days       CULTURE, BLOOD [[id number]] Collected: 10/18/21 1230    Order Status: Completed Specimen: Blood Updated: 10/24/21 1009     Special Requests: No Special Requests        Culture result: No growth 6 days       CULTURE, BLOOD [[id number]]  (Abnormal) Collected: 10/15/21 1650    Order Status: Completed Specimen: Blood Updated: 10/18/21 1335     Special Requests: No Special Requests        Culture result:       * methicillin resistant staphylococcus aureus * GROWING IN THE ANAEROBIC BOTTLE (NO SITE)            *KNOWN MRSA PATIENT            Please refer to previous blood culture  [id number] COLLECTED 10/12/21      CULTURE, BLOOD [[id number]]  (Abnormal) Collected: 10/13/21 0500    Order Status: Completed Specimen: Blood Updated: 10/15/21 1247     Special Requests: No Special Requests        Culture result:       Gram Positive Cocci in clusters growing in both bottles drawn [facility] ([address]) ON 10/14/21 AT 1157. HH                  * methicillin resistant staphylococcus aureus * growing in both bottles drawn (SITE NOT INDICATED) REFER TO [id number] FOR SENSITIVITIES            (NOTE) KNOWN MRSA PATIENT    CULTURE, Darral Pears STAIN [[id number]] Collected: 10/12/21 0945    Order Status: Canceled Specimen: Wound from Mouth     MRSA SCREEN - PCR (NASAL) [[id number]]  (Abnormal) Collected: 10/11/21 1025    Order Status: Completed Specimen: Swab Updated: 10/11/21 1147     MRSA by PCR, Nasal DETECTED        Comment: Results verified, phoned to and read back by Mary Jo Arndt 10/11/21 11:50       MRSA SCREEN - PCR (NASAL) [[id number]] Collected: 10/11/21 0945    Order Status: Canceled Specimen: Swab     COVID-19 RAPID TEST [[id number]] Collected: 10/11/21 0930    Order Status: Completed Specimen: Nasopharyngeal Updated: 10/11/21 1104     Specimen source       Please find results under separate order           COVID-19 rapid test Not Detected        Comment: Rapid Abbott ID Now   Rapid NAAT:  The specimen is NEGATIVE for SARS-CoV-2, the novel coronavirus associated with COVID-19. Negative results should be treated as presumptive and, if inconsistent with clinical signs and symptoms or necessary for patient management, should be tested with an alternative molecular assay.  Negative results do not preclude SARS-CoV-2 infection and should not be used as the sole basis for patient management decisions. This test has been authorized by the FDA under   an Emergency Use Authorization (EUA) for use by authorized laboratories.  Fact sheet for Healthcare Providers: ConventionUpdate.co.nz Fact sheet for Patients: ConventionUpdate.co.nz   Methodology: Isothermal Nucleic Acid Amplification         CULTURE, BLOOD, LINE [[id number]] Collected: 10/11/21 0930    Order Status: Canceled Specimen: Blood     CULTURE, URINE [[id number]] Collected: 10/11/21 0930    Order Status: Completed Specimen: Urine Updated: 10/12/21 1049     Special Requests: No Special Requests        Culture result: No Growth (<1000 cfu/mL)       CULTURE, CSF  TUBE 2 [[id number]] Collected: 10/11/21 0504    Order Status: Completed Specimen: Cerebrospinal Fluid Updated: 10/18/21 1450     Special Requests: No Special Requests        GRAM STAIN No wbc's seen         No organisms seen        Culture result:       no growth on solid media at 4 days                  no growth in Thio broth at 7 days          CULTURE, BLOOD, PAIRED [[id number]]  (Abnormal)  (Susceptibility) Collected: 10/11/21 0225    Order Status: Completed Specimen: Blood Updated: 10/15/21 0921     Special Requests: No Special Requests        Culture result:       * methicillin resistant staphylococcus aureus * growing in all 4 bottles drawn (SITES NOT INDICATED)          Susceptibility      Staphylococcus aureus Methcillin Resistant      NIMA      Ciprofloxacin ($) Susceptible      Clindamycin ($) Susceptible      Daptomycin ($$$$$) Susceptible      Doxycycline ($$) Susceptible      Erythromycin ($$$$) Resistant      Gentamicin ($) Susceptible      Levofloxacin ($) Susceptible      Linezolid ($$$$$) Susceptible      Oxacillin Resistant      Rifampin ($$$$) Susceptible  [1]       Tetracycline Susceptible      Trimeth/Sulfa Susceptible      Vancomycin ($) Susceptible              [1]  Rifampin is not to be used for mono-therapy. [facility]                          Labs:     Recent Labs     10/24/21  0250 10/23/21  0458   WBC 7.1 16.3*   HGB 15.1 13.2   HCT 49.3* 41.5   PLT 58* 73*     Recent Labs     10/24/21  0250 10/23/21  0458 10/21/21  1240   * 150* 148*   K 4.4 4.8 4.4   * 119* 116*   CO2 23 24 23   BUN 71* 70* 74*   CREA 2.22* 1.75* 1.85*   * 79 342*   CA 8.4* 8.8 8.5     Recent Labs     10/24/21  0250 10/23/21  0458 10/21/21  1240   ALT 64 74 74   * 139* 232*   TBILI 1.3* 1.0 1.0   TP 6.5 6.7 6.3*   ALB 1.8* 1.9* 2.0*   GLOB 4.7* 4.8* 4.3*     No results for input(s): INR, PTP, APTT, INREXT, INREXT in the last 72 hours. No results for input(s): FE, TIBC, PSAT, FERR in the last 72 hours. Lab Results   Component Value Date/Time    Folate 12.5 10/02/2021 07:37 PM      Recent Labs     10/24/21  0255   PH 7.44   PCO2 24*   PO2 180*     No results for input(s): CPK, CKNDX, TROIQ in the last 72 hours.     No lab exists for component: CPKMB  Lab Results   Component Value Date/Time    Cholesterol, total 81 10/03/2021 07:08 AM    HDL Cholesterol 25 10/03/2021 07:08 AM    LDL, calculated 37.6 10/03/2021 07:08 AM    Triglyceride 92 10/03/2021 07:08 AM    CHOL/HDL Ratio 3.2 10/03/2021 07:08 AM     Lab Results   Component Value Date/Time    Glucose (POC) 100 10/24/2021 07:11 AM    Glucose (POC) 170 (H) 10/24/2021 02:05 AM    Glucose (POC) 157 (H) 10/23/2021 08:33 PM    Glucose (POC) 175 (H) 10/23/2021 04:42 PM    Glucose (POC) 138 (H) 10/23/2021 12:16 PM     Lab Results   Component Value Date/Time    Color Kathie 10/11/2021 01:33 AM    Appearance Clear 10/11/2021 01:33 AM    Specific gravity 1.016 10/11/2021 01:33 AM    pH (UA) 5.0 10/11/2021 01:33 AM    Protein 30 (A) 10/11/2021 01:33 AM    Glucose Negative 10/11/2021 01:33 AM    Ketone Negative 10/11/2021 01:33 AM    Bilirubin Negative 10/11/2021 01:33 AM    Urobilinogen 4.0 (H) 10/11/2021 01:33 AM    Nitrites Negative 10/11/2021 01:33 AM Leukocyte Esterase Negative 10/11/2021 01:33 AM    Bacteria Negative 10/11/2021 01:33 AM    WBC 0-4 10/11/2021 01:33 AM    RBC 0-5 10/11/2021 01:33 AM         Assessment:   Acute hypoxemic respiratory failure on Ventimask 15 L  Aspiration pneumonia  Hypertensive  Sepsis  Altered mental status  Metabolic encephalopathy  Sepsis  Sepsis with bacteremia with MRSA  Acute proctitis  Lactic acidosis  Leukocytosis  Chronic A.  Fib  Congestive heart failure  Hypertension  Cardiomyopathy ejection fraction 15 x 20%  Acute on chronic kidney disease  Type 2 diabetes  Static post PEG tube placement  Hyponatremia  Hypokalemia    Acute kidney injury  Lactic acidosis  Static post PEG tube placement yesterday    Plan:     Chest x-ray shows atelectasis airspace process effusion on the right hemidiaphragm and potentially sending across the midline    Continue nebulizer treatment  Continue aspirin 81 mg daily  Lipitor 40 daily  Hold Coreg at this time    On IV vancomycin IV Zosyn      Discussed with the patient daughter discussed about comfort care  Hold  PEG tube feeding now              Overall prognosis poor  Monitor renal function/sodium level  Patient is DNR  Repeat the labs            Current Facility-Administered Medications:     piperacillin-tazobactam (ZOSYN) 3.375 g in 0.9% sodium chloride (MBP/ADV) 100 mL MBP, 3.375 g, IntraVENous, Q8H, Ammy Dumont MD, Last Rate: 25 mL/hr at 10/24/21 0344, 3.375 g at 10/24/21 0344    albuterol-ipratropium (DUO-NEB) 2.5 MG-0.5 MG/3 ML, 3 mL, Nebulization, Q6H RT, Ammy Dumotn MD, 3 mL at 10/24/21 0753    dexmedeTOMidine (PRECEDEX) 400 mcg in 0.9% sodium chloride (MBP/ADV) 100 mL MBP, 0.1-1.5 mcg/kg/hr, IntraVENous, TITRATE, Ammy Dumont MD, Last Rate: 7.7 mL/hr at 10/24/21 0529, 0.4 mcg/kg/hr at 10/24/21 0529    dextrose 5% infusion, 75 mL/hr, IntraVENous, CONTINUOUS, Ammy Dumont MD, Stopped at 10/23/21 1900    vancomycin (VANCOCIN) 750 mg in 0.9% sodium chloride 250 mL (VIAL-MATE), 750 mg, IntraVENous, Q24H, Torie Mei MD    [START ON 10/25/2021] DUE: Vancomycin Trough 10/25 at 1000, , Other, ONCE, Torie Mei MD    aspirin chewable tablet 81 mg, 81 mg, Oral, DAILY, Ammy Dumont MD, 81 mg at 10/21/21 9328    glucose chewable tablet 16 g, 4 Tablet, Oral, PRN, Peng DOLAN MD    dextrose (D50W) injection syrg 12.5-25 g, 25-50 mL, IntraVENous, PRN, Sourav Marquez MD, 12.5 g at 10/23/21 0840    glucagon (GLUCAGEN) injection 1 mg, 1 mg, IntraMUSCular, PRN, Dawit Villatoro MD    insulin glargine (LANTUS) injection 10 Units, 10 Units, SubCUTAneous, DAILY, Sourav So MD, 10 Units at 10/24/21 0947    hydrOXYzine (VISTARIL) injection 25 mg, 25 mg, IntraMUSCular, Q6H PRN, Kenyon Louis MD, 25 mg at 10/24/21 0211    traMADoL (ULTRAM) tablet 50 mg, 50 mg, Oral, Q6H PRN, Kenyon Louis MD, 50 mg at 10/22/21 0134    zinc oxide-white petrolatum 17-57 % topical paste, , Topical, TID, Kenyon Louis MD, Given at 10/24/21 0802    rifAMPin (RIFADIN) capsule 300 mg, 300 mg, Oral, DAILY, Torie Mei MD, 300 mg at 10/21/21 0926    [Held by provider] furosemide (LASIX) tablet 40 mg, 40 mg, Oral, Q48H, Rachel Hills MD, 40 mg at 10/15/21 6721    atorvastatin (LIPITOR) tablet 40 mg, 40 mg, Oral, QHS, Ammy Dumont MD, 40 mg at 10/23/21 2321    carvediloL (COREG) tablet 6.25 mg, 6.25 mg, Oral, BID WITH MEALS, Ammy Dumont MD, 6.25 mg at 10/21/21 0926    prednisoLONE acetate (PRED FORTE) 1 % ophthalmic suspension 1 Drop, 1 Drop, Both Eyes, TID, Kenyon Louis MD, 1 Drop at 10/24/21 0947    insulin lispro (HUMALOG) injection, , SubCUTAneous, AC&HS, Ammy Dumont MD, 4 Units at 10/21/21 1753    glucose chewable tablet 16 g, 4 Tablet, Oral, PRN, Ammy Dumont MD    dextrose (D50W) injection syrg 12.5-25 g, 25-50 mL, IntraVENous, PRN, Ammy Dumont MD    glucagon (GLUCAGEN) injection 1 mg, 1 mg, IntraMUSCular, PRN, Carlton Dumont MD    acetaminophen (TYLENOL) tablet 650 mg, 650 mg, Oral, Q6H PRN **OR** acetaminophen (TYLENOL) suppository 650 mg, 650 mg, Rectal, Q6H PRN, Carlton Dumont MD    polyethylene glycol (MIRALAX) packet 17 g, 17 g, Oral, DAILY PRN, Carlton Dumont MD    ondansetron (ZOFRAN ODT) tablet 4 mg, 4 mg, Oral, Q8H PRN **OR** ondansetron (ZOFRAN) injection 4 mg, 4 mg, IntraVENous, Q6H PRN, Kenyon Louis MD

## 2021-10-24 NOTE — PROGRESS NOTES
Renal Note    NAME:  Vicente Cunningham   :   1955   MRN:   [id number]     ATTENDING: Abram Villegas MD  PCP:  Yessica Messina MD    Date/Time:  10/24/2021 1:00 PM      Subjective:     Pt seen in room. Na 148  Creatinine 2.22 (worse)  hypotensive  Family agree for comfort care       Past Medical History:   Diagnosis Date    [facility] ([facility]     Cardiomyopathy, idiopathic ([facility] 10/28/2010    Chronic kidney disease     Heart failure ([facility]     HTN (hypertension), benign 10/28/2010    Pacemaker     ICD    PVC (premature ventricular contraction) 10/28/2010      Past Surgical History:   Procedure Laterality Date    HX OTHER SURGICAL  10/02/2018    Right eye surgery    HX PACEMAKER       Social History     Tobacco Use    Smoking status: Never Smoker    Smokeless tobacco: Never Used   Substance Use Topics    Alcohol use: No      History reviewed. No pertinent family history. Allergies   Allergen Reactions    Chocolate [Cocoa] Anaphylaxis and Swelling    Iodine Anaphylaxis    Lisinopril Anaphylaxis    Peanut Anaphylaxis and Swelling    Glimepiride Swelling    Glipizide Other (comments)     Pruritis    Metformin Diarrhea    Norvasc [Amlodipine] Other (comments)     Light Headed    Pneumovax 23 [Pneumococcal 23-Devi Ps Vaccine] Hives    Toprol Xl [Metoprolol Succinate] Other (comments)     Light headed    Tositumomab I-131 (Maltose) Unable to Obtain    Vitamin D [Cholecalciferol (Vitamin D3)] Other (comments)     Dizziness/headache not a true allergy. Prior to Admission medications    Medication Sig Start Date End Date Taking? Authorizing Provider   insulin glargine (LANTUS) 100 unit/mL injection daily 10/22/21  Yes Kenyon Louis MD   rifAMPin (RIFADIN) 300 mg capsule Take 1 Capsule by mouth daily for 7 doses.  10/22/21 10/29/21 Yes Carlton Dumont MD   furosemide (Lasix) 20 mg tablet daily 10/23/21  Yes Kenyon Louis MD   vancomycin (VANCOCIN) 10 gram solr 1,250 mg by IntraVENous route daily for 38 doses. Check Vanc trough, BUN/Cr, CRP and ESR wkly while on Vanc   Keep trough b/w 15-20 10/21/21 11/28/21 Yes Torie Mei MD   aspirin delayed-release 81 mg tablet Take 1 Tablet by mouth daily. 10/5/21   Gloria Connell MD   carvediloL (COREG) 6.25 mg tablet Take 1 Tablet by mouth two (2) times daily (with meals). 8/5/21   Chong Bolton MD   oxyCODONE IR (ROXICODONE) 5 mg immediate release tablet Take 5 mg by mouth every four (4) hours as needed for Pain. 7/16/21   Provider, Historical   cyclobenzaprine (FLEXERIL) 5 mg tablet Take 5 mg by mouth two (2) times daily as needed for Muscle Spasm(s). 7/19/21   Provider, Historical   amitriptyline (ELAVIL) 75 mg tablet Take 75 mg by mouth daily. 5/3/21   Provider, Historical   prednisoLONE acetate (PRED FORTE) 1 % ophthalmic suspension INSTILL 1 DROP INTO RIGHT EYE 4 TIMES A DAY 3/13/21   Provider, Historical   atorvastatin (LIPITOR) 40 mg tablet Take 1 Tab by mouth nightly. 2/19/21   Luis Dumont MD   furosemide (Lasix) 40 mg tablet 1 tablet daily  Patient taking differently: Take 40 mg by mouth daily. 1 tablet daily 2/19/21   Taina Freeman MD   metFORMIN (GLUCOPHAGE) 1,000 mg tablet two (2) times daily (with meals). 10/12/20   Provider, Historical   gabapentin (NEURONTIN) 100 mg capsule TAKE 1 CAPSULE BY MOUTH THREE TIMES A DAY 10/27/20   Provider, Historical   metoprolol succinate (TOPROL-XL) 25 mg XL tablet TAKE 0.5 TABS BY MOUTH DAILY. 12/16/19   Kashif Piña NP   ELIQUIS 5 mg tablet TAKE 1 TABLET BY MOUTH TWICE A DAY 12/5/19   Kashif Piña NP   nitroglycerin (NITROSTAT) 0.4 mg SL tablet 0.4 mg by SubLINGual route every five (5) minutes as needed for Chest Pain. Up to 3 doses. Patient not taking: Reported on 8/3/2021    Provider, Historical   cloNIDine HCL (CATAPRES) 0.2 mg tablet Take 0.2 mg by mouth two (2) times a day.  5/21/18   Provider, Historical   losartan (COZAAR) 100 mg tablet Take 100 mg by mouth daily. 17   Provider, Historical   PARoxetine (PAXIL) 20 mg tablet Take 20 mg by mouth daily. Provider, Historical   omeprazole (PRILOSEC) 20 mg capsule Take 20 mg by mouth daily. 10/28/10   Provider, Historical       REVIEW OF SYSTEMS:       Unable to obtain because of patient's mentation      Objective:   VITALS:    Visit Vitals  BP (!) 87/76 (BP 1 Location: Left upper arm, BP Patient Position: At rest)   Pulse 88   Temp 97.7 °F (36.5 °C)   Resp 22   Ht 5' 6\" (1.676 m)   Wt 76.5 kg (168 lb 10.4 oz)   SpO2 98%   Breastfeeding No   BMI 27.22 kg/m²     Temp (24hrs), Av.9 °F (37.2 °C), Min:97.7 °F (36.5 °C), Max:100.4 °F (38 °C)      PHYSICAL EXAM:     General: NAD, awake. Lethargic  Eyes: sclera anicteric  Oral Cavity: No thrush or ulcers  Neck: no JVD  Chest: Fair bilateral air entry. No Wheezing or Rhonchi. No rales. Heart: normal sounds  Abdomen: soft and non tender   :  escobedo  Lower Extremities: no edema  Skin: no rash  Neuro: She is lethargic.   Withdraws to pain  Psychiatric: Unable to assess        LAB DATA REVIEWED:    Recent Results (from the past 24 hour(s))   GLUCOSE, POC    Collection Time: 10/23/21  4:42 PM   Result Value Ref Range    Glucose (POC) 175 (H) 65 - 117 mg/dL    Performed by Clint MARTINES)    GLUCOSE, POC    Collection Time: 10/23/21  8:33 PM   Result Value Ref Range    Glucose (POC) 157 (H) 65 - 117 mg/dL    Performed by [address] [place] [address] Drive, POC    Collection Time: 10/24/21  2:05 AM   Result Value Ref Range    Glucose (POC) 170 (H) 65 - 117 mg/dL    Performed by Jean Urena    CBC WITH AUTOMATED DIFF    Collection Time: 10/24/21  2:50 AM   Result Value Ref Range    WBC 7.1 3.6 - 11.0 K/uL    RBC 5.48 (H) 3.80 - 5.20 M/uL    HGB 15.1 11.5 - 16.0 g/dL    HCT 49.3 (H) 35.0 - 47.0 %    MCV 90.0 80.0 - 99.0 FL    MCH 27.6 26.0 - 34.0 PG    MCHC 30.6 30.0 - 36.5 g/dL    RDW 22.2 (H) 11.5 - 14.5 %    PLATELET 58 (L) 229 - 400 K/uL    NRBC 3.5 (H) 0.0 PER 100 WBC    ABSOLUTE NRBC 0.25 (H) 0.00 - 0.01 K/uL    NEUTROPHILS 75 32 - 75 %    BAND NEUTROPHILS 17 (H) 0 - 6 %    LYMPHOCYTES 3 (L) 12 - 49 %    MONOCYTES 5 5 - 13 %    EOSINOPHILS 0 0 - 7 %    BASOPHILS 0 0 - 1 %    NRBC 2.0  WBC    IMMATURE GRANULOCYTES 0 %    ABS. NEUTROPHILS 6.5 1.8 - 8.0 K/UL    ABS. LYMPHOCYTES 0.2 (L) 0.8 - 3.5 K/UL    ABS. MONOCYTES 0.4 0.0 - 1.0 K/UL    ABS. EOSINOPHILS 0.0 0.0 - 0.4 K/UL    ABS. BASOPHILS 0.0 0.0 - 0.1 K/UL    ABSOLUTE NRBC 0.14 K/uL    ABS. IMM. GRANS. 0.0 K/UL    DF Manual      RBC COMMENTS Ovalocytes  1+        RBC COMMENTS Maria G cells  1+        RBC COMMENTS Anisocytosis  1+        RBC COMMENTS Target Cells  1+        RBC COMMENTS Teardrop cells  1+       METABOLIC PANEL, COMPREHENSIVE    Collection Time: 10/24/21  2:50 AM   Result Value Ref Range    Sodium 148 (H) 136 - 145 mmol/L    Potassium 4.4 3.5 - 5.1 mmol/L    Chloride 112 (H) 97 - 108 mmol/L    CO2 23 21 - 32 mmol/L    Anion gap 13 5 - 15 mmol/L    Glucose 178 (H) 65 - 100 mg/dL    BUN 71 (H) 6 - 20 mg/dL    Creatinine 2.22 (H) 0.55 - 1.02 mg/dL    BUN/Creatinine ratio 32 (H) 12 - 20      GFR est AA 27 (L) >60 ml/min/1.73m2    GFR est non-AA 22 (L) >60 ml/min/1.73m2    Calcium 8.4 (L) 8.5 - 10.1 mg/dL    Bilirubin, total 1.3 (H) 0.2 - 1.0 mg/dL    AST (SGOT) 45 (H) 15 - 37 U/L    ALT (SGPT) 64 12 - 78 U/L    Alk.  phosphatase 130 (H) 45 - 117 U/L    Protein, total 6.5 6.4 - 8.2 g/dL    Albumin 1.8 (L) 3.5 - 5.0 g/dL    Globulin 4.7 (H) 2.0 - 4.0 g/dL    A-G Ratio 0.4 (L) 1.1 - 2.2     LACTIC ACID    Collection Time: 10/24/21  2:50 AM   Result Value Ref Range    Lactic acid 7.7 (HH) 0.4 - 2.0 mmol/L   BLOOD GAS, ARTERIAL    Collection Time: 10/24/21  2:55 AM   Result Value Ref Range    pH 7.44 7.35 - 7.45      PCO2 24 (L) 35 - 45 mmHg    PO2 180 (H) 75 - 100 mmHg    O2  >95 %    BICARBONATE 20 (L) 22 - 26 mmol/L    BASE DEFICIT 5.6 (H) 0 - 2 mmol/L    O2 METHOD NRB mask      O2 FLOW RATE 15.0 L/min    FIO2 100.0 %    SITE Left Femoral     GLUCOSE, POC    Collection Time: 10/24/21  7:11 AM   Result Value Ref Range    Glucose (POC) 100 65 - 117 mg/dL    Performed by [address] [place] [address] Dr Langford, POC    Collection Time: 10/24/21 11:51 AM   Result Value Ref Range    Glucose (POC) 139 (H) 65 - 117 mg/dL    Performed by Chichi Herman        Recommendations/Plan:     1 acute kidney injury on chronic kidney disease stage III:  -likely prerenal secondary to sepsis/possible cardiorenal syndrome  -She presented with a creatinine of 2.5   -Creatinine increased to 2.7.  improved to 1.5  -BUN/Cr bumped again to 71/2.22 (from hypotension)  -Recently discharged with creatinine 1.6 on 10/6  -clinically she has no volume overload. No edema in both legs. -Urine is bland  -CT abdomen without contrast on 10/11 shows no renal obstruction.    -Not on any potential nephrotoxins preadmission  -off IVF      2  MRSA bacteremia:  -acute right parotitis with parapharyngeal and retropharyngeal  involvement per CT scan  -She had LP on admission which was negative  -Urine is bland. Covid negative. No infiltrate on chest x-ray  -She has been started on vancomycin and Ceftaroline and Rifampicin. -ID is on the case      3 Hypernatremia:  -Sodium has improved 150.  -will c/w 300 mL every 3 hours.    -Also will continue to hold Lasix    4 CHF:  -She has history of cardiomyopathy with EF less than 15%  -Chest x-ray shows mild pulmonary congestion. BNP 15,000  -on Lasix 40 mg IV every 48 hrs which I will hold and monitor urine output    5 altered mentation:  -Could be secondary to metabolic derangements from sepsis.    -Unsure of her baseline mentation  -CT head was negative on admission    6  Hypercalcemia  -PTH is 85.   Corrected calcium is 10.3  -Phosphorus is okay at 5.1        Family agreed making her comfort care    ________________________________________________________________________  Signed: Shanita Walker MD

## 2021-10-24 NOTE — PROGRESS NOTES
Infectious Disease Progress Note           Subjective:   Assessed pt at bedside. Decompensated last night, aspirated, started on Zosyn and transferred to ICU. On nonrebreather mask. WBC normalized on today's labs  Objective:   Physical Exam:     Visit Vitals  BP (!) 87/76 (BP 1 Location: Left upper arm, BP Patient Position: At rest)   Pulse 88   Temp 97.7 °F (36.5 °C)   Resp 22   Ht 5' 6\" (1.676 m)   Wt 168 lb 10.4 oz (76.5 kg)   SpO2 98%   Breastfeeding No   BMI 27.22 kg/m²    O2 Flow Rate (L/min): 15 l/min O2 Device: Non-rebreather mask    Temp (24hrs), Av.9 °F (37.2 °C), Min:97.7 °F (36.5 °C), Max:100.4 °F (38 °C)    No intake/output data recorded.    10/22 1901 - 10/24 0700  In: 1226.3 [I.V.:1226.3]  Out: -     General: NAD, minimally responsive  HEENT: SHARON, Moist mucosa, decreasing size of right facial swelling, nonrebreather mask in place  Lungs: CTA b/l, decreased at the bases, no wheeze/rhonchi   Heart: S1S2+, RRR, no murmur  Abdo: Soft, NT, ND, +BS, peg tube in place   : + escobedo cath   Exts: no edema  + pulses b/l   Skin: No wounds, No rashes or lesions    Data Review:       Recent Days:  Recent Labs     10/24/21  0250 10/23/21  0458   WBC 7.1 16.3*   HGB 15.1 13.2   HCT 49.3* 41.5   PLT 58* 73*     Recent Labs     10/24/21  0250 10/23/21  0458   BUN 71* 70*   CREA 2.22* 1.75*       No results found for: CRPQN, CRP, CRPM       Microbiology     Results     Procedure Component Value Units Date/Time    CULTURE, BLOOD [[id number]] Collected: 10/21/21 1240    Order Status: Completed Specimen: Blood Updated: 10/24/21 1009     Special Requests: No Special Requests        Culture result: No growth 2 days       CULTURE, BLOOD [[id number]] Collected: 10/18/21 1230    Order Status: Completed Specimen: Blood Updated: 10/24/21 1009     Special Requests: No Special Requests        Culture result: No growth 6 days       CULTURE, BLOOD [[id number]]  (Abnormal) Collected: 10/15/21 1650    Order Status: Completed Specimen: Blood Updated: 10/18/21 1335     Special Requests: No Special Requests        Culture result:       * methicillin resistant staphylococcus aureus * GROWING IN THE ANAEROBIC BOTTLE (NO SITE)            *KNOWN MRSA PATIENT            Please refer to previous blood culture  [id number] COLLECTED 10/12/21      CULTURE, BLOOD [[id number]]  (Abnormal) Collected: 10/13/21 0500    Order Status: Completed Specimen: Blood Updated: 10/15/21 1247     Special Requests: No Special Requests        Culture result:       Gram Positive Cocci in clusters growing in both bottles drawn [facility] ([address] ON 10/14/21 AT 1157. HH                  * methicillin resistant staphylococcus aureus * growing in both bottles drawn (SITE NOT INDICATED) REFER TO [id number] FOR SENSITIVITIES            (NOTE) KNOWN MRSA PATIENT    CULTURE, Baljeet Wilder STAIN [[id number]] Collected: 10/12/21 0945    Order Status: Canceled Specimen: Wound from Mouth     MRSA SCREEN - PCR (NASAL) [[id number]]  (Abnormal) Collected: 10/11/21 1025    Order Status: Completed Specimen: Swab Updated: 10/11/21 1147     MRSA by PCR, Nasal DETECTED        Comment: Results verified, phoned to and read back by Julienne Muse 10/11/21 11:50       MRSA SCREEN - PCR (NASAL) [[id number]] Collected: 10/11/21 0945    Order Status: Canceled Specimen: Swab     COVID-19 RAPID TEST [[id number]] Collected: 10/11/21 0930    Order Status: Completed Specimen: Nasopharyngeal Updated: 10/11/21 1104     Specimen source       Please find results under separate order           COVID-19 rapid test Not Detected        Comment: Rapid Abbott ID Now   Rapid NAAT:  The specimen is NEGATIVE for SARS-CoV-2, the novel coronavirus associated with COVID-19. Negative results should be treated as presumptive and, if inconsistent with clinical signs and symptoms or necessary for patient management, should be tested with an alternative molecular assay. Negative results do not preclude SARS-CoV-2 infection and should not be used as the sole basis for patient management decisions. This test has been authorized by the FDA under   an Emergency Use Authorization (EUA) for use by authorized laboratories.  Fact sheet for Healthcare Providers: ConventionUpdate.co.nz Fact sheet for Patients: ConventionUpdate.co.nz   Methodology: Isothermal Nucleic Acid Amplification         CULTURE, BLOOD, LINE [[id number]] Collected: 10/11/21 0930    Order Status: Canceled Specimen: Blood     CULTURE, URINE [[id number]] Collected: 10/11/21 0930    Order Status: Completed Specimen: Urine Updated: 10/12/21 1049     Special Requests: No Special Requests        Culture result: No Growth (<1000 cfu/mL)       CULTURE, CSF  TUBE 2 [[id number]] Collected: 10/11/21 0504    Order Status: Completed Specimen: Cerebrospinal Fluid Updated: 10/18/21 1450     Special Requests: No Special Requests        GRAM STAIN No wbc's seen         No organisms seen        Culture result:       no growth on solid media at 4 days                  no growth in Thio broth at 7 days          CULTURE, BLOOD, PAIRED [[id number]]  (Abnormal)  (Susceptibility) Collected: 10/11/21 0225    Order Status: Completed Specimen: Blood Updated: 10/15/21 0921     Special Requests: No Special Requests        Culture result:       * methicillin resistant staphylococcus aureus * growing in all 4 bottles drawn (SITES NOT INDICATED)          Susceptibility      Staphylococcus aureus Methcillin Resistant      NIMA      Ciprofloxacin ($) Susceptible      Clindamycin ($) Susceptible      Daptomycin ($$$$$) Susceptible      Doxycycline ($$) Susceptible      Erythromycin ($$$$) Resistant      Gentamicin ($) Susceptible      Levofloxacin ($) Susceptible      Linezolid ($$$$$) Susceptible      Oxacillin Resistant      Rifampin ($$$$) Susceptible  [1]       Tetracycline Susceptible      Trimeth/Sulfa Susceptible Vancomycin ($) Susceptible              [1]  Rifampin is not to be used for mono-therapy. [facility]                          Diagnostics   CXR Results  (Last 48 hours)               10/24/21 0222  XR CHEST PORT Final result    Impression:  Left lung base atelectasis, airspace process and/or effusion. Lucency on the right hemidiaphragm and potentially extending across the midline   could indicate free air. Further evaluation for possible perforation or other   cause of free air is recommended if this patient is not postoperative. The inpatient nursing station was contacted and the findings were conveyed at   the time of this report. They will have the covering physician call me back with   any questions. I spoke with nurse Shaniqua Ponce       Narrative:  HISTORY:  Possible aspiration       TECHNIQUE:  XR CHEST PORT       COMPARISON: 2 weeks prior   LIMITATIONS: None       TUBES/LINES: Left chest wall dual-lead AICD       LUNG PARENCHYMA: Partial obscuration of the left hemidiaphragm increased or new   from prior   TRACHEA/BRONCHI: Normal   PULMONARY VESSELS: Normal   PLEURA: There may be left pleural effusion   HEART: Normal   AORTIC SHADOW:Normal.     MEDIASTINUM: Normal   BONE/SOFT TISSUES: No acute abnormality. OTHER: Questionable lucency beneath the right hemidiaphragm extending across the   midline                  Assessment/Plan     1. Acute respiratory failure due to aspiration pneumonitis       Maintaining O2 sats on nonrebreather mask       Afebrile, WBC normalized on today's lab       Agree with empiric Zosyn. Routine labs in the morning    2. MRSA bacteremia, persistent. BCx positive on 10/11, 10/13 and 10/15      Repeat BCx from 10/18 and 10/21 are both neg. No valvular veg on 2 D echo       Continue on Vanco and rifampin for MRSA coverage, adding on 6 weeks of therapy from 10/18       3. Acute right parotitis, suppurative, decreasing right facial swelling         4. MRSA colonization: S/p 5 days of Mupirocin      5. Cardiomyopathy w baseline EF of <15 % (10/19)     6. Acute on chronic renal failure, and rise in Cr on today's labs     Per ICU staff, fmly is considering comfort measures     Vi Ortega MD    10/24/2021

## 2021-10-24 NOTE — PROGRESS NOTES
PT TRANSFERRED TO  FROM [facility]. WAS A RAPID RESPONSE DUE TO ASPIRATING TUBE FEEDING. PRIOR TO ARRIVAL TO ICU; PT WAS SUCTIONED OF LARGE AMOUNT OF TUBE FEEDING. APPARENTLY VERY RESTLESS IN THE BED. SHE IS AWAKE BUT RESTLESS; NONVERBAL. O2 BY NRM AT 15 L/M. PT IS WAVING HER ARMS AROUND IN THE AIR AND LEGS IN THE AIR. WE REMOVED A COBAN FROM RIGHT UPPER ARM THAT APPEARED TO BE TOO TIGHT. SHE HAS AN IV LOCK THERE THAT FLUSHES SLUGGISHLY BUT DOES NOT HAVE ANY BLOOD RETURN. ANOTHER IV WAS STARTED IN LEFT FOREARM AND LABS WERE DRAWN. ABG DRAWN BY SUSIE; RRT. AFTER RESULTS OBTAINED; O2 WAS TURNED DOWN TO 12 L/M (NRB MASK)  AFTER A FEW MINUTES; PT DID RELAX. SHE HAS A PEG TUBE IN ABDOMEN THAT IS CLAMPED OFF. DR. Alan Rogers WAS CALLED FOR ORDERS; ASKED IF SHE NEEDED A CT OF ABDOMEN; HE SAID NO.  ALSO; THE NURSE ON [facility] WAS CALLED AND ASKED TO NOTIFY PT'S DAUGHTER OF TRANSFER TO ICU.  VERBALIZED UNDERSTANDING. At [facility]; pt's daughter was called. No answer; so detailed message left on answering service that pt was transferred to ICU. Her voice mail introduction identified her as Hillary Garcia.

## 2021-10-28 LAB
BACTERIA SPEC CULT: NORMAL
SPECIAL REQUESTS,SREQ: NORMAL

## 2021-12-08 NOTE — ANESTHESIA POSTPROCEDURE EVALUATION
Procedure(s):  PERCUTANEOUS ENDOSCOPIC GASTROSTOMY TUBE INSERTION. general    Anesthesia Post Evaluation      Multimodal analgesia: multimodal analgesia not used between 6 hours prior to anesthesia start to PACU discharge  Patient location during evaluation: bedside (Endoscopy suite)  Patient participation: complete - patient cannot participate  Level of consciousness: sleepy but conscious  Pain score: 0  Pain management: adequate  Airway patency: patent  Anesthetic complications: no  Cardiovascular status: acceptable  Respiratory status: acceptable and nasal cannula  Hydration status: acceptable  Comments: This patient remained on the stretcher. The patient was handed off to the endoscopy nursing team.  All questions regarding pre-, intra-, and postoperative care were answered.   Post anesthesia nausea and vomiting:  none      INITIAL Post-op Vital signs:   Vitals Value Taken Time   BP 53/42 10/24/21 1700   Temp 36.5 °C (97.7 °F) 10/24/21 1100   Pulse 0 10/24/21 1920   Resp 0 10/24/21 1920   SpO2 100 % 10/24/21 1100

## 2024-01-15 NOTE — PROGRESS NOTES
OCCUPATIONAL THERAPY TREATMENT  Patient: Bre Morales (1 y.o. female)  Date: 10/6/2021  Diagnosis: CHF exacerbation ([facility] [I50.9] <principal problem not specified>       Precautions:    Chart, occupational therapy assessment, plan of care, and goals were reviewed. ASSESSMENT  Patient continues with skilled OT services and is progressing towards goals. Pt. Received semi-supine in bed and agreeable to therapy session. Pt. A&O to self and place however re-oriented to time and situation. Pt. Performed supine to sit with Min A x2,  Patient was able to improve with all mobility req less assistance for bed mob, transfers, and demos improved sitting balance compared to prior therapy sessions. Patient completed supine to sit with min A x2. Pt req mod A to maintain seated balance at times with heavy post lean especially when performing dynamic tasks (ADL's)  at the EOB. Pt performed oral care and facial grooming while seated at EOB with set-up assistance and min A for placing tooth paste of tooth brush. Balance improved with static sitting and increased focus with close CGA. Patient performed sit to stand transfers with mod A x2 req cues for appropriate hand placement. Static standing with RW with slight left/post lean noted. Patient attempted side steps and marching in place in which pt demonstrated weakness and advancing LEs but not moving trunk forward. Pt. Returned semi-supine in bed with Min A for LE's. Pt able to scoot self up to [facility] with bed modified and SBA. Pt. Left semi-supine in bed with needs met and call bell within reach. REC. SNF when medically appropriate for therapy. Other factors to consider for discharge: PLOF, time since on set, increased confusion, assistance with transfers         PLAN :  Patient continues to benefit from skilled intervention to address the above impairments. Continue treatment per established plan of care. to address goals.     Recommendation for discharge: (in order for the patient to meet his/her long term goals)  Therapy up to 5 days/week in SNF setting    This discharge recommendation:  Has been made in collaboration with the attending provider and/or case management    IF patient discharges home will need the following DME:TBD       SUBJECTIVE:   Patient stated  I would like to wash my face.     OBJECTIVE DATA SUMMARY:   Cognitive/Behavioral Status:  Neurologic State: Alert  Orientation Level: Oriented to person;Oriented to place  Cognition: Follows commands;Poor safety awareness    Functional Mobility and Transfers for ADLs:  Bed Mobility:  Rolling: Minimum assistance;Assist x2  Supine to Sit: Minimum assistance;Assist x2  Sit to Supine: Minimum assistance;Assist x2  Scooting: Minimum assistance    Transfers:  Sit to Stand: Moderate assistance;Assist x2    Balance:  Sitting: Impaired; With support  Sitting - Static: Fair (occasional)  Sitting - Dynamic: Poor (constant support)  Standing: Impaired; With support  Standing - Static: Poor;Constant support  Standing - Dynamic : Poor;Constant support    ADL Intervention:  Grooming  Grooming Assistance: Minimum assistance  Position Performed: Seated in chair  Washing Face: Set-up; Stand-by assistance  Brushing Teeth: Set-up; Minimum assistance     Lower Body Dressing Assistance  Socks: Total assistance (dependent)  Position Performed: Seated edge of bed    Therapeutic Exercises: antonino Ue's  Exercise Sets Reps AROM AAROM PROM Self PROM Comments   Shoulder flex/ext 1 15 [x] [] [] []    Elbow flex/ext 1 15 [x] [] [] []    Wrist flex/ext   [] [] [] []       [] [] [] []      Pain:  Pain reported on ankle of RLE, no level of pain verbalized    Activity Tolerance:   Good  Please refer to the flowsheet for vital signs taken during this treatment.     After treatment patient left in no apparent distress:   Supine in bed, Call bell within reach and Side rails x 3    COMMUNICATION/COLLABORATION:   The patients plan of care was discussed with: Physical therapy assistant.  Co-tx with PTA for increased assistance with transfers     Emma Stylesia  Time Calculation: 33 mins    Problem: Self Care Deficits Care Plan (Adult)  Goal: *Acute Goals and Plan of Care (Insert Text)  Description: Pt will be min A sup<->sit in prep for EOB ADL's  Pt will be min A  LB dressing EOB level  Pt will be SBA  sit EOB 10 minutes in prep for EOB ADL's  Pt will be min A  grooming bed level progress to EOB  Pt will be min A sit<-> prep for toilet transfer  Pt will be min A  BSC/toilet transfer with LRAD  Pt will be min A  toileting/cloth mgmt LRAD  Pt will be min A UB bathing/dressing bed progress to EOB/chair level  Pt will be MI antonino UE HEP in prep for self care tasks      Outcome: Progressing Towards Goal [Impairment in more than one setting] : Impairment in more than one setting: Yes [Coexisting conditions] : Coexisting conditions: Yes [Medication choices] : Medication choices: Yes [Side effects of medications] : Side effects of medications: Yes

## 2024-04-04 NOTE — PROGRESS NOTES
HISTORY OF PRESENTING ILLNESS      Ab Yadav is a 76 y.o. female with hypertension, cardiomyopathy, and dual chamber Medtronic ICD who presents for follow up of her ICD. She denies chest pain, SOB, edema, syncope or functional limitations. Her device interrogation reveals normal device functioning. She has new onset atrial fibrillation occurring on device check, longest episode was 40 minutes. She denies cardiac complaints. Eliquis 5mg BID for CVA risk reduction (CHADS VASC 3) as well as Lopressor 25mg BID. She is tolerating her anticoagulation thus far however has experienced fatigue since starting the Lopressor. Metoprolol was lowered to 12.5mg daily d/t fatigue. She is feeling well and denies cardiac complications. EKG shows sinus rhythm and her device interrogation failed to reveal recurrence of arrhythmia. She was last seen one year ago. Denies chest pain, SOB, syncope. Tolerating eliquis. Interrogation demonstrated normal functioning.           PAST MEDICAL HISTORY     Past Medical History:   Diagnosis Date    [facility] ([address]     Cardiomyopathy, idiopathic ([address] 10/28/2010    HTN (hypertension), benign 10/28/2010    Pacemaker     ICD    PVC (premature ventricular contraction) 10/28/2010           PAST SURGICAL HISTORY     Past Surgical History:   Procedure Laterality Date    HX OTHER SURGICAL  10/02/2018    Right eye surgery    HX PACEMAKER            ALLERGIES     Allergies   Allergen Reactions    Chocolate [Cocoa] Anaphylaxis and Swelling    Iodine Anaphylaxis    Lisinopril Anaphylaxis    Peanut Anaphylaxis and Swelling    Glimepiride Swelling    Glipizide Other (comments)     Pruritis    Metformin Diarrhea    Norvasc [Amlodipine] Other (comments)     Light Headed    Pneumovax 23 [Pneumococcal 23-Devi Ps Vaccine] Hives    Toprol Xl [Metoprolol Succinate] Other (comments)     Light headed    Vitamin D [Cholecalciferol (Vitamin D3)] Other (comments) Dizziness/headache          FAMILY HISTORY     History reviewed. No pertinent family history. negative for cardiac disease       SOCIAL HISTORY     Social History     Socioeconomic History    Marital status: SINGLE     Spouse name: Not on file    Number of children: Not on file    Years of education: Not on file    Highest education level: Not on file   Tobacco Use    Smoking status: Never Smoker    Smokeless tobacco: Never Used   Substance and Sexual Activity    Alcohol use: No    Drug use: Never         MEDICATIONS     Current Outpatient Medications   Medication Sig    metFORMIN (GLUCOPHAGE) 500 mg tablet TAKE 1 TABLET BY MOUTH TWICE A DAY    gabapentin (NEURONTIN) 100 mg capsule TAKE 1 CAPSULE BY MOUTH THREE TIMES A DAY    metoprolol succinate (TOPROL-XL) 25 mg XL tablet TAKE 0.5 TABS BY MOUTH DAILY.  ELIQUIS 5 mg tablet TAKE 1 TABLET BY MOUTH TWICE A DAY    nitroglycerin (NITROSTAT) 0.4 mg SL tablet 0.4 mg by SubLINGual route every five (5) minutes as needed for Chest Pain. Up to 3 doses.  insulin syringe-needle U-100 (BD INSULIN SYRINGE ULTRA-FINE) 1/2 mL 31 gauge x 15/64\" syrg Use 4 times daily. Dx code E11.65    flash glucose scanning reader (FREESTYLE BELKIS READER) misc Test 4 times daily. Dx code E11.65. To use as needed    flash glucose sensor (FREESTYLE BELKIS SENSOR) kit Test 4 times daily. Dx code E11.65. Change every 10 days    cloNIDine HCl (CATAPRES) 0.3 mg tablet TAKE 1 TABLET TWICE DAILY    NIFEdipine ER (ADALAT CC) 30 mg ER tablet TAKE 1 TABLET BY MOUTH EVERY DAY FOR 90 DAYS    losartan (COZAAR) 100 mg tablet TAKE 1 TABLET EVERY DAY    hydroCHLOROthiazide (MICROZIDE) 12.5 mg capsule TAKE ONE CAPSULE BY MOUTH ONCE A DAY    PARoxetine (PAXIL) 20 mg tablet Take  by mouth daily.  omeprazole (PRILOSEC) 20 mg capsule Take 20 mg by mouth daily.     SITagliptin (Januvia) 100 mg tablet TAKE 1 TABLET BY MOUTH EVERY DAY     No current facility-administered medications for this Patient is a 89y old  Female who presents with a chief complaint of AMS- suspect focal seizure (03 Apr 2024 16:06)    INTERVAL HPI/OVERNIGHT EVENTS: No acute events overnight. HD stable.     MEDICATIONS  (STANDING):  amLODIPine   Tablet 10 milliGRAM(s) Oral daily  aspirin  chewable 81 milliGRAM(s) Oral daily  atorvastatin 20 milliGRAM(s) Oral at bedtime  divalproex  milliGRAM(s) Oral two times a day  dorzolamide 2% Ophthalmic Solution 1 Drop(s) Both EYES <User Schedule>  metoprolol tartrate 25 milliGRAM(s) Oral two times a day    MEDICATIONS  (PRN):  acetaminophen     Tablet .. 650 milliGRAM(s) Oral every 6 hours PRN Mild Pain (1 - 3), Moderate Pain (4 - 6)  melatonin 3 milliGRAM(s) Oral at bedtime PRN Insomnia      Allergies    penicillin (Hives)    Intolerances    Tolerated CTX 11/2023 (Other)      REVIEW OF SYSTEMS: all negative with exception of above    Vital Signs Last 24 Hrs  T(C): 37.1 (04 Apr 2024 11:35), Max: 37.1 (03 Apr 2024 18:54)  T(F): 98.8 (04 Apr 2024 11:35), Max: 98.8 (03 Apr 2024 18:54)  HR: 72 (04 Apr 2024 11:35) (72 - 82)  BP: 132/77 (04 Apr 2024 11:35) (129/95 - 162/90)  BP(mean): --  RR: 18 (04 Apr 2024 11:35) (16 - 18)  SpO2: 96% (04 Apr 2024 11:35) (96% - 100%)    Parameters below as of 04 Apr 2024 04:41  Patient On (Oxygen Delivery Method): room air        PHYSICAL EXAM:  GENERAL: NAD, well-groomed, well-developed  HEAD:  Atraumatic, Normocephalic  EYES: EOMI, PERRLA, conjunctiva and sclera clear  ENMT: No tonsillar erythema, exudates, or enlargement; Moist mucous membranes, Good dentition, No lesions  NECK: Supple, No JVD, Normal thyroid  NERVOUS SYSTEM:  Alert & Oriented X3, Good concentration; Motor Strength 5/5 B/L upper and lower extremities; DTRs 2+ intact and symmetric  CHEST/LUNG: Clear to percussion bilaterally; No rales, rhonchi, wheezing, or rubs  HEART: Regular rate and rhythm; No murmurs, rubs, or gallops  ABDOMEN: Soft, Nontender, Nondistended; Bowel sounds present  EXTREMITIES:  2+ Peripheral Pulses, No clubbing, cyanosis, or edema  LYMPH: No lymphadenopathy noted  SKIN: No rashes or lesions    LABS:                        11.3   4.94  )-----------( 199      ( 04 Apr 2024 07:10 )             34.1     04-04    141  |  109<H>  |  22  ----------------------------<  121<H>  3.8   |  23  |  0.77    Ca    8.7      04 Apr 2024 07:10  Phos  2.7     04-04  Mg     2.1     04-04    TPro  7.4  /  Alb  2.8<L>  /  TBili  0.5  /  DBili  x   /  AST  44<H>  /  ALT  29  /  AlkPhos  76  04-04    PTT - ( 02 Apr 2024 14:51 )  PTT:16.6 sec  Urinalysis Basic - ( 04 Apr 2024 07:10 )    Color: x / Appearance: x / SG: x / pH: x  Gluc: 121 mg/dL / Ketone: x  / Bili: x / Urobili: x   Blood: x / Protein: x / Nitrite: x   Leuk Esterase: x / RBC: x / WBC x   Sq Epi: x / Non Sq Epi: x / Bacteria: x      CAPILLARY BLOOD GLUCOSE          RADIOLOGY & ADDITIONAL TESTS:    Imaging Personally Reviewed:  [ ] YES  [ ] NO    Consultant(s) Notes Reviewed:  [ ] YES  [ ] NO    Care Discussed with Consultants/Other Providers [ ] YES  [ ] NO visit.        I have reviewed the nurses notes, vitals, problem list, allergy list, medical history, family, social history and medications. REVIEW OF SYMPTOMS      General: Pt denies excessive weight gain or loss. Pt is able to conduct ADL's  HEENT: Denies blurred vision, headaches, hearing loss, epistaxis and difficulty swallowing. Respiratory: Denies cough, congestion, shortness of breath, WEAVER, wheezing or stridor. Cardiovascular: Denies precordial pain, palpitations, edema or PND  Gastrointestinal: Denies poor appetite, indigestion, abdominal pain or blood in stool  Genitourinary: Denies hematuria, dysuria, increased urinary frequency  Musculoskeletal: Denies joint pain or swelling from muscles or joints  Neurologic: Denies tremor, paresthesias, headache, or sensory motor disturbance  Psychiatric: Denies confusion, insomnia, depression  Integumentray: Denies rash, itching or ulcers. Hematologic: Denies easy bruising, bleeding       PHYSICAL EXAMINATION      Vitals: see vitals section  General: Well developed, in no acute distress. HEENT: No jaundice, oral mucosa moist, no oral ulcers  Neck: Supple, no stiffness, no lymphadenopathy, supple  Heart:  Normal S1/S2 negative S3 or S4. Regular, no murmur, gallop or rub, no jugular venous distention  Respiratory: Clear bilaterally x 4, no wheezing or rales  Abdomen:   Soft, non-tender, bowel sounds are active. Extremities:  No edema, normal cap refill, no cyanosis. Musculoskeletal: No clubbing, no deformities  Neuro: A&Ox3, speech clear, gait stable, cooperative, no focal neurologic deficits  Skin: Skin color is normal. No rashes or lesions.  Non diaphoretic, moist.  Vascular: 2+ pulses symmetric in all extremities       DIAGNOSTIC DATA      EKG:        LABORATORY DATA      Lab Results   Component Value Date/Time    WBC 6.0 08/13/2018 10:48 AM    HGB 13.7 08/13/2018 10:48 AM    HCT 40.5 08/13/2018 10:48 AM    PLATELET 974 22/82/4654 10:48 AM    MCV 89 08/13/2018 10:48 AM      Lab Results   Component Value Date/Time    Sodium 140 01/14/2019 10:09 AM    Potassium 3.7 01/14/2019 10:09 AM    Chloride 100 01/14/2019 10:09 AM    CO2 23 01/14/2019 10:09 AM    Glucose 207 (H) 01/14/2019 10:09 AM    BUN 19 01/14/2019 10:09 AM    Creatinine 1.28 (H) 01/14/2019 10:09 AM    BUN/Creatinine ratio 15 01/14/2019 10:09 AM    GFR est AA 51 (L) 01/14/2019 10:09 AM    GFR est non-AA 44 (L) 01/14/2019 10:09 AM    Calcium 9.7 01/14/2019 10:09 AM    Bilirubin, total 0.4 01/14/2019 10:09 AM    Alk. phosphatase 98 01/14/2019 10:09 AM    Protein, total 7.6 01/14/2019 10:09 AM    Albumin 4.0 01/14/2019 10:09 AM    A-G Ratio 1.1 (L) 01/14/2019 10:09 AM    ALT (SGPT) 11 01/14/2019 10:09 AM           ASSESSMENT      1. ICD                        X. Medtronic                        T. Dual  2. Cardiomyopathy  3. Hypertension  4. Paroxysmal [facility]                        CHADS VASC 3              Asymptomatic     PLAN     Continue current drug regimen. Monitor for progression of AF in which case may consider changing to diltiazem or addition of an AAD. ICD-10-CM ICD-9-CM    1. Cardiac pacemaker in situ  Z95.0 V45.01    2. Paroxysmal atrial fibrillation (HCC)  I48.0 427.31    3. HTN (hypertension), benign  I10 401.1    4. PVC (premature ventricular contraction)  I49.3 427.69      Orders Placed This Encounter    metFORMIN (GLUCOPHAGE) 500 mg tablet     Sig: TAKE 1 TABLET BY MOUTH TWICE A DAY    gabapentin (NEURONTIN) 100 mg capsule     Sig: TAKE 1 CAPSULE BY MOUTH THREE TIMES A DAY          FOLLOW-UP     1 year      Thank you, Dedrick Parra MD for allowing me to participate in the care of this extraordinarily pleasant female. Please do not hesitate to contact me for further questions/concerns.          Kasia Remy MD  Cardiac Electrophysiology / Cardiology    [address]  [address] [facility] [address] [facility], [address]  [address]    [place]  [phone] / [phone] Fax   [phone] / [phone] Fax

## 2024-06-15 NOTE — PROGRESS NOTES
Patient hard to arouse, MD notified. MD assessed patient and explained to RN that this has happened before and to continue to monitor the patient. 1330: MD notified again of patient not responding. CT head ordered. 1409: blood sugar of 70. MD ordered dextrose to be given. 1421: blood sugar 211 and MD ordered 5% dextrose with 0.45% normal saline to be infused at 75mL/hr. MD also ordered ammonia and MD gave it to patient. Patient aroused some to the smell of ammonia. 1538: Patient's family at bedside and requesting to talk to MD. MD notified and phone number to patient received and given to MD    1700: patient is awake and alert. Family at bedside. [id number]

## 2024-12-17 NOTE — PROGRESS NOTES
Progress Note    Patient: Gris Bolaños MRN: [id number]  SSN: [id number]    YOB: 1955  Age: 78 y.o. Sex: female      Admit Date: 10/11/2021    LOS: 12 days     Subjective:   Reports of PEG tube was pulled out by pt.  Quinn catheter in   Past Medical History:   Diagnosis Date    [facility] ([email or web address]     Cardiomyopathy, idiopathic ([email or web address] 10/28/2010    Chronic kidney disease     Heart failure ([email or web address]     HTN (hypertension), benign 10/28/2010    Pacemaker     ICD    PVC (premature ventricular contraction) 10/28/2010        Current Facility-Administered Medications:     dextrose 5% infusion, 75 mL/hr, IntraVENous, CONTINUOUS, Ammy Dumont MD, Last Rate: 75 mL/hr at 10/23/21 1222, 75 mL/hr at 10/23/21 1222    DAPTOmycin (CUBICIN) 450 mg in 0.9% sodium chloride 50 mL IVPB, 6 mg/kg, IntraVENous, Q24H, Troie Mei MD, Last Rate: 100 mL/hr at 10/22/21 1337, 450 mg at 10/22/21 1337    aspirin chewable tablet 81 mg, 81 mg, Oral, DAILY, Ammy Dumont MD, 81 mg at 10/21/21 7480    glucose chewable tablet 16 g, 4 Tablet, Oral, PRN, Chanel DOLAN MD    dextrose (D50W) injection syrg 12.5-25 g, 25-50 mL, IntraVENous, PRN, Sourva Salmeron MD, 12.5 g at 10/23/21 0840    glucagon (GLUCAGEN) injection 1 mg, 1 mg, IntraMUSCular, PRN, Sourav Salmeron MD    insulin glargine (LANTUS) injection 10 Units, 10 Units, SubCUTAneous, DAILY, Sourav So MD, 10 Units at 10/22/21 0837    hydrOXYzine (VISTARIL) injection 25 mg, 25 mg, IntraMUSCular, Q6H PRN, Ammy Dumont MD, 25 mg at 10/22/21 6620    traMADoL (ULTRAM) tablet 50 mg, 50 mg, Oral, Q6H PRN, Ammy Dumont MD, 50 mg at 10/22/21 0134    zinc oxide-white petrolatum 17-57 % topical paste, , Topical, TID, Ammy Dumont MD, Given at 10/23/21 0848    rifAMPin (RIFADIN) capsule 300 mg, 300 mg, Oral, DAILY, Torie Mei MD, 300 mg at 10/21/21 0926    cefTARoline (TEFLARO) 300 mg in 0.9% sodium chloride 50 mL IVPB, 300 mg, IntraVENous, Q12H, Torie Mei MD, Last Rate: 50 mL/hr at 10/23/21 0847, 300 mg at 10/23/21 0847    [Held by provider] furosemide (LASIX) tablet 40 mg, 40 mg, Oral, Q48H, Akilah White MD, 40 mg at 10/15/21 1135    atorvastatin (LIPITOR) tablet 40 mg, 40 mg, Oral, QHS, Ammy Dumont MD, 40 mg at 10/21/21 2252    carvediloL (COREG) tablet 6.25 mg, 6.25 mg, Oral, BID WITH MEALS, Ammy Dumont MD, 6.25 mg at 10/21/21 0025    prednisoLONE acetate (PRED FORTE) 1 % ophthalmic suspension 1 Drop, 1 Drop, Both Eyes, TID, Ammy Dumont MD, 1 Drop at 10/23/21 0850    insulin lispro (HUMALOG) injection, , SubCUTAneous, AC&HS, Ammy Dumont MD, 4 Units at 10/21/21 1753    glucose chewable tablet 16 g, 4 Tablet, Oral, PRN, Ammy Dumont MD    dextrose (D50W) injection syrg 12.5-25 g, 25-50 mL, IntraVENous, PRN, Logan Dumont MD    glucagon (GLUCAGEN) injection 1 mg, 1 mg, IntraMUSCular, PRN, Logan Dumont MD    acetaminophen (TYLENOL) tablet 650 mg, 650 mg, Oral, Q6H PRN **OR** acetaminophen (TYLENOL) suppository 650 mg, 650 mg, Rectal, Q6H PRN, Logan Dumont MD    polyethylene glycol (MIRALAX) packet 17 g, 17 g, Oral, DAILY PRN, Logan Dumont MD    ondansetron (ZOFRAN ODT) tablet 4 mg, 4 mg, Oral, Q8H PRN **OR** ondansetron (ZOFRAN) injection 4 mg, 4 mg, IntraVENous, Q6H PRN, Ammy Dumont MD    Objective:     Vitals:    10/22/21 1454 10/22/21 1624 10/23/21 0249 10/23/21 0841   BP: 119/64 (!) 136/96 (!) 138/92    Pulse: 100 83 83 71   Resp: 20 20 20 20   Temp:  97 °F (36.1 °C) 97.6 °F (36.4 °C) 97.6 °F (36.4 °C)   SpO2:  93% 93% (!) 88%   Weight:       Height:            Intake and Output:  Current Shift: No intake/output data recorded. Last three shifts: 10/21 1901 - 10/23 0700  In: 3806 [I.V.:450]  Out: 50 [Urine:50]    Physical Exam:   Physical Exam  Constitutional:       Appearance: She is ill-appearing.    Eyes:      Extraocular Movements: Extraocular movements intact. Cardiovascular:      Rate and Rhythm: Normal rate and regular rhythm. Pulmonary:      Effort: Pulmonary effort is normal.   Abdominal:      General: Abdomen is flat. Comments: PEG tube site with escobedo, no drainage, no infection   Musculoskeletal:         General: Normal range of motion. Skin:     General: Skin is warm. Neurological:      General: No focal deficit present. Lab/Data Review:  Recent Results (from the past 24 hour(s))   GLUCOSE, POC    Collection Time: 10/22/21  2:54 PM   Result Value Ref Range    Glucose (POC) 149 (H) 65 - 117 mg/dL    Performed by Nathalie, POC    Collection Time: 10/22/21  9:30 PM   Result Value Ref Range    Glucose (POC) 101 65 - 117 mg/dL    Performed by Mike Yousif, POC    Collection Time: 10/23/21  3:43 AM   Result Value Ref Range    Glucose (POC) 30 (LL) 65 - 117 mg/dL    Performed by Hernan Cohen    GLUCOSE, POC    Collection Time: 10/23/21  3:47 AM   Result Value Ref Range    Glucose (POC) 65 65 - 117 mg/dL    Performed by [facility]    GLUCOSE, POC    Collection Time: 10/23/21  4:39 AM   Result Value Ref Range    Glucose (POC) 55 (L) 65 - 117 mg/dL    Performed by [facility]    CBC WITH AUTOMATED DIFF    Collection Time: 10/23/21  4:58 AM   Result Value Ref Range    WBC 16.3 (H) 3.6 - 11.0 K/uL    RBC 4.69 3.80 - 5.20 M/uL    HGB 13.2 11.5 - 16.0 g/dL    HCT 41.5 35.0 - 47.0 %    MCV 88.5 80.0 - 99.0 FL    MCH 28.1 26.0 - 34.0 PG    MCHC 31.8 30.0 - 36.5 g/dL    RDW 21.4 (H) 11.5 - 14.5 %    PLATELET 73 (L) 388 - 400 K/uL    NRBC 1.3 (H) 0.0  WBC    ABSOLUTE NRBC 0.21 (H) 0.00 - 0.01 K/uL    NEUTROPHILS 77 (H) 32 - 75 %    LYMPHOCYTES 13 12 - 49 %    MONOCYTES 9 5 - 13 %    EOSINOPHILS 0 0 - 7 %    BASOPHILS 0 0 - 1 %    IMMATURE GRANULOCYTES 1 (H) 0 - 0.5 %    ABS. NEUTROPHILS 12.6 (H) 1.8 - 8.0 K/UL    ABS. LYMPHOCYTES 2.1 0.8 - 3.5 K/UL    ABS.  MONOCYTES 1.4 (H) 0.0 - 1.0 K/UL ABS. EOSINOPHILS 0.1 0.0 - 0.4 K/UL    ABS. BASOPHILS 0.0 0.0 - 0.1 K/UL    ABS. IMM. GRANS. 0.1 (H) 0.00 - 0.04 K/UL    DF AUTOMATED     METABOLIC PANEL, COMPREHENSIVE    Collection Time: 10/23/21  4:58 AM   Result Value Ref Range    Sodium 150 (H) 136 - 145 mmol/L    Potassium 4.8 3.5 - 5.1 mmol/L    Chloride 119 (H) 97 - 108 mmol/L    CO2 24 21 - 32 mmol/L    Anion gap 7 5 - 15 mmol/L    Glucose 79 65 - 100 mg/dL    BUN 70 (H) 6 - 20 mg/dL    Creatinine 1.75 (H) 0.55 - 1.02 mg/dL    BUN/Creatinine ratio 40 (H) 12 - 20      GFR est AA 35 (L) >60 ml/min/1.73m2    GFR est non-AA 29 (L) >60 ml/min/1.73m2    Calcium 8.8 8.5 - 10.1 mg/dL    Bilirubin, total 1.0 0.2 - 1.0 mg/dL    AST (SGOT) 71 (H) 15 - 37 U/L    ALT (SGPT) 74 12 - 78 U/L    Alk. phosphatase 139 (H) 45 - 117 U/L    Protein, total 6.7 6.4 - 8.2 g/dL    Albumin 1.9 (L) 3.5 - 5.0 g/dL    Globulin 4.8 (H) 2.0 - 4.0 g/dL    A-G Ratio 0.4 (L) 1.1 - 2.2     GLUCOSE, POC    Collection Time: 10/23/21  8:31 AM   Result Value Ref Range    Glucose (POC) 67 65 - 117 mg/dL    Performed by Vince Breen (ANGEL)    GLUCOSE, POC    Collection Time: 10/23/21  9:02 AM   Result Value Ref Range    Glucose (POC) 116 65 - 117 mg/dL    Performed by Sheila GOMEZ [facility] POC    Collection Time: 10/23/21 12:14 PM   Result Value Ref Range    Glucose (POC) 32 (LL) 65 - 117 mg/dL    Performed by Quan Freeman    GLUCOSE, POC    Collection Time: 10/23/21 12:16 PM   Result Value Ref Range    Glucose (POC) 138 (H) 65 - 117 mg/dL    Performed by uQan Freeman         CT NECK SOFT TISSUE WO CONT   Final Result   Persistent swelling of the right parotid gland although slightly   less prominent. Resolving cellulitic changes in the right neck. Decrease in the   retropharyngeal and right parapharyngeal edema.       Follow-up in 2 weeks preferably with intravenous contrast (if there are no   contraindications) is suggested to exclude associated neoplastic process of the   right parotid gland.      CT MAXILLOFACIAL WO CONT   Final Result   Findings more consistent with cellulitis of the right face that   includes the right parotid gland (acute parotitis) as described above. Edema/fluid collection in the parapharyngeal space and retropharyngeal space. Suggestions as above. CT ABD PELV WO CONT   Final Result   Small amount of ascites. No focal fluid collection or free air. Density in the gallbladder which could be artifact, sludge or gallstones. No   specific CT evidence of cholecystitis. Bladder wall prominence. Other findings   as above. CT HEAD WO CONT   Final Result   1. No evidence of acute intracranial hemorrhage or mass effect. 2.  Multifocal intracranial encephalomalacia as above. XR CHEST PORT   Final Result   1. Mild pulmonary vascular congestion. 2.  Cardiomegaly.            Assessment:     Active Problems:    Altered mental status (10/11/2021)      Sepsis ([address] (10/11/2021)      Moderate protein-calorie malnutrition ([address] (10/12/2021)    Start postop PEG tube placement for nutrition support  Pull out  By pt   Plan:   Continue IV hydrations  Peg reinsertion at bed side in am    Signed By: Roz Smith MD     October 23, 2021        Thank you for allowing me to participate in this patients care  Cc Referring Physician   Chichi Jain MD 4 = No assist / stand by assistance (4) rarely moist

## 2025-05-08 NOTE — PROGRESS NOTES
PHYSICAL THERAPY EVALUATION  Patient: Merlin Sol (75 y.o. female)  Date: 10/21/2021  Primary Diagnosis: Sepsis ([facility] [A41.9]  Altered mental status [R41.82]  Procedure(s) (LRB):  PERCUTANEOUS ENDOSCOPIC GASTROSTOMY TUBE INSERTION (N/A) 8 Days Post-Op   Precautions: falls, PEG tube feeds       ASSESSMENT  Pt is a 79 yo female admitted on 10/11/2021 for AMS; per medical records pt recently DC to [place] for skilled nursing. PMH: afib, cardiomyopathy, CKD, HF, HTN, AICD, PVC. Pt A&O x 0; unable to verbalize. Per medical records pt has not amb since May 2021 following fall. DC to [facility] 10/6/21. Based on the objective data described below, the patient presents with generalized weakness, impaired functional mobility, impaired amb, impaired balance, and decreased activity tolerance. Pt semi-supine in bed upon PT arrival, agreeable to evaluation. Pt appeared very restless in bed, noted to have had BM on chux pad. Pt required max to total A for bed mobility; pushing back against therapist during bowel hygiene while sidelying, Pt did poor with session today, unable to answer basic questions with little to no command following noted; requiring max verbal and tactile cues to perform all mobility. Pt will benefit from continued skilled PT to address above deficits and return to PLOF. Current PT DC recommendation SNF. Current Level of Function Impacting Discharge (mobility/balance): max to total A    Other factors to consider for discharge: severity of deficits       PLAN :  Recommendations and Planned Interventions: bed mobility training, transfer training, gait training, therapeutic exercises, neuromuscular re-education, patient and family training/education and therapeutic activities      Frequency/Duration: Patient will be followed by physical therapy:  1-2x/week to address goals.     Recommendation for discharge: (in order for the patient to meet his/her long term goals)  Skilled Nursing Facility    This discharge recommendation:  Has been made in collaboration with the attending provider and/or case management    IF patient discharges home will need the following DME: none         SUBJECTIVE:   Patient non-verbal at time of assessment. OBJECTIVE DATA SUMMARY:   HISTORY:    Past Medical History:   Diagnosis Date    [facility] ([facility]     Cardiomyopathy, idiopathic ([facility] 10/28/2010    Chronic kidney disease     [facility]     HTN (hypertension), benign 10/28/2010    Pacemaker     ICD    PVC (premature ventricular contraction) 10/28/2010     Past Surgical History:   Procedure Laterality Date    HX OTHER SURGICAL  10/02/2018    Right eye surgery    HX PACEMAKER         Home Situation  Home Environment: Private residence  One/Two Story Residence: One story  Living Alone: No  Support Systems: Child(elvis)  Patient Expects to be Discharged to[de-identified] Skilled nursing facility  Current DME Used/Available at Home: Walker, rolling    EXAMINATION/PRESENTATION/DECISION MAKING:   Critical Behavior:  Neurologic State: Alert, Confused  Orientation Level: Unable to verbalize  Cognition: No command following     Hearing: Auditory  Auditory Impairment: None  Skin:  Intact where visible  Edema: none noted   Range Of Motion:  AROM: Within functional limits           PROM: Within functional limits           Strength:    Strength: Generally decreased, functional                   Functional Mobility:  Bed Mobility:  Rolling: Maximum assistance; Total assistance        Scooting: Total assistance; Other (comment) (HOB)  Transfers: Therapeutic Exercises:   Not completed this session    Functional Measure:  [employer] AM-PAC 6 Clicks         Basic Mobility Inpatient Short Form  How much difficulty does the patient currently have. .. Unable A Lot A Little None   1. Turning over in bed (including adjusting bedclothes, sheets and blankets)? [] 1   [x] 2   [] 3   [] 4   2.   Sitting [email or web address]/[id number]               down on and standing up from a chair with arms ( e.g., wheelchair, bedside commode, etc.)   [x] 1   [] 2   [] 3   [] 4   3. Moving from lying on back to sitting on the side of the bed? [x] 1   [] 2   [] 3   [] 4          How much help from another person does the patient currently need. .. Total A Lot A Little None   4. Moving to and from a bed to a chair (including a wheelchair)? [x] 1   [] 2   [] 3   [] 4   5. Need to walk in hospital room? [x] 1   [] 2   [] 3   [] 4   6. Climbing 3-5 steps with a railing? [x] 1   [] 2   [] 3   [] 4   © , Trustees of [address] [place] [address] [address] [id number], under license to [employer]. All rights reserved     Score:  Initial:  Most Recent: X (Date: 10/21/21 )   Interpretation of Tool:  Represents activities that are increasingly more difficult (i.e. Bed mobility, Transfers, Gait). Score 24 23 22-20 19-15 14-10 9-7 6   Modifier CH CI CJ CK CL CM CN         Physical Therapy Evaluation Charge Determination   History Examination Presentation Decision-Making   HIGH Complexity :3+ comorbidities / personal factors will impact the outcome/ POC  HIGH Complexity : 4+ Standardized tests and measures addressing body structure, function, activity limitation and / or participation in recreation  MEDIUM Complexity : Evolving with changing characteristics  Other outcome measures ampac 6  mod       Based on the above components, the patient evaluation is determined to be of the following complexity level: MEDIUM    Pain Ratin/10 FACES    Activity Tolerance:   Poor    After treatment patient left in no apparent distress:   Supine in bed, Call bell within reach and Side rails x 3 and nsg updated. GOALS:    Problem: Mobility Impaired (Adult and Pediatric)  Goal: *Acute Goals and Plan of Care (Insert Text)  Description: Pt will participate with LE HEP in 7 days. Pt will perform bed mobility with min A in 7 days. Pt will perform transfers with min A in 7 days.        Outcome: Not Met COMMUNICATION/EDUCATION:   The patients plan of care was discussed with: Registered nurse and Case management. Patient is unable to participate in goal setting and plan of care.        Thank you for this referral.  Svetlana Staley, PT, DPT   Time Calculation: 18 mins

## (undated) DEVICE — CATH KT GASTMY PEG PSH 20FR --